# Patient Record
Sex: FEMALE | Race: WHITE | NOT HISPANIC OR LATINO | Employment: FULL TIME | URBAN - METROPOLITAN AREA
[De-identification: names, ages, dates, MRNs, and addresses within clinical notes are randomized per-mention and may not be internally consistent; named-entity substitution may affect disease eponyms.]

---

## 2017-01-04 DIAGNOSIS — E03.9 HYPOTHYROIDISM, UNSPECIFIED TYPE: Primary | ICD-10-CM

## 2017-01-06 DIAGNOSIS — E03.9 HYPOTHYROIDISM, UNSPECIFIED TYPE: Primary | ICD-10-CM

## 2017-01-06 RX ORDER — LEVOTHYROXINE SODIUM 50 UG/1
50 TABLET ORAL DAILY
Qty: 90 TABLET | Refills: 2 | Status: SHIPPED | OUTPATIENT
Start: 2017-01-06 | End: 2017-04-30 | Stop reason: DRUGHIGH

## 2017-02-23 ENCOUNTER — OFFICE VISIT (OUTPATIENT)
Dept: FAMILY MEDICINE | Facility: CLINIC | Age: 52
End: 2017-02-23
Payer: COMMERCIAL

## 2017-02-23 ENCOUNTER — RADIANT APPOINTMENT (OUTPATIENT)
Dept: GENERAL RADIOLOGY | Facility: CLINIC | Age: 52
End: 2017-02-23
Attending: FAMILY MEDICINE
Payer: COMMERCIAL

## 2017-02-23 VITALS
SYSTOLIC BLOOD PRESSURE: 114 MMHG | BODY MASS INDEX: 35.36 KG/M2 | DIASTOLIC BLOOD PRESSURE: 75 MMHG | HEIGHT: 66 IN | WEIGHT: 220 LBS | HEART RATE: 78 BPM | TEMPERATURE: 98.4 F

## 2017-02-23 DIAGNOSIS — M25.512 PAIN IN JOINT OF LEFT SHOULDER: ICD-10-CM

## 2017-02-23 DIAGNOSIS — M25.512 ACUTE PAIN OF LEFT SHOULDER: ICD-10-CM

## 2017-02-23 DIAGNOSIS — M25.512 ACUTE PAIN OF LEFT SHOULDER: Primary | ICD-10-CM

## 2017-02-23 PROCEDURE — 99213 OFFICE O/P EST LOW 20 MIN: CPT | Mod: 25 | Performed by: FAMILY MEDICINE

## 2017-02-23 PROCEDURE — 73030 X-RAY EXAM OF SHOULDER: CPT | Mod: LT

## 2017-02-23 PROCEDURE — 20610 DRAIN/INJ JOINT/BURSA W/O US: CPT | Performed by: FAMILY MEDICINE

## 2017-02-23 RX ORDER — LIDOCAINE HYDROCHLORIDE 10 MG/ML
3 INJECTION, SOLUTION INFILTRATION; PERINEURAL ONCE
Qty: 3 ML | Refills: 0 | OUTPATIENT
Start: 2017-02-23 | End: 2017-02-23

## 2017-02-23 NOTE — PROGRESS NOTES
SUBJECTIVE:                                                    Merlyn Ravi is a 51 year old female who presents to clinic today for the following health issues:      Joint Pain     Onset: 4 days ago     Description: Patient started pain in L arm 4 days ago, on  2-18 woke up with pain   Location: left shoulder  Character: Sharp, Dull ache and Stabbing    Intensity: moderate, severe with lifting     Progression of Symptoms: worse    Accompanying Signs & Symptoms:  Other symptoms: radiation of pain to down L arm  and weakness of L arm    History:   Previous similar pain: no       Precipitating factors:   Trauma or overuse: no     Alleviating factors:  Improved by: nothing       Therapies Tried and outcome: heat ice and aleve       Patient is a 51 yr old female here for left shoulder and arm pain. She woke up with the pain in her left shoulder. Patient reports that she did not have any trauma to the shoulder and she does not recollect any heavy lifting. Pain is severe and has affected her range of motion. She denies any tingling or numbness in her fingers. Pain does radiate into her upper arms.Patient reports that the pain is sharp in nature. She has been icing it and using aleve.     Problem list and histories reviewed & adjusted, as indicated.  Additional history: as documented    Patient Active Problem List   Diagnosis     CARDIOVASCULAR SCREENING; LDL GOAL LESS THAN 160     LASIK OU 5 yrs     Dermatofibroma     Monoclonal paraproteinemia     Hyperlipidemia LDL goal <130     Right elbow pain     Lateral epicondylitis of right elbow     Past Surgical History   Procedure Laterality Date     C nonspecific procedure       bladder surgery     Hysterectomy, nusrat       Lasik bilateral  2005     Dr. Solis      Hysterectomy, pap no longer indicated         Social History   Substance Use Topics     Smoking status: Never Smoker     Smokeless tobacco: Never Used     Alcohol use 0.0 oz/week     0 Standard drinks or  equivalent per week      Comment: 3 drinks per month     Family History   Problem Relation Age of Onset     Thyroid Disease Mother      HEART DISEASE Mother      HEART DISEASE Father      Hyperlipidemia Father      Hypertension Maternal Grandmother      Breast Cancer Maternal Grandmother      Hypertension Maternal Grandfather      Alzheimer Disease Paternal Grandmother      DIABETES Brother      Eczema Brother      Thyroid Disease Brother          Current Outpatient Prescriptions   Medication Sig Dispense Refill     levothyroxine (SYNTHROID/LEVOTHROID) 50 MCG tablet Take 1 tablet (50 mcg) by mouth daily 90 tablet 2     multivitamin, therapeutic with minerals (MULTI-VITAMIN) TABS Take 1 tablet by mouth daily       atorvastatin (LIPITOR) 10 MG tablet Take 1 tablet (10 mg) by mouth daily 90 tablet 3     [DISCONTINUED] levothyroxine (SYNTHROID/LEVOTHROID) 50 MCG tablet Take 1 tablet (50 mcg) by mouth daily 90 tablet 3     order for DME Equipment being ordered: wrist brace 1 Device 0     levothyroxine (SYNTHROID, LEVOTHROID) 88 MCG tablet Take 1 tablet (88 mcg) by mouth daily 90 tablet 3     IBUPROFEN PO Take 800 mg by mouth every 4 hours as needed for moderate pain       Naproxen Sodium (ALEVE PO) Take 220 mg by mouth 2 times daily (with meals)       FLUARIX QUADRIVALENT 0.5 ML injection        metroNIDAZOLE (METROCREAM) 0.75 % cream Apply twice daily to affected area on face. 45 g 11     doxycycline Monohydrate 100 MG CAPS Take 1 capsule (100 mg) by mouth daily with food 90 capsule 0     Allergies   Allergen Reactions     Demerol      Sedation and vomiting     Sulfa Drugs      unknown reaction     BP Readings from Last 3 Encounters:   02/23/17 114/75   12/08/16 118/70   12/07/16 117/68    Wt Readings from Last 3 Encounters:   02/23/17 220 lb (99.8 kg)   12/08/16 227 lb (103 kg)   12/07/16 222 lb (100.7 kg)                  Labs reviewed in EPIC  Problem list, Medication list, Allergies, and Medical/Social/Surgical  "histories reviewed in EPIC and updated as appropriate.    ROS:  C: NEGATIVE for fever, chills, change in weight  E/M: NEGATIVE for ear, mouth and throat problems  R: NEGATIVE for significant cough or SOB  CV: NEGATIVE for chest pain, palpitations or peripheral edema  MUSCULOSKELETAL: NEGATIVE for significant arthralgias or myalgia and POSITIVE  for joint pain left shoulder    OBJECTIVE:                                                    /75 (BP Location: Right arm, Cuff Size: Adult Regular)  Pulse 78  Temp 98.4  F (36.9  C) (Tympanic)  Ht 5' 5.75\" (1.67 m)  Wt 220 lb (99.8 kg)  BMI 35.78 kg/m2  Body mass index is 35.78 kg/(m^2).  GENERAL: healthy, alert and no distress  HENT: ear canals and TM's normal, nose and mouth without ulcers or lesions  NECK: no adenopathy, no asymmetry, masses, or scars and thyroid normal to palpation  CV: regular rates and rhythm, normal S1 S2, no S3 or S4 and no murmur, click or rub  MS: decreased range of motion in left shoulder  SKIN: no suspicious lesions or rashes    Diagnostic Test Results:  X-rays left shoulder  Normal x-rays     ASSESSMENT/PLAN:                                                        (M25.512) Acute pain of left shoulder  (primary encounter diagnosis)  Comment: Patient's x-rays were discussed. No dislocation or fracture.  Plan: XR Shoulder Left 2 Views            (M25.512) Pain in joint of left shoulder  Comment: Injection placed   Plan: Continue with aleve, icing and restrict self from heavy lifting .     FUTURE APPOINTMENTS:       - Follow-up visit as needed  Patient Instructions         Thank you for choosing [facility].  You may be receiving a survey in the mail from [employer] regarding your visit today.  Please take a few minutes to complete and return the survey to let us know how we are doing.      If you have questions or concerns, please contact us via [employer] or you can contact your care team at [phone].    Our Clinic hours are:  Monday " 6:40 am  to 7:00 pm  Tuesday -Friday 6:40 am to 5:00 pm    The [place] outpatient lab hours are:  Monday - Friday 6:10 am to 4:45 pm  Saturdays 7:00 am to 11:00 am  Appointments are required, call [phone]    If you have clinical questions after hours or would like to schedule an appointment,  call the clinic at [phone].      Memo Garcia MD  [facility]

## 2017-02-23 NOTE — NURSING NOTE
"Chief Complaint   Patient presents with     Shoulder Pain       Initial /75 (BP Location: Right arm, Cuff Size: Adult Regular)  Pulse 78  Temp 98.4  F (36.9  C) (Tympanic)  Ht 5' 5.75\" (1.67 m)  Wt 220 lb (99.8 kg)  BMI 35.78 kg/m2 Estimated body mass index is 35.78 kg/(m^2) as calculated from the following:    Height as of this encounter: 5' 5.75\" (1.67 m).    Weight as of this encounter: 220 lb (99.8 kg).  Medication Reconciliation: complete  "

## 2017-02-23 NOTE — MR AVS SNAPSHOT
After Visit Summary   2/23/2017    Merlyn Ravi    MRN: [id number]           Patient Information     Date Of Birth          1965        Visit Information        Provider Department      2/23/2017 8:40 AM Memo Garcia MD [facility]        Today's Diagnoses     Acute pain of left shoulder    -  1    Pain in joint of left shoulder          Care Instructions          Thank you for choosing [facility].  You may be receiving a survey in the mail from [facility] regarding your visit today.  Please take a few minutes to complete and return the survey to let us know how we are doing.      If you have questions or concerns, please contact us via [employer] or you can contact your care team at [phone].    Our Clinic hours are:  Monday 6:40 am  to 7:00 pm  Tuesday -Friday 6:40 am to 5:00 pm    The [place] outpatient lab hours are:  Monday - Friday 6:10 am to 4:45 pm  Saturdays 7:00 am to 11:00 am  Appointments are required, call [phone]    If you have clinical questions after hours or would like to schedule an appointment,  call the clinic at [phone].        Follow-ups after your visit        Your next 10 appointments already scheduled     Mar 14, 2017  2:00 PM CDT   (Arrive by 1:30 PM)   RETURN ENDOCRINE with Gala Pop MD   [facility] ([facility])    [address] [id number] [phone]            Oct 12, 2017 11:00 AM CDT   LAB with [facility] ([facility])    [address] [place] [id number] [phone]           Patient must bring picture ID.  Patient should be prepared to give a urine specimen  Please do not eat 10-12 hours before your appointment if you are coming in fasting for labs on lipids, cholesterol, or glucose (sugar).  Pregnant women should follow their Care Team instructions. Water with medications is okay. Do  "not drink coffee or other fluids.   If you have concerns about taking  your medications, please ask at office or if scheduling via [employer], send a message by clicking on Secure Messaging, Message Your Care Team.            Oct 19, 2017 11:00 AM CDT   Return Visit with Tanvi Blakely MD   [facility] ([facility])    [facility]  [address]  [facility] [address] [phone]              Who to contact     If you have questions or need follow up information about today's clinic visit or your schedule please contact [facility] directly at [phone].  Normal or non-critical lab and imaging results will be communicated to you by MyChart, letter or phone within 4 business days after the clinic has received the results. If you do not hear from us within 7 days, please contact the clinic through [employer] or phone. If you have a critical or abnormal lab result, we will notify you by phone as soon as possible.  Submit refill requests through [employer] or call your pharmacy and they will forward the refill request to us. Please allow 3 business days for your refill to be completed.          Additional Information About Your Visit        [employer] Information     [employer] gives you secure access to your electronic health record. If you see a primary care provider, you can also send messages to your care team and make appointments. If you have questions, please call your primary care clinic.  If you do not have a primary care provider, please call [phone] and they will assist you.        Care EveryWhere ID     This is your Care EveryWhere ID. This could be used by other organizations to access your [facility] medical records  [id number]        Your Vitals Were     Pulse Temperature Height BMI (Body Mass Index)          78 98.4  F (36.9  C) (Tympanic) 5' 5.75\" (1.67 m) 35.78 kg/m2         Blood Pressure from Last 3 Encounters:   02/23/17 114/75 "   12/08/16 118/70   12/07/16 117/68    Weight from Last 3 Encounters:   02/23/17 220 lb (99.8 kg)   12/08/16 227 lb (103 kg)   12/07/16 222 lb (100.7 kg)              We Performed the Following     Kenalog 40 MG  []          Today's Medication Changes          These changes are accurate as of: 2/23/17 11:01 AM.  If you have any questions, ask your nurse or doctor.               Start taking these medicines.        Dose/Directions    lidocaine 1 % injection   Used for:  Acute pain of left shoulder, Pain in joint of left shoulder   Started by:  Memo Garcia MD        Dose:  3 mL   3 mLs by INTRA-ARTICULAR route once for 1 dose   Quantity:  3 mL   Refills:  0         These medicines have changed or have updated prescriptions.        Dose/Directions    * levothyroxine 88 MCG tablet   Commonly known as:  SYNTHROID/LEVOTHROID   This may have changed:  Another medication with the same name was removed. Continue taking this medication, and follow the directions you see here.   Used for:  Hypothyroidism, unspecified type   Changed by:  Gala Pop MD        Dose:  88 mcg   Take 1 tablet (88 mcg) by mouth daily   Quantity:  90 tablet   Refills:  3       * levothyroxine 50 MCG tablet   Commonly known as:  SYNTHROID/LEVOTHROID   This may have changed:  Another medication with the same name was removed. Continue taking this medication, and follow the directions you see here.   Used for:  Hypothyroidism, unspecified type   Changed by:  Gala Pop MD        Dose:  50 mcg   Take 1 tablet (50 mcg) by mouth daily   Quantity:  90 tablet   Refills:  2       * Notice:  This list has 2 medication(s) that are the same as other medications prescribed for you. Read the directions carefully, and ask your doctor or other care provider to review them with you.      Stop taking these medicines if you haven't already. Please contact your care team if you have questions.     predniSONE 20 MG tablet   Commonly known as:   DELTASONE   Stopped by:  Memo Garcia MD                Where to get your medicines      Some of these will need a paper prescription and others can be bought over the counter.  Ask your nurse if you have questions.     You don't need a prescription for these medications     lidocaine 1 % injection                Primary Care Provider Office Phone # Fax #    Memo Garcia [phone]       [facility] [address] [address]        Thank you!     Thank you for choosing [facility]  for your care. Our goal is always to provide you with excellent care. Hearing back from our patients is one way we can continue to improve our services. Please take a few minutes to complete the written survey that you may receive in the mail after your visit with us. Thank you!             Your Updated Medication List - Protect others around you: Learn how to safely use, store and throw away your medicines at www.disposemymeds.org.          This list is accurate as of: 2/23/17 11:01 AM.  Always use your most recent med list.                   Brand Name Dispense Instructions for use    ALEVE PO      Take 220 mg by mouth 2 times daily (with meals)       atorvastatin 10 MG tablet    LIPITOR    90 tablet    Take 1 tablet (10 mg) by mouth daily       doxycycline Monohydrate 100 MG Caps     90 capsule    Take 1 capsule (100 mg) by mouth daily with food       FLUARIX QUADRIVALENT 0.5 ML injection   Generic drug:  influenza quadrivalent (PF) vacc age 3 yrs and older          IBUPROFEN PO      Take 800 mg by mouth every 4 hours as needed for moderate pain       * levothyroxine 88 MCG tablet    SYNTHROID/LEVOTHROID    90 tablet    Take 1 tablet (88 mcg) by mouth daily       * levothyroxine 50 MCG tablet    SYNTHROID/LEVOTHROID    90 tablet    Take 1 tablet (50 mcg) by mouth daily       lidocaine 1 % injection     3 mL    3 mLs by INTRA-ARTICULAR route once for 1  dose       metroNIDAZOLE 0.75 % cream    METROCREAM    45 g    Apply twice daily to affected area on face.       Multi-vitamin Tabs tablet      Take 1 tablet by mouth daily       order for DME     1 Device    Equipment being ordered: wrist brace       * Notice:  This list has 2 medication(s) that are the same as other medications prescribed for you. Read the directions carefully, and ask your doctor or other care provider to review them with you.

## 2017-02-23 NOTE — PROCEDURES
Injection Procedure note  After obtaining consent from the patient the area of the joint( left shoulder )  was prepped in the usual fashion. Lidocaine 1% (2mls)  with Kenalog (40 Mg ) ( ( 2mls ) was injected into the joint space .Area was cleansed .Band aid applied.Patient tolerated the procedure well.

## 2017-02-23 NOTE — PATIENT INSTRUCTIONS
Thank you for choosing [facility].  You may be receiving a survey in the mail from [employer] regarding your visit today.  Please take a few minutes to complete and return the survey to let us know how we are doing.      If you have questions or concerns, please contact us via [employer] or you can contact your care team at [phone].    Our Clinic hours are:  Monday 6:40 am  to 7:00 pm  Tuesday -Friday 6:40 am to 5:00 pm    The [place] outpatient lab hours are:  Monday - Friday 6:10 am to 4:45 pm  Saturdays 7:00 am to 11:00 am  Appointments are required, call [phone]    If you have clinical questions after hours or would like to schedule an appointment,  call the clinic at [phone].

## 2017-04-05 DIAGNOSIS — E78.5 HYPERLIPIDEMIA LDL GOAL <130: ICD-10-CM

## 2017-04-05 NOTE — TELEPHONE ENCOUNTER
atorvastatin (LIPITOR) 10 MG tablet     Last Written Prescription Date: 1/26/16  Last Fill Quantity: 90, # refills: 3  Last Office Visit with G, P or [facility] prescribing provider: 2/23/16       Lab Results   Component Value Date    CHOL 221 05/12/2016     Lab Results   Component Value Date    HDL 55 05/12/2016     Lab Results   Component Value Date     05/12/2016     Lab Results   Component Value Date    TRIG 181 05/12/2016     Lab Results   Component Value Date    CHOLHDLRATIO 2.9 03/03/2015

## 2017-04-11 RX ORDER — ATORVASTATIN CALCIUM 10 MG/1
TABLET, FILM COATED ORAL
Qty: 90 TABLET | Refills: 0 | Status: SHIPPED | OUTPATIENT
Start: 2017-04-11 | End: 2017-07-25

## 2017-04-19 ENCOUNTER — MYC MEDICAL ADVICE (OUTPATIENT)
Dept: ENDOCRINOLOGY | Facility: CLINIC | Age: 52
End: 2017-04-19

## 2017-04-19 DIAGNOSIS — E06.3 HYPOTHYROIDISM DUE TO HASHIMOTO'S THYROIDITIS: Primary | ICD-10-CM

## 2017-04-27 DIAGNOSIS — E06.3 HYPOTHYROIDISM DUE TO HASHIMOTO'S THYROIDITIS: ICD-10-CM

## 2017-04-27 LAB
T4 FREE SERPL-MCNC: 1.01 NG/DL (ref 0.76–1.46)
TSH SERPL DL<=0.05 MIU/L-ACNC: 1.9 MU/L (ref 0.4–4)

## 2017-04-27 PROCEDURE — 36415 COLL VENOUS BLD VENIPUNCTURE: CPT | Performed by: INTERNAL MEDICINE

## 2017-04-27 PROCEDURE — 84439 ASSAY OF FREE THYROXINE: CPT | Performed by: INTERNAL MEDICINE

## 2017-04-27 PROCEDURE — 84443 ASSAY THYROID STIM HORMONE: CPT | Performed by: INTERNAL MEDICINE

## 2017-04-30 ENCOUNTER — DOCUMENTATION ONLY (OUTPATIENT)
Dept: ENDOCRINOLOGY | Facility: CLINIC | Age: 52
End: 2017-04-30

## 2017-04-30 DIAGNOSIS — E06.3 HASHIMOTO'S THYROIDITIS: Primary | ICD-10-CM

## 2017-04-30 RX ORDER — LEVOTHYROXINE SODIUM 75 UG/1
75 TABLET ORAL DAILY
Qty: 90 TABLET | Refills: 3 | Status: SHIPPED | OUTPATIENT
Start: 2017-04-30 | End: 2018-09-05

## 2017-05-01 ENCOUNTER — TELEPHONE (OUTPATIENT)
Dept: ENDOCRINOLOGY | Facility: CLINIC | Age: 52
End: 2017-05-01

## 2017-05-01 NOTE — PROGRESS NOTES
Patient contact us that she has fatigue persists.  Ordered thyroid lab and normal range.   She was 88 mcg before, then decreased 50, considering her symtpoms, will try 75 mcg.   Sent message to MG team to contact to patient.    Gala Pop MD  Staff Physician  Endocrinology and Metabolism  [facility]  License: [place] [id number]  Pager: [phone]   Ref. Range 4/27/2017 11:38   TSH Latest Ref Range: 0.40 - 4.00 mU/L 1.90   T4 Free Latest Ref Range: 0.76 - 1.46 ng/dL 1.01

## 2017-05-01 NOTE — TELEPHONE ENCOUNTER
Left message for patient to return call.    Mouna Helm LPN  Adult Endocrinology  [facility]      Per Dr. Pop Message  Hi,     She contacted us that her fatigue worsened, ordered thryoid lab,   Came back normal range, but considering her symtoms will change from 50 mcg to 75 mcg levothyroxine, ordered to [facility] [place], [address] [address]. Could you tell her and also she needs to come to see me in 4 months, before that she needs lab, which I ordered.     Thank you     Gala

## 2017-05-03 NOTE — TELEPHONE ENCOUNTER
Left voicemail for patient to contact our office.     Angella Haynes RN  Endocrine Care Coordinator  [facility]

## 2017-05-10 NOTE — TELEPHONE ENCOUNTER
Left voicemail for patient to contact our office.     Angella Haynes RN  Endocrine Care Coordinator  [facility]

## 2017-07-19 DIAGNOSIS — E78.5 HYPERLIPIDEMIA LDL GOAL <130: ICD-10-CM

## 2017-07-19 RX ORDER — ATORVASTATIN CALCIUM 10 MG/1
TABLET, FILM COATED ORAL
Qty: 90 TABLET | Refills: 0 | Status: CANCELLED | OUTPATIENT
Start: 2017-07-19

## 2017-07-19 NOTE — TELEPHONE ENCOUNTER
Atorvastatin         Last Written Prescription Date: 04/11/17  Last Fill Quantity: 90, # refills: 0    Last Office Visit with [facility], P or [facility] prescribing provider:  02/23/17   Future Office Visit:      BP Readings from Last 3 Encounters:   02/23/17 114/75   12/08/16 118/70   12/07/16 117/68     Lab Results   Component Value Date    ALT 36 10/06/2016     Lab Results   Component Value Date    CHOL 221 05/12/2016     Lab Results   Component Value Date    HDL 55 05/12/2016     Lab Results   Component Value Date     05/12/2016     Lab Results   Component Value Date    TRIG 181 05/12/2016     Lab Results   Component Value Date    CHOLHDLRATIO 2.9 03/03/2015

## 2017-07-25 ENCOUNTER — MYC MEDICAL ADVICE (OUTPATIENT)
Dept: FAMILY MEDICINE | Facility: CLINIC | Age: 52
End: 2017-07-25

## 2017-07-25 DIAGNOSIS — E78.5 HYPERLIPIDEMIA LDL GOAL <130: ICD-10-CM

## 2017-07-25 RX ORDER — ATORVASTATIN CALCIUM 10 MG/1
10 TABLET, FILM COATED ORAL DAILY
Qty: 90 TABLET | Refills: 3 | Status: SHIPPED | OUTPATIENT
Start: 2017-07-25 | End: 2018-09-05

## 2017-08-24 DIAGNOSIS — D47.2 MONOCLONAL PARAPROTEINEMIA: Primary | ICD-10-CM

## 2017-08-28 ENCOUNTER — OFFICE VISIT (OUTPATIENT)
Dept: FAMILY MEDICINE | Facility: CLINIC | Age: 52
End: 2017-08-28
Payer: COMMERCIAL

## 2017-08-28 VITALS
SYSTOLIC BLOOD PRESSURE: 121 MMHG | TEMPERATURE: 97.5 F | WEIGHT: 213 LBS | HEIGHT: 66 IN | DIASTOLIC BLOOD PRESSURE: 73 MMHG | BODY MASS INDEX: 34.23 KG/M2

## 2017-08-28 DIAGNOSIS — R22.9 LOCALIZED SUPERFICIAL SWELLING, MASS, OR LUMP: ICD-10-CM

## 2017-08-28 DIAGNOSIS — T75.3XXA MOTION SICKNESS, INITIAL ENCOUNTER: Primary | ICD-10-CM

## 2017-08-28 PROCEDURE — 99213 OFFICE O/P EST LOW 20 MIN: CPT | Performed by: FAMILY MEDICINE

## 2017-08-28 RX ORDER — ONDANSETRON 4 MG/1
4 TABLET, FILM COATED ORAL EVERY 8 HOURS PRN
Qty: 18 TABLET | Refills: 0 | Status: SHIPPED | OUTPATIENT
Start: 2017-08-28 | End: 2020-01-31

## 2017-08-28 RX ORDER — MECLIZINE HYDROCHLORIDE 25 MG/1
25 TABLET ORAL EVERY 6 HOURS PRN
Qty: 30 TABLET | Refills: 1 | Status: SHIPPED | OUTPATIENT
Start: 2017-08-28 | End: 2020-01-31

## 2017-08-28 NOTE — NURSING NOTE
"Chief Complaint   Patient presents with     motion sickness     Mass       Initial /73 (BP Location: Left arm, Cuff Size: Adult Regular)  Temp 97.5  F (36.4  C) (Tympanic)  Ht 5' 5.75\" (1.67 m)  Wt 213 lb (96.6 kg)  BMI 34.64 kg/m2 Estimated body mass index is 34.64 kg/(m^2) as calculated from the following:    Height as of this encounter: 5' 5.75\" (1.67 m).    Weight as of this encounter: 213 lb (96.6 kg).  Medication Reconciliation: complete  "

## 2017-08-28 NOTE — PATIENT INSTRUCTIONS
Thank you for choosing [facility].  You may be receiving a survey in the mail from Jakub Bianchi regarding your visit today.  Please take a few minutes to complete and return the survey to let us know how we are doing.      If you have questions or concerns, please contact us via Studio Moderna or you can contact your care team at [phone].    Our Clinic hours are:  Monday 6:40 am  to 7:00 pm  Tuesday -Friday 6:40 am to 5:00 pm    The [place] outpatient lab hours are:  Monday - Friday 6:10 am to 4:45 pm  Saturdays 7:00 am to 11:00 am  Appointments are required, call [phone]    If you have clinical questions after hours or would like to schedule an appointment,  call the clinic at [phone].  Meclizine tablets or capsules  What is this medicine?  MECLIZINE (MEK anita zeen) is an antihistamine. It is used to prevent nausea, vomiting, or dizziness caused by motion sickness. It is also used to prevent and treat vertigo (extreme dizziness or a feeling that you or your surroundings are tilting or spinning around).  How should I use this medicine?  Take this medicine by mouth with a glass of water. Follow the directions on the prescription label. If you are using this medicine to prevent motion sickness, take the dose at least 1 hour before travel. If it upsets your stomach, take it with food or milk. Take your doses at regular intervals. Do not take your medicine more often than directed.  Talk to your pediatrician regarding the use of this medicine in children. Special care may be needed.  What side effects may I notice from receiving this medicine?  Side effects that you should report to your doctor or health care professional as soon as possible:    feeling faint or lightheaded, falls    fast, irregular heartbeat  Side effects that usually do not require medical attention (report these to your doctor or health care professional if they continue or are bothersome):    constipation    headache    trouble  passing urine or change in the amount of urine    trouble sleeping    upset stomach  What may interact with this medicine?  Do not take this medicine with any of the following medications:    MAOIs like Carbex, Eldepryl, Marplan, Nardil, and Parnate  This medicine may also interact with the following medications:    alcohol    antihistamines for allergy, cough and cold    certain medicines for anxiety or sleep    certain medicines for depression, like amitriptyline, fluoxetine, sertraline    certain medicines for seizures like phenobarbital, primidone    general anesthetics like halothane, isoflurane, methoxyflurane, propofol    local anesthetics like lidocaine, pramoxine, tetracaine    medicines that relax muscles for surgery    narcotic medicines for pain    phenothiazines like chlorpromazine, mesoridazine, prochlorperazine, thioridazine  What if I miss a dose?  If you miss a dose, take it as soon as you can. If it is almost time for your next dose, take only that dose. Do not take double or extra doses.  Where should I keep my medicine?  Keep out of the reach of children.  Store at room temperature between 15 and 30 degrees C (59 and 86 degrees F). Keep container tightly closed. Throw away any unused medicine after the expiration date.  What should I tell my health care provider before I take this medicine?  They need to know if you have any of these conditions:    glaucoma    lung or breathing disease, like asthma    problems urinating    prostate disease    stomach or intestine problems    an unusual or allergic reaction to meclizine, other medicines, foods, dyes, or preservatives    pregnant or trying to get pregnant    breast-feeding  What should I watch for while using this medicine?  Tell your doctor or healthcare professional if your symptoms do not start to get better or if they get worse.  You may get drowsy or dizzy. Do not drive, use machinery, or do anything that needs mental alertness until you know  how this medicine affects you. Do not stand or sit up quickly, especially if you are an older patient. This reduces the risk of dizzy or fainting spells. Alcohol may interfere with the effect of this medicine. Avoid alcoholic drinks.  Your mouth may get dry. Chewing sugarless gum or sucking hard candy, and drinking plenty of water may help. Contact your doctor if the problem does not go away or is severe.  This medicine may cause dry eyes and blurred vision. If you wear contact lenses you may feel some discomfort. Lubricating drops may help. See your eye doctor if the problem does not go away or is severe.  NOTE:This sheet is a summary. It may not cover all possible information. If you have questions about this medicine, talk to your doctor, pharmacist, or health care provider. Copyright  2017 [employer] Standard

## 2017-08-28 NOTE — MR AVS SNAPSHOT
After Visit Summary   8/28/2017    Merlyn Ravi    MRN: [id number]           Patient Information     Date Of Birth          1965        Visit Information        Provider Department      8/28/2017 9:00 AM Memo Garcia MD [facility]        Today's Diagnoses     Motion sickness, initial encounter    -  1      Care Instructions          Thank you for choosing [facility].  You may be receiving a survey in the mail from [facility] regarding your visit today.  Please take a few minutes to complete and return the survey to let us know how we are doing.      If you have questions or concerns, please contact us via LP Amina or you can contact your care team at [phone].    Our Clinic hours are:  Monday 6:40 am  to 7:00 pm  Tuesday -Friday 6:40 am to 5:00 pm    The [place] outpatient lab hours are:  Monday - Friday 6:10 am to 4:45 pm  Saturdays 7:00 am to 11:00 am  Appointments are required, call [phone]    If you have clinical questions after hours or would like to schedule an appointment,  call the clinic at [phone].  Meclizine tablets or capsules  What is this medicine?  MECLIZINE (MEK anita zeen) is an antihistamine. It is used to prevent nausea, vomiting, or dizziness caused by motion sickness. It is also used to prevent and treat vertigo (extreme dizziness or a feeling that you or your surroundings are tilting or spinning around).  How should I use this medicine?  Take this medicine by mouth with a glass of water. Follow the directions on the prescription label. If you are using this medicine to prevent motion sickness, take the dose at least 1 hour before travel. If it upsets your stomach, take it with food or milk. Take your doses at regular intervals. Do not take your medicine more often than directed.  Talk to your pediatrician regarding the use of this medicine in children. Special care may be needed.  What side effects may I notice from receiving  this medicine?  Side effects that you should report to your doctor or health care professional as soon as possible:    feeling faint or lightheaded, falls    fast, irregular heartbeat  Side effects that usually do not require medical attention (report these to your doctor or health care professional if they continue or are bothersome):    constipation    headache    trouble passing urine or change in the amount of urine    trouble sleeping    upset stomach  What may interact with this medicine?  Do not take this medicine with any of the following medications:    MAOIs like Carbex, Eldepryl, Marplan, Nardil, and Parnate  This medicine may also interact with the following medications:    alcohol    antihistamines for allergy, cough and cold    certain medicines for anxiety or sleep    certain medicines for depression, like amitriptyline, fluoxetine, sertraline    certain medicines for seizures like phenobarbital, primidone    general anesthetics like halothane, isoflurane, methoxyflurane, propofol    local anesthetics like lidocaine, pramoxine, tetracaine    medicines that relax muscles for surgery    narcotic medicines for pain    phenothiazines like chlorpromazine, mesoridazine, prochlorperazine, thioridazine  What if I miss a dose?  If you miss a dose, take it as soon as you can. If it is almost time for your next dose, take only that dose. Do not take double or extra doses.  Where should I keep my medicine?  Keep out of the reach of children.  Store at room temperature between 15 and 30 degrees C (59 and 86 degrees F). Keep container tightly closed. Throw away any unused medicine after the expiration date.  What should I tell my health care provider before I take this medicine?  They need to know if you have any of these conditions:    glaucoma    lung or breathing disease, like asthma    problems urinating    prostate disease    stomach or intestine problems    an unusual or allergic reaction to meclizine, other  medicines, foods, dyes, or preservatives    pregnant or trying to get pregnant    breast-feeding  What should I watch for while using this medicine?  Tell your doctor or healthcare professional if your symptoms do not start to get better or if they get worse.  You may get drowsy or dizzy. Do not drive, use machinery, or do anything that needs mental alertness until you know how this medicine affects you. Do not stand or sit up quickly, especially if you are an older patient. This reduces the risk of dizzy or fainting spells. Alcohol may interfere with the effect of this medicine. Avoid alcoholic drinks.  Your mouth may get dry. Chewing sugarless gum or sucking hard candy, and drinking plenty of water may help. Contact your doctor if the problem does not go away or is severe.  This medicine may cause dry eyes and blurred vision. If you wear contact lenses you may feel some discomfort. Lubricating drops may help. See your eye doctor if the problem does not go away or is severe.  NOTE:This sheet is a summary. It may not cover all possible information. If you have questions about this medicine, talk to your doctor, pharmacist, or health care provider. Copyright  2017 Gold Standard                Follow-ups after your visit        Your next 10 appointments already scheduled     Sep 15, 2017  8:50 AM CDT   LAB with [facility] ([facility])    [address] [address] [phone]           Patient must bring picture ID. Patient should be prepared to give a urine specimen  Please do not eat 10-12 hours before your appointment if you are coming in fasting for labs on lipids, cholesterol, or glucose (sugar). Pregnant women should follow their Care Team instructions. Water with medications is okay. Do not drink coffee or other fluids. If you have concerns about taking  your medications, please ask at office or if scheduling via IncellDx, send a message by clicking  "on Secure Messaging, Message Your Care Team.            Sep 20, 2017  9:00 AM CDT   Return Visit with Tanvi Blakely MD   [facility] ([facility])    [facility]  [address]  [facility] [id number] [phone]              Who to contact     If you have questions or need follow up information about today's clinic visit or your schedule please contact [facility] directly at [phone].  Normal or non-critical lab and imaging results will be communicated to you by GleeMasterhart, letter or phone within 4 business days after the clinic has received the results. If you do not hear from us within 7 days, please contact the clinic through Actionalityt or phone. If you have a critical or abnormal lab result, we will notify you by phone as soon as possible.  Submit refill requests through Ridemakerz or call your pharmacy and they will forward the refill request to us. Please allow 3 business days for your refill to be completed.          Additional Information About Your Visit        GleeMasterhart Information     Ridemakerz gives you secure access to your electronic health record. If you see a primary care provider, you can also send messages to your care team and make appointments. If you have questions, please call your primary care clinic.  If you do not have a primary care provider, please call [phone] and they will assist you.        Care EveryWhere ID     This is your Care EveryWhere ID. This could be used by other organizations to access your [place] medical records  [id number]        Your Vitals Were     Temperature Height BMI (Body Mass Index)             97.5  F (36.4  C) (Tympanic) 5' 5.75\" (1.67 m) 34.64 kg/m2          Blood Pressure from Last 3 Encounters:   08/28/17 121/73   02/23/17 114/75   12/08/16 118/70    Weight from Last 3 Encounters:   08/28/17 213 lb (96.6 kg)   02/23/17 220 lb (99.8 kg)   12/08/16 227 lb (103 kg)              Today, you " had the following     No orders found for display         Today's Medication Changes          These changes are accurate as of: 8/28/17  9:37 AM.  If you have any questions, ask your nurse or doctor.               Start taking these medicines.        Dose/Directions    meclizine 25 MG tablet   Commonly known as:  ANTIVERT   Used for:  Motion sickness, initial encounter   Started by:  Memo Garcia MD        Dose:  25 mg   Take 1 tablet (25 mg) by mouth every 6 hours as needed for dizziness   Quantity:  30 tablet   Refills:  1       ondansetron 4 MG tablet   Commonly known as:  ZOFRAN   Used for:  Motion sickness, initial encounter   Started by:  Memo Garcia MD        Dose:  4 mg   Take 1 tablet (4 mg) by mouth every 8 hours as needed   Quantity:  18 tablet   Refills:  0            Where to get your medicines      These medications were sent to [facility] [address]  [address] [address]     Phone:  [phone]     meclizine 25 MG tablet    ondansetron 4 MG tablet                Primary Care Provider Office Phone # Fax #    Memo Garcia [phone]       [address] [address]        Equal Access to Services     CONTRERAS HERNANDEZ : Hadii gildardo howard hadasho Sodeeali, waaxda luqadaha, qaybta kaalmada adeegyada, kadi olivera. So [facility] [phone].    ATENCIÓN: Si habla español, tiene a gay disposición servicios gratuitos de asistencia lingüística. Llame al [phone].    We comply with applicable federal civil rights laws and [place] laws. We do not discriminate on the basis of race, color, national origin, age, disability sex, sexual orientation or gender identity.            Thank you!     Thank you for choosing [facility]  for your care. Our goal is always to provide you with excellent care. Hearing back from our patients is one way we can continue to  improve our services. Please take a few minutes to complete the written survey that you may receive in the mail after your visit with us. Thank you!             Your Updated Medication List - Protect others around you: Learn how to safely use, store and throw away your medicines at www.disposemymeds.org.          This list is accurate as of: 8/28/17  9:37 AM.  Always use your most recent med list.                   Brand Name Dispense Instructions for use Diagnosis    ALEVE PO      Take 220 mg by mouth 2 times daily (with meals)        atorvastatin 10 MG tablet    LIPITOR    90 tablet    Take 1 tablet (10 mg) by mouth daily    Hyperlipidemia LDL goal <130       doxycycline Monohydrate 100 MG Caps     90 capsule    Take 1 capsule (100 mg) by mouth daily with food    Acne rosacea       FLUARIX QUADRIVALENT 0.5 ML injection   Generic drug:  influenza quadrivalent (PF) vacc age 3 yrs and older           IBUPROFEN PO      Take 800 mg by mouth every 4 hours as needed for moderate pain        levothyroxine 75 MCG tablet    SYNTHROID/LEVOTHROID    90 tablet    Take 1 tablet (75 mcg) by mouth daily    Hashimoto's thyroiditis       meclizine 25 MG tablet    ANTIVERT    30 tablet    Take 1 tablet (25 mg) by mouth every 6 hours as needed for dizziness    Motion sickness, initial encounter       metroNIDAZOLE 0.75 % cream    METROCREAM    45 g    Apply twice daily to affected area on face.    Acne rosacea       Multi-vitamin Tabs tablet      Take 1 tablet by mouth daily        ondansetron 4 MG tablet    ZOFRAN    18 tablet    Take 1 tablet (4 mg) by mouth every 8 hours as needed    Motion sickness, initial encounter       order for DME     1 Device    Equipment being ordered: wrist brace    Right elbow pain, Overuse injury, Lateral epicondylitis of right elbow

## 2017-09-14 ENCOUNTER — HOSPITAL ENCOUNTER (OUTPATIENT)
Dept: LAB | Facility: CLINIC | Age: 52
Discharge: HOME | End: 2017-09-14
Attending: INTERNAL MEDICINE | Admitting: INTERNAL MEDICINE
Payer: COMMERCIAL

## 2017-09-14 DIAGNOSIS — D47.2 MONOCLONAL PARAPROTEINEMIA: ICD-10-CM

## 2017-09-14 LAB
ALBUMIN SERPL-MCNC: 3.9 G/DL (ref 3.4–5)
ALP SERPL-CCNC: 103 U/L (ref 40–150)
ALT SERPL W P-5'-P-CCNC: 23 U/L (ref 0–50)
ANION GAP SERPL CALCULATED.3IONS-SCNC: 5 MMOL/L (ref 3–14)
AST SERPL W P-5'-P-CCNC: 16 U/L (ref 0–45)
BASOPHILS # BLD AUTO: 0 10E9/L (ref 0–0.2)
BASOPHILS NFR BLD AUTO: 0.6 %
BILIRUB SERPL-MCNC: 0.7 MG/DL (ref 0.2–1.3)
BUN SERPL-MCNC: 16 MG/DL (ref 7–30)
CALCIUM SERPL-MCNC: 9.4 MG/DL (ref 8.5–10.1)
CHLORIDE SERPL-SCNC: 109 MMOL/L (ref 94–109)
CO2 SERPL-SCNC: 27 MMOL/L (ref 20–32)
CREAT SERPL-MCNC: 0.86 MG/DL (ref 0.52–1.04)
DIFFERENTIAL METHOD BLD: NORMAL
EOSINOPHIL # BLD AUTO: 0.2 10E9/L (ref 0–0.7)
EOSINOPHIL NFR BLD AUTO: 3.5 %
ERYTHROCYTE [DISTWIDTH] IN BLOOD BY AUTOMATED COUNT: 12.6 % (ref 10–15)
GFR SERPL CREATININE-BSD FRML MDRD: 69 ML/MIN/1.7M2
GLUCOSE SERPL-MCNC: 96 MG/DL (ref 70–99)
HCT VFR BLD AUTO: 41.1 % (ref 35–47)
HGB BLD-MCNC: 13.7 G/DL (ref 11.7–15.7)
IMM GRANULOCYTES # BLD: 0 10E9/L (ref 0–0.4)
IMM GRANULOCYTES NFR BLD: 0 %
LYMPHOCYTES # BLD AUTO: 1.9 10E9/L (ref 0.8–5.3)
LYMPHOCYTES NFR BLD AUTO: 38.4 %
MCH RBC QN AUTO: 30.6 PG (ref 26.5–33)
MCHC RBC AUTO-ENTMCNC: 33.3 G/DL (ref 31.5–36.5)
MCV RBC AUTO: 92 FL (ref 78–100)
MONOCYTES # BLD AUTO: 0.3 10E9/L (ref 0–1.3)
MONOCYTES NFR BLD AUTO: 5.8 %
NEUTROPHILS # BLD AUTO: 2.5 10E9/L (ref 1.6–8.3)
NEUTROPHILS NFR BLD AUTO: 51.7 %
PLATELET # BLD AUTO: 264 10E9/L (ref 150–450)
POTASSIUM SERPL-SCNC: 4 MMOL/L (ref 3.4–5.3)
PROT SERPL-MCNC: 7.8 G/DL (ref 6.8–8.8)
RBC # BLD AUTO: 4.47 10E12/L (ref 3.8–5.2)
SODIUM SERPL-SCNC: 141 MMOL/L (ref 133–144)
WBC # BLD AUTO: 4.8 10E9/L (ref 4–11)

## 2017-09-14 PROCEDURE — 82784 ASSAY IGA/IGD/IGG/IGM EACH: CPT | Performed by: INTERNAL MEDICINE

## 2017-09-14 PROCEDURE — 84165 PROTEIN E-PHORESIS SERUM: CPT | Performed by: INTERNAL MEDICINE

## 2017-09-14 PROCEDURE — 83883 ASSAY NEPHELOMETRY NOT SPEC: CPT | Performed by: INTERNAL MEDICINE

## 2017-09-14 PROCEDURE — 80053 COMPREHEN METABOLIC PANEL: CPT | Performed by: INTERNAL MEDICINE

## 2017-09-14 PROCEDURE — 85025 COMPLETE CBC W/AUTO DIFF WBC: CPT | Performed by: INTERNAL MEDICINE

## 2017-09-14 PROCEDURE — 00000402 ZZHCL STATISTIC TOTAL PROTEIN: Performed by: INTERNAL MEDICINE

## 2017-09-14 PROCEDURE — 36415 COLL VENOUS BLD VENIPUNCTURE: CPT | Performed by: INTERNAL MEDICINE

## 2017-09-15 LAB
ALBUMIN SERPL ELPH-MCNC: 4.3 G/DL (ref 3.7–5.1)
ALPHA1 GLOB SERPL ELPH-MCNC: 0.3 G/DL (ref 0.2–0.4)
ALPHA2 GLOB SERPL ELPH-MCNC: 0.8 G/DL (ref 0.5–0.9)
B-GLOBULIN SERPL ELPH-MCNC: 0.8 G/DL (ref 0.6–1)
GAMMA GLOB SERPL ELPH-MCNC: 1.5 G/DL (ref 0.7–1.6)
IGA SERPL-MCNC: 92 MG/DL (ref 70–380)
IGG SERPL-MCNC: 1390 MG/DL (ref 695–1620)
IGM SERPL-MCNC: 179 MG/DL (ref 60–265)
KAPPA LC UR-MCNC: 1.54 MG/DL (ref 0.33–1.94)
KAPPA LC/LAMBDA SER: 0.69 {RATIO} (ref 0.26–1.65)
LAMBDA LC SERPL-MCNC: 2.23 MG/DL (ref 0.57–2.63)
M PROTEIN SERPL ELPH-MCNC: 0.7 G/DL
PROT PATTERN SERPL ELPH-IMP: ABNORMAL

## 2017-09-20 ENCOUNTER — ONCOLOGY VISIT (OUTPATIENT)
Dept: ONCOLOGY | Facility: CLINIC | Age: 52
End: 2017-09-20
Attending: INTERNAL MEDICINE
Payer: COMMERCIAL

## 2017-09-20 VITALS
SYSTOLIC BLOOD PRESSURE: 124 MMHG | HEART RATE: 70 BPM | TEMPERATURE: 98.5 F | WEIGHT: 212.6 LBS | OXYGEN SATURATION: 98 % | HEIGHT: 66 IN | RESPIRATION RATE: 18 BRPM | BODY MASS INDEX: 34.17 KG/M2 | DIASTOLIC BLOOD PRESSURE: 89 MMHG

## 2017-09-20 DIAGNOSIS — D47.2 MONOCLONAL PARAPROTEINEMIA: Primary | ICD-10-CM

## 2017-09-20 PROCEDURE — 99213 OFFICE O/P EST LOW 20 MIN: CPT | Performed by: INTERNAL MEDICINE

## 2017-09-20 PROCEDURE — 99211 OFF/OP EST MAY X REQ PHY/QHP: CPT

## 2017-09-20 RX ORDER — LEVOTHYROXINE SODIUM 50 UG/1
TABLET ORAL
Refills: 2 | COMMUNITY
Start: 2017-04-05 | End: 2017-09-20 | Stop reason: DRUGHIGH

## 2017-09-20 ASSESSMENT — PAIN SCALES - GENERAL: PAINLEVEL: NO PAIN (0)

## 2017-09-20 NOTE — PATIENT INSTRUCTIONS
We would like to see you back in clinic with Dr. Blakely in 1 year with labs prior.  Copy of appointments, and after visit summary (AVS) given to patient.  If you have any questions during business hours (M-F 8 AM- 4PM), please call Smiley Gonzalez RN, BSN, OCN Oncology Hematology /Breast Cancer Navigator at [facility] [phone].   For questions after business hours, or on holidays/weekends, please call our after hours Nurse Triage line [phone]. Thank you.

## 2017-09-20 NOTE — NURSING NOTE
"Oncology Rooming Note    September 20, 2017 10:41 AM   Merlyn Ravi is a 52 year old female who presents for:    Chief Complaint   Patient presents with     Hematology     1 year recheck Monoclonal paraproteinemia, review labs     Initial Vitals: /89 (BP Location: Right arm, Patient Position: Sitting, Cuff Size: Adult Large)  Pulse 70  Temp 98.5  F (36.9  C) (Tympanic)  Resp 18  Ht 1.676 m (5' 6\")  Wt 96.4 kg (212 lb 9.6 oz)  SpO2 98%  Breastfeeding? No  BMI 34.31 kg/m2 Estimated body mass index is 34.31 kg/(m^2) as calculated from the following:    Height as of this encounter: 1.676 m (5' 6\").    Weight as of this encounter: 96.4 kg (212 lb 9.6 oz). Body surface area is 2.12 meters squared.  No Pain (0) Comment: Data Unavailable   No LMP recorded. Patient has had a hysterectomy.  Allergies reviewed: Yes  Medications reviewed: Yes    Medications: Medication refills not needed today.  Pharmacy name entered into QingKe: [email or web address] [address] [place] [address] DR ARRIAGA AT [facility]    Clinical concerns: 1 year recheck Monoclonal paraproteinemia, review labs.    1. Noticed in June 2017 feeling a sensation of her surroundings moving. Worse in there car & looking at her phone. Denies earaches or sinus issues. Tried a OTC decongestant.      7 minutes for nursing intake (face to face time)     Brandy Lorenzo CMA              "

## 2017-09-20 NOTE — MR AVS SNAPSHOT
After Visit Summary   9/20/2017    Merlyn Ravi    MRN: [id number]           Patient Information     Date Of Birth          1965        Visit Information        Provider Department      9/20/2017 10:30 AM Tanvi Blakely MD [facility] ONCOLOGY      Today's Diagnoses     Monoclonal paraproteinemia    -  1       Follow-ups after your visit        Your next 10 appointments already scheduled     Sep 13, 2018  9:00 AM CDT   LAB with [facility] ([facility])    [address] [place] [address]   [phone]           Patient must bring picture ID. Patient should be prepared to give a urine specimen  Please do not eat 10-12 hours before your appointment if you are coming in fasting for labs on lipids, cholesterol, or glucose (sugar). Pregnant women should follow their Care Team instructions. Water with medications is okay. Do not drink coffee or other fluids. If you have concerns about taking  your medications, please ask at office or if scheduling via [employer], send a message by clicking on Secure Messaging, Message Your Care Team.            Sep 20, 2018 11:00 AM CDT   Return Visit with Tanvi Blakely MD   [facility]    [facility]  [address]  [facility] [address]   [phone]              Future tests that were ordered for you today     Open Future Orders        Priority Expected Expires Ordered    CBC with platelets differential Routine 9/20/2018 9/20/2018 9/20/2017    Comprehensive metabolic panel Routine 9/20/2018 9/20/2018 9/20/2017    Immunoglobulins A G and M Routine 9/20/2018 9/20/2018 9/20/2017    [place] and lambda light chain Routine 9/20/2018 9/20/2018 9/20/2017    ELP reflex to IELP Routine 9/20/2018 9/20/2018 9/20/2017            Who to contact     If you have questions or need follow up information about today's clinic visit or your  "schedule please contact [facility] directly at [phone].  Normal or non-critical lab and imaging results will be communicated to you by MyChart, letter or phone within 4 business days after the clinic has received the results. If you do not hear from us within 7 days, please contact the clinic through [employer] or phone. If you have a critical or abnormal lab result, we will notify you by phone as soon as possible.  Submit refill requests through [employer] or call your pharmacy and they will forward the refill request to us. Please allow 3 business days for your refill to be completed.          Additional Information About Your Visit        [facility] Information     [employer] gives you secure access to your electronic health record. If you see a primary care provider, you can also send messages to your care team and make appointments. If you have questions, please call your primary care clinic.  If you do not have a primary care provider, please call [phone] and they will assist you.        Care EveryWhere ID     This is your Care EveryWhere ID. This could be used by other organizations to access your [place] medical records  [id number]        Your Vitals Were     Pulse Temperature Respirations Height Pulse Oximetry Breastfeeding?    70 98.5  F (36.9  C) (Tympanic) 18 1.676 m (5' 6\") 98% No    BMI (Body Mass Index)                   34.31 kg/m2            Blood Pressure from Last 3 Encounters:   09/20/17 124/89   08/28/17 121/73   02/23/17 114/75    Weight from Last 3 Encounters:   09/20/17 96.4 kg (212 lb 9.6 oz)   08/28/17 96.6 kg (213 lb)   02/23/17 99.8 kg (220 lb)                 Today's Medication Changes          These changes are accurate as of: 9/20/17 11:12 AM.  If you have any questions, ask your nurse or doctor.               These medicines have changed or have updated prescriptions.        Dose/Directions    levothyroxine 75 MCG tablet   Commonly known as:  SYNTHROID/LEVOTHROID   This may " have changed:  Another medication with the same name was removed. Continue taking this medication, and follow the directions you see here.   Used for:  Hashimoto's thyroiditis   Changed by:  Gala Pop MD        Dose:  75 mcg   Take 1 tablet (75 mcg) by mouth daily   Quantity:  90 tablet   Refills:  3         Stop taking these medicines if you haven't already. Please contact your care team if you have questions.     metroNIDAZOLE 0.75 % cream   Commonly known as:  METROCREAM   Stopped by:  Tanvi Blakely MD                    Primary Care Provider Office Phone # Fax #    Greciadeidre Tao Garcia [phone] [address] [address]        Equal Access to Services     [facility]: Roseline Stephenson, waayo luciano, qaybta kaalmada juventino, kadi crow . So [facility] [phone].    ATENCIÓN: Si habla español, tiene a gay disposición servicios gratuitos de asistencia lingüística. [facility] [phone].    We comply with applicable federal civil rights laws and [place] laws. We do not discriminate on the basis of race, color, national origin, age, disability sex, sexual orientation or gender identity.            Thank you!     Thank you for choosing [facility]  for your care. Our goal is always to provide you with excellent care. Hearing back from our patients is one way we can continue to improve our services. Please take a few minutes to complete the written survey that you may receive in the mail after your visit with us. Thank you!             Your Updated Medication List - Protect others around you: Learn how to safely use, store and throw away your medicines at www.disposemymeds.org.          This list is accurate as of: 9/20/17 11:12 AM.  Always use your most recent med list.                   Brand Name Dispense Instructions for use Diagnosis    ALEVE PO      Take 220 mg by mouth 2 times daily (with meals)         atorvastatin 10 MG tablet    LIPITOR    90 tablet    Take 1 tablet (10 mg) by mouth daily    Hyperlipidemia LDL goal <130       doxycycline Monohydrate 100 MG Caps     90 capsule    Take 1 capsule (100 mg) by mouth daily with food    Acne rosacea       FLUARIX QUADRIVALENT 0.5 ML injection   Generic drug:  influenza quadrivalent (PF) vacc age 3 yrs and older           IBUPROFEN PO      Take 800 mg by mouth every 4 hours as needed for moderate pain        levothyroxine 75 MCG tablet    SYNTHROID/LEVOTHROID    90 tablet    Take 1 tablet (75 mcg) by mouth daily    Hashimoto's thyroiditis       meclizine 25 MG tablet    ANTIVERT    30 tablet    Take 1 tablet (25 mg) by mouth every 6 hours as needed for dizziness    Motion sickness, initial encounter       Multi-vitamin Tabs tablet      Take 1 tablet by mouth daily        ondansetron 4 MG tablet    ZOFRAN    18 tablet    Take 1 tablet (4 mg) by mouth every 8 hours as needed    Motion sickness, initial encounter       order for DME     1 Device    Equipment being ordered: wrist brace    Right elbow pain, Overuse injury, Lateral epicondylitis of right elbow

## 2017-09-21 NOTE — ASSESSMENT & PLAN NOTE
Merlyn Ravi  has a history of IgG kappa MGUS. The MGUS was discovered after she had been found to have an elevated gamma globulin fraction when she underwent blood testing prior to donating blood at [facility]. She was found to have a 0.4 gram/dL IgG kappa monoclonal protein upon further evaluation. She was noted to have an elevated gamma globulin on 09/30/2013. She underwent metastatic bone survey which did not reveal any evidence of lytic destruction. She was neither anemic nor hypercalcemic at the time of the discovery of the MGUS. She in addition has a history of a dermatofibroma in the lower extremity.

## 2017-09-21 NOTE — PROGRESS NOTES
Hematology/ Oncology Follow-up Visit:  Sep 20, 2017    Reason for Visit:   Chief Complaint   Patient presents with     Hematology     1 year recheck Monoclonal paraproteinemia, review labs       Oncologic History:  Monoclonal paraproteinemia  Merlyn Ravi  has a history of IgG kappa MGUS. The MGUS was discovered after she had been found to have an elevated gamma globulin fraction when she underwent blood testing prior to donating blood at [facility]. She was found to have a 0.4 gram/dL IgG kappa monoclonal protein upon further evaluation. She was noted to have an elevated gamma globulin on 09/30/2013. She underwent metastatic bone survey which did not reveal any evidence of lytic destruction. She was neither anemic nor hypercalcemic at the time of the discovery of the MGUS. She in addition has a history of a dermatofibroma in the lower extremity.       Interval History:  She is returning today for follow-up. She has been feeling well without any complaints of bony aches or pains. She denies any nausea vomiting or diarrhea. She denies any fever or chills. She denies any weight loss or fatigue. She denies any shortness of breath or cough or wheezing.    Review Of Systems:  Constitutional: Negative for fever, chills, and night sweats.  Skin: negative.  Eyes: negative.  Ears/Nose/Throat: negative.  Respiratory: No shortness of breath, dyspnea on exertion, cough, or hemoptysis.  Cardiovascular: negative.  Gastrointestinal: negative.  Genitourinary: negative.  Musculoskeletal: negative.  Neurologic: negative.  Psychiatric: negative.  Hematologic/Lymphatic/Immunologic: negative.  Endocrine: negative.    All other ROS negative unless mentioned in interval history.    Past medical, social, surgical, and family histories reviewed.    Allergies:  Allergies as of 09/20/2017 - Ross as Reviewed 09/20/2017   Allergen Reaction Noted     Demerol  11/16/2009     Sulfa drugs  11/16/2009       Current Medications:  Current Outpatient  "Prescriptions   Medication Sig Dispense Refill     meclizine (ANTIVERT) 25 MG tablet Take 1 tablet (25 mg) by mouth every 6 hours as needed for dizziness 30 tablet 1     ondansetron (ZOFRAN) 4 MG tablet Take 1 tablet (4 mg) by mouth every 8 hours as needed 18 tablet 0     atorvastatin (LIPITOR) 10 MG tablet Take 1 tablet (10 mg) by mouth daily 90 tablet 3     levothyroxine (SYNTHROID/LEVOTHROID) 75 MCG tablet Take 1 tablet (75 mcg) by mouth daily 90 tablet 3     IBUPROFEN PO Take 800 mg by mouth every 4 hours as needed for moderate pain       Naproxen Sodium (ALEVE PO) Take 220 mg by mouth 2 times daily (with meals)       order for DME Equipment being ordered: wrist brace (Patient not taking: Reported on 8/28/2017) 1 Device 0     multivitamin, therapeutic with minerals (MULTI-VITAMIN) TABS Take 1 tablet by mouth daily       FLUARIX QUADRIVALENT 0.5 ML injection        doxycycline Monohydrate 100 MG CAPS Take 1 capsule (100 mg) by mouth daily with food (Patient not taking: Reported on 9/20/2017) 90 capsule 0        Physical Exam:  /89 (BP Location: Right arm, Patient Position: Sitting, Cuff Size: Adult Large)  Pulse 70  Temp 98.5  F (36.9  C) (Tympanic)  Resp 18  Ht 1.676 m (5' 6\")  Wt 96.4 kg (212 lb 9.6 oz)  SpO2 98%  Breastfeeding? No  BMI 34.31 kg/m2  Wt Readings from Last 12 Encounters:   09/20/17 96.4 kg (212 lb 9.6 oz)   08/28/17 96.6 kg (213 lb)   02/23/17 99.8 kg (220 lb)   12/08/16 103 kg (227 lb)   12/07/16 100.7 kg (222 lb)   11/01/16 101.7 kg (224 lb 4.8 oz)   10/14/16 99 kg (218 lb 4.8 oz)   05/12/16 97.5 kg (215 lb)   02/18/16 96.6 kg (213 lb)   01/05/16 96.2 kg (212 lb 2 oz)   11/13/15 94.3 kg (208 lb)   10/22/15 96.1 kg (211 lb 14.4 oz)     GENERAL APPEARANCE: Healthy, alert and in no acute distress.  HEENT: Sclerae anicteric. PERRLA. Oropharynx without ulcers, lesions, or thrush.  NECK: Supple. No asymmetry or masses.  LYMPHATICS: No palpable cervical, supraclavicular, axillary, or " inguinal lymphadenopathy.  RESP: Lungs clear to auscultation bilaterally without rales, rhonchi or wheezes.  CARDIOVASCULAR: Regular rate and rhythm. Normal S1, S2; no S3 or S4. No murmur, gallop, or rub.  ABDOMEN: Soft, nontender. Bowel sounds normal. No palpable organomegaly or masses.  MUSCULOSKELETAL: Extremities without gross deformities noted. No edema of bilateral lower extremities.  SKIN: No suspicious lesions or rashes.  NEURO: Alert and oriented x 3. Cranial nerves II-XII grossly intact.  PSYCHIATRIC: Mentation and affect appear normal.    Laboratory/Imaging Studies:  No visits with results within 2 Week(s) from this visit.  Latest known visit with results is:    Orders Only on 04/27/2017   Component Date Value Ref Range Status     TSH 04/27/2017 1.90  0.40 - 4.00 mU/L Final     T4 Free 04/27/2017 1.01  0.76 - 1.46 ng/dL Final        No results found for this or any previous visit (from the past 744 hour(s)).    Assessment and plan:  (D47.2) Monoclonal paraproteinemia  (primary encounter diagnosis)  I reviewed with the patient today most recent laboratory results. M protein has been stable at 0.7. Patient currently asymptomatic without any evidence of renal impairment, hypercalcemia, anemia or bone involvement. We'll continue to monitor the patient annually. I will see the patient again in one year or sooner if there are new developments or concerns.    Plan: CBC with platelets differential, Comprehensive metabolic panel, Immunoglobulins A G and M, Kappa and lambda light chain, ELP reflex to IELP before next appointment.    The patient is ready to learn, no apparent learning barriers were identified.  Diagnosis and treatment plans were explained to the patient. The patient expressed understanding of the content. The patient asked appropriate questions. The patient questions were answered to her satisfaction.    Chart documentation with Dragon Voice recognition Software. Although reviewed after completion,  some words and grammatical errors may remain.

## 2017-11-22 NOTE — TELEPHONE ENCOUNTER
Patient was not at home so uncertain of Levo dose. Patient believes it's 75 mcg. She will not have insurance after December. Patient scheduled for follow up 12/8/17 at 0830 with labs prior at [facility].     Patient will verify dose at office visit.    Mouna Helm LPN  Adult Endocrinology  [facility]

## 2017-12-06 DIAGNOSIS — E06.3 HYPOTHYROIDISM DUE TO HASHIMOTO'S THYROIDITIS: ICD-10-CM

## 2017-12-06 DIAGNOSIS — E06.3 HASHIMOTO'S THYROIDITIS: ICD-10-CM

## 2017-12-06 LAB
T4 FREE SERPL-MCNC: 1.13 NG/DL (ref 0.76–1.46)
TSH SERPL DL<=0.005 MIU/L-ACNC: 2.99 MU/L (ref 0.4–4)

## 2017-12-06 PROCEDURE — 36415 COLL VENOUS BLD VENIPUNCTURE: CPT | Performed by: INTERNAL MEDICINE

## 2017-12-06 PROCEDURE — 84443 ASSAY THYROID STIM HORMONE: CPT | Performed by: INTERNAL MEDICINE

## 2017-12-06 PROCEDURE — 84439 ASSAY OF FREE THYROXINE: CPT | Performed by: INTERNAL MEDICINE

## 2017-12-08 ENCOUNTER — OFFICE VISIT (OUTPATIENT)
Dept: ENDOCRINOLOGY | Facility: CLINIC | Age: 52
End: 2017-12-08
Payer: COMMERCIAL

## 2017-12-08 VITALS
HEIGHT: 66 IN | SYSTOLIC BLOOD PRESSURE: 112 MMHG | DIASTOLIC BLOOD PRESSURE: 84 MMHG | HEART RATE: 83 BPM | WEIGHT: 207.56 LBS | BODY MASS INDEX: 33.36 KG/M2

## 2017-12-08 DIAGNOSIS — E06.3 HASHIMOTO'S THYROIDITIS: Primary | ICD-10-CM

## 2017-12-08 DIAGNOSIS — Z78.0 POST-MENOPAUSAL: ICD-10-CM

## 2017-12-08 PROCEDURE — 99214 OFFICE O/P EST MOD 30 MIN: CPT | Performed by: INTERNAL MEDICINE

## 2017-12-08 NOTE — LETTER
12/8/2017       RE: Merlyn Ravi  [address] [address]     Dear Colleague,    Thank you for referring your patient, Merlyn Ravi, to the [facility]. Please see a copy of my visit note below.                                                            - Endocrinology Follow up -    Reason for visit/consult:  Hypothyrodism, fatiue    Primary care provider: Memo Garcia    HPI: A 51 yo female with monoclonal gammopathy of unknown significance, stable, incidentally found upon plasma transfusion donation, here for the second follow up of her hypothryoidism. She had a history of HRT since 1998, due to total hysterectomy. HRT stopped January 2016.   (upate)  She has been doing well. She lost 20 lb over 1 year, no more fatigue, hair started to grow, no dry skin. Medication compliance is excellent.       1/5/2016 3/10/2016 12:51 10/6/2016 10:10 12/29/2016 12:20 4/27/2017 11:38 12/6/2017 10:11   TSH  0.4-4.0 8.08 4.99 (H) 5.96 (H) 0.01 (L) 1.90 2.99   T4 Free  0.76-1.46 0.85 0.82 0.89 1.58 (H) 1.01 1.13         ENDO VITALS-UMP 12/8/2017 9/20/2017 8/28/2017 2/23/2017   Weight 207 lb 9 oz 212 lb 9.6 oz 213 lb 220 lb     ENDO VITALS-UMP 12/8/2016   Weight 227 lb     Assessment and Plan  51 year old female with hypothyroidism, came with fatigue for a few years and recently getting worse,     - Continue current LT4 75 mcg  - Recheck TFTs in 1 year  - RTC in 1 year    I spent 30 minutes with this patient face to face and explained the conditions and plans (more than 50% of time was counseling/coordination of care) . The patient understood and is satisfied with today's visit. Return to clinic with me in 1 year.         Gala Pop MD  Staff Physician  Endocrinology and Metabolism  License: [id number]        Past Medical/Surgical History:  Past Medical History:   Diagnosis Date     Allergies      Dermatofibroma  5/14/2012     Past Surgical History:   Procedure Laterality Date     C NONSPECIFIC PROCEDURE      bladder surgery     HYSTERECTOMY, PAP NO LONGER INDICATED       HYSTERECTOMY, MAGY       LASIK BILATERAL  2005    Dr. Solis        Allergies:  Allergies   Allergen Reactions     Demerol      Sedation and vomiting     Sulfa Drugs      unknown reaction       Current Medications   Current Outpatient Prescriptions   Medication     atorvastatin (LIPITOR) 10 MG tablet     levothyroxine (SYNTHROID/LEVOTHROID) 75 MCG tablet     meclizine (ANTIVERT) 25 MG tablet     ondansetron (ZOFRAN) 4 MG tablet     order for DME     IBUPROFEN PO     Naproxen Sodium (ALEVE PO)     FLUARIX QUADRIVALENT 0.5 ML injection     doxycycline Monohydrate 100 MG CAPS     No current facility-administered medications for this visit.        Family History:  Family History   Problem Relation Age of Onset     Thyroid Disease Mother      HEART DISEASE Mother      HEART DISEASE Father      Hyperlipidemia Father      Hypertension Maternal Grandmother      Breast Cancer Maternal Grandmother      Hypertension Maternal Grandfather      Alzheimer Disease Paternal Grandmother      DIABETES Brother      Eczema Brother      Thyroid Disease Brother    Mother hyperthyrodism- removed goiter, Borhter hyperthyroidism- with medication, cousin- hyperthyrodism    Social History:  Social History   Substance Use Topics     Smoking status: Never Smoker     Smokeless tobacco: Never Used     Alcohol use 0.0 oz/week     0 Standard drinks or equivalent per week      Comment: 3 drinks per month   Lives  with 2 kids. Work at [employer]    ROS:  Full review of systems taken with the help of the intake sheet. Pertinent positives include fatigue, loss of energy, weight gain, hair loss. Otherwise a complete 14 point review of systems was taken and is negative unless stated in the history above.      Physical Exam:   Blood pressure 112/84, pulse 83, height  "1.672 m (5' 5.83\"), weight 94.1 kg (207 lb 9 oz)  General: well appearing, no acute distress, pleasant and conversant,   Mental Status/neuro: alert and oriented  Face: symmetrical, normal facial color  Eyes: anicteric, PERRL, no proptosis or lid lag  Neck: suppler, no lymphadenopahty  Thyroid: normal size and texture, no nodule palpable, no bruits  Heart: regular rhythm, S1S2, no murmur appreciated  Lung: clear to auscultation bilaterally  Abdomen: soft, NT/ND, no hepatomegaly  Legs: no swelling or edema      Again, thank you for allowing me to participate in the care of your patient.      Sincerely,    Gala Pop MD      "

## 2017-12-08 NOTE — PROGRESS NOTES
- Endocrinology Follow up -    Reason for visit/consult:  Hypothyrodism, tasneem    Primary care provider: Memo Garcia    HPI: A 51 yo female with monoclonal gammopathy of unknown significance, stable, incidentally found upon plasma transfusion donation, here for the second follow up of her hypothryoidism. She had a history of HRT since 1998, due to total hysterectomy. HRT stopped January 2016.   (upate)  She has been doing well. She lost 20 lb over 1 year, no more fatigue, hair started to grow, no dry skin. Medication compliance is excellent.       1/5/2016 3/10/2016 12:51 10/6/2016 10:10 12/29/2016 12:20 4/27/2017 11:38 12/6/2017 10:11   TSH  0.4-4.0 8.08 4.99 (H) 5.96 (H) 0.01 (L) 1.90 2.99   T4 Free  0.76-1.46 0.85 0.82 0.89 1.58 (H) 1.01 1.13         ENDO VITALS-UMP 12/8/2017 9/20/2017 8/28/2017 2/23/2017   Weight 207 lb 9 oz 212 lb 9.6 oz 213 lb 220 lb     ENDO VITALS-UMP 12/8/2016   Weight 227 lb     Assessment and Plan  51 year old female with hypothyroidism, came with fatigue for a few years and recently getting worse,     - Continue current LT4 75 mcg  - Recheck TFTs in 1 year  - RTC in 1 year    I spent 30 minutes with this patient face to face and explained the conditions and plans (more than 50% of time was counseling/coordination of care) . The patient understood and is satisfied with today's visit. Return to clinic with me in 1 year.         Gala Pop MD  Staff Physician  Endocrinology and Metabolism  License: [id number]        Past Medical/Surgical History:  Past Medical History:   Diagnosis Date     Allergies      Dermatofibroma 5/14/2012     Past Surgical History:   Procedure Laterality Date     C NONSPECIFIC PROCEDURE      bladder surgery     HYSTERECTOMY, PAP NO LONGER INDICATED       HYSTERECTOMY, MAGY       LASIK BILATERAL  2005    Dr. Solis        Allergies:  Allergies   Allergen Reactions     Demerol      Sedation and  "vomiting     Sulfa Drugs      unknown reaction       Current Medications   Current Outpatient Prescriptions   Medication     atorvastatin (LIPITOR) 10 MG tablet     levothyroxine (SYNTHROID/LEVOTHROID) 75 MCG tablet     meclizine (ANTIVERT) 25 MG tablet     ondansetron (ZOFRAN) 4 MG tablet     order for DME     IBUPROFEN PO     Naproxen Sodium (ALEVE PO)     FLUARIX QUADRIVALENT 0.5 ML injection     doxycycline Monohydrate 100 MG CAPS     No current facility-administered medications for this visit.        Family History:  Family History   Problem Relation Age of Onset     Thyroid Disease Mother      HEART DISEASE Mother      HEART DISEASE Father      Hyperlipidemia Father      Hypertension Maternal Grandmother      Breast Cancer Maternal Grandmother      Hypertension Maternal Grandfather      Alzheimer Disease Paternal Grandmother      DIABETES Brother      Eczema Brother      Thyroid Disease Brother    Mother hyperthyrodism- removed goiter, Borhter hyperthyroidism- with medication, cousin- hyperthyrodism    Social History:  Social History   Substance Use Topics     Smoking status: Never Smoker     Smokeless tobacco: Never Used     Alcohol use 0.0 oz/week     0 Standard drinks or equivalent per week      Comment: 3 drinks per month   Lives  with 2 kids. Work at [employer]    ROS:  Full review of systems taken with the help of the intake sheet. Pertinent positives include fatigue, loss of energy, weight gain, hair loss. Otherwise a complete 14 point review of systems was taken and is negative unless stated in the history above.      Physical Exam:   Blood pressure 112/84, pulse 83, height 1.672 m (5' 5.83\"), weight 94.1 kg (207 lb 9 oz)  General: well appearing, no acute distress, pleasant and conversant,   Mental Status/neuro: alert and oriented  Face: symmetrical, normal facial color  Eyes: anicteric, PERRL, no proptosis or lid lag  Neck: suppler, no lymphadenopahty  Thyroid: normal " size and texture, no nodule palpable, no bruits  Heart: regular rhythm, S1S2, no murmur appreciated  Lung: clear to auscultation bilaterally  Abdomen: soft, NT/ND, no hepatomegaly  Legs: no swelling or edema

## 2017-12-08 NOTE — NURSING NOTE
"Merlyn Ravi's goals for this visit include:   Chief Complaint   Patient presents with     Thyroid Problem       She requests these members of her care team be copied on today's visit information: Memo Garcia      PCP: Memo Garcia    Referring Provider:  No referring provider defined for this encounter.    Chief Complaint   Patient presents with     Thyroid Problem       Initial /84  Pulse 83  Ht 1.672 m (5' 5.83\")  Wt 94.1 kg (207 lb 9 oz)  BMI 33.68 kg/m2 Estimated body mass index is 33.68 kg/(m^2) as calculated from the following:    Height as of this encounter: 1.672 m (5' 5.83\").    Weight as of this encounter: 94.1 kg (207 lb 9 oz).  Medication Reconciliation: complete    Do you need any medication refills at today's visit? No    Christi Chester LPN      "

## 2017-12-08 NOTE — MR AVS SNAPSHOT
After Visit Summary   12/8/2017    Merlyn Ravi    MRN: [id number]           Patient Information     Date Of Birth          1965        Visit Information        Provider Department      12/8/2017 8:30 AM Gala Pop MD [facility]        Today's Diagnoses     Hashimoto's thyroiditis    -  1    Post-menopausal          Care Instructions    Patient will call to schedule one year follow up appt.          Follow-ups after your visit        Follow-up notes from your care team     Return in about 1 year (around 12/8/2018).      Your next 10 appointments already scheduled     Sep 13, 2018  9:00 AM CDT   LAB with [facility] ([facility])    [address]  [facility] [address]   [phone]           Please do not eat 10-12 hours before your appointment if you are coming in fasting for labs on lipids, cholesterol, or glucose (sugar). This does not apply to pregnant women. Water, hot tea and black coffee (with nothing added) are okay. Do not drink other fluids, diet soda or chew gum.            Sep 20, 2018 11:00 AM CDT   Return Visit with Tanvi Blakely MD   [facility] ([facility])    [facility]  [address]  [facility] [address]   [phone]              Who to contact     If you have questions or need follow up information about today's clinic visit or your schedule please contact [facility] directly at [phone].  Normal or non-critical lab and imaging results will be communicated to you by MyChart, letter or phone within 4 business days after the clinic has received the results. If you do not hear from us within 7 days, please contact the clinic through MyChart or phone. If you have a critical or abnormal lab result, we will notify you by phone as soon as possible.  Submit refill requests through [employer] or call your pharmacy and they will  "forward the refill request to us. Please allow 3 business days for your refill to be completed.          Additional Information About Your Visit        [employer] Information     [employer] gives you secure access to your electronic health record. If you see a primary care provider, you can also send messages to your care team and make appointments. If you have questions, please call your primary care clinic.  If you do not have a primary care provider, please call [phone] and they will assist you.      [employer] is an electronic gateway that provides easy, online access to your medical records. With [employer], you can request a clinic appointment, read your test results, renew a prescription or communicate with your care team.     To access your existing account, please contact your [facility] or call [phone] for assistance.        Care EveryWhere ID     This is your Care EveryWhere ID. This could be used by other organizations to access your [facility] medical records  [id number]        Your Vitals Were     Pulse Height BMI (Body Mass Index)             83 1.672 m (5' 5.83\") 33.68 kg/m2          Blood Pressure from Last 3 Encounters:   12/08/17 112/84   09/20/17 124/89   08/28/17 121/73    Weight from Last 3 Encounters:   12/08/17 94.1 kg (207 lb 9 oz)   09/20/17 96.4 kg (212 lb 9.6 oz)   08/28/17 96.6 kg (213 lb)              Today, you had the following     No orders found for display       Primary Care Provider Office Phone # Fax #    Memo Tao Garcia [phone] [address] [facility] [address]        Equal Access to Services     CONTRERAS HERNANDEZ : Hadii gildardo Stephenson, wamarnieda mustapha, qaybta blossomalkadi calixto. So [facility] [phone].    ATENCIÓN: Si habla español, tiene a gay disposición servicios gratuitos de asistencia lingüística. Llame al [phone].    We comply with applicable federal " civil rights laws and [place] laws. We do not discriminate on the basis of race, color, national origin, age, disability, sex, sexual orientation, or gender identity.            Thank you!     Thank you for choosing [facility]  for your care. Our goal is always to provide you with excellent care. Hearing back from our patients is one way we can continue to improve our services. Please take a few minutes to complete the written survey that you may receive in the mail after your visit with us. Thank you!             Your Updated Medication List - Protect others around you: Learn how to safely use, store and throw away your medicines at www.disposemymeds.org.          This list is accurate as of: 12/8/17  8:13 PM.  Always use your most recent med list.                   Brand Name Dispense Instructions for use Diagnosis    ALEVE PO      Take 220 mg by mouth 2 times daily (with meals)        atorvastatin 10 MG tablet    LIPITOR    90 tablet    Take 1 tablet (10 mg) by mouth daily    Hyperlipidemia LDL goal <130       doxycycline monohydrate 100 MG capsule     90 capsule    Take 1 capsule (100 mg) by mouth daily with food    Acne rosacea       FLUARIX QUADRIVALENT 0.5 ML injection   Generic drug:  influenza quadrivalent (PF) vacc age 3 yrs and older           IBUPROFEN PO      Take 800 mg by mouth every 4 hours as needed for moderate pain        levothyroxine 75 MCG tablet    SYNTHROID/LEVOTHROID    90 tablet    Take 1 tablet (75 mcg) by mouth daily    Hashimoto's thyroiditis       meclizine 25 MG tablet    ANTIVERT    30 tablet    Take 1 tablet (25 mg) by mouth every 6 hours as needed for dizziness    Motion sickness, initial encounter       ondansetron 4 MG tablet    ZOFRAN    18 tablet    Take 1 tablet (4 mg) by mouth every 8 hours as needed    Motion sickness, initial encounter       order for DME     1 Device    Equipment being ordered: wrist brace    Right elbow pain, Overuse injury, Lateral  epicondylitis of right elbow

## 2018-09-12 ENCOUNTER — HOSPITAL ENCOUNTER (OUTPATIENT)
Dept: LAB | Facility: CLINIC | Age: 53
Discharge: HOME | End: 2018-09-12
Attending: INTERNAL MEDICINE | Admitting: INTERNAL MEDICINE
Payer: COMMERCIAL

## 2018-09-12 DIAGNOSIS — D47.2 MONOCLONAL PARAPROTEINEMIA: ICD-10-CM

## 2018-09-12 LAB
ALBUMIN SERPL-MCNC: 4 G/DL (ref 3.4–5)
ALP SERPL-CCNC: 94 U/L (ref 40–150)
ALT SERPL W P-5'-P-CCNC: 17 U/L (ref 0–50)
ANION GAP SERPL CALCULATED.3IONS-SCNC: 2 MMOL/L (ref 3–14)
AST SERPL W P-5'-P-CCNC: 21 U/L (ref 0–45)
BASOPHILS # BLD AUTO: 0.1 10E9/L (ref 0–0.2)
BASOPHILS NFR BLD AUTO: 0.8 %
BILIRUB SERPL-MCNC: 0.4 MG/DL (ref 0.2–1.3)
BUN SERPL-MCNC: 18 MG/DL (ref 7–30)
CALCIUM SERPL-MCNC: 9.3 MG/DL (ref 8.5–10.1)
CHLORIDE SERPL-SCNC: 107 MMOL/L (ref 94–109)
CO2 SERPL-SCNC: 32 MMOL/L (ref 20–32)
CREAT SERPL-MCNC: 0.79 MG/DL (ref 0.52–1.04)
DIFFERENTIAL METHOD BLD: NORMAL
EOSINOPHIL # BLD AUTO: 0.2 10E9/L (ref 0–0.7)
EOSINOPHIL NFR BLD AUTO: 2.7 %
ERYTHROCYTE [DISTWIDTH] IN BLOOD BY AUTOMATED COUNT: 12.3 % (ref 10–15)
GFR SERPL CREATININE-BSD FRML MDRD: 76 ML/MIN/1.7M2
GLUCOSE SERPL-MCNC: 93 MG/DL (ref 70–99)
HCT VFR BLD AUTO: 41.8 % (ref 35–47)
HGB BLD-MCNC: 13.4 G/DL (ref 11.7–15.7)
IMM GRANULOCYTES # BLD: 0 10E9/L (ref 0–0.4)
IMM GRANULOCYTES NFR BLD: 0.2 %
LYMPHOCYTES # BLD AUTO: 2.2 10E9/L (ref 0.8–5.3)
LYMPHOCYTES NFR BLD AUTO: 34.7 %
MCH RBC QN AUTO: 30.7 PG (ref 26.5–33)
MCHC RBC AUTO-ENTMCNC: 32.1 G/DL (ref 31.5–36.5)
MCV RBC AUTO: 96 FL (ref 78–100)
MONOCYTES # BLD AUTO: 0.5 10E9/L (ref 0–1.3)
MONOCYTES NFR BLD AUTO: 7.2 %
NEUTROPHILS # BLD AUTO: 3.4 10E9/L (ref 1.6–8.3)
NEUTROPHILS NFR BLD AUTO: 54.4 %
NRBC # BLD AUTO: 0 10*3/UL
NRBC BLD AUTO-RTO: 0 /100
PLATELET # BLD AUTO: 299 10E9/L (ref 150–450)
POTASSIUM SERPL-SCNC: 4 MMOL/L (ref 3.4–5.3)
PROT SERPL-MCNC: 8.3 G/DL (ref 6.8–8.8)
RBC # BLD AUTO: 4.37 10E12/L (ref 3.8–5.2)
SODIUM SERPL-SCNC: 141 MMOL/L (ref 133–144)
WBC # BLD AUTO: 6.3 10E9/L (ref 4–11)

## 2018-09-12 PROCEDURE — 83883 ASSAY NEPHELOMETRY NOT SPEC: CPT | Performed by: INTERNAL MEDICINE

## 2018-09-12 PROCEDURE — 82784 ASSAY IGA/IGD/IGG/IGM EACH: CPT | Performed by: INTERNAL MEDICINE

## 2018-09-12 PROCEDURE — 84165 PROTEIN E-PHORESIS SERUM: CPT | Performed by: INTERNAL MEDICINE

## 2018-09-12 PROCEDURE — 36415 COLL VENOUS BLD VENIPUNCTURE: CPT | Performed by: INTERNAL MEDICINE

## 2018-09-12 PROCEDURE — 86334 IMMUNOFIX E-PHORESIS SERUM: CPT | Performed by: INTERNAL MEDICINE

## 2018-09-12 PROCEDURE — 85025 COMPLETE CBC W/AUTO DIFF WBC: CPT | Performed by: INTERNAL MEDICINE

## 2018-09-12 PROCEDURE — 80053 COMPREHEN METABOLIC PANEL: CPT | Performed by: INTERNAL MEDICINE

## 2018-09-12 PROCEDURE — 00000402 ZZHCL STATISTIC TOTAL PROTEIN: Performed by: INTERNAL MEDICINE

## 2018-09-13 LAB
ALBUMIN SERPL ELPH-MCNC: 4.5 G/DL (ref 3.7–5.1)
ALPHA1 GLOB SERPL ELPH-MCNC: 0.3 G/DL (ref 0.2–0.4)
ALPHA2 GLOB SERPL ELPH-MCNC: 0.8 G/DL (ref 0.5–0.9)
B-GLOBULIN SERPL ELPH-MCNC: 0.7 G/DL (ref 0.6–1)
GAMMA GLOB SERPL ELPH-MCNC: 1.5 G/DL (ref 0.7–1.6)
IGA SERPL-MCNC: 102 MG/DL (ref 70–380)
IGG SERPL-MCNC: 1430 MG/DL (ref 695–1620)
IGM SERPL-MCNC: 191 MG/DL (ref 60–265)
KAPPA LC UR-MCNC: 1.8 MG/DL (ref 0.33–1.94)
KAPPA LC/LAMBDA SER: 0.89 {RATIO} (ref 0.26–1.65)
LAMBDA LC SERPL-MCNC: 2.02 MG/DL (ref 0.57–2.63)
M PROTEIN SERPL ELPH-MCNC: 0.7 G/DL
PROT PATTERN SERPL ELPH-IMP: ABNORMAL
PROT PATTERN SERPL IFE-IMP: NORMAL

## 2018-09-17 ENCOUNTER — MYC MEDICAL ADVICE (OUTPATIENT)
Dept: ONCOLOGY | Facility: CLINIC | Age: 53
End: 2018-09-17

## 2018-11-13 ENCOUNTER — MYC MEDICAL ADVICE (OUTPATIENT)
Dept: ENDOCRINOLOGY | Facility: CLINIC | Age: 53
End: 2018-11-13

## 2018-11-13 DIAGNOSIS — E03.9 HYPOTHYROIDISM, UNSPECIFIED TYPE: Primary | ICD-10-CM

## 2018-11-13 NOTE — TELEPHONE ENCOUNTER
Per Dr. Pop Message:    I ordered a thyroid function test.     She have not seen with loss more than 1 year, she can go to blood work but does she need to have a appointment.     Thank you   Gala

## 2018-11-16 DIAGNOSIS — E03.9 HYPOTHYROIDISM, UNSPECIFIED TYPE: ICD-10-CM

## 2018-11-16 LAB
ALBUMIN SERPL-MCNC: 3.8 G/DL (ref 3.4–5)
ALP SERPL-CCNC: 122 U/L (ref 40–150)
ALT SERPL W P-5'-P-CCNC: 45 U/L (ref 0–50)
ANION GAP SERPL CALCULATED.3IONS-SCNC: 6 MMOL/L (ref 3–14)
AST SERPL W P-5'-P-CCNC: 40 U/L (ref 0–45)
BILIRUB SERPL-MCNC: 0.2 MG/DL (ref 0.2–1.3)
BUN SERPL-MCNC: 15 MG/DL (ref 7–30)
CALCIUM SERPL-MCNC: 9.2 MG/DL (ref 8.5–10.1)
CHLORIDE SERPL-SCNC: 104 MMOL/L (ref 94–109)
CO2 SERPL-SCNC: 31 MMOL/L (ref 20–32)
CREAT SERPL-MCNC: 0.74 MG/DL (ref 0.52–1.04)
GFR SERPL CREATININE-BSD FRML MDRD: 81 ML/MIN/1.7M2
GLUCOSE SERPL-MCNC: 119 MG/DL (ref 70–99)
POTASSIUM SERPL-SCNC: 4.3 MMOL/L (ref 3.4–5.3)
PROT SERPL-MCNC: 8 G/DL (ref 6.8–8.8)
SODIUM SERPL-SCNC: 141 MMOL/L (ref 133–144)
T4 FREE SERPL-MCNC: 1.02 NG/DL (ref 0.76–1.46)
TSH SERPL DL<=0.005 MIU/L-ACNC: 3.38 MU/L (ref 0.4–4)

## 2018-11-16 PROCEDURE — 84439 ASSAY OF FREE THYROXINE: CPT | Performed by: INTERNAL MEDICINE

## 2018-11-16 PROCEDURE — 84443 ASSAY THYROID STIM HORMONE: CPT | Performed by: INTERNAL MEDICINE

## 2018-11-16 PROCEDURE — 36415 COLL VENOUS BLD VENIPUNCTURE: CPT | Performed by: INTERNAL MEDICINE

## 2018-11-16 PROCEDURE — 80053 COMPREHEN METABOLIC PANEL: CPT | Performed by: INTERNAL MEDICINE

## 2018-11-19 ENCOUNTER — TELEPHONE (OUTPATIENT)
Dept: ENDOCRINOLOGY | Facility: CLINIC | Age: 53
End: 2018-11-19

## 2018-11-19 ENCOUNTER — MYC MEDICAL ADVICE (OUTPATIENT)
Dept: FAMILY MEDICINE | Facility: CLINIC | Age: 53
End: 2018-11-19

## 2018-11-19 DIAGNOSIS — R74.8 ELEVATED LIVER ENZYMES: Primary | ICD-10-CM

## 2018-11-19 NOTE — TELEPHONE ENCOUNTER
[facility] Call Center    Phone Message    May a detailed message be left on voicemail: yes    Reason for Call: Patient had a lab draw and needs a follow up appt to have her results reviewed prior to January.    Action Taken: Message routed to:  Adult Clinics: Endocrinology p [phone]

## 2018-11-19 NOTE — TELEPHONE ENCOUNTER
Please see patient's my chart message  She is updating provider on labs    Routing to provider.    Iesha ROBLES Rn

## 2018-11-19 NOTE — TELEPHONE ENCOUNTER
Patient sent [facility] message also. Message was sent to Dr. Pop via [facility].      Angella Haynes RN  Endocrine Care Coordinator  [facility]

## 2018-11-21 NOTE — TELEPHONE ENCOUNTER
Please inform patient that her liver enzymes are noted. They did go up. I will recommend that she comes in for a repeat of these labs in a month. Alcohol is sometimes responsible for an increase in liver enzymes. If her alcohol intake is heavy it may affect the liver enzymes. Medication is another cause. Will recommend Lab appointment in a month

## 2018-11-22 DIAGNOSIS — E06.3 HYPOTHYROIDISM DUE TO HASHIMOTO'S THYROIDITIS: Primary | ICD-10-CM

## 2018-11-22 RX ORDER — LEVOTHYROXINE SODIUM 88 UG/1
88 TABLET ORAL DAILY
Qty: 90 TABLET | Refills: 3 | Status: SHIPPED | OUTPATIENT
Start: 2018-11-22 | End: 2020-01-02

## 2018-12-27 ENCOUNTER — OFFICE VISIT (OUTPATIENT)
Dept: DERMATOLOGY | Facility: CLINIC | Age: 53
End: 2018-12-27
Payer: COMMERCIAL

## 2018-12-27 VITALS — SYSTOLIC BLOOD PRESSURE: 128 MMHG | DIASTOLIC BLOOD PRESSURE: 73 MMHG | HEART RATE: 64 BPM | OXYGEN SATURATION: 99 %

## 2018-12-27 DIAGNOSIS — L71.9 ACNE ROSACEA: Primary | ICD-10-CM

## 2018-12-27 DIAGNOSIS — L82.1 SEBORRHEIC KERATOSIS: ICD-10-CM

## 2018-12-27 PROCEDURE — 99213 OFFICE O/P EST LOW 20 MIN: CPT | Performed by: PHYSICIAN ASSISTANT

## 2018-12-27 RX ORDER — DOXYCYCLINE HYCLATE 20 MG
TABLET ORAL
Qty: 180 TABLET | Refills: 3 | Status: SHIPPED | OUTPATIENT
Start: 2018-12-27 | End: 2020-01-02

## 2018-12-27 NOTE — LETTER
12/27/2018         RE: Merlyn Ravi  [address] [facility] [address]        Dear Colleague,    Thank you for referring your patient, Merlyn Ravi, to the [facility]. Please see a copy of my visit note below.    HPI:   Merlyn Ravi is a 53 year old female who presents for recheck of rosacea on the face and for evaluation of new growths on the trunk  chief complaint  Location: as above   Condition present for: rosacea for many years. Spots on the trunk appeared within the past few months.   Previous treatments include: doxycycline for the rosacea which works great    Review Of Systems  Eyes: negative  Ears/Nose/Throat: negative  Respiratory: No shortness of breath, dyspnea on exertion, cough, or hemoptysis  Cardiovascular: negative  Gastrointestinal: negative  Genitourinary: negative  Musculoskeletal: negative  Neurologic: negative  Psychiatric: negative        PHYSICAL EXAM:    /73   Pulse 64   SpO2 99%   Skin exam performed as follows: Type 2 skin. Mood appropriate  Alert and Oriented X 3. Well developed, well nourished in no distress.  General appearance: Normal  Head including face: Normal  Eyes: conjunctiva and lids: Normal  Mouth: Lips, teeth, gums: Normal  Neck: Normal  Chest-breast/axillae: Normal  Back: Normal  Spleen and liver: Normal  Cardiovascular: Exam of peripheral vascular system by observation for swelling, varicosities, edema: Normal  Genitalia: groin, buttocks: Normal  Extremities: digits/nails (clubbing): Normal  Eccrine and Apocrine glands: Normal  Right upper extremity: Normal  Left upper extremity: Normal  Right lower extremity: Normal  Left lower extremity: Normal  Skin: Scalp and body hair: See below    1. Facial skin with mild background erythema but otherwise clear  2. Keratotic papules on th trunk    ASSESSMENT/PLAN:     1. Acne Rosacea - advised on diagnosis and treatment options. Discussed chronic condition of unknown etiology.  Discussed anti-inflammatories, sunscreen, PO and topical medications. Using doxycycline 100 mg every day-BID currently.   --Start doxycycline 20 mg BID    2.  Seborrheic keratosis on the trunk. Advised benign, no treatment needed.        Follow-up: yearly if doing well/PRN sooner  CC:   Scribed By: Radha Hill, MS, PALENNIE      Again, thank you for allowing me to participate in the care of your patient.        Sincerely,        Radha Hill PA-C

## 2018-12-27 NOTE — NURSING NOTE
"Initial /73   Pulse 64   SpO2 99%  Estimated body mass index is 35.35 kg/m  as calculated from the following:    Height as of 9/5/18: 1.676 m (5' 6\").    Weight as of 9/5/18: 99.3 kg (219 lb). .    Melissa Peterson LPN    "

## 2018-12-28 NOTE — PROGRESS NOTES
HPI:   Merlyn Ravi is a 53 year old female who presents for recheck of rosacea on the face and for evaluation of new growths on the trunk  chief complaint  Location: as above   Condition present for: rosacea for many years. Spots on the trunk appeared within the past few months.   Previous treatments include: doxycycline for the rosacea which works great    Review Of Systems  Eyes: negative  Ears/Nose/Throat: negative  Respiratory: No shortness of breath, dyspnea on exertion, cough, or hemoptysis  Cardiovascular: negative  Gastrointestinal: negative  Genitourinary: negative  Musculoskeletal: negative  Neurologic: negative  Psychiatric: negative        PHYSICAL EXAM:    /73   Pulse 64   SpO2 99%   Skin exam performed as follows: Type 2 skin. Mood appropriate  Alert and Oriented X 3. Well developed, well nourished in no distress.  General appearance: Normal  Head including face: Normal  Eyes: conjunctiva and lids: Normal  Mouth: Lips, teeth, gums: Normal  Neck: Normal  Chest-breast/axillae: Normal  Back: Normal  Spleen and liver: Normal  Cardiovascular: Exam of peripheral vascular system by observation for swelling, varicosities, edema: Normal  Genitalia: groin, buttocks: Normal  Extremities: digits/nails (clubbing): Normal  Eccrine and Apocrine glands: Normal  Right upper extremity: Normal  Left upper extremity: Normal  Right lower extremity: Normal  Left lower extremity: Normal  Skin: Scalp and body hair: See below    1. Facial skin with mild background erythema but otherwise clear  2. Keratotic papules on th trunk    ASSESSMENT/PLAN:     1. Acne Rosacea - advised on diagnosis and treatment options. Discussed chronic condition of unknown etiology. Discussed anti-inflammatories, sunscreen, PO and topical medications. Using doxycycline 100 mg every day-BID currently.   --Start doxycycline 20 mg BID    2.  Seborrheic keratosis on the trunk. Advised benign, no treatment needed.        Follow-up: yearly if  doing well/PRN sooner  CC:   Scribed By: Radha Hill, MS, PA-C

## 2019-01-21 DIAGNOSIS — E06.3 HYPOTHYROIDISM DUE TO HASHIMOTO'S THYROIDITIS: ICD-10-CM

## 2019-01-21 LAB
T4 FREE SERPL-MCNC: 1.11 NG/DL (ref 0.76–1.46)
TSH SERPL DL<=0.005 MIU/L-ACNC: 0.46 MU/L (ref 0.4–4)

## 2019-01-21 PROCEDURE — 84439 ASSAY OF FREE THYROXINE: CPT | Performed by: INTERNAL MEDICINE

## 2019-01-21 PROCEDURE — 36415 COLL VENOUS BLD VENIPUNCTURE: CPT | Performed by: INTERNAL MEDICINE

## 2019-01-21 PROCEDURE — 84443 ASSAY THYROID STIM HORMONE: CPT | Performed by: INTERNAL MEDICINE

## 2019-01-25 ENCOUNTER — OFFICE VISIT (OUTPATIENT)
Dept: ENDOCRINOLOGY | Facility: CLINIC | Age: 54
End: 2019-01-25
Payer: COMMERCIAL

## 2019-01-25 VITALS
DIASTOLIC BLOOD PRESSURE: 84 MMHG | BODY MASS INDEX: 36.88 KG/M2 | HEART RATE: 72 BPM | HEIGHT: 66 IN | WEIGHT: 229.5 LBS | OXYGEN SATURATION: 99 % | SYSTOLIC BLOOD PRESSURE: 129 MMHG

## 2019-01-25 DIAGNOSIS — E06.3 HASHIMOTO'S THYROIDITIS: Primary | ICD-10-CM

## 2019-01-25 DIAGNOSIS — Z83.49 FAMILY HISTORY OF THYROID DISEASE: ICD-10-CM

## 2019-01-25 DIAGNOSIS — E66.01 MORBID OBESITY (H): ICD-10-CM

## 2019-01-25 DIAGNOSIS — Z13.21 ENCOUNTER FOR VITAMIN DEFICIENCY SCREENING: ICD-10-CM

## 2019-01-25 PROCEDURE — 99214 OFFICE O/P EST MOD 30 MIN: CPT | Performed by: INTERNAL MEDICINE

## 2019-01-25 ASSESSMENT — MIFFLIN-ST. JEOR: SCORE: 1658.76

## 2019-01-25 NOTE — NURSING NOTE
"Merlyn Ravi's goals for this visit include: follow up thyroid  She requests these members of her care team be copied on today's visit information: Yes    PCP: Memo Garcia    Referring Provider:  Memo Garcia MD  [address] [place] [address]    /84 (BP Location: Left arm, Patient Position: Sitting, Cuff Size: Adult Regular)   Pulse 72   Ht 1.67 m (5' 5.75\")   Wt 104.1 kg (229 lb 8 oz)   SpO2 99%   BMI 37.33 kg/m      Do you need any medication refills at today's visit? No    "

## 2019-01-25 NOTE — LETTER
1/25/2019         RE: Merlyn Ravi  [address] [facility] [address]        Dear Colleague,    Thank you for referring your patient, Merlyn Ravi, to the [facility]. Please see a copy of my visit note below.                                                            - Endocrinology Follow up -    Reason for visit/consult:  Hypothyrodism, fatiue    Primary care provider: Memo Garcia    Assessment and Plan  A 53 year old female with hypothyroidism, came with fatigue for a few years and recently getting worse,     # Hashimoto thryoiditis  Recently increased (3 month ago 11/2018) levothyroxien from 75 mcg to 88 mcg, patient energy level up and feeling better.   Blood work today euthryoid.   - continue current dose of levothryoxine 88 mcg    # Biotin take  She has been takign large dose of biotin (10,000 mcg per patient) for her hair growth.   I explained to the patient that biotin kristine affect thyroid level, so we will try to hold biotin for 3 weeks then recheck.   - TSH, free T4 recheck after holding biotin for 3 weeks    # Family history of DM1   Her brother has DM1 since age 14.   Will check A1C     # Bone health  Hysterectomy, followed by hormone replacement which was stopped 5 year ago.   - vitmain D level to check  - Calcium citrate plus D (elemental Ca 630 mg, VitD 500 international unit(s))    RTC with me in 1 year     I spent 30 minutes with this patient face to face and explained the conditions and plans (more than 50% of time was counseling/coordination of care) . The patient understood and is satisfied with today's visit. Return to clinic with me in 1 year.         Gala Pop MD  Staff Physician  Endocrinology and Metabolism  License: [id number]    Interval History: Recently increased (3 month ago 11/2018) levothyroxien from 75 mcg to 88 mcg, patient energy level up and feeling better.   Blood work today euthryoid.     HPI: A 53 yo female with  monoclonal gammopathy of unknown significance, stable, incidentally found upon plasma transfusion donation, here for the second follow up of her hypothryoidism. She had a history of HRT since 1998, due to total hysterectomy. HRT stopped January 2016.   (upate)  She has been doing well. She lost 20 lb over 1 year, no more fatigue, hair started to grow, no dry skin. Medication compliance is excellent.       1/5/2016 3/10/2016 12:51 10/6/2016 10:10 12/29/2016 12:20 4/27/2017 11:38 12/6/2017 10:11   TSH  0.4-4.0 8.08 4.99 (H) 5.96 (H) 0.01 (L) 1.90 2.99   T4 Free  0.76-1.46 0.85 0.82 0.89 1.58 (H) 1.01 1.13        11/16/2018 15:54 1/21/2019 09:16   TSH 3.38 0.46   T4 Free 1.02 1.11       ENDO VITALS-UMP 1/25/2019 12/27/2018 9/5/2018 12/8/2017   Weight 229 lb 8 oz  219 lb 207 lb 9 oz     ENDO VITALS-UMP 9/20/2017 8/28/2017 2/23/2017 12/8/2016   Weight 212 lb 9.6 oz 213 lb 220 lb 227 lb     ENDO VITALS-UMP 12/7/2016 11/1/2016 10/14/2016 5/12/2016   Weight 222 lb 224 lb 4.8 oz 218 lb 4.8 oz 215 lb     ENDO VITALS-UMP 2/18/2016 1/5/2016 11/13/2015 10/22/2015   Weight 213 lb 212 lb 2 oz 208 lb 211 lb 14.4 oz     ENDO VITALS-UMP 10/15/2015 9/10/2015 4/16/2015 4/2/2015   Weight 211 lb 211 lb 12.8 oz 214 lb 9.6 oz      ENDO VITALS-UMP 3/3/2015 11/14/2014 10/15/2014 10/9/2014   Weight 213 lb 9.6 oz 207 lb 213 lb 3.2 oz 216 lb     ENDO VITALS-UMP 9/25/2014 7/4/2014 4/17/2014 10/16/2013   Weight 205 lb 204 lb 209 lb 14.4 oz 210 lb 4.8 oz     ENDO VITALS-UMP 10/9/2013 10/8/2013   Weight 208 lb 11.2 oz 207 lb         Past Medical/Surgical History:  Past Medical History:   Diagnosis Date     Allergies      Dermatofibroma 5/14/2012     Past Surgical History:   Procedure Laterality Date     C NONSPECIFIC PROCEDURE      bladder surgery     HYSTERECTOMY, PAP NO LONGER INDICATED       HYSTERECTOMY, MAGY       LASIK BILATERAL  2005    Dr. Solis        Allergies:  Allergies   Allergen Reactions     Demerol      Sedation and vomiting      "Sulfa Drugs      unknown reaction       Current Medications   Current Outpatient Medications   Medication     atorvastatin (LIPITOR) 10 MG tablet     doxycycline hyclate (PERIOSTAT) 20 MG tablet     doxycycline monohydrate 100 MG capsule     IBUPROFEN PO     levothyroxine (SYNTHROID/LEVOTHROID) 88 MCG tablet     Naproxen Sodium (ALEVE PO)     meclizine (ANTIVERT) 25 MG tablet     ondansetron (ZOFRAN) 4 MG tablet     order for DME     No current facility-administered medications for this visit.        Family History:  Family History   Problem Relation Age of Onset     Thyroid Disease Mother      Heart Disease Mother      Heart Disease Father      Hyperlipidemia Father      Hypertension Maternal Grandmother      Breast Cancer Maternal Grandmother      Hypertension Maternal Grandfather      Alzheimer Disease Paternal Grandmother      Diabetes Brother      Eczema Brother      Thyroid Disease Brother      Melanoma No family hx of    Mother hyperthyrodism- removed goiter, Borhter hyperthyroidism- with medication, cousin- hyperthyrodism    Social History:  Social History     Tobacco Use     Smoking status: Never Smoker     Smokeless tobacco: Never Used   Substance Use Topics     Alcohol use: Yes     Alcohol/week: 0.0 oz     Comment: 3 drinks per month   Lives  with 2 kids. Work at [employer]).    ROS:  Full review of systems taken with the help of the intake sheet. Pertinent positives include fatigue, loss of energy, weight gain, hair loss. Otherwise a complete 14 point review of systems was taken and is negative unless stated in the history above.      Physical Exam:   Blood pressure 112/84, pulse 83, height 1.672 m (5' 5.83\"), weight 94.1 kg (207 lb 9 oz)  General: well appearing, no acute distress, pleasant and conversant,   Mental Status/neuro: alert and oriented  Face: symmetrical, normal facial color  Eyes: anicteric, PERRL, no proptosis or lid lag  Neck: suppler, no " lymphadenopahty  Thyroid: normal size and texture, no nodule palpable, no bruits  Heart: regular rhythm, S1S2, no murmur appreciated  Lung: clear to auscultation bilaterally  Abdomen: soft, NT/ND, no hepatomegaly  Legs: no swelling or edema      Again, thank you for allowing me to participate in the care of your patient.        Sincerely,        Gala Pop MD

## 2019-01-25 NOTE — PROGRESS NOTES
- Endocrinology Follow up -    Reason for visit/consult:  Hypothyrodism, fatiue    Primary care provider: Memo Garcia    Assessment and Plan  A 53 year old female with hypothyroidism, came with fatigue for a few years and recently getting worse,     # Hashimoto thryoiditis  Recently increased (3 month ago 11/2018) levothyroxien from 75 mcg to 88 mcg, patient energy level up and feeling better.   Blood work today euthryoid.   - continue current dose of levothryoxine 88 mcg    # Biotin take  She has been takign large dose of biotin (10,000 mcg per patient) for her hair growth.   I explained to the patient that biotin kristine affect thyroid level, so we will try to hold biotin for 3 weeks then recheck.   - TSH, free T4 recheck after holding biotin for 3 weeks    # Family history of DM1   Her brother has DM1 since age 14.   Will check A1C     # Bone health  Hysterectomy, followed by hormone replacement which was stopped 5 year ago.   - vitmain D level to check  - Calcium citrate plus D (elemental Ca 630 mg, VitD 500 international unit(s))    RTC with me in 1 year     I spent 30 minutes with this patient face to face and explained the conditions and plans (more than 50% of time was counseling/coordination of care) . The patient understood and is satisfied with today's visit. Return to clinic with me in 1 year.         Gala Pop MD  Staff Physician  Endocrinology and Metabolism  License: [id number]    Interval History: Recently increased (3 month ago 11/2018) levothyroxien from 75 mcg to 88 mcg, patient energy level up and feeling better.   Blood work today euthryoid.     HPI: A 53 yo female with monoclonal gammopathy of unknown significance, stable, incidentally found upon plasma transfusion donation, here for the second follow up of her hypothryoidism. She had a history of HRT since 1998, due to total hysterectomy. HRT stopped January 2016.    (upate)  She has been doing well. She lost 20 lb over 1 year, no more fatigue, hair started to grow, no dry skin. Medication compliance is excellent.       1/5/2016 3/10/2016 12:51 10/6/2016 10:10 12/29/2016 12:20 4/27/2017 11:38 12/6/2017 10:11   TSH  0.4-4.0 8.08 4.99 (H) 5.96 (H) 0.01 (L) 1.90 2.99   T4 Free  0.76-1.46 0.85 0.82 0.89 1.58 (H) 1.01 1.13        11/16/2018 15:54 1/21/2019 09:16   TSH 3.38 0.46   T4 Free 1.02 1.11       [facility]ALS[facility] 1/25/2019 12/27/2018 9/5/2018 12/8/2017   Weight 229 lb 8 oz  219 lb 207 lb 9 oz     [facility] 9/20/2017 8/28/2017 2/23/2017 12/8/2016   Weight 212 lb 9.6 oz 213 lb 220 lb 227 lb     [facility] 12/7/2016 11/1/2016 10/14/2016 5/12/2016   Weight 222 lb 224 lb 4.8 oz 218 lb 4.8 oz 215 lb     [facility] 2/18/2016 1/5/2016 11/13/2015 10/22/2015   Weight 213 lb 212 lb 2 oz 208 lb 211 lb 14.4 oz     [facility] 10/15/2015 9/10/2015 4/16/2015 4/2/2015   Weight 211 lb 211 lb 12.8 oz 214 lb 9.6 oz      [facility] 3/3/2015 11/14/2014 10/15/2014 10/9/2014   Weight 213 lb 9.6 oz 207 lb 213 lb 3.2 oz 216 lb     [facility] 9/25/2014 7/4/2014 4/17/2014 10/16/2013   Weight 205 lb 204 lb 209 lb 14.4 oz 210 lb 4.8 oz     [facility]ALS[facility] 10/9/2013 10/8/2013   Weight 208 lb 11.2 oz 207 lb         Past Medical/Surgical History:  Past Medical History:   Diagnosis Date     Allergies      Dermatofibroma 5/14/2012     Past Surgical History:   Procedure Laterality Date     C NONSPECIFIC PROCEDURE      bladder surgery     HYSTERECTOMY, PAP NO LONGER INDICATED       HYSTERECTOMY, MAGY       LASIK BILATERAL  2005    Dr. Solis        Allergies:  Allergies   Allergen Reactions     Demerol      Sedation and vomiting     Sulfa Drugs      unknown reaction       Current Medications   Current Outpatient Medications   Medication     atorvastatin (LIPITOR) 10 MG tablet     doxycycline hyclate (PERIOSTAT) 20 MG tablet     doxycycline monohydrate 100 MG capsule     IBUPROFEN PO  "    levothyroxine (SYNTHROID/LEVOTHROID) 88 MCG tablet     Naproxen Sodium (ALEVE PO)     meclizine (ANTIVERT) 25 MG tablet     ondansetron (ZOFRAN) 4 MG tablet     order for DME     No current facility-administered medications for this visit.        Family History:  Family History   Problem Relation Age of Onset     Thyroid Disease Mother      Heart Disease Mother      Heart Disease Father      Hyperlipidemia Father      Hypertension Maternal Grandmother      Breast Cancer Maternal Grandmother      Hypertension Maternal Grandfather      Alzheimer Disease Paternal Grandmother      Diabetes Brother      Eczema Brother      Thyroid Disease Brother      Melanoma No family hx of    Mother hyperthyrodism- removed goiter, Borhter hyperthyroidism- with medication, cousin- hyperthyrodism    Social History:  Social History     Tobacco Use     Smoking status: Never Smoker     Smokeless tobacco: Never Used   Substance Use Topics     Alcohol use: Yes     Alcohol/week: 0.0 oz     Comment: 3 drinks per month   Lives  with 2 kids. Work at [employer]    ROS:  Full review of systems taken with the help of the intake sheet. Pertinent positives include fatigue, loss of energy, weight gain, hair loss. Otherwise a complete 14 point review of systems was taken and is negative unless stated in the history above.      Physical Exam:   Blood pressure 112/84, pulse 83, height 1.672 m (5' 5.83\"), weight 94.1 kg (207 lb 9 oz)  General: well appearing, no acute distress, pleasant and conversant,   Mental Status/neuro: alert and oriented  Face: symmetrical, normal facial color  Eyes: anicteric, PERRL, no proptosis or lid lag  Neck: suppler, no lymphadenopahty  Thyroid: normal size and texture, no nodule palpable, no bruits  Heart: regular rhythm, S1S2, no murmur appreciated  Lung: clear to auscultation bilaterally  Abdomen: soft, NT/ND, no hepatomegaly  Legs: no swelling or edema    "

## 2019-02-18 DIAGNOSIS — R74.8 ELEVATED LIVER ENZYMES: ICD-10-CM

## 2019-02-18 DIAGNOSIS — E06.3 HASHIMOTO'S THYROIDITIS: ICD-10-CM

## 2019-02-18 DIAGNOSIS — Z83.49 FAMILY HISTORY OF THYROID DISEASE: ICD-10-CM

## 2019-02-18 DIAGNOSIS — Z13.21 ENCOUNTER FOR VITAMIN DEFICIENCY SCREENING: ICD-10-CM

## 2019-02-18 LAB
ALBUMIN SERPL-MCNC: 3.6 G/DL (ref 3.4–5)
ALP SERPL-CCNC: 110 U/L (ref 40–150)
ALT SERPL W P-5'-P-CCNC: 21 U/L (ref 0–50)
ANION GAP SERPL CALCULATED.3IONS-SCNC: 5 MMOL/L (ref 3–14)
AST SERPL W P-5'-P-CCNC: 16 U/L (ref 0–45)
BILIRUB SERPL-MCNC: 0.5 MG/DL (ref 0.2–1.3)
BUN SERPL-MCNC: 16 MG/DL (ref 7–30)
CALCIUM SERPL-MCNC: 9 MG/DL (ref 8.5–10.1)
CHLORIDE SERPL-SCNC: 108 MMOL/L (ref 94–109)
CO2 SERPL-SCNC: 26 MMOL/L (ref 20–32)
CREAT SERPL-MCNC: 0.8 MG/DL (ref 0.52–1.04)
DEPRECATED CALCIDIOL+CALCIFEROL SERPL-MC: 22 UG/L (ref 20–75)
GFR SERPL CREATININE-BSD FRML MDRD: 84 ML/MIN/{1.73_M2}
GLUCOSE SERPL-MCNC: 104 MG/DL (ref 70–99)
HBA1C MFR BLD: 5.1 % (ref 0–5.6)
POTASSIUM SERPL-SCNC: 4.1 MMOL/L (ref 3.4–5.3)
PROT SERPL-MCNC: 7.6 G/DL (ref 6.8–8.8)
SODIUM SERPL-SCNC: 139 MMOL/L (ref 133–144)
T4 FREE SERPL-MCNC: 1.24 NG/DL (ref 0.76–1.46)
TSH SERPL DL<=0.005 MIU/L-ACNC: 0.1 MU/L (ref 0.4–4)

## 2019-02-18 PROCEDURE — 84439 ASSAY OF FREE THYROXINE: CPT | Performed by: INTERNAL MEDICINE

## 2019-02-18 PROCEDURE — 84443 ASSAY THYROID STIM HORMONE: CPT | Performed by: INTERNAL MEDICINE

## 2019-02-18 PROCEDURE — 82306 VITAMIN D 25 HYDROXY: CPT | Performed by: INTERNAL MEDICINE

## 2019-02-18 PROCEDURE — 80053 COMPREHEN METABOLIC PANEL: CPT | Performed by: INTERNAL MEDICINE

## 2019-02-18 PROCEDURE — 36415 COLL VENOUS BLD VENIPUNCTURE: CPT | Performed by: INTERNAL MEDICINE

## 2019-02-18 PROCEDURE — 83036 HEMOGLOBIN GLYCOSYLATED A1C: CPT | Performed by: INTERNAL MEDICINE

## 2019-02-18 NOTE — RESULT ENCOUNTER NOTE
Dear Merlyn     Here is your results.  Please continue current dose of levothryoxine.  Please call nursing line at [phone] if you have any questions.    Take care  Gala Pop MD

## 2019-02-20 NOTE — RESULT ENCOUNTER NOTE
Please inform patient that test result was within normal parameters.  Blood glucose was slightly high.   Thank you.     Memo Garcia M.D.

## 2019-03-20 ENCOUNTER — HOSPITAL ENCOUNTER (OUTPATIENT)
Dept: MAMMOGRAPHY | Facility: CLINIC | Age: 54
Discharge: HOME | End: 2019-03-20
Attending: FAMILY MEDICINE | Admitting: FAMILY MEDICINE
Payer: COMMERCIAL

## 2019-03-20 DIAGNOSIS — Z12.31 VISIT FOR SCREENING MAMMOGRAM: ICD-10-CM

## 2019-03-20 PROCEDURE — 77067 SCR MAMMO BI INCL CAD: CPT

## 2019-04-04 DIAGNOSIS — E78.5 HYPERLIPIDEMIA LDL GOAL <130: ICD-10-CM

## 2019-04-04 NOTE — TELEPHONE ENCOUNTER
"Requested Prescriptions   Pending Prescriptions Disp Refills     atorvastatin (LIPITOR) 10 MG tablet [Pharmacy Med Name: ATORVASTATIN 10MG TABLETS] 90 tablet 0     Sig: TAKE 1 TABLET(10 MG) BY MOUTH DAILY    Statins Protocol Failed - 4/4/2019  8:54 AM       Failed - Recent (12 mo) or future (30 days) visit within the authorizing provider's specialty    Patient had office visit in the last 12 months or has a visit in the next 30 days with authorizing provider or within the authorizing provider's specialty.  See \"Patient Info\" tab in inbasket, or \"Choose Columns\" in Meds & Orders section of the refill encounter.             Passed - LDL on file in past 12 months    Recent Labs   Lab Test 09/05/18  0806   *            Passed - No abnormal creatine kinase in past 12 months    No lab results found.            Passed - Medication is active on med list       Passed - Patient is age 18 or older       Passed - No active pregnancy on record       Passed - No positive pregnancy test in past 12 months        Last Written Prescription Date:  9/5/18  Last Fill Quantity: 90,  # refills: 3   Last office visit: 9/5/2018 with prescribing provider:  Jose   Future Office Visit:      "

## 2019-04-05 RX ORDER — ATORVASTATIN CALCIUM 10 MG/1
TABLET, FILM COATED ORAL
Qty: 90 TABLET | Refills: 0 | OUTPATIENT
Start: 2019-04-05

## 2019-04-05 NOTE — TELEPHONE ENCOUNTER
Pharmacy advised patient still has refills available at another pharmacy and to check with the patient.    Leandra Rey RN

## 2019-08-01 ENCOUNTER — OFFICE VISIT (OUTPATIENT)
Dept: FAMILY MEDICINE | Facility: CLINIC | Age: 54
End: 2019-08-01
Payer: COMMERCIAL

## 2019-08-01 VITALS
HEIGHT: 66 IN | BODY MASS INDEX: 36.64 KG/M2 | SYSTOLIC BLOOD PRESSURE: 118 MMHG | HEART RATE: 72 BPM | TEMPERATURE: 97.9 F | DIASTOLIC BLOOD PRESSURE: 78 MMHG | WEIGHT: 228 LBS | RESPIRATION RATE: 14 BRPM | OXYGEN SATURATION: 94 %

## 2019-08-01 DIAGNOSIS — N63.0 LUMP OR MASS IN BREAST: Primary | ICD-10-CM

## 2019-08-01 PROCEDURE — 99213 OFFICE O/P EST LOW 20 MIN: CPT | Performed by: FAMILY MEDICINE

## 2019-08-01 ASSESSMENT — MIFFLIN-ST. JEOR: SCORE: 1646.98

## 2019-08-01 NOTE — PROGRESS NOTES
Subjective     Merlyn Ravi is a 54 year old female who presents to clinic today for the following health issues:54 yr old female here for left breast lump. Seen about a month ago. Has increased in size, tender. No nipple discharge. No skin changes. Last mammogram was in March and it was normal . Breast cancer runs in her family. Mom and grandmother.     HPI   Concern - breast lump   Onset: 1mo ago     Description:   Patient notice a lump on L breast about 1 mo ago. It is the size of an egg and seem to be getting larger area  9:00     Intensity: mild     Progression of Symptoms:  Worsening getting larger     Accompanying Signs & Symptoms:  None     Previous history of similar problem:   None     Precipitating factors:   Worsened by: with touching     Alleviating factors:  Improved by: none     Therapies Tried and outcome: none       Patient Active Problem List   Diagnosis     CARDIOVASCULAR SCREENING; LDL GOAL LESS THAN 160     LASIK OU 5 yrs     Dermatofibroma     Monoclonal paraproteinemia     Hyperlipidemia LDL goal <130     Obesity (BMI 35.0-39.9) with comorbidity (H)     Past Surgical History:   Procedure Laterality Date     C NONSPECIFIC PROCEDURE      bladder surgery     HYSTERECTOMY, PAP NO LONGER INDICATED       HYSTERECTOMY, MAGY       LASIK BILATERAL  2005    Dr. Solis        Social History     Tobacco Use     Smoking status: Never Smoker     Smokeless tobacco: Never Used   Substance Use Topics     Alcohol use: Yes     Alcohol/week: 0.0 oz     Comment: 3 drinks per month     Family History   Problem Relation Age of Onset     Thyroid Disease Mother      Heart Disease Mother      Heart Disease Father      Hyperlipidemia Father      Hypertension Maternal Grandmother      Breast Cancer Maternal Grandmother      Hypertension Maternal Grandfather      Alzheimer Disease Paternal Grandmother      Diabetes Brother      Eczema Brother      Thyroid Disease Brother      Melanoma No family hx of          Current  Outpatient Medications   Medication Sig Dispense Refill     atorvastatin (LIPITOR) 10 MG tablet Take 1 tablet (10 mg) by mouth daily 90 tablet 3     doxycycline hyclate (PERIOSTAT) 20 MG tablet 1 tab PO  tablet 3     doxycycline monohydrate 100 MG capsule Take 1 capsule (100 mg) by mouth daily with food 90 capsule 0     levothyroxine (SYNTHROID/LEVOTHROID) 88 MCG tablet Take 1 tablet (88 mcg) by mouth daily 90 tablet 3     IBUPROFEN PO Take 800 mg by mouth every 4 hours as needed for moderate pain       meclizine (ANTIVERT) 25 MG tablet Take 1 tablet (25 mg) by mouth every 6 hours as needed for dizziness (Patient not taking: Reported on 12/8/2017) 30 tablet 1     Naproxen Sodium (ALEVE PO) Take 220 mg by mouth 2 times daily (with meals)       ondansetron (ZOFRAN) 4 MG tablet Take 1 tablet (4 mg) by mouth every 8 hours as needed (Patient not taking: Reported on 12/8/2017) 18 tablet 0     order for DME Equipment being ordered: wrist brace (Patient not taking: Reported on 8/28/2017) 1 Device 0     Allergies   Allergen Reactions     Demerol      Sedation and vomiting     Sulfa Drugs      unknown reaction     BP Readings from Last 3 Encounters:   08/01/19 118/78   01/25/19 129/84   12/27/18 128/73    Wt Readings from Last 3 Encounters:   08/01/19 103.4 kg (228 lb)   01/25/19 104.1 kg (229 lb 8 oz)   09/05/18 99.3 kg (219 lb)                      Reviewed and updated as needed this visit by Provider         Review of Systems   ROS COMP: Constitutional, HEENT, cardiovascular, pulmonary, gi and gu systems are negative, except as otherwise noted.      Objective    There were no vitals taken for this visit.  There is no height or weight on file to calculate BMI.  Physical Exam   GENERAL: healthy, alert and no distress  NECK: no adenopathy, no asymmetry, masses, or scars and thyroid normal to palpation  RESP: lungs clear to auscultation - no rales, rhonchi or wheezes  BREAST: right breast normal without masses,  tenderness or nipple discharge and no palpable axillary masses or adenopathy  BREAST: mass left breast around 11'0clock, soft mobile tender   CV: regular rate and rhythm, normal S1 S2, no S3 or S4, no murmur, click or rub, no peripheral edema and peripheral pulses strong      Diagnostic Test Results:  none         Assessment & Plan     1. Lump or mass in breast  Patient here for a breast lump. Ultrasound and mammogram ordered. Patient will be notified of results   - US Breast Left Limited 1-3 Quadrants; Future  - MA Diagnostic Left w/Pancho; Future             FUTURE APPOINTMENTS:       - Follow-up visit in one month or sooner as needed.    No follow-ups on file.    Memo Garcia MD  [facility] [place]

## 2019-08-01 NOTE — PATIENT INSTRUCTIONS
Thank you for choosing [facility].  You may be receiving an email and/or telephone survey request from [facility] Customer Experience regarding your visit today.  Please take a few minutes to respond to the survey to let us know how we are doing.      If you have questions or concerns, please contact us via [employer] or you can contact your care team at [phone].    Our Clinic hours are:  Monday 6:40 am  to 7:00 pm  Tuesday -Friday 6:40 am to 5:00 pm    The [place] outpatient lab hours are:  Monday - Friday 6:10 am to 4:45 pm  Saturdays 7:00 am to 11:00 am  Appointments are required, call [phone]    If you have clinical questions after hours or would like to schedule an appointment,  call the clinic at [phone].

## 2019-08-09 ENCOUNTER — HOSPITAL ENCOUNTER (OUTPATIENT)
Dept: ULTRASOUND IMAGING | Facility: CLINIC | Age: 54
End: 2019-08-09
Attending: FAMILY MEDICINE
Payer: COMMERCIAL

## 2019-08-09 ENCOUNTER — HOSPITAL ENCOUNTER (OUTPATIENT)
Dept: MAMMOGRAPHY | Facility: CLINIC | Age: 54
Discharge: HOME | End: 2019-08-09
Attending: FAMILY MEDICINE | Admitting: FAMILY MEDICINE
Payer: COMMERCIAL

## 2019-08-09 DIAGNOSIS — N63.0 LUMP OR MASS IN BREAST: ICD-10-CM

## 2019-08-09 PROCEDURE — G0279 TOMOSYNTHESIS, MAMMO: HCPCS

## 2019-08-09 PROCEDURE — 76642 ULTRASOUND BREAST LIMITED: CPT | Mod: LT

## 2019-08-14 NOTE — DISCHARGE INSTRUCTIONS
Breast Biopsy Care Instructions      Site Care:    You may have some discomfort in the breast and may see some bruising at the needle site.    You will be given an ice pack which should be kept in place over the dressing until bedtime tonight.Always keep a gauze pad between your skin and the ice pack.    Wearing your bra continuously for 24 hours post biopsy will give optimal support to the biopsy site.    Activity:       You may go back to your normal routine.     No heavy lifting for 24 hours.    Diet and Medications:       You may go back to your regular diet.     Tylenol is the best choice to relieve your discomfort, the first 24 hours. If you have no bleeding on the first day, Ibuprofen (such as Advil, or Motrin), may be taken on the second day after for pain.     Do not take aspirin for 72 hours following your biopsy.    Questions:     If you have any questions about your procedure performed in Ultrasound, you may contact us at [phone].If you have any questions about your procedure performed in Mammography, you may contact us at [phone].    Results:       The pathology report on your biopsy is usually available within 72 hours. The Radiologist or your personal physician will call you with the results.     You will receive information about any further follow up from your physician.    Call your doctor if you have:       More than slight bleeding or significant pain, or experience swelling of the breast.     If your physician is unavailable, please call the Nurse Advice Line at [phone], or [facility] Emergency Department at [phone].      Patient:______________________________  Time:_________  Date:_____________    Instructor:____________________________   Time:_________  Date:_____________

## 2019-08-15 ENCOUNTER — HOSPITAL ENCOUNTER (OUTPATIENT)
Dept: MAMMOGRAPHY | Facility: CLINIC | Age: 54
Discharge: HOME | End: 2019-08-15
Attending: FAMILY MEDICINE | Admitting: FAMILY MEDICINE
Payer: COMMERCIAL

## 2019-08-15 ENCOUNTER — HOSPITAL ENCOUNTER (OUTPATIENT)
Dept: MAMMOGRAPHY | Facility: CLINIC | Age: 54
End: 2019-08-15
Attending: FAMILY MEDICINE
Payer: COMMERCIAL

## 2019-08-15 DIAGNOSIS — N63.0 LUMP OR MASS IN BREAST: ICD-10-CM

## 2019-08-15 PROCEDURE — 88305 TISSUE EXAM BY PATHOLOGIST: CPT | Performed by: RADIOLOGY

## 2019-08-15 PROCEDURE — 40000986 MA POST PROCEDURE LEFT

## 2019-08-15 PROCEDURE — 88342 IMHCHEM/IMCYTCHM 1ST ANTB: CPT | Performed by: RADIOLOGY

## 2019-08-15 PROCEDURE — 88305 TISSUE EXAM BY PATHOLOGIST: CPT | Mod: 26 | Performed by: RADIOLOGY

## 2019-08-15 PROCEDURE — 88341 IMHCHEM/IMCYTCHM EA ADD ANTB: CPT | Mod: 26,59 | Performed by: RADIOLOGY

## 2019-08-15 PROCEDURE — 88377 M/PHMTRC ALYS ISHQUANT/SEMIQ: CPT | Performed by: PATHOLOGY

## 2019-08-15 PROCEDURE — 25000125 ZZHC RX 250: Performed by: RADIOLOGY

## 2019-08-15 PROCEDURE — 88341 IMHCHEM/IMCYTCHM EA ADD ANTB: CPT | Mod: XU | Performed by: RADIOLOGY

## 2019-08-15 PROCEDURE — 19081 BX BREAST 1ST LESION STRTCTC: CPT | Mod: LT

## 2019-08-15 PROCEDURE — 00000158 ZZHCL STATISTIC H-FISH PROCESS B/S: Performed by: RADIOLOGY

## 2019-08-15 PROCEDURE — 88360 TUMOR IMMUNOHISTOCHEM/MANUAL: CPT | Mod: 26,59 | Performed by: RADIOLOGY

## 2019-08-15 PROCEDURE — 88360 TUMOR IMMUNOHISTOCHEM/MANUAL: CPT | Performed by: RADIOLOGY

## 2019-08-15 PROCEDURE — 00000159 ZZHCL STATISTIC H-SEND OUTS PREP: Performed by: RADIOLOGY

## 2019-08-15 PROCEDURE — 88342 IMHCHEM/IMCYTCHM 1ST ANTB: CPT | Mod: 26 | Performed by: RADIOLOGY

## 2019-08-15 RX ORDER — LIDOCAINE HYDROCHLORIDE 10 MG/ML
5 INJECTION, SOLUTION EPIDURAL; INFILTRATION; INTRACAUDAL; PERINEURAL ONCE
Status: COMPLETED | OUTPATIENT
Start: 2019-08-15 | End: 2019-08-15

## 2019-08-15 RX ADMIN — LIDOCAINE HYDROCHLORIDE 20 ML: 10 INJECTION, SOLUTION EPIDURAL; INFILTRATION; INTRACAUDAL; PERINEURAL at 09:32

## 2019-08-15 NOTE — PROGRESS NOTES
RADIOLOGY PROCEDURE NOTE  Patient name: Merlyn Ravi  MRN: [id number]  : 1965    Pre-procedure diagnosis: Left breast lesion.  Post-procedure diagnosis: Same    Procedure Date/Time: August 15, 2019  9:47 AM  Procedure: Stereotactic needle core biopsy.  Estimated blood loss: None  Specimen(s) collected with description: Six vacuum needle cores.  The patient tolerated the procedure well with no immediate complications.    See imaging dictation for procedural details.    Provider name: Jr Mares  Assistant(s):None

## 2019-08-15 NOTE — IP AVS SNAPSHOT
MRN:[id number]                      After Visit Summary   8/15/2019    Merlyn Ravi    MRN: [id number]           Visit Information        Provider Department      8/15/2019  9:00 AM [place] RAD; W[place]MA1 [facility]           Review of your medicines      Notice    Cannot display patient medications because the patient has not yet been checked in.           Protect others around you: Learn how to safely use, store and throw away your medicines at www.disposemymeds.org.       Follow-ups after your visit       Your next 10 appointments already scheduled    Aug 15, 2019  9:00 AM CDT  MA STEREOTACTIC BREAST BIOPSY VACUUM ASST LEFT with [place]1, [place] RAD  [facility] ([facility] [address] [address] [phone]   How do I prepare for my exam? (Food and drink instructions)  No Food and Drink Restrictions.    How do I prepare for my exam? (Other instructions)  Do not use any powder, lotion or deodorant under your arms or on your breast. If you do, we will ask you to remove it before your exam.  Stop taking Coumadin (warfarin) 5 days before treatment. Restart the day after treatment.  If you take Plavix, Ticlid, Pletal or Persantine, ask your doctor if you should stop these medicines. You may need extra tests on the morning of your scan.    What should I wear: Wear comfortable clothing and a well-fitted bra to the clinic. (A sports bra is best.)    How long does the exam take:  * If having Stereotactic-guided biopsy this exam will take about 2 hours.  * If having Ultrasound-guided biopsy this exam will take about one hour.  * If having MRI-guided biopsy this exam will take about two hours.    What should I bring: Bring a list of your medicines, including vitamins, minerals and over-the-counter drugs. Bring any previous mammograms from other facilities or have them mailed to the breast center. It is best to leave valuables at home.    Do I need a  : No  is needed.    What do I need to tell my doctor:  Tell your care team if:  * You have any allergies.  * You are taking aspirin, ibuprofen, naproxen or any drug that could increase bleeding.    What should I do after the exam:  You may have some bleeding. We will put pressure on the biopsy site, followed by a bandage. You may also have an ice pack. Please wear a well-fitted bra for 24 hours for extra breast support. To reduce the risk of bleeding, take it easy for the rest of the day.    Do not do anything strenuous for 24 hours.    Your breast may feel tender, and you may have a bruise. Take Tylenol for pain relief if needed. Do not take aspirin for three days. If you have questions or concerns about your medicines, talk to your doctor. A small scar may form at the biopsy site.    What is this test: During this exam, a doctor uses imaging and a needle to take tissue samples from your breast. Lab experts then look at the samples under a microscope. A biopsy will tell us if the tissue is cancer or benign (not cancer). For 8 out of 10 patients, the biopsy shows no cancer.    Who should I call with questions: If you have any questions, please call the Imaging Department where you will have your exam. Directions, parking instructions, and other information is available on our website, [email or web address].     Aug 15, 2019 10:30 AM CDT  MA POST PROCEDURE LEFT with W[place]AMike  [facility] ([facility]) [address] [address]  [phone]   How do I prepare for my exam? (Food and drink instructions)  No Food and Drink Restrictions.    How do I prepare for my exam? (Other instructions)  Do not use any powder, lotion or deodorant under your arms or on your breast. If you do, we will ask you to remove it before your exam.    What should I wear: Wear comfortable, two-piece clothing.    How long does the exam take: Most scans will take 15 minutes.    What should I  "bring: Bring any previous mammograms from other facilities or have them mailed to the breast center.    Do I need a : No  is needed.    What do I need to tell my doctor: If you have any allergies, tell your care team.    What should I do after the exam: No restrictions, you may resume normal activities.    What is this test: This test is an x-ray of the breast to look for breast disease. The breast is pressed between two plates to flatten and spread the tissue. An X-ray is taken of the breast from different angles.    Who should I call with questions: If you have any questions, please call the Imaging Department where you will have your exam. Directions, parking instructions, and other information are available on our website, [email or web address].    Other information about my exam  Three-dimensional (3D) mammograms are available at [facility] locations in [facility], [facility], [facility], [place], [facility] and [place]. The [facility] locations include [place] and the [facility] in [place].    Benefits of 3D mammograms include:  * Improved rate of cancer detection  * Decreases your chance of having to go back for more tests, which means fewer:  * \"False-positive\" results (This means that there is an abnormal area but it isn't cancer.)  * Invasive testing procedures, such as a biopsy or surgery  * Can provide clearer images of the breast if you have dense breast tissue.    *3D mammography is an optional exam that anyone can have with a 2D mammogram. It doesn't replace or take the place of a 2D mammogram. 2D mammograms remain an effective screening test for all women. Not all insurance companies cover the cost of a 3D mammogram. Check with your insurance.     Sep 12, 2019  3:30 PM CDT  LAB with [facility] ([facility]) [address] [address] [phone]   Please do not eat 10-12 hours before your " appointment if you are coming in fasting for labs on lipids, cholesterol, or glucose (sugar). Does not apply to pregnant women. Water, tea and black coffee (with nothing added) is okay. Do not drink other fluids, diet soda or gum. If you have concerns about taking your medications, please send a message by clicking on Secure Messaging, Message Your Care Team.     Sep 19, 2019  3:40 PM CDT  Return Visit with Tanvi Blakely MD  [facility] ([facility]) [facility]  [address]  [facility] [address]  [phone]         Care Instructions       Further instructions from your care team       Breast Biopsy Care Instructions      Site Care:    You may have some discomfort in the breast and may see some bruising at the needle site.    You will be given an ice pack which should be kept in place over the dressing until bedtime tonight.Always keep a gauze pad between your skin and the ice pack.    Wearing your bra continuously for 24 hours post biopsy will give optimal support to the biopsy site.    Activity:       You may go back to your normal routine.     No heavy lifting for 24 hours.    Diet and Medications:       You may go back to your regular diet.     Tylenol is the best choice to relieve your discomfort, the first 24 hours. If you have no bleeding on the first day, Ibuprofen (such as Advil, or Motrin), may be taken on the second day after for pain.     Do not take aspirin for 72 hours following your biopsy.    Questions:     If you have any questions about your procedure performed in Ultrasound, you may contact us at [phone].If you have any questions about your procedure performed in Mammography, you may contact us at [phone].    Results:       The pathology report on your biopsy is usually available within 72 hours. The Radiologist or your personal physician will call you with the results.     You will receive information about any further  follow up from your physician.    Call your doctor if you have:       More than slight bleeding or significant pain, or experience swelling of the breast.     If your physician is unavailable, please call the Nurse Advice Line at [phone], or [facility] Emergency Department at [phone].      Patient:______________________________  Time:_________  Date:_____________    Instructor:____________________________   Time:_________  Date:_____________     Additional Information About Your Visit       MyChart Information    [employer] gives you secure access to your electronic health record. If you see a primary care provider, you can also send messages to your care team and make appointments. If you have questions, please call your primary care clinic.  If you do not have a primary care provider, please call [phone] and they will assist you.       Care EveryWhere ID    This is your Care EveryWhere ID. This could be used by other organizations to access your [facility] medical records  [id number]        Primary Care Provider Office Phone # Fax #    Memo Garcia [phone]      Equal Access to Services    [facility] : Roseline Stephenson, waayo luciano, qaybta kadi arambula . So [facility] [phone].    ATENCIÓN: Si damonla español, tiene a gay disposición servicios gratuitos de asistencia lingüística. Llame al [phone].    We comply with applicable federal civil rights laws and [place] laws. We do not discriminate on the basis of race, color, national origin, age, disability, sex, sexual orientation, or gender identity.           Thank you!    Thank you for choosing [facility] for your care. Our goal is always to provide you with excellent care. Hearing back from our patients is one way we can continue to improve our services. Please take a few minutes to complete the written survey that you may receive in the mail after  you visit with us. Thank you!         Medication List     Notice    Cannot display patient medications because the patient has not yet been checked in.

## 2019-08-15 NOTE — IP AVS SNAPSHOT
[facility]  [address]  [facility] [address]  Phone:  [phone]  Fax:  [phone]                                    After Visit Summary   8/15/2019    Merlyn Ravi    MRN: [id number]           After Visit Summary Signature Page    I have received my discharge instructions, and my questions have been answered. I have discussed any challenges I see with this plan with the nurse or doctor.    ..........................................................................................................................................  Patient/Patient Representative Signature      ..........................................................................................................................................  Patient Representative Print Name and Relationship to Patient    ..................................................               ................................................  Date                                   Time    ..........................................................................................................................................  Reviewed by Signature/Title    ...................................................              ..............................................  Date                                               Time          22EPIC Rev 08/18

## 2019-08-20 LAB
COPATH REPORT: NORMAL
COPATH REPORT: NORMAL

## 2019-08-21 ENCOUNTER — OFFICE VISIT (OUTPATIENT)
Dept: FAMILY MEDICINE | Facility: CLINIC | Age: 54
End: 2019-08-21
Payer: COMMERCIAL

## 2019-08-21 VITALS
HEIGHT: 66 IN | WEIGHT: 230 LBS | DIASTOLIC BLOOD PRESSURE: 88 MMHG | RESPIRATION RATE: 14 BRPM | BODY MASS INDEX: 36.96 KG/M2 | TEMPERATURE: 97.8 F | OXYGEN SATURATION: 98 % | HEART RATE: 71 BPM | SYSTOLIC BLOOD PRESSURE: 130 MMHG

## 2019-08-21 DIAGNOSIS — C50.912 INVASIVE LOBULAR CARCINOMA OF LEFT BREAST IN FEMALE (H): Primary | ICD-10-CM

## 2019-08-21 PROCEDURE — 99213 OFFICE O/P EST LOW 20 MIN: CPT | Performed by: FAMILY MEDICINE

## 2019-08-21 ASSESSMENT — MIFFLIN-ST. JEOR: SCORE: 1660.02

## 2019-08-21 NOTE — PROGRESS NOTES
Subjective     Merlyn Ravi is a 54 year old female who presents to clinic today for the following health issues:    Patient is a 54 yr old female here to discuss biopsy results of her breast , she has invasive lobular carcinoma of the left breast. She will need to establish with oncology and general surgery    HPI   Result of bx from breast lump      Patient Active Problem List   Diagnosis     CARDIOVASCULAR SCREENING; LDL GOAL LESS THAN 160     LASIK OU 5 yrs     Dermatofibroma     Monoclonal paraproteinemia     Hyperlipidemia LDL goal <130     Obesity (BMI 35.0-39.9) with comorbidity (H)     Past Surgical History:   Procedure Laterality Date     C NONSPECIFIC PROCEDURE      bladder surgery     HYSTERECTOMY, PAP NO LONGER INDICATED       HYSTERECTOMY, MAGY       LASIK BILATERAL  2005    Dr. Solis        Social History     Tobacco Use     Smoking status: Never Smoker     Smokeless tobacco: Never Used   Substance Use Topics     Alcohol use: Yes     Alcohol/week: 0.0 oz     Comment: 3 drinks per month     Family History   Problem Relation Age of Onset     Thyroid Disease Mother      Heart Disease Mother      Heart Disease Father      Hyperlipidemia Father      Hypertension Maternal Grandmother      Breast Cancer Maternal Grandmother      Hypertension Maternal Grandfather      Alzheimer Disease Paternal Grandmother      Diabetes Brother      Eczema Brother      Thyroid Disease Brother      Melanoma No family hx of          Current Outpatient Medications   Medication Sig Dispense Refill     atorvastatin (LIPITOR) 10 MG tablet Take 1 tablet (10 mg) by mouth daily 90 tablet 3     doxycycline hyclate (PERIOSTAT) 20 MG tablet 1 tab PO  tablet 3     doxycycline monohydrate 100 MG capsule Take 1 capsule (100 mg) by mouth daily with food 90 capsule 0     levothyroxine (SYNTHROID/LEVOTHROID) 88 MCG tablet Take 1 tablet (88 mcg) by mouth daily 90 tablet 3     IBUPROFEN PO Take 800 mg by mouth every 4 hours as needed  for moderate pain       meclizine (ANTIVERT) 25 MG tablet Take 1 tablet (25 mg) by mouth every 6 hours as needed for dizziness (Patient not taking: Reported on 12/8/2017) 30 tablet 1     Naproxen Sodium (ALEVE PO) Take 220 mg by mouth 2 times daily (with meals)       ondansetron (ZOFRAN) 4 MG tablet Take 1 tablet (4 mg) by mouth every 8 hours as needed (Patient not taking: Reported on 12/8/2017) 18 tablet 0     order for DME Equipment being ordered: wrist brace (Patient not taking: Reported on 8/28/2017) 1 Device 0     Allergies   Allergen Reactions     Demerol      Sedation and vomiting     Sulfa Drugs      unknown reaction     BP Readings from Last 3 Encounters:   08/21/19 130/88   08/01/19 118/78   01/25/19 129/84    Wt Readings from Last 3 Encounters:   08/21/19 104.3 kg (230 lb)   08/01/19 103.4 kg (228 lb)   01/25/19 104.1 kg (229 lb 8 oz)                      Reviewed and updated as needed this visit by Provider         Review of Systems   ROS COMP: Constitutional, HEENT, cardiovascular, pulmonary, gi and gu systems are negative, except as otherwise noted.      Objective    There were no vitals taken for this visit.  There is no height or weight on file to calculate BMI.  Physical Exam   GENERAL: healthy, alert and no distress  MS: no gross musculoskeletal defects noted, no edema  PSYCH: mentation appears normal and tearful    Diagnostic Test Results:  none         Assessment & Plan     1. Invasive lobular carcinoma of left breast in female (H)  Referrals placed.   - Oncology/Hematology Adult Referral; Future  - GENERAL SURG ADULT REFERRAL          FUTURE APPOINTMENTS:       - Follow-up visit in one month or sooner as needed.  Patient Instructions         Thank you for choosing [facility].  You may be receiving an email and/or telephone survey request from [facility] [employer] Customer [employer] regarding your visit today.  Please take a few minutes to respond to the survey to let us know how we are  doing.      If you have questions or concerns, please contact us via [employer] or you can contact your care team at [phone].    Our Clinic hours are:  Monday 6:40 am  to 7:00 pm  Tuesday -Friday 6:40 am to 5:00 pm    The [place] outpatient lab hours are:  Monday - Friday 6:10 am to 4:45 pm  Saturdays 7:00 am to 11:00 am  Appointments are required, call [phone]    If you have clinical questions after hours or would like to schedule an appointment,  call the clinic at [phone].      No follow-ups on file.    Memo Garcia MD  [facility]

## 2019-08-21 NOTE — PATIENT INSTRUCTIONS
Thank you for choosing [facility].  You may be receiving an email and/or telephone survey request from [facility] Customer Experience regarding your visit today.  Please take a few minutes to respond to the survey to let us know how we are doing.      If you have questions or concerns, please contact us via [employer] or you can contact your care team at [phone].    Our Clinic hours are:  Monday 6:40 am  to 7:00 pm  Tuesday -Friday 6:40 am to 5:00 pm    The [place] outpatient lab hours are:  Monday - Friday 6:10 am to 4:45 pm  Saturdays 7:00 am to 11:00 am  Appointments are required, call [phone]    If you have clinical questions after hours or would like to schedule an appointment,  call the clinic at [phone].

## 2019-08-28 ENCOUNTER — ONCOLOGY VISIT (OUTPATIENT)
Dept: ONCOLOGY | Facility: CLINIC | Age: 54
End: 2019-08-28
Attending: INTERNAL MEDICINE
Payer: COMMERCIAL

## 2019-08-28 VITALS
WEIGHT: 231.5 LBS | RESPIRATION RATE: 18 BRPM | HEART RATE: 80 BPM | BODY MASS INDEX: 37.21 KG/M2 | HEIGHT: 66 IN | SYSTOLIC BLOOD PRESSURE: 133 MMHG | OXYGEN SATURATION: 97 % | DIASTOLIC BLOOD PRESSURE: 79 MMHG | TEMPERATURE: 98.7 F

## 2019-08-28 DIAGNOSIS — E78.5 HYPERLIPIDEMIA LDL GOAL <130: ICD-10-CM

## 2019-08-28 DIAGNOSIS — C50.912 INVASIVE LOBULAR CARCINOMA OF LEFT BREAST IN FEMALE (H): Primary | ICD-10-CM

## 2019-08-28 DIAGNOSIS — E03.8 OTHER SPECIFIED HYPOTHYROIDISM: ICD-10-CM

## 2019-08-28 DIAGNOSIS — D47.2 MONOCLONAL PARAPROTEINEMIA: ICD-10-CM

## 2019-08-28 PROCEDURE — G0463 HOSPITAL OUTPT CLINIC VISIT: HCPCS

## 2019-08-28 PROCEDURE — 99215 OFFICE O/P EST HI 40 MIN: CPT | Performed by: INTERNAL MEDICINE

## 2019-08-28 ASSESSMENT — MIFFLIN-ST. JEOR: SCORE: 1658.89

## 2019-08-28 ASSESSMENT — PAIN SCALES - GENERAL: PAINLEVEL: MILD PAIN (2)

## 2019-08-28 NOTE — LETTER
"    8/28/2019         RE: Merlyn Ravi  [address] [facility] [id number]        Dear Colleague,    Thank you for referring your patient, Merlyn Ravi, to the [facility]. Please see a copy of my visit note below.    Oncology Rooming Note    August 28, 2019 1:04 PM   Merlyn Ravi is a 54 year old female who presents for:    Chief Complaint   Patient presents with     Oncology Clinic Visit     New Diagnosis - Invasive lobular carcinoma of left breast in female      Initial Vitals: /79 (BP Location: Right arm, Patient Position: Sitting, Cuff Size: Adult Large)   Pulse 80   Temp 98.7  F (37.1  C) (Tympanic)   Resp 18   Ht 1.664 m (5' 5.5\")   Wt 105 kg (231 lb 8 oz)   SpO2 97%   BMI 37.94 kg/m    Estimated body mass index is 37.94 kg/m  as calculated from the following:    Height as of this encounter: 1.664 m (5' 5.5\").    Weight as of this encounter: 105 kg (231 lb 8 oz). Body surface area is 2.2 meters squared.  Mild Pain (2) Comment: Data Unavailable   No LMP recorded. Patient has had a hysterectomy.  Allergies reviewed: Yes  Medications reviewed: Yes    Medications: Medication refills not needed today.  Pharmacy name entered into [facility]: [facility] - [place], [place] - [address]    Clinical concerns: New Diagnosis - Invasive lobular carcinoma of left breast in female.       Brandy Lorenzo CMA                DATE OF VISIT: Aug 28, 2019    REASON FOR REFERRAL: Management of breast cancer.    CHIEF COMPLAINT:   Chief Complaint   Patient presents with     Oncology Clinic Visit     New Diagnosis - Invasive lobular carcinoma of left breast in female        HISTORY OF PRESENT ILLNESS:   54-year-old female patient who is here today to discuss management of breast cancer.  Patient felt a lump in the left breast subsequently she went on to have diagnostic mammography which showed 1.1 cm irregular asymmetry about 11.2 cm from the nipple at 9-10 o'clock " position.  Left breast ultrasound at that area revealed the lesion that measures 1.1 cm additional sonography to the left axilla shows no abnormal lymphadenopathy.  Subsequently the patient went on to have stereotactic breast biopsy done on August 15.  The pathology revealed invasive lobular carcinoma grade 2 out of 3.  Angiolymphatic invasion was not seen.  Carcinoma in situ cannot be excluded.  The tumor was estrogen receptor positive progesterone receptor positive and HER-2/walter came back negative.  Patient is here to discuss these results.  The patient is known with monoclonal gammopathy since 2013.  She has been followed annually with laboratory tests without any evidence of plasma cell dyscrasia.    REVIEW OF SYSTEMS:   Constitutional: Negative for fever, chills, and night sweats.  Skin: negative.  Eyes: negative.  Ears/Nose/Throat: negative.  Respiratory: No shortness of breath, dyspnea on exertion, cough, or hemoptysis.  Cardiovascular: negative.  Gastrointestinal: negative.  Genitourinary: negative.  Musculoskeletal: negative.  Neurologic: negative.  Psychiatric: negative.  Hematologic/Lymphatic/Immunologic: negative.  Endocrine: negative.    PAST MEDICAL HISTORY:   Past Medical History:   Diagnosis Date     Allergies      Dermatofibroma 5/14/2012       PAST SURGICAL HISTORY:   Past Surgical History:   Procedure Laterality Date     C NONSPECIFIC PROCEDURE      bladder surgery     HYSTERECTOMY, PAP NO LONGER INDICATED       HYSTERECTOMY, MAGY       LASIK BILATERAL  2005    Dr. Solis        ALLERGIES:   Allergies as of 08/28/2019 - Reviewed 08/28/2019   Allergen Reaction Noted     Demerol  11/16/2009     Sulfa drugs  11/16/2009       MEDICATIONS:   Current Outpatient Medications   Medication Sig Dispense Refill     atorvastatin (LIPITOR) 10 MG tablet Take 1 tablet (10 mg) by mouth daily 90 tablet 3     doxycycline hyclate (PERIOSTAT) 20 MG tablet 1 tab PO  tablet 3     doxycycline monohydrate 100 MG  capsule Take 1 capsule (100 mg) by mouth daily with food 90 capsule 0     IBUPROFEN PO Take 800 mg by mouth every 4 hours as needed for moderate pain       levothyroxine (SYNTHROID/LEVOTHROID) 88 MCG tablet Take 1 tablet (88 mcg) by mouth daily 90 tablet 3     meclizine (ANTIVERT) 25 MG tablet Take 1 tablet (25 mg) by mouth every 6 hours as needed for dizziness 30 tablet 1     Naproxen Sodium (ALEVE PO) Take 220 mg by mouth 2 times daily (with meals)       ondansetron (ZOFRAN) 4 MG tablet Take 1 tablet (4 mg) by mouth every 8 hours as needed 18 tablet 0     order for DME Equipment being ordered: wrist brace 1 Device 0        FAMILY HISTORY:   Family History   Problem Relation Age of Onset     Thyroid Disease Mother      Heart Disease Mother      Heart Disease Father      Hyperlipidemia Father      Hypertension Maternal Grandmother      Breast Cancer Maternal Grandmother      Hypertension Maternal Grandfather      Alzheimer Disease Paternal Grandmother      Diabetes Brother      Eczema Brother      Thyroid Disease Brother      Melanoma No family hx of         SOCIAL HISTORY:   Social History     Socioeconomic History     Marital status:      Spouse name: None     Number of children: None     Years of education: None     Highest education level: None   Occupational History     Employer: [employer]   Social Needs     Financial resource strain: None     Food insecurity:     Worry: None     Inability: None     Transportation needs:     Medical: None     Non-medical: None   Tobacco Use     Smoking status: Never Smoker     Smokeless tobacco: Never Used   Substance and Sexual Activity     Alcohol use: Yes     Alcohol/week: 0.0 oz     Comment: 3 drinks per month     Drug use: No     Sexual activity: Yes     Partners: Male     Birth control/protection: Surgical     Comment: defer to md   Lifestyle     Physical activity:     Days per week: None     Minutes per session: None     Stress: None   Relationships     Social  "connections:     Talks on phone: None     Gets together: None     Attends Mosque service: None     Active member of club or organization: None     Attends meetings of clubs or organizations: None     Relationship status: None     Intimate partner violence:     Fear of current or ex partner: None     Emotionally abused: None     Physically abused: None     Forced sexual activity: None   Other Topics Concern     Parent/sibling w/ CABG, MI or angioplasty before 65F 55M? No   Social History Narrative     None       PHYSICAL EXAMINATION:   /79 (BP Location: Right arm, Patient Position: Sitting, Cuff Size: Adult Large)   Pulse 80   Temp 98.7  F (37.1  C) (Tympanic)   Resp 18   Ht 1.664 m (5' 5.5\")   Wt 105 kg (231 lb 8 oz)   SpO2 97%   BMI 37.94 kg/m     Wt Readings from Last 10 Encounters:   08/28/19 105 kg (231 lb 8 oz)   08/21/19 104.3 kg (230 lb)   08/01/19 103.4 kg (228 lb)   01/25/19 104.1 kg (229 lb 8 oz)   09/05/18 99.3 kg (219 lb)   12/08/17 94.1 kg (207 lb 9 oz)   09/20/17 96.4 kg (212 lb 9.6 oz)   08/28/17 96.6 kg (213 lb)   02/23/17 99.8 kg (220 lb)   12/08/16 103 kg (227 lb)      ECOG performance status: 0  GENERAL APPEARANCE: Healthy, alert and in no acute distress.  HEENT: Sclerae anicteric. PERRLA. Oropharynx without ulcers, lesions, or thrush.  NECK: Supple. No asymmetry or masses.  LYMPHATICS: No palpable cervical, supraclavicular, axillary, or inguinal lymphadenopathy.  RESP: Lungs clear to auscultation bilaterally without rales, rhonchi or wheezes.  CARDIOVASCULAR: Regular rate and rhythm. Normal S1, S2; no S3 or S4. No murmur, gallop, or rub.  ABDOMEN: Soft, nontender. Bowel sounds normal. No palpable organomegaly or masses.  MUSCULOSKELETAL: Extremities without gross deformities noted. No edema of bilateral lower extremities.  SKIN: No suspicious lesions or rashes.  NEURO: Alert and oriented x 3. Cranial nerves II-XII grossly intact.  PSYCHIATRIC: Mentation and affect appear " "normal.    LABORATORY RESULTS:  Hospital Outpatient Visit on 08/15/2019   Component Date Value Ref Range Status     Copath Report 08/15/2019    Final                    Value:Patient Name: JESSICA CHIU  MR#: [id number]  Specimen #: [id number]  Collected: 8/15/2019  Received: 8/16/2019  Reported: 8/20/2019 20:21  Ordering Phy(s): HERLINDA ROE  Additional Phy(s): ERIC SHAIKH    For improved result formatting, select 'View Enhanced Report Format' under   Linked Documents section.    SPECIMEN(S):  Breast needle biopsy, left. 10:00, 12 cm from nipple    FINAL DIAGNOSIS:  Breast needle biopsy, left. 10:00, 12 cm from nipple:  - Invasive lobular carcinoma  - [place] grade: II (of possible III)       - [place] score 6 (of possible 9)       - Scoring criteria: tubules = 3, nuclear pleomorphism = 2, mitosis =   1.  - Angiolymphatic invasion:  None identified  - Carcinoma in-situ: Cannot exclude (see comment and images)  - Estrogen receptor:  POSITIVE (approximately 80%)  - Progesterone receptor:  POSITIVE (approximately 60-70%)  - Her2/walter:  FISH analysis will be performed and reported by the   [facility]  Danny hong in a separate report.    COMMENT:  TO VIEW DIGITAL IMAGES IN THIS REPORT:      To view the digital images associated with this report in Epic, select   \"View Enhanced Report Format\" under the  Linked Documents section of this report to view the PDF version of the   original CoPath generated report. For  the best view of the images, grab the edge of the opened report window and   stretch it to expand to the full  width of your screen then scroll down to view images.    DIAGNOSTIC COMMENT:  The breast reporting protocol is based on AJCC/UICC TNM, 8th edition.   Protocol web posting date: January 2018.    There is a focal nodular tumor cells which is relatively circumscribed but   not within a clear duct, lacking  the infiltrative " "single cell appearance of the remaining invasive tumor.    The possibility that this nodule is  from within a ductular percent sampling of areas of in situ nikita                          gnancy   cannot be ruled out with final  evaluation deferred to the resected specimen findings.    Electronically signed out by:    AIDE Mann M.D.    CLINICAL HISTORY:  54-year-old female with palpable left breast mass, sound report including   : \"Directed sonography at to the  site of patient's palpable complaint and mammographic finding shows a   vague lesion that measures 1.1 cm\".    GROSS:  The specimen is received in formalin, labeled with the patient's name and   date of birth, and designated \"left  breast 10:00, 12 cm from the nipple\". It consists of multiple yellow-tan   fibrofatty breast core fragments  measuring 2.5 x 2.5 x 0.3 cm in aggregate.  The specimen is wrapped and   entirely submitted in one cassette.  (Dictated by: LOUIS King([facility]) 8/16/2019 10:52 AM)    MICROSCOPIC:  Sections show fibrous tissue with a infiltrate of tumor cells with a   linear pattern of infiltration in the  fibrous stroma and a small nodule of tumor cells without a surrounding   duct                           epithelium, the latter without a  clear surrounding duct although the nodular appearance may be related to   portions of tumor from within a duct  leaving open the possibility of carcinoma in situ (final evaluation for   in-situ malignancy deferred to  permanent section evaluation of excised nodule).  No angiolymphatic   invasion is seen.  The tumors showed no  residual tubule formation demonstrate moderate nuclear pleomorphism and no   appreciable mitotic activity.    IMMUNOHISTOCHEMICAL ANALYSIS FOR ESTROGEN AND PROGESTERONE RECEPTORS    RESULTS:    ESTROGEN RECEPTORS:    POSITIVE                    Approximately 80% of tumor cell nuclei stain   positive                           Intensity of staining: Strong    PROGESTERONE " RECEPTORS:    POSITIVE                    Approximately 60-70% of tumor cell nuclei   stain positive.                           Intensity of staining: Moderate to strong    Performed on paraffin block: Needle biopsy    External and internal controls stain approp                          riately.  Standard assay conditions are met.  Assay Conditions:  Fixative and processing: 10% neutral buffered formalin, paraffin embedded.  Cold ischemia time less than one hour. Time of specimen collection 09:39;   Time placed in formalin 09:41.  Total duration of formalin fixation greater than 6 hours and less than 72   hours.  Staining method used: [place] predilute monoclonal antibodies, estrogen   receptor clone SP1 and progesterone  receptor clone 1E2, antigen heat retrieval in EDTA alkaline pH for 60   minutes, [place] ultraview detection  system and automatic immunostainer.    Scoring system: The ASCO-CAP scoring guidelines are used. A positive test   is defined as positive nuclei  staining in greater than or equal to 1% of tumor cells. A negative test is   defined as nuclear staining in less  than 1% of tumor cells. For positive tests, an estimation of intensity of   nuclear staining over the entire  tumor on tissue sections is also provided.    Reference: DEBRA Hurley, LASHAE Craft, ARUNA Griffin, et al. American Society of   Clinical Oncology/College of  American Pathologists guideline recommendations for immunohistochemical   testing of estrogen and  progesterone receptors in breast cancer, Arch Pathol Lab Med.   2010;134:907-922    (Dictated by: ELA Mann MD 08/20/2019)    The technical component of this testing was completed at the [facility], with the professional component performed   at the [facility], [address] [place] [id number] ([phone])    CPT  Codes:  A: 62985-HC2, 96684-VX, 78712-GD, HFISH, SOH, SOH, 03812-ZVW, 33510-FRQ    COLLECTION SITE:  Client: [facility]  Location: YOLIE Rosado (K) Report 08/15/2019    Final                    Value:Patient Name: JESSICA RAVI  MR#: [id number]  Specimen #: [id number]  Collected: 8/15/2019 09:39  Received: 8/19/2019 17:16  Reported: 8/20/2019 18:15  Ordering Phy(s): KEN JOSHUA  Additional Phy(s): HERLINDA SHAIKH    For improved result formatting, select 'View Enhanced Report Format' under   Linked Documents section.  __________________________________________    TEST(S) REQUESTED:  [facility] HER2 FISH    SPECIMEN DESCRIPTION:  Breast Tissue, Paraffin Embedded    CLINICAL COMMENTS:  [id number]    RESULTS:    Ratio of HER2/JOE-17 signals  Jessica Ravi: 1.0 (GERALD Negative)                                    Avg.   number HER2 signals/nucleus: 1.9                                                                            Avg. number JOE-17 signals/nucleus:  1.9    **Interpretive guidelines per the American Society of Clinical   Oncology/College of American Pathologists  Clinical Practice Guideline Focused Update (Tali ALLEN et al, 2018, Arch                             Pathol Lab Med 142:1364;  doi:10.5858/arpa.9068-2064-FQ):    -- Group 1: HER2/JOE-17 ratio 2.0 or more -AND- avg. number HER2   signals/nucleus 4.0 or more (GERALD Positive)  -- Group 2: HER2/JOE-17 ratio 2.0 or more -AND- avg. number HER2   signals/nucleus <4.0 (Additional work  required)  -- Group 3: HER2/JOE-17 ratio <2.0 -AND- avg. number HER2 signals/nucleus   6.0 or more (Additional work  required)  -- Group 4: HER2/JOE-17 ratio <2.0 -AND- avg. number HER2 signals/nucleus   4.0 or more and <6.0 (Additional  work required)  -- Group 5: HER2/JOE-17 ratio <2.0 -AND- avg. number HER2 signals/nucleus   <4.0 (GERALD Negative)    INTERPRETATION:  Group 5 (GERALD Negative)    Per the American  Society of Clinical Oncology/College of American   Pathologists Clinical Practice Guideline  Focused Update (Tali ALLEN et al, 2018, Arch Pathol Lab Med    doi:10.5858/arpa.6300-5849-GM), the HER2/JOE 17  ratio of 1.0 and average number of HER2 signals/cell of 1.9 places this   specimen in Group 5 (GERALD Negativ                          e).    Specimen:  Case [id number], Block A1  Reported formalin fixation time: 6-72 hours  Number of cells scored: 60  Probe:   Dako HER2/JOE-17 IQFISH pharmDx Probe Mix to HER2 (17q12) and to   the centromere region of chromosome  17    ADDITIONAL COMMENTS:  The IQFISH pharmDx test has been approved by the FDA for the evaluation of   HER2 (ERBB2) gene amplification  status in formalin-fixed, paraffin-embedded breast cancer tissue specimens   and gastric or gastroesophageal  junction adenocarcinoma.  It is intended for use as an adjunct to other   existing clinicopathologic information  used to evaluate patients with such tumors. This test was developed and   its performance characteristics  determined by the [facility] [place]   Clinical Laboratories.    Electronically Signed Out By:  Verona Mcrae M.D., [facility]    CPT Codes:  A: 72851-YLB7, [id number]    TESTING LAB LOCATION:  [address], [address] [address] [phone]    COLLECTION SITE:  Client:  [facility]  Location:  [facility])         IMAGING RESULTS:  Recent Results (from the past 744 hour(s))   MA Diagnostic Left w/Pancho    Narrative    MA DIAGNOSTIC LEFT W/ PANCHO, US BREAST LEFT LIMITED 1-3 QUADRANTS  8/9/2019 9:05 AM    HISTORY:  Palpable lump medially in left breast.    COMPARISON:  3/20/2019.    TECHNIQUE:  Left digital mammography with CAD is performed as well as  DBT. Directed left breast ultrasound was also done.    BREAST DENSITY: Scattered fibroglandular  densities.    LEFT MAMMOGRAM: In the region of the marked palpable site at 9-10:00  and 11.2 cm from the nipple is an irregular asymmetry. It measures 1.1  cm.    Left breast ultrasound: Directed sonography at to the site of  patient's palpable complaint and mammographic finding shows a vague  lesion that measures 1.1 cm. It is difficult to define in two imaging  planes. Since it is best seen mammographically, stereotactic needle  core biopsy is recommended at this time. We will help patient get set  up for this. Additional sonography to the left axilla shows no  abnormal lymph nodes.      Impression    IMPRESSION: BI-RADS CATEGORY: 4 - Suspicious Abnormality-Biopsy Should  Be Considered.    RECOMMENDED FOLLOW-UP: Biopsy.    HERLINDA ROE MD   US Breast Left Limited 1-3 Quadrants    Narrative    MA DIAGNOSTIC LEFT W/ LAURA, US BREAST LEFT LIMITED 1-3 QUADRANTS  8/9/2019 9:05 AM    HISTORY:  Palpable lump medially in left breast.    COMPARISON:  3/20/2019.    TECHNIQUE:  Left digital mammography with CAD is performed as well as  DBT. Directed left breast ultrasound was also done.    BREAST DENSITY: Scattered fibroglandular densities.    LEFT MAMMOGRAM: In the region of the marked palpable site at 9-10:00  and 11.2 cm from the nipple is an irregular asymmetry. It measures 1.1  cm.    Left breast ultrasound: Directed sonography at to the site of  patient's palpable complaint and mammographic finding shows a vague  lesion that measures 1.1 cm. It is difficult to define in two imaging  planes. Since it is best seen mammographically, stereotactic needle  core biopsy is recommended at this time. We will help patient get set  up for this. Additional sonography to the left axilla shows no  abnormal lymph nodes.      Impression    IMPRESSION: BI-RADS CATEGORY: 4 - Suspicious Abnormality-Biopsy Should  Be Considered.    RECOMMENDED FOLLOW-UP: Biopsy.    HERLINDA ROE MD MA Stereotactic Breast Biopsy Vacuum Assist Left     Addendum: 8/21/2019    Merlyn Ravi  Accession # [id number], [id number]    The original report on this patient was dictated by Dr. Mares. The  pathology results have returned and show invasive lobular carcinoma  (with LCIS not excluded). These pathology results are concordant with  imaging findings. Dr. Garcia was called with these results today.  She will make sure the patient is contacted and set up for further  workup.    Herlinda Mares MD  ( Date of Addendum: 8/21/2019 )      HERLINDA MARES MD      Narrative    HISTORY: Left breast lesion.    COMPARISON: 8/9/2019.    STEREOTACTIC NEEDLE CORE BIOPSY OF LEFT BREAST LESION AD 9-10:00:  Risks and benefits of the procedure are discussed with the patient.  Stereotactic guidance is utilized via a medial-lateral approach. A  total of 20 mL of 1% lidocaine are utilized as a local anesthetic.   Six 9-gauge vacuum assisted core biopsy samples of the left breast  lesion are obtained.  There was no significant blood loss.      After the biopsy a hydrophilic marker is placed in case any further  necessary. Before this is discussed with the patient.    POSTPROCEDURE MAMMOGRAMS FOR MARKER PLACEMENT: The hydrophilic marker  has appropriately deployed. There is a small 1.8 cm hematoma at the  needle core biopsy marker site.      Impression    IMPRESSION: The patient is status post needle core biopsy of left  breast lesion, as described above. Pathologic results are pending.    HERLINDA MARES MD   MA Post Procedure Left    Addendum: 8/21/2019    Merlyn Ravi  Accession # [id number], [id number]    The original report on this patient was dictated by Dr. Mares. The  pathology results have returned and show invasive lobular carcinoma  (with LCIS not excluded). These pathology results are concordant with  imaging findings. Dr. Garcia was called with these results today.  She will make sure the patient is contacted and set up for further  workup.    Herlinda Mares MD  ( Date of Addendum:  8/21/2019 )      HERLINDA ROE MD      Narrative    HISTORY: Left breast lesion.    COMPARISON: 8/9/2019.    STEREOTACTIC NEEDLE CORE BIOPSY OF LEFT BREAST LESION AD 9-10:00:  Risks and benefits of the procedure are discussed with the patient.  Stereotactic guidance is utilized via a medial-lateral approach. A  total of 20 mL of 1% lidocaine are utilized as a local anesthetic.   Six 9-gauge vacuum assisted core biopsy samples of the left breast  lesion are obtained.  There was no significant blood loss.      After the biopsy a hydrophilic marker is placed in case any further  necessary. Before this is discussed with the patient.    POSTPROCEDURE MAMMOGRAMS FOR MARKER PLACEMENT: The hydrophilic marker  has appropriately deployed. There is a small 1.8 cm hematoma at the  needle core biopsy marker site.      Impression    IMPRESSION: The patient is status post needle core biopsy of left  breast lesion, as described above. Pathologic results are pending.    HERLINDA ROE MD       ASSESSMENT AND PLAN:    (C50.912) Invasive lobular carcinoma of left breast in female (H)  54-year-old female patient with invasive lobular carcinoma of the left breast clinical stage T1c N0 M0.  The tumor is estrogen receptor positive, progesterone receptor positive, and HER-2/walter negative.  I reviewed with the patient today the natural history of breast cancer.  We talked about staging, biology, management, follow-up and prognosis.  I reviewed different modalities and treatment of breast cancer including surgical resection, breast radiation therapy, systemic therapy including both systemic chemotherapy and endocrine therapy.  The patient will meet with surgery next week.  I reviewed with her the rationale of surgery we talked about breast lumpectomy versus mastectomy.  I will see the patient again following her definitive surgery to discuss with her further management plan.  I also discussed with the patient the rationale of Oncotype DX.   Potential benefit and risk of endocrine therapy in details.    (E78.5) Hyperlipidemia LDL goal <130  Patient currently on Lipitor 10 mg orally daily.    (E03.8) Other specified hypothyroidism  Patient currently on Synthroid 88 mcg orally daily.     (D47.2) Monoclonal paraproteinemia    There is no evidence of plasma cell dyscrasia at this time.    The patient is ready to learn, no apparent learning barriers were identified, Diagnosis and treatment plans were explained to the patient. The patient expressed understanding of the content. The patient questions were answered to her satisfaction.    Tanvi Blakely MD    Chart documentation with Dragon Voice recognition Software. Although reviewed after completion, some words and grammatical errors may remain.      Again, thank you for allowing me to participate in the care of your patient.        Sincerely,        Tanvi Blakely MD

## 2019-08-28 NOTE — PROGRESS NOTES
"Oncology Rooming Note    August 28, 2019 1:04 PM   Merlyn Ravi is a 54 year old female who presents for:    Chief Complaint   Patient presents with     Oncology Clinic Visit     New Diagnosis - Invasive lobular carcinoma of left breast in female      Initial Vitals: /79 (BP Location: Right arm, Patient Position: Sitting, Cuff Size: Adult Large)   Pulse 80   Temp 98.7  F (37.1  C) (Tympanic)   Resp 18   Ht 1.664 m (5' 5.5\")   Wt 105 kg (231 lb 8 oz)   SpO2 97%   BMI 37.94 kg/m   Estimated body mass index is 37.94 kg/m  as calculated from the following:    Height as of this encounter: 1.664 m (5' 5.5\").    Weight as of this encounter: 105 kg (231 lb 8 oz). Body surface area is 2.2 meters squared.  Mild Pain (2) Comment: Data Unavailable   No LMP recorded. Patient has had a hysterectomy.  Allergies reviewed: Yes  Medications reviewed: Yes    Medications: Medication refills not needed today.  Pharmacy name entered into [facility]: [facility] [place], [place] - [address]    Clinical concerns: New Diagnosis - Invasive lobular carcinoma of left breast in female.       Brandy Lorenzo CMA              "

## 2019-08-28 NOTE — PATIENT INSTRUCTIONS
Follow-up 2 weeks following surgery.  Send Oncotype DX if the patient has node-negative disease of the time of surgery.

## 2019-08-29 PROBLEM — E03.8 OTHER SPECIFIED HYPOTHYROIDISM: Status: ACTIVE | Noted: 2019-08-29

## 2019-08-30 NOTE — PROGRESS NOTES
DATE OF VISIT: Aug 28, 2019    REASON FOR REFERRAL: Management of breast cancer.    CHIEF COMPLAINT:   Chief Complaint   Patient presents with     Oncology Clinic Visit     New Diagnosis - Invasive lobular carcinoma of left breast in female        HISTORY OF PRESENT ILLNESS:   54-year-old female patient who is here today to discuss management of breast cancer.  Patient felt a lump in the left breast subsequently she went on to have diagnostic mammography which showed 1.1 cm irregular asymmetry about 11.2 cm from the nipple at 9-10 o'clock position.  Left breast ultrasound at that area revealed the lesion that measures 1.1 cm additional sonography to the left axilla shows no abnormal lymphadenopathy.  Subsequently the patient went on to have stereotactic breast biopsy done on August 15.  The pathology revealed invasive lobular carcinoma grade 2 out of 3.  Angiolymphatic invasion was not seen.  Carcinoma in situ cannot be excluded.  The tumor was estrogen receptor positive progesterone receptor positive and HER-2/walter came back negative.  Patient is here to discuss these results.  The patient is known with monoclonal gammopathy since 2013.  She has been followed annually with laboratory tests without any evidence of plasma cell dyscrasia.    REVIEW OF SYSTEMS:   Constitutional: Negative for fever, chills, and night sweats.  Skin: negative.  Eyes: negative.  Ears/Nose/Throat: negative.  Respiratory: No shortness of breath, dyspnea on exertion, cough, or hemoptysis.  Cardiovascular: negative.  Gastrointestinal: negative.  Genitourinary: negative.  Musculoskeletal: negative.  Neurologic: negative.  Psychiatric: negative.  Hematologic/Lymphatic/Immunologic: negative.  Endocrine: negative.    PAST MEDICAL HISTORY:   Past Medical History:   Diagnosis Date     Allergies      Dermatofibroma 5/14/2012       PAST SURGICAL HISTORY:   Past Surgical History:   Procedure Laterality Date     C NONSPECIFIC PROCEDURE      bladder  surgery     HYSTERECTOMY, PAP NO LONGER INDICATED       HYSTERECTOMY, MAGY       LASIK BILATERAL  2005    Dr. Solis        ALLERGIES:   Allergies as of 08/28/2019 - Reviewed 08/28/2019   Allergen Reaction Noted     Demerol  11/16/2009     Sulfa drugs  11/16/2009       MEDICATIONS:   Current Outpatient Medications   Medication Sig Dispense Refill     atorvastatin (LIPITOR) 10 MG tablet Take 1 tablet (10 mg) by mouth daily 90 tablet 3     doxycycline hyclate (PERIOSTAT) 20 MG tablet 1 tab PO  tablet 3     doxycycline monohydrate 100 MG capsule Take 1 capsule (100 mg) by mouth daily with food 90 capsule 0     IBUPROFEN PO Take 800 mg by mouth every 4 hours as needed for moderate pain       levothyroxine (SYNTHROID/LEVOTHROID) 88 MCG tablet Take 1 tablet (88 mcg) by mouth daily 90 tablet 3     meclizine (ANTIVERT) 25 MG tablet Take 1 tablet (25 mg) by mouth every 6 hours as needed for dizziness 30 tablet 1     Naproxen Sodium (ALEVE PO) Take 220 mg by mouth 2 times daily (with meals)       ondansetron (ZOFRAN) 4 MG tablet Take 1 tablet (4 mg) by mouth every 8 hours as needed 18 tablet 0     order for DME Equipment being ordered: wrist brace 1 Device 0        FAMILY HISTORY:   Family History   Problem Relation Age of Onset     Thyroid Disease Mother      Heart Disease Mother      Heart Disease Father      Hyperlipidemia Father      Hypertension Maternal Grandmother      Breast Cancer Maternal Grandmother      Hypertension Maternal Grandfather      Alzheimer Disease Paternal Grandmother      Diabetes Brother      Eczema Brother      Thyroid Disease Brother      Melanoma No family hx of         SOCIAL HISTORY:   Social History     Socioeconomic History     Marital status:      Spouse name: None     Number of children: None     Years of education: None     Highest education level: None   Occupational History     Employer: [employer]   Social Needs     Financial resource strain: None     Food insecurity:      "Worry: None     Inability: None     Transportation needs:     Medical: None     Non-medical: None   Tobacco Use     Smoking status: Never Smoker     Smokeless tobacco: Never Used   Substance and Sexual Activity     Alcohol use: Yes     Alcohol/week: 0.0 oz     Comment: 3 drinks per month     Drug use: No     Sexual activity: Yes     Partners: Male     Birth control/protection: Surgical     Comment: defer to md   Lifestyle     Physical activity:     Days per week: None     Minutes per session: None     Stress: None   Relationships     Social connections:     Talks on phone: None     Gets together: None     Attends Hindu service: None     Active member of club or organization: None     Attends meetings of clubs or organizations: None     Relationship status: None     Intimate partner violence:     Fear of current or ex partner: None     Emotionally abused: None     Physically abused: None     Forced sexual activity: None   Other Topics Concern     Parent/sibling w/ CABG, MI or angioplasty before 65F 55M? No   Social History Narrative     None       PHYSICAL EXAMINATION:   /79 (BP Location: Right arm, Patient Position: Sitting, Cuff Size: Adult Large)   Pulse 80   Temp 98.7  F (37.1  C) (Tympanic)   Resp 18   Ht 1.664 m (5' 5.5\")   Wt 105 kg (231 lb 8 oz)   SpO2 97%   BMI 37.94 kg/m    Wt Readings from Last 10 Encounters:   08/28/19 105 kg (231 lb 8 oz)   08/21/19 104.3 kg (230 lb)   08/01/19 103.4 kg (228 lb)   01/25/19 104.1 kg (229 lb 8 oz)   09/05/18 99.3 kg (219 lb)   12/08/17 94.1 kg (207 lb 9 oz)   09/20/17 96.4 kg (212 lb 9.6 oz)   08/28/17 96.6 kg (213 lb)   02/23/17 99.8 kg (220 lb)   12/08/16 103 kg (227 lb)      ECOG performance status: 0  GENERAL APPEARANCE: Healthy, alert and in no acute distress.  HEENT: Sclerae anicteric. PERRLA. Oropharynx without ulcers, lesions, or thrush.  NECK: Supple. No asymmetry or masses.  LYMPHATICS: No palpable cervical, supraclavicular, axillary, or inguinal " "lymphadenopathy.  RESP: Lungs clear to auscultation bilaterally without rales, rhonchi or wheezes.  CARDIOVASCULAR: Regular rate and rhythm. Normal S1, S2; no S3 or S4. No murmur, gallop, or rub.  ABDOMEN: Soft, nontender. Bowel sounds normal. No palpable organomegaly or masses.  MUSCULOSKELETAL: Extremities without gross deformities noted. No edema of bilateral lower extremities.  SKIN: No suspicious lesions or rashes.  NEURO: Alert and oriented x 3. Cranial nerves II-XII grossly intact.  PSYCHIATRIC: Mentation and affect appear normal.    LABORATORY RESULTS:  Hospital Outpatient Visit on 08/15/2019   Component Date Value Ref Range Status     Copath Report 08/15/2019    Final                    Value:Patient Name: JESSICA CHIU  MR#: [id number]  Specimen #: [id number]  Collected: 8/15/2019  Received: 8/16/2019  Reported: 8/20/2019 20:21  Ordering Phy(s): HERLINDA ROE  Additional Phy(s): ERIC SHAIKH    For improved result formatting, select 'View Enhanced Report Format' under   Linked Documents section.    SPECIMEN(S):  Breast needle biopsy, left. 10:00, 12 cm from nipple    FINAL DIAGNOSIS:  Breast needle biopsy, left. 10:00, 12 cm from nipple:  - Invasive lobular carcinoma  - Tiffany grade: II (of possible III)       - Tiffany score 6 (of possible 9)       - Scoring criteria: tubules = 3, nuclear pleomorphism = 2, mitosis =   1.  - Angiolymphatic invasion:  None identified  - Carcinoma in-situ: Cannot exclude (see comment and images)  - Estrogen receptor:  POSITIVE (approximately 80%)  - Progesterone receptor:  POSITIVE (approximately 60-70%)  - Her2/walter:  FISH analysis will be performed and reported by the   [facility]  Danny hong in a separate report.    COMMENT:  TO VIEW DIGITAL IMAGES IN THIS REPORT:      To view the digital images associated with this report in Epic, select   \"View Enhanced Report Format\" under the  Linked " "Documents section of this report to view the PDF version of the   original [employer] generated report. For  the best view of the images, grab the edge of the opened report window and   stretch it to expand to the full  width of your screen then scroll down to view images.    DIAGNOSTIC COMMENT:  The breast reporting protocol is based on AJCC/UICC TNM, 8th edition.   Protocol web posting date: January 2018.    There is a focal nodular tumor cells which is relatively circumscribed but   not within a clear duct, lacking  the infiltrative single cell appearance of the remaining invasive tumor.    The possibility that this nodule is  from within a ductular percent sampling of areas of in situ nikita                          gnancy   cannot be ruled out with final  evaluation deferred to the resected specimen findings.    Electronically signed out by:    AIDE Mann M.D.    CLINICAL HISTORY:  54-year-old female with palpable left breast mass, sound report including   : \"Directed sonography at to the  site of patient's palpable complaint and mammographic finding shows a   vague lesion that measures 1.1 cm\".    GROSS:  The specimen is received in formalin, labeled with the patient's name and   date of birth, and designated \"left  breast 10:00, 12 cm from the nipple\". It consists of multiple yellow-tan   fibrofatty breast core fragments  measuring 2.5 x 2.5 x 0.3 cm in aggregate.  The specimen is wrapped and   entirely submitted in one cassette.  (Dictated by: LOUIS Kign([facility]) 8/16/2019 10:52 AM)    MICROSCOPIC:  Sections show fibrous tissue with a infiltrate of tumor cells with a   linear pattern of infiltration in the  fibrous stroma and a small nodule of tumor cells without a surrounding   duct                           epithelium, the latter without a  clear surrounding duct although the nodular appearance may be related to   portions of tumor from within a duct  leaving open the possibility of carcinoma in situ " (final evaluation for   in-situ malignancy deferred to  permanent section evaluation of excised nodule).  No angiolymphatic   invasion is seen.  The tumors showed no  residual tubule formation demonstrate moderate nuclear pleomorphism and no   appreciable mitotic activity.    IMMUNOHISTOCHEMICAL ANALYSIS FOR ESTROGEN AND PROGESTERONE RECEPTORS    RESULTS:    ESTROGEN RECEPTORS:    POSITIVE                    Approximately 80% of tumor cell nuclei stain   positive                           Intensity of staining: Strong    PROGESTERONE RECEPTORS:    POSITIVE                    Approximately 60-70% of tumor cell nuclei   stain positive.                           Intensity of staining: Moderate to strong    Performed on paraffin block: Needle biopsy    External and internal controls stain approp                          riately.  Standard assay conditions are met.  Assay Conditions:  Fixative and processing: 10% neutral buffered formalin, paraffin embedded.  Cold ischemia time less than one hour. Time of specimen collection 09:39;   Time placed in formalin 09:41.  Total duration of formalin fixation greater than 6 hours and less than 72   hours.  Staining method used: [place] predilute monoclonal antibodies, estrogen   receptor clone SP1 and progesterone  receptor clone 1E2, antigen heat retrieval in EDTA alkaline pH for 60   minutes, [place] ultraview detection  system and automatic immunostainer.    Scoring system: The ASCO-CAP scoring guidelines are used. A positive test   is defined as positive nuclei  staining in greater than or equal to 1% of tumor cells. A negative test is   defined as nuclear staining in less  than 1% of tumor cells. For positive tests, an estimation of intensity of   nuclear staining over the entire  tumor on tissue sections is also provided.    Reference: DEBRA Hurley, LASHAE Craft, ARUNA Griffin, et al. American Society of   Clinical Oncology/College of  American  Pathologists guideline recommendations for immunohistochemical   testing of estrogen and  progesterone receptors in breast cancer, Arch Pathol Lab Med.   2010;134:907-922    (Dictated by: ELA Joshua MD 08/20/2019)    The technical component of this testing was completed at the [facility], with the professional component performed   at the [facility], [address] [place] [phone] ([phone])    CPT Codes:  A: 78650-SM9, 44515-FW, 34014-AB, HFISH, SOH, SOH, 79535-OKC, 57084-YKI    COLLECTION SITE:  Client[facility]  [facility]    Final                    Value:Patient Name: JESSICA RAVI  MR#: [id number]  Specimen #: [id number]  Collected: 8/15/2019 09:39  Received: 8/19/2019 17:16  Reported: 8/20/2019 18:15  Ordering Phy(s): KEN JOSHUA  Additional Phy(s): HERLINDA SHAIKH    For improved result formatting, select 'View Enhanced Report Format' under   Linked Documents section.  __________________________________________    TEST(S) REQUESTED:  [employer] HER2 FISH    SPECIMEN DESCRIPTION:  Breast Tissue, Paraffin Embedded    CLINICAL COMMENTS:  [id number]    RESULTS:    Ratio of HER2/JOE-17 signals  Jessica Ravi: 1.0 (GERALD Negative)                                    Avg.   number HER2 signals/nucleus: 1.9                                                                            Avg. number JOE-17 signals/nucleus:  1.9    **Interpretive guidelines per the American Society of Clinical   Oncology/College of American Pathologists  Clinical Practice Guideline Focused Update (Tali ALLEN et al, 2018, Arch                             Pathol Lab Med 142:1364;  doi:10.5858/arpa.8076-3176-KV):    -- Group 1: HER2/JOE-17 ratio 2.0 or more -AND- avg. number HER2   signals/nucleus 4.0 or  more (GERALD Positive)  -- Group 2: HER2/JOE-17 ratio 2.0 or more -AND- avg. number HER2   signals/nucleus <4.0 (Additional work  required)  -- Group 3: HER2/JOE-17 ratio <2.0 -AND- avg. number HER2 signals/nucleus   6.0 or more (Additional work  required)  -- Group 4: HER2/JOE-17 ratio <2.0 -AND- avg. number HER2 signals/nucleus   4.0 or more and <6.0 (Additional  work required)  -- Group 5: HER2/JOE-17 ratio <2.0 -AND- avg. number HER2 signals/nucleus   <4.0 (GERALD Negative)    INTERPRETATION:  Group 5 (GERALD Negative)    Per the American Society of Clinical Oncology/College of American   Pathologists Clinical Practice Guideline  Focused Update (Tali ALLEN et al, 2018, Arch Pathol Lab Med    doi:10.5858/arpa.2862-2329-KM), the HER2/JOE 17  ratio of 1.0 and average number of HER2 signals/cell of 1.9 places this   specimen in Group 5 (GERALD Negativ                          e).    Specimen:  Case [id number], Block A1  Reported formalin fixation time: 6-72 hours  Number of cells scored: 60  Probe:   [employer] HER2/JOE-17 IQFISH pharmDx Probe Mix to HER2 (17q12) and to   the centromere region of chromosome  17    ADDITIONAL COMMENTS:  The IQFISH pharmDx test has been approved by the FDA for the evaluation of   HER2 (ERBB2) gene amplification  status in formalin-fixed, paraffin-embedded breast cancer tissue specimens   and gastric or gastroesophageal  junction adenocarcinoma.  It is intended for use as an adjunct to other   existing clinicopathologic information  used to evaluate patients with such tumors. This test was developed and   its performance characteristics  determined by the [facility] [place]   [facility].    Electronically Signed Out By:  Verona Mcrae M.D., [facility]    CPT Codes:  A: 50485-MUQ4, [id number]    TESTING LAB LOCATION:  [facility]   [facility], [address]  [address]  [phone]    COLLECTION SITE:  Client:  [facility]  Location:  [facility] ()         IMAGING RESULTS:  Recent Results (from the past 744 hour(s))   MA Diagnostic Left w/Laura    Narrative    MA DIAGNOSTIC LEFT W/ LAURA, US BREAST LEFT LIMITED 1-3 QUADRANTS  8/9/2019 9:05 AM    HISTORY:  Palpable lump medially in left breast.    COMPARISON:  3/20/2019.    TECHNIQUE:  Left digital mammography with CAD is performed as well as  DBT. Directed left breast ultrasound was also done.    BREAST DENSITY: Scattered fibroglandular densities.    LEFT MAMMOGRAM: In the region of the marked palpable site at 9-10:00  and 11.2 cm from the nipple is an irregular asymmetry. It measures 1.1  cm.    Left breast ultrasound: Directed sonography at to the site of  patient's palpable complaint and mammographic finding shows a vague  lesion that measures 1.1 cm. It is difficult to define in two imaging  planes. Since it is best seen mammographically, stereotactic needle  core biopsy is recommended at this time. We will help patient get set  up for this. Additional sonography to the left axilla shows no  abnormal lymph nodes.      Impression    IMPRESSION: BI-RADS CATEGORY: 4 - Suspicious Abnormality-Biopsy Should  Be Considered.    RECOMMENDED FOLLOW-UP: Biopsy.    HERLINDA ROE MD   US Breast Left Limited 1-3 Quadrants    Narrative    MA DIAGNOSTIC LEFT W/ LAURA, US BREAST LEFT LIMITED 1-3 QUADRANTS  8/9/2019 9:05 AM    HISTORY:  Palpable lump medially in left breast.    COMPARISON:  3/20/2019.    TECHNIQUE:  Left digital mammography with CAD is performed as well as  DBT. Directed left breast ultrasound was also done.    BREAST DENSITY: Scattered fibroglandular densities.    LEFT MAMMOGRAM: In the region of the marked palpable site at 9-10:00  and 11.2 cm from the nipple is an irregular asymmetry. It measures 1.1  cm.    Left breast ultrasound: Directed sonography at to the site of  patient's palpable complaint and  mammographic finding shows a vague  lesion that measures 1.1 cm. It is difficult to define in two imaging  planes. Since it is best seen mammographically, stereotactic needle  core biopsy is recommended at this time. We will help patient get set  up for this. Additional sonography to the left axilla shows no  abnormal lymph nodes.      Impression    IMPRESSION: BI-RADS CATEGORY: 4 - Suspicious Abnormality-Biopsy Should  Be Considered.    RECOMMENDED FOLLOW-UP: Biopsy.    HERLINDA ROE MD   MA Stereotactic Breast Biopsy Vacuum Assist Left    Addendum: 8/21/2019    Merlyn Ravi  Accession # [id number], [id number]    The original report on this patient was dictated by Dr. Roe. The  pathology results have returned and show invasive lobular carcinoma  (with LCIS not excluded). These pathology results are concordant with  imaging findings. Dr. Garcia was called with these results today.  She will make sure the patient is contacted and set up for further  workup.    Herlinda Roe MD  ( Date of Addendum: 8/21/2019 )      HERLINDA ROE MD      Narrative    HISTORY: Left breast lesion.    COMPARISON: 8/9/2019.    STEREOTACTIC NEEDLE CORE BIOPSY OF LEFT BREAST LESION AD 9-10:00:  Risks and benefits of the procedure are discussed with the patient.  Stereotactic guidance is utilized via a medial-lateral approach. A  total of 20 mL of 1% lidocaine are utilized as a local anesthetic.   Six 9-gauge vacuum assisted core biopsy samples of the left breast  lesion are obtained.  There was no significant blood loss.      After the biopsy a hydrophilic marker is placed in case any further  necessary. Before this is discussed with the patient.    POSTPROCEDURE MAMMOGRAMS FOR MARKER PLACEMENT: The hydrophilic marker  has appropriately deployed. There is a small 1.8 cm hematoma at the  needle core biopsy marker site.      Impression    IMPRESSION: The patient is status post needle core biopsy of left  breast lesion, as described above.  Pathologic results are pending.    HERLINDA ROE MD   MA Post Procedure Left    Addendum: 8/21/2019    Merlyn Ravi  Accession # [id number], [id number]    The original report on this patient was dictated by Dr. Roe. The  pathology results have returned and show invasive lobular carcinoma  (with LCIS not excluded). These pathology results are concordant with  imaging findings. Dr. Garcia was called with these results today.  She will make sure the patient is contacted and set up for further  workup.    Herlinda Roe MD  ( Date of Addendum: 8/21/2019 )      HERLINDA ROE MD      Narrative    HISTORY: Left breast lesion.    COMPARISON: 8/9/2019.    STEREOTACTIC NEEDLE CORE BIOPSY OF LEFT BREAST LESION AD 9-10:00:  Risks and benefits of the procedure are discussed with the patient.  Stereotactic guidance is utilized via a medial-lateral approach. A  total of 20 mL of 1% lidocaine are utilized as a local anesthetic.   Six 9-gauge vacuum assisted core biopsy samples of the left breast  lesion are obtained.  There was no significant blood loss.      After the biopsy a hydrophilic marker is placed in case any further  necessary. Before this is discussed with the patient.    POSTPROCEDURE MAMMOGRAMS FOR MARKER PLACEMENT: The hydrophilic marker  has appropriately deployed. There is a small 1.8 cm hematoma at the  needle core biopsy marker site.      Impression    IMPRESSION: The patient is status post needle core biopsy of left  breast lesion, as described above. Pathologic results are pending.    HERLINDA ROE MD       ASSESSMENT AND PLAN:    (C50.912) Invasive lobular carcinoma of left breast in female (H)  54-year-old female patient with invasive lobular carcinoma of the left breast clinical stage T1c N0 M0.  The tumor is estrogen receptor positive, progesterone receptor positive, and HER-2/walter negative.  I reviewed with the patient today the natural history of breast cancer.  We talked about staging, biology, management,  follow-up and prognosis.  I reviewed different modalities and treatment of breast cancer including surgical resection, breast radiation therapy, systemic therapy including both systemic chemotherapy and endocrine therapy.  The patient will meet with surgery next week.  I reviewed with her the rationale of surgery we talked about breast lumpectomy versus mastectomy.  I will see the patient again following her definitive surgery to discuss with her further management plan.  I also discussed with the patient the rationale of Oncotype DX.  Potential benefit and risk of endocrine therapy in details.    (E78.5) Hyperlipidemia LDL goal <130  Patient currently on Lipitor 10 mg orally daily.    (E03.8) Other specified hypothyroidism  Patient currently on Synthroid 88 mcg orally daily.     (D47.2) Monoclonal paraproteinemia    There is no evidence of plasma cell dyscrasia at this time.    The patient is ready to learn, no apparent learning barriers were identified, Diagnosis and treatment plans were explained to the patient. The patient expressed understanding of the content. The patient questions were answered to her satisfaction.    Tanvi Blakely MD    Chart documentation with Dragon Voice recognition Software. Although reviewed after completion, some words and grammatical errors may remain.

## 2019-09-04 ENCOUNTER — TELEPHONE (OUTPATIENT)
Dept: SURGERY | Facility: CLINIC | Age: 54
End: 2019-09-04

## 2019-09-04 NOTE — TELEPHONE ENCOUNTER
Reason for Call:  Form, our goal is to have forms completed with 72 hours, however, some forms may require a visit or additional information.    Type of letter, form or note:  FMLA    Who is the form from?: Patient    Where did the form come from: form was faxed in    What clinic location was the form placed at?: [facility] (ENT, Neurology, Rheumatology, Surgery, Urology, Vascular Surgery)    Where the form was placed: Given to MA/RN    What number is listed as a contact on the form?: [phone]       Additional comments: please complete forms and call pt when ready for      Call taken on 9/4/2019 at 1:35 PM by Barbara Gibbons

## 2019-09-05 ENCOUNTER — OFFICE VISIT (OUTPATIENT)
Dept: SURGERY | Facility: CLINIC | Age: 54
End: 2019-09-05
Payer: COMMERCIAL

## 2019-09-05 VITALS
RESPIRATION RATE: 18 BRPM | SYSTOLIC BLOOD PRESSURE: 122 MMHG | TEMPERATURE: 98.5 F | HEART RATE: 64 BPM | DIASTOLIC BLOOD PRESSURE: 76 MMHG

## 2019-09-05 DIAGNOSIS — C50.919 INVASIVE LOBULAR CARCINOMA OF BREAST IN FEMALE (H): Primary | ICD-10-CM

## 2019-09-05 PROCEDURE — 99205 OFFICE O/P NEW HI 60 MIN: CPT | Performed by: SURGERY

## 2019-09-05 NOTE — NURSING NOTE
"Chief Complaint   Patient presents with     Breast Mass     Left        Initial /76 (BP Location: Right arm, Patient Position: Chair, Cuff Size: Adult Large)   Pulse 64   Temp 98.5  F (36.9  C) (Tympanic)   Resp 18  Estimated body mass index is 37.94 kg/m  as calculated from the following:    Height as of 8/28/19: 1.664 m (5' 5.5\").    Weight as of 8/28/19: 105 kg (231 lb 8 oz).  BP completed using cuff size: large   Medications and allergies reviewed.      Britni VALDOVINOS CMA     "

## 2019-09-05 NOTE — LETTER
9/5/2019         RE: Merlyn Ravi  [address] [facility] [id number]        Dear Colleague,    Thank you for referring your patient, Merlyn Ravi, to the [facility]. Please see a copy of my visit note below.    Assessment:    Merlyn Ravi is seen in consultation for left breast DCIS, at the request of Memo Garcia MD.    Merlyn is a 54 year old female with left breast invasive lobular carcinoma, grade 2, ER +, IA +, measuring 1.1 cm (mammography) at 9:00 and 11 cm from the nipple.    Plan:  Lumpectomy, mastectomy, bilateral mastectomies, sentinel lymph node biopsy with possible axillary node dissection and reconstructive options have been offered and discussed at length.  I feel the cosmetic outcome with lumpectomy would be optimal.      Her mass feels slightly larger than on mammography, however I suspect part of this is hematoma/post biopsy change.  The entire area will be excised on operation.    We have discussed the indication, alternatives, risks and expected recovery.  Specifically, we have discussed local recurrence of cancer, risk of future second primary breast cancer, incisions, scarring, breast deformity, volume loss, postoperative infection, anesthesia, bleeding, blood transfusion, DVT, PE, postoperative restrictions and physical limitations.  We have discussed the recommended interventions and treatments for these outcomes.    All questions have been answered to the best of my ability.    Breast MRI:  no  Oncology consult:  yes-done  Plastic surgery consult:  no  Radiation oncology consult:  yes-following surgery    Tentatively elects for lumpectomy and sentinel lymph node biopsy.    Recommended time off work postop:  1 wks    HPI:  Merlyn Ravi is a 54 year old female who presents to discuss her recently diagnosed invasive lobular carcinoma.  This was diagnosed via left mammography and core needle biopsy.     Breast mass noted:  left    Skin rashes, dimpling or nipple changes:  none  Nipple discharge:  none  Breast pain:   No  Perform self breast exams: Yes  Previous breast biopsies: No  Previous cyst aspiration: No  Previous other breast surgery: No    Hormonal history:  Age 33 menopausal (surgical), + HRT 16 years, Yes--fertility treatment.  No biological children    Family History:  Family history of breast cancer: Yes - Maternal grandmother  Family history of ovarian cancer:  No  Family history of colon cancer: No  Family history of prostate cancer: Yes - Paternal grandfather    Imaging:   All imaging studies reviewed by me.    Recent Results (from the past 744 hour(s))   MA Diagnostic Left w/Laura    Narrative    MA DIAGNOSTIC LEFT W/ LAURA, US BREAST LEFT LIMITED 1-3 QUADRANTS  8/9/2019 9:05 AM    HISTORY:  Palpable lump medially in left breast.    COMPARISON:  3/20/2019.    TECHNIQUE:  Left digital mammography with CAD is performed as well as  DBT. Directed left breast ultrasound was also done.    BREAST DENSITY: Scattered fibroglandular densities.    LEFT MAMMOGRAM: In the region of the marked palpable site at 9-10:00  and 11.2 cm from the nipple is an irregular asymmetry. It measures 1.1  cm.    Left breast ultrasound: Directed sonography at to the site of  patient's palpable complaint and mammographic finding shows a vague  lesion that measures 1.1 cm. It is difficult to define in two imaging  planes. Since it is best seen mammographically, stereotactic needle  core biopsy is recommended at this time. We will help patient get set  up for this. Additional sonography to the left axilla shows no  abnormal lymph nodes.      Impression    IMPRESSION: BI-RADS CATEGORY: 4 - Suspicious Abnormality-Biopsy Should  Be Considered.    RECOMMENDED FOLLOW-UP: Biopsy.    HERLINDA ROE MD   US Breast Left Limited 1-3 Quadrants    Narrative    MA DIAGNOSTIC LEFT W/ LAURA, US BREAST LEFT LIMITED 1-3 QUADRANTS  8/9/2019 9:05 AM    HISTORY:  Palpable lump  medially in left breast.    COMPARISON:  3/20/2019.    TECHNIQUE:  Left digital mammography with CAD is performed as well as  DBT. Directed left breast ultrasound was also done.    BREAST DENSITY: Scattered fibroglandular densities.    LEFT MAMMOGRAM: In the region of the marked palpable site at 9-10:00  and 11.2 cm from the nipple is an irregular asymmetry. It measures 1.1  cm.    Left breast ultrasound: Directed sonography at to the site of  patient's palpable complaint and mammographic finding shows a vague  lesion that measures 1.1 cm. It is difficult to define in two imaging  planes. Since it is best seen mammographically, stereotactic needle  core biopsy is recommended at this time. We will help patient get set  up for this. Additional sonography to the left axilla shows no  abnormal lymph nodes.      Impression    IMPRESSION: BI-RADS CATEGORY: 4 - Suspicious Abnormality-Biopsy Should  Be Considered.    RECOMMENDED FOLLOW-UP: Biopsy.    HERLINDA ROE MD   MA Stereotactic Breast Biopsy Vacuum Assist Left    Addendum: 8/21/2019    Merlyn Ravi  Accession # [id number], [id number]    The original report on this patient was dictated by Dr. Roe. The  pathology results have returned and show invasive lobular carcinoma  (with LCIS not excluded). These pathology results are concordant with  imaging findings. Dr. Garcia was called with these results today.  She will make sure the patient is contacted and set up for further  workup.    Herlinda Roe MD  ( Date of Addendum: 8/21/2019 )      HERLINDA ROE MD      Narrative    HISTORY: Left breast lesion.    COMPARISON: 8/9/2019.    STEREOTACTIC NEEDLE CORE BIOPSY OF LEFT BREAST LESION AD 9-10:00:  Risks and benefits of the procedure are discussed with the patient.  Stereotactic guidance is utilized via a medial-lateral approach. A  total of 20 mL of 1% lidocaine are utilized as a local anesthetic.   Six 9-gauge vacuum assisted core biopsy samples of the left  breast  lesion are obtained.  There was no significant blood loss.      After the biopsy a hydrophilic marker is placed in case any further  necessary. Before this is discussed with the patient.    POSTPROCEDURE MAMMOGRAMS FOR MARKER PLACEMENT: The hydrophilic marker  has appropriately deployed. There is a small 1.8 cm hematoma at the  needle core biopsy marker site.      Impression    IMPRESSION: The patient is status post needle core biopsy of left  breast lesion, as described above. Pathologic results are pending.    HERLINDA ROE MD   MA Post Procedure Left    Addendum: 8/21/2019    Merlyn Ravi  Accession # [id number], [id number]    The original report on this patient was dictated by Dr. Roe. The  pathology results have returned and show invasive lobular carcinoma  (with LCIS not excluded). These pathology results are concordant with  imaging findings. Dr. Garcia was called with these results today.  She will make sure the patient is contacted and set up for further  workup.    Herlinda Roe MD  ( Date of Addendum: 8/21/2019 )      HERLINDA ROE MD      Narrative    HISTORY: Left breast lesion.    COMPARISON: 8/9/2019.    STEREOTACTIC NEEDLE CORE BIOPSY OF LEFT BREAST LESION AD 9-10:00:  Risks and benefits of the procedure are discussed with the patient.  Stereotactic guidance is utilized via a medial-lateral approach. A  total of 20 mL of 1% lidocaine are utilized as a local anesthetic.   Six 9-gauge vacuum assisted core biopsy samples of the left breast  lesion are obtained.  There was no significant blood loss.      After the biopsy a hydrophilic marker is placed in case any further  necessary. Before this is discussed with the patient.    POSTPROCEDURE MAMMOGRAMS FOR MARKER PLACEMENT: The hydrophilic marker  has appropriately deployed. There is a small 1.8 cm hematoma at the  needle core biopsy marker site.      Impression    IMPRESSION: The patient is status post needle core biopsy of left  breast lesion,  as described above. Pathologic results are pending.    HERLINDA ROE MD       Percutaneous core needle biopsy: FINAL DIAGNOSIS:   Breast needle biopsy, left. 10:00, 12 cm from nipple:   - Invasive lobular carcinoma   - [place] grade: II (of possible III)        - Tiffany score 6 (of possible 9)        - Scoring criteria: tubules = 3, nuclear pleomorphism = 2, mitosis =   1.   - Angiolymphatic invasion:  None identified   - Carcinoma in-situ: Cannot exclude (see comment and images)   - Estrogen receptor:  POSITIVE (approximately 80%)   - Progesterone receptor:  POSITIVE (approximately 60-70%)   - Her2/walter:  FISH analysis will be performed and reported by the   [facility] in a separate report.     Past Medical History:   has a past medical history of Allergies and Dermatofibroma (5/14/2012).    Past Surgical History:  Past Surgical History:   Procedure Laterality Date     C NONSPECIFIC PROCEDURE      bladder surgery     HYSTERECTOMY, PAP NO LONGER INDICATED       HYSTERECTOMY, MAGY       LASIK BILATERAL  2005    Dr. Solis       Social History:  Social History     Socioeconomic History     Marital status:      Spouse name: Not on file     Number of children: Not on file     Years of education: Not on file     Highest education level: Not on file   Occupational History     Employer: [employer]   Social Needs     Financial resource strain: Not on file     Food insecurity:     Worry: Not on file     Inability: Not on file     Transportation needs:     Medical: Not on file     Non-medical: Not on file   Tobacco Use     Smoking status: Never Smoker     Smokeless tobacco: Never Used   Substance and Sexual Activity     Alcohol use: Yes     Alcohol/week: 0.0 oz     Comment: 3 drinks per month     Drug use: No     Sexual activity: Yes     Partners: Male     Birth control/protection: Surgical     Comment: defer to md   Lifestyle     Physical activity:     Days per week: Not on  file     Minutes per session: Not on file     Stress: Not on file   Relationships     Social connections:     Talks on phone: Not on file     Gets together: Not on file     Attends Faith service: Not on file     Active member of club or organization: Not on file     Attends meetings of clubs or organizations: Not on file     Relationship status: Not on file     Intimate partner violence:     Fear of current or ex partner: Not on file     Emotionally abused: Not on file     Physically abused: Not on file     Forced sexual activity: Not on file   Other Topics Concern     Parent/sibling w/ CABG, MI or angioplasty before 65F 55M? No   Social History Narrative     Not on file      ROS:  The 10 point review of systems is negative other than noted in the HPI and above.    PE:  Vitals: /76 (BP Location: Right arm, Patient Position: Chair, Cuff Size: Adult Large)   Pulse 64   Temp 98.5  F (36.9  C) (Tympanic)   Resp 18      General appearance: well-nourished, sitting comfortably, no apparent distress  HEENT:  Head normocephalic and atraumatic, pupils equal and round, conjunctivae clear, mucous membranes moist, external ears and nose normal  Neck: Supple without thyromegaly or masses  Lungs: Respirations unlabored  Lymphatic: No cervical, or supraclavicular lymphadenopathy  Extremities: Without edema  Musculoskeletal:  Normal station and gait  Neurologic: alert, speech is clear, moves all extremities with good strength  Psychiatric: Mood and affect are appropriate  Skin: Without lesions, rashes, or jaundice  Breast: (Examined with Britni Santillan MA)   Symmetrical with no skin or nipple changes.     Contour is pendulous and large.   Parenchyma is soft.   Masses- left - 1.5 cm diameter at 9 O'clock, just medial to this is additional 1.5 cm rounded mass consistent with hematoma.  No ecchymosis.  Biopsy scar is medial to mass   Ecchymosis- none   Incisional scar- none    Lymph:       No supraclavicular/infraclavicular  adenopathy.   Axillary adenopathy: none      This note was created using voice recognition software. Undetected word substitutions or other errors may have occurred.     Time spent with the patient with greater that 50% of the time in discussion was 65 minutes.     Kj Salas, DO      Please route or send letter to:  Referring Provider                  Again, thank you for allowing me to participate in the care of your patient.        Sincerely,        Kj Salas, DO

## 2019-09-05 NOTE — PROGRESS NOTES
Assessment:    Merlyn Ravi is seen in consultation for left breast DCIS, at the request of Memo Garcia MD.    Merlyn is a 54 year old female with left breast invasive lobular carcinoma, grade 2, ER +, CO +, measuring 1.1 cm (mammography) at 9:00 and 11 cm from the nipple.    Plan:  Lumpectomy, mastectomy, bilateral mastectomies, sentinel lymph node biopsy with possible axillary node dissection and reconstructive options have been offered and discussed at length.  I feel the cosmetic outcome with lumpectomy would be optimal.      Her mass feels slightly larger than on mammography, however I suspect part of this is hematoma/post biopsy change.  The entire area will be excised on operation.    We have discussed the indication, alternatives, risks and expected recovery.  Specifically, we have discussed local recurrence of cancer, risk of future second primary breast cancer, incisions, scarring, breast deformity, volume loss, postoperative infection, anesthesia, bleeding, blood transfusion, DVT, PE, postoperative restrictions and physical limitations.  We have discussed the recommended interventions and treatments for these outcomes.    All questions have been answered to the best of my ability.    Breast MRI:  no  Oncology consult:  yes-done  Plastic surgery consult:  no  Radiation oncology consult:  yes-following surgery    Tentatively elects for lumpectomy and sentinel lymph node biopsy.    Recommended time off work postop:  1 wks    HPI:  Merlyn Ravi is a 54 year old female who presents to discuss her recently diagnosed invasive lobular carcinoma.  This was diagnosed via left mammography and core needle biopsy.     Breast mass noted:  left   Skin rashes, dimpling or nipple changes:  none  Nipple discharge:  none  Breast pain:   No  Perform self breast exams: Yes  Previous breast biopsies: No  Previous cyst aspiration: No  Previous other breast surgery: No    Hormonal history:  Age 33  menopausal (surgical), + HRT 16 years, Yes--fertility treatment.  No biological children    Family History:  Family history of breast cancer: Yes - Maternal grandmother  Family history of ovarian cancer:  No  Family history of colon cancer: No  Family history of prostate cancer: Yes - Paternal grandfather    Imaging:   All imaging studies reviewed by me.    Recent Results (from the past 744 hour(s))   MA Diagnostic Left w/Laura    Narrative    MA DIAGNOSTIC LEFT W/ LAURA, US BREAST LEFT LIMITED 1-3 QUADRANTS  8/9/2019 9:05 AM    HISTORY:  Palpable lump medially in left breast.    COMPARISON:  3/20/2019.    TECHNIQUE:  Left digital mammography with CAD is performed as well as  DBT. Directed left breast ultrasound was also done.    BREAST DENSITY: Scattered fibroglandular densities.    LEFT MAMMOGRAM: In the region of the marked palpable site at 9-10:00  and 11.2 cm from the nipple is an irregular asymmetry. It measures 1.1  cm.    Left breast ultrasound: Directed sonography at to the site of  patient's palpable complaint and mammographic finding shows a vague  lesion that measures 1.1 cm. It is difficult to define in two imaging  planes. Since it is best seen mammographically, stereotactic needle  core biopsy is recommended at this time. We will help patient get set  up for this. Additional sonography to the left axilla shows no  abnormal lymph nodes.      Impression    IMPRESSION: BI-RADS CATEGORY: 4 - Suspicious Abnormality-Biopsy Should  Be Considered.    RECOMMENDED FOLLOW-UP: Biopsy.    HERLINDA ROE MD   US Breast Left Limited 1-3 Quadrants    Narrative    MA DIAGNOSTIC LEFT W/ LAURA, US BREAST LEFT LIMITED 1-3 QUADRANTS  8/9/2019 9:05 AM    HISTORY:  Palpable lump medially in left breast.    COMPARISON:  3/20/2019.    TECHNIQUE:  Left digital mammography with CAD is performed as well as  DBT. Directed left breast ultrasound was also done.    BREAST DENSITY: Scattered fibroglandular densities.    LEFT MAMMOGRAM: In  the region of the marked palpable site at 9-10:00  and 11.2 cm from the nipple is an irregular asymmetry. It measures 1.1  cm.    Left breast ultrasound: Directed sonography at to the site of  patient's palpable complaint and mammographic finding shows a vague  lesion that measures 1.1 cm. It is difficult to define in two imaging  planes. Since it is best seen mammographically, stereotactic needle  core biopsy is recommended at this time. We will help patient get set  up for this. Additional sonography to the left axilla shows no  abnormal lymph nodes.      Impression    IMPRESSION: BI-RADS CATEGORY: 4 - Suspicious Abnormality-Biopsy Should  Be Considered.    RECOMMENDED FOLLOW-UP: Biopsy.    HERLINDA ROE MD   MA Stereotactic Breast Biopsy Vacuum Assist Left    Addendum: 8/21/2019    Merlyn Ravi  Accession # [id number], [id number]    The original report on this patient was dictated by Dr. Roe. The  pathology results have returned and show invasive lobular carcinoma  (with LCIS not excluded). These pathology results are concordant with  imaging findings. Dr. Garcia was called with these results today.  She will make sure the patient is contacted and set up for further  workup.    Herlinda Roe MD  ( Date of Addendum: 8/21/2019 )      HERLINDA ROE MD      Narrative    HISTORY: Left breast lesion.    COMPARISON: 8/9/2019.    STEREOTACTIC NEEDLE CORE BIOPSY OF LEFT BREAST LESION AD 9-10:00:  Risks and benefits of the procedure are discussed with the patient.  Stereotactic guidance is utilized via a medial-lateral approach. A  total of 20 mL of 1% lidocaine are utilized as a local anesthetic.   Six 9-gauge vacuum assisted core biopsy samples of the left breast  lesion are obtained.  There was no significant blood loss.      After the biopsy a hydrophilic marker is placed in case any further  necessary. Before this is discussed with the patient.    POSTPROCEDURE MAMMOGRAMS FOR MARKER PLACEMENT: The hydrophilic  marker  has appropriately deployed. There is a small 1.8 cm hematoma at the  needle core biopsy marker site.      Impression    IMPRESSION: The patient is status post needle core biopsy of left  breast lesion, as described above. Pathologic results are pending.    HERLINDA ROE MD   MA Post Procedure Left    Addendum: 8/21/2019    Merlyn Ravi  Accession # [id number], [id number]    The original report on this patient was dictated by Dr. Roe. The  pathology results have returned and show invasive lobular carcinoma  (with LCIS not excluded). These pathology results are concordant with  imaging findings. Dr. Garcia was called with these results today.  She will make sure the patient is contacted and set up for further  workup.    Herlinda Roe MD  ( Date of Addendum: 8/21/2019 )      HERLINDA ROE MD      Narrative    HISTORY: Left breast lesion.    COMPARISON: 8/9/2019.    STEREOTACTIC NEEDLE CORE BIOPSY OF LEFT BREAST LESION AD 9-10:00:  Risks and benefits of the procedure are discussed with the patient.  Stereotactic guidance is utilized via a medial-lateral approach. A  total of 20 mL of 1% lidocaine are utilized as a local anesthetic.   Six 9-gauge vacuum assisted core biopsy samples of the left breast  lesion are obtained.  There was no significant blood loss.      After the biopsy a hydrophilic marker is placed in case any further  necessary. Before this is discussed with the patient.    POSTPROCEDURE MAMMOGRAMS FOR MARKER PLACEMENT: The hydrophilic marker  has appropriately deployed. There is a small 1.8 cm hematoma at the  needle core biopsy marker site.      Impression    IMPRESSION: The patient is status post needle core biopsy of left  breast lesion, as described above. Pathologic results are pending.    HERLINDA ROE MD       Percutaneous core needle biopsy: FINAL DIAGNOSIS:   Breast needle biopsy, left. 10:00, 12 cm from nipple:   - Invasive lobular carcinoma   - Tiffany grade: II (of possible III)         - Tiffany score 6 (of possible 9)        - Scoring criteria: tubules = 3, nuclear pleomorphism = 2, mitosis =   1.   - Angiolymphatic invasion:  None identified   - Carcinoma in-situ: Cannot exclude (see comment and images)   - Estrogen receptor:  POSITIVE (approximately 80%)   - Progesterone receptor:  POSITIVE (approximately 60-70%)   - Her2/walter:  FISH analysis will be performed and reported by the   [facility] in a separate report.     Past Medical History:   has a past medical history of Allergies and Dermatofibroma (5/14/2012).    Past Surgical History:  Past Surgical History:   Procedure Laterality Date     C NONSPECIFIC PROCEDURE      bladder surgery     HYSTERECTOMY, PAP NO LONGER INDICATED       HYSTERECTOMY, MAGY       LASIK BILATERAL  2005    Dr. Solis       Social History:  Social History     Socioeconomic History     Marital status:      Spouse name: Not on file     Number of children: Not on file     Years of education: Not on file     Highest education level: Not on file   Occupational History     Employer: [employer]   Social Needs     Financial resource strain: Not on file     Food insecurity:     Worry: Not on file     Inability: Not on file     Transportation needs:     Medical: Not on file     Non-medical: Not on file   Tobacco Use     Smoking status: Never Smoker     Smokeless tobacco: Never Used   Substance and Sexual Activity     Alcohol use: Yes     Alcohol/week: 0.0 oz     Comment: 3 drinks per month     Drug use: No     Sexual activity: Yes     Partners: Male     Birth control/protection: Surgical     Comment: defer to md   Lifestyle     Physical activity:     Days per week: Not on file     Minutes per session: Not on file     Stress: Not on file   Relationships     Social connections:     Talks on phone: Not on file     Gets together: Not on file     Attends Christian service: Not on file     Active member of club or organization: Not on  file     Attends meetings of clubs or organizations: Not on file     Relationship status: Not on file     Intimate partner violence:     Fear of current or ex partner: Not on file     Emotionally abused: Not on file     Physically abused: Not on file     Forced sexual activity: Not on file   Other Topics Concern     Parent/sibling w/ CABG, MI or angioplasty before 65F 55M? No   Social History Narrative     Not on file      ROS:  The 10 point review of systems is negative other than noted in the HPI and above.    PE:  Vitals: /76 (BP Location: Right arm, Patient Position: Chair, Cuff Size: Adult Large)   Pulse 64   Temp 98.5  F (36.9  C) (Tympanic)   Resp 18      General appearance: well-nourished, sitting comfortably, no apparent distress  HEENT:  Head normocephalic and atraumatic, pupils equal and round, conjunctivae clear, mucous membranes moist, external ears and nose normal  Neck: Supple without thyromegaly or masses  Lungs: Respirations unlabored  Lymphatic: No cervical, or supraclavicular lymphadenopathy  Extremities: Without edema  Musculoskeletal:  Normal station and gait  Neurologic: alert, speech is clear, moves all extremities with good strength  Psychiatric: Mood and affect are appropriate  Skin: Without lesions, rashes, or jaundice  Breast: (Examined with Britni Santillan MA)   Symmetrical with no skin or nipple changes.     Contour is pendulous and large.   Parenchyma is soft.   Masses- left - 1.5 cm diameter at 9 O'clock, just medial to this is additional 1.5 cm rounded mass consistent with hematoma.  No ecchymosis.  Biopsy scar is medial to mass   Ecchymosis- none   Incisional scar- none    Lymph:       No supraclavicular/infraclavicular adenopathy.   Axillary adenopathy: none      This note was created using voice recognition software. Undetected word substitutions or other errors may have occurred.     Time spent with the patient with greater that 50% of the time in discussion was 65 minutes.      Kj Salas, DO      Please route or send letter to:  Referring Provider

## 2019-09-05 NOTE — H&P (VIEW-ONLY)
Assessment:    Merlyn Ravi is seen in consultation for left breast DCIS, at the request of Memo Garcia MD.    Merlyn is a 54 year old female with left breast invasive lobular carcinoma, grade 2, ER +, HI +, measuring 1.1 cm (mammography) at 9:00 and 11 cm from the nipple.    Plan:  Lumpectomy, mastectomy, bilateral mastectomies, sentinel lymph node biopsy with possible axillary node dissection and reconstructive options have been offered and discussed at length.  I feel the cosmetic outcome with lumpectomy would be optimal.      Her mass feels slightly larger than on mammography, however I suspect part of this is hematoma/post biopsy change.  The entire area will be excised on operation.    We have discussed the indication, alternatives, risks and expected recovery.  Specifically, we have discussed local recurrence of cancer, risk of future second primary breast cancer, incisions, scarring, breast deformity, volume loss, postoperative infection, anesthesia, bleeding, blood transfusion, DVT, PE, postoperative restrictions and physical limitations.  We have discussed the recommended interventions and treatments for these outcomes.    All questions have been answered to the best of my ability.    Breast MRI:  no  Oncology consult:  yes-done  Plastic surgery consult:  no  Radiation oncology consult:  yes-following surgery    Tentatively elects for lumpectomy and sentinel lymph node biopsy.    Recommended time off work postop:  1 wks    HPI:  Merlyn Ravi is a 54 year old female who presents to discuss her recently diagnosed invasive lobular carcinoma.  This was diagnosed via left mammography and core needle biopsy.     Breast mass noted:  left   Skin rashes, dimpling or nipple changes:  none  Nipple discharge:  none  Breast pain:   No  Perform self breast exams: Yes  Previous breast biopsies: No  Previous cyst aspiration: No  Previous other breast surgery: No    Hormonal history:  Age 33  menopausal (surgical), + HRT 16 years, Yes--fertility treatment.  No biological children    Family History:  Family history of breast cancer: Yes - Maternal grandmother  Family history of ovarian cancer:  No  Family history of colon cancer: No  Family history of prostate cancer: Yes - Paternal grandfather    Imaging:   All imaging studies reviewed by me.    Recent Results (from the past 744 hour(s))   MA Diagnostic Left w/Laura    Narrative    MA DIAGNOSTIC LEFT W/ LAURA, US BREAST LEFT LIMITED 1-3 QUADRANTS  8/9/2019 9:05 AM    HISTORY:  Palpable lump medially in left breast.    COMPARISON:  3/20/2019.    TECHNIQUE:  Left digital mammography with CAD is performed as well as  DBT. Directed left breast ultrasound was also done.    BREAST DENSITY: Scattered fibroglandular densities.    LEFT MAMMOGRAM: In the region of the marked palpable site at 9-10:00  and 11.2 cm from the nipple is an irregular asymmetry. It measures 1.1  cm.    Left breast ultrasound: Directed sonography at to the site of  patient's palpable complaint and mammographic finding shows a vague  lesion that measures 1.1 cm. It is difficult to define in two imaging  planes. Since it is best seen mammographically, stereotactic needle  core biopsy is recommended at this time. We will help patient get set  up for this. Additional sonography to the left axilla shows no  abnormal lymph nodes.      Impression    IMPRESSION: BI-RADS CATEGORY: 4 - Suspicious Abnormality-Biopsy Should  Be Considered.    RECOMMENDED FOLLOW-UP: Biopsy.    HERLINDA ROE MD   US Breast Left Limited 1-3 Quadrants    Narrative    MA DIAGNOSTIC LEFT W/ LAURA, US BREAST LEFT LIMITED 1-3 QUADRANTS  8/9/2019 9:05 AM    HISTORY:  Palpable lump medially in left breast.    COMPARISON:  3/20/2019.    TECHNIQUE:  Left digital mammography with CAD is performed as well as  DBT. Directed left breast ultrasound was also done.    BREAST DENSITY: Scattered fibroglandular densities.    LEFT MAMMOGRAM: In  the region of the marked palpable site at 9-10:00  and 11.2 cm from the nipple is an irregular asymmetry. It measures 1.1  cm.    Left breast ultrasound: Directed sonography at to the site of  patient's palpable complaint and mammographic finding shows a vague  lesion that measures 1.1 cm. It is difficult to define in two imaging  planes. Since it is best seen mammographically, stereotactic needle  core biopsy is recommended at this time. We will help patient get set  up for this. Additional sonography to the left axilla shows no  abnormal lymph nodes.      Impression    IMPRESSION: BI-RADS CATEGORY: 4 - Suspicious Abnormality-Biopsy Should  Be Considered.    RECOMMENDED FOLLOW-UP: Biopsy.    HERLINDA ROE MD   MA Stereotactic Breast Biopsy Vacuum Assist Left    Addendum: 8/21/2019    Merlyn Ravi  Accession # [id number], [id number]    The original report on this patient was dictated by Dr. Roe. The  pathology results have returned and show invasive lobular carcinoma  (with LCIS not excluded). These pathology results are concordant with  imaging findings. Dr. Garcia was called with these results today.  She will make sure the patient is contacted and set up for further  workup.    Herlinda Roe MD  ( Date of Addendum: 8/21/2019 )      HERLINDA ROE MD      Narrative    HISTORY: Left breast lesion.    COMPARISON: 8/9/2019.    STEREOTACTIC NEEDLE CORE BIOPSY OF LEFT BREAST LESION AD 9-10:00:  Risks and benefits of the procedure are discussed with the patient.  Stereotactic guidance is utilized via a medial-lateral approach. A  total of 20 mL of 1% lidocaine are utilized as a local anesthetic.   Six 9-gauge vacuum assisted core biopsy samples of the left breast  lesion are obtained.  There was no significant blood loss.      After the biopsy a hydrophilic marker is placed in case any further  necessary. Before this is discussed with the patient.    POSTPROCEDURE MAMMOGRAMS FOR MARKER PLACEMENT: The hydrophilic  marker  has appropriately deployed. There is a small 1.8 cm hematoma at the  needle core biopsy marker site.      Impression    IMPRESSION: The patient is status post needle core biopsy of left  breast lesion, as described above. Pathologic results are pending.    HERLINDA ROE MD   MA Post Procedure Left    Addendum: 8/21/2019    Merlyn Ravi  Accession # [id number], [id number]    The original report on this patient was dictated by Dr. Roe. The  pathology results have returned and show invasive lobular carcinoma  (with LCIS not excluded). These pathology results are concordant with  imaging findings. Dr. Garcia was called with these results today.  She will make sure the patient is contacted and set up for further  workup.    Herlinda Roe MD  ( Date of Addendum: 8/21/2019 )      HERLINDA ROE MD      Narrative    HISTORY: Left breast lesion.    COMPARISON: 8/9/2019.    STEREOTACTIC NEEDLE CORE BIOPSY OF LEFT BREAST LESION AD 9-10:00:  Risks and benefits of the procedure are discussed with the patient.  Stereotactic guidance is utilized via a medial-lateral approach. A  total of 20 mL of 1% lidocaine are utilized as a local anesthetic.   Six 9-gauge vacuum assisted core biopsy samples of the left breast  lesion are obtained.  There was no significant blood loss.      After the biopsy a hydrophilic marker is placed in case any further  necessary. Before this is discussed with the patient.    POSTPROCEDURE MAMMOGRAMS FOR MARKER PLACEMENT: The hydrophilic marker  has appropriately deployed. There is a small 1.8 cm hematoma at the  needle core biopsy marker site.      Impression    IMPRESSION: The patient is status post needle core biopsy of left  breast lesion, as described above. Pathologic results are pending.    HERLINDA ROE MD       Percutaneous core needle biopsy: FINAL DIAGNOSIS:   Breast needle biopsy, left. 10:00, 12 cm from nipple:   - Invasive lobular carcinoma   - Tiffany grade: II (of possible III)         - Tiffany score 6 (of possible 9)        - Scoring criteria: tubules = 3, nuclear pleomorphism = 2, mitosis =   1.   - Angiolymphatic invasion:  None identified   - Carcinoma in-situ: Cannot exclude (see comment and images)   - Estrogen receptor:  POSITIVE (approximately 80%)   - Progesterone receptor:  POSITIVE (approximately 60-70%)   - Her2/walter:  FISH analysis will be performed and reported by the   [facility]   [facility] in a separate report.     Past Medical History:   has a past medical history of Allergies and Dermatofibroma (5/14/2012).    Past Surgical History:  Past Surgical History:   Procedure Laterality Date     C NONSPECIFIC PROCEDURE      bladder surgery     HYSTERECTOMY, PAP NO LONGER INDICATED       HYSTERECTOMY, MAGY       LASIK BILATERAL  2005    Dr. Solis       Social History:  Social History     Socioeconomic History     Marital status:      Spouse name: Not on file     Number of children: Not on file     Years of education: Not on file     Highest education level: Not on file   Occupational History     Employer: [employer]   Social Needs     Financial resource strain: Not on file     Food insecurity:     Worry: Not on file     Inability: Not on file     Transportation needs:     Medical: Not on file     Non-medical: Not on file   Tobacco Use     Smoking status: Never Smoker     Smokeless tobacco: Never Used   Substance and Sexual Activity     Alcohol use: Yes     Alcohol/week: 0.0 oz     Comment: 3 drinks per month     Drug use: No     Sexual activity: Yes     Partners: Male     Birth control/protection: Surgical     Comment: defer to md   Lifestyle     Physical activity:     Days per week: Not on file     Minutes per session: Not on file     Stress: Not on file   Relationships     Social connections:     Talks on phone: Not on file     Gets together: Not on file     Attends Yarsani service: Not on file     Active member of club or organization: Not on  file     Attends meetings of clubs or organizations: Not on file     Relationship status: Not on file     Intimate partner violence:     Fear of current or ex partner: Not on file     Emotionally abused: Not on file     Physically abused: Not on file     Forced sexual activity: Not on file   Other Topics Concern     Parent/sibling w/ CABG, MI or angioplasty before 65F 55M? No   Social History Narrative     Not on file      ROS:  The 10 point review of systems is negative other than noted in the HPI and above.    PE:  Vitals: /76 (BP Location: Right arm, Patient Position: Chair, Cuff Size: Adult Large)   Pulse 64   Temp 98.5  F (36.9  C) (Tympanic)   Resp 18      General appearance: well-nourished, sitting comfortably, no apparent distress  HEENT:  Head normocephalic and atraumatic, pupils equal and round, conjunctivae clear, mucous membranes moist, external ears and nose normal  Neck: Supple without thyromegaly or masses  Lungs: Respirations unlabored  Lymphatic: No cervical, or supraclavicular lymphadenopathy  Extremities: Without edema  Musculoskeletal:  Normal station and gait  Neurologic: alert, speech is clear, moves all extremities with good strength  Psychiatric: Mood and affect are appropriate  Skin: Without lesions, rashes, or jaundice  Breast: (Examined with Britni Santillan MA)   Symmetrical with no skin or nipple changes.     Contour is pendulous and large.   Parenchyma is soft.   Masses- left - 1.5 cm diameter at 9 O'clock, just medial to this is additional 1.5 cm rounded mass consistent with hematoma.  No ecchymosis.  Biopsy scar is medial to mass   Ecchymosis- none   Incisional scar- none    Lymph:       No supraclavicular/infraclavicular adenopathy.   Axillary adenopathy: none      This note was created using voice recognition software. Undetected word substitutions or other errors may have occurred.     Time spent with the patient with greater that 50% of the time in discussion was 65 minutes.      Kj Salas, DO      Please route or send letter to:  Referring Provider

## 2019-09-05 NOTE — PATIENT INSTRUCTIONS
Per physician instructions.    If you have questions or concerns on any instructions given to you by your provider today or if you need to schedule an appointment, you can reach us at [phone].    Thank you!

## 2019-09-09 NOTE — TELEPHONE ENCOUNTER
Forms partially completed and placed on providers desk.  Need signature and return to work date for lumpectomy scheduled on 09/23.    Britni VALDOVINOS CMA

## 2019-09-12 ENCOUNTER — TUMOR CONFERENCE (OUTPATIENT)
Dept: ONCOLOGY | Facility: CLINIC | Age: 54
End: 2019-09-12

## 2019-09-12 NOTE — TUMOR CONFERENCE
Tumor Conference Information  Tumor Conference:    Specialties Present:  Medical oncology, Radiation oncology, Surgery, Radiology, Pathology, Research  Patient Status:  A new patient  Pathology:  Discussed (see comment) (Comment: Left Invasive Lobular Carcinoma ER/MO+, HER2-)  Treatment to Date:  None  Clinical Trial Eligibility:  None available  Recommended Plan:  Surgery, Hormonal therapy, Post-operative radiation therapy (Comment: Pan for lumpectomy + RT and hormone therapy.)  Did the review exceed 30 minutes?:  did not           Documentation / Disclaimer Cancer Tumor Board Note  Cancer tumor board recommendations do not override what is determined to be reasonable care and treatment, which is dependent on the circumstances of a patient's case; the patient's medical, social, and personal concerns; and the clinical judgment of the oncologist [physician].

## 2019-09-16 ENCOUNTER — ANESTHESIA EVENT (OUTPATIENT)
Dept: SURGERY | Facility: CLINIC | Age: 54
End: 2019-09-16
Payer: COMMERCIAL

## 2019-09-16 NOTE — ANESTHESIA PREPROCEDURE EVALUATION
Anesthesia Pre-Procedure Evaluation    Patient: Merlyn Ravi   MRN: [id number] : 1965          Preoperative Diagnosis: Right breast cancer    Procedure(s):  LUMPECTOMY, BREAST, WITH SENTINEL LYMPH NODE BIOPSY. [place] node placement at 7:00a by Dr. Salas    Past Medical History:   Diagnosis Date     Allergies      Dermatofibroma 2012     Past Surgical History:   Procedure Laterality Date     C NONSPECIFIC PROCEDURE      bladder surgery     HYSTERECTOMY, PAP NO LONGER INDICATED       HYSTERECTOMY, MAGY       LASIK BILATERAL      Dr. Solis        Anesthesia Evaluation     . Pt has had prior anesthetic.     No history of anesthetic complications          ROS/MED HX    ENT/Pulmonary:     (+)PRISCILLA risk factors obese, , . .    Neurologic:       Cardiovascular:     (+) Dyslipidemia, ----. : . . . :. . Previous cardiac testing date:results:date: results:ECG reviewed date:16 results:Sinus Rhythm   -consider old anterior infarct.     ABNORMAL    date: results:          METS/Exercise Tolerance:     Hematologic:         Musculoskeletal:         GI/Hepatic:         Renal/Genitourinary:         Endo:     (+) thyroid problem hypothyroidism, Obesity, .      Psychiatric:         Infectious Disease:         Malignancy:   (+) Malignancy History of Breast          Other: Comment: dermatofibroma                         Physical Exam      Airway   Mallampati: I  TM distance: >3 FB  Neck ROM: full    Dental     Cardiovascular       Pulmonary             Lab Results   Component Value Date    WBC 6.3 2018    HGB 13.4 2018    HCT 41.8 2018     2018     2019    POTASSIUM 4.1 2019    CHLORIDE 108 2019    CO2 26 2019    BUN 16 2019    CR 0.80 2019     (H) 2019    VERONICA 9.0 2019    ALBUMIN 3.6 2019    PROTTOTAL 7.6 2019    ALT 21 2019    AST 16 2019    ALKPHOS 110 2019    BILITOTAL 0.5 2019     "TSH 0.10 (L) 02/18/2019    T4 1.24 02/18/2019       Preop Vitals  BP Readings from Last 3 Encounters:   09/05/19 122/76   08/28/19 133/79   08/21/19 130/88    Pulse Readings from Last 3 Encounters:   09/05/19 64   08/28/19 80   08/21/19 71      Resp Readings from Last 3 Encounters:   09/05/19 18   08/28/19 18   08/21/19 14    SpO2 Readings from Last 3 Encounters:   08/28/19 97%   08/21/19 98%   08/01/19 94%      Temp Readings from Last 1 Encounters:   09/05/19 36.9  C (98.5  F) (Tympanic)    Ht Readings from Last 1 Encounters:   08/28/19 1.664 m (5' 5.5\")      Wt Readings from Last 1 Encounters:   08/28/19 105 kg (231 lb 8 oz)    Estimated body mass index is 37.94 kg/m  as calculated from the following:    Height as of 8/28/19: 1.664 m (5' 5.5\").    Weight as of 8/28/19: 105 kg (231 lb 8 oz).       Anesthesia Plan      History & Physical Review  History and physical reviewed and following examination; no interval change.    ASA Status:  2 .    NPO Status:  > 6 hours    Plan for General and ETT with Intravenous induction. Maintenance will be TIVA.           Postoperative Care      Consents  Anesthetic plan, risks, benefits and alternatives discussed with:  Patient and Spouse..                 MICHELE Delacruz CRNA  "

## 2019-09-19 ENCOUNTER — HOSPITAL ENCOUNTER (OUTPATIENT)
Dept: LAB | Facility: CLINIC | Age: 54
Discharge: HOME | End: 2019-09-19
Attending: INTERNAL MEDICINE | Admitting: INTERNAL MEDICINE
Payer: COMMERCIAL

## 2019-09-19 DIAGNOSIS — D47.2 MONOCLONAL PARAPROTEINEMIA: ICD-10-CM

## 2019-09-19 LAB
ALBUMIN SERPL-MCNC: 3.7 G/DL (ref 3.4–5)
ALP SERPL-CCNC: 111 U/L (ref 40–150)
ALT SERPL W P-5'-P-CCNC: 26 U/L (ref 0–50)
ANION GAP SERPL CALCULATED.3IONS-SCNC: 4 MMOL/L (ref 3–14)
AST SERPL W P-5'-P-CCNC: 21 U/L (ref 0–45)
BASOPHILS # BLD AUTO: 0.1 10E9/L (ref 0–0.2)
BASOPHILS NFR BLD AUTO: 0.8 %
BILIRUB SERPL-MCNC: 0.3 MG/DL (ref 0.2–1.3)
BUN SERPL-MCNC: 16 MG/DL (ref 7–30)
CALCIUM SERPL-MCNC: 9 MG/DL (ref 8.5–10.1)
CHLORIDE SERPL-SCNC: 108 MMOL/L (ref 94–109)
CO2 SERPL-SCNC: 28 MMOL/L (ref 20–32)
CREAT SERPL-MCNC: 0.84 MG/DL (ref 0.52–1.04)
DIFFERENTIAL METHOD BLD: NORMAL
EOSINOPHIL # BLD AUTO: 0.2 10E9/L (ref 0–0.7)
EOSINOPHIL NFR BLD AUTO: 3 %
ERYTHROCYTE [DISTWIDTH] IN BLOOD BY AUTOMATED COUNT: 12.1 % (ref 10–15)
GFR SERPL CREATININE-BSD FRML MDRD: 79 ML/MIN/{1.73_M2}
GLUCOSE SERPL-MCNC: 109 MG/DL (ref 70–99)
HCT VFR BLD AUTO: 41.4 % (ref 35–47)
HGB BLD-MCNC: 13.3 G/DL (ref 11.7–15.7)
IMM GRANULOCYTES # BLD: 0 10E9/L (ref 0–0.4)
IMM GRANULOCYTES NFR BLD: 0.2 %
LYMPHOCYTES # BLD AUTO: 2.3 10E9/L (ref 0.8–5.3)
LYMPHOCYTES NFR BLD AUTO: 37.5 %
MCH RBC QN AUTO: 30.6 PG (ref 26.5–33)
MCHC RBC AUTO-ENTMCNC: 32.1 G/DL (ref 31.5–36.5)
MCV RBC AUTO: 95 FL (ref 78–100)
MONOCYTES # BLD AUTO: 0.5 10E9/L (ref 0–1.3)
MONOCYTES NFR BLD AUTO: 7.8 %
NEUTROPHILS # BLD AUTO: 3.1 10E9/L (ref 1.6–8.3)
NEUTROPHILS NFR BLD AUTO: 50.7 %
NRBC # BLD AUTO: 0 10*3/UL
NRBC BLD AUTO-RTO: 0 /100
PLATELET # BLD AUTO: 280 10E9/L (ref 150–450)
POTASSIUM SERPL-SCNC: 3.6 MMOL/L (ref 3.4–5.3)
PROT SERPL-MCNC: 7.8 G/DL (ref 6.8–8.8)
RBC # BLD AUTO: 4.34 10E12/L (ref 3.8–5.2)
SODIUM SERPL-SCNC: 140 MMOL/L (ref 133–144)
WBC # BLD AUTO: 6.1 10E9/L (ref 4–11)

## 2019-09-19 PROCEDURE — 82784 ASSAY IGA/IGD/IGG/IGM EACH: CPT | Performed by: INTERNAL MEDICINE

## 2019-09-19 PROCEDURE — 84165 PROTEIN E-PHORESIS SERUM: CPT | Performed by: INTERNAL MEDICINE

## 2019-09-19 PROCEDURE — 83883 ASSAY NEPHELOMETRY NOT SPEC: CPT | Performed by: INTERNAL MEDICINE

## 2019-09-19 PROCEDURE — 00000402 ZZHCL STATISTIC TOTAL PROTEIN: Performed by: INTERNAL MEDICINE

## 2019-09-19 PROCEDURE — 36415 COLL VENOUS BLD VENIPUNCTURE: CPT | Performed by: INTERNAL MEDICINE

## 2019-09-19 PROCEDURE — 85025 COMPLETE CBC W/AUTO DIFF WBC: CPT | Performed by: INTERNAL MEDICINE

## 2019-09-19 PROCEDURE — 80053 COMPREHEN METABOLIC PANEL: CPT | Performed by: INTERNAL MEDICINE

## 2019-09-19 PROCEDURE — 86334 IMMUNOFIX E-PHORESIS SERUM: CPT | Performed by: INTERNAL MEDICINE

## 2019-09-20 DIAGNOSIS — E78.5 HYPERLIPIDEMIA LDL GOAL <130: ICD-10-CM

## 2019-09-20 LAB
ALBUMIN SERPL ELPH-MCNC: 4.1 G/DL (ref 3.7–5.1)
ALPHA1 GLOB SERPL ELPH-MCNC: 0.3 G/DL (ref 0.2–0.4)
ALPHA2 GLOB SERPL ELPH-MCNC: 0.7 G/DL (ref 0.5–0.9)
B-GLOBULIN SERPL ELPH-MCNC: 0.7 G/DL (ref 0.6–1)
GAMMA GLOB SERPL ELPH-MCNC: 1.3 G/DL (ref 0.7–1.6)
IGA SERPL-MCNC: 80 MG/DL (ref 70–380)
IGG SERPL-MCNC: 1410 MG/DL (ref 695–1620)
IGM SERPL-MCNC: 151 MG/DL (ref 60–265)
KAPPA LC UR-MCNC: 2.81 MG/DL (ref 0.33–1.94)
KAPPA LC/LAMBDA SER: 1.78 {RATIO} (ref 0.26–1.65)
LAMBDA LC SERPL-MCNC: 1.58 MG/DL (ref 0.57–2.63)
M PROTEIN SERPL ELPH-MCNC: 0.7 G/DL
PROT PATTERN SERPL ELPH-IMP: ABNORMAL
PROT PATTERN SERPL IFE-IMP: NORMAL

## 2019-09-23 ENCOUNTER — ANESTHESIA (OUTPATIENT)
Dept: SURGERY | Facility: CLINIC | Age: 54
End: 2019-09-23
Payer: COMMERCIAL

## 2019-09-23 ENCOUNTER — HOSPITAL ENCOUNTER (OUTPATIENT)
Facility: CLINIC | Age: 54
Discharge: HOME | End: 2019-09-23
Attending: SURGERY | Admitting: SURGERY
Payer: COMMERCIAL

## 2019-09-23 ENCOUNTER — HOSPITAL ENCOUNTER (OUTPATIENT)
Dept: NUCLEAR MEDICINE | Facility: CLINIC | Age: 54
Setting detail: NUCLEAR MEDICINE
Discharge: HOME | End: 2019-09-23
Attending: SURGERY | Admitting: SURGERY
Payer: COMMERCIAL

## 2019-09-23 ENCOUNTER — HOSPITAL ENCOUNTER (OUTPATIENT)
Dept: MAMMOGRAPHY | Facility: CLINIC | Age: 54
End: 2019-09-23
Attending: SURGERY | Admitting: SURGERY
Payer: COMMERCIAL

## 2019-09-23 VITALS
OXYGEN SATURATION: 95 % | HEIGHT: 66 IN | SYSTOLIC BLOOD PRESSURE: 114 MMHG | DIASTOLIC BLOOD PRESSURE: 66 MMHG | BODY MASS INDEX: 37.12 KG/M2 | WEIGHT: 231 LBS | HEART RATE: 46 BPM | TEMPERATURE: 97.4 F | RESPIRATION RATE: 16 BRPM

## 2019-09-23 DIAGNOSIS — C50.919 INVASIVE LOBULAR CARCINOMA OF BREAST IN FEMALE (H): Primary | ICD-10-CM

## 2019-09-23 DIAGNOSIS — C50.919 INVASIVE LOBULAR CARCINOMA OF BREAST IN FEMALE (H): ICD-10-CM

## 2019-09-23 PROCEDURE — 71000013 ZZH RECOVERY PHASE 1 LEVEL 1 EA ADDTL HR: Performed by: SURGERY

## 2019-09-23 PROCEDURE — 25000132 ZZH RX MED GY IP 250 OP 250 PS 637: Performed by: NURSE ANESTHETIST, CERTIFIED REGISTERED

## 2019-09-23 PROCEDURE — 38525 BIOPSY/REMOVAL LYMPH NODES: CPT | Mod: LT | Performed by: SURGERY

## 2019-09-23 PROCEDURE — 25000128 H RX IP 250 OP 636: Performed by: SURGERY

## 2019-09-23 PROCEDURE — 88307 TISSUE EXAM BY PATHOLOGIST: CPT | Mod: 26 | Performed by: SURGERY

## 2019-09-23 PROCEDURE — 27210794 ZZH OR GENERAL SUPPLY STERILE: Performed by: SURGERY

## 2019-09-23 PROCEDURE — 25000128 H RX IP 250 OP 636: Performed by: NURSE ANESTHETIST, CERTIFIED REGISTERED

## 2019-09-23 PROCEDURE — A9520 TC99 TILMANOCEPT DIAG 0.5MCI: HCPCS | Performed by: SURGERY

## 2019-09-23 PROCEDURE — 25000125 ZZHC RX 250: Performed by: NURSE ANESTHETIST, CERTIFIED REGISTERED

## 2019-09-23 PROCEDURE — 71000012 ZZH RECOVERY PHASE 1 LEVEL 1 FIRST HR: Performed by: SURGERY

## 2019-09-23 PROCEDURE — 76098 X-RAY EXAM SURGICAL SPECIMEN: CPT

## 2019-09-23 PROCEDURE — 36000058 ZZH SURGERY LEVEL 3 EA 15 ADDTL MIN: Performed by: SURGERY

## 2019-09-23 PROCEDURE — 34300033 ZZH RX 343: Performed by: SURGERY

## 2019-09-23 PROCEDURE — 88307 TISSUE EXAM BY PATHOLOGIST: CPT | Performed by: SURGERY

## 2019-09-23 PROCEDURE — 36000056 ZZH SURGERY LEVEL 3 1ST 30 MIN: Performed by: SURGERY

## 2019-09-23 PROCEDURE — 78195 LYMPH SYSTEM IMAGING: CPT | Mod: TC

## 2019-09-23 PROCEDURE — 38900 IO MAP OF SENT LYMPH NODE: CPT | Performed by: SURGERY

## 2019-09-23 PROCEDURE — 37000009 ZZH ANESTHESIA TECHNICAL FEE, EACH ADDTL 15 MIN: Performed by: SURGERY

## 2019-09-23 PROCEDURE — 71000027 ZZH RECOVERY PHASE 2 EACH 15 MINS: Performed by: SURGERY

## 2019-09-23 PROCEDURE — 37000008 ZZH ANESTHESIA TECHNICAL FEE, 1ST 30 MIN: Performed by: SURGERY

## 2019-09-23 PROCEDURE — 27110028 ZZH OR GENERAL SUPPLY NON-STERILE: Performed by: SURGERY

## 2019-09-23 PROCEDURE — 19301 PARTIAL MASTECTOMY: CPT | Mod: LT | Performed by: SURGERY

## 2019-09-23 PROCEDURE — 25000125 ZZHC RX 250: Performed by: SURGERY

## 2019-09-23 PROCEDURE — 25000132 ZZH RX MED GY IP 250 OP 250 PS 637: Performed by: SURGERY

## 2019-09-23 PROCEDURE — 40000305 ZZH STATISTIC PRE PROC ASSESS I: Performed by: SURGERY

## 2019-09-23 PROCEDURE — 25800030 ZZH RX IP 258 OP 636: Performed by: NURSE ANESTHETIST, CERTIFIED REGISTERED

## 2019-09-23 PROCEDURE — 00000159 ZZHCL STATISTIC H-SEND OUTS PREP: Performed by: SURGERY

## 2019-09-23 RX ORDER — ONDANSETRON 4 MG/1
4 TABLET, ORALLY DISINTEGRATING ORAL EVERY 30 MIN PRN
Status: DISCONTINUED | OUTPATIENT
Start: 2019-09-23 | End: 2019-09-23 | Stop reason: HOSPADM

## 2019-09-23 RX ORDER — ATORVASTATIN CALCIUM 10 MG/1
TABLET, FILM COATED ORAL
Qty: 30 TABLET | Refills: 0 | Status: SHIPPED | OUTPATIENT
Start: 2019-09-23 | End: 2019-12-11

## 2019-09-23 RX ORDER — HYDROCODONE BITARTRATE AND ACETAMINOPHEN 5; 325 MG/1; MG/1
1 TABLET ORAL
Status: COMPLETED | OUTPATIENT
Start: 2019-09-23 | End: 2019-09-23

## 2019-09-23 RX ORDER — SODIUM CHLORIDE, SODIUM LACTATE, POTASSIUM CHLORIDE, CALCIUM CHLORIDE 600; 310; 30; 20 MG/100ML; MG/100ML; MG/100ML; MG/100ML
INJECTION, SOLUTION INTRAVENOUS CONTINUOUS
Status: DISCONTINUED | OUTPATIENT
Start: 2019-09-23 | End: 2019-09-23 | Stop reason: HOSPADM

## 2019-09-23 RX ORDER — LIDOCAINE 40 MG/G
CREAM TOPICAL
Status: DISCONTINUED | OUTPATIENT
Start: 2019-09-23 | End: 2019-09-23 | Stop reason: HOSPADM

## 2019-09-23 RX ORDER — HYDROCODONE BITARTRATE AND ACETAMINOPHEN 5; 325 MG/1; MG/1
1 TABLET ORAL EVERY 6 HOURS PRN
Qty: 12 TABLET | Refills: 0 | Status: SHIPPED | OUTPATIENT
Start: 2019-09-23 | End: 2019-10-16

## 2019-09-23 RX ORDER — ACETAMINOPHEN 325 MG/1
975 TABLET ORAL ONCE
Status: COMPLETED | OUTPATIENT
Start: 2019-09-23 | End: 2019-09-23

## 2019-09-23 RX ORDER — MEPERIDINE HYDROCHLORIDE 25 MG/ML
12.5 INJECTION INTRAMUSCULAR; INTRAVENOUS; SUBCUTANEOUS
Status: DISCONTINUED | OUTPATIENT
Start: 2019-09-23 | End: 2019-09-23 | Stop reason: HOSPADM

## 2019-09-23 RX ORDER — FENTANYL CITRATE 50 UG/ML
INJECTION, SOLUTION INTRAMUSCULAR; INTRAVENOUS PRN
Status: DISCONTINUED | OUTPATIENT
Start: 2019-09-23 | End: 2019-09-23

## 2019-09-23 RX ORDER — PROPOFOL 10 MG/ML
INJECTION, EMULSION INTRAVENOUS PRN
Status: DISCONTINUED | OUTPATIENT
Start: 2019-09-23 | End: 2019-09-23

## 2019-09-23 RX ORDER — CEFAZOLIN SODIUM 1 G/50ML
1 INJECTION, SOLUTION INTRAVENOUS SEE ADMIN INSTRUCTIONS
Status: DISCONTINUED | OUTPATIENT
Start: 2019-09-23 | End: 2019-09-23 | Stop reason: HOSPADM

## 2019-09-23 RX ORDER — FENTANYL CITRATE 50 UG/ML
25-50 INJECTION, SOLUTION INTRAMUSCULAR; INTRAVENOUS
Status: DISCONTINUED | OUTPATIENT
Start: 2019-09-23 | End: 2019-09-23 | Stop reason: HOSPADM

## 2019-09-23 RX ORDER — DEXAMETHASONE SODIUM PHOSPHATE 4 MG/ML
4 INJECTION, SOLUTION INTRA-ARTICULAR; INTRALESIONAL; INTRAMUSCULAR; INTRAVENOUS; SOFT TISSUE EVERY 10 MIN PRN
Status: DISCONTINUED | OUTPATIENT
Start: 2019-09-23 | End: 2019-09-23 | Stop reason: HOSPADM

## 2019-09-23 RX ORDER — DEXAMETHASONE SODIUM PHOSPHATE 4 MG/ML
INJECTION, SOLUTION INTRA-ARTICULAR; INTRALESIONAL; INTRAMUSCULAR; INTRAVENOUS; SOFT TISSUE PRN
Status: DISCONTINUED | OUTPATIENT
Start: 2019-09-23 | End: 2019-09-23

## 2019-09-23 RX ORDER — BUPIVACAINE HYDROCHLORIDE 5 MG/ML
INJECTION, SOLUTION PERINEURAL PRN
Status: DISCONTINUED | OUTPATIENT
Start: 2019-09-23 | End: 2019-09-23 | Stop reason: HOSPADM

## 2019-09-23 RX ORDER — HYDROMORPHONE HYDROCHLORIDE 1 MG/ML
.3-.5 INJECTION, SOLUTION INTRAMUSCULAR; INTRAVENOUS; SUBCUTANEOUS EVERY 10 MIN PRN
Status: DISCONTINUED | OUTPATIENT
Start: 2019-09-23 | End: 2019-09-23 | Stop reason: HOSPADM

## 2019-09-23 RX ORDER — PROPOFOL 10 MG/ML
INJECTION, EMULSION INTRAVENOUS CONTINUOUS PRN
Status: DISCONTINUED | OUTPATIENT
Start: 2019-09-23 | End: 2019-09-23

## 2019-09-23 RX ORDER — METOCLOPRAMIDE 10 MG/1
10 TABLET ORAL EVERY 6 HOURS PRN
Status: DISCONTINUED | OUTPATIENT
Start: 2019-09-23 | End: 2019-09-23 | Stop reason: HOSPADM

## 2019-09-23 RX ORDER — CEFAZOLIN SODIUM 2 G/100ML
2 INJECTION, SOLUTION INTRAVENOUS
Status: DISCONTINUED | OUTPATIENT
Start: 2019-09-23 | End: 2019-09-23 | Stop reason: HOSPADM

## 2019-09-23 RX ORDER — METOCLOPRAMIDE HYDROCHLORIDE 5 MG/ML
10 INJECTION INTRAMUSCULAR; INTRAVENOUS EVERY 6 HOURS PRN
Status: DISCONTINUED | OUTPATIENT
Start: 2019-09-23 | End: 2019-09-23 | Stop reason: HOSPADM

## 2019-09-23 RX ORDER — NALOXONE HYDROCHLORIDE 0.4 MG/ML
.1-.4 INJECTION, SOLUTION INTRAMUSCULAR; INTRAVENOUS; SUBCUTANEOUS
Status: DISCONTINUED | OUTPATIENT
Start: 2019-09-23 | End: 2019-09-23 | Stop reason: HOSPADM

## 2019-09-23 RX ORDER — ONDANSETRON 2 MG/ML
INJECTION INTRAMUSCULAR; INTRAVENOUS PRN
Status: DISCONTINUED | OUTPATIENT
Start: 2019-09-23 | End: 2019-09-23

## 2019-09-23 RX ORDER — DOCUSATE SODIUM 100 MG/1
100 CAPSULE, LIQUID FILLED ORAL 2 TIMES DAILY
Refills: 0 | COMMUNITY
Start: 2019-09-23 | End: 2019-10-16

## 2019-09-23 RX ORDER — ONDANSETRON 2 MG/ML
4 INJECTION INTRAMUSCULAR; INTRAVENOUS EVERY 30 MIN PRN
Status: DISCONTINUED | OUTPATIENT
Start: 2019-09-23 | End: 2019-09-23 | Stop reason: HOSPADM

## 2019-09-23 RX ORDER — GABAPENTIN 300 MG/1
300 CAPSULE ORAL ONCE
Status: COMPLETED | OUTPATIENT
Start: 2019-09-23 | End: 2019-09-23

## 2019-09-23 RX ORDER — LIDOCAINE HYDROCHLORIDE AND EPINEPHRINE 10; 10 MG/ML; UG/ML
INJECTION, SOLUTION INFILTRATION; PERINEURAL PRN
Status: DISCONTINUED | OUTPATIENT
Start: 2019-09-23 | End: 2019-09-23 | Stop reason: HOSPADM

## 2019-09-23 RX ORDER — IBUPROFEN 200 MG
600 TABLET ORAL
Status: DISCONTINUED | OUTPATIENT
Start: 2019-09-23 | End: 2019-09-23 | Stop reason: HOSPADM

## 2019-09-23 RX ORDER — ALBUTEROL SULFATE 0.83 MG/ML
2.5 SOLUTION RESPIRATORY (INHALATION) EVERY 4 HOURS PRN
Status: DISCONTINUED | OUTPATIENT
Start: 2019-09-23 | End: 2019-09-23 | Stop reason: HOSPADM

## 2019-09-23 RX ADMIN — PROPOFOL 75 MG: 10 INJECTION, EMULSION INTRAVENOUS at 11:14

## 2019-09-23 RX ADMIN — SODIUM CHLORIDE, POTASSIUM CHLORIDE, SODIUM LACTATE AND CALCIUM CHLORIDE: 600; 310; 30; 20 INJECTION, SOLUTION INTRAVENOUS at 09:20

## 2019-09-23 RX ADMIN — SODIUM CHLORIDE, POTASSIUM CHLORIDE, SODIUM LACTATE AND CALCIUM CHLORIDE: 600; 310; 30; 20 INJECTION, SOLUTION INTRAVENOUS at 10:07

## 2019-09-23 RX ADMIN — ONDANSETRON 4 MG: 2 INJECTION INTRAMUSCULAR; INTRAVENOUS at 09:31

## 2019-09-23 RX ADMIN — FENTANYL CITRATE 100 MCG: 50 INJECTION, SOLUTION INTRAMUSCULAR; INTRAVENOUS at 09:27

## 2019-09-23 RX ADMIN — DEXAMETHASONE SODIUM PHOSPHATE 10 MG: 4 INJECTION, SOLUTION INTRA-ARTICULAR; INTRALESIONAL; INTRAMUSCULAR; INTRAVENOUS; SOFT TISSUE at 09:31

## 2019-09-23 RX ADMIN — MIDAZOLAM 2 MG: 1 INJECTION INTRAMUSCULAR; INTRAVENOUS at 09:26

## 2019-09-23 RX ADMIN — PROPOFOL 250 MG: 10 INJECTION, EMULSION INTRAVENOUS at 09:31

## 2019-09-23 RX ADMIN — PROPOFOL 50 MCG/KG/MIN: 10 INJECTION, EMULSION INTRAVENOUS at 09:45

## 2019-09-23 RX ADMIN — HYDROMORPHONE HYDROCHLORIDE 0.5 MG: 1 INJECTION, SOLUTION INTRAMUSCULAR; INTRAVENOUS; SUBCUTANEOUS at 12:06

## 2019-09-23 RX ADMIN — ACETAMINOPHEN 975 MG: 325 TABLET, FILM COATED ORAL at 06:42

## 2019-09-23 RX ADMIN — PROPOFOL 50 MG: 10 INJECTION, EMULSION INTRAVENOUS at 09:55

## 2019-09-23 RX ADMIN — GABAPENTIN 300 MG: 300 CAPSULE ORAL at 06:42

## 2019-09-23 RX ADMIN — TILMANOCEPT 0.5 MILLICURIE: KIT at 07:15

## 2019-09-23 RX ADMIN — HYDROCODONE BITARTRATE AND ACETAMINOPHEN 1 TABLET: 5; 325 TABLET ORAL at 12:53

## 2019-09-23 RX ADMIN — FENTANYL CITRATE 100 MCG: 50 INJECTION, SOLUTION INTRAMUSCULAR; INTRAVENOUS at 10:31

## 2019-09-23 ASSESSMENT — MIFFLIN-ST. JEOR: SCORE: 1656.62

## 2019-09-23 NOTE — ANESTHESIA POSTPROCEDURE EVALUATION
Patient: Merlyn Ravi    Procedure(s):  LUMPECTOMY, BREAST, WITH SENTINEL LYMPH NODE BIOPSY. [place] node placement at 7:00a by Dr. Salas    Diagnosis:left breast cancer  Diagnosis Additional Information: No value filed.    Anesthesia Type:  No value filed.    Note:  Anesthesia Post Evaluation    Patient location during evaluation: Bedside  Patient participation: Able to fully participate in evaluation  multimodal analgesia used between 6 hours prior to anesthesia start to PACU dischargeAirway patency: patent  Cardiovascular status: acceptable  Respiratory status: acceptable  two or more mitigation strategies used for obstructive sleep apneaHydration status: acceptable  PONV: none     Anesthetic complications: None          Last vitals:  Vitals:    09/23/19 0611   BP: 120/72   Pulse: 68   Resp: 16   Temp: 36.6  C (97.8  F)   SpO2: 96%         Electronically Signed By: MICHELE White CRNA  September 23, 2019  11:34 AM

## 2019-09-23 NOTE — OP NOTE
Procedure Date: 19     PREOPERATIVE DIAGNOSIS: left Breast Cancer (Invasive lobular carcinoma, ER+, VA+, HER2-)  POSTOPERATIVE DIAGNOSIS:   1. left Breast Cancer (Invasive lobular carcinoma, ER+, VA+, HER2-)  2. Acquired breast defect     PROCEDURE:   1. Left Partial Mastectomy  2. Left Axillary [place] Lymph Node Biopsy  3. Injection of Methylene blue and Technetium 99  4. Lymph node mapping  5. Oncoplastic closure     ATTENDING SURGEON: Kj Salas DO    Assistant: Ross Hebert PA-C (needed for retraction and suction)    ESTIMATED BLOOD LOSS: 30mL    ANESTHESIA: General Endotracheal plus Local Anesthesia (0.25% Marcaine and 1% Lidocaine in a 50:50 mixture)     INDICATIONS FOR PROCEDURE: : Merlyn Ravi presents with a history of a left breast mass, biopsied via image-guided percutaneous means, with features of invasive lobular carcinoma, 1.1cm ER+, VA+, HER2- noted on histology. After discussion was held with the patient regarding indications, risks, benefits and alternatives, benefits, and risks, her questions were addressed and she understood and wished to proceed with left partial mastectomy and left axillary sentinel lymph node biopsy (possible axillary lymphadenectomy). Specific risks discussed included bleeding, infection, seroma, need for additional treatment, nontherapeutic intervention, wound complication (such as dehiscence), recurrence, cosmetic deformity, potential need for additional surgical resection, potential for drain placement, lymphedema, and rare complications related to surgery and/or anesthesia such as venous thromboembolism and cardiorespiratory complications.     PROCEDURE: In the pre-op holding area, the patient was identified by name, , a pre-injection pause was performed and 500 mCi of Technetium 99 was injected after the left nipple areolar complex was prepped in a sterile fashion.  The area was massaged for 5 minutes and patient was brought to OR 2 hours later.   Ultrasound was used in pre-operative holding to identify the clip which appeared to be superficial and discrete from the mass.  The borders were marked on the skin.After informed consent was obtained, the patient was brought to the operating room and placed in the supine position on the Operating Room table. Anesthesia was then administered. The left breast and upper extremity were then prepped and draped in the typical sterile fashion. A time-out was performed to verify patient and procedure. Methylene blue was injected at the margin of the nipple areolar complex on the tumor side (left upper outer ).     Local anesthetic was delivered, and an incision was carried out over the area of highest uptake in the left axilla, at the inferior margin of the hair bearing area. The subcutaneous tissue was carefully dissected using electrocautery, to expose the axillary basin. A focus of high nuclear activity was noted and the probe was utilized to navigate the dissection toward a single nuclear active but otherwise unremarkable lymph node. This was dissected free between hemoclips and electrocautery where appropriate. A ten second count was performed, and the values recorded (75987). Once excised this was passed off the field and sent routine to pathology. No other activity was noted in the axilla to warrant further dissection.  Furthermore no internal mammary activity was observed. Hemostasis was assured. Sterile water irrigant was used, and the irrigant solution suctioned free.  The wound was closed in layers with 3-0 Vicryl deep and 4-0 Monocryl.     Additional local anesthetic was delivered around the left breast mass and a curvilinear incision carried out below the bra line in close proximity to the mass.  Subcutaneous flaps were raised circumferentially until the borders of the subcutaneous tissue was discrete and free from the underlying breast parenchyma containing the tumor.  Dissection was then carried out  circumferentially until the mass was completely excised.  Orientation was maintained and the margin paint kit was used to markus the specimen.  This was then sent for mammography. The margins of the specimen appeared complete, and without evidence of any diseased tissue in proximity to the cut edges. Hemostasis was again assured, and the cavity was cleansed using sterile irrigant. The primary specimen was sent to radiology, and mammographic confirmation of complete resection was received shortly thereafter. Hemoclips were placed in the cavity of the breast to markus the superior, inferior, medial and lateral margins of dissection.   The defect was assessed and the breast was mobile enough to allow deep re-approximation which was accomplished with layers starting with 2-0 Vicryl followed by 2 layers of 3-0 Vicryl.  The breast was then assessed in a more seated position.  The defect was still visible at the inferior and superior margin of dissection thus additional subcutaneous tissue was mobilized.  The subcutaneous tissue was then re-approximated with 3-0 Vicryl followed by 4-0 Monocryl.  An additional 20 minutes of closure time was required for oncoplastic closure defect and the skin.    An instrument count, including laparotomy pads, sponges and needles was performed and found to be correct.       The skin was cleansed and dried, before administration of Dermabond glue. A compression bra was then administered.     The patient tolerated the procedure well, was allowed to recover and was transferred to the recovery room in stable condition.    Kj Salas DO on 9/23/2019 at 11:27 AM

## 2019-09-23 NOTE — ANESTHESIA CARE TRANSFER NOTE
Patient: Merlyn Ravi    Procedure(s):  LUMPECTOMY, BREAST, WITH SENTINEL LYMPH NODE BIOPSY. [place] node placement at 7:00a by Dr. Salas    Diagnosis: left breast cancer  Diagnosis Additional Information: No value filed.    Anesthesia Type:   No value filed.     Note:  Airway :Face Mask  Patient transferred to:PACU  Handoff Report: Identifed the Patient, Identified the Reponsible Provider, Reviewed the pertinent medical history, Discussed the surgical course, Reviewed Intra-OP anesthesia mangement and issues during anesthesia, Set expectations for post-procedure period and Allowed opportunity for questions and acknowledgement of understanding      Vitals: (Last set prior to Anesthesia Care Transfer)    CRNA VITALS  9/23/2019 1058 - 9/23/2019 1134      9/23/2019             Pulse:  69    SpO2:  98 %    Resp Rate (observed):  8                Electronically Signed By: MICHELE White CRNA  September 23, 2019  11:34 AM

## 2019-09-23 NOTE — TELEPHONE ENCOUNTER
Medication is being filled for 1 time refill only due to:  Patient needs labs lipid. Future labs ordered lipid.

## 2019-09-23 NOTE — DISCHARGE INSTRUCTIONS
Same Day Surgery Discharge Instructions  Special Precautions After Surgery - Adult    1. It is not unusual to feel lightheaded or faint, up to 24 hours after surgery or while taking pain medication.  If you have these symptoms; sit for a few minutes before standing and have someone assist you when getting up.  2. You should rest and relax for the next 24 hours and must have someone stay with you for at least 24 hours after your discharge.  3. DO NOT DRIVE any vehicle or operate mechanical equipment for 24 hours following the end of your surgery.  DO NOT DRIVE while taking narcotic pain medications that have been prescribed by your physician.  If you had a limb operated on, you must be able to use it fully to drive.  4. DO NOT drink alcoholic beverages for 24 hours following surgery or while taking prescription pain medication.  5. Drink clear liquids (apple juice, ginger ale, broth, 7-Up, etc.).  Progress to your regular diet as you feel able.  6. Any questions call your physician and do not make important decisions for 24 hours.  ?     __________________________________________________________________________________________________________________________________  IMPORTANT NUMBERS:    [facility] [facility] [place]:  [phone], [phone]  Pharmacy:  [phone]  Same Day Surgery:  [phone], Monday - Friday until 8:30 p.m.  Urgent Care:  [phone]  Emergency Room:  [phone]      [facility]:  [phone]                                                                             [facility]:  [phone] option 1  [facility]:  [phone]     [facility]:  [phone]   [facility]:  [phone]   Home Medical Equipment: [phone]  [facility]:  [phone]    HOME CARE FOLLOWING LUMPECTOMY      APPOINTMENT WITH YOUR SURGEON:  You will be scheduled to see your surgeon in 1 week to discuss your pathology results and  for a wound check.    SUPPORT:  Wear a bra for support and comfort for 3-7 days, day and night.    DRAIN:  If you have a drain in place, you will need a separate appointment with a nurse in the office to have this removed.  You will have a form to keep track of the output of your drain.  When the output reaches a point where the total for a 24 hour period is less than 30cc s, you are ready to have the drain removed.  At that point you can call the office and talk to the nurse to arrange coming into the office to have this done.  If you have more than one drain in place, they may not all be removed at the same time, as the outputs can be very different from each.  The nurse will help you to manage this and help decide when the remaining drains will be taken out.    INCISIONAL CARE:    If you have a dressing in place, keep clean and dry for 48 hours; you may replace the gauze if it becomes soiled.    After 48 hours you may remove the dressing and shower.  Do not submerse incision in water for 1 week.    If you have a Dermabond dressing (a type of skin glue), you may shower immediately.    Sutures will absorb and do not need to be removed.    If present, leave the steri-strips (white paper tapes) in place for 14 days after surgery.    If present, leave Dermabond glue in place until it wears/flakes off.    You may expect a small amount of drainage from your incision.    A lump/ridge under the incision is normal and will gradually resolve.    BATHING:  You are allowed to bath (shallow water in a tub only) or shower 24-48 hours after your surgery.  Mild soap is OK to use near these sites.  When bathing, do not allow the incision or drain site to become submersed in water as this may increase the risk of infection.    ACTIVITY:  Cautiously resume exercise and strenuous activities such as jogging, tennis, aerobics, etc. Also, be careful of stretching activities with operative side for two weeks.    If you had a  sentinel  node biopsy  at the time of your surgery:  You have no restrictions in addition to those noted above.    If you had an  axillary node dissection  at the time of your surgery:  You are recommended to restrict the activity of the arm on the side the dissection was done on.  This means:  no reaching overhead until cleared to do so by your surgeon, no carrying weight on that side greater than a couple pounds, no injections/vaccinations/ blood samples from that side, and no blood pressure measurements on that side.  It is also recommended to elevate the arm on pillows several times a day to decrease/minimize swelling, and avoid sleeping on that arm.    DIET:  No restrictions.  Increased fluid intake is recommended. While taking pain medications, increase dietary fiber or add a fiber supplementation like Metamucil or Citrucel to help prevent constipation - a possible side effect of pain medications.    DISCOMFORT:  Local anesthetic placed at surgery should provide relief for 4-8 hours.  Begin taking pain pills before discomfort is severe.  Take the pain medication with some food, when possible, to minimize side effects.  Intermittent use of ice packs may help during the first 48 hours.  Expect gradual improvement.    RETURN APPOINTMENT:  Schedule a follow-up visit 2-3 weeks post-op.  Office Phone:  [phone]     CONTACT US IF THE FOLLOWING DEVELOPS:   1. A fever that is above 101     2. If there is a large amount of drainage, bleeding, or swelling.   3. Severe pain that is not relieved by your prescription.   4. Drainage that is thick, cloudy, yellow, green or white.   5. Any other questions not answered by  Frequently Asked Questions  sheet.      FREQUENTLY ASKED QUESTIONS:    Q:  How should my incision look?    A:  Normally your incision will appear slightly swollen with light redness directly along the incision itself as it heals.  It may feel like a bump or ridge as the healing/scarring happens, and over time (3-4  months) this bump or ridge feeling should slowly go away.  In general, clear or pink watery drainage can be normal at first as your incision heals, but should decrease over time.    Q:  How do I know if my incision is infected?  A:  Look at your incision for signs of infection, like redness around the incision spreading to surrounding skin, or drainage of cloudy or foul-smelling drainage.  If you feel warm, check your temperature to see if you are running a fever.    **If any of these things occur, please notify the nurse at our office.  We may need you to come into the office for an incision check.      Q:  How do I take care of my incision?  A:  If you have a dressing in place - Starting the day after surgery, replace the dressing 1-2 times a day until there is no further drainage from the incision.  At that time, a dressing is no longer needed.  Try to minimize tape on the skin if irritation is occurring at the tape sites.  If you have significant irritation from tape on the skin, please call the office to discuss other method of dressing your incision.    Small pieces of tape called  steri-strips  may be present directly overlying your incision; these may be removed 10 days after surgery unless otherwise specified by your surgeon.  If these tapes start to loosen at the ends, you may trim them back until they fall off or are removed.    A:  If you had  Dermabond  tissue glue used as a dressing (this causes your incision to look shiny with a clear covering over it) - This type of dressing wears off with time and does not require more dressings over the top unless it is draining around the glue as it wears off.  Do not apply ointments or lotions over the incisions until the glue has completely worn off.    Q:  There is a piece of tape or a sticky  lead  still on my skin.  Can I remove this?  A:  Sometimes the sticky  leads  used for monitoring during surgery or for evaluation in the emergency department are not all  removed while you are in the hospital.  These sometimes have a tab or metal dot on them.  You can easily remove these on your own, like taking off a band-aid.  If there is a gel substance under the  lead , simply wipe/clean it off with a washcloth or paper towel.      Q:  What can I do to minimize constipation (very hard stools, or lack of stools)?  A:  Stay well hydrated.  Increase your dietary fiber intake or take a fiber supplement -with plenty of water.  Walk around frequently.  You may consider an over-the-counter stool-softener.  Your Pharmacist can assist you with choosing one that is stocked at your pharmacy.  Constipation is also one of the most common side effects of pain medication.  If you are using pain medication, be pro-active and try to PREVENT problems with constipation by taking the steps above BEFORE constipation becomes a problem.    Q:  What do I do if I need more pain medications?  A:  Call the office to receive refills.  Be aware that certain pain meds cannot be called into a pharmacy and actually require a paper prescription.  A change may be made in your pain med as you progress thru your recovery period or if you have side effects to certain meds.    --Pain meds are NOT refilled after 5pm on weekdays, and NOT AT ALL on the weekends, so please look ahead to prevent problems.      Q:  Why am I having a hard time sleeping now that I am at home?  A:  Many medications you receive while you are in the hospital can impact your sleep for a number of days after your surgery/hospitalization.  Decreased level of activity and naps during the day may also make sleeping at night difficult.  Try to minimize day-time naps, and get up frequently during the day to walk around your home during your recovery time.  Sleep aides may be of some help, but are not recommended for long-term use.      Q:  I am having some back discomfort.  What should I do?  A:  This may be related to certain positioning that was  required for your surgery, extended periods of time in bed, or other changes in your overall activity level.  You may try ice, heat, acetaminophen, or ibuprofen to treat this temporarily.  Note that many pain medications have acetaminophen in them and would state this on the prescription bottle.  Be sure not to exceed the maximum of 4000mg per day of acetaminophen.     **If the pain you are having does not resolve, is severe, or is a flare of back pain you have had on other occasions prior to surgery, please contact your primary physician for further recommendations or for an appointment to be examined at their office.    Q:  Why am I having headaches?  A:  Headaches can be caused by many things:  caffeine withdrawal, use of pain meds, dehydration, high blood pressure, lack of sleep, over-activity/exhaustion, flare-up of usual migraine headaches.  If you feel this is related to muscle tension (a band-like feeling around the head, or a pressure at the low-back of the head) you may try ice or heat to this area.  You may need to drink more fluids (try electrolyte drink like Gatorade), rest, or take your usual migraine medications.   **If your headaches do not resolve, worsen, are accompanied by other symptoms, or if your blood pressure is high, please call your primary physician for recommendation and/or examination.    Q:  I am unable to urinate.  What do I do?  A:  A small percentage of people can have difficulty urinating initially after surgery.  This includes being able to urinate only a very small amount at a time and feeling discomfort or pressure in the very low abdomen.  This is called  urinary retention , and is actually an urgent situation.  Proceed to your nearest Emergency department for evaluation (not an Urgent Care Center).  Sometimes the bladder does not work correctly after certain medications you receive during surgery, or related to certain procedures.  You may need to have a catheter placed until  your bladder recovers.  When planning to go to an Emergency department, it may help to call the ER to let them know you are coming in for this problem after a surgery.  This may help you get in quicker to be evaluated.  **If you have symptoms of a urinary tract infection, please contact your primary physician for the proper evaluation and treatment.          If you have other questions, please call the office Monday thru Friday between 8am and 5pm to discuss with the nurse.  # [phone]    There is a surgeon ON CALL on weekday evenings and over the weekend in case of urgent need only, and may be contacted at the same number.    If you are having an emergency, call 911 or proceed to your nearest emergency department.

## 2019-09-23 NOTE — TELEPHONE ENCOUNTER
"Requested Prescriptions   Pending Prescriptions Disp Refills     atorvastatin (LIPITOR) 10 MG tablet [Pharmacy Med Name: ATORVASTATIN 10MG TABLETS] 90 tablet 0     Sig: TAKE 1 TABLET BY MOUTH EVERY DAY   Last Written Prescription Date:  9/5/18  Last Fill Quantity: 90 tab,  # refills: 3   Last office visit: 8/21/2019 with prescribing provider:  Memo Garcia     Future Office Visit:   Next 5 appointments (look out 90 days)    Oct 02, 2019  9:15 AM CDT  Return Visit with Tanvi Blakely MD  [facility] ([facility]  [address]  [facility] [place] [address]  [phone]             Statins Protocol Failed - 9/20/2019  6:16 PM        Failed - LDL on file in past 12 months     Recent Labs   Lab Test 09/05/18  0806   *             Passed - No abnormal creatine kinase in past 12 months     No lab results found.             Passed - Recent (12 mo) or future (30 days) visit within the authorizing provider's specialty     Patient had office visit in the last 12 months or has a visit in the next 30 days with authorizing provider or within the authorizing provider's specialty.  See \"Patient Info\" tab in inbasket, or \"Choose Columns\" in Meds & Orders section of the refill encounter.              Passed - Medication is active on med list        Passed - Patient is age 18 or older        Passed - No active pregnancy on record        Passed - No positive pregnancy test in past 12 months          "

## 2019-09-26 ENCOUNTER — MYC MEDICAL ADVICE (OUTPATIENT)
Dept: SURGERY | Facility: CLINIC | Age: 54
End: 2019-09-26

## 2019-09-26 NOTE — LETTER
[facility]  [address] [address]  Phone: [phone]      REPORT OF WORK ABILITY      Date: 9/27/2019                     Employee Name: Merlyn Ravi         YOB: 1965  Medical Record Number: [id number]   Soc.Sec.No: [id number]    EMPLOYEE IS ABLE TO RETURN TO WORK: unrestricted / Full shift on Monday September 30th 2019.    Patient does have a Follow Up in clinic appointment scheduled with provider on 10/1/2019.           Nichole Santiago RN  [facility] / per written order of Dr Josue BAKER [facility]

## 2019-09-27 ENCOUNTER — ANESTHESIA EVENT (OUTPATIENT)
Dept: SURGERY | Facility: CLINIC | Age: 54
End: 2019-09-27
Payer: COMMERCIAL

## 2019-09-27 DIAGNOSIS — C50.912 INVASIVE DUCTAL CARCINOMA OF LEFT BREAST (H): Primary | ICD-10-CM

## 2019-09-27 LAB — COPATH REPORT: NORMAL

## 2019-09-27 NOTE — TELEPHONE ENCOUNTER
Kj Salas, DO  Fl Wy Sp Rn Pool 9 minutes ago (10:58 AM)      Can you please create work note and fax this in for her.  No restrictions when she returns.     Thank you

## 2019-09-30 ENCOUNTER — HOSPITAL ENCOUNTER (OUTPATIENT)
Facility: CLINIC | Age: 54
Discharge: HOME | End: 2019-09-30
Attending: SURGERY | Admitting: SURGERY
Payer: COMMERCIAL

## 2019-09-30 ENCOUNTER — ANESTHESIA (OUTPATIENT)
Dept: SURGERY | Facility: CLINIC | Age: 54
End: 2019-09-30
Payer: COMMERCIAL

## 2019-09-30 VITALS
DIASTOLIC BLOOD PRESSURE: 64 MMHG | SYSTOLIC BLOOD PRESSURE: 108 MMHG | OXYGEN SATURATION: 95 % | RESPIRATION RATE: 16 BRPM

## 2019-09-30 DIAGNOSIS — C50.912 INVASIVE DUCTAL CARCINOMA OF LEFT BREAST (H): ICD-10-CM

## 2019-09-30 PROCEDURE — 88307 TISSUE EXAM BY PATHOLOGIST: CPT | Mod: 26 | Performed by: SURGERY

## 2019-09-30 PROCEDURE — 36000052 ZZH SURGERY LEVEL 2 EA 15 ADDTL MIN: Performed by: SURGERY

## 2019-09-30 PROCEDURE — 25000125 ZZHC RX 250: Performed by: SURGERY

## 2019-09-30 PROCEDURE — 25000132 ZZH RX MED GY IP 250 OP 250 PS 637: Performed by: NURSE ANESTHETIST, CERTIFIED REGISTERED

## 2019-09-30 PROCEDURE — 25000128 H RX IP 250 OP 636: Performed by: NURSE ANESTHETIST, CERTIFIED REGISTERED

## 2019-09-30 PROCEDURE — 19301 PARTIAL MASTECTOMY: CPT | Mod: 78 | Performed by: PHYSICIAN ASSISTANT

## 2019-09-30 PROCEDURE — 37000009 ZZH ANESTHESIA TECHNICAL FEE, EACH ADDTL 15 MIN: Performed by: SURGERY

## 2019-09-30 PROCEDURE — 25000125 ZZHC RX 250: Performed by: NURSE ANESTHETIST, CERTIFIED REGISTERED

## 2019-09-30 PROCEDURE — 36000050 ZZH SURGERY LEVEL 2 1ST 30 MIN: Performed by: SURGERY

## 2019-09-30 PROCEDURE — 25800030 ZZH RX IP 258 OP 636: Performed by: NURSE ANESTHETIST, CERTIFIED REGISTERED

## 2019-09-30 PROCEDURE — 27210794 ZZH OR GENERAL SUPPLY STERILE: Performed by: SURGERY

## 2019-09-30 PROCEDURE — 37000008 ZZH ANESTHESIA TECHNICAL FEE, 1ST 30 MIN: Performed by: SURGERY

## 2019-09-30 PROCEDURE — 25000566 ZZH SEVOFLURANE, EA 15 MIN: Performed by: SURGERY

## 2019-09-30 PROCEDURE — 25000128 H RX IP 250 OP 636: Performed by: SURGERY

## 2019-09-30 PROCEDURE — 88307 TISSUE EXAM BY PATHOLOGIST: CPT | Performed by: SURGERY

## 2019-09-30 PROCEDURE — 40000305 ZZH STATISTIC PRE PROC ASSESS I: Performed by: SURGERY

## 2019-09-30 PROCEDURE — 71000027 ZZH RECOVERY PHASE 2 EACH 15 MINS: Performed by: SURGERY

## 2019-09-30 RX ORDER — CEFAZOLIN SODIUM 2 G/100ML
2 INJECTION, SOLUTION INTRAVENOUS
Status: COMPLETED | OUTPATIENT
Start: 2019-09-30 | End: 2019-09-30

## 2019-09-30 RX ORDER — MEPERIDINE HYDROCHLORIDE 25 MG/ML
12.5 INJECTION INTRAMUSCULAR; INTRAVENOUS; SUBCUTANEOUS
Status: DISCONTINUED | OUTPATIENT
Start: 2019-09-30 | End: 2019-09-30 | Stop reason: HOSPADM

## 2019-09-30 RX ORDER — HYDROMORPHONE HYDROCHLORIDE 1 MG/ML
.3-.5 INJECTION, SOLUTION INTRAMUSCULAR; INTRAVENOUS; SUBCUTANEOUS EVERY 10 MIN PRN
Status: DISCONTINUED | OUTPATIENT
Start: 2019-09-30 | End: 2019-09-30 | Stop reason: HOSPADM

## 2019-09-30 RX ORDER — HYDROXYZINE HYDROCHLORIDE 25 MG/1
25 TABLET, FILM COATED ORAL EVERY 6 HOURS PRN
Status: DISCONTINUED | OUTPATIENT
Start: 2019-09-30 | End: 2019-09-30 | Stop reason: HOSPADM

## 2019-09-30 RX ORDER — HYDROXYZINE HYDROCHLORIDE 50 MG/1
50 TABLET, FILM COATED ORAL EVERY 6 HOURS PRN
Status: DISCONTINUED | OUTPATIENT
Start: 2019-09-30 | End: 2019-09-30 | Stop reason: HOSPADM

## 2019-09-30 RX ORDER — DEXAMETHASONE SODIUM PHOSPHATE 4 MG/ML
INJECTION, SOLUTION INTRA-ARTICULAR; INTRALESIONAL; INTRAMUSCULAR; INTRAVENOUS; SOFT TISSUE PRN
Status: DISCONTINUED | OUTPATIENT
Start: 2019-09-30 | End: 2019-09-30

## 2019-09-30 RX ORDER — ONDANSETRON 4 MG/1
4 TABLET, ORALLY DISINTEGRATING ORAL EVERY 30 MIN PRN
Status: DISCONTINUED | OUTPATIENT
Start: 2019-09-30 | End: 2019-09-30 | Stop reason: HOSPADM

## 2019-09-30 RX ORDER — NALOXONE HYDROCHLORIDE 0.4 MG/ML
.1-.4 INJECTION, SOLUTION INTRAMUSCULAR; INTRAVENOUS; SUBCUTANEOUS
Status: DISCONTINUED | OUTPATIENT
Start: 2019-09-30 | End: 2019-09-30 | Stop reason: HOSPADM

## 2019-09-30 RX ORDER — KETAMINE HYDROCHLORIDE 10 MG/ML
INJECTION, SOLUTION INTRAMUSCULAR; INTRAVENOUS PRN
Status: DISCONTINUED | OUTPATIENT
Start: 2019-09-30 | End: 2019-09-30

## 2019-09-30 RX ORDER — LIDOCAINE HYDROCHLORIDE 10 MG/ML
INJECTION, SOLUTION INFILTRATION; PERINEURAL PRN
Status: DISCONTINUED | OUTPATIENT
Start: 2019-09-30 | End: 2019-09-30

## 2019-09-30 RX ORDER — CEFAZOLIN SODIUM 1 G/50ML
1 INJECTION, SOLUTION INTRAVENOUS SEE ADMIN INSTRUCTIONS
Status: DISCONTINUED | OUTPATIENT
Start: 2019-09-30 | End: 2019-09-30 | Stop reason: HOSPADM

## 2019-09-30 RX ORDER — LIDOCAINE HYDROCHLORIDE AND EPINEPHRINE 10; 10 MG/ML; UG/ML
INJECTION, SOLUTION INFILTRATION; PERINEURAL PRN
Status: DISCONTINUED | OUTPATIENT
Start: 2019-09-30 | End: 2019-09-30 | Stop reason: HOSPADM

## 2019-09-30 RX ORDER — LIDOCAINE 40 MG/G
CREAM TOPICAL
Status: DISCONTINUED | OUTPATIENT
Start: 2019-09-30 | End: 2019-09-30 | Stop reason: HOSPADM

## 2019-09-30 RX ORDER — ONDANSETRON 2 MG/ML
INJECTION INTRAMUSCULAR; INTRAVENOUS PRN
Status: DISCONTINUED | OUTPATIENT
Start: 2019-09-30 | End: 2019-09-30

## 2019-09-30 RX ORDER — SODIUM CHLORIDE, SODIUM LACTATE, POTASSIUM CHLORIDE, CALCIUM CHLORIDE 600; 310; 30; 20 MG/100ML; MG/100ML; MG/100ML; MG/100ML
INJECTION, SOLUTION INTRAVENOUS CONTINUOUS
Status: DISCONTINUED | OUTPATIENT
Start: 2019-09-30 | End: 2019-09-30 | Stop reason: HOSPADM

## 2019-09-30 RX ORDER — ACETAMINOPHEN 325 MG/1
975 TABLET ORAL ONCE
Status: COMPLETED | OUTPATIENT
Start: 2019-09-30 | End: 2019-09-30

## 2019-09-30 RX ORDER — BUPIVACAINE HYDROCHLORIDE 5 MG/ML
INJECTION, SOLUTION PERINEURAL PRN
Status: DISCONTINUED | OUTPATIENT
Start: 2019-09-30 | End: 2019-09-30 | Stop reason: HOSPADM

## 2019-09-30 RX ORDER — DIPHENHYDRAMINE HYDROCHLORIDE 50 MG/ML
INJECTION INTRAMUSCULAR; INTRAVENOUS PRN
Status: DISCONTINUED | OUTPATIENT
Start: 2019-09-30 | End: 2019-09-30

## 2019-09-30 RX ORDER — ONDANSETRON 2 MG/ML
4 INJECTION INTRAMUSCULAR; INTRAVENOUS EVERY 30 MIN PRN
Status: DISCONTINUED | OUTPATIENT
Start: 2019-09-30 | End: 2019-09-30 | Stop reason: HOSPADM

## 2019-09-30 RX ORDER — PROPOFOL 10 MG/ML
INJECTION, EMULSION INTRAVENOUS CONTINUOUS PRN
Status: DISCONTINUED | OUTPATIENT
Start: 2019-09-30 | End: 2019-09-30

## 2019-09-30 RX ORDER — KETOROLAC TROMETHAMINE 30 MG/ML
INJECTION, SOLUTION INTRAMUSCULAR; INTRAVENOUS PRN
Status: DISCONTINUED | OUTPATIENT
Start: 2019-09-30 | End: 2019-09-30

## 2019-09-30 RX ADMIN — DEXAMETHASONE SODIUM PHOSPHATE 10 MG: 4 INJECTION, SOLUTION INTRA-ARTICULAR; INTRALESIONAL; INTRAMUSCULAR; INTRAVENOUS; SOFT TISSUE at 12:30

## 2019-09-30 RX ADMIN — LIDOCAINE HYDROCHLORIDE 100 MG: 10 INJECTION, SOLUTION INFILTRATION; PERINEURAL at 12:30

## 2019-09-30 RX ADMIN — ACETAMINOPHEN 975 MG: 325 TABLET, FILM COATED ORAL at 15:01

## 2019-09-30 RX ADMIN — LIDOCAINE HYDROCHLORIDE 0.1 ML: 10 INJECTION, SOLUTION EPIDURAL; INFILTRATION; INTRACAUDAL; PERINEURAL at 12:14

## 2019-09-30 RX ADMIN — PROPOFOL 150 MCG/KG/MIN: 10 INJECTION, EMULSION INTRAVENOUS at 12:30

## 2019-09-30 RX ADMIN — CEFAZOLIN SODIUM 2 G: 2 INJECTION, SOLUTION INTRAVENOUS at 12:26

## 2019-09-30 RX ADMIN — SODIUM CHLORIDE, POTASSIUM CHLORIDE, SODIUM LACTATE AND CALCIUM CHLORIDE: 600; 310; 30; 20 INJECTION, SOLUTION INTRAVENOUS at 12:13

## 2019-09-30 RX ADMIN — DIPHENHYDRAMINE HYDROCHLORIDE 25 MG: 50 INJECTION, SOLUTION INTRAMUSCULAR; INTRAVENOUS at 12:33

## 2019-09-30 RX ADMIN — KETOROLAC TROMETHAMINE 30 MG: 30 INJECTION, SOLUTION INTRAMUSCULAR at 12:37

## 2019-09-30 RX ADMIN — MIDAZOLAM 2 MG: 1 INJECTION INTRAMUSCULAR; INTRAVENOUS at 12:24

## 2019-09-30 RX ADMIN — KETAMINE HYDROCHLORIDE 50 MG: 10 INJECTION INTRAMUSCULAR; INTRAVENOUS at 12:30

## 2019-09-30 RX ADMIN — ONDANSETRON 4 MG: 2 INJECTION INTRAMUSCULAR; INTRAVENOUS at 12:26

## 2019-09-30 NOTE — ANESTHESIA CARE TRANSFER NOTE
Patient: Merlyn Ravi    Procedure(s):  Re-excision of left breast lumpectomy site    Diagnosis: Invasive ductal carcinoma of left breast (H) [C50.912]  Diagnosis Additional Information: No value filed.    Anesthesia Type:   General, MAC     Note:  Airway :Room Air  Patient transferred to:Phase II  Handoff Report: Identifed the Patient, Identified the Reponsible Provider, Reviewed the pertinent medical history, Discussed the surgical course, Reviewed Intra-OP anesthesia mangement and issues during anesthesia, Set expectations for post-procedure period and Allowed opportunity for questions and acknowledgement of understanding      Vitals: (Last set prior to Anesthesia Care Transfer)    CRNA VITALS  9/30/2019 1249 - 9/30/2019 1323      9/30/2019             Pulse:  61    SpO2:  100 %                Electronically Signed By: MICHELE Aburto CRNA  September 30, 2019  1:23 PM

## 2019-09-30 NOTE — DISCHARGE INSTRUCTIONS
Same Day Surgery Discharge Instructions  Special Precautions After Surgery - Adult    1. It is not unusual to feel lightheaded or faint, up to 24 hours after surgery or while taking pain medication.  If you have these symptoms; sit for a few minutes before standing and have someone assist you when getting up.  2. You should rest and relax for the next 24 hours and must have someone stay with you for at least 24 hours after your discharge.  3. DO NOT DRIVE any vehicle or operate mechanical equipment for 24 hours following the end of your surgery.  DO NOT DRIVE while taking narcotic pain medications that have been prescribed by your physician.  If you had a limb operated on, you must be able to use it fully to drive.  4. DO NOT drink alcoholic beverages for 24 hours following surgery or while taking prescription pain medication.  5. Drink clear liquids (apple juice, ginger ale, broth, 7-Up, etc.).  Progress to your regular diet as you feel able.  6. Any questions call your physician and do not make important decisions for 24 hours.    __________________________________________________________________________________________________________________________________  IMPORTANT NUMBERS:    [facility] [place]  [phone], [phone]  Pharmacy:  [phone]  Same Day Surgery:  [phone], Monday - Friday until 8:30 p.m.  Urgent Care:  [phone]  Emergency Room:  [phone]                                                                                 [facility]:  [phone] -  Dr. Salas      HOME CARE FOLLOWING LUMPECTOMY      APPOINTMENT WITH YOUR SURGEON:  You will be scheduled to see your surgeon in 1 week to discuss your pathology results and for a wound check.    SUPPORT:  Wear a bra for support and comfort for 3-7 days, day and night.    DRAIN:  If you have a drain in place, you will need a separate appointment with a nurse in the office to have this removed.  You will  have a form to keep track of the output of your drain.  When the output reaches a point where the total for a 24 hour period is less than 30cc s, you are ready to have the drain removed.  At that point you can call the office and talk to the nurse to arrange coming into the office to have this done.  If you have more than one drain in place, they may not all be removed at the same time, as the outputs can be very different from each.  The nurse will help you to manage this and help decide when the remaining drains will be taken out.    INCISIONAL CARE:    If you have a dressing in place, keep clean and dry for 48 hours; you may replace the gauze if it becomes soiled.    After 48 hours you may remove the dressing and shower.  Do not submerse incision in water for 1 week.    If you have a Dermabond dressing (a type of skin glue), you may shower immediately.    Sutures will absorb and do not need to be removed.    If present, leave the steri-strips (white paper tapes) in place for 14 days after surgery.    If present, leave Dermabond glue in place until it wears/flakes off.    You may expect a small amount of drainage from your incision.    A lump/ridge under the incision is normal and will gradually resolve.    BATHING:  You are allowed to bath (shallow water in a tub only) or shower 24-48 hours after your surgery.  Mild soap is OK to use near these sites.  When bathing, do not allow the incision or drain site to become submersed in water as this may increase the risk of infection.    ACTIVITY:  Cautiously resume exercise and strenuous activities such as jogging, tennis, aerobics, etc. Also, be careful of stretching activities with operative side for two weeks.    If you had a  sentinel node biopsy  at the time of your surgery:  You have no restrictions in addition to those noted above.    If you had an  axillary node dissection  at the time of your surgery:  You are recommended to restrict the activity of the arm on  the side the dissection was done on.  This means:  no reaching overhead until cleared to do so by your surgeon, no carrying weight on that side greater than a couple pounds, no injections/vaccinations/ blood samples from that side, and no blood pressure measurements on that side.  It is also recommended to elevate the arm on pillows several times a day to decrease/minimize swelling, and avoid sleeping on that arm.    DIET:  No restrictions.  Increased fluid intake is recommended. While taking pain medications, increase dietary fiber or add a fiber supplementation like Metamucil or Citrucel to help prevent constipation - a possible side effect of pain medications.    DISCOMFORT:  Local anesthetic placed at surgery should provide relief for 4-8 hours.  Begin taking pain pills before discomfort is severe.  Take the pain medication with some food, when possible, to minimize side effects.  Intermittent use of ice packs may help during the first 48 hours.  Expect gradual improvement.    RETURN APPOINTMENT:  Schedule a follow-up visit 2-3 weeks post-op.  Office Phone:  [phone]     CONTACT US IF THE FOLLOWING DEVELOPS:   1. A fever that is above 101     2. If there is a large amount of drainage, bleeding, or swelling.   3. Severe pain that is not relieved by your prescription.   4. Drainage that is thick, cloudy, yellow, green or white.   5. Any other questions not answered by  Frequently Asked Questions  sheet.      FREQUENTLY ASKED QUESTIONS:    Q:  How should my incision look?    A:  Normally your incision will appear slightly swollen with light redness directly along the incision itself as it heals.  It may feel like a bump or ridge as the healing/scarring happens, and over time (3-4 months) this bump or ridge feeling should slowly go away.  In general, clear or pink watery drainage can be normal at first as your incision heals, but should decrease over time.    Q:  How do I know if my incision is infected?  A:   Look at your incision for signs of infection, like redness around the incision spreading to surrounding skin, or drainage of cloudy or foul-smelling drainage.  If you feel warm, check your temperature to see if you are running a fever.    **If any of these things occur, please notify the nurse at our office.  We may need you to come into the office for an incision check.      Q:  How do I take care of my incision?  A:  If you have a dressing in place - Starting the day after surgery, replace the dressing 1-2 times a day until there is no further drainage from the incision.  At that time, a dressing is no longer needed.  Try to minimize tape on the skin if irritation is occurring at the tape sites.  If you have significant irritation from tape on the skin, please call the office to discuss other method of dressing your incision.    Small pieces of tape called  steri-strips  may be present directly overlying your incision; these may be removed 10 days after surgery unless otherwise specified by your surgeon.  If these tapes start to loosen at the ends, you may trim them back until they fall off or are removed.    A:  If you had  Dermabond  tissue glue used as a dressing (this causes your incision to look shiny with a clear covering over it) - This type of dressing wears off with time and does not require more dressings over the top unless it is draining around the glue as it wears off.  Do not apply ointments or lotions over the incisions until the glue has completely worn off.    Q:  There is a piece of tape or a sticky  lead  still on my skin.  Can I remove this?  A:  Sometimes the sticky  leads  used for monitoring during surgery or for evaluation in the emergency department are not all removed while you are in the hospital.  These sometimes have a tab or metal dot on them.  You can easily remove these on your own, like taking off a band-aid.  If there is a gel substance under the  lead , simply wipe/clean it off  with a washcloth or paper towel.      Q:  What can I do to minimize constipation (very hard stools, or lack of stools)?  A:  Stay well hydrated.  Increase your dietary fiber intake or take a fiber supplement -with plenty of water.  Walk around frequently.  You may consider an over-the-counter stool-softener.  Your Pharmacist can assist you with choosing one that is stocked at your pharmacy.  Constipation is also one of the most common side effects of pain medication.  If you are using pain medication, be pro-active and try to PREVENT problems with constipation by taking the steps above BEFORE constipation becomes a problem.    Q:  What do I do if I need more pain medications?  A:  Call the office to receive refills.  Be aware that certain pain meds cannot be called into a pharmacy and actually require a paper prescription.  A change may be made in your pain med as you progress thru your recovery period or if you have side effects to certain meds.    --Pain meds are NOT refilled after 5pm on weekdays, and NOT AT ALL on the weekends, so please look ahead to prevent problems.      Q:  Why am I having a hard time sleeping now that I am at home?  A:  Many medications you receive while you are in the hospital can impact your sleep for a number of days after your surgery/hospitalization.  Decreased level of activity and naps during the day may also make sleeping at night difficult.  Try to minimize day-time naps, and get up frequently during the day to walk around your home during your recovery time.  Sleep aides may be of some help, but are not recommended for long-term use.      Q:  I am having some back discomfort.  What should I do?  A:  This may be related to certain positioning that was required for your surgery, extended periods of time in bed, or other changes in your overall activity level.  You may try ice, heat, acetaminophen, or ibuprofen to treat this temporarily.  Note that many pain medications have  acetaminophen in them and would state this on the prescription bottle.  Be sure not to exceed the maximum of 4000mg per day of acetaminophen.     **If the pain you are having does not resolve, is severe, or is a flare of back pain you have had on other occasions prior to surgery, please contact your primary physician for further recommendations or for an appointment to be examined at their office.    Q:  Why am I having headaches?  A:  Headaches can be caused by many things:  caffeine withdrawal, use of pain meds, dehydration, high blood pressure, lack of sleep, over-activity/exhaustion, flare-up of usual migraine headaches.  If you feel this is related to muscle tension (a band-like feeling around the head, or a pressure at the low-back of the head) you may try ice or heat to this area.  You may need to drink more fluids (try electrolyte drink like Gatorade), rest, or take your usual migraine medications.   **If your headaches do not resolve, worsen, are accompanied by other symptoms, or if your blood pressure is high, please call your primary physician for recommendation and/or examination.    Q:  I am unable to urinate.  What do I do?  A:  A small percentage of people can have difficulty urinating initially after surgery.  This includes being able to urinate only a very small amount at a time and feeling discomfort or pressure in the very low abdomen.  This is called  urinary retention , and is actually an urgent situation.  Proceed to your nearest Emergency department for evaluation (not an Urgent Care Center).  Sometimes the bladder does not work correctly after certain medications you receive during surgery, or related to certain procedures.  You may need to have a catheter placed until your bladder recovers.  When planning to go to an Emergency department, it may help to call the ER to let them know you are coming in for this problem after a surgery.  This may help you get in quicker to be evaluated.  **If  you have symptoms of a urinary tract infection, please contact your primary physician for the proper evaluation and treatment.          If you have other questions, please call the office Monday thru Friday between 8am and 5pm to discuss with the nurse.  # [phone]    There is a surgeon ON CALL on weekday evenings and over the weekend in case of urgent need only, and may be contacted at the same number.    If you are having an emergency, call 911 or proceed to your nearest emergency department.

## 2019-09-30 NOTE — INTERVAL H&P NOTE
Patient returns for re-excision of positive posterior margin.  Indications, risks, benefits discussed, she wishes to proceed.  Family and patient questions answered.  No other changes to history from previous.    Kj Salas, DO on 9/30/2019 at 12:25 PM

## 2019-09-30 NOTE — ANESTHESIA POSTPROCEDURE EVALUATION
Patient: Merlyn Ravi    Procedure(s):  Re-excision of left breast lumpectomy site    Diagnosis:Invasive ductal carcinoma of left breast (H) [C50.912]  Diagnosis Additional Information: No value filed.    Anesthesia Type:  General, MAC    Note:  Anesthesia Post Evaluation    Patient location during evaluation: Phase 2 and Bedside  Patient participation: Able to fully participate in evaluation  Level of consciousness: awake and alert  Pain management: adequate  Airway patency: patent  Cardiovascular status: acceptable and hemodynamically stable  Respiratory status: acceptable and room air  Hydration status: acceptable  PONV: none     Anesthetic complications: None          Last vitals:  Vitals:    09/30/19 1202   BP: 127/87   Resp: 16   SpO2: 98%         Electronically Signed By: MICHELE Aburto CRNA  September 30, 2019  1:24 PM

## 2019-09-30 NOTE — OP NOTE
Procedure Date: 9/30/2019    PREOPERATIVE DIAGNOSIS: Cancer of the left Breast, Positive Margin at deep posterior  POSTOPERATIVE DIAGNOSIS: Same  PROCEDURE:  1. Left Segmental Mastectomy (Reexcision additional margin)  2. Complex tissue rearrangement of soft tissue     ATTENDING SURGEON and Assistants:   Surgeon(s):  Kj Salas, Ross Lawson PA-C (needed for retraction and suction    ESTIMATED BLOOD LOSS: 30 mL    FINDINGS:   Lumpectomy cavity intact, no abnormal tissue at re-excision conclusion    INDICATIONS:Ms. Merlyn Ravi is a 54 year old  female who was seen and evaluated in surgical oncology clinic and found to have evidence of invasive breast cancer. The patient was found to be a candidate for breast-conserving partial mastectomy. She was found to have positive margins on pathology and additional breast tissue resection was warranted.  After a discussion was had with the patient regarding alternatives, benefits, and risks (including, but not limited to, bleeding, infection, MI, death, abscess, lymphocele, lymphedema), the patient consented to the aforementioned procedure.     PROCEDURE:   The patient was taken back to the operating room in the supine position and transferred to the operating room table. Sequential venodyne boots were placed. All bony prominences were protected with sponge foam. General anesthesia was then induced. The breast and upper extremity was then prepped with Chloraprep and draped in the usual sterile, surgical fashion. A time-out was performed to verify patient and procedure.     An incision was made over the overlying the original incision on the left breast. The incision was opened and small seroma evacuated. Additional deep posterior margin tissue was excised, with suture marking specimen side. The wound was copiously irrigated, and hemostasis was achieved satisfactorily. The breast tissue was reapproximated in a complex fasion for breast  conservation. The subcutaneous tissue was then reapproximated with interrupted 3-0 Vicryl. The skin was then reapproximated with 4-0 Monocryl and a single fast absorbing 5-0 followed by dermabond. A 50%/50% mixture of 0.25% bupivicaine and 1% lidocaine was injected into the wounds.     The patient tolerated the procedure without difficulty. All needle, towel, and sponge counts were correct at the end of the procedure. The patient was returned to the recovery room in stable condition.    Kj Salas DO on 9/30/2019 at 1:24 PM

## 2019-10-01 ENCOUNTER — MYC MEDICAL ADVICE (OUTPATIENT)
Dept: SURGERY | Facility: CLINIC | Age: 54
End: 2019-10-01

## 2019-10-02 LAB — COPATH REPORT: NORMAL

## 2019-10-05 ENCOUNTER — HEALTH MAINTENANCE LETTER (OUTPATIENT)
Age: 54
End: 2019-10-05

## 2019-10-08 ENCOUNTER — OFFICE VISIT (OUTPATIENT)
Dept: SURGERY | Facility: CLINIC | Age: 54
End: 2019-10-08
Payer: COMMERCIAL

## 2019-10-08 ENCOUNTER — ANESTHESIA EVENT (OUTPATIENT)
Dept: SURGERY | Facility: CLINIC | Age: 54
End: 2019-10-08
Payer: COMMERCIAL

## 2019-10-08 VITALS
BODY MASS INDEX: 37.12 KG/M2 | TEMPERATURE: 98 F | SYSTOLIC BLOOD PRESSURE: 122 MMHG | WEIGHT: 231 LBS | HEIGHT: 66 IN | HEART RATE: 72 BPM | DIASTOLIC BLOOD PRESSURE: 77 MMHG

## 2019-10-08 DIAGNOSIS — C50.919 INVASIVE LOBULAR CARCINOMA OF BREAST IN FEMALE (H): Primary | ICD-10-CM

## 2019-10-08 DIAGNOSIS — E78.5 HYPERLIPIDEMIA LDL GOAL <130: ICD-10-CM

## 2019-10-08 LAB
CHOLEST SERPL-MCNC: 202 MG/DL
HDLC SERPL-MCNC: 52 MG/DL
LDLC SERPL CALC-MCNC: 114 MG/DL
NONHDLC SERPL-MCNC: 150 MG/DL
TRIGL SERPL-MCNC: 180 MG/DL

## 2019-10-08 PROCEDURE — 99024 POSTOP FOLLOW-UP VISIT: CPT | Performed by: SURGERY

## 2019-10-08 PROCEDURE — 80061 LIPID PANEL: CPT | Performed by: FAMILY MEDICINE

## 2019-10-08 PROCEDURE — 36415 COLL VENOUS BLD VENIPUNCTURE: CPT | Performed by: FAMILY MEDICINE

## 2019-10-08 ASSESSMENT — MIFFLIN-ST. JEOR: SCORE: 1656.62

## 2019-10-08 NOTE — ANESTHESIA PREPROCEDURE EVALUATION
Anesthesia Pre-Procedure Evaluation    Patient: Merlyn Ravi   MRN: [id number] : 1965          Preoperative Diagnosis: Invasive lobular carcinoma of breast in female (H) [C50.919]    Procedure(s):  Left breast re-excision    Past Medical History:   Diagnosis Date     Allergies      Dermatofibroma 2012     Past Surgical History:   Procedure Laterality Date     C NONSPECIFIC PROCEDURE      bladder surgery     HYSTERECTOMY, PAP NO LONGER INDICATED       HYSTERECTOMY, MAGY       LASIK BILATERAL      Dr. Solis      LUMPECTOMY BREAST Left 2019    Procedure: Re-excision of left breast lumpectomy site;  Surgeon: Kj Salas DO;  Location: [facility] OR     LUMPECTOMY BREAST WITH SENTINEL NODE, COMBINED Left 2019    Procedure: LUMPECTOMY, BREAST, WITH SENTINEL LYMPH NODE BIOPSY.;  Surgeon: Kj Salas DO;  Location: [facility] OR       Anesthesia Evaluation     . Pt has had prior anesthetic. Type: General           ROS/MED HX    ENT/Pulmonary:  - neg pulmonary ROS     Neurologic:  - neg neurologic ROS     Cardiovascular:     (+) Dyslipidemia, ----. : . . . :. . Previous cardiac testing date:results:date: results:ECG reviewed date:16 results:Sinus Rhythm   -consider old anterior infarct.     ABNORMAL date: results:          METS/Exercise Tolerance:     Hematologic:  - neg hematologic  ROS       Musculoskeletal:  - neg musculoskeletal ROS       GI/Hepatic:  - neg GI/hepatic ROS       Renal/Genitourinary: Comment: Cystitis  Bladder diverticulum - ROS Renal section negative       Endo:     (+) thyroid problem hypothyroidism, Obesity, .      Psychiatric:  - neg psychiatric ROS       Infectious Disease:  - neg infectious disease ROS       Malignancy:   (+) Malignancy History of Breast  Left breast cancer        Other: Comment: Disorder of sweat glands  Allergies  Dermatofibroma  Monoclonal paraproteinemia                         Physical Exam  Normal systems: cardiovascular, pulmonary  "and dental    Airway   Mallampati: II  TM distance: >3 FB  Neck ROM: full    Dental     Cardiovascular       Pulmonary             Lab Results   Component Value Date    WBC 6.1 09/19/2019    HGB 13.3 09/19/2019    HCT 41.4 09/19/2019     09/19/2019     09/19/2019    POTASSIUM 3.6 09/19/2019    CHLORIDE 108 09/19/2019    CO2 28 09/19/2019    BUN 16 09/19/2019    CR 0.84 09/19/2019     (H) 09/19/2019    VERONICA 9.0 09/19/2019    ALBUMIN 3.7 09/19/2019    PROTTOTAL 7.8 09/19/2019    ALT 26 09/19/2019    AST 21 09/19/2019    ALKPHOS 111 09/19/2019    BILITOTAL 0.3 09/19/2019    TSH 0.10 (L) 02/18/2019    T4 1.24 02/18/2019       Preop Vitals  BP Readings from Last 3 Encounters:   10/08/19 122/77   09/30/19 108/64   09/23/19 114/66    Pulse Readings from Last 3 Encounters:   10/08/19 72   09/23/19 (!) 46   09/05/19 64      Resp Readings from Last 3 Encounters:   09/30/19 16   09/23/19 16   09/05/19 18    SpO2 Readings from Last 3 Encounters:   09/30/19 95%   09/23/19 95%   08/28/19 97%      Temp Readings from Last 1 Encounters:   10/08/19 36.7  C (98  F) (Oral)    Ht Readings from Last 1 Encounters:   10/08/19 1.664 m (5' 5.5\")      Wt Readings from Last 1 Encounters:   10/08/19 104.8 kg (231 lb)    Estimated body mass index is 37.86 kg/m  as calculated from the following:    Height as of 10/8/19: 1.664 m (5' 5.5\").    Weight as of 10/8/19: 104.8 kg (231 lb).       Anesthesia Plan      History & Physical Review      ASA Status:  2 .    NPO Status:  > 8 hours    Plan for MAC and General with Intravenous and Propofol induction. Reason for MAC:  Other - see comments  PONV prophylaxis:  Ondansetron (or other 5HT-3) and Dexamethasone or Solumedrol       Postoperative Care  Postoperative pain management:  IV analgesics and Oral pain medications.      Consents                 Miguelito Aguilar CRNA, APRN CRNA  "

## 2019-10-08 NOTE — NURSING NOTE
"Initial /77 (BP Location: Right arm, Patient Position: Sitting, Cuff Size: Adult Large)   Pulse 72   Temp 98  F (36.7  C) (Oral)   Ht 1.664 m (5' 5.5\")   Wt 104.8 kg (231 lb)   BMI 37.86 kg/m   Estimated body mass index is 37.86 kg/m  as calculated from the following:    Height as of this encounter: 1.664 m (5' 5.5\").    Weight as of this encounter: 104.8 kg (231 lb). .    Danni Brock MA    "

## 2019-10-08 NOTE — LETTER
"    10/8/2019         RE: Merlyn Ravi  [id number] [facility] [id number]        Dear Colleague,    Thank you for referring your patient, Merlyn Ravi, to the [facility]. Please see a copy of my visit note below.    General Surgery Post Op    Pt returns for follow up visit s/p left breast re-excision for positive margin on 9/30/2019.    Patient has been doing well, tolerating diet. Bowels moving well. No issues with wound healing/redness/drainage. No fevers.  She admits pain in her left chest wall starting 2 days prior.  She can touch the area of tenderness.  Denies any trauma, falls, injury to the area.        Physical exam: Vitals: /77 (BP Location: Right arm, Patient Position: Sitting, Cuff Size: Adult Large)   Pulse 72   Temp 98  F (36.7  C) (Oral)   Ht 1.664 m (5' 5.5\")   Wt 104.8 kg (231 lb)   BMI 37.86 kg/m     BMI= Body mass index is 37.86 kg/m .    Exam:  Constitutional: healthy, alert and no distress  Cardiovascular: negative  Respiratory: negative  Incision C/D/I.  Minimal seroma.  Left alteral chest wall tender to palpation along rib.  No crepitus, skin changes.  Most tender along costochondral margin    Path:    Initial Surgical Pathology:  A. [place] lymph node, left:   - Negative for malignancy in single lymph node.     B. Left breast tissue with oriented margins:     - Invasive lobular carcinoma (see description, comment and images):     - Tumor size: Non-contiguous tumor spanning an estimated 4.5 of tissue   with 1.8 x 1.5 cm cross sectional   area of tumor in block B16 (see diagnostic comment)     - Tiffany grade: II (of possible III)             - Tiffany score 6 (of possible 9)             - Scoring criteria:  tubules = 3, nuclear pleomorphism = 2,   mitosis = 1.   -  Angiolymphatic invasion seen: None seen     - In-situ malignancy: None seen     - Margins of resection for invasive carcinoma:         - Deep black inked margin: POSITIVE " (block B21 and B29, mid   specimen)         - Anterior green inked margin: 2.1 mm from margin (block B30,   lateral specimen)         - Inferior blue inked margin: 3.5 mm from margin (block B15, mid   specimen)         - Medial yellow inked margin: 3.7 mm from margin (block B1)         - Superior red inked margin: Widely clear         - Lateral orange inked margin: Widely clear     - Staging: pT2pN0     - Estrogen receptor:  POSITIVE (80% on prior needle biopsy [id number])     - Progesterone receptor:  POSITIVE (60-70% on prior needle biopsy   [id number])     - Her2/walter:  Non-amplified (Group 5 - GERALD Negative)     Repeat Surgical pathology  Left breast additional posterior margin:   - Positive new margin involved by invasive lobular carcinoma extending   over 0.55mm x 0.35 mm area.   - No in situ malignancy identified.   - No angiolymphatic invasion present     Assessment:     ICD-10-CM    1. Invasive lobular carcinoma of breast in female (H) C50.919 Case Request: Left breast re-excision     Case Request: Left breast re-excision     Plan: Mrs. Ravi presents after a re-excision for her lobular carcinoma.  Her initial tumor size was larger and potentially multifocal.  Her margins on the inferior portion are positive along a tiny focus measuring less than 1 mm in size.  We discussed her pathology at length with her and her .  We discussed management options at this point of repeat excision of the area versus mastectomy.  I discussed that it is potential for us to have an additional positive margin given her T stage increased and there is a possibility of multifocal disease.  She wishes to continue on with breast conservation therapy which I feel is reasonable given the limited small portion which was positive.  If she has positive margins at this time, I would recommend a mastectomy with or without reconstruction and we again discussed this at length.  She will be expedited to surgery to take advantage of likely  small seroma present to help define our surgical field.  Risks discussed include but are not limited to: bleeding, infection, need for additional surgery, poor cosmesis, volume loss to her breast, dimpling, skin changes and she wishes to proceed.    Kj Salas DO on 10/8/2019 at 8:51 AM        Again, thank you for allowing me to participate in the care of your patient.        Sincerely,        Kj Salas DO

## 2019-10-08 NOTE — PROGRESS NOTES
"General Surgery Post Op    Pt returns for follow up visit s/p left breast re-excision for positive margin on 9/30/2019.    Patient has been doing well, tolerating diet. Bowels moving well. No issues with wound healing/redness/drainage. No fevers.  She admits pain in her left chest wall starting 2 days prior.  She can touch the area of tenderness.  Denies any trauma, falls, injury to the area.        Physical exam: Vitals: /77 (BP Location: Right arm, Patient Position: Sitting, Cuff Size: Adult Large)   Pulse 72   Temp 98  F (36.7  C) (Oral)   Ht 1.664 m (5' 5.5\")   Wt 104.8 kg (231 lb)   BMI 37.86 kg/m    BMI= Body mass index is 37.86 kg/m .    Exam:  Constitutional: healthy, alert and no distress  Cardiovascular: negative  Respiratory: negative  Incision C/D/I.  Minimal seroma.  Left alteral chest wall tender to palpation along rib.  No crepitus, skin changes.  Most tender along costochondral margin    Path:    Initial Surgical Pathology:  A. [place] lymph node, left:   - Negative for malignancy in single lymph node.     B. Left breast tissue with oriented margins:     - Invasive lobular carcinoma (see description, comment and images):     - Tumor size: Non-contiguous tumor spanning an estimated 4.5 of tissue   with 1.8 x 1.5 cm cross sectional   area of tumor in block B16 (see diagnostic comment)     - Tiffany grade: II (of possible III)             - [place] score 6 (of possible 9)             - Scoring criteria:  tubules = 3, nuclear pleomorphism = 2,   mitosis = 1.   -  Angiolymphatic invasion seen: None seen     - In-situ malignancy: None seen     - Margins of resection for invasive carcinoma:         - Deep black inked margin: POSITIVE (block B21 and B29, mid   specimen)         - Anterior green inked margin: 2.1 mm from margin (block B30,   lateral specimen)         - Inferior blue inked margin: 3.5 mm from margin (block B15, mid   specimen)         - Medial yellow inked margin: 3.7 mm from " margin (block B1)         - Superior red inked margin: Widely clear         - Lateral orange inked margin: Widely clear     - Staging: pT2pN0     - Estrogen receptor:  POSITIVE (80% on prior needle biopsy [id number])     - Progesterone receptor:  POSITIVE (60-70% on prior needle biopsy   [id number])     - Her2/watler:  Non-amplified (Group 5 - GERALD Negative)     Repeat Surgical pathology  Left breast additional posterior margin:   - Positive new margin involved by invasive lobular carcinoma extending   over 0.55mm x 0.35 mm area.   - No in situ malignancy identified.   - No angiolymphatic invasion present     Assessment:     ICD-10-CM    1. Invasive lobular carcinoma of breast in female (H) C50.919 Case Request: Left breast re-excision     Case Request: Left breast re-excision     Plan: Mrs. Ravi presents after a re-excision for her lobular carcinoma.  Her initial tumor size was larger and potentially multifocal.  Her margins on the inferior portion are positive along a tiny focus measuring less than 1 mm in size.  We discussed her pathology at length with her and her .  We discussed management options at this point of repeat excision of the area versus mastectomy.  I discussed that it is potential for us to have an additional positive margin given her T stage increased and there is a possibility of multifocal disease.  She wishes to continue on with breast conservation therapy which I feel is reasonable given the limited small portion which was positive.  If she has positive margins at this time, I would recommend a mastectomy with or without reconstruction and we again discussed this at length.  She will be expedited to surgery to take advantage of likely small seroma present to help define our surgical field.  Risks discussed include but are not limited to: bleeding, infection, need for additional surgery, poor cosmesis, volume loss to her breast, dimpling, skin changes and she wishes to proceed.    Kj HARO  DO Josue on 10/8/2019 at 8:51 AM

## 2019-10-09 ENCOUNTER — HOSPITAL ENCOUNTER (OUTPATIENT)
Facility: CLINIC | Age: 54
Discharge: HOME | End: 2019-10-09
Attending: SURGERY | Admitting: SURGERY
Payer: COMMERCIAL

## 2019-10-09 ENCOUNTER — ANESTHESIA (OUTPATIENT)
Dept: SURGERY | Facility: CLINIC | Age: 54
End: 2019-10-09
Payer: COMMERCIAL

## 2019-10-09 VITALS
HEART RATE: 58 BPM | BODY MASS INDEX: 37.12 KG/M2 | TEMPERATURE: 97.8 F | DIASTOLIC BLOOD PRESSURE: 78 MMHG | WEIGHT: 231 LBS | OXYGEN SATURATION: 96 % | HEIGHT: 66 IN | RESPIRATION RATE: 14 BRPM | SYSTOLIC BLOOD PRESSURE: 132 MMHG

## 2019-10-09 DIAGNOSIS — C50.919 INVASIVE LOBULAR CARCINOMA OF BREAST IN FEMALE (H): ICD-10-CM

## 2019-10-09 PROCEDURE — 27210794 ZZH OR GENERAL SUPPLY STERILE: Performed by: SURGERY

## 2019-10-09 PROCEDURE — 37000009 ZZH ANESTHESIA TECHNICAL FEE, EACH ADDTL 15 MIN: Performed by: SURGERY

## 2019-10-09 PROCEDURE — 88307 TISSUE EXAM BY PATHOLOGIST: CPT | Performed by: SURGERY

## 2019-10-09 PROCEDURE — 37000008 ZZH ANESTHESIA TECHNICAL FEE, 1ST 30 MIN: Performed by: SURGERY

## 2019-10-09 PROCEDURE — 19301 PARTIAL MASTECTOMY: CPT | Mod: 78 | Performed by: SURGERY

## 2019-10-09 PROCEDURE — 25000125 ZZHC RX 250: Performed by: NURSE ANESTHETIST, CERTIFIED REGISTERED

## 2019-10-09 PROCEDURE — 25000132 ZZH RX MED GY IP 250 OP 250 PS 637: Performed by: NURSE ANESTHETIST, CERTIFIED REGISTERED

## 2019-10-09 PROCEDURE — 25000128 H RX IP 250 OP 636: Performed by: SURGERY

## 2019-10-09 PROCEDURE — 25000128 H RX IP 250 OP 636: Performed by: NURSE ANESTHETIST, CERTIFIED REGISTERED

## 2019-10-09 PROCEDURE — 40000305 ZZH STATISTIC PRE PROC ASSESS I: Performed by: SURGERY

## 2019-10-09 PROCEDURE — 36000052 ZZH SURGERY LEVEL 2 EA 15 ADDTL MIN: Performed by: SURGERY

## 2019-10-09 PROCEDURE — 36000050 ZZH SURGERY LEVEL 2 1ST 30 MIN: Performed by: SURGERY

## 2019-10-09 PROCEDURE — 25000125 ZZHC RX 250: Performed by: SURGERY

## 2019-10-09 PROCEDURE — 25800030 ZZH RX IP 258 OP 636: Performed by: NURSE ANESTHETIST, CERTIFIED REGISTERED

## 2019-10-09 PROCEDURE — 71000027 ZZH RECOVERY PHASE 2 EACH 15 MINS: Performed by: SURGERY

## 2019-10-09 PROCEDURE — 88307 TISSUE EXAM BY PATHOLOGIST: CPT | Mod: 26 | Performed by: SURGERY

## 2019-10-09 RX ORDER — HYDRALAZINE HYDROCHLORIDE 20 MG/ML
2.5-5 INJECTION INTRAMUSCULAR; INTRAVENOUS EVERY 10 MIN PRN
Status: DISCONTINUED | OUTPATIENT
Start: 2019-10-09 | End: 2019-10-09 | Stop reason: HOSPADM

## 2019-10-09 RX ORDER — BUPIVACAINE HYDROCHLORIDE 5 MG/ML
INJECTION, SOLUTION PERINEURAL PRN
Status: DISCONTINUED | OUTPATIENT
Start: 2019-10-09 | End: 2019-10-09 | Stop reason: HOSPADM

## 2019-10-09 RX ORDER — LIDOCAINE HYDROCHLORIDE AND EPINEPHRINE 10; 10 MG/ML; UG/ML
INJECTION, SOLUTION INFILTRATION; PERINEURAL PRN
Status: DISCONTINUED | OUTPATIENT
Start: 2019-10-09 | End: 2019-10-09 | Stop reason: HOSPADM

## 2019-10-09 RX ORDER — ONDANSETRON 2 MG/ML
4 INJECTION INTRAMUSCULAR; INTRAVENOUS EVERY 30 MIN PRN
Status: DISCONTINUED | OUTPATIENT
Start: 2019-10-09 | End: 2019-10-09 | Stop reason: HOSPADM

## 2019-10-09 RX ORDER — ONDANSETRON 2 MG/ML
INJECTION INTRAMUSCULAR; INTRAVENOUS PRN
Status: DISCONTINUED | OUTPATIENT
Start: 2019-10-09 | End: 2019-10-09

## 2019-10-09 RX ORDER — DEXAMETHASONE SODIUM PHOSPHATE 4 MG/ML
INJECTION, SOLUTION INTRA-ARTICULAR; INTRALESIONAL; INTRAMUSCULAR; INTRAVENOUS; SOFT TISSUE PRN
Status: DISCONTINUED | OUTPATIENT
Start: 2019-10-09 | End: 2019-10-09

## 2019-10-09 RX ORDER — KETAMINE HYDROCHLORIDE 10 MG/ML
INJECTION, SOLUTION INTRAMUSCULAR; INTRAVENOUS PRN
Status: DISCONTINUED | OUTPATIENT
Start: 2019-10-09 | End: 2019-10-09

## 2019-10-09 RX ORDER — CEFAZOLIN SODIUM 2 G/100ML
2 INJECTION, SOLUTION INTRAVENOUS
Status: COMPLETED | OUTPATIENT
Start: 2019-10-09 | End: 2019-10-09

## 2019-10-09 RX ORDER — SODIUM CHLORIDE, SODIUM LACTATE, POTASSIUM CHLORIDE, CALCIUM CHLORIDE 600; 310; 30; 20 MG/100ML; MG/100ML; MG/100ML; MG/100ML
INJECTION, SOLUTION INTRAVENOUS CONTINUOUS
Status: DISCONTINUED | OUTPATIENT
Start: 2019-10-09 | End: 2019-10-09 | Stop reason: HOSPADM

## 2019-10-09 RX ORDER — DEXAMETHASONE SODIUM PHOSPHATE 4 MG/ML
4 INJECTION, SOLUTION INTRA-ARTICULAR; INTRALESIONAL; INTRAMUSCULAR; INTRAVENOUS; SOFT TISSUE EVERY 10 MIN PRN
Status: DISCONTINUED | OUTPATIENT
Start: 2019-10-09 | End: 2019-10-09 | Stop reason: HOSPADM

## 2019-10-09 RX ORDER — ALBUTEROL SULFATE 0.83 MG/ML
2.5 SOLUTION RESPIRATORY (INHALATION) EVERY 4 HOURS PRN
Status: DISCONTINUED | OUTPATIENT
Start: 2019-10-09 | End: 2019-10-09 | Stop reason: HOSPADM

## 2019-10-09 RX ORDER — GABAPENTIN 300 MG/1
300 CAPSULE ORAL ONCE
Status: COMPLETED | OUTPATIENT
Start: 2019-10-09 | End: 2019-10-09

## 2019-10-09 RX ORDER — CEFAZOLIN SODIUM 1 G/50ML
1 INJECTION, SOLUTION INTRAVENOUS SEE ADMIN INSTRUCTIONS
Status: DISCONTINUED | OUTPATIENT
Start: 2019-10-09 | End: 2019-10-09 | Stop reason: HOSPADM

## 2019-10-09 RX ORDER — LIDOCAINE 40 MG/G
CREAM TOPICAL
Status: DISCONTINUED | OUTPATIENT
Start: 2019-10-09 | End: 2019-10-09 | Stop reason: HOSPADM

## 2019-10-09 RX ORDER — PROPOFOL 10 MG/ML
INJECTION, EMULSION INTRAVENOUS CONTINUOUS PRN
Status: DISCONTINUED | OUTPATIENT
Start: 2019-10-09 | End: 2019-10-09

## 2019-10-09 RX ORDER — FENTANYL CITRATE 50 UG/ML
25-50 INJECTION, SOLUTION INTRAMUSCULAR; INTRAVENOUS
Status: DISCONTINUED | OUTPATIENT
Start: 2019-10-09 | End: 2019-10-09 | Stop reason: HOSPADM

## 2019-10-09 RX ORDER — ONDANSETRON 4 MG/1
4 TABLET, ORALLY DISINTEGRATING ORAL EVERY 30 MIN PRN
Status: DISCONTINUED | OUTPATIENT
Start: 2019-10-09 | End: 2019-10-09 | Stop reason: HOSPADM

## 2019-10-09 RX ORDER — KETOROLAC TROMETHAMINE 30 MG/ML
30 INJECTION, SOLUTION INTRAMUSCULAR; INTRAVENOUS EVERY 6 HOURS PRN
Status: DISCONTINUED | OUTPATIENT
Start: 2019-10-09 | End: 2019-10-09 | Stop reason: HOSPADM

## 2019-10-09 RX ORDER — FENTANYL CITRATE 50 UG/ML
INJECTION, SOLUTION INTRAMUSCULAR; INTRAVENOUS PRN
Status: DISCONTINUED | OUTPATIENT
Start: 2019-10-09 | End: 2019-10-09

## 2019-10-09 RX ORDER — NALOXONE HYDROCHLORIDE 0.4 MG/ML
.1-.4 INJECTION, SOLUTION INTRAMUSCULAR; INTRAVENOUS; SUBCUTANEOUS
Status: DISCONTINUED | OUTPATIENT
Start: 2019-10-09 | End: 2019-10-09 | Stop reason: HOSPADM

## 2019-10-09 RX ORDER — ACETAMINOPHEN 325 MG/1
975 TABLET ORAL ONCE
Status: COMPLETED | OUTPATIENT
Start: 2019-10-09 | End: 2019-10-09

## 2019-10-09 RX ORDER — HYDROMORPHONE HYDROCHLORIDE 1 MG/ML
.3-.5 INJECTION, SOLUTION INTRAMUSCULAR; INTRAVENOUS; SUBCUTANEOUS EVERY 10 MIN PRN
Status: DISCONTINUED | OUTPATIENT
Start: 2019-10-09 | End: 2019-10-09 | Stop reason: HOSPADM

## 2019-10-09 RX ADMIN — GABAPENTIN 300 MG: 300 CAPSULE ORAL at 14:08

## 2019-10-09 RX ADMIN — LIDOCAINE HYDROCHLORIDE 0.1 ML: 10 INJECTION, SOLUTION EPIDURAL; INFILTRATION; INTRACAUDAL; PERINEURAL at 13:41

## 2019-10-09 RX ADMIN — SODIUM CHLORIDE, POTASSIUM CHLORIDE, SODIUM LACTATE AND CALCIUM CHLORIDE: 600; 310; 30; 20 INJECTION, SOLUTION INTRAVENOUS at 13:42

## 2019-10-09 RX ADMIN — FENTANYL CITRATE 100 MCG: 50 INJECTION, SOLUTION INTRAMUSCULAR; INTRAVENOUS at 14:14

## 2019-10-09 RX ADMIN — CEFAZOLIN SODIUM 2 G: 2 INJECTION, SOLUTION INTRAVENOUS at 14:14

## 2019-10-09 RX ADMIN — KETAMINE HYDROCHLORIDE 10 MG: 10 INJECTION INTRAMUSCULAR; INTRAVENOUS at 14:43

## 2019-10-09 RX ADMIN — MIDAZOLAM 2 MG: 1 INJECTION INTRAMUSCULAR; INTRAVENOUS at 14:12

## 2019-10-09 RX ADMIN — FENTANYL CITRATE 50 MCG: 50 INJECTION INTRAMUSCULAR; INTRAVENOUS at 15:29

## 2019-10-09 RX ADMIN — ONDANSETRON 4 MG: 2 INJECTION INTRAMUSCULAR; INTRAVENOUS at 14:14

## 2019-10-09 RX ADMIN — KETAMINE HYDROCHLORIDE 20 MG: 10 INJECTION INTRAMUSCULAR; INTRAVENOUS at 14:16

## 2019-10-09 RX ADMIN — LIDOCAINE HYDROCHLORIDE 50 MG: 10 INJECTION, SOLUTION EPIDURAL; INFILTRATION; INTRACAUDAL; PERINEURAL at 14:14

## 2019-10-09 RX ADMIN — DEXAMETHASONE SODIUM PHOSPHATE 8 MG: 4 INJECTION, SOLUTION INTRA-ARTICULAR; INTRALESIONAL; INTRAMUSCULAR; INTRAVENOUS; SOFT TISSUE at 14:14

## 2019-10-09 RX ADMIN — ACETAMINOPHEN 975 MG: 325 TABLET, FILM COATED ORAL at 14:08

## 2019-10-09 RX ADMIN — PROPOFOL 100 MCG/KG/MIN: 10 INJECTION, EMULSION INTRAVENOUS at 14:14

## 2019-10-09 ASSESSMENT — MIFFLIN-ST. JEOR: SCORE: 1656.62

## 2019-10-09 NOTE — ANESTHESIA CARE TRANSFER NOTE
Patient: Merlyn Ravi    Procedure(s):  Left breast re-excision    Diagnosis: Invasive lobular carcinoma of breast in female (H) [C50.919]  Diagnosis Additional Information: No value filed.    Anesthesia Type:   MAC, General     Note:  Airway :Room Air  Patient transferred to:Phase II  Handoff Report: Identifed the Patient, Identified the Reponsible Provider, Reviewed the pertinent medical history, Discussed the surgical course, Reviewed Intra-OP anesthesia mangement and issues during anesthesia, Set expectations for post-procedure period and Allowed opportunity for questions and acknowledgement of understanding      Vitals: (Last set prior to Anesthesia Care Transfer)    CRNA VITALS  10/9/2019 1440 - 10/9/2019 1511      10/9/2019             Pulse:  74    SpO2:  95 %                Electronically Signed By: MICHELE Trevizo CRNA  October 9, 2019  3:11 PM

## 2019-10-09 NOTE — DISCHARGE INSTRUCTIONS
HOME CARE FOLLOWING LUMPECTOMY      APPOINTMENT WITH YOUR SURGEON:  You will be scheduled to see your surgeon in 1 week to discuss your pathology results and for a wound check.    SUPPORT:  Wear a bra for support and comfort for 3-7 days, day and night.    DRAIN:  If you have a drain in place, you will need a separate appointment with a nurse in the office to have this removed.  You will have a form to keep track of the output of your drain.  When the output reaches a point where the total for a 24 hour period is less than 30cc s, you are ready to have the drain removed.  At that point you can call the office and talk to the nurse to arrange coming into the office to have this done.  If you have more than one drain in place, they may not all be removed at the same time, as the outputs can be very different from each.  The nurse will help you to manage this and help decide when the remaining drains will be taken out.    INCISIONAL CARE:    If you have a dressing in place, keep clean and dry for 48 hours; you may replace the gauze if it becomes soiled.    After 48 hours you may remove the dressing and shower.  Do not submerse incision in water for 1 week.    If you have a Dermabond dressing (a type of skin glue), you may shower immediately.    Sutures will absorb and do not need to be removed.    If present, leave the steri-strips (white paper tapes) in place for 14 days after surgery.    If present, leave Dermabond glue in place until it wears/flakes off.    You may expect a small amount of drainage from your incision.    A lump/ridge under the incision is normal and will gradually resolve.    BATHING:  You are allowed to bath (shallow water in a tub only) or shower 24-48 hours after your surgery.  Mild soap is OK to use near these sites.  When bathing, do not allow the incision or drain site to become submersed in water as this may increase the risk of infection.    ACTIVITY:  Cautiously resume exercise and  strenuous activities such as jogging, tennis, aerobics, etc. Also, be careful of stretching activities with operative side for two weeks.    If you had a  sentinel node biopsy  at the time of your surgery:  You have no restrictions in addition to those noted above.    If you had an  axillary node dissection  at the time of your surgery:  You are recommended to restrict the activity of the arm on the side the dissection was done on.  This means:  no reaching overhead until cleared to do so by your surgeon, no carrying weight on that side greater than a couple pounds, no injections/vaccinations/ blood samples from that side, and no blood pressure measurements on that side.  It is also recommended to elevate the arm on pillows several times a day to decrease/minimize swelling, and avoid sleeping on that arm.    DIET:  No restrictions.  Increased fluid intake is recommended. While taking pain medications, increase dietary fiber or add a fiber supplementation like Metamucil or Citrucel to help prevent constipation - a possible side effect of pain medications.    DISCOMFORT:  Local anesthetic placed at surgery should provide relief for 4-8 hours.  Begin taking pain pills before discomfort is severe.  Take the pain medication with some food, when possible, to minimize side effects.  Intermittent use of ice packs may help during the first 48 hours.  Expect gradual improvement.    RETURN APPOINTMENT:  Schedule a follow-up visit 2-3 weeks post-op.  Office Phone:  [phone]     CONTACT US IF THE FOLLOWING DEVELOPS:   1. A fever that is above 101     2. If there is a large amount of drainage, bleeding, or swelling.   3. Severe pain that is not relieved by your prescription.   4. Drainage that is thick, cloudy, yellow, green or white.   5. Any other questions not answered by  Frequently Asked Questions  sheet.      FREQUENTLY ASKED QUESTIONS:    Q:  How should my incision look?    A:  Normally your incision will appear  slightly swollen with light redness directly along the incision itself as it heals.  It may feel like a bump or ridge as the healing/scarring happens, and over time (3-4 months) this bump or ridge feeling should slowly go away.  In general, clear or pink watery drainage can be normal at first as your incision heals, but should decrease over time.    Q:  How do I know if my incision is infected?  A:  Look at your incision for signs of infection, like redness around the incision spreading to surrounding skin, or drainage of cloudy or foul-smelling drainage.  If you feel warm, check your temperature to see if you are running a fever.    **If any of these things occur, please notify the nurse at our office.  We may need you to come into the office for an incision check.      Q:  How do I take care of my incision?  A:  If you have a dressing in place - Starting the day after surgery, replace the dressing 1-2 times a day until there is no further drainage from the incision.  At that time, a dressing is no longer needed.  Try to minimize tape on the skin if irritation is occurring at the tape sites.  If you have significant irritation from tape on the skin, please call the office to discuss other method of dressing your incision.    Small pieces of tape called  steri-strips  may be present directly overlying your incision; these may be removed 10 days after surgery unless otherwise specified by your surgeon.  If these tapes start to loosen at the ends, you may trim them back until they fall off or are removed.    A:  If you had  Dermabond  tissue glue used as a dressing (this causes your incision to look shiny with a clear covering over it) - This type of dressing wears off with time and does not require more dressings over the top unless it is draining around the glue as it wears off.  Do not apply ointments or lotions over the incisions until the glue has completely worn off.    Q:  There is a piece of tape or a sticky   lead  still on my skin.  Can I remove this?  A:  Sometimes the sticky  leads  used for monitoring during surgery or for evaluation in the emergency department are not all removed while you are in the hospital.  These sometimes have a tab or metal dot on them.  You can easily remove these on your own, like taking off a band-aid.  If there is a gel substance under the  lead , simply wipe/clean it off with a washcloth or paper towel.      Q:  What can I do to minimize constipation (very hard stools, or lack of stools)?  A:  Stay well hydrated.  Increase your dietary fiber intake or take a fiber supplement -with plenty of water.  Walk around frequently.  You may consider an over-the-counter stool-softener.  Your Pharmacist can assist you with choosing one that is stocked at your pharmacy.  Constipation is also one of the most common side effects of pain medication.  If you are using pain medication, be pro-active and try to PREVENT problems with constipation by taking the steps above BEFORE constipation becomes a problem.    Q:  What do I do if I need more pain medications?  A:  Call the office to receive refills.  Be aware that certain pain meds cannot be called into a pharmacy and actually require a paper prescription.  A change may be made in your pain med as you progress thru your recovery period or if you have side effects to certain meds.    --Pain meds are NOT refilled after 5pm on weekdays, and NOT AT ALL on the weekends, so please look ahead to prevent problems.      Q:  Why am I having a hard time sleeping now that I am at home?  A:  Many medications you receive while you are in the hospital can impact your sleep for a number of days after your surgery/hospitalization.  Decreased level of activity and naps during the day may also make sleeping at night difficult.  Try to minimize day-time naps, and get up frequently during the day to walk around your home during your recovery time.  Sleep aides may be of some  help, but are not recommended for long-term use.      Q:  I am having some back discomfort.  What should I do?  A:  This may be related to certain positioning that was required for your surgery, extended periods of time in bed, or other changes in your overall activity level.  You may try ice, heat, acetaminophen, or ibuprofen to treat this temporarily.  Note that many pain medications have acetaminophen in them and would state this on the prescription bottle.  Be sure not to exceed the maximum of 4000mg per day of acetaminophen.     **If the pain you are having does not resolve, is severe, or is a flare of back pain you have had on other occasions prior to surgery, please contact your primary physician for further recommendations or for an appointment to be examined at their office.    Q:  Why am I having headaches?  A:  Headaches can be caused by many things:  caffeine withdrawal, use of pain meds, dehydration, high blood pressure, lack of sleep, over-activity/exhaustion, flare-up of usual migraine headaches.  If you feel this is related to muscle tension (a band-like feeling around the head, or a pressure at the low-back of the head) you may try ice or heat to this area.  You may need to drink more fluids (try electrolyte drink like Gatorade), rest, or take your usual migraine medications.   **If your headaches do not resolve, worsen, are accompanied by other symptoms, or if your blood pressure is high, please call your primary physician for recommendation and/or examination.    Q:  I am unable to urinate.  What do I do?  A:  A small percentage of people can have difficulty urinating initially after surgery.  This includes being able to urinate only a very small amount at a time and feeling discomfort or pressure in the very low abdomen.  This is called  urinary retention , and is actually an urgent situation.  Proceed to your nearest Emergency department for evaluation (not an Urgent Care Center).  Sometimes the  bladder does not work correctly after certain medications you receive during surgery, or related to certain procedures.  You may need to have a catheter placed until your bladder recovers.  When planning to go to an Emergency department, it may help to call the ER to let them know you are coming in for this problem after a surgery.  This may help you get in quicker to be evaluated.  **If you have symptoms of a urinary tract infection, please contact your primary physician for the proper evaluation and treatment.          If you have other questions, please call the office Monday thru Friday between 8am and 5pm to discuss with the nurse.  # [phone]    There is a surgeon ON CALL on weekday evenings and over the weekend in case of urgent need only, and may be contacted at the same number.    If you are having an emergency, call 911 or proceed to your nearest emergency department.                          Same Day Surgery Discharge Instructions  Special Precautions After Surgery - Adult    1. It is not unusual to feel lightheaded or faint, up to 24 hours after surgery or while taking pain medication.  If you have these symptoms; sit for a few minutes before standing and have someone assist you when getting up.  2. You should rest and relax for the next 24 hours and must have someone stay with you for at least 24 hours after your discharge.  3. DO NOT DRIVE any vehicle or operate mechanical equipment for 24 hours following the end of your surgery.  DO NOT DRIVE while taking narcotic pain medications that have been prescribed by your physician.  If you had a limb operated on, you must be able to use it fully to drive.  4. DO NOT drink alcoholic beverages for 24 hours following surgery or while taking prescription pain medication.  5. Drink clear liquids (apple juice, ginger ale, broth, 7-Up, etc.).  Progress to your regular diet as you feel able.  6. Any questions call your physician and do not make important  decisions for 24 hours.    __________________________________________________________________________________________________________________________________  IMPORTANT NUMBERS:    [facility] Main Number:  [phone], [phone]  Pharmacy:  [phone]  Same Day Surgery:  [phone], Monday - Friday until 8:30 p.m.  [facility]:  [phone]

## 2019-10-09 NOTE — ANESTHESIA POSTPROCEDURE EVALUATION
Patient: Merlyn Ravi    Procedure(s):  Left breast re-excision    Diagnosis:Invasive lobular carcinoma of breast in female (H) [C50.919]  Diagnosis Additional Information: No value filed.    Anesthesia Type:  MAC, General    Note:  Anesthesia Post Evaluation    Patient location during evaluation: Bedside  Patient participation: Able to fully participate in evaluation  Level of consciousness: awake  Pain management: adequate  Airway patency: patent  Cardiovascular status: acceptable  Respiratory status: acceptable  PONV: none     Anesthetic complications: None          Last vitals:  Vitals:    10/09/19 1600 10/09/19 1615 10/09/19 1700   BP: 123/72 136/74 132/78   Pulse: 53 58    Resp: 14 16 14   Temp:      SpO2: 97% 94% 96%         Electronically Signed By: MICHELE Trevizo CRNA  October 9, 2019  6:22 PM

## 2019-10-09 NOTE — OP NOTE
Procedure Date: 10/9/2019    PREOPERATIVE DIAGNOSIS: Cancer of the left Breast, Positive Margin at posterior margin  POSTOPERATIVE DIAGNOSIS: Same  PROCEDURE:  1. Left Segmental Mastectomy (Reexcision additional margin)  2. Complex tissue rearrangement of soft tissue     ATTENDING SURGEON and Assistants:   Surgeon(s):  Kj Salas, Ross Lawson PA-C (needed for retraction and suction)    ESTIMATED BLOOD LOSS: 3 mL    INDICATIONS:Ms. Merlyn Ravi is a 54 year old  female who was seen and evaluated in surgical oncology clinic and found to have evidence of invasive breast cancer. The patient was found to be a candidate for breast-conserving partial mastectomy. She was found to have positive margins on pathology and additional breast tissue resection was warranted.  After a discussion was had with the patient regarding alternatives, benefits, and risks (including, but not limited to, bleeding, infection, MI, death, abscess, lymphocele, lymphedema), the patient consented to the aforementioned procedure.  We discussed specifically that we would likely revert to mastectomy if this is positive as it may represent multifocal disease.  The patient expressed understanding and was very interested in continuing attempt at breast conservation therapy.    PROCEDURE:   The patient was taken back to the operating room in the supine position and transferred to the operating room table. Sequential venodyne boots were placed. All bony prominences were protected with sponge foam. General anesthesia was then induced. The breast and upper extremity was then prepped with Chloraprep and draped in the usual sterile, surgical fashion. A time-out was performed to verify patient and procedure.     An incision was made over the overlying the original incision on the left breast. The incision was opened and small seroma evacuated. Additional posterior and inferior margin tissue was excised, with clips marking  true margin for radiation.  The specimens (2 posterior and 1 inferior were marked with 2-0 Silk suture on the cavity side). The wound was copiously irrigated, and hemostasis was achieved satisfactorily. The breast tissue was reapproximated in a complex fasion for breast conservation. The subcutaneous tissue was then reapproximated with interrupted 2-0 Vicryl and 3-0 Vicryl. The skin was then reapproximated with Dermabond. A 50%/50% mixture of 0.25% bupivicaine and 1% lidocaine was injected into the wounds.     The patient tolerated the procedure without difficulty. All needle, towel, and sponge counts were correct at the end of the procedure. The patient was returned to the recovery room in stable condition.    Kj Salas DO on 10/9/2019 at 3:07 PM

## 2019-10-12 LAB — COPATH REPORT: NORMAL

## 2019-10-15 ENCOUNTER — OFFICE VISIT (OUTPATIENT)
Dept: SURGERY | Facility: CLINIC | Age: 54
End: 2019-10-15
Payer: COMMERCIAL

## 2019-10-15 VITALS
HEIGHT: 66 IN | SYSTOLIC BLOOD PRESSURE: 117 MMHG | DIASTOLIC BLOOD PRESSURE: 76 MMHG | BODY MASS INDEX: 37.12 KG/M2 | WEIGHT: 231 LBS | TEMPERATURE: 98.6 F | HEART RATE: 76 BPM

## 2019-10-15 DIAGNOSIS — C50.919 INVASIVE LOBULAR CARCINOMA OF BREAST IN FEMALE (H): Primary | ICD-10-CM

## 2019-10-15 PROCEDURE — 99024 POSTOP FOLLOW-UP VISIT: CPT | Performed by: SURGERY

## 2019-10-15 ASSESSMENT — MIFFLIN-ST. JEOR: SCORE: 1656.62

## 2019-10-15 NOTE — NURSING NOTE
"Initial /76 (BP Location: Right arm, Patient Position: Sitting, Cuff Size: Adult Large)   Pulse 76   Temp 98.6  F (37  C) (Oral)   Ht 1.664 m (5' 5.5\")   Wt 104.8 kg (231 lb)   BMI 37.86 kg/m   Estimated body mass index is 37.86 kg/m  as calculated from the following:    Height as of this encounter: 1.664 m (5' 5.5\").    Weight as of this encounter: 104.8 kg (231 lb). .    Danni Brock MA    "

## 2019-10-15 NOTE — LETTER
"    10/15/2019         RE: Merlyn Ravi  [address] [facility] [address]        Dear Colleague,    Thank you for referring your patient, Merlyn Ravi, to the [facility]. Please see a copy of my visit note below.    General Surgery Post Op    Pt returns for follow up visit s/p breast re-excision on 10/9/2019.    Patient has been doing well, tolerating diet. Bowels moving well.  No issues with wound healing/redness/drainage. No fevers. She has some residual point tenderness on left chest and left medial arm, this was present after initial operation and has gradually improved.  No chest pain, no shortness of breath, no cough.      Physical exam: Vitals: /76 (BP Location: Right arm, Patient Position: Sitting, Cuff Size: Adult Large)   Pulse 76   Temp 98.6  F (37  C) (Oral)   Ht 1.664 m (5' 5.5\")   Wt 104.8 kg (231 lb)   BMI 37.86 kg/m     BMI= Body mass index is 37.86 kg/m .    Exam:  Constitutional: healthy, alert and no distress  Cardiovascular: negative  Respiratory: negative  Breast: examined with Danni Hope.  Well healing incision, minimal ecchymosis.  Minimal seroma  Point tenderness left chest wall over costochondral junction Rib 7/8.    Path:  A. Posterior left breast lumpectomy cavity:   - Negative for residual malignancy, no atypical cellular proliferations   identified.   - Reactive inflammatory changes of prior biopsy with reactive fibrosis,   fat necrosis, granulation tissue, and   granulomatous inflammation.     B. Inferior left breast lump cavity:   - Negative for residual malignancy, no atypical cellular proliferations   identified.   - Reactive inflammatory changes of prior biopsy with reactive fibrosis,   fat necrosis, granulation tissue, and   granulomatous inflammation.     Assessment: No diagnosis found.     Plan: Mrs. Ravi returns after re-excision x 2 of breast lumpectomy cavity which is negative this time.  Her final pathology is [id number] " Invasive lobular carcinoma ER+, DE+, HER2-.  She will have a follow-up clinical breast exam in 6 months.  She is to see oncology tomorrow for discussion of adjuvant endocrine therapy and referral to radiation oncology.  I discussed that her pain should improve in her medial arm over time, it appears to be area of intercostobrachial nerve.  I expect this to improve.  She is otherwise asymptomatic from a cardiopulmonary standpoint.    Kj Salas,  on 10/15/2019 at 7:29 AM        Again, thank you for allowing me to participate in the care of your patient.        Sincerely,        Kj Salas, DO

## 2019-10-15 NOTE — PROGRESS NOTES
"General Surgery Post Op    Pt returns for follow up visit s/p breast re-excision on 10/9/2019.    Patient has been doing well, tolerating diet. Bowels moving well.  No issues with wound healing/redness/drainage. No fevers. She has some residual point tenderness on left chest and left medial arm, this was present after initial operation and has gradually improved.  No chest pain, no shortness of breath, no cough.      Physical exam: Vitals: /76 (BP Location: Right arm, Patient Position: Sitting, Cuff Size: Adult Large)   Pulse 76   Temp 98.6  F (37  C) (Oral)   Ht 1.664 m (5' 5.5\")   Wt 104.8 kg (231 lb)   BMI 37.86 kg/m    BMI= Body mass index is 37.86 kg/m .    Exam:  Constitutional: healthy, alert and no distress  Cardiovascular: negative  Respiratory: negative  Breast: examined with Danni Hope.  Well healing incision, minimal ecchymosis.  Minimal seroma  Point tenderness left chest wall over costochondral junction Rib 7/8.    Path:  A. Posterior left breast lumpectomy cavity:   - Negative for residual malignancy, no atypical cellular proliferations   identified.   - Reactive inflammatory changes of prior biopsy with reactive fibrosis,   fat necrosis, granulation tissue, and   granulomatous inflammation.     B. Inferior left breast lump cavity:   - Negative for residual malignancy, no atypical cellular proliferations   identified.   - Reactive inflammatory changes of prior biopsy with reactive fibrosis,   fat necrosis, granulation tissue, and   granulomatous inflammation.     Assessment: No diagnosis found.     Plan: Mrs. Ravi returns after re-excision x 2 of breast lumpectomy cavity which is negative this time.  Her final pathology is [id number] Invasive lobular carcinoma ER+, DC+, HER2-.  She will have a follow-up clinical breast exam in 6 months.  She is to see oncology tomorrow for discussion of adjuvant endocrine therapy and referral to radiation oncology.  I discussed that her pain should improve " in her medial arm over time, it appears to be area of intercostobrachial nerve.  I expect this to improve.  She is otherwise asymptomatic from a cardiopulmonary standpoint.    Kj Salas,  on 10/15/2019 at 7:29 AM

## 2019-10-16 ENCOUNTER — ONCOLOGY VISIT (OUTPATIENT)
Dept: ONCOLOGY | Facility: CLINIC | Age: 54
End: 2019-10-16
Attending: INTERNAL MEDICINE
Payer: COMMERCIAL

## 2019-10-16 VITALS
HEIGHT: 66 IN | SYSTOLIC BLOOD PRESSURE: 126 MMHG | DIASTOLIC BLOOD PRESSURE: 82 MMHG | RESPIRATION RATE: 18 BRPM | WEIGHT: 231.1 LBS | OXYGEN SATURATION: 98 % | HEART RATE: 79 BPM | TEMPERATURE: 97.8 F | BODY MASS INDEX: 37.14 KG/M2

## 2019-10-16 DIAGNOSIS — C50.912 INVASIVE LOBULAR CARCINOMA OF LEFT BREAST IN FEMALE (H): Primary | ICD-10-CM

## 2019-10-16 PROCEDURE — G0463 HOSPITAL OUTPT CLINIC VISIT: HCPCS

## 2019-10-16 PROCEDURE — 99214 OFFICE O/P EST MOD 30 MIN: CPT | Performed by: INTERNAL MEDICINE

## 2019-10-16 ASSESSMENT — MIFFLIN-ST. JEOR: SCORE: 1661.04

## 2019-10-16 ASSESSMENT — PAIN SCALES - GENERAL: PAINLEVEL: MILD PAIN (2)

## 2019-10-16 NOTE — PROGRESS NOTES
"Oncology Rooming Note    October 16, 2019 8:58 AM   Merlyn Ravi is a 54 year old female who presents for:    Chief Complaint   Patient presents with     Oncology Clinic Visit     New Daignoosis - Invasive ductal carcinoma of left breast & 1 year Monoclonal paraproteinemia, review labs & Path     Initial Vitals: /82 (BP Location: Right arm, Patient Position: Sitting, Cuff Size: Adult Large)   Pulse 79   Temp 97.8  F (36.6  C) (Tympanic)   Resp 18   Ht 1.67 m (5' 5.75\")   Wt 104.8 kg (231 lb 1.6 oz)   SpO2 98%   Breastfeeding? No   BMI 37.58 kg/m   Estimated body mass index is 37.58 kg/m  as calculated from the following:    Height as of this encounter: 1.67 m (5' 5.75\").    Weight as of this encounter: 104.8 kg (231 lb 1.6 oz). Body surface area is 2.2 meters squared.  Mild Pain (2) Comment: Data Unavailable   No LMP recorded. Patient has had a hysterectomy.  Allergies reviewed: Yes  Medications reviewed: Yes    Medications: Medication refills not needed today.  Pharmacy name entered into [employer]:    [facility] [place] [address] [id number] - [place] - [address] AT [facility] [address]    Clinical concerns: New Daignoosis - Invasive lobular carcinoma of left breast & 1 year Monoclonal paraproteinemia, review labs & Path.  Noticing left sided soreness left armpit down into her left rib cage since second surgery 9/30/19.      Angy Nicolas, JORDAN            "

## 2019-10-16 NOTE — LETTER
"    10/16/2019         RE: Merlyn Ravi  [address] [facility] [address]        Dear Colleague,    Thank you for referring your patient, Merlyn Ravi, to the [facility]. Please see a copy of my visit note below.    Oncology Rooming Note    October 16, 2019 8:58 AM   Merlyn Ravi is a 54 year old female who presents for:    Chief Complaint   Patient presents with     Oncology Clinic Visit     New Daignoosis - Invasive ductal carcinoma of left breast & 1 year Monoclonal paraproteinemia, review labs & Path     Initial Vitals: /82 (BP Location: Right arm, Patient Position: Sitting, Cuff Size: Adult Large)   Pulse 79   Temp 97.8  F (36.6  C) (Tympanic)   Resp 18   Ht 1.67 m (5' 5.75\")   Wt 104.8 kg (231 lb 1.6 oz)   SpO2 98%   Breastfeeding? No   BMI 37.58 kg/m    Estimated body mass index is 37.58 kg/m  as calculated from the following:    Height as of this encounter: 1.67 m (5' 5.75\").    Weight as of this encounter: 104.8 kg (231 lb 1.6 oz). Body surface area is 2.2 meters squared.  Mild Pain (2) Comment: Data Unavailable   No LMP recorded. Patient has had a hysterectomy.  Allergies reviewed: Yes  Medications reviewed: Yes    Medications: Medication refills not needed today.  Pharmacy name entered into [employer]:    [facility], [place] - [address] [id number] - [place], [place] - [address] AT [facility] [address]    Clinical concerns: New Daignoosis - Invasive lobular carcinoma of left breast & 1 year Monoclonal paraproteinemia, review labs & Path.  Noticing left sided soreness left armpit down into her left rib cage since second surgery 9/30/19.      Angy Nicolas CMA              OncotypeDx ordered. RT referral placed. Smiley Gonzalez, RN, BSN, OCN on 10/16/2019 at 10:53 AM      DATE OF VISIT: Oct 16, 2019    Merlyn Ravi is a 54 year old female is seen today for   Chief Complaint   Patient " presents with     Oncology Clinic Visit     New Daignoosis - Invasive lobular carcinoma of left breast & 1 year Monoclonal paraproteinemia, review labs & Path.    .       (C50.912) Invasive lobular carcinoma of left breast in female (H)  (primary encounter diagnosis)  Patient is here today for follow-up visit.  Patient had a left lumpectomy and sentinel lymph node biopsy done on September 23, 2019 showing invasive lobular carcinoma with tumor size about 4.5 cm.  Tumor is grade 2 angiolymphatic invasion was not seen.  In situ malignancy was not seen . [place] lymph node was negative for malignancy.  The tumor was positive for estrogen receptor and progesterone receptor and negative for HER-2/walter receptors.  Because of positive deep margin, the patient went on to have reexcision on September 30, 2019 which resulted in positive margin as well.  Eventually she had reexcision again on October 9, 2019 which came back negative for residual malignancy.  Patient is here today to discuss further management.  Base of the above information I would recommend patient to have an Oncotype DX evaluation since the benefit of chemotherapy.  I gave the patient an overview today about endocrine therapy.  Because of the patient history of hysterectomy in the past, I would recommend tamoxifen at 20 mg orally daily.  I gave the patient overview about potential side effects of tamoxifen including increased risk of thromboembolic disease.  I also gave the patient handout about the drug.  We referred the patient to conclude local therapy with breast radiation therapy.  I will meet with the patient again when the results of Oncotype DX is available to discuss chemotherapy if indicated    The patient is ready to learn, no apparent learning barriers were identified.  Diagnosis and treatment plans were explained to the patient. The patient expressed understanding of the content. The patient asked appropriate questions. The patient questions were  answered to her satisfaction.  Time spent 25 minutes more than 50% of the time in counseling and coordination of care including discussion of management of lobular carcinoma of the breast, management, side effects of endocrine therapy and chemotherapy, follow-up and prognosis.  Chart documentation with Dragon Voice recognition Software. Although reviewed after completion, some words and grammatical errors may remain.      Again, thank you for allowing me to participate in the care of your patient.        Sincerely,        Tanvi Blakely MD

## 2019-10-16 NOTE — PATIENT INSTRUCTIONS
Order oncotypeDX  Referral to radiation therapy  Give the patient information about tamoxifen  Follow-up when Oncotype is available

## 2019-10-17 NOTE — PROGRESS NOTES
DATE OF VISIT: Oct 16, 2019    Merlyn Ravi is a 54 year old female is seen today for   Chief Complaint   Patient presents with     Oncology Clinic Visit     New Daignoosis - Invasive lobular carcinoma of left breast & 1 year Monoclonal paraproteinemia, review labs & Path.    .       (C50.912) Invasive lobular carcinoma of left breast in female (H)  (primary encounter diagnosis)  Patient is here today for follow-up visit.  Patient had a left lumpectomy and sentinel lymph node biopsy done on September 23, 2019 showing invasive lobular carcinoma with tumor size about 4.5 cm.  Tumor is grade 2 angiolymphatic invasion was not seen.  In situ malignancy was not seen . [place] lymph node was negative for malignancy.  The tumor was positive for estrogen receptor and progesterone receptor and negative for HER-2/walter receptors.  Because of positive deep margin, the patient went on to have reexcision on September 30, 2019 which resulted in positive margin as well.  Eventually she had reexcision again on October 9, 2019 which came back negative for residual malignancy.  Patient is here today to discuss further management.  Base of the above information I would recommend patient to have an Oncotype DX evaluation since the benefit of chemotherapy.  I gave the patient an overview today about endocrine therapy.  Because of the patient history of hysterectomy in the past, I would recommend tamoxifen at 20 mg orally daily.  I gave the patient overview about potential side effects of tamoxifen including increased risk of thromboembolic disease.  I also gave the patient handout about the drug.  We referred the patient to conclude local therapy with breast radiation therapy.  I will meet with the patient again when the results of Oncotype DX is available to discuss chemotherapy if indicated    The patient is ready to learn, no apparent learning barriers were identified.  Diagnosis and treatment plans were explained to the patient.  The patient expressed understanding of the content. The patient asked appropriate questions. The patient questions were answered to her satisfaction.  Time spent 25 minutes more than 50% of the time in counseling and coordination of care including discussion of management of lobular carcinoma of the breast, management, side effects of endocrine therapy and chemotherapy, follow-up and prognosis.  Chart documentation with Dragon Voice recognition Software. Although reviewed after completion, some words and grammatical errors may remain.

## 2019-10-24 ENCOUNTER — TRANSFERRED RECORDS (OUTPATIENT)
Dept: HEALTH INFORMATION MANAGEMENT | Facility: CLINIC | Age: 54
End: 2019-10-24

## 2019-10-24 LAB — LAB SCANNED RESULT: NORMAL

## 2019-10-25 ENCOUNTER — PATIENT OUTREACH (OUTPATIENT)
Dept: ONCOLOGY | Facility: CLINIC | Age: 54
End: 2019-10-25

## 2019-10-25 NOTE — PROGRESS NOTES
Patient notified of Oncotype 20. She will keep her appointment with radiation and follow up with Dr. Blakely.  Maame Soliz RN

## 2019-10-25 NOTE — PROGRESS NOTES
Per verbal from Dr. Blakely, there is no benefit to chemotherapy. After radiation therapy, make appointment to discuss endocrine therapy. Patient notified of recommendations. Appointment will be kept for RT.  Maame Soliz RN on 10.25.19 at 4:27 PM

## 2019-10-27 NOTE — RESULT ENCOUNTER NOTE
Please inform patient that test result showed mild elevation of cholesterol.  Recommend working on diet and exercise.  Thank you.     Memo Garcia M.D.

## 2019-10-29 ENCOUNTER — OFFICE VISIT (OUTPATIENT)
Dept: RADIATION THERAPY | Facility: OUTPATIENT CENTER | Age: 54
End: 2019-10-29
Payer: COMMERCIAL

## 2019-10-29 VITALS
BODY MASS INDEX: 37.83 KG/M2 | DIASTOLIC BLOOD PRESSURE: 87 MMHG | RESPIRATION RATE: 16 BRPM | OXYGEN SATURATION: 97 % | HEART RATE: 82 BPM | WEIGHT: 232.6 LBS | SYSTOLIC BLOOD PRESSURE: 137 MMHG

## 2019-10-29 DIAGNOSIS — C50.919 INVASIVE LOBULAR CARCINOMA OF BREAST IN FEMALE (H): Primary | ICD-10-CM

## 2019-10-29 ASSESSMENT — PAIN SCALES - GENERAL: PAINLEVEL: NO PAIN (0)

## 2019-10-29 NOTE — PROGRESS NOTES
Department of Radiation Oncology  Radiation Therapy Center  [facility]  [address], [address]  [place] [address]  [phone]       Consultation Note    Name: Merlyn Ravi MRN: [id number]   : 1965   Date of Service: 10/29/2019  Referring: Dr. Salas and Dr. Blakely     Reason for consultation: Invasive lobular carcinoma of the left breast status post lumpectomy and sentinel lymph node evaluation.  Final pathology demonstrated ILC, 4.5 cm in size, grade 2, no LVSI, negative margins status post reexcision, 0 out of 1 sentinel lymph node, ER positive, [place] positive, HER-2 negative, pathologic T2N0, low Oncotype recurrence score.  Evaluate role for radiation therapy.    History of Present Illness   Ms. Ravi is a 54 year old female left breast cancer.     Patient initially presented with a palpable left breast mass, prompting further evaluation.  Diagnostic imaging confirmed the presence of a left breast mass at the 10 o'clock position, 12 cm from the nipple.  On 8/15/2019 the patient underwent left breast biopsy.  Final pathology demonstrated ILC, grade 2, no LVSI, ER positive, KY positive, HER-2 negative.  On 2019 the patient underwent left breast lumpectomy and sentinel lymph node evaluation by Dr. Salas.  Final pathology demonstrated ILC, 4.5 cm in size, grade 2, no LVSI, negative margins status post reexcision, 0 out of 1 sentinel lymph node, ER positive, [place] positive, HER-2 negative, pathologic T2N0.  The patient was subsequent seen by Dr. Blakely who ordered Oncotype, and recurrence score came back low at 20.  No systemic chemotherapy was recommended.  Anti-endocrine therapy was discussed.  The patient subsequent presents today to discuss potential role of adjuvant whole breast radiation therapy.     She is overall doing well.  She denies any breast pain.  No issues with range of motion.  No lymphedema.  No pacemaker.  No prior radiation.  No history of  connective tissue disorder.      Past Medical History:   Past Medical History:   Diagnosis Date     Allergies      Dermatofibroma 5/14/2012       Past Surgical History:   Past Surgical History:   Procedure Laterality Date     C NONSPECIFIC PROCEDURE      bladder surgery     HYSTERECTOMY, PAP NO LONGER INDICATED       HYSTERECTOMY, MAGY       LASIK BILATERAL  2005    Dr. Solis      LUMPECTOMY BREAST Left 9/30/2019    Procedure: Re-excision of left breast lumpectomy site;  Surgeon: Kj Salas DO;  Location: [facility]     LUMPECTOMY BREAST Left 10/9/2019    Procedure: Left breast re-excision;  Surgeon: Kj Salas DO;  Location: [facility]     LUMPECTOMY BREAST WITH SENTINEL NODE, COMBINED Left 9/23/2019    Procedure: LUMPECTOMY, BREAST, WITH SENTINEL LYMPH NODE BIOPSY.;  Surgeon: Kj Salas DO;  Location: [facility] OR       Chemotherapy History:  None    Radiation History:  none    Pregnant: No  Implanted Cardiac Devices: No    Medications:  Current Outpatient Medications   Medication     atorvastatin (LIPITOR) 10 MG tablet     doxycycline hyclate (PERIOSTAT) 20 MG tablet     doxycycline monohydrate 100 MG capsule     IBUPROFEN PO     levothyroxine (SYNTHROID/LEVOTHROID) 88 MCG tablet     meclizine (ANTIVERT) 25 MG tablet     Naproxen Sodium (ALEVE PO)     ondansetron (ZOFRAN) 4 MG tablet     No current facility-administered medications for this visit.          Allergies:     Allergies   Allergen Reactions     Demerol      Sedation and vomiting     Sulfa Drugs      unknown reaction           Family History:  Family History   Problem Relation Age of Onset     Thyroid Disease Mother      Heart Disease Mother      Heart Disease Father      Hyperlipidemia Father      Hypertension Maternal Grandmother      Breast Cancer Maternal Grandmother      Hypertension Maternal Grandfather      Alzheimer Disease Paternal Grandmother      Diabetes Brother      Eczema Brother      Thyroid Disease Brother       Melanoma No family hx of        Review of Systems   A 10-point review of systems was performed. Pertinent findings are noted in the HPI.    Physical Exam   ECOG Status: 1    Vitals:  /87 (BP Location: Right arm, Cuff Size: Adult Large)   Pulse 82   Resp 16   Wt 105.5 kg (232 lb 9.6 oz)   SpO2 97%   BMI 37.83 kg/m      Gen: Alert, in NAD  Head: NC/AT  Eyes: PERRL, EOMI, sclera anicteric  Ears: No external auricular lesions  Nose/sinus: No rhinorrhea or epistaxis  Oral cavity/oropharynx: MMM, no visible oral cavity lesions, FOM and BOT are soft to palpation  Neck: Full ROM, supple, no palpable adenopathy  Pulm: No wheezing, stridor or respiratory distress  CV: Extremities are warm and well-perfused, no cyanosis, no pedal edema  Abdominal: Normal bowel sounds, soft, nontender, no masses  Musculoskeletal: No issues with range of motion.  Lymphedema: Lower extremity lymphedema.  Skin: Normal color and turgor  Neuro: A/Ox3, CN II-XII intact, normal gait    Imaging/Path/Labs   Imaging:   Per HPI    Path:   8/15/19  SPECIMEN(S):   Breast needle biopsy, left. 10:00, 12 cm from nipple     FINAL DIAGNOSIS:   Breast needle biopsy, left. 10:00, 12 cm from nipple:   - Invasive lobular carcinoma   - [place] grade: II (of possible III)        - Tiffany score 6 (of possible 9)        - Scoring criteria: tubules = 3, nuclear pleomorphism = 2, mitosis =   1.   - Angiolymphatic invasion:  None identified   - Carcinoma in-situ: Cannot exclude (see comment and images)   - Estrogen receptor:  POSITIVE (approximately 80%)   - Progesterone receptor:  POSITIVE (approximately 60-70%)   - Her2/walter:  FISH analysis will be performed and reported by the   [facility] in a separate report.     9/23/19  SPECIMEN(S):   A: [place] lymph node, left   B: Breast tissue, left     FINAL DIAGNOSIS:   A. [place] lymph node, left:   - Negative for malignancy in single lymph node.     B. Left breast  tissue with oriented margins:     - Invasive lobular carcinoma (see description, comment and images):     - Tumor size: Non-contiguous tumor spanning an estimated 4.5 of tissue   with 1.8 x 1.5 cm cross sectional   area of tumor in block B16 (see diagnostic comment)     - Tiffany grade: II (of possible III)             - Tiffany score 6 (of possible 9)             - Scoring criteria:  tubules = 3, nuclear pleomorphism = 2,   mitosis = 1.   -  Angiolymphatic invasion seen: None seen     - In-situ malignancy: None seen     - Margins of resection for invasive carcinoma:         - Deep black inked margin: POSITIVE (block B21 and B29, mid   specimen)         - Anterior green inked margin: 2.1 mm from margin (block B30,   lateral specimen)         - Inferior blue inked margin: 3.5 mm from margin (block B15, mid   specimen)         - Medial yellow inked margin: 3.7 mm from margin (block B1)         - Superior red inked margin: Widely clear         - Lateral orange inked margin: Widely clear     - Staging: pT2pN0     - Estrogen receptor:  POSITIVE (80% on prior needle biopsy [id number])     - Progesterone receptor:  POSITIVE (60-70% on prior needle biopsy   [id number])     - Her2/walter:  Non-amplified (Group 5 - GERALD Negative)     COMMENT:   TO VIEW DIGITAL IMAGES IN THIS REPORT:   +++ Digital images of the pathologic findings are included with this   report (15 images total)+++     9/30/19  FINAL DIAGNOSIS:   Left breast additional posterior margin:   - Positive new margin involved by invasive lobular carcinoma extending   over 0.55mm x 0.35 mm area.   - No in situ malignancy identified.   - No angiolymphatic invasion present     10/9/19  SPECIMEN(S):   A: Posterior left breast lumpectomy cavity   B: Inferior left breast lump cavity     FINAL DIAGNOSIS:   A. Posterior left breast lumpectomy cavity:   - Negative for residual malignancy, no atypical cellular proliferations   identified.   - Reactive inflammatory changes of  prior biopsy with reactive fibrosis,   fat necrosis, granulation tissue, and   granulomatous inflammation.     B. Inferior left breast lump cavity:   - Negative for residual malignancy, no atypical cellular proliferations   identified.   - Reactive inflammatory changes of prior biopsy with reactive fibrosis,   fat necrosis, granulation tissue, and   granulomatous inflammation.       Assessment    Ms. Ravi is a 54 year old female with diagnosed invasive lobular carcinoma of the left breast status post lumpectomy and sentinel lymph node evaluation.  Final pathology demonstrated ILC, 4.5 cm in size, grade 2, no LVSI, negative margins status post reexcision, 0 out of 1 sentinel lymph node, ER positive, TN positive, HER-2 negative, pathologic T2N0, low Oncotype recurrence score.  She presents today to discuss potential role of adjuvant radiation therapy.     Plan     We discussed that adjuvant radiation is the standard of care for patients with invasive ductal carcinoma after lumpectomy. The most recent update of the EBCTCG metaanalysis for early stage breast cancer showed that for patients with pathologically node negative disease, radiotherapy reduces the 5 year risk of any recurrence from 31% to 15% (Lancet, 2011). This translated into a 3.3% absolute survival benefit at 15 years for all-comers.      We recommend hypofractionated radiation therapy based on results from the [place] hypofractionation trial (Clayton et al, NEJ, 2010) and the recent update of the [place] START trial (Sri et al, Lancet Oncol, 2013), which both show equivalent local control, survival and cosmesis with these shorter treatment schedules as compared to conventional radiation fractionation. After our discussion, the patient is interested in proceeding with hypofractionated treatment to the left breast.  We recommend a dose of 4005 cGy in 15 fractions followed by 1000 cGy in 5 fraction boost to the lumpectomy bed due to age < 60 years old and  large tumor size.     Today we discussed the logistics of treatment planning and delivery in detail with the patient. We also discussed side effects, including short term risks of fatigue and skin reaction, and long term risks of pneumonitis, lung fibrosis, soft tissue fibrosis, skin changes, breast contour changes, rib fractures, cardiac damage, secondary cancers, lymphedema, and thyroid dysfunction. We described the use of 3D-conformal radiotherapy to minimize dose to the normal tissues, while adequately covering the target tissues, and the ability of this technique to decrease potential for toxicity.     With regards to sparing of the heart, we discussed the use of deep inspiration breath hold for treatment planning and delivery. This method has been shown to significantly reduce dose to the lung and heart as compared to treatment with free breathing (Shawn et al, AJCO, 2012). We discussed that this will involve 3D-conformal treatment planning, with contouring of the heart, and that limiting heart dose will be a high priority for treatment planning, as will dose to the contralateral breast and lung.      The risks and benefits of radiation therapy were discussed in detail with the patient. The patient expressed an understanding of these risks and has agreed to proceed with the proposed treatment. Informed consent was obtained.  CT simulation will be scheduled. We will plan to begin RT 7-10 days after CT simulation. Patient agrees with the plan in place.     Donnie Nuno MD  Department of Radiation Oncology  [facility]

## 2019-10-29 NOTE — LETTER
10/29/2019      RE: Merlyn Ravi  [address] [facility] [address]          Department of Radiation Oncology  Radiation Therapy Center  [facility]  [address], [address]  [place] [address] [phone]       Consultation Note    Name: Merlyn Ravi MRN: [id number]   : 1965   Date of Service: 10/29/2019  Referring: Dr. Salas and Dr. Blakely     Reason for consultation: Invasive lobular carcinoma of the left breast status post lumpectomy and sentinel lymph node evaluation.  Final pathology demonstrated ILC, 4.5 cm in size, grade 2, no LVSI, negative margins status post reexcision, 0 out of 1 sentinel lymph node, ER positive, LA positive, HER-2 negative, pathologic T2N0, low Oncotype recurrence score.  Evaluate role for radiation therapy.    History of Present Illness   Ms. Ravi is a 54 year old female left breast cancer.     Patient initially presented with a palpable left breast mass, prompting further evaluation.  Diagnostic imaging confirmed the presence of a left breast mass at the 10 o'clock position, 12 cm from the nipple.  On 8/15/2019 the patient underwent left breast biopsy.  Final pathology demonstrated ILC, grade 2, no LVSI, ER positive, LA positive, HER-2 negative.  On 2019 the patient underwent left breast lumpectomy and sentinel lymph node evaluation by Dr. Salas.  Final pathology demonstrated ILC, 4.5 cm in size, grade 2, no LVSI, negative margins status post reexcision, 0 out of 1 sentinel lymph node, ER positive, LA positive, HER-2 negative, pathologic T2N0.  The patient was subsequent seen by Dr. Blakely who ordered Oncotype, and recurrence score came back low at 20.  No systemic chemotherapy was recommended.  Anti-endocrine therapy was discussed.  The patient subsequent presents today to discuss potential role of adjuvant whole breast radiation therapy.     She is overall doing well.  She denies any breast pain.  No  [address] [facility] [address]   HISTORY AND PHYSICAL       Name:  Alexei Carter   MR#:  [id number]   :  1988   Account #:  [de-identified]        Date of Adm:  2017       CHIEF COMPLAINT: Abdominal pain. HISTORY OF PRESENT ILLNESS: The patient is a 24-year-old   female with past medical history of ovarian cyst, migrainous   headaches, dysmenorrhea, and recent implantation of IUD, who   presents to the hospital with the above mentioned symptoms. The   patient reports that she was at baseline health until yesterday when   she started experiencing some abdominal pain when awaking this   morning and the pain has been severe. She started getting some   vaginal bleeding this morning, but because she has irregular   periods thought that was because of recent implantation of IUD. Regardless went to work today and the pain got more severe to the   point where she could not get up. She went to see her gynecologist,   Dr. Vickie Donovan, who did a pelvic exam associated with a pelvic ultrasound,   both abdominal as well as vaginal. The patient is a healthcare provider   and reports that she does not have any cervical motion tenderness. The doctor told her that there was nothing that was related to GYN or   the recent implantation of IUD. He was concerned about appendicitis   and the patient was told to come to the nearest ER for further   management and evaluation. The continued to be in significant   distress and pain and had a CT scan done, which was essentially   negative for appendicitis. Was evaluated by Surgery, who   recommended no surgical intervention at this point in time and   observing the patient under the hospitalist service. Currently the   patient reports that she is experiencing significant right lower quadrant   pain radiating to the upper quadrant with some nausea, no vomiting.    The patient had a small bowel movement this morning, but reports that issues with range of motion.  No lymphedema.  No pacemaker.  No prior radiation.  No history of connective tissue disorder.      Past Medical History:   Past Medical History:   Diagnosis Date     Allergies      Dermatofibroma 5/14/2012       Past Surgical History:   Past Surgical History:   Procedure Laterality Date     C NONSPECIFIC PROCEDURE      bladder surgery     HYSTERECTOMY, PAP NO LONGER INDICATED       HYSTERECTOMY, MAGY       LASIK BILATERAL  2005    Dr. Solis      LUMPECTOMY BREAST Left 9/30/2019    Procedure: Re-excision of left breast lumpectomy site;  Surgeon: Kj Salas DO;  Location: [facility] OR     LUMPECTOMY BREAST Left 10/9/2019    Procedure: Left breast re-excision;  Surgeon: Kj Salas DO;  Location: [facility] OR     LUMPECTOMY BREAST WITH SENTINEL NODE, COMBINED Left 9/23/2019    Procedure: LUMPECTOMY, BREAST, WITH SENTINEL LYMPH NODE BIOPSY.;  Surgeon: Kj Salas DO;  Location: [facility] OR       Chemotherapy History:  None    Radiation History:  none    Pregnant: No  Implanted Cardiac Devices: No    Medications:  Current Outpatient Medications   Medication     atorvastatin (LIPITOR) 10 MG tablet     doxycycline hyclate (PERIOSTAT) 20 MG tablet     doxycycline monohydrate 100 MG capsule     IBUPROFEN PO     levothyroxine (SYNTHROID/LEVOTHROID) 88 MCG tablet     meclizine (ANTIVERT) 25 MG tablet     Naproxen Sodium (ALEVE PO)     ondansetron (ZOFRAN) 4 MG tablet     No current facility-administered medications for this visit.          Allergies:     Allergies   Allergen Reactions     Demerol      Sedation and vomiting     Sulfa Drugs      unknown reaction           Family History:  Family History   Problem Relation Age of Onset     Thyroid Disease Mother      Heart Disease Mother      Heart Disease Father      Hyperlipidemia Father      Hypertension Maternal Grandmother      Breast Cancer Maternal Grandmother      Hypertension Maternal Grandfather      Alzheimer Disease  she is usually regular. The patient denies any fevers or chills   associated with her symptoms. Denies any melena or hematemesis. Denies any trauma to the abdomen. Denies consumption of any exotic   or unconventional food. Denies any headache, blurry vision, sore   throat, trouble swallowing, trouble with speech, any chest pain,   shortness of breath, cough, fever, chills, urinary symptoms, focal or   generalized neurological weakness, recent travels or sick contacts. The patient denies any vaginal discharge. PAST MEDICAL HISTORY: See above. HOME MEDICATIONS: Currently the patient is on     1. Prednisone 10 mg daily. 2. IUD. 3. Topamax 50 mg nightly. 4. BuSpar 50 mg daily. 5. Clonazepam 2 mg at bedtime. 6. Lexapro 20 mg daily. SOCIAL HISTORY: Denies alcohol, tobacco, IV drug abuse. Lives at   home. REVIEW OF SYSTEMS   A 10-point review of systems is done, which is essentially negative   except as in HPI mentioned above. ALLERGIES   1. COMPAZINE. 2.    3. PROMETHAZINE. 4. REGLAN. [address] [place] [address]. FAMILY HISTORY: Was discussed, was found to be noncontributory. PHYSICAL EXAMINATION   VITAL SIGNS: Temperature 98.1, pulse 73, respiratory rate 14, blood   pressure 112/76, pulse oximetry 98% on room air. GENERAL: Alert x3, awake, in severe distress. Resting in   bed, pleasant female, appears to be stated age. HEENT: Pupils equal and reactive to light. Dry mucous membranes. Tympanic membranes clear. NECK: Supple. CHEST: Clear to auscultation bilaterally. CORONARY: S1, S2 were heard. ABDOMEN: Soft, tender to palpation in the right lower quadrant. No   rebound or guarding. Bowel sounds are hypoactive. EXTREMITIES: No clubbing, no cyanosis, no edema. NEUROPSYCHIATRIC: Pleasant mood and affect. Sensory grossly   within normal limits. DTR 1+. Strength 5/5. SKIN: Warm. LABORATORY DATA: White count 8, hemoglobin 12, hematocrit 36.8,   platelets 138.  Urine shows no Paternal Grandmother      Diabetes Brother      Eczema Brother      Thyroid Disease Brother      Melanoma No family hx of        Review of Systems   A 10-point review of systems was performed. Pertinent findings are noted in the HPI.    Physical Exam   ECOG Status: 1    Vitals:  /87 (BP Location: Right arm, Cuff Size: Adult Large)   Pulse 82   Resp 16   Wt 105.5 kg (232 lb 9.6 oz)   SpO2 97%   BMI 37.83 kg/m       Gen: Alert, in NAD  Head: NC/AT  Eyes: PERRL, EOMI, sclera anicteric  Ears: No external auricular lesions  Nose/sinus: No rhinorrhea or epistaxis  Oral cavity/oropharynx: MMM, no visible oral cavity lesions, FOM and BOT are soft to palpation  Neck: Full ROM, supple, no palpable adenopathy  Pulm: No wheezing, stridor or respiratory distress  CV: Extremities are warm and well-perfused, no cyanosis, no pedal edema  Abdominal: Normal bowel sounds, soft, nontender, no masses  Musculoskeletal: No issues with range of motion.  Lymphedema: Lower extremity lymphedema.  Skin: Normal color and turgor  Neuro: A/Ox3, CN II-XII intact, normal gait    Imaging/Path/Labs   Imaging:   Per HPI    Path:   8/15/19  SPECIMEN(S):   Breast needle biopsy, left. 10:00, 12 cm from nipple     FINAL DIAGNOSIS:   Breast needle biopsy, left. 10:00, 12 cm from nipple:   - Invasive lobular carcinoma   - [place] grade: II (of possible III)        - [place] score 6 (of possible 9)        - Scoring criteria: tubules = 3, nuclear pleomorphism = 2, mitosis =   1.   - Angiolymphatic invasion:  None identified   - Carcinoma in-situ: Cannot exclude (see comment and images)   - Estrogen receptor:  POSITIVE (approximately 80%)   - Progesterone receptor:  POSITIVE (approximately 60-70%)   - Her2/walter:  FISH analysis will be performed and reported by the   [facility]   [facility] in a separate report.     9/23/19  SPECIMEN(S):   A: [place] lymph node, left   B: Breast tissue, left     FINAL DIAGNOSIS:   A.  signs of infection. Sodium 140,   potassium 3.6, chloride 109, bicarbonate 21, BUN 9, creatinine 0.8    albumin 4.1, ALT  alkaline phosphatase 46, lipase 164, lactic acid   0.7. CT of the abdomen and pelvis shows a 2.6 cm left ovarian cyst    abnormality. Ultrasound of the pelvis shows  left ovary, also   documented in both ovaries  . ASSESSMENT AND PLAN   1. Abdominal pain, no clear etiology. The patient will be observed in   the observation unit. Start the patient on IV hydration, keep n.p.o.   except for ice chips and provide pain control. We monitor. Will repeat   labs in the morning. If any peritoneal signs, will mandate emergent   surgical intervention. Will provide supportive care and may consider   getting an in-house gynecological  consult in the morning if symptoms   persist. Further intervention will be per hospital course. Will reassess   as needed. 2. Migraine headaches, stable. Continue to monitor. 3. Gastrointestinal and deep venous thrombosis prophylaxis. The   patient is on sequential compression devices.         Hillary Brock MD MM / Giancarlo Griffith   D:  04/20/2017   20:22   T:  04/20/2017   21:22   Job #:  [id number] [place] lymph node, left:   - Negative for malignancy in single lymph node.     B. Left breast tissue with oriented margins:     - Invasive lobular carcinoma (see description, comment and images):     - Tumor size: Non-contiguous tumor spanning an estimated 4.5 of tissue   with 1.8 x 1.5 cm cross sectional   area of tumor in block B16 (see diagnostic comment)     - [place] grade: II (of possible III)             - [place] score 6 (of possible 9)             - Scoring criteria:  tubules = 3, nuclear pleomorphism = 2,   mitosis = 1.   -  Angiolymphatic invasion seen: None seen     - In-situ malignancy: None seen     - Margins of resection for invasive carcinoma:         - Deep black inked margin: POSITIVE (block B21 and B29, mid   specimen)         - Anterior green inked margin: 2.1 mm from margin (block B30,   lateral specimen)         - Inferior blue inked margin: 3.5 mm from margin (block B15, mid   specimen)         - Medial yellow inked margin: 3.7 mm from margin (block B1)         - Superior red inked margin: Widely clear         - Lateral orange inked margin: Widely clear     - Staging: pT2pN0     - Estrogen receptor:  POSITIVE (80% on prior needle biopsy [id number])     - Progesterone receptor:  POSITIVE (60-70% on prior needle biopsy   [id number])     - Her2/walter:  Non-amplified (Group 5 - GERALD Negative)     COMMENT:   TO VIEW DIGITAL IMAGES IN THIS REPORT:   +++ Digital images of the pathologic findings are included with this   report (15 images total)+++     9/30/19  FINAL DIAGNOSIS:   Left breast additional posterior margin:   - Positive new margin involved by invasive lobular carcinoma extending   over 0.55mm x 0.35 mm area.   - No in situ malignancy identified.   - No angiolymphatic invasion present     10/9/19  SPECIMEN(S):   A: Posterior left breast lumpectomy cavity   B: Inferior left breast lump cavity     FINAL DIAGNOSIS:   A. Posterior left breast lumpectomy cavity:   - Negative for residual  malignancy, no atypical cellular proliferations   identified.   - Reactive inflammatory changes of prior biopsy with reactive fibrosis,   fat necrosis, granulation tissue, and   granulomatous inflammation.     B. Inferior left breast lump cavity:   - Negative for residual malignancy, no atypical cellular proliferations   identified.   - Reactive inflammatory changes of prior biopsy with reactive fibrosis,   fat necrosis, granulation tissue, and   granulomatous inflammation.       Assessment    Ms. Ravi is a 54 year old female with diagnosed invasive lobular carcinoma of the left breast status post lumpectomy and sentinel lymph node evaluation.  Final pathology demonstrated ILC, 4.5 cm in size, grade 2, no LVSI, negative margins status post reexcision, 0 out of 1 sentinel lymph node, ER positive, SD positive, HER-2 negative, pathologic T2N0, low Oncotype recurrence score.  She presents today to discuss potential role of adjuvant radiation therapy.     Plan     We discussed that adjuvant radiation is the standard of care for patients with invasive ductal carcinoma after lumpectomy. The most recent update of the EBCTCG metaanalysis for early stage breast cancer showed that for patients with pathologically node negative disease, radiotherapy reduces the 5 year risk of any recurrence from 31% to 15% (Lancet, 2011). This translated into a 3.3% absolute survival benefit at 15 years for all-comers.      We recommend hypofractionated radiation therapy based on results from the [place] hypofractionation trial (Clayton et al, NEJ, 2010) and the recent update of the [place] START trial (Sri et al, Lancet Oncol, 2013), which both show equivalent local control, survival and cosmesis with these shorter treatment schedules as compared to conventional radiation fractionation. After our discussion, the patient is interested in proceeding with hypofractionated treatment to the left breast.  We recommend a dose of 4005 cGy in 15  fractions followed by 1000 cGy in 5 fraction boost to the lumpectomy bed due to age < 60 years old and large tumor size.     Today we discussed the logistics of treatment planning and delivery in detail with the patient. We also discussed side effects, including short term risks of fatigue and skin reaction, and long term risks of pneumonitis, lung fibrosis, soft tissue fibrosis, skin changes, breast contour changes, rib fractures, cardiac damage, secondary cancers, lymphedema, and thyroid dysfunction. We described the use of 3D-conformal radiotherapy to minimize dose to the normal tissues, while adequately covering the target tissues, and the ability of this technique to decrease potential for toxicity.     With regards to sparing of the heart, we discussed the use of deep inspiration breath hold for treatment planning and delivery. This method has been shown to significantly reduce dose to the lung and heart as compared to treatment with free breathing (Shawn et al, AJCO, 2012). We discussed that this will involve 3D-conformal treatment planning, with contouring of the heart, and that limiting heart dose will be a high priority for treatment planning, as will dose to the contralateral breast and lung.      The risks and benefits of radiation therapy were discussed in detail with the patient. The patient expressed an understanding of these risks and has agreed to proceed with the proposed treatment. Informed consent was obtained.  CT simulation will be scheduled. We will plan to begin RT 7-10 days after CT simulation. Patient agrees with the plan in place.     Donnie Nuno MD  Department of Radiation Oncology  [facility]

## 2019-10-31 ENCOUNTER — OFFICE VISIT (OUTPATIENT)
Dept: RADIATION THERAPY | Facility: OUTPATIENT CENTER | Age: 54
End: 2019-10-31
Payer: COMMERCIAL

## 2019-10-31 DIAGNOSIS — C50.919 INVASIVE LOBULAR CARCINOMA OF BREAST IN FEMALE (H): Primary | ICD-10-CM

## 2019-10-31 NOTE — PROGRESS NOTES
Patient underwent CT simulation.    Donnie Nuno M.D.  Department of Radiation Oncology  [facility]

## 2019-10-31 NOTE — LETTER
10/31/2019      RE: Merlyn FRANCOIS [place]  [address] [address] [facility] [address]       Patient underwent CT simulation.    Donnie Nuno M.D.  Department of Radiation Oncology  [facility]       Donnie Nuno MD

## 2019-11-08 ENCOUNTER — APPOINTMENT (OUTPATIENT)
Dept: RADIATION THERAPY | Facility: OUTPATIENT CENTER | Age: 54
End: 2019-11-08
Payer: COMMERCIAL

## 2019-11-11 ENCOUNTER — APPOINTMENT (OUTPATIENT)
Dept: RADIATION THERAPY | Facility: OUTPATIENT CENTER | Age: 54
End: 2019-11-11
Payer: COMMERCIAL

## 2019-11-12 ENCOUNTER — APPOINTMENT (OUTPATIENT)
Dept: RADIATION THERAPY | Facility: OUTPATIENT CENTER | Age: 54
End: 2019-11-12
Payer: COMMERCIAL

## 2019-11-13 ENCOUNTER — OFFICE VISIT (OUTPATIENT)
Dept: RADIATION THERAPY | Facility: OUTPATIENT CENTER | Age: 54
End: 2019-11-13
Payer: COMMERCIAL

## 2019-11-13 ENCOUNTER — APPOINTMENT (OUTPATIENT)
Dept: RADIATION THERAPY | Facility: OUTPATIENT CENTER | Age: 54
End: 2019-11-13
Payer: COMMERCIAL

## 2019-11-13 VITALS
WEIGHT: 232.6 LBS | OXYGEN SATURATION: 96 % | HEART RATE: 80 BPM | SYSTOLIC BLOOD PRESSURE: 117 MMHG | RESPIRATION RATE: 18 BRPM | DIASTOLIC BLOOD PRESSURE: 77 MMHG | BODY MASS INDEX: 37.83 KG/M2

## 2019-11-13 DIAGNOSIS — C50.919 INVASIVE LOBULAR CARCINOMA OF BREAST IN FEMALE (H): Primary | ICD-10-CM

## 2019-11-13 NOTE — LETTER
"2019      RE: Merlyn Ravi  [address] [facility] [address]       [facility]  SPECIALIZING IN BREAKTHROUGHS  Radiation Oncology    On Treatment Visit Note      Merlyn Ravi      Date: 2019   MRN: [id number]   : 1965  Diagnosis: Lobular Breast Ca      Reason for Visit:  On Radiation Treatment Visit     Treatment Summary to Date  Treatment Site: L breast Current Dose: 801/5005 cGy Fractions: 3/20      Chemotherapy  Chemo concurrent with radx?: No    Subjective:   Doing well. No acute complaints. Very mild pruritus at times. Some warmth sensation.       Nursing ROS:   Nutrition Alteration  Diet Type: Patient's Preference  Skin  Skin Reaction: 0 - No changes  Skin Note: feels a little \"warmth\" to breast, a little heaviness. no pain.        Cardiovascular  Respiratory effort: 1 - Normal - without distress           Pain Assessment  0-10 Pain Scale: 0      Objective:   /77   Pulse 80   Resp 18   Wt 105.5 kg (232 lb 9.6 oz)   SpO2 96%   BMI 37.83 kg/m      Mild erythema without desquamation.     Labs:  CBC RESULTS:   Recent Labs   Lab Test 19  1601   WBC 6.1   RBC 4.34   HGB 13.3   HCT 41.4   MCV 95   MCH 30.6   MCHC 32.1   RDW 12.1        ELECTROLYTES:  Recent Labs   Lab Test 19  1601      POTASSIUM 3.6   CHLORIDE 108   VERONICA 9.0   CO2 28   BUN 16   CR 0.84   *       Assessment:  Ms. Ravi is a 54 year old female with diagnosed invasive lobular carcinoma of the left breast status post lumpectomy and sentinel lymph node evaluation.  Final pathology demonstrated ILC, 4.5 cm in size, grade 2, no LVSI, negative margins status post reexcision, 0 out of 1 sentinel lymph node, ER positive, KS positive, HER-2 negative, pathologic T2N0, low Oncotype recurrence score.  She  is undergoing adjuvant radiation therapy.    Tolerating radiation therapy well.  All questions and concerns addressed.    Plan:   1. Continue current " therapy.    2. Continue skin care.   3. Ibuprofen PRN breast pain. Neosporin with pain relief PRN pain.   4. Hydrocortisone PRN pruritus.       Mosaiq chart and setup information reviewed  Ports checked    Medication Review  Med list reviewed with patient?: Yes    Educational Topic Discussed  Education Instructions: reviewed SE, skin care, self care.      Donnie Nuno MD

## 2019-11-13 NOTE — PROGRESS NOTES
"[facility]YS[facility]IANS  SPECIALIZING IN BREAKTHROUGHS  Radiation Oncology    On Treatment Visit Note      Merlyn Ravi      Date: 2019   MRN: [id number]   : 1965  Diagnosis: Lobular Breast Ca      Reason for Visit:  On Radiation Treatment Visit     Treatment Summary to Date  Treatment Site: L breast Current Dose: 801/5005 cGy Fractions: 3/20      Chemotherapy  Chemo concurrent with radx?: No    Subjective:   Doing well. No acute complaints. Very mild pruritus at times. Some warmth sensation.       Nursing ROS:   Nutrition Alteration  Diet Type: Patient's Preference  Skin  Skin Reaction: 0 - No changes  Skin Note: feels a little \"warmth\" to breast, a little heaviness. no pain.        Cardiovascular  Respiratory effort: 1 - Normal - without distress           Pain Assessment  0-10 Pain Scale: 0      Objective:   /77   Pulse 80   Resp 18   Wt 105.5 kg (232 lb 9.6 oz)   SpO2 96%   BMI 37.83 kg/m     Mild erythema without desquamation.     Labs:  CBC RESULTS:   Recent Labs   Lab Test 19  1601   WBC 6.1   RBC 4.34   HGB 13.3   HCT 41.4   MCV 95   MCH 30.6   MCHC 32.1   RDW 12.1        ELECTROLYTES:  Recent Labs   Lab Test 19  1601      POTASSIUM 3.6   CHLORIDE 108   VERONICA 9.0   CO2 28   BUN 16   CR 0.84   *       Assessment:  Ms. Ravi is a 54 year old female with diagnosed invasive lobular carcinoma of the left breast status post lumpectomy and sentinel lymph node evaluation.  Final pathology demonstrated ILC, 4.5 cm in size, grade 2, no LVSI, negative margins status post reexcision, 0 out of 1 sentinel lymph node, ER positive, MO positive, HER-2 negative, pathologic T2N0, low Oncotype recurrence score.  She is undergoing adjuvant radiation therapy.    Tolerating radiation therapy well.  All questions and concerns addressed.    Plan:   1. Continue current therapy.    2. Continue skin care.   3. Ibuprofen PRN breast pain. Neosporin with pain relief " PRN pain.   4. Hydrocortisone PRN pruritus.       Mosaiq chart and setup information reviewed  Ports checked    Medication Review  Med list reviewed with patient?: Yes    Educational Topic Discussed  Education Instructions: reviewed SE, skin care, self care.      Donnie Nuno MD

## 2019-11-14 ENCOUNTER — APPOINTMENT (OUTPATIENT)
Dept: RADIATION THERAPY | Facility: OUTPATIENT CENTER | Age: 54
End: 2019-11-14
Payer: COMMERCIAL

## 2019-11-15 ENCOUNTER — APPOINTMENT (OUTPATIENT)
Dept: RADIATION THERAPY | Facility: OUTPATIENT CENTER | Age: 54
End: 2019-11-15
Payer: COMMERCIAL

## 2019-11-18 ENCOUNTER — APPOINTMENT (OUTPATIENT)
Dept: RADIATION THERAPY | Facility: OUTPATIENT CENTER | Age: 54
End: 2019-11-18
Payer: COMMERCIAL

## 2019-11-19 ENCOUNTER — APPOINTMENT (OUTPATIENT)
Dept: RADIATION THERAPY | Facility: OUTPATIENT CENTER | Age: 54
End: 2019-11-19
Payer: COMMERCIAL

## 2019-11-20 ENCOUNTER — APPOINTMENT (OUTPATIENT)
Dept: RADIATION THERAPY | Facility: OUTPATIENT CENTER | Age: 54
End: 2019-11-20
Payer: COMMERCIAL

## 2019-11-20 ENCOUNTER — OFFICE VISIT (OUTPATIENT)
Dept: RADIATION THERAPY | Facility: OUTPATIENT CENTER | Age: 54
End: 2019-11-20
Payer: COMMERCIAL

## 2019-11-20 VITALS
SYSTOLIC BLOOD PRESSURE: 113 MMHG | DIASTOLIC BLOOD PRESSURE: 67 MMHG | WEIGHT: 235.8 LBS | HEART RATE: 67 BPM | BODY MASS INDEX: 38.35 KG/M2

## 2019-11-20 DIAGNOSIS — C50.919 INVASIVE LOBULAR CARCINOMA OF BREAST IN FEMALE (H): Primary | ICD-10-CM

## 2019-11-20 NOTE — PROGRESS NOTES
On Treatment Visit Note     Merlyn Ravi                                                                                 Date: 2019              MRN: [id number]   : 1965    Diagnosis: Lobular Breast Cancer     Reason for Visit:  On Radiation Treatment Visit      Treatment Summary to Date  Treatment Site: L breast Current Dose: 2136/5005 cGy Fractions:        Chemotherapy  Chemo concurrent with radx?: No     Subjective:  Doing well. No acute complaints. Mild left marleni-areolar tenderness, stated as mostly manageable.     Objective: A/Ox3. NAD  Mild erythema without desquamation over irradiated field. Moderate erythema present in left marleni-areolar area.     Labs: No interval labs     Assessment:  Ms. Ravi is a 54 year old female with invasive lobular carcinoma of the left breast. She is status post lumpectomy and sentinel lymph node evaluation.  Final pathology demonstrated ILC, 4.5 cm in size, grade 2, no LVSI, negative margins status post re-excision, 0 out of 1 sentinel lymph nodes, ER+, CT+, HER-2 negative disease. pathologic T2N0, low Oncotype recurrence score.  She is undergoing adjuvant radiation therapy with a lumpectomy cavity boost.     Tolerating radiation therapy well.  All questions and concerns addressed.     Plan:   1. Continue current therapy.    2. Continue skin care.   3. Ibuprofen PRN breast pain and nipple tenderness. Neosporin with pain relief PRN pain.   4. Hydrocortisone PRN pruritus.      Sung Perez MD-PhD PGY-4  Radiation Oncology Resident, [facility]  [phone]    I was present with the resident during the visit. I discussed the case with the resident and agree with the note as documented by the resident.    Donnie Nuno M.D.  Department of Radiation Oncology  [facility]

## 2019-11-20 NOTE — LETTER
2019      RE: Merlyn Ravi  [address] [facility] [address]       On Treatment Visit Note     Merlyn Ravi                                                                                 Date: 2019              MRN: [id number]   : 1965    Diagnosis: Lobular Breast Cancer     Reason for Visit:  On Radiation Treatment Visit      Treatment Summary to Date  Treatment Site: L breast Current Dose: 2136/5005 cGy Fractions:        Chemotherapy  Chemo concurrent with radx?: No     Subjective:  Doing well. No acute complaints. Mild left marleni-areolar tenderness, stated as mostly manageable.     Objective:  A/Ox3. NAD  Mild erythema without desquamation over irradiated field. Moderate erythema present in left marleni-areolar area.     Labs: No interval labs     Assessment:  Ms. Ravi is a 54 year old female with invasive lobular carcinoma of the left breast. She is status post lumpectomy and sentinel lymph node evaluation.  Final pathology demonstrated ILC, 4.5 cm in size, grade 2, no LVSI, negative margins status post re-excision, 0 out of 1 sentinel lymph nodes, ER+, [place]+, HER-2 negative disease. pathologic T2N0, low Oncotype recurrence score.  She is undergoing adjuvant radiation therapy with a lumpectomy cavity boost.     Tolerating radiation therapy well.  All questions and concerns addressed.     Plan:   1. Continue current therapy.    2. Continue skin care.   3. Ibuprofen PRN breast pain and nipple tenderness. Neosporin with pain relief PRN pain.   4. Hydrocortisone PRN pruritus.      Sung Perez MD-PhD PGY-4  Radiation Oncology Resident, [facility]  [phone]    I was present with the resident during the visit. I discussed the case with the resident and agree with the note as documented by the resident.    Donnie Nuno M.D.  Department of Radiation Oncology  [facility]

## 2019-11-21 ENCOUNTER — APPOINTMENT (OUTPATIENT)
Dept: RADIATION THERAPY | Facility: OUTPATIENT CENTER | Age: 54
End: 2019-11-21
Payer: COMMERCIAL

## 2019-11-22 ENCOUNTER — APPOINTMENT (OUTPATIENT)
Dept: RADIATION THERAPY | Facility: OUTPATIENT CENTER | Age: 54
End: 2019-11-22
Payer: COMMERCIAL

## 2019-11-25 ENCOUNTER — APPOINTMENT (OUTPATIENT)
Dept: RADIATION THERAPY | Facility: OUTPATIENT CENTER | Age: 54
End: 2019-11-25
Payer: COMMERCIAL

## 2019-11-26 ENCOUNTER — APPOINTMENT (OUTPATIENT)
Dept: RADIATION THERAPY | Facility: OUTPATIENT CENTER | Age: 54
End: 2019-11-26
Payer: COMMERCIAL

## 2019-11-27 ENCOUNTER — APPOINTMENT (OUTPATIENT)
Dept: RADIATION THERAPY | Facility: OUTPATIENT CENTER | Age: 54
End: 2019-11-27
Payer: COMMERCIAL

## 2019-11-27 ENCOUNTER — OFFICE VISIT (OUTPATIENT)
Dept: RADIATION THERAPY | Facility: OUTPATIENT CENTER | Age: 54
End: 2019-11-27
Payer: COMMERCIAL

## 2019-11-27 VITALS
DIASTOLIC BLOOD PRESSURE: 80 MMHG | SYSTOLIC BLOOD PRESSURE: 121 MMHG | HEART RATE: 69 BPM | BODY MASS INDEX: 37.96 KG/M2 | WEIGHT: 233.4 LBS

## 2019-11-27 DIAGNOSIS — C50.919 INVASIVE LOBULAR CARCINOMA OF BREAST IN FEMALE (H): Primary | ICD-10-CM

## 2019-11-27 NOTE — LETTER
2019      RE: Merlyn Ravi  [address] [facility] [address]       On Treatment Visit Note     Merlyn Ravi                                                                                 Date: 2019              MRN: [id number]   : 1965    Diagnosis: Lobular Breast Cancer     Reason for Visit:  On Radiation Treatment Visit      Treatment Summary to Date  Treatment Site: L breast Current Dose: 3471/5005 cGy Fractions:        Chemotherapy  Chemo concurrent with radx?: No     Subjective:  Doing well. No acute complaints. Mild left marleni-areolar tenderness, stated as mostly manageable.     Objective: A/Ox3. NAD  Mild erythema without desquamation over irradiated field. Moderate erythema present in left marleni-areolar area.     Labs: No interval labs     Assessment:  Ms. Ravi is a 54 year old female with invasive lobular carcinoma of the left breast. She is status post lumpectomy and sentinel lymph node evaluation.  Final pathology demonstrated ILC, 4.5 cm in size, grade 2, no LVSI, negative margins status post re-excision, 0 out of 1 sentinel lymph nodes, ER+, MS+, HER-2 negative disease. pathologic T2N0, low Oncotype recurrence score.  She is undergoing adjuvant radiation therapy with a lumpectomy cavity boost.     Tolerating radiation therapy well.  All questions and concerns addressed.     Plan:   1. Continue current therapy.    2. Continue skin care.   3. Ibuprofen PRN breast pain and nipple tenderness. Neosporin with pain relief PRN pain.   4. Hydrocortisone PRN pruritus.       Donnie Nuno M.D.  Department of Radiation Oncology  [facility]

## 2019-11-27 NOTE — PROGRESS NOTES
On Treatment Visit Note     Merlyn Ravi                                                                                 Date: 2019              MRN: [id number]   : 1965    Diagnosis: Lobular Breast Cancer     Reason for Visit:  On Radiation Treatment Visit      Treatment Summary to Date  Treatment Site: L breast Current Dose: 3471/5005 cGy Fractions:        Chemotherapy  Chemo concurrent with radx?: No     Subjective:  Doing well. No acute complaints. Mild left marleni-areolar tenderness, stated as mostly manageable.     Objective: A/Ox3. NAD  Mild erythema without desquamation over irradiated field. Moderate erythema present in left marleni-areolar area.     Labs: No interval labs     Assessment:  Ms. Ravi is a 54 year old female with invasive lobular carcinoma of the left breast. She is status post lumpectomy and sentinel lymph node evaluation.  Final pathology demonstrated ILC, 4.5 cm in size, grade 2, no LVSI, negative margins status post re-excision, 0 out of 1 sentinel lymph nodes, ER+, [place]+, HER-2 negative disease. pathologic T2N0, low Oncotype recurrence score.  She is undergoing adjuvant radiation therapy with a lumpectomy cavity boost.     Tolerating radiation therapy well.  All questions and concerns addressed.     Plan:   1. Continue current therapy.    2. Continue skin care.   3. Ibuprofen PRN breast pain and nipple tenderness. Neosporin with pain relief PRN pain.   4. Hydrocortisone PRN pruritus.       Donnie Nuno M.D.  Department of Radiation Oncology  [facility]

## 2019-11-29 ENCOUNTER — APPOINTMENT (OUTPATIENT)
Dept: RADIATION THERAPY | Facility: OUTPATIENT CENTER | Age: 54
End: 2019-11-29
Payer: COMMERCIAL

## 2019-12-02 ENCOUNTER — APPOINTMENT (OUTPATIENT)
Dept: RADIATION THERAPY | Facility: OUTPATIENT CENTER | Age: 54
End: 2019-12-02
Payer: COMMERCIAL

## 2019-12-03 ENCOUNTER — APPOINTMENT (OUTPATIENT)
Dept: RADIATION THERAPY | Facility: OUTPATIENT CENTER | Age: 54
End: 2019-12-03
Payer: COMMERCIAL

## 2019-12-04 ENCOUNTER — APPOINTMENT (OUTPATIENT)
Dept: RADIATION THERAPY | Facility: OUTPATIENT CENTER | Age: 54
End: 2019-12-04
Payer: COMMERCIAL

## 2019-12-04 ENCOUNTER — OFFICE VISIT (OUTPATIENT)
Dept: RADIATION THERAPY | Facility: OUTPATIENT CENTER | Age: 54
End: 2019-12-04
Payer: COMMERCIAL

## 2019-12-04 VITALS
DIASTOLIC BLOOD PRESSURE: 80 MMHG | RESPIRATION RATE: 18 BRPM | OXYGEN SATURATION: 97 % | BODY MASS INDEX: 38.35 KG/M2 | WEIGHT: 235.8 LBS | HEART RATE: 71 BPM | SYSTOLIC BLOOD PRESSURE: 125 MMHG

## 2019-12-04 DIAGNOSIS — C50.919 INVASIVE LOBULAR CARCINOMA OF BREAST IN FEMALE (H): Primary | ICD-10-CM

## 2019-12-04 NOTE — LETTER
2019      RE: Merlyn Ravi  [address] [facility] [address]       On Treatment Visit Note     Merlyn Ravi                                                                             Date: 2019              MRN: [id number]   : 1965     Diagnosis: Lobular Breast Cancer     Reason for Visit:  On Radiation Treatment Visit      Treatment Summary to Date  Treatment Site: L breast Current Dose:  4405/5005 cGy Fractions: 1       Subjective:  Doing well. No acute complaints. Mild left marleni-areolar tenderness, stated as mostly manageable.     Objective: A/Ox3. NAD  Patchy mild-moderate erythema without desquamation over irradiated field. Moderate erythema present in left marleni-areolar area. Hyperpigmentation but not desquamation appreciated in left inframammary fold     Labs: No interval labs     Assessment:  Ms. Ravi is a 54 year old female with invasive lobular carcinoma of the left breast. She is status post lumpectomy and sentinel lymph node evaluation.  Final pathology demonstrated ILC, 4.5 cm in size, grade 2, no LVSI, negative margins status post re-excision, 0 out of 1 sentinel lymph nodes, ER+, MT+, HER-2 negative disease. pathologic T2N0, low Oncotype recurrence score.  She is undergoing adjuvant radiation therapy with a lumpectomy cavity boost.     Tolerating radiation therapy well.  All questions and concerns addressed.     Plan:   1. Continue current therapy.    2. Continue skin care. Pt provided with Mepilex  3. Ibuprofen PRN breast pain and nipple tenderness. Neosporin with pain relief PRN pain.   4. Hydrocortisone PRN pruritus.   5. The patient is scheduled to finish adjuvant RT on 2019. We will have her RTC for reassessment in 1m with PERCY Jackson MD-PhD PGY-4  Radiation Oncology Resident, [facility]  [phone]    Staff Note:  Patient was seen personally by be and I was present when she was seen by resident. I  agree with above note.  Mosaic and and treatment image was reviewed.    Amando Garcia MD

## 2019-12-04 NOTE — PROGRESS NOTES
On Treatment Visit Note     Merlyn Ravi                                                                             Date: 2019              MRN: [id number]   : 1965     Diagnosis: Lobular Breast Cancer     Reason for Visit:  On Radiation Treatment Visit      Treatment Summary to Date  Treatment Site: L breast Current Dose: 4405/5005 cGy Fractions:       Subjective:  Doing well. No acute complaints. Mild left marleni-areolar tenderness, stated as mostly manageable.     Objective: A/Ox3. NAD  Patchy mild-moderate erythema without desquamation over irradiated field. Moderate erythema present in left marleni-areolar area. Hyperpigmentation but not desquamation appreciated in left inframammary fold     Labs: No interval labs     Assessment:  Ms. Ravi is a 54 year old female with invasive lobular carcinoma of the left breast. She is status post lumpectomy and sentinel lymph node evaluation.  Final pathology demonstrated ILC, 4.5 cm in size, grade 2, no LVSI, negative margins status post re-excision, 0 out of 1 sentinel lymph nodes, ER+, ID+, HER-2 negative disease. pathologic T2N0, low Oncotype recurrence score.  She is undergoing adjuvant radiation therapy with a lumpectomy cavity boost.     Tolerating radiation therapy well.  All questions and concerns addressed.     Plan:   1. Continue current therapy.    2. Continue skin care. Pt provided with Mepilex  3. Ibuprofen PRN breast pain and nipple tenderness. Neosporin with pain relief PRN pain.   4. Hydrocortisone PRN pruritus.   5. The patient is scheduled to finish adjuvant RT on 2019. We will have her RTC for reassessment in 1m with PERCY Jackson MD-PhD PGY-4  Radiation Oncology Resident, [facility]  [phone]    Staff Note:  Patient was seen personally by be and I was present when she was seen by resident. I agree with above note.  Mosaic and and treatment image was reviewed.    Amando Garcia MD

## 2019-12-05 ENCOUNTER — APPOINTMENT (OUTPATIENT)
Dept: RADIATION THERAPY | Facility: OUTPATIENT CENTER | Age: 54
End: 2019-12-05
Payer: COMMERCIAL

## 2019-12-06 ENCOUNTER — APPOINTMENT (OUTPATIENT)
Dept: RADIATION THERAPY | Facility: OUTPATIENT CENTER | Age: 54
End: 2019-12-06
Payer: COMMERCIAL

## 2019-12-09 ENCOUNTER — APPOINTMENT (OUTPATIENT)
Dept: RADIATION THERAPY | Facility: OUTPATIENT CENTER | Age: 54
End: 2019-12-09
Payer: COMMERCIAL

## 2019-12-10 ENCOUNTER — MYC MEDICAL ADVICE (OUTPATIENT)
Dept: FAMILY MEDICINE | Facility: CLINIC | Age: 54
End: 2019-12-10

## 2019-12-10 DIAGNOSIS — E78.5 HYPERLIPIDEMIA LDL GOAL <130: ICD-10-CM

## 2019-12-10 NOTE — TELEPHONE ENCOUNTER
Dr. Garcia,    Patient has been on Atorvastatin and had a repeat Lipid as she was due for her yearly.  On the results you wrote for her to do diet and exercise. Are you not wanting patient on her medication? She will need a refill otherwise. Mayte BRADY RN

## 2019-12-11 ENCOUNTER — DOCUMENTATION ONLY (OUTPATIENT)
Dept: RADIATION THERAPY | Facility: OUTPATIENT CENTER | Age: 54
End: 2019-12-11

## 2019-12-11 ENCOUNTER — ONCOLOGY VISIT (OUTPATIENT)
Dept: ONCOLOGY | Facility: CLINIC | Age: 54
End: 2019-12-11
Attending: INTERNAL MEDICINE
Payer: COMMERCIAL

## 2019-12-11 VITALS
BODY MASS INDEX: 37.02 KG/M2 | HEIGHT: 67 IN | DIASTOLIC BLOOD PRESSURE: 72 MMHG | OXYGEN SATURATION: 98 % | WEIGHT: 235.9 LBS | RESPIRATION RATE: 18 BRPM | TEMPERATURE: 98.7 F | SYSTOLIC BLOOD PRESSURE: 124 MMHG | HEART RATE: 73 BPM

## 2019-12-11 DIAGNOSIS — C50.919 INVASIVE LOBULAR CARCINOMA OF BREAST IN FEMALE (H): ICD-10-CM

## 2019-12-11 DIAGNOSIS — C50.912 INVASIVE LOBULAR CARCINOMA OF LEFT BREAST IN FEMALE (H): Primary | ICD-10-CM

## 2019-12-11 PROCEDURE — G0463 HOSPITAL OUTPT CLINIC VISIT: HCPCS

## 2019-12-11 PROCEDURE — 99214 OFFICE O/P EST MOD 30 MIN: CPT | Performed by: INTERNAL MEDICINE

## 2019-12-11 RX ORDER — TAMOXIFEN CITRATE 20 MG/1
20 TABLET ORAL DAILY
Qty: 90 TABLET | Refills: 1 | Status: SHIPPED | OUTPATIENT
Start: 2019-12-11 | End: 2020-03-11

## 2019-12-11 RX ORDER — ATORVASTATIN CALCIUM 10 MG/1
10 TABLET, FILM COATED ORAL DAILY
Qty: 90 TABLET | Refills: 3 | Status: SHIPPED | OUTPATIENT
Start: 2019-12-11 | End: 2019-12-12

## 2019-12-11 ASSESSMENT — MIFFLIN-ST. JEOR: SCORE: 1694.73

## 2019-12-11 ASSESSMENT — PAIN SCALES - GENERAL: PAINLEVEL: NO PAIN (0)

## 2019-12-11 NOTE — PATIENT INSTRUCTIONS
Start tamoxifen 20 mg orally daily.  Please give the patient information about tamoxifen.  Follow-up in 3 months with repeat laboratory tests.

## 2019-12-11 NOTE — PROGRESS NOTES
"Oncology Rooming Note    December 11, 2019 3:47 PM   Merlyn Ravi is a 54 year old female who presents for:    Chief Complaint   Patient presents with     Oncology Clinic Visit     Follow up after RT, breast cancer.      Initial Vitals: /72 (BP Location: Right arm, Patient Position: Sitting, Cuff Size: Adult Large)   Pulse 73   Temp 98.7  F (37.1  C) (Tympanic)   Resp 18   Ht 1.689 m (5' 6.5\")   Wt 107 kg (235 lb 14.4 oz)   SpO2 98%   Breastfeeding No   BMI 37.50 kg/m   Estimated body mass index is 37.5 kg/m  as calculated from the following:    Height as of this encounter: 1.689 m (5' 6.5\").    Weight as of this encounter: 107 kg (235 lb 14.4 oz). Body surface area is 2.24 meters squared.  No Pain (0) Comment: Data Unavailable   No LMP recorded. Patient has had a hysterectomy.  Allergies reviewed: Yes  Medications reviewed: Yes    Medications: Medication refills not needed today.  Pharmacy name entered into [employer]: [id number], [place] - [address] AT [facility] [address]    Clinical concerns: Follow up after RT, breast cancer.     Angy Nicolas, JORDAN           C  "

## 2019-12-11 NOTE — PROGRESS NOTES
Department of Radiation Oncology  Radiation Therapy Center  [facility] [place] [address]  [phone]       Radiotherapy Treatment Summary          19-19    PATIENT: Merlyn Ravi  MEDICAL RECORD NO: [id number]   : 1965    DIAGNOSIS: invasive lobular carcinoma of the left breast. She is status post lumpectomy and sentinel lymph node evaluation  PATHOLOGY:Final pathology demonstrated ILC, 4.5 cm in size, grade 2, no LVSI, negative margins status post re-excision, 0 out of 1 sentinel lymph nodes, ER+, RI+, HER-2 negative disease, low Oncotype recurrence score                             STAGE: pathologic T2N0  INTENT OF RADIOTHERAPY: adjuvant radiation therapy with a lumpectomy cavity boost.  CONCURRENT SYSTEMIC THERAPY:  No     ONCOLOGIC HISTORY:    Ms. Ravi is a 54 year old female left breast cancer.      Patient initially presented with a palpable left breast mass, prompting further evaluation.  Diagnostic imaging confirmed the presence of a left breast mass at the 10 o'clock position, 12 cm from the nipple.  On 8/15/2019 the patient underwent left breast biopsy.  Final pathology demonstrated ILC, grade 2, no LVSI, ER positive, RI positive, HER-2 negative.  On 2019 the patient underwent left breast lumpectomy and sentinel lymph node evaluation by Dr. Salas.  Final pathology demonstrated ILC, 4.5 cm in size, grade 2, no LVSI, negative margins status post reexcision, 0 out of 1 sentinel lymph node, ER positive, RI positive, HER-2 negative, pathologic T2N0.  The patient was subsequent seen by Dr. Blakely who ordered Oncotype, and recurrence score came back low at 20.  No systemic chemotherapy was recommended.  Anti-endocrine therapy was discussed.  The patient subsequently presented 10/29/19 in Radiation Oncology to discuss potential role of adjuvant whole breast radiation therapy.      SITE OF TREATMENT: L breast    DATES  OF TREATMENT:  11/11/19-12/09/19    TOTAL DOSE OF TREATMENT: 5005 cGy    DOSE PER FRACTION OF TREATMENT: 267 cGy x 15 fractions + 200 cGy x 5 fractions       COMMENT/TOXICITY:   Mild left marleni-areolar tenderness reported. Patchy mild-moderate erythema without desquamation over irradiated field. Moderate erythema present in left marleni-areolar area. Hyperpigmentation but not desquamation appreciated in left inframammary fold          PAIN MANAGEMENT:    Ibuprofen PRN breast pain and nipple tenderness. Neosporin with pain relief PRN pain                        FOLLOW UP PLAN:  1. Continue skin care. Pt provided with Mepilex  2. Hydrocortisone PRN pruritus.   3. We will have her RTC for reassessment in 1m with Bailey Melendez NP    Radiation Oncologist: Donnie Nuno M.D.  Department of Radiation Oncology  [facility]

## 2019-12-11 NOTE — PROGRESS NOTES
DATE OF VISIT: Dec 11, 2019    Merlyn Ravi is a 54 year old female is seen today for   Chief Complaint   Patient presents with     Oncology Clinic Visit     Follow up after RT, breast cancer.    .       (C50.912) Invasive lobular carcinoma of left breast in female (H)  (primary encounter diagnosis)  I discussed with the patient today adjuvant chemotherapy.  We discussed aromatase inhibitors as well as tamoxifen.  Patient had a history of hysterectomy.  We will proceed with tamoxifen 20 mg orally daily.  I gave the patient an overview about potential side effects of tamoxifen in details including the risk of thromboembolic disease.  Patient will continue on tamoxifen for 5 years.  Patient will continue calcium and vitamin D supplements.  We talked about reducing the risk for breast cancer by diet and exercise.  We also talked about follow-up plan for breast cancer.I reviewed with the patient today also follow-up of breast cancer. I told her that evidence from randomized trials indicates that periodic follow-up with bone scans, liver sonography, chest x-rays, and blood tests of liver function does not improve survival or quality of life when compared with routine physical examinations. Even when these tests permit earlier detection of recurrent disease, patient survival is unaffected. On the basis of these data, acceptable follow-up can be limited to physical examination and annual mammography for asymptomatic patients who complete treatment. According to NCCN guidelines, follow-up of breast cancer should include history and physical examination with emphasis on breast examination every 4-6 months for 5 years then annually thereafter. Emphasis is also on continuing with annual mammography. Evidence also suggests that active lifestyle and achieving and maintaining ideal body weight with a BMI of 20-25 may lead to optimal breast cancer outcomes.  The patient is ready to learn, no apparent learning barriers were  identified.  Diagnosis and treatment plans were explained to the patient. The patient expressed understanding of the content. The patient asked appropriate questions. The patient questions were answered to her satisfaction.  Time spent 25 minutes more than 50% of the time in counseling and coronation of care including discussion of management of lobular carcinoma, side effects of medications, follow-up and prognosis.  Chart documentation with Dragon Voice recognition Software. Although reviewed after completion, some words and grammatical errors may remain.

## 2019-12-11 NOTE — PROGRESS NOTES
Education on Tamoxifen provided to patient and . Denies questions or concerns at this time. Will  prescription and start it tomorrow 12.12.19. Status check next week. Smiley Gonzalez RN, BSN, OCN on 12/11/2019 at 4:25 PM

## 2019-12-11 NOTE — LETTER
"    12/11/2019         RE: Merlyn Ravi  [address] [facility] [address]        Dear Colleague,    Thank you for referring your patient, Merlyn Ravi, to the [facility]. Please see a copy of my visit note below.    Oncology Rooming Note    December 11, 2019 3:47 PM   Merlyn Ravi is a 54 year old female who presents for:    Chief Complaint   Patient presents with     Oncology Clinic Visit     Follow up after RT, breast cancer.      Initial Vitals: /72 (BP Location: Right arm, Patient Position: Sitting, Cuff Size: Adult Large)   Pulse 73   Temp 98.7  F (37.1  C) (Tympanic)   Resp 18   Ht 1.689 m (5' 6.5\")   Wt 107 kg (235 lb 14.4 oz)   SpO2 98%   Breastfeeding No   BMI 37.50 kg/m    Estimated body mass index is 37.5 kg/m  as calculated from the following:    Height as of this encounter: 1.689 m (5' 6.5\").    Weight as of this encounter: 107 kg (235 lb 14.4 oz). Body surface area is 2.24 meters squared.  No Pain (0) Comment: Data Unavailable   No LMP recorded. Patient has had a hysterectomy.  Allergies reviewed: Yes  Medications reviewed: Yes    Medications: Medication refills not needed today.  Pharmacy name entered into [employer]: [facility] #[id number] - [place], [place] - [address] AT [facility] OF [address]    Clinical concerns: Follow up after RT, breast cancer.     JORDAN Fortune    DATE OF VISIT: Dec 11, 2019    Merlyn Ravi is a 54 year old female is seen today for   Chief Complaint   Patient presents with     Oncology Clinic Visit     Follow up after RT, breast cancer.    .       (C50.912) Invasive lobular carcinoma of left breast in female (H)  (primary encounter diagnosis)  I discussed with the patient today adjuvant chemotherapy.  We discussed aromatase inhibitors as well as tamoxifen.  Patient had a history of hysterectomy.  We will proceed with tamoxifen 20 mg orally daily.  I gave the patient an overview about " potential side effects of tamoxifen in details including the risk of thromboembolic disease.  Patient will continue on tamoxifen for 5 years.  Patient will continue calcium and vitamin D supplements.  We talked about reducing the risk for breast cancer by diet and exercise.  We also talked about follow-up plan for breast cancer.I reviewed with the patient today also follow-up of breast cancer. I told her that evidence from randomized trials indicates that periodic follow-up with bone scans, liver sonography, chest x-rays, and blood tests of liver function does not improve survival or quality of life when compared with routine physical examinations. Even when these tests permit earlier detection of recurrent disease, patient survival is unaffected. On the basis of these data, acceptable follow-up can be limited to physical examination and annual mammography for asymptomatic patients who complete treatment. According to NCCN guidelines, follow-up of breast cancer should include history and physical examination with emphasis on breast examination every 4-6 months for 5 years then annually thereafter. Emphasis is also on continuing with annual mammography. Evidence also suggests that active lifestyle and achieving and maintaining ideal body weight with a BMI of 20-25 may lead to optimal breast cancer outcomes.  The patient is ready to learn, no apparent learning barriers were identified.  Diagnosis and treatment plans were explained to the patient. The patient expressed understanding of the content. The patient asked appropriate questions. The patient questions were answered to her satisfaction.  Time spent 25 minutes more than 50% of the time in counseling and coronation of care including discussion of management of lobular carcinoma, side effects of medications, follow-up and prognosis.  Chart documentation with Dragon Voice recognition Software. Although reviewed after completion, some words and grammatical errors  may remain.    Again, thank you for allowing me to participate in the care of your patient.        Sincerely,        Tanvi Blakely MD

## 2019-12-12 ENCOUNTER — MYC REFILL (OUTPATIENT)
Dept: FAMILY MEDICINE | Facility: CLINIC | Age: 54
End: 2019-12-12

## 2019-12-12 DIAGNOSIS — E78.5 HYPERLIPIDEMIA LDL GOAL <130: ICD-10-CM

## 2019-12-13 RX ORDER — ATORVASTATIN CALCIUM 10 MG/1
TABLET, FILM COATED ORAL
Qty: 30 TABLET | Refills: 0 | OUTPATIENT
Start: 2019-12-13

## 2019-12-13 NOTE — TELEPHONE ENCOUNTER
Recently sent to another pharmacy. Advised requesting pharmacy to check with patient.    Leandra Rey RN

## 2019-12-16 RX ORDER — ATORVASTATIN CALCIUM 10 MG/1
10 TABLET, FILM COATED ORAL DAILY
Qty: 90 TABLET | Refills: 2 | Status: SHIPPED | OUTPATIENT
Start: 2019-12-16 | End: 2021-02-02

## 2019-12-16 NOTE — TELEPHONE ENCOUNTER
Prescription approved per [facility].  Binta Gayle RNC

## 2020-01-02 ENCOUNTER — MYC REFILL (OUTPATIENT)
Dept: DERMATOLOGY | Facility: CLINIC | Age: 55
End: 2020-01-02

## 2020-01-02 ENCOUNTER — MYC REFILL (OUTPATIENT)
Dept: ENDOCRINOLOGY | Facility: CLINIC | Age: 55
End: 2020-01-02

## 2020-01-02 DIAGNOSIS — E06.3 HYPOTHYROIDISM DUE TO HASHIMOTO'S THYROIDITIS: ICD-10-CM

## 2020-01-02 DIAGNOSIS — L71.9 ACNE ROSACEA: ICD-10-CM

## 2020-01-02 RX ORDER — DOXYCYCLINE HYCLATE 20 MG
TABLET ORAL
Qty: 180 TABLET | Refills: 0 | Status: SHIPPED | OUTPATIENT
Start: 2020-01-02 | End: 2020-07-23

## 2020-01-02 RX ORDER — LEVOTHYROXINE SODIUM 88 UG/1
88 TABLET ORAL DAILY
Qty: 90 TABLET | Refills: 0 | Status: SHIPPED | OUTPATIENT
Start: 2020-01-02 | End: 2020-04-02

## 2020-01-02 NOTE — TELEPHONE ENCOUNTER
Medication is being filled for 1 time refill only due to:  Patient needs to be seen because it has been more than one year since last visit. Letter sent to pt to make appt.  Lacey MAHMOOD RN BSN PHN  Specialty Clinics

## 2020-01-10 ENCOUNTER — OFFICE VISIT (OUTPATIENT)
Dept: RADIATION THERAPY | Facility: OUTPATIENT CENTER | Age: 55
End: 2020-01-10
Payer: COMMERCIAL

## 2020-01-10 VITALS
RESPIRATION RATE: 16 BRPM | SYSTOLIC BLOOD PRESSURE: 130 MMHG | DIASTOLIC BLOOD PRESSURE: 79 MMHG | HEART RATE: 69 BPM | WEIGHT: 233.6 LBS | BODY MASS INDEX: 37.14 KG/M2

## 2020-01-10 DIAGNOSIS — C50.919 INVASIVE LOBULAR CARCINOMA OF BREAST IN FEMALE (H): Primary | ICD-10-CM

## 2020-01-10 NOTE — NURSING NOTE
FOLLOW-UP VISIT    Patient Name: Merlyn Ravi      : 1965     Age: 54 year old        ______________________________________________________________________________     Chief Complaint   Patient presents with     Radiation Therapy     Return visit, breast cancer     /79 (BP Location: Right arm, Cuff Size: Adult Large)   Pulse 69   Resp 16   Wt 106 kg (233 lb 9.6 oz)   BMI 37.14 kg/m       Date Radiation Completed: 19    Pain  Denies    Meds  Current Med List Reviewed: Yes  Medication Note:     Skin: healed well    Range of Motion: full, no edema    Respiratory: No shortness of breath, dyspnea on exertion, cough, or hemoptysis    Hormone Therapy:  Started tamoxifen    Lymphedema Follow up: No    Energy Level: tired, but just started tamoxifen       Appointments:     DATE  Oncologist: marybeth HIDALGO/radha March   Surgeon: mg Ashton april   Primary:      Other Notes: heading to [place] next week for vacation

## 2020-01-10 NOTE — PROGRESS NOTES
Department of Radiation Oncology  Radiation Therapy Center  [facility] [place] [address]  [phone]         Radiation Oncology Follow-up Visit  January 10, 2020    Merlyn Ravi  MRN: [id number]   : 1965     DIAGNOSIS: invasive lobular carcinoma of the left breast. She is status post lumpectomy and sentinel lymph node evaluation  PATHOLOGY: Final pathology demonstrated ILC, 4.5 cm in size, grade 2, no LVSI, negative margins status post re-excision, 0 out of 1 sentinel lymph nodes, ER+, IN+, HER-2 negative disease, low Oncotype recurrence score                             STAGE: pathologic T2N0  INTENT OF RADIOTHERAPY: adjuvant radiation therapy with a lumpectomy cavity boost.  CONCURRENT SYSTEMIC THERAPY:  No      ONCOLOGIC HISTORY:    Ms. Ravi is a 54 year old female left breast cancer.      Patient initially presented with a palpable left breast mass, prompting further evaluation.  Diagnostic imaging confirmed the presence of a left breast mass at the 10 o'clock position, 12 cm from the nipple.  On 8/15/2019 the patient underwent left breast biopsy.  Final pathology demonstrated ILC, grade 2, no LVSI, ER positive, IN positive, HER-2 negative.  On 2019 the patient underwent left breast lumpectomy and sentinel lymph node evaluation by Dr. Salas.  Final pathology demonstrated ILC, 4.5 cm in size, grade 2, no LVSI, negative margins status post reexcision, 0 out of 1 sentinel lymph node, ER positive, IN positive, HER-2 negative, pathologic T2N0.  The patient was subsequent seen by Dr. Blakely who ordered Oncotype, and recurrence score came back low at 20.  No systemic chemotherapy was recommended.  Anti-endocrine therapy was discussed.  The patient subsequently presented 10/29/19 in Radiation Oncology to discuss potential role of adjuvant whole breast radiation therapy.      Of note, the patient has a history of monoclonal gammopathy since .  She has been  "followed annually by medical oncology with laboratory tests without any evidence of plasma cell dyscrasia     SITE OF TREATMENT: L breast     DATES  OF TREATMENT: 19-19     TOTAL DOSE OF TREATMENT: 5005 cGy     DOSE PER FRACTION OF TREATMENT: 267 cGy x 15 fractions + 200 cGy x 5 fractions       COMMENT/TOXICITY:   Mild left marleni-areolar tenderness reported. Patchy mild-moderate erythema without desquamation over irradiated field. Moderate erythema present in left marleni-areolar area. Hyperpigmentation but not desquamation appreciated in left inframammary fold                                           INTERVAL SINCE COMPLETION OF RADIATION THERAPY:   1 month    SUBJECTIVE:   Merlyn Ravi is a 54 year old female who is here today for routine 1 month follow up after completing radiation therapy.  She has seen healing of her skin reaction. She reports she did have peeling of skin near her nipple and areola complex and near her surgical incision. Now resolved. She has improving hyperpigmentation to skin of IMF and near incision line.  She continues to moisturize.  Denies any cough or shortness of breath related to possible risk of interstitial pneumonitis. Good ROM of left arm and shoulder. She also has had resolution of her fatigue. No lymphedema. No pain other than intermittent short in duration \"zingers\". These are reported as becoming less severe and frequent since surgery.     Tolerating Tamoxifen with some side effects of hot flashes. She did have a headache for 3 days after starting the medication now resolved. .      ROS otherwise negative on a 12-system review.  ECO       Current Outpatient Medications   Medication     atorvastatin (LIPITOR) 10 MG tablet     doxycycline hyclate (PERIOSTAT) 20 MG tablet     doxycycline monohydrate 100 MG capsule     IBUPROFEN PO     levothyroxine (SYNTHROID/LEVOTHROID) 88 MCG tablet     meclizine (ANTIVERT) 25 MG tablet     Naproxen Sodium (ALEVE PO)     " ondansetron (ZOFRAN) 4 MG tablet     tamoxifen (NOLVADEX) 20 MG tablet     No current facility-administered medications for this visit.           Allergies   Allergen Reactions     Demerol      Sedation and vomiting     Sulfa Drugs      unknown reaction       Past Medical History:   Diagnosis Date     Allergies      Dermatofibroma 5/14/2012         PHYSICAL EXAM:  /79 (BP Location: Right arm, Cuff Size: Adult Large)   Pulse 69   Resp 16   Wt 106 kg (233 lb 9.6 oz)   BMI 37.14 kg/m    General: in no acute distress, alert and oriented x3.   HEENT: Normocephalic, atraumatic.  PERRLA, oropharynx clear with no mucositis or thrush.   Lymph Nodes: No palpable pre/post-auricular, cervical, or axillary lymphadenopathy appreciated.   CV: RRR, normal S1 S2.  No murmurs, gallops, or rubs.   Lungs: Clear to auscultation bilaterally.  No dullness to percussion.   Abdomen:  Soft and non tender. No palpable masses.    Extremities: no clubbing, cyanosis, or edema.   Neurologic: Alert and oriented x3. Cranial nerves II-XII grossly intact.   Breast:Well healed incisions to left breast. Hyperpigmentation to left IMF and near incision area (10 o'clock). Skin intact. No concerning lumps or masses bilaterally.     LABS AND IMAGING:  none    IMPRESSION:   Ms. Ravi is a 54 year old female with a invasive lobular carcinoma of the left breast. She is status post lumpectomy and sentinel lymph node evaluation. Final pathology demonstrated ILC, 4.5 cm in size, grade 2, no LVSI, negative margins status post re-excision, 0 out of 1 sentinel lymph nodes, ER+, VT+, HER-2 negative disease, low Oncotype recurrence score. Pathologic T2N0. Completed adjuvant radiation therapy with a lumpectomy cavity boost to L breast 5005 cGy from 11/11/19-12/19/19.        Adjuvant Tamoxifen (had a hysterectomy).                     PLAN:   Acute toxicities from RT improving. Has mild residual hyperpigmenation which will continue to improve with time.  Discussed long term care with continued moisturizing of the treated breast, and the use of sun screen (going to [place] soon). Discussed possibility of scar formation from radiation and treatment with gentle massage. She will follow up here 6 months with Dr. Nuno.     Patient will follow up with medical oncology for continued care and imaging (Dr. Blakely).  According to NCCN guidelines, follow-up of breast cancer should include history and physical examination with emphasis on breast examination every 4-12 months for 5 years as clinically appropriate then annually thereafter. Last screening bilateral mammogram was 3/20/2019. Emphasis is also on continuing with annual mammography. Recommended yearly.    Endocrine therapy. Continues to be adherent to adjuvant endocrine therapy.    Lymphedema.She remains at low risk for lymphedema.  She denies lymphedema at this time, but she will contact the clinic if she needs a referral to the lymphedema specialist.     Cancer screening.  Up to date with colonoscopies.  She should limit her sun exposure and use sunscreens.     Healthy lifestyle.  She should continue to refrain from tobacco. She should have an exercise program of 150 minutes of cardiovascular exercise per week.  She will continue to see her primary care provider for general health maintenance.       Bailey Melendez [facility]  Radiation Therapy Center  [facility]  [address] [place], [address]  [place] [address]

## 2020-01-10 NOTE — LETTER
1/10/2020      RE: Merlyn Ravi  [address] [id number]       .       Department of Radiation Oncology  Radiation Therapy Center  [facility]  [place] [id number] [phone]         Radiation Oncology Follow-up Visit  January 10, 2020    Merlyn Ravi  MRN: [id number]   : 1965     DIAGNOSIS: invasive lobular carcinoma of the left breast. She is status post lumpectomy and sentinel lymph node evaluation  PATHOLOGY: Final pathology demonstrated ILC, 4.5 cm in size, grade 2, no LVSI, negative margins status post re-excision, 0 out of 1 sentinel lymph nodes, ER+, DE+, HER-2 negative disease, low Oncotype recurrence score                             STAGE: pathologic T2N0  INTENT OF RADIOTHERAPY: adjuvant radiation therapy with a lumpectomy cavity boost.  CONCURRENT SYSTEMIC THERAPY:  No      ONCOLOGIC HISTORY:    Ms. Ravi is a 54 year old female left breast cancer.      Patient initially presented with a palpable left breast mass, prompting further evaluation.  Diagnostic imaging confirmed the presence of a left breast mass at the 10 o'clock position, 12 cm from the nipple.  On 8/15/2019 the patient underwent left breast biopsy.  Final pathology demonstrated ILC, grade 2, no LVSI, ER positive, DE positive, HER-2 negative.  On 2019 the patient underwent left breast lumpectomy and sentinel lymph node evaluation by Dr. Salas.  Final pathology demonstrated ILC, 4.5 cm in size, grade 2, no LVSI, negative margins status post reexcision, 0 out of 1 sentinel lymph node, ER positive, DE positive, HER-2 negative, pathologic T2N0.  The patient was subsequent seen by Dr. Blakely who ordered Oncotype, and recurrence score came back low at 20.  No systemic chemotherapy was recommended.  Anti-endocrine therapy was discussed.  The patient subsequently presented 10/29/19 in Radiation Oncology to discuss potential role of adjuvant whole breast radiation  "therapy.      Of note, the patient has a history of monoclonal gammopathy since .  She has been followed annually by medical oncology with laboratory tests without any evidence of plasma cell dyscrasia     SITE OF TREATMENT: L breast     DATES  OF TREATMENT: 19-19     TOTAL DOSE OF TREATMENT: 5005 cGy     DOSE PER FRACTION OF TREATMENT: 267 cGy x 15 fractions + 200 cGy x 5 fractions       COMMENT/TOXICITY:   Mild left marleni-areolar tenderness reported. Patchy mild-moderate erythema without desquamation over irradiated field. Moderate erythema present in left marleni-areolar area. Hyperpigmentation but not desquamation appreciated in left inframammary fold                                           INTERVAL SINCE COMPLETION OF RADIATION THERAPY:   1 month    SUBJECTIVE:   Merlyn Ravi is a 54 year old female who is here today for routine 1 month follow up after completing radiation therapy.  She has seen healing of her skin reaction. She reports she did have peeling of skin near her nipple and areola complex and near her surgical incision. Now resolved. She has improving hyperpigmentation to skin of IMF and near incision line.  She continues to moisturize.  Denies any cough or shortness of breath related to possible risk of interstitial pneumonitis. Good ROM of left arm and shoulder. She also has had resolution of her fatigue. No lymphedema. No pain other than intermittent short in duration \"zingers\". These are reported as becoming less severe and frequent since surgery.     Tolerating Tamoxifen with some side effects of hot flashes. She did have a headache for 3 days after starting the medication now resolved. .      ROS otherwise negative on a 12-system review.  ECO       Current Outpatient Medications   Medication     atorvastatin (LIPITOR) 10 MG tablet     doxycycline hyclate (PERIOSTAT) 20 MG tablet     doxycycline monohydrate 100 MG capsule     IBUPROFEN PO     levothyroxine " (SYNTHROID/LEVOTHROID) 88 MCG tablet     meclizine (ANTIVERT) 25 MG tablet     Naproxen Sodium (ALEVE PO)     ondansetron (ZOFRAN) 4 MG tablet     tamoxifen (NOLVADEX) 20 MG tablet     No current facility-administered medications for this visit.           Allergies   Allergen Reactions     Demerol      Sedation and vomiting     Sulfa Drugs      unknown reaction       Past Medical History:   Diagnosis Date     Allergies      Dermatofibroma 5/14/2012         PHYSICAL EXAM:  /79 (BP Location: Right arm, Cuff Size: Adult Large)   Pulse 69   Resp 16   Wt 106 kg (233 lb 9.6 oz)   BMI 37.14 kg/m     General: in no acute distress, alert and oriented x3.   HEENT: Normocephalic, atraumatic.  PERRLA, oropharynx clear with no mucositis or thrush.   Lymph Nodes: No palpable pre/post-auricular, cervical, or axillary lymphadenopathy appreciated.   CV: RRR, normal S1 S2.  No murmurs, gallops, or rubs.   Lungs: Clear to auscultation bilaterally.  No dullness to percussion.   Abdomen:  Soft and non tender. No palpable masses.    Extremities: no clubbing, cyanosis, or edema.   Neurologic: Alert and oriented x3. Cranial nerves II-XII grossly intact.   Breast:Well healed incisions to left breast. Hyperpigmentation to left IMF and near incision area (10 o'clock). Skin intact. No concerning lumps or masses bilaterally.     LABS AND IMAGING:  none    IMPRESSION:   Ms. Ravi is a 54 year old female with a invasive lobular carcinoma of the left breast. She is status post lumpectomy and sentinel lymph node evaluation. Final pathology demonstrated ILC, 4.5 cm in size, grade 2, no LVSI, negative margins status post re-excision, 0 out of 1 sentinel lymph nodes, ER+, MI+, HER-2 negative disease, low Oncotype recurrence score. Pathologic T2N0. Completed adjuvant radiation therapy with a lumpectomy cavity boost to L breast 5005 cGy from 11/11/19-12/19/19.        Adjuvant Tamoxifen (had a hysterectomy).                     PLAN:   Acute  toxicities from RT improving. Has mild residual hyperpigmenation which will continue to improve with time. Discussed long term care with continued moisturizing of the treated breast, and the use of sun screen (going to [place] soon). Discussed possibility of scar formation from radiation and treatment with gentle massage. She will follow up here 6 months with Dr. Nuno.     Patient will follow up with medical oncology for continued care and imaging (Dr. Blakely).  According to NCCN guidelines, follow-up of breast cancer should include history and physical examination with emphasis on breast examination every 4-12 months for 5 years as clinically appropriate then annually thereafter. Last screening bilateral mammogram was 3/20/2019. Emphasis is also on continuing with annual mammography. Recommended yearly.    Endocrine therapy. Continues to be adherent to adjuvant endocrine therapy.    Lymphedema.She remains at low risk for lymphedema.  She denies lymphedema at this time, but she will contact the clinic if she needs a referral to the lymphedema specialist.     Cancer screening.   Up to date with colonoscopies.  She should limit her sun exposure and use sunscreens.     Healthy lifestyle.  She should continue to refrain from tobacco. She should have an exercise program of 150 minutes of cardiovascular exercise per week.  She will continue to see her primary care provider for general health maintenance.       Bailey Melendez [facility]  Radiation Therapy Center  [facility]  [address], [address]  [place] [address]

## 2020-01-31 ENCOUNTER — PATIENT OUTREACH (OUTPATIENT)
Dept: ONCOLOGY | Facility: CLINIC | Age: 55
End: 2020-01-31

## 2020-01-31 ENCOUNTER — APPOINTMENT (OUTPATIENT)
Dept: ULTRASOUND IMAGING | Facility: CLINIC | Age: 55
End: 2020-01-31
Attending: NURSE PRACTITIONER
Payer: COMMERCIAL

## 2020-01-31 ENCOUNTER — HOSPITAL ENCOUNTER (EMERGENCY)
Facility: CLINIC | Age: 55
Discharge: HOME | End: 2020-01-31
Attending: NURSE PRACTITIONER | Admitting: NURSE PRACTITIONER
Payer: COMMERCIAL

## 2020-01-31 VITALS
DIASTOLIC BLOOD PRESSURE: 75 MMHG | BODY MASS INDEX: 36.48 KG/M2 | WEIGHT: 227 LBS | OXYGEN SATURATION: 97 % | HEIGHT: 66 IN | TEMPERATURE: 98.2 F | SYSTOLIC BLOOD PRESSURE: 135 MMHG | RESPIRATION RATE: 16 BRPM

## 2020-01-31 DIAGNOSIS — R25.2 BILATERAL LEG CRAMPS: ICD-10-CM

## 2020-01-31 LAB
ANION GAP SERPL CALCULATED.3IONS-SCNC: 3 MMOL/L (ref 3–14)
BUN SERPL-MCNC: 15 MG/DL (ref 7–30)
CALCIUM SERPL-MCNC: 9.8 MG/DL (ref 8.5–10.1)
CHLORIDE SERPL-SCNC: 109 MMOL/L (ref 94–109)
CO2 SERPL-SCNC: 30 MMOL/L (ref 20–32)
CREAT SERPL-MCNC: 0.9 MG/DL (ref 0.52–1.04)
GFR SERPL CREATININE-BSD FRML MDRD: 72 ML/MIN/{1.73_M2}
GLUCOSE SERPL-MCNC: 99 MG/DL (ref 70–99)
MAGNESIUM SERPL-MCNC: 2.2 MG/DL (ref 1.6–2.3)
POTASSIUM SERPL-SCNC: 4.1 MMOL/L (ref 3.4–5.3)
SODIUM SERPL-SCNC: 142 MMOL/L (ref 133–144)

## 2020-01-31 PROCEDURE — 93970 EXTREMITY STUDY: CPT

## 2020-01-31 PROCEDURE — 99284 EMERGENCY DEPT VISIT MOD MDM: CPT | Mod: Z6 | Performed by: NURSE PRACTITIONER

## 2020-01-31 PROCEDURE — 80048 BASIC METABOLIC PNL TOTAL CA: CPT | Performed by: NURSE PRACTITIONER

## 2020-01-31 PROCEDURE — 99284 EMERGENCY DEPT VISIT MOD MDM: CPT | Mod: 25 | Performed by: NURSE PRACTITIONER

## 2020-01-31 PROCEDURE — 83735 ASSAY OF MAGNESIUM: CPT | Performed by: NURSE PRACTITIONER

## 2020-01-31 ASSESSMENT — ENCOUNTER SYMPTOMS
FEVER: 0
CONSTIPATION: 0
SPEECH DIFFICULTY: 0
VOMITING: 0
SORE THROAT: 0
CONFUSION: 0
DIFFICULTY URINATING: 0
ABDOMINAL PAIN: 0
MYALGIAS: 0
DYSURIA: 0
HEADACHES: 0
DIARRHEA: 0
COUGH: 0
EYE PAIN: 0
CHILLS: 0
WHEEZING: 0
DIAPHORESIS: 0
SHORTNESS OF BREATH: 0
NAUSEA: 0

## 2020-01-31 ASSESSMENT — MIFFLIN-ST. JEOR: SCORE: 1638.48

## 2020-01-31 NOTE — PROGRESS NOTES
Patient called and is having left leg cramps in her left calf for the last several nights and occasionally during the day also. She denies redness or warmth to the touch. Patient started Tamoxifen about 6 weeks ago. Patient wondering what she should do. Spoke with Dr. Blakely who would like the patient to go to the ER to rule out DVT. Patient verbalized agreement to plan. Will await results.Sury Ramos RN on 1/31/2020 at 12:50 PM

## 2020-01-31 NOTE — ED PROVIDER NOTES
"  History     Chief Complaint   Patient presents with     Leg Pain     cramping left calf pain; \"joan horses\", increasing over the past 1 week.     HPI  Merlyn Ravi is a 54 year old female with past medical history of invasive lobular carcinoma of the breast and subsequent tamoxifen who presents to the emergency department with bilateral lower leg pain with reported onset of symptoms started this past Sunday.    cramping left and right calf pain that started this past Sunday and is worse on the left than the right.   Pt states that her calf felt like there was a cramp and felt there was a hard knot present.  She tried to have her daughter massage it without relief. The pain stayed until Wednesday and then the pain seemed to lessen and then last night it seemed to start all over again and milder in intensity.  Pt on tamoxifen and concerned about hx of DVT as this is a possible SE.  Pt denies hx of DVT, PE.  Pt denies hx   Pt was on a plane last week and it was 3 hours long.    Allergies:  Allergies   Allergen Reactions     Demerol      Sedation and vomiting     Sulfa Drugs      unknown reaction       Problem List:    Patient Active Problem List    Diagnosis Date Noted     Invasive lobular carcinoma of breast in female (H) 10/08/2019     Priority: Medium     Added automatically from request for surgery [id number]       Other specified hypothyroidism 08/29/2019     Priority: Medium     Obesity (BMI 35.0-39.9) with comorbidity (H) 01/25/2019     Priority: Medium     Hyperlipidemia LDL goal <130 10/14/2014     Priority: Medium     Monoclonal paraproteinemia 10/08/2013     Priority: Medium     Dermatofibroma 05/14/2012     Priority: Medium     LASIK OU 5 yrs 11/18/2010     Priority: Medium     CARDIOVASCULAR SCREENING; LDL GOAL LESS THAN 160 10/31/2010     Priority: Medium        Past Medical History:    Past Medical History:   Diagnosis Date     Allergies      Dermatofibroma 5/14/2012       Past Surgical History:  "   Past Surgical History:   Procedure Laterality Date     C NONSPECIFIC PROCEDURE      bladder surgery     HYSTERECTOMY, PAP NO LONGER INDICATED       HYSTERECTOMY, MAGY       LASIK BILATERAL  2005    Dr. Solis      LUMPECTOMY BREAST Left 9/30/2019    Procedure: Re-excision of left breast lumpectomy site;  Surgeon: Kj Salas DO;  Location: [facility]     LUMPECTOMY BREAST Left 10/9/2019    Procedure: Left breast re-excision;  Surgeon: Kj Salas DO;  Location: [facility] OR     LUMPECTOMY BREAST WITH SENTINEL NODE, COMBINED Left 9/23/2019    Procedure: LUMPECTOMY, BREAST, WITH SENTINEL LYMPH NODE BIOPSY.;  Surgeon: Kj Salas DO;  Location: [facility] OR       Family History:    Family History   Problem Relation Age of Onset     Thyroid Disease Mother      Heart Disease Mother      Heart Disease Father      Hyperlipidemia Father      Hypertension Maternal Grandmother      Breast Cancer Maternal Grandmother      Hypertension Maternal Grandfather      Alzheimer Disease Paternal Grandmother      Diabetes Brother      Eczema Brother      Thyroid Disease Brother      Melanoma No family hx of        Social History:  Marital Status:   [2]  Social History     Tobacco Use     Smoking status: Never Smoker     Smokeless tobacco: Never Used   Substance Use Topics     Alcohol use: Yes     Alcohol/week: 0.0 standard drinks     Comment: 3 drinks per month     Drug use: No        Medications:    atorvastatin (LIPITOR) 10 MG tablet  calcium 500-125 MG-UNIT TABS  doxycycline hyclate (PERIOSTAT) 20 MG tablet  IBUPROFEN PO  levothyroxine (SYNTHROID/LEVOTHROID) 88 MCG tablet  Naproxen Sodium (ALEVE PO)  tamoxifen (NOLVADEX) 20 MG tablet  doxycycline monohydrate 100 MG capsule      Review of Systems   Constitutional: Negative for chills, diaphoresis and fever.   HENT: Negative for ear pain and sore throat.    Eyes: Negative for pain.   Respiratory: Negative for cough, shortness of breath and wheezing.   "  Cardiovascular: Negative for chest pain.   Gastrointestinal: Negative for abdominal pain, constipation, diarrhea, nausea and vomiting.   Genitourinary: Negative for difficulty urinating and dysuria.   Musculoskeletal: Negative for myalgias.        Bilateral calf cramps and pain   Skin: Negative for rash.   Neurological: Negative for speech difficulty and headaches.   Psychiatric/Behavioral: Negative for confusion and self-injury.   All other systems reviewed and are negative.      Physical Exam   BP: 135/75  Heart Rate: 75  Temp: 98.2  F (36.8  C)  Resp: 16  Height: 166.4 cm (5' 5.5\")  Weight: 103 kg (227 lb)  SpO2: 97 %      Physical Exam  Vitals signs and nursing note reviewed.   Constitutional:       General: She is not in acute distress.     Appearance: Normal appearance. She is well-developed. She is obese. She is not ill-appearing, toxic-appearing or diaphoretic.   HENT:      Head: Normocephalic and atraumatic.      Right Ear: External ear normal.      Left Ear: External ear normal.   Eyes:      General:         Right eye: No discharge.         Left eye: No discharge.      Conjunctiva/sclera: Conjunctivae normal.   Cardiovascular:      Rate and Rhythm: Normal rate and regular rhythm.      Heart sounds: Normal heart sounds. No murmur. No friction rub.   Pulmonary:      Effort: Pulmonary effort is normal. No respiratory distress.      Breath sounds: Normal breath sounds. No stridor. No wheezing or rales.   Chest:      Chest wall: No tenderness.   Musculoskeletal:      Right lower leg: Normal. She exhibits no tenderness, no bony tenderness, no swelling, no deformity and no laceration. No edema.      Left lower leg: Normal. She exhibits no tenderness, no bony tenderness, no swelling, no deformity and no laceration. No edema.      Comments: Pedal pulses equal and normal bilaterally, deep tendon reflexes equal +2 bilaterally.  Bilateral legs are equal without erythema, masses, nodules, swelling.  No palpable " popliteal masses noted and no erythema noted on either leg.   Skin:     General: Skin is warm and dry.      Coloration: Skin is not pale.      Findings: No erythema or rash.   Neurological:      Mental Status: She is alert.         ED Course        Procedures    Results for orders placed or performed during the hospital encounter of 01/31/20 (from the past 24 hour(s))   US Lower Extremity Venous Duplex Bilateral    Narrative    US LOWER EXTREMITY VENOUS DUPLEX BILATERAL   1/31/2020 4:34 PM     HISTORY: Bilateral calf pain onset of symptoms Sunday evening, worse  on left than right. History of breast cancer. On tamoxifen. 3 hour  plane flight a few weeks ago.    COMPARISON: None.    FINDINGS: Gray-scale, color and Doppler spectral analysis ultrasound  was performed of the bilateral legs. Compression and augmentation  imaging was performed.    There is no evidence for deep venous thrombosis.      Impression    IMPRESSION: No evidence for DVT within either leg.   Basic metabolic panel   Result Value Ref Range    Sodium 142 133 - 144 mmol/L    Potassium 4.1 3.4 - 5.3 mmol/L    Chloride 109 94 - 109 mmol/L    Carbon Dioxide 30 20 - 32 mmol/L    Anion Gap 3 3 - 14 mmol/L    Glucose 99 70 - 99 mg/dL    Urea Nitrogen 15 7 - 30 mg/dL    Creatinine 0.90 0.52 - 1.04 mg/dL    GFR Estimate 72 >60 mL/min/[1.73_m2]    GFR Estimate If Black 83 >60 mL/min/[1.73_m2]    Calcium 9.8 8.5 - 10.1 mg/dL   Magnesium   Result Value Ref Range    Magnesium 2.2 1.6 - 2.3 mg/dL       Medications - No data to display    Assessments & Plan (with Medical Decision Making)  Merlyn Ravi is a 54 year old female with past medical history of invasive lobular carcinoma of the breast and subsequent tamoxifen who presents to the emergency department with bilateral lower leg pain with reported onset of symptoms started this past Sunday.  On exam patient has normal bilateral lower extremities with no erythema, swelling, masses, deep tendon reflexes  equal, pedal pulses equal bilaterally.  Bilateral venous Doppler ultrasound completed and negative for DVT.  Patient concerned about magnesium level and calcium level and basic metabolic panel completed and magnesium level completed and both are within normal limits.  Lab level results were called the patient due to patient standing extended time in the emergency department due to patient volume.  Reassurance is provided and discussed magnesium supplementation PRN.  Recommend follow-up if worsening symptoms.  Did discuss tamoxifen if persistent symptoms to discuss with oncologist about alternative agents.  Did discuss exercise is important to.  Patient verbalized understanding and was discharged in stable condition.     I have reviewed the nursing notes.    I have reviewed the findings, diagnosis, plan and need for follow up with the patient.  Discharge Medication List as of 1/31/2020  4:58 PM          Final diagnoses:   Bilateral leg cramps       1/31/2020   [facility] EMERGENCY DEPARTMENT     Merlyn Vargas, MICHELE CNP  01/31/20 3605

## 2020-01-31 NOTE — ED AVS SNAPSHOT
[facility] Emergency Department  [address] [address]  Phone:  [phone]  Fax:  [phone]                                    Merlyn Ravi   MRN: [id number]    Department:  [facility] Emergency Department   Date of Visit:  1/31/2020           After Visit Summary Signature Page    I have received my discharge instructions, and my questions have been answered. I have discussed any challenges I see with this plan with the nurse or doctor.    ..........................................................................................................................................  Patient/Patient Representative Signature      ..........................................................................................................................................  Patient Representative Print Name and Relationship to Patient    ..................................................               ................................................  Date                                   Time    ..........................................................................................................................................  Reviewed by Signature/Title    ...................................................              ..............................................  Date                                               Time          22EPIC Rev 08/18

## 2020-01-31 NOTE — DISCHARGE INSTRUCTIONS
Consider daily magnesium supplement 400 mg to 1200 mg daily, may cause diarrhea if at the 1200 dose  Follow up with oncology if cramps persist   Exercise regularly

## 2020-02-03 NOTE — PROGRESS NOTES
Called patient to follow up leg cramps. Patients U/S was negative for DVT's in legs, labs were normal. Patient did state that she had a leg cramp again last night but it didn't last very long. Asked patient if she wants to continue Tamoxifen or if she wants to discuss with MD taking a different AI. Patient states she would like to continue taking the tamoxifen for awhile longer and start exercising to see if that helps. Patient will call if symptoms worsen.

## 2020-02-10 PROBLEM — C50.919 INVASIVE LOBULAR CARCINOMA OF BREAST IN FEMALE (H): Status: ACTIVE | Noted: 2019-08-15

## 2020-02-22 NOTE — PROGRESS NOTES
SUBJECTIVE:   Merlyn Ravi is a 52 year old female who presents to clinic today for the following health issues:      Concern - motion sickness while passenger in car   Onset: 2 mos ago     Description:   Patient will get car sickness while riding in the car  she will have to have her  pull over so she can drive.    Intensity: mild, moderate    Progression of Symptoms:  worsening    Accompanying Signs & Symptoms:  None     Previous history of similar problem:   Patient had motion sickness as a child     Precipitating factors:   Worsened by: with riding in the car     Alleviating factors:  Improved by: with driving the car     Therapies Tried and outcome: dramamine     Concern - Lump back of neck   Onset: 2 weeks ago     Description:   Patient has lump in back of her neck has not gotten larger     Intensity: 0/10    Progression of Symptoms:  same    Accompanying Signs & Symptoms:  none    Previous history of similar problem:   None     Precipitating factors:   Worsened by: none     Alleviating factors:  Improved by: none     Therapies Tried and outcome: none     Patient understand may not get to this     Joint Pain    Onset: L knee     Description: Patient has pain in L knee with bending   Location: left knee  Character: Sharp    Intensity: moderate    Progression of Symptoms: same    Accompanying Signs & Symptoms:  Other symptoms: none    History:   Previous similar pain: no       Precipitating factors:   Trauma or overuse: no     Alleviating factors:  Improved by: massage    Therapies Tried and outcome: messaging and moving postion of knee     Patient comes in with chief complaint of motion sickness.Patient states that she had this symptom as a child where she will experience motion sickness only in the car. Patient she kind of out grown the symptom but lately the symptom has crept back. Patient says she sometimes have her  pull over and sometimes she will vomit. When she is driving she does not  experience this feeling.     Other symptoms that she brought up today is that she noticed a small lump in the back of her neck. She reports that she at first thought it was a pimple and tried to pop it but with no relief. Patient reports that it does not hurt. She has had no drainage from the lump.      Problem list and histories reviewed & adjusted, as indicated.  Additional history: as documented    Patient Active Problem List   Diagnosis     CARDIOVASCULAR SCREENING; LDL GOAL LESS THAN 160     LASIK OU 5 yrs     Dermatofibroma     Monoclonal paraproteinemia     Hyperlipidemia LDL goal <130     Past Surgical History:   Procedure Laterality Date     C NONSPECIFIC PROCEDURE      bladder surgery     HYSTERECTOMY, PAP NO LONGER INDICATED       HYSTERECTOMY, MAGY       LASIK BILATERAL  2005    Dr. Solis        Social History   Substance Use Topics     Smoking status: Never Smoker     Smokeless tobacco: Never Used     Alcohol use 0.0 oz/week     0 Standard drinks or equivalent per week      Comment: 3 drinks per month     Family History   Problem Relation Age of Onset     Thyroid Disease Mother      HEART DISEASE Mother      HEART DISEASE Father      Hyperlipidemia Father      Hypertension Maternal Grandmother      Breast Cancer Maternal Grandmother      Hypertension Maternal Grandfather      Alzheimer Disease Paternal Grandmother      DIABETES Brother      Eczema Brother      Thyroid Disease Brother          Current Outpatient Prescriptions   Medication Sig Dispense Refill     meclizine (ANTIVERT) 25 MG tablet Take 1 tablet (25 mg) by mouth every 6 hours as needed for dizziness 30 tablet 1     ondansetron (ZOFRAN) 4 MG tablet Take 1 tablet (4 mg) by mouth every 8 hours as needed 18 tablet 0     atorvastatin (LIPITOR) 10 MG tablet Take 1 tablet (10 mg) by mouth daily 90 tablet 3     levothyroxine (SYNTHROID/LEVOTHROID) 75 MCG tablet Take 1 tablet (75 mcg) by mouth daily 90 tablet 3     order for DME Equipment being  "ordered: wrist brace (Patient not taking: Reported on 8/28/2017) 1 Device 0     [DISCONTINUED] levothyroxine (SYNTHROID, LEVOTHROID) 88 MCG tablet Take 1 tablet (88 mcg) by mouth daily (Patient not taking: Reported on 8/28/2017) 90 tablet 3     multivitamin, therapeutic with minerals (MULTI-VITAMIN) TABS Take 1 tablet by mouth daily       IBUPROFEN PO Take 800 mg by mouth every 4 hours as needed for moderate pain       Naproxen Sodium (ALEVE PO) Take 220 mg by mouth 2 times daily (with meals)       FLUARIX QUADRIVALENT 0.5 ML injection        metroNIDAZOLE (METROCREAM) 0.75 % cream Apply twice daily to affected area on face. (Patient not taking: Reported on 8/28/2017) 45 g 11     doxycycline Monohydrate 100 MG CAPS Take 1 capsule (100 mg) by mouth daily with food 90 capsule 0     Allergies   Allergen Reactions     Demerol      Sedation and vomiting     Sulfa Drugs      unknown reaction         Reviewed and updated as needed this visit by clinical staffTobacco  Allergies  Med Hx  Surg Hx  Fam Hx  Soc Hx      Reviewed and updated as needed this visit by Provider         ROS:  Constitutional, HEENT, cardiovascular, pulmonary, gi and gu systems are negative, except as otherwise noted.      OBJECTIVE:   /73 (BP Location: Left arm, Cuff Size: Adult Regular)  Temp 97.5  F (36.4  C) (Tympanic)  Ht 5' 5.75\" (1.67 m)  Wt 213 lb (96.6 kg)  BMI 34.64 kg/m2  Body mass index is 34.64 kg/(m^2).  GENERAL: healthy, alert and no distress  EYES: Eyes grossly normal to inspection, PERRL and conjunctivae and sclerae normal  HENT: ear canals and TM's normal, nose and mouth without ulcers or lesions  NECK: no adenopathy, no asymmetry, masses, or scars and thyroid normal to palpation  RESP: lungs clear to auscultation - no rales, rhonchi or wheezes  CV: regular rate and rhythm, normal S1 S2, no S3 or S4, no murmur, click or rub, no peripheral edema and peripheral pulses strong  SKIN: no suspicious lesions or rashes and small " cyst like swelling which is movable , non tender located on the base of her neck.     Diagnostic Test Results:  none     ASSESSMENT/PLAN:     (T75.3XXA) Motion sickness, initial encounter  (primary encounter diagnosis)  Comment: Patient reassured, given medication to help with the motion sickness  Plan: meclizine (ANTIVERT) 25 MG tablet, ondansetron         (ZOFRAN) 4 MG tablet            (R22.9) Localized superficial swelling, mass, or lump  Comment: Patient reassured. Cyst likely benign, if cyst becomes bigger and starts to cause pain may need this removed   Plan: As above     FUTURE APPOINTMENTS:       - Follow-up visit as needed    Memo Garcia MD  [facility]   stated

## 2020-03-05 ENCOUNTER — TELEPHONE (OUTPATIENT)
Dept: ENDOCRINOLOGY | Facility: CLINIC | Age: 55
End: 2020-03-05

## 2020-03-05 DIAGNOSIS — E03.9 HYPOTHYROIDISM, UNSPECIFIED TYPE: Primary | ICD-10-CM

## 2020-03-05 NOTE — TELEPHONE ENCOUNTER
[facility] Call Center    Phone Message    May a detailed message be left on voicemail: yes     Reason for Call: Pt called requesting orders for lab- blood work to be ordered before her appointment on Monday with Kiesha. Pt stated she is going to [facility] 3/6/20 to be drawn for different labs and she would like to have them done at the same time. Please follow up with pt for questions and concerns.    Action Taken: Message routed to:  [facility] ([facility] Endo    Travel Screening: Not Applicable

## 2020-03-06 ENCOUNTER — HOSPITAL ENCOUNTER (OUTPATIENT)
Dept: LAB | Facility: CLINIC | Age: 55
Discharge: HOME | End: 2020-03-06
Attending: INTERNAL MEDICINE | Admitting: INTERNAL MEDICINE
Payer: COMMERCIAL

## 2020-03-06 DIAGNOSIS — C50.912 INVASIVE LOBULAR CARCINOMA OF LEFT BREAST IN FEMALE (H): ICD-10-CM

## 2020-03-06 LAB
ALBUMIN SERPL-MCNC: 3.5 G/DL (ref 3.4–5)
ALP SERPL-CCNC: 68 U/L (ref 40–150)
ALT SERPL W P-5'-P-CCNC: 17 U/L (ref 0–50)
ANION GAP SERPL CALCULATED.3IONS-SCNC: <1 MMOL/L (ref 3–14)
AST SERPL W P-5'-P-CCNC: 18 U/L (ref 0–45)
BASOPHILS # BLD AUTO: 0 10E9/L (ref 0–0.2)
BASOPHILS NFR BLD AUTO: 0.8 %
BILIRUB SERPL-MCNC: 0.3 MG/DL (ref 0.2–1.3)
BUN SERPL-MCNC: 16 MG/DL (ref 7–30)
CALCIUM SERPL-MCNC: 9 MG/DL (ref 8.5–10.1)
CANCER AG27-29 SERPL-ACNC: 17 U/ML (ref 0–39)
CHLORIDE SERPL-SCNC: 108 MMOL/L (ref 94–109)
CO2 SERPL-SCNC: 31 MMOL/L (ref 20–32)
CREAT SERPL-MCNC: 0.86 MG/DL (ref 0.52–1.04)
DIFFERENTIAL METHOD BLD: NORMAL
EOSINOPHIL # BLD AUTO: 0.1 10E9/L (ref 0–0.7)
EOSINOPHIL NFR BLD AUTO: 2.3 %
ERYTHROCYTE [DISTWIDTH] IN BLOOD BY AUTOMATED COUNT: 12.8 % (ref 10–15)
GFR SERPL CREATININE-BSD FRML MDRD: 76 ML/MIN/{1.73_M2}
GLUCOSE SERPL-MCNC: 115 MG/DL (ref 70–99)
HCT VFR BLD AUTO: 39.1 % (ref 35–47)
HGB BLD-MCNC: 13 G/DL (ref 11.7–15.7)
IMM GRANULOCYTES # BLD: 0 10E9/L (ref 0–0.4)
IMM GRANULOCYTES NFR BLD: 0.2 %
LYMPHOCYTES # BLD AUTO: 1.7 10E9/L (ref 0.8–5.3)
LYMPHOCYTES NFR BLD AUTO: 31.9 %
MCH RBC QN AUTO: 31 PG (ref 26.5–33)
MCHC RBC AUTO-ENTMCNC: 33.2 G/DL (ref 31.5–36.5)
MCV RBC AUTO: 93 FL (ref 78–100)
MONOCYTES # BLD AUTO: 0.5 10E9/L (ref 0–1.3)
MONOCYTES NFR BLD AUTO: 8.7 %
NEUTROPHILS # BLD AUTO: 3 10E9/L (ref 1.6–8.3)
NEUTROPHILS NFR BLD AUTO: 56.1 %
NRBC # BLD AUTO: 0 10*3/UL
NRBC BLD AUTO-RTO: 0 /100
PLATELET # BLD AUTO: 248 10E9/L (ref 150–450)
POTASSIUM SERPL-SCNC: 4 MMOL/L (ref 3.4–5.3)
PROT SERPL-MCNC: 7.4 G/DL (ref 6.8–8.8)
RBC # BLD AUTO: 4.19 10E12/L (ref 3.8–5.2)
SODIUM SERPL-SCNC: 139 MMOL/L (ref 133–144)
WBC # BLD AUTO: 5.3 10E9/L (ref 4–11)

## 2020-03-06 PROCEDURE — 80053 COMPREHEN METABOLIC PANEL: CPT | Performed by: INTERNAL MEDICINE

## 2020-03-06 PROCEDURE — 85025 COMPLETE CBC W/AUTO DIFF WBC: CPT | Performed by: INTERNAL MEDICINE

## 2020-03-06 PROCEDURE — 36415 COLL VENOUS BLD VENIPUNCTURE: CPT | Performed by: INTERNAL MEDICINE

## 2020-03-06 PROCEDURE — 86300 IMMUNOASSAY TUMOR CA 15-3: CPT | Performed by: INTERNAL MEDICINE

## 2020-03-09 ENCOUNTER — OFFICE VISIT (OUTPATIENT)
Dept: ENDOCRINOLOGY | Facility: CLINIC | Age: 55
End: 2020-03-09
Payer: COMMERCIAL

## 2020-03-09 VITALS
DIASTOLIC BLOOD PRESSURE: 77 MMHG | HEART RATE: 85 BPM | WEIGHT: 233 LBS | HEIGHT: 67 IN | BODY MASS INDEX: 36.57 KG/M2 | SYSTOLIC BLOOD PRESSURE: 114 MMHG

## 2020-03-09 DIAGNOSIS — E03.9 HYPOTHYROIDISM, UNSPECIFIED TYPE: ICD-10-CM

## 2020-03-09 DIAGNOSIS — C50.912 MALIGNANT NEOPLASM OF LEFT FEMALE BREAST, UNSPECIFIED ESTROGEN RECEPTOR STATUS, UNSPECIFIED SITE OF BREAST (H): ICD-10-CM

## 2020-03-09 DIAGNOSIS — E06.3 HYPOTHYROIDISM DUE TO HASHIMOTO'S THYROIDITIS: Primary | ICD-10-CM

## 2020-03-09 LAB
DEPRECATED CALCIDIOL+CALCIFEROL SERPL-MC: 30 UG/L (ref 20–75)
T4 FREE SERPL-MCNC: 1.24 NG/DL (ref 0.76–1.46)
TSH SERPL DL<=0.005 MIU/L-ACNC: 1.75 MU/L (ref 0.4–4)

## 2020-03-09 ASSESSMENT — PAIN SCALES - GENERAL: PAINLEVEL: NO PAIN (0)

## 2020-03-09 ASSESSMENT — MIFFLIN-ST. JEOR: SCORE: 1676.57

## 2020-03-09 NOTE — LETTER
3/9/2020       RE: Merlyn Ravi  [address] [facility] [address]     Dear Colleague,    Thank you for referring your patient, Merlyn Ravi, to the [place] [facility] at [facility]. Please see a copy of my visit note below.                                                            - Endocrinology Follow up -    Reason for visit/consult:  Hypothyrodism, fatiue    Primary care provider: Memo Garcia    Assessment and Plan  A 55 year old female with hypothyroidism, came with fatigue for a few years and recently getting worse,     # Hashimoto thryoiditis  Increased in 11/2018) levothyroxien from 75 mcg to 88 mcg, since then she feels better.     - TSH, free T4 today    - continue current dose of levothryoxine 88 mcg    # Breast CA on the left  Diagnosed 2019, underwent lumpectomy, followed by RT. Currently taking Tamoxifen.       # Family history of DM1   Her brother has DM1 since age 14.   Will check A1C     # Bone health  Hysterectomy, followed by hormone replacement which was stopped 5 year ago.   She was recently diagnosed braest CA, Currently taking Tamoxifen.     - vitmain D level to check today  - Calcium citrate plus D (elemental Ca 630 mg, VitD 500 international unit(s)) to make sure to take.     RTC with me in 1 year     I spent 30 minutes with this patient face to face and explained the conditions and plans (more than 50% of time was counseling/coordination of care) . The patient understood and is satisfied with today's visit. Return to clinic with me in 1 year.         Gala Pop MD  Staff Physician  Endocrinology and Metabolism  License: [id number]    Interval History as of 3/9/2020 : She was recently diagnosed braest CA, Currently taking Tamoxifen. Compliant to levothryoxine 88 mcg.   Interval History as of 3/9/2020 : Patient has been doing well. Last seen . Medication compliance   . New event includes  .feelign good.    Interval History: Recently increased (3 month ago 11/2018) levothyroxien from 75 mcg to 88 mcg, patient energy level up and feeling better.   Blood work today euthryoid.     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 She had a history of HRT since 1998, due to total hysterectomy. HRT stopped January 2016.   (upate)  She has been doing well. She lost 20 lb over 1 year, no more fatigue, hair started to grow, no dry skin. Medication compliance is excellent.       1/5/2016 3/10/2016 12:51 10/6/2016 10:10 12/29/2016 12:20 4/27/2017 11:38 12/6/2017 10:11   TSH  0.4-4.0 8.08 4.99 (H) 5.96 (H) 0.01 (L) 1.90 2.99   T4 Free  0.76-1.46 0.85 0.82 0.89 1.58 (H) 1.01 1.13        11/16/2018 15:54 1/21/2019 09:16   TSH 3.38 0.46   T4 Free 1.02 1.11       Past Medical/Surgical History:  Past Medical History:   Diagnosis Date     Allergies      Dermatofibroma 5/14/2012     Past Surgical History:   Procedure Laterality Date     C NONSPECIFIC PROCEDURE      bladder surgery     HYSTERECTOMY, PAP NO LONGER INDICATED       HYSTERECTOMY, MAGY       LASIK BILATERAL  2005    Dr. Solis      LUMPECTOMY BREAST Left 9/30/2019    Procedure: Re-excision of left breast lumpectomy site;  Surgeon: Kj Salas DO;  Location: [facility]     LUMPECTOMY BREAST Left 10/9/2019    Procedure: Left breast re-excision;  Surgeon: Kj Salas DO;  Location: [facility]     LUMPECTOMY BREAST WITH SENTINEL NODE, COMBINED Left 9/23/2019    Procedure: LUMPECTOMY, BREAST, WITH SENTINEL LYMPH NODE BIOPSY.;  Surgeon: Kj Salas DO;  Location: [facility]       Allergies:  Allergies   Allergen Reactions     Demerol      Sedation and vomiting     Sulfa Drugs      unknown reaction       Current Medications   Current Outpatient Medications   Medication     atorvastatin (LIPITOR) 10 MG tablet     calcium  "500-125 MG-UNIT TABS     doxycycline hyclate (PERIOSTAT) 20 MG tablet     doxycycline monohydrate 100 MG capsule     IBUPROFEN PO     levothyroxine (SYNTHROID/LEVOTHROID) 88 MCG tablet     Naproxen Sodium (ALEVE PO)     tamoxifen (NOLVADEX) 20 MG tablet     No current facility-administered medications for this visit.        Family History:  Family History   Problem Relation Age of Onset     Thyroid Disease Mother      Heart Disease Mother      Heart Disease Father      Hyperlipidemia Father      Hypertension Maternal Grandmother      Breast Cancer Maternal Grandmother      Hypertension Maternal Grandfather      Alzheimer Disease Paternal Grandmother      Diabetes Brother      Eczema Brother      Thyroid Disease Brother      Melanoma No family hx of    Mother hyperthyrodism- removed goiter, Borhter hyperthyroidism- with medication, cousin- hyperthyrodism    Social History:  Social History     Tobacco Use     Smoking status: Never Smoker     Smokeless tobacco: Never Used   Substance Use Topics     Alcohol use: Yes     Alcohol/week: 0.0 standard drinks     Comment: 3 drinks per month   Lives  with 2 kids. Work at [employer]    ROS:  Full review of systems taken with the help of the intake sheet. Pertinent positives include fatigue, loss of energy, weight gain, hair loss. Otherwise a complete 14 point review of systems was taken and is negative unless stated in the history above.      Physical Exam:   Blood pressure 112/84, pulse 83, height 1.672 m (5' 5.83\"), weight 94.1 kg (207 lb 9 oz)  General: well appearing, no acute distress, pleasant and conversant,   Mental Status/neuro: alert and oriented  Face: symmetrical, normal facial color  Eyes: anicteric, PERRL, no proptosis or lid lag  Neck: suppler, no lymphadenopahty  Thyroid: normal size and texture, no nodule palpable, no bruits  Heart: regular rhythm, S1S2, no murmur appreciated  Lung: clear to auscultation bilaterally  Abdomen: soft, " NT/ND, no hepatomegaly  Legs: no swelling or edema      Again, thank you for allowing me to participate in the care of your patient.      Sincerely,    Gala Pop MD

## 2020-03-09 NOTE — PROGRESS NOTES
- Endocrinology Follow up -    Reason for visit/consult:  Hypothyrodism, tasneem    Primary care provider: Memo Garcia    Assessment and Plan  A 55 year old female with hypothyroidism, came with fatigue for a few years and recently getting worse,     # Hashimoto thryoiditis  Increased in 11/2018) levothyroxien from 75 mcg to 88 mcg, since then she feels better.     - TSH, free T4 today    - continue current dose of levothryoxine 88 mcg    # Breast CA on the left  Diagnosed 2019, underwent lumpectomy, followed by RT. Currently taking Tamoxifen.       # Family history of DM1   Her brother has DM1 since age 14.   Will check A1C     # Bone health  Hysterectomy, followed by hormone replacement which was stopped 5 year ago.   She was recently diagnosed braest CA, Currently taking Tamoxifen.     - vitmain D level to check today  - Calcium citrate plus D (elemental Ca 630 mg, VitD 500 international unit(s)) to make sure to take.     RTC with me in 1 year     I spent 30 minutes with this patient face to face and explained the conditions and plans (more than 50% of time was counseling/coordination of care) . The patient understood and is satisfied with today's visit. Return to clinic with me in 1 year.         Gala Pop MD  Staff Physician  Endocrinology and Metabolism  License: [id number]    Interval History as of 3/9/2020 : She was recently diagnosed braest CA, Currently taking Tamoxifen. Compliant to levothryoxine 88 mcg.   Interval History as of 3/9/2020 : Patient has been doing well. Last seen . Medication compliance   . New event includes  .feelign good.   Interval History: Recently increased (3 month ago 11/2018) levothyroxien from 75 mcg to 88 mcg, patient energy level up and feeling better.   Blood work today euthryoid.     HPI: A 53 yo female with monoclonal gammopathy of unknown significance, stable, incidentally found upon plasma transfusion  donation, here for the second follow up of her hypothryoidism. She had a history of HRT since 1998, due to total hysterectomy. HRT stopped January 2016.   (upate)  She has been doing well. She lost 20 lb over 1 year, no more fatigue, hair started to grow, no dry skin. Medication compliance is excellent.       1/5/2016 3/10/2016 12:51 10/6/2016 10:10 12/29/2016 12:20 4/27/2017 11:38 12/6/2017 10:11   TSH  0.4-4.0 8.08 4.99 (H) 5.96 (H) 0.01 (L) 1.90 2.99   T4 Free  0.76-1.46 0.85 0.82 0.89 1.58 (H) 1.01 1.13        11/16/2018 15:54 1/21/2019 09:16   TSH 3.38 0.46   T4 Free 1.02 1.11       Past Medical/Surgical History:  Past Medical History:   Diagnosis Date     Allergies      Dermatofibroma 5/14/2012     Past Surgical History:   Procedure Laterality Date     C NONSPECIFIC PROCEDURE      bladder surgery     HYSTERECTOMY, PAP NO LONGER INDICATED       HYSTERECTOMY, MAGY       LASIK BILATERAL  2005    Dr. Solis      LUMPECTOMY BREAST Left 9/30/2019    Procedure: Re-excision of left breast lumpectomy site;  Surgeon: Kj Salas DO;  Location: [facility]     LUMPECTOMY BREAST Left 10/9/2019    Procedure: Left breast re-excision;  Surgeon: Kj Salas DO;  Location: [facility]     LUMPECTOMY BREAST WITH SENTINEL NODE, COMBINED Left 9/23/2019    Procedure: LUMPECTOMY, BREAST, WITH SENTINEL LYMPH NODE BIOPSY.;  Surgeon: Kj Salas DO;  Location: [facility]       Allergies:  Allergies   Allergen Reactions     Demerol      Sedation and vomiting     Sulfa Drugs      unknown reaction       Current Medications   Current Outpatient Medications   Medication     atorvastatin (LIPITOR) 10 MG tablet     calcium 500-125 MG-UNIT TABS     doxycycline hyclate (PERIOSTAT) 20 MG tablet     doxycycline monohydrate 100 MG capsule     IBUPROFEN PO     levothyroxine (SYNTHROID/LEVOTHROID) 88 MCG tablet     Naproxen Sodium (ALEVE PO)     tamoxifen (NOLVADEX) 20 MG tablet     No current facility-administered medications  "for this visit.        Family History:  Family History   Problem Relation Age of Onset     Thyroid Disease Mother      Heart Disease Mother      Heart Disease Father      Hyperlipidemia Father      Hypertension Maternal Grandmother      Breast Cancer Maternal Grandmother      Hypertension Maternal Grandfather      Alzheimer Disease Paternal Grandmother      Diabetes Brother      Eczema Brother      Thyroid Disease Brother      Melanoma No family hx of    Mother hyperthyrodism- removed goiter, Borhter hyperthyroidism- with medication, cousin- hyperthyrodism    Social History:  Social History     Tobacco Use     Smoking status: Never Smoker     Smokeless tobacco: Never Used   Substance Use Topics     Alcohol use: Yes     Alcohol/week: 0.0 standard drinks     Comment: 3 drinks per month   Lives  with 2 kids. Work at [employer]).    ROS:  Full review of systems taken with the help of the intake sheet. Pertinent positives include fatigue, loss of energy, weight gain, hair loss. Otherwise a complete 14 point review of systems was taken and is negative unless stated in the history above.      Physical Exam:   Blood pressure 112/84, pulse 83, height 1.672 m (5' 5.83\"), weight 94.1 kg (207 lb 9 oz)  General: well appearing, no acute distress, pleasant and conversant,   Mental Status/neuro: alert and oriented  Face: symmetrical, normal facial color  Eyes: anicteric, PERRL, no proptosis or lid lag  Neck: suppler, no lymphadenopahty  Thyroid: normal size and texture, no nodule palpable, no bruits  Heart: regular rhythm, S1S2, no murmur appreciated  Lung: clear to auscultation bilaterally  Abdomen: soft, NT/ND, no hepatomegaly  Legs: no swelling or edema    "

## 2020-03-11 ENCOUNTER — ONCOLOGY VISIT (OUTPATIENT)
Dept: ONCOLOGY | Facility: CLINIC | Age: 55
End: 2020-03-11
Attending: INTERNAL MEDICINE
Payer: COMMERCIAL

## 2020-03-11 VITALS
SYSTOLIC BLOOD PRESSURE: 114 MMHG | RESPIRATION RATE: 18 BRPM | HEIGHT: 67 IN | OXYGEN SATURATION: 99 % | WEIGHT: 228.9 LBS | TEMPERATURE: 98.2 F | BODY MASS INDEX: 35.93 KG/M2 | HEART RATE: 64 BPM | DIASTOLIC BLOOD PRESSURE: 73 MMHG

## 2020-03-11 DIAGNOSIS — C50.912 INVASIVE LOBULAR CARCINOMA OF LEFT BREAST IN FEMALE (H): ICD-10-CM

## 2020-03-11 DIAGNOSIS — C50.919 INVASIVE LOBULAR CARCINOMA OF BREAST IN FEMALE (H): Primary | ICD-10-CM

## 2020-03-11 DIAGNOSIS — D47.2 MONOCLONAL PARAPROTEINEMIA: ICD-10-CM

## 2020-03-11 DIAGNOSIS — E03.8 OTHER SPECIFIED HYPOTHYROIDISM: ICD-10-CM

## 2020-03-11 DIAGNOSIS — E78.5 HYPERLIPIDEMIA LDL GOAL <130: ICD-10-CM

## 2020-03-11 PROCEDURE — G0463 HOSPITAL OUTPT CLINIC VISIT: HCPCS

## 2020-03-11 PROCEDURE — 99214 OFFICE O/P EST MOD 30 MIN: CPT | Performed by: INTERNAL MEDICINE

## 2020-03-11 RX ORDER — TAMOXIFEN CITRATE 20 MG/1
20 TABLET ORAL DAILY
Qty: 90 TABLET | Refills: 1 | Status: SHIPPED | OUTPATIENT
Start: 2020-03-11 | End: 2020-12-23

## 2020-03-11 ASSESSMENT — PAIN SCALES - GENERAL: PAINLEVEL: MILD PAIN (2)

## 2020-03-11 ASSESSMENT — MIFFLIN-ST. JEOR: SCORE: 1657.97

## 2020-03-11 NOTE — LETTER
3/11/2020         RE: Merlyn Ravi  [address] [facility] [id number]        Dear Colleague,    Thank you for referring your patient, Merlyn Ravi, to the [facility]. Please see a copy of my visit note below.    Hematology/ Oncology Follow-up Visit:  Mar 11, 2020    Reason for Visit:   Chief Complaint   Patient presents with     Oncology Clinic Visit     Follow up after radiation therapy, left breast cancer.        Oncologic History:    Invasive lobular carcinoma of breast in female (H)  Kristina Ravi felt a lump in the left breast subsequently she went on to have diagnostic mammography which showed 1.1 cm irregular asymmetry about 11.2 cm from the nipple at 9-10 o'clock position.  Left breast ultrasound at that area revealed the lesion that measures 1.1 cm additional sonography to the left axilla shows no abnormal lymphadenopathy.  Subsequently the patient went on to have stereotactic breast biopsy done on August 15.  The pathology revealed invasive lobular carcinoma grade 2 out of 3.  Angiolymphatic invasion was not seen.  Carcinoma in situ cannot be excluded.  The tumor was estrogen receptor positive progesterone receptor positive and HER-2/walter came back negative.  Patient had a left lumpectomy and sentinel lymph node biopsy done on September 23, 2019 showing invasive lobular carcinoma with tumor size about 4.5 cm.  Tumor is grade 2 angiolymphatic invasion was not seen.  In situ malignancy was not seen . [place] lymph node was negative for malignancy.  The tumor was positive for estrogen receptor and progesterone receptor and negative for HER-2/walter receptors.  Because of positive deep margin, the patient went on to have reexcision on September 30, 2019 which resulted in positive margin as well.  Eventually she had reexcision again on October 9, 2019 which came back negative for residual malignancy.  Oncotype DX came back with recurrence score at 20.  The patient started on  tamoxifen 20 mg orally daily.      Interval History:  Patient returning today for follow-up visit.  Started on tamoxifen about 3 months ago.  She noticed mild increase in hot flashes.  She also noticed some change in hair short-term memory.  She denies aches or pains.  She denies any skin rash.  She denies any nausea or vomiting or diarrhea.    Review Of Systems:  Constitutional: Negative for fever, chills, and night sweats.  Hot flashes as mentioned above.  Skin: negative.  Eyes: negative.  Ears/Nose/Throat: negative.  Respiratory: No shortness of breath, dyspnea on exertion, cough, or hemoptysis.  Cardiovascular: negative.  Gastrointestinal: negative.  Genitourinary: negative.  Musculoskeletal: negative.  Neurologic: negative.  Psychiatric: negative.  Hematologic/Lymphatic/Immunologic: negative.  Endocrine: negative.    All other ROS negative unless mentioned in interval history.    Past medical, social, surgical, and family histories reviewed.    Allergies:  Allergies as of 03/11/2020 - Reviewed 03/11/2020   Allergen Reaction Noted     Demerol  11/16/2009     Sulfa drugs  11/16/2009       Current Medications:  Current Outpatient Medications   Medication Sig Dispense Refill     atorvastatin (LIPITOR) 10 MG tablet Take 1 tablet (10 mg) by mouth daily 90 tablet 2     calcium 500-125 MG-UNIT TABS Take 2 tablets by mouth 2 times daily       doxycycline hyclate (PERIOSTAT) 20 MG tablet 1 tab PO BID APPT NEEDED FOR REFILLS. (Patient taking differently: Take 20 mg by mouth daily APPT NEEDED FOR REFILLS.) 180 tablet 0     doxycycline monohydrate 100 MG capsule Take 1 capsule (100 mg) by mouth daily with food 90 capsule 0     levothyroxine (SYNTHROID/LEVOTHROID) 88 MCG tablet Take 1 tablet (88 mcg) by mouth daily Patient due for office visit 90 tablet 0     IBUPROFEN PO Take 800 mg by mouth every 4 hours as needed for moderate pain       Naproxen Sodium (ALEVE PO) Take 220 mg by mouth 2 times daily (with meals)        "tamoxifen (NOLVADEX) 20 MG tablet Take 1 tablet (20 mg) by mouth daily 90 tablet 1        Physical Exam:  /73 (BP Location: Right arm, Patient Position: Sitting, Cuff Size: Adult Large)   Pulse 64   Temp 98.2  F (36.8  C) (Tympanic)   Resp 18   Ht 1.689 m (5' 6.5\")   Wt 103.8 kg (228 lb 14.4 oz)   SpO2 99%   Breastfeeding No   BMI 36.39 kg/m    Wt Readings from Last 12 Encounters:   03/11/20 103.8 kg (228 lb 14.4 oz)   03/09/20 105.7 kg (233 lb)   01/31/20 103 kg (227 lb)   01/10/20 106 kg (233 lb 9.6 oz)   12/11/19 107 kg (235 lb 14.4 oz)   12/04/19 107 kg (235 lb 12.8 oz)   11/27/19 105.9 kg (233 lb 6.4 oz)   11/20/19 107 kg (235 lb 12.8 oz)   11/13/19 105.5 kg (232 lb 9.6 oz)   10/29/19 105.5 kg (232 lb 9.6 oz)   10/16/19 104.8 kg (231 lb 1.6 oz)   10/15/19 104.8 kg (231 lb)     ECOG performance status: 0  GENERAL APPEARANCE: Healthy, alert and in no acute distress.  HEENT: Sclerae anicteric. PERRLA. Oropharynx without ulcers, lesions, or thrush.  NECK: Supple. No asymmetry or masses.  LYMPHATICS: No palpable cervical, supraclavicular, axillary, or inguinal lymphadenopathy.  RESP: Lungs clear to auscultation bilaterally without rales, rhonchi or wheezes.\",  BREAST: Right- No mass, nipple discharge or axillary adenopathy. Left- No mass, nipple discharge or axillary adenopathy \"CARDIOVASCULAR: Regular rate and rhythm. Normal S1, S2; no S3 or S4. No murmur, gallop, or rub.  ABDOMEN: Soft, nontender. Bowel sounds normal. No palpable organomegaly or masses.  MUSCULOSKELETAL: Extremities without gross deformities noted. No edema of bilateral lower extremities.  SKIN: No suspicious lesions or rashes.  NEURO: Alert and oriented x 3. Cranial nerves II-XII grossly intact.  PSYCHIATRIC: Mentation and affect appear normal.    Laboratory/Imaging Studies:  Orders Only on 03/09/2020   Component Date Value Ref Range Status     Vitamin D Deficiency screening 03/09/2020 30  20 - 75 ug/L Final    Comment: Season, " race, dietary intake, and treatment affect the concentration of   25-hydroxy-Vitamin D. Values may decrease during winter months and increase   during summer months. Values 20-29 ug/L may indicate Vitamin D insufficiency   and values <20 ug/L may indicate Vitamin D deficiency.  Vitamin D determination is routinely performed by an immunoassay specific for   25 hydroxyvitamin D3.  If an individual is on vitamin D2 (ergocalciferol)   supplementation, please specify 25 OH vitamin D2 and D3 level determination by   LCMSMS test [id number].       T4 Free 03/09/2020 1.24  0.76 - 1.46 ng/dL Final     TSH 03/09/2020 1.75  0.40 - 4.00 mU/L Final          Assessment and plan:    (C50.919) Invasive lobular carcinoma of breast in female (H)  I reviewed with the patient today most recent laboratory test.  Patient will continue on tamoxifen 20 mg orally daily.  We will arrange for annual mammography.  I will see the patient again in 3 months time or sooner if there are new developments or concerns.    (D47.2) Monoclonal paraproteinemia  There is no evidence of plasma cell dyscrasia.    (E78.5) Hyperlipidemia LDL goal <130  Patient continues on atorvastatin 10 mg orally daily.    (E03.8) Other specified hypothyroidism    Patient currently on Synthroid 88 mcg orally daily.    The patient is ready to learn, no apparent learning barriers were identified.  Diagnosis and treatment plans were explained to the patient. The patient expressed understanding of the content. The patient asked appropriate questions. The patient questions were answered to her satisfaction.    Chart documentation with Dragon Voice recognition Software. Although reviewed after completion, some words and grammatical errors may remain.    Oncology Rooming Note    March 11, 2020 3:21 PM   Merlyn Ravi is a 55 year old female who presents for:    No chief complaint on file.    Initial Vitals: /73 (BP Location: Right arm, Patient Position: Sitting, Cuff Size:  "Adult Large)   Pulse 64   Temp 98.2  F (36.8  C) (Tympanic)   Resp 18   Ht 1.689 m (5' 6.5\")   Wt 103.8 kg (228 lb 14.4 oz)   SpO2 99%   Breastfeeding No   BMI 36.39 kg/m   Estimated body mass index is 36.39 kg/m  as calculated from the following:    Height as of this encounter: 1.689 m (5' 6.5\").    Weight as of this encounter: 103.8 kg (228 lb 14.4 oz). Body surface area is 2.21 meters squared.  Mild Pain (2) Comment: Data Unavailable   No LMP recorded. Patient has had a hysterectomy.  Allergies reviewed: Yes  Medications reviewed: Yes    Medications: Medication refills not needed today.  Pharmacy name entered into [employer]: [id number] - [place], [place] - [address] [facility] [address]    Clinical concerns:Follow up after radiation therapy, left breast cancer.  Very itchy underneath the left breast, 2/10 discomfort.       Angy Nicolas [facility]                Again, thank you for allowing me to participate in the care of your patient.        Sincerely,        Tanvi Blakely MD    "

## 2020-03-11 NOTE — PROGRESS NOTES
"Oncology Rooming Note    March 11, 2020 3:21 PM   Merlyn Ravi is a 55 year old female who presents for:    No chief complaint on file.    Initial Vitals: /73 (BP Location: Right arm, Patient Position: Sitting, Cuff Size: Adult Large)   Pulse 64   Temp 98.2  F (36.8  C) (Tympanic)   Resp 18   Ht 1.689 m (5' 6.5\")   Wt 103.8 kg (228 lb 14.4 oz)   SpO2 99%   Breastfeeding No   BMI 36.39 kg/m   Estimated body mass index is 36.39 kg/m  as calculated from the following:    Height as of this encounter: 1.689 m (5' 6.5\").    Weight as of this encounter: 103.8 kg (228 lb 14.4 oz). Body surface area is 2.21 meters squared.  Mild Pain (2) Comment: Data Unavailable   No LMP recorded. Patient has had a hysterectomy.  Allergies reviewed: Yes  Medications reviewed: Yes    Medications: Medication refills not needed today.  Pharmacy name entered into [employer]: [facility] #[id number] - [place], [place] - [address] AT [facility] [address]    Clinical concerns:Follow up after radiation therapy, left breast cancer.  Very itchy underneath the left breast, 2/10 discomfort.       Angy Nicolas, [facility]              "

## 2020-03-11 NOTE — ASSESSMENT & PLAN NOTE
Kristina Ravi felt a lump in the left breast subsequently she went on to have diagnostic mammography which showed 1.1 cm irregular asymmetry about 11.2 cm from the nipple at 9-10 o'clock position.  Left breast ultrasound at that area revealed the lesion that measures 1.1 cm additional sonography to the left axilla shows no abnormal lymphadenopathy.  Subsequently the patient went on to have stereotactic breast biopsy done on August 15.  The pathology revealed invasive lobular carcinoma grade 2 out of 3.  Angiolymphatic invasion was not seen.  Carcinoma in situ cannot be excluded.  The tumor was estrogen receptor positive progesterone receptor positive and HER-2/walter came back negative.  Patient had a left lumpectomy and sentinel lymph node biopsy done on September 23, 2019 showing invasive lobular carcinoma with tumor size about 4.5 cm.  Tumor is grade 2 angiolymphatic invasion was not seen.  In situ malignancy was not seen . [place] lymph node was negative for malignancy.  The tumor was positive for estrogen receptor and progesterone receptor and negative for HER-2/walter receptors.  Because of positive deep margin, the patient went on to have reexcision on September 30, 2019 which resulted in positive margin as well.  Eventually she had reexcision again on October 9, 2019 which came back negative for residual malignancy.  Oncotype DX came back with recurrence score at 20.  The patient started on tamoxifen 20 mg orally daily.

## 2020-03-11 NOTE — PROGRESS NOTES
Hematology/ Oncology Follow-up Visit:  Mar 11, 2020    Reason for Visit:   Chief Complaint   Patient presents with     Oncology Clinic Visit     Follow up after radiation therapy, left breast cancer.        Oncologic History:    Invasive lobular carcinoma of breast in female (H)  Kristina Ravi felt a lump in the left breast subsequently she went on to have diagnostic mammography which showed 1.1 cm irregular asymmetry about 11.2 cm from the nipple at 9-10 o'clock position.  Left breast ultrasound at that area revealed the lesion that measures 1.1 cm additional sonography to the left axilla shows no abnormal lymphadenopathy.  Subsequently the patient went on to have stereotactic breast biopsy done on August 15.  The pathology revealed invasive lobular carcinoma grade 2 out of 3.  Angiolymphatic invasion was not seen.  Carcinoma in situ cannot be excluded.  The tumor was estrogen receptor positive progesterone receptor positive and HER-2/walter came back negative.  Patient had a left lumpectomy and sentinel lymph node biopsy done on September 23, 2019 showing invasive lobular carcinoma with tumor size about 4.5 cm.  Tumor is grade 2 angiolymphatic invasion was not seen.  In situ malignancy was not seen . [place] lymph node was negative for malignancy.  The tumor was positive for estrogen receptor and progesterone receptor and negative for HER-2/walter receptors.  Because of positive deep margin, the patient went on to have reexcision on September 30, 2019 which resulted in positive margin as well.  Eventually she had reexcision again on October 9, 2019 which came back negative for residual malignancy.  Oncotype DX came back with recurrence score at 20.  The patient started on tamoxifen 20 mg orally daily.      Interval History:  Patient returning today for follow-up visit.  Started on tamoxifen about 3 months ago.  She noticed mild increase in hot flashes.  She also noticed some change in hair short-term memory.  She  denies aches or pains.  She denies any skin rash.  She denies any nausea or vomiting or diarrhea.    Review Of Systems:  Constitutional: Negative for fever, chills, and night sweats.  Hot flashes as mentioned above.  Skin: negative.  Eyes: negative.  Ears/Nose/Throat: negative.  Respiratory: No shortness of breath, dyspnea on exertion, cough, or hemoptysis.  Cardiovascular: negative.  Gastrointestinal: negative.  Genitourinary: negative.  Musculoskeletal: negative.  Neurologic: negative.  Psychiatric: negative.  Hematologic/Lymphatic/Immunologic: negative.  Endocrine: negative.    All other ROS negative unless mentioned in interval history.    Past medical, social, surgical, and family histories reviewed.    Allergies:  Allergies as of 03/11/2020 - Reviewed 03/11/2020   Allergen Reaction Noted     Demerol  11/16/2009     Sulfa drugs  11/16/2009       Current Medications:  Current Outpatient Medications   Medication Sig Dispense Refill     atorvastatin (LIPITOR) 10 MG tablet Take 1 tablet (10 mg) by mouth daily 90 tablet 2     calcium 500-125 MG-UNIT TABS Take 2 tablets by mouth 2 times daily       doxycycline hyclate (PERIOSTAT) 20 MG tablet 1 tab PO BID APPT NEEDED FOR REFILLS. (Patient taking differently: Take 20 mg by mouth daily APPT NEEDED FOR REFILLS.) 180 tablet 0     doxycycline monohydrate 100 MG capsule Take 1 capsule (100 mg) by mouth daily with food 90 capsule 0     levothyroxine (SYNTHROID/LEVOTHROID) 88 MCG tablet Take 1 tablet (88 mcg) by mouth daily Patient due for office visit 90 tablet 0     IBUPROFEN PO Take 800 mg by mouth every 4 hours as needed for moderate pain       Naproxen Sodium (ALEVE PO) Take 220 mg by mouth 2 times daily (with meals)       tamoxifen (NOLVADEX) 20 MG tablet Take 1 tablet (20 mg) by mouth daily 90 tablet 1        Physical Exam:  /73 (BP Location: Right arm, Patient Position: Sitting, Cuff Size: Adult Large)   Pulse 64   Temp 98.2  F (36.8  C) (Tympanic)   Resp 18  "  Ht 1.689 m (5' 6.5\")   Wt 103.8 kg (228 lb 14.4 oz)   SpO2 99%   Breastfeeding No   BMI 36.39 kg/m    Wt Readings from Last 12 Encounters:   03/11/20 103.8 kg (228 lb 14.4 oz)   03/09/20 105.7 kg (233 lb)   01/31/20 103 kg (227 lb)   01/10/20 106 kg (233 lb 9.6 oz)   12/11/19 107 kg (235 lb 14.4 oz)   12/04/19 107 kg (235 lb 12.8 oz)   11/27/19 105.9 kg (233 lb 6.4 oz)   11/20/19 107 kg (235 lb 12.8 oz)   11/13/19 105.5 kg (232 lb 9.6 oz)   10/29/19 105.5 kg (232 lb 9.6 oz)   10/16/19 104.8 kg (231 lb 1.6 oz)   10/15/19 104.8 kg (231 lb)     ECOG performance status: 0  GENERAL APPEARANCE: Healthy, alert and in no acute distress.  HEENT: Sclerae anicteric. PERRLA. Oropharynx without ulcers, lesions, or thrush.  NECK: Supple. No asymmetry or masses.  LYMPHATICS: No palpable cervical, supraclavicular, axillary, or inguinal lymphadenopathy.  RESP: Lungs clear to auscultation bilaterally without rales, rhonchi or wheezes.\",  BREAST: Right- No mass, nipple discharge or axillary adenopathy. Left- No mass, nipple discharge or axillary adenopathy \"CARDIOVASCULAR: Regular rate and rhythm. Normal S1, S2; no S3 or S4. No murmur, gallop, or rub.  ABDOMEN: Soft, nontender. Bowel sounds normal. No palpable organomegaly or masses.  MUSCULOSKELETAL: Extremities without gross deformities noted. No edema of bilateral lower extremities.  SKIN: No suspicious lesions or rashes.  NEURO: Alert and oriented x 3. Cranial nerves II-XII grossly intact.  PSYCHIATRIC: Mentation and affect appear normal.    Laboratory/Imaging Studies:  Orders Only on 03/09/2020   Component Date Value Ref Range Status     Vitamin D Deficiency screening 03/09/2020 30  20 - 75 ug/L Final    Comment: Season, race, dietary intake, and treatment affect the concentration of   25-hydroxy-Vitamin D. Values may decrease during winter months and increase   during summer months. Values 20-29 ug/L may indicate Vitamin D insufficiency   and values <20 ug/L may indicate " Vitamin D deficiency.  Vitamin D determination is routinely performed by an immunoassay specific for   25 hydroxyvitamin D3.  If an individual is on vitamin D2 (ergocalciferol)   supplementation, please specify 25 OH vitamin D2 and D3 level determination by   LCMSMS test [id number].       T4 Free 03/09/2020 1.24  0.76 - 1.46 ng/dL Final     TSH 03/09/2020 1.75  0.40 - 4.00 mU/L Final          Assessment and plan:    (C50.919) Invasive lobular carcinoma of breast in female (H)  I reviewed with the patient today most recent laboratory test.  Patient will continue on tamoxifen 20 mg orally daily.  We will arrange for annual mammography.  I will see the patient again in 3 months time or sooner if there are new developments or concerns.    (D47.2) Monoclonal paraproteinemia  There is no evidence of plasma cell dyscrasia.    (E78.5) Hyperlipidemia LDL goal <130  Patient continues on atorvastatin 10 mg orally daily.    (E03.8) Other specified hypothyroidism    Patient currently on Synthroid 88 mcg orally daily.    The patient is ready to learn, no apparent learning barriers were identified.  Diagnosis and treatment plans were explained to the patient. The patient expressed understanding of the content. The patient asked appropriate questions. The patient questions were answered to her satisfaction.    Chart documentation with Dragon Voice recognition Software. Although reviewed after completion, some words and grammatical errors may remain.

## 2020-04-02 ENCOUNTER — MYC REFILL (OUTPATIENT)
Dept: ENDOCRINOLOGY | Facility: CLINIC | Age: 55
End: 2020-04-02

## 2020-04-02 DIAGNOSIS — E06.3 HYPOTHYROIDISM DUE TO HASHIMOTO'S THYROIDITIS: ICD-10-CM

## 2020-04-02 RX ORDER — LEVOTHYROXINE SODIUM 88 UG/1
88 TABLET ORAL DAILY
Qty: 90 TABLET | Refills: 3 | Status: SHIPPED | OUTPATIENT
Start: 2020-04-02 | End: 2021-03-29

## 2020-04-20 ENCOUNTER — OFFICE VISIT (OUTPATIENT)
Dept: SURGERY | Facility: CLINIC | Age: 55
End: 2020-04-20
Payer: COMMERCIAL

## 2020-04-20 VITALS
WEIGHT: 219 LBS | HEART RATE: 66 BPM | HEIGHT: 67 IN | DIASTOLIC BLOOD PRESSURE: 76 MMHG | TEMPERATURE: 98.3 F | BODY MASS INDEX: 34.37 KG/M2 | SYSTOLIC BLOOD PRESSURE: 127 MMHG

## 2020-04-20 DIAGNOSIS — C50.919 INVASIVE LOBULAR CARCINOMA OF BREAST IN FEMALE (H): Primary | ICD-10-CM

## 2020-04-20 PROCEDURE — 99213 OFFICE O/P EST LOW 20 MIN: CPT | Performed by: SURGERY

## 2020-04-20 ASSESSMENT — MIFFLIN-ST. JEOR: SCORE: 1613.07

## 2020-04-20 NOTE — PATIENT INSTRUCTIONS
Per physician instructions      If you have questions or concerns on any instructions given to you by your provider today or if you need to schedule an appointment, you can reach us at [phone].

## 2020-04-20 NOTE — PROGRESS NOTES
"BREAST CANCER FOLLOW UP  CHIEF COMPLAINT  Chief Complaint   Patient presents with     RECHECK     follow up issues with      HPI  Merlyn Ravi is a 55 year old female who presents with   Chief Complaint   Patient presents with     RECHECK     follow up issues with      The patient presents for her breast cancer followup appointment. Overall, she is feeling good. She denies any significant fatigue, fevers, chills, or changes in appetite. She has not suffered any significant weight loss.  She has not noted any areas of skin changes or any masses in the breast or chest wall that she is concerned about.  She denies skin dimpling, puckering, erythema, or nipple discharge. She has not noted any palpable axillary lymphadenopathy.    She does complain of pain in her left medial arm which has been presents since surgery.  She admits some weakness with arm elevation and numbness.  She does not a history of \"bulging disc\" many years ago and does see a chiropractor with some improvement.  She feels her symptoms worsen with elevation of her left arm above shoulder level    PAST MEDICAL HISTORY  Past Medical History:   Diagnosis Date     Allergies      Dermatofibroma 5/14/2012     FAMILY HISTORY  Family History   Problem Relation Age of Onset     Thyroid Disease Mother      Heart Disease Mother      Heart Disease Father      Hyperlipidemia Father      Hypertension Maternal Grandmother      Breast Cancer Maternal Grandmother      Hypertension Maternal Grandfather      Alzheimer Disease Paternal Grandmother      Diabetes Brother      Eczema Brother      Thyroid Disease Brother      Melanoma No family hx of      SOCIAL HISTORY  Social History     Socioeconomic History     Marital status:      Spouse name: Not on file     Number of children: Not on file     Years of education: Not on file     Highest education level: Not on file   Occupational History     Employer: [employer]   Social Needs     Financial resource strain: " Not on file     Food insecurity     Worry: Not on file     Inability: Not on file     Transportation needs     Medical: Not on file     Non-medical: Not on file   Tobacco Use     Smoking status: Never Smoker     Smokeless tobacco: Never Used   Substance and Sexual Activity     Alcohol use: Yes     Alcohol/week: 0.0 standard drinks     Comment: 3 drinks per month     Drug use: No     Sexual activity: Yes     Partners: Male     Birth control/protection: Surgical     Comment: defer to md   Lifestyle     Physical activity     Days per week: Not on file     Minutes per session: Not on file     Stress: Not on file   Relationships     Social connections     Talks on phone: Not on file     Gets together: Not on file     Attends Druze service: Not on file     Active member of club or organization: Not on file     Attends meetings of clubs or organizations: Not on file     Relationship status: Not on file     Intimate partner violence     Fear of current or ex partner: Not on file     Emotionally abused: Not on file     Physically abused: Not on file     Forced sexual activity: Not on file   Other Topics Concern     Parent/sibling w/ CABG, MI or angioplasty before 65F 55M? No   Social History Narrative     Not on file     SURGICAL HISTORY  Past Surgical History:   Procedure Laterality Date     C NONSPECIFIC PROCEDURE      bladder surgery     HYSTERECTOMY, PAP NO LONGER INDICATED       HYSTERECTOMY, MAGY       LASIK BILATERAL  2005    Dr. Solis      LUMPECTOMY BREAST Left 9/30/2019    Procedure: Re-excision of left breast lumpectomy site;  Surgeon: Kj Salas DO;  Location: [facility]     LUMPECTOMY BREAST Left 10/9/2019    Procedure: Left breast re-excision;  Surgeon: Kj Salas DO;  Location: [facility]     LUMPECTOMY BREAST WITH SENTINEL NODE, COMBINED Left 9/23/2019    Procedure: LUMPECTOMY, BREAST, WITH SENTINEL LYMPH NODE BIOPSY.;  Surgeon: Kj Salas DO;  Location: [facility]     CURRENT  "MEDICATIONS    Current Outpatient Medications:      atorvastatin (LIPITOR) 10 MG tablet, Take 1 tablet (10 mg) by mouth daily, Disp: 90 tablet, Rfl: 2     calcium 500-125 MG-UNIT TABS, Take 2 tablets by mouth 2 times daily, Disp: , Rfl:      doxycycline hyclate (PERIOSTAT) 20 MG tablet, 1 tab PO BID APPT NEEDED FOR REFILLS. (Patient taking differently: Take 20 mg by mouth daily APPT NEEDED FOR REFILLS.), Disp: 180 tablet, Rfl: 0     doxycycline monohydrate 100 MG capsule, Take 1 capsule (100 mg) by mouth daily with food, Disp: 90 capsule, Rfl: 0     IBUPROFEN PO, Take 800 mg by mouth every 4 hours as needed for moderate pain, Disp: , Rfl:      levothyroxine (SYNTHROID/LEVOTHROID) 88 MCG tablet, Take 1 tablet (88 mcg) by mouth daily, Disp: 90 tablet, Rfl: 3     Naproxen Sodium (ALEVE PO), Take 220 mg by mouth 2 times daily (with meals), Disp: , Rfl:      tamoxifen (NOLVADEX) 20 MG tablet, Take 1 tablet (20 mg) by mouth daily, Disp: 90 tablet, Rfl: 1  ALLERGIES  Allergies   Allergen Reactions     Demerol      Sedation and vomiting     Sulfa Drugs      unknown reaction     PHYSICAL EXAM  VITAL SIGNS:  height is 1.689 m (5' 6.5\") and weight is 99.3 kg (219 lb). Her tympanic temperature is 98.3  F (36.8  C). Her blood pressure is 127/76 and her pulse is 66.   Constitutional: Well developed, Well nourished, No acute distress, Non-toxic appearance.   HENT: Normocephalic, Atraumatic, Bilateral external ears normal, Oropharynx moist, No oral exudates, Nose normal.   Eyes: EOMI, Conjunctiva normal, No discharge.   Neck: Normal range of motion, No tenderness, Supple, No stridor.   Lymphatic: No lymphadenopathy noted.   Cardiovascular: Normal heart rate, Normal rhythm, No murmurs, No rubs, No gallops.   Breast Exam: Seen and examined with Maria Isabel VINES  RIGHT: No areas of skin dimpling or puckering. No erythema. No skin scaling or changes. No expressible nipple discharge. No focal areas of significant tenderness. .  No palpable " axillary lymphadenopathy.  LEFT: Post radiation changes of left breast, mainly medially.  Post-operative changes/scarring.  No focal mass.  Palpable ridge left lateral chest wall with some tenderness.    No palpable axillary lymphadenopathy.  Thorax & Lungs: Normal breath sounds, No respiratory distress, No wheezing, No chest tenderness.   Skin: Warm, Dry, No erythema, No rash.   Neurologic: Alert & oriented x 3, Normal motor function, Normal sensory function, No focal deficits noted.   Psychiatric: Affect normal, Judgment normal, Mood normal.     FINAL IMPRESSION AND PLAN  1. Invasive lobular carcinoma of breast in female (H)        The patient is 7 months out from her breast cancer surgery. She c/o pain in medial arm in addition to radicular type symptoms.  She does have a palpable ridge over left lateral chest wall on exam which is tender to palpation.  This is far remote from her cancer or axillary procedures.  I am uncertain of the significance of this, however recommended imaging to rule out any chance of recurrence including mammogram/diagnostic ultrasound.  I advised her to wear a sports type bra, free of underwire as it appears to be in line with her current bra and may be causing irritation.    Pending imaging, she will have appropriate management with potential referral for cervical imaging to rule out radicular type symptoms/etiology, physical therapy, neurotin, vs other.  She is comfortable with this plan.    She will continue periodic self breast or chest wall exam. She is encouraged to followup with me for any changes on self-exam, or for any concerns or questions. She will followup with her primary care provider for routine care, and continue followup with her oncologist as scheduled.    My recommendations were discussed with the patient. She will see me in 3 months for another exam.      Kj Salas, DO on 4/20/2020 at 3:12 PM

## 2020-04-20 NOTE — NURSING NOTE
"Initial /76 (BP Location: Right arm, Patient Position: Sitting, Cuff Size: Adult Regular)   Pulse 66   Temp 98.3  F (36.8  C) (Tympanic)   Ht 1.689 m (5' 6.5\")   Wt 99.3 kg (219 lb)   BMI 34.82 kg/m   Estimated body mass index is 34.82 kg/m  as calculated from the following:    Height as of this encounter: 1.689 m (5' 6.5\").    Weight as of this encounter: 99.3 kg (219 lb). .    Sury Gaspar CMA    "

## 2020-04-20 NOTE — LETTER
"    4/20/2020         RE: Merlyn Ravi  [id number] [facility] [id number]        Dear Colleague,    Thank you for referring your patient, Merlyn Ravi, to the [facility]. Please see a copy of my visit note below.    BREAST CANCER FOLLOW UP  CHIEF COMPLAINT  Chief Complaint   Patient presents with     RECHECK     follow up issues with      HPI  Merlyn Ravi is a 55 year old female who presents with   Chief Complaint   Patient presents with     RECHECK     follow up issues with      The patient presents for her breast cancer followup appointment. Overall, she is feeling good. She denies any significant fatigue, fevers, chills, or changes in appetite. She has not suffered any significant weight loss.  She has not noted any areas of skin changes or any masses in the breast or chest wall that she is concerned about.  She denies skin dimpling, puckering, erythema, or nipple discharge. She has not noted any palpable axillary lymphadenopathy.    She does complain of pain in her left medial arm which has been presents since surgery.  She admits some weakness with arm elevation and numbness.  She does not a history of \"bulging disc\" many years ago and does see a chiropractor with some improvement.  She feels her symptoms worsen with elevation of her left arm above shoulder level    PAST MEDICAL HISTORY  Past Medical History:   Diagnosis Date     Allergies      Dermatofibroma 5/14/2012     FAMILY HISTORY  Family History   Problem Relation Age of Onset     Thyroid Disease Mother      Heart Disease Mother      Heart Disease Father      Hyperlipidemia Father      Hypertension Maternal Grandmother      Breast Cancer Maternal Grandmother      Hypertension Maternal Grandfather      Alzheimer Disease Paternal Grandmother      Diabetes Brother      Eczema Brother      Thyroid Disease Brother      Melanoma No family hx of      SOCIAL HISTORY  Social History     Socioeconomic History     Marital " status:      Spouse name: Not on file     Number of children: Not on file     Years of education: Not on file     Highest education level: Not on file   Occupational History     Employer: [employer]   Social Needs     Financial resource strain: Not on file     Food insecurity     Worry: Not on file     Inability: Not on file     Transportation needs     Medical: Not on file     Non-medical: Not on file   Tobacco Use     Smoking status: Never Smoker     Smokeless tobacco: Never Used   Substance and Sexual Activity     Alcohol use: Yes     Alcohol/week: 0.0 standard drinks     Comment: 3 drinks per month     Drug use: No     Sexual activity: Yes     Partners: Male     Birth control/protection: Surgical     Comment: defer to md   Lifestyle     Physical activity     Days per week: Not on file     Minutes per session: Not on file     Stress: Not on file   Relationships     Social connections     Talks on phone: Not on file     Gets together: Not on file     Attends Islam service: Not on file     Active member of club or organization: Not on file     Attends meetings of clubs or organizations: Not on file     Relationship status: Not on file     Intimate partner violence     Fear of current or ex partner: Not on file     Emotionally abused: Not on file     Physically abused: Not on file     Forced sexual activity: Not on file   Other Topics Concern     Parent/sibling w/ CABG, MI or angioplasty before 65F 55M? No   Social History Narrative     Not on file     SURGICAL HISTORY  Past Surgical History:   Procedure Laterality Date     C NONSPECIFIC PROCEDURE      bladder surgery     HYSTERECTOMY, PAP NO LONGER INDICATED       HYSTERECTOMY, MAGY       LASIK BILATERAL  2005    Dr. Solis      LUMPECTOMY BREAST Left 9/30/2019    Procedure: Re-excision of left breast lumpectomy site;  Surgeon: Kj Salas DO;  Location: [facility]     LUMPECTOMY BREAST Left 10/9/2019    Procedure: Left breast re-excision;   "Surgeon: Kj Salas DO;  Location: [facility]     LUMPECTOMY BREAST WITH SENTINEL NODE, COMBINED Left 9/23/2019    Procedure: LUMPECTOMY, BREAST, WITH SENTINEL LYMPH NODE BIOPSY.;  Surgeon: Kj Salas DO;  Location: [facility]     CURRENT MEDICATIONS    Current Outpatient Medications:      atorvastatin (LIPITOR) 10 MG tablet, Take 1 tablet (10 mg) by mouth daily, Disp: 90 tablet, Rfl: 2     calcium 500-125 MG-UNIT TABS, Take 2 tablets by mouth 2 times daily, Disp: , Rfl:      doxycycline hyclate (PERIOSTAT) 20 MG tablet, 1 tab PO BID APPT NEEDED FOR REFILLS. (Patient taking differently: Take 20 mg by mouth daily APPT NEEDED FOR REFILLS.), Disp: 180 tablet, Rfl: 0     doxycycline monohydrate 100 MG capsule, Take 1 capsule (100 mg) by mouth daily with food, Disp: 90 capsule, Rfl: 0     IBUPROFEN PO, Take 800 mg by mouth every 4 hours as needed for moderate pain, Disp: , Rfl:      levothyroxine (SYNTHROID/LEVOTHROID) 88 MCG tablet, Take 1 tablet (88 mcg) by mouth daily, Disp: 90 tablet, Rfl: 3     Naproxen Sodium (ALEVE PO), Take 220 mg by mouth 2 times daily (with meals), Disp: , Rfl:      tamoxifen (NOLVADEX) 20 MG tablet, Take 1 tablet (20 mg) by mouth daily, Disp: 90 tablet, Rfl: 1  ALLERGIES  Allergies   Allergen Reactions     Demerol      Sedation and vomiting     Sulfa Drugs      unknown reaction     PHYSICAL EXAM  VITAL SIGNS:  height is 1.689 m (5' 6.5\") and weight is 99.3 kg (219 lb). Her tympanic temperature is 98.3  F (36.8  C). Her blood pressure is 127/76 and her pulse is 66.   Constitutional: Well developed, Well nourished, No acute distress, Non-toxic appearance.   HENT: Normocephalic, Atraumatic, Bilateral external ears normal, Oropharynx moist, No oral exudates, Nose normal.   Eyes: EOMI, Conjunctiva normal, No discharge.   Neck: Normal range of motion, No tenderness, Supple, No stridor.   Lymphatic: No lymphadenopathy noted.   Cardiovascular: Normal heart rate, Normal rhythm, No " murmurs, No rubs, No gallops.   Breast Exam: Seen and examined with Maria Isabel VINES  RIGHT: No areas of skin dimpling or puckering. No erythema. No skin scaling or changes. No expressible nipple discharge. No focal areas of significant tenderness. .  No palpable axillary lymphadenopathy.  LEFT: Post radiation changes of left breast, mainly medially.  Post-operative changes/scarring.  No focal mass.  Palpable ridge left lateral chest wall with some tenderness.    No palpable axillary lymphadenopathy.  Thorax & Lungs: Normal breath sounds, No respiratory distress, No wheezing, No chest tenderness.   Skin: Warm, Dry, No erythema, No rash.   Neurologic: Alert & oriented x 3, Normal motor function, Normal sensory function, No focal deficits noted.   Psychiatric: Affect normal, Judgment normal, Mood normal.     FINAL IMPRESSION AND PLAN  1. Invasive lobular carcinoma of breast in female (H)        The patient is 7 months out from her breast cancer surgery. She c/o pain in medial arm in addition to radicular type symptoms.  She does have a palpable ridge over left lateral chest wall on exam which is tender to palpation.  This is far remote from her cancer or axillary procedures.  I am uncertain of the significance of this, however recommended imaging to rule out any chance of recurrence including mammogram/diagnostic ultrasound.  I advised her to wear a sports type bra, free of underwire as it appears to be in line with her current bra and may be causing irritation.    Pending imaging, she will have appropriate management with potential referral for cervical imaging to rule out radicular type symptoms/etiology, physical therapy, neurotin, vs other.  She is comfortable with this plan.    She will continue periodic self breast or chest wall exam. She is encouraged to followup with me for any changes on self-exam, or for any concerns or questions. She will followup with her primary care provider for routine care, and continue  followup with her oncologist as scheduled.    My recommendations were discussed with the patient. She will see me in 3 months for another exam.      Kj Salas, DO on 4/20/2020 at 3:12 PM          Again, thank you for allowing me to participate in the care of your patient.        Sincerely,        Kj Salas, DO

## 2020-04-23 ENCOUNTER — HOSPITAL ENCOUNTER (OUTPATIENT)
Dept: ULTRASOUND IMAGING | Facility: CLINIC | Age: 55
End: 2020-04-23
Attending: SURGERY
Payer: COMMERCIAL

## 2020-04-23 ENCOUNTER — HOSPITAL ENCOUNTER (OUTPATIENT)
Dept: MAMMOGRAPHY | Facility: CLINIC | Age: 55
End: 2020-04-23
Attending: SURGERY
Payer: COMMERCIAL

## 2020-04-23 DIAGNOSIS — C50.919 INVASIVE LOBULAR CARCINOMA OF BREAST IN FEMALE (H): ICD-10-CM

## 2020-04-23 DIAGNOSIS — N63.32 LUMP OF AXILLARY TAIL OF LEFT BREAST: ICD-10-CM

## 2020-04-23 PROCEDURE — 76642 ULTRASOUND BREAST LIMITED: CPT | Mod: LT

## 2020-04-23 PROCEDURE — 77066 DX MAMMO INCL CAD BI: CPT

## 2020-06-01 ENCOUNTER — HOSPITAL ENCOUNTER (OUTPATIENT)
Dept: LAB | Facility: CLINIC | Age: 55
Discharge: HOME | End: 2020-06-01
Attending: INTERNAL MEDICINE | Admitting: INTERNAL MEDICINE
Payer: COMMERCIAL

## 2020-06-01 DIAGNOSIS — C50.919 INVASIVE LOBULAR CARCINOMA OF BREAST IN FEMALE (H): ICD-10-CM

## 2020-06-01 LAB
ALBUMIN SERPL-MCNC: 3.1 G/DL (ref 3.4–5)
ALP SERPL-CCNC: 66 U/L (ref 40–150)
ALT SERPL W P-5'-P-CCNC: 37 U/L (ref 0–50)
ANION GAP SERPL CALCULATED.3IONS-SCNC: 4 MMOL/L (ref 3–14)
AST SERPL W P-5'-P-CCNC: 28 U/L (ref 0–45)
BASOPHILS # BLD AUTO: 0 10E9/L (ref 0–0.2)
BASOPHILS NFR BLD AUTO: 0.5 %
BILIRUB SERPL-MCNC: 0.4 MG/DL (ref 0.2–1.3)
BUN SERPL-MCNC: 12 MG/DL (ref 7–30)
CALCIUM SERPL-MCNC: 8.9 MG/DL (ref 8.5–10.1)
CANCER AG27-29 SERPL-ACNC: 16 U/ML (ref 0–39)
CHLORIDE SERPL-SCNC: 111 MMOL/L (ref 94–109)
CO2 SERPL-SCNC: 28 MMOL/L (ref 20–32)
CREAT SERPL-MCNC: 0.86 MG/DL (ref 0.52–1.04)
DIFFERENTIAL METHOD BLD: ABNORMAL
EOSINOPHIL # BLD AUTO: 0.1 10E9/L (ref 0–0.7)
EOSINOPHIL NFR BLD AUTO: 2.1 %
ERYTHROCYTE [DISTWIDTH] IN BLOOD BY AUTOMATED COUNT: 12.1 % (ref 10–15)
GFR SERPL CREATININE-BSD FRML MDRD: 76 ML/MIN/{1.73_M2}
GLUCOSE SERPL-MCNC: 104 MG/DL (ref 70–99)
HCT VFR BLD AUTO: 38.7 % (ref 35–47)
HGB BLD-MCNC: 12.6 G/DL (ref 11.7–15.7)
IMM GRANULOCYTES # BLD: 0 10E9/L (ref 0–0.4)
IMM GRANULOCYTES NFR BLD: 0.3 %
LYMPHOCYTES # BLD AUTO: 1.1 10E9/L (ref 0.8–5.3)
LYMPHOCYTES NFR BLD AUTO: 28.7 %
MCH RBC QN AUTO: 30.7 PG (ref 26.5–33)
MCHC RBC AUTO-ENTMCNC: 32.6 G/DL (ref 31.5–36.5)
MCV RBC AUTO: 94 FL (ref 78–100)
MONOCYTES # BLD AUTO: 0.4 10E9/L (ref 0–1.3)
MONOCYTES NFR BLD AUTO: 9.2 %
NEUTROPHILS # BLD AUTO: 2.3 10E9/L (ref 1.6–8.3)
NEUTROPHILS NFR BLD AUTO: 59.2 %
NRBC # BLD AUTO: 0 10*3/UL
NRBC BLD AUTO-RTO: 0 /100
PLATELET # BLD AUTO: 236 10E9/L (ref 150–450)
POTASSIUM SERPL-SCNC: 3.7 MMOL/L (ref 3.4–5.3)
PROT SERPL-MCNC: 7.3 G/DL (ref 6.8–8.8)
RBC # BLD AUTO: 4.11 10E12/L (ref 3.8–5.2)
SODIUM SERPL-SCNC: 143 MMOL/L (ref 133–144)
WBC # BLD AUTO: 3.8 10E9/L (ref 4–11)

## 2020-06-01 PROCEDURE — 86300 IMMUNOASSAY TUMOR CA 15-3: CPT | Performed by: INTERNAL MEDICINE

## 2020-06-01 PROCEDURE — 85025 COMPLETE CBC W/AUTO DIFF WBC: CPT | Performed by: INTERNAL MEDICINE

## 2020-06-01 PROCEDURE — 36415 COLL VENOUS BLD VENIPUNCTURE: CPT | Performed by: INTERNAL MEDICINE

## 2020-06-01 PROCEDURE — 80053 COMPREHEN METABOLIC PANEL: CPT | Performed by: INTERNAL MEDICINE

## 2020-06-05 ENCOUNTER — ONCOLOGY VISIT (OUTPATIENT)
Dept: ONCOLOGY | Facility: CLINIC | Age: 55
End: 2020-06-05
Attending: INTERNAL MEDICINE
Payer: COMMERCIAL

## 2020-06-05 VITALS
TEMPERATURE: 97.5 F | WEIGHT: 224.7 LBS | RESPIRATION RATE: 18 BRPM | OXYGEN SATURATION: 96 % | SYSTOLIC BLOOD PRESSURE: 121 MMHG | BODY MASS INDEX: 36.11 KG/M2 | HEART RATE: 72 BPM | DIASTOLIC BLOOD PRESSURE: 80 MMHG | HEIGHT: 66 IN

## 2020-06-05 DIAGNOSIS — C50.919 INVASIVE LOBULAR CARCINOMA OF BREAST IN FEMALE (H): Primary | ICD-10-CM

## 2020-06-05 DIAGNOSIS — N61.0 MASTITIS, LEFT, ACUTE: ICD-10-CM

## 2020-06-05 PROCEDURE — G0463 HOSPITAL OUTPT CLINIC VISIT: HCPCS

## 2020-06-05 PROCEDURE — 99213 OFFICE O/P EST LOW 20 MIN: CPT | Performed by: NURSE PRACTITIONER

## 2020-06-05 RX ORDER — CLINDAMYCIN HCL 300 MG
300 CAPSULE ORAL
COMMUNITY
Start: 2020-05-30 | End: 2020-07-07

## 2020-06-05 ASSESSMENT — PAIN SCALES - GENERAL: PAINLEVEL: NO PAIN (0)

## 2020-06-05 ASSESSMENT — MIFFLIN-ST. JEOR: SCORE: 1627.01

## 2020-06-05 NOTE — LETTER
6/5/2020         RE: Merlyn Ravi  [address] [facility] [id number]        Dear Colleague,    Thank you for referring your patient, Merlyn Ravi, to the [facility]. Please see a copy of my visit note below.    [facility] Hematology and Oncology Progress Note    Patient: Merlyn Ravi  MRN: [id number]        Reason for Visit    1. Left breast cancer (invasive lobular; ER/NH+; HER-2 neg) - pT2-pN0-cM0  2. Monoclonal paraproteinemia    _____________________________________________________________________________    History of Present Illness/ Interval History    Ms. Merlyn Ravi  is a 55 year old back for 3-month evaluation/surveillance for her history of breast cancer. Resected about 8 months ago. On adjuvant Tamoxifen for approximately 7 months.    At post-op follow-up in April, a left lateral chest wall abnormality was palpated/tender. Diagnostic bilateral mamogram negative for cancer. Left breast US also negative for cancer.      Review of Systems    Mild/rare hot flashes on Tamoxifen; has lessened the longer she's been on it.   No myalgias/arthralgias.  No lower extremity edema.   No GI side effects.    Still has small left lateral breast lump, stable. (Evaluated with mammogram and US in April, negative).    Last Friday awoke with acute chills/rigors, high-grade temp (104 max), left breast tenderness then diffuse redness by Saturday. Video visit with Urgent Care, diagnosed with cellulitis and initiated antibiotics 5/30. Symptoms markedly improved within 24 hrs. Low grade temps 3-4 days, now resolved. Minimal residual tenderness and erythema remains. Completes antibiotics through 6/9.    Ongoing and stable left arm/axillary discomfort/mild weakness since surgery. No lymphedema.     No dyspnea.  No new pain.  No headaches/neuro symptoms.      Oncology History/Treatment  Diagnosis/Stage:   8/2019: Left lobular breast cancer (pT2-pN0)  -self-palpated left  breast lump  -Diagnostic mammogram: 1.1 cm irregularly asymmetry 11.2 cm from nipple at 9:30 o'clock position  -Left breast US: 1.1 cm tumor. No left axillary adenopathy  -stereotactic biopsy: invasive lobular ca, grade 2. Angiolymphatic invasion neg. Carcinoma in situ cannot be excluded. ER+/IL+, HER-2 negative  -Lumpectomy surgical path: 4.5 cm invasive lobular carcinoma, grade 2. Angiolymphatic invasion neg. No LCIS. SN negative.  ER+/IL+; HER-2 neg. Positive deep margin. Re-resection also resulted in another positive margin. 2nd re-excision eventually resulted in negative residual malignancy.    Oncotype DX score 20    Total hysterectomy and bilateral oopherectomy, 1998.      Treatment:  9/23/2019: Left lumpectomy and SN biopsy  9/30/2019: Re-excision for positive margin  10/9/2019: Re-excision for 2nd positive margin (resulted in negative margin/residual malignancy)    11/11 - 12/9/2019: Completed adjuvant left breast RT with lumpectomy cavity boost (5005 cGy)    12/2019: Initiated adjuvant tamoxifen 20 mg daily      Past History  Past Medical History:   Diagnosis Date     Allergies      Dermatofibroma 5/14/2012         Past Surgical History:   Procedure Laterality Date     C NONSPECIFIC PROCEDURE      bladder surgery     HYSTERECTOMY, PAP NO LONGER INDICATED       HYSTERECTOMY, MAGY       LASIK BILATERAL  2005    Dr. Solis      LUMPECTOMY BREAST Left 9/30/2019    Procedure: Re-excision of left breast lumpectomy site;  Surgeon: Kj Salas DO;  Location: [facility]     LUMPECTOMY BREAST Left 10/9/2019    Procedure: Left breast re-excision;  Surgeon: Kj Salas DO;  Location: [facility]     LUMPECTOMY BREAST WITH SENTINEL NODE, COMBINED Left 9/23/2019    Procedure: LUMPECTOMY, BREAST, WITH SENTINEL LYMPH NODE BIOPSY.;  Surgeon: Kj Salas DO;  Location: [facility] OR       Physical Exam    GENERAL: Alert and oriented to time place and person. Seated comfortably. In no distress.  HEAD:  Atraumatic and normocephalic. No alopecia.  EYES: JEN, EOMI. No erythema. No icterus.  ORAL CAVITY: Moist. No mucosal lesion or tonsillar enlargement.  NECK: supple. No thyroid enlargement.  LYMPH NODES: No palpable supraclavicular, cervical, axillary lymphadenopathy.  BREASTS: Right breast without abnormalities. Left breast medial lumpectomy scar with minimal tenderness/numbness, no masses/lumps. Mild peau d'orange- like appearance of left breast stable per patient post-radiation. Faint erythema of medial/lower left breast and minimally warm to touch. Non-tender.  CHEST: clear to auscultation bilaterally. Resonant to percussion throughout bilaterally. Symmetrical breath movements bilaterally.  CVS: S1 and S2 are heard. Regular rate and rhythm. No murmur or gallop or rub heard.  ABDOMEN: Soft. Not tender. Not distended. No palpable hepatomegaly or splenomegaly. No other mass palpable. Bowel sounds present.  EXTREMITIES: Warm. No peripheral edema.  NEURO: No gross deficit noted. Non-antalgic gait.    Lab Results    CBC: WBC 3.8; diff normal. Remainder WNL  CMP: WNL  CA 27.29: 16 (WNL)    Imaging  4/2020: Diagnostic bilateral mammogram and Left breast US negative    Assessment/Plan  1. Invasive left lobular breast cancer (ER/CO+/HER2 neg)  Continues on Tamoxifen 20 mg daily with good tolerance.  Clinically, no evidence or recurrent cancer. Tumor marker normal.  Recent mammogram and US negative.     Continue Tamoxifen 20 mg daily.    Return in 3-4 months with CA 27.29 and clinical exam.  Due for annual screening bilateral mammogram April, 2021.    2.  Left breast cellulitis  Differentials include cellulitis, latent radiation dermatitis flare, lymphedema or inflammatory breast cancer. Given the high-grade fever and prompt response to antibiotics, this is favored as bacterial cellulitis. She will complete the full antibiotic course next week and should expect continued improvement with breast back to baseline within  "1-2 weeks of completion. If she does not have complete return to baseline or recurrent symptoms, she will call us or Dr. Salas (breast surgeon) for re-evaluation.     3.   Monoclonal paraproteinemia  No evidence of plasma cell dyscrasia.    Billing  Total face to face time 20 minutes, with 15 minutes of that dedicated to counseling, education or coordination of care.     Signed by: Zena Gonzalez NP      Oncology Rooming Note    June 5, 2020 9:41 AM   Merlyn Ravi is a 55 year old female who presents for:    Chief Complaint   Patient presents with     Oncology Clinic Visit     Follow up breast cancer.      Initial Vitals: /80 (BP Location: Right arm, Patient Position: Sitting, Cuff Size: Adult Large)   Pulse 72   Temp 97.5  F (36.4  C) (Tympanic)   Resp 18   Ht 1.67 m (5' 5.75\")   Wt 101.9 kg (224 lb 11.2 oz)   SpO2 96%   Breastfeeding No   BMI 36.54 kg/m   Estimated body mass index is 36.54 kg/m  as calculated from the following:    Height as of this encounter: 1.67 m (5' 5.75\").    Weight as of this encounter: 101.9 kg (224 lb 11.2 oz). Body surface area is 2.17 meters squared.  No Pain (0) Comment: Data Unavailable   No LMP recorded. Patient has had a hysterectomy.  Allergies reviewed: Yes  Medications reviewed: Yes    Medications: Medication refills not needed today.  Pharmacy name entered into [employer]: [id number] [place] - [address] AT [facility] [address]    Clinical concerns: Follow up breast cancer. C/o 5/30/20 developed cellulitis left breast.       Angy Nicolas, [facility]              Again, thank you for allowing me to participate in the care of your patient.        Sincerely,        Zena Gonzalez NP    "

## 2020-06-05 NOTE — PROGRESS NOTES
[facility] Hematology and Oncology Progress Note    Patient: Merlyn Ravi  MRN: [id number]        Reason for Visit    1. Left breast cancer (invasive lobular; ER/CT+; HER-2 neg) - pT2-pN0-cM0  2. Monoclonal paraproteinemia    _____________________________________________________________________________    History of Present Illness/ Interval History    Ms. Merlyn Ravi  is a 55 year old back for 3-month evaluation/surveillance for her history of breast cancer. Resected about 8 months ago. On adjuvant Tamoxifen for approximately 7 months.    At post-op follow-up in April, a left lateral chest wall abnormality was palpated/tender. Diagnostic bilateral mamogram negative for cancer. Left breast US also negative for cancer.      Review of Systems    Mild/rare hot flashes on Tamoxifen; has lessened the longer she's been on it.   No myalgias/arthralgias.  No lower extremity edema.   No GI side effects.    Still has small left lateral breast lump, stable. (Evaluated with mammogram and US in April, negative).    Last Friday awoke with acute chills/rigors, high-grade temp (104 max), left breast tenderness then diffuse redness by Saturday. Video visit with Urgent Care, diagnosed with cellulitis and initiated antibiotics 5/30. Symptoms markedly improved within 24 hrs. Low grade temps 3-4 days, now resolved. Minimal residual tenderness and erythema remains. Completes antibiotics through 6/9.    Ongoing and stable left arm/axillary discomfort/mild weakness since surgery. No lymphedema.     No dyspnea.  No new pain.  No headaches/neuro symptoms.      Oncology History/Treatment  Diagnosis/Stage:   8/2019: Left lobular breast cancer (pT2-pN0)  -self-palpated left breast lump  -Diagnostic mammogram: 1.1 cm irregularly asymmetry 11.2 cm from nipple at 9:30 o'clock position  -Left breast US: 1.1 cm tumor. No left axillary adenopathy  -stereotactic biopsy: invasive lobular ca, grade 2. Angiolymphatic invasion  neg. Carcinoma in situ cannot be excluded. ER+/UT+, HER-2 negative  -Lumpectomy surgical path: 4.5 cm invasive lobular carcinoma, grade 2. Angiolymphatic invasion neg. No LCIS. SN negative.  ER+/UT+; HER-2 neg. Positive deep margin. Re-resection also resulted in another positive margin. 2nd re-excision eventually resulted in negative residual malignancy.    Oncotype DX score 20    Total hysterectomy and bilateral oopherectomy, 1998.      Treatment:  9/23/2019: Left lumpectomy and SN biopsy  9/30/2019: Re-excision for positive margin  10/9/2019: Re-excision for 2nd positive margin (resulted in negative margin/residual malignancy)    11/11 - 12/9/2019: Completed adjuvant left breast RT with lumpectomy cavity boost (5005 cGy)    12/2019: Initiated adjuvant tamoxifen 20 mg daily      Past History  Past Medical History:   Diagnosis Date     Allergies      Dermatofibroma 5/14/2012         Past Surgical History:   Procedure Laterality Date     C NONSPECIFIC PROCEDURE      bladder surgery     HYSTERECTOMY, PAP NO LONGER INDICATED       HYSTERECTOMY, MAGY       LASIK BILATERAL  2005    Dr. Solis      LUMPECTOMY BREAST Left 9/30/2019    Procedure: Re-excision of left breast lumpectomy site;  Surgeon: Kj Salas DO;  Location: [facility]     LUMPECTOMY BREAST Left 10/9/2019    Procedure: Left breast re-excision;  Surgeon: Kj Salas DO;  Location: [facility]     LUMPECTOMY BREAST WITH SENTINEL NODE, COMBINED Left 9/23/2019    Procedure: LUMPECTOMY, BREAST, WITH SENTINEL LYMPH NODE BIOPSY.;  Surgeon: Kj Salas DO;  Location: [facility] OR       Physical Exam    GENERAL: Alert and oriented to time place and person. Seated comfortably. In no distress.  HEAD: Atraumatic and normocephalic. No alopecia.  EYES: JEN, EOMI. No erythema. No icterus.  ORAL CAVITY: Moist. No mucosal lesion or tonsillar enlargement.  NECK: supple. No thyroid enlargement.  LYMPH NODES: No palpable supraclavicular, cervical,  axillary lymphadenopathy.  BREASTS: Right breast without abnormalities. Left breast medial lumpectomy scar with minimal tenderness/numbness, no masses/lumps. Mild peau d'orange- like appearance of left breast stable per patient post-radiation. Faint erythema of medial/lower left breast and minimally warm to touch. Non-tender.  CHEST: clear to auscultation bilaterally. Resonant to percussion throughout bilaterally. Symmetrical breath movements bilaterally.  CVS: S1 and S2 are heard. Regular rate and rhythm. No murmur or gallop or rub heard.  ABDOMEN: Soft. Not tender. Not distended. No palpable hepatomegaly or splenomegaly. No other mass palpable. Bowel sounds present.  EXTREMITIES: Warm. No peripheral edema.  NEURO: No gross deficit noted. Non-antalgic gait.    Lab Results    CBC: WBC 3.8; diff normal. Remainder WNL  CMP: WNL  CA 27.29: 16 (WNL)    Imaging  4/2020: Diagnostic bilateral mammogram and Left breast US negative    Assessment/Plan  1. Invasive left lobular breast cancer (ER/[place]+/HER2 neg)  Continues on Tamoxifen 20 mg daily with good tolerance.  Clinically, no evidence or recurrent cancer. Tumor marker normal.  Recent mammogram and US negative.     Continue Tamoxifen 20 mg daily.    Return in 3-4 months with CA 27.29 and clinical exam.  Due for annual screening bilateral mammogram April, 2021.    2.  Left breast cellulitis  Differentials include cellulitis, latent radiation dermatitis flare, lymphedema or inflammatory breast cancer. Given the high-grade fever and prompt response to antibiotics, this is favored as bacterial cellulitis. She will complete the full antibiotic course next week and should expect continued improvement with breast back to baseline within 1-2 weeks of completion. If she does not have complete return to baseline or recurrent symptoms, she will call us or Dr. Salas (breast surgeon) for re-evaluation.     3.   Monoclonal paraproteinemia  No evidence of plasma cell  dyscrasia.    Billing  Total face to face time 20 minutes, with 15 minutes of that dedicated to counseling, education or coordination of care.     Signed by: Zena Gonzalez NP

## 2020-06-05 NOTE — PROGRESS NOTES
"Oncology Rooming Note    June 5, 2020 9:41 AM   Merlyn Ravi is a 55 year old female who presents for:    Chief Complaint   Patient presents with     Oncology Clinic Visit     Follow up breast cancer.      Initial Vitals: /80 (BP Location: Right arm, Patient Position: Sitting, Cuff Size: Adult Large)   Pulse 72   Temp 97.5  F (36.4  C) (Tympanic)   Resp 18   Ht 1.67 m (5' 5.75\")   Wt 101.9 kg (224 lb 11.2 oz)   SpO2 96%   Breastfeeding No   BMI 36.54 kg/m   Estimated body mass index is 36.54 kg/m  as calculated from the following:    Height as of this encounter: 1.67 m (5' 5.75\").    Weight as of this encounter: 101.9 kg (224 lb 11.2 oz). Body surface area is 2.17 meters squared.  No Pain (0) Comment: Data Unavailable   No LMP recorded. Patient has had a hysterectomy.  Allergies reviewed: Yes  Medications reviewed: Yes    Medications: Medication refills not needed today.  Pharmacy name entered into [employer]: [facility][id number] [facility], [place] - [address] AT [facility] [address]    Clinical concerns: Follow up breast cancer. C/o 5/30/20 developed cellulitis left breast.       Angy Nicolas, [facility]            "

## 2020-07-07 ENCOUNTER — OFFICE VISIT (OUTPATIENT)
Dept: RADIATION THERAPY | Facility: OUTPATIENT CENTER | Age: 55
End: 2020-07-07
Payer: COMMERCIAL

## 2020-07-07 VITALS
TEMPERATURE: 96.5 F | OXYGEN SATURATION: 98 % | DIASTOLIC BLOOD PRESSURE: 80 MMHG | SYSTOLIC BLOOD PRESSURE: 122 MMHG | RESPIRATION RATE: 16 BRPM | BODY MASS INDEX: 36.33 KG/M2 | WEIGHT: 223.4 LBS | HEART RATE: 63 BPM

## 2020-07-07 DIAGNOSIS — Z98.890 STATUS POST LEFT BREAST LUMPECTOMY: ICD-10-CM

## 2020-07-07 DIAGNOSIS — C50.919 INVASIVE LOBULAR CARCINOMA OF BREAST IN FEMALE (H): Primary | ICD-10-CM

## 2020-07-07 DIAGNOSIS — Z78.9 TIGHTNESS SENSATION: ICD-10-CM

## 2020-07-07 DIAGNOSIS — I89.0 LYMPHEDEMA: ICD-10-CM

## 2020-07-07 DIAGNOSIS — Z92.3 STATUS POST RADIATION THERAPY: ICD-10-CM

## 2020-07-07 ASSESSMENT — PAIN SCALES - GENERAL: PAINLEVEL: MILD PAIN (3)

## 2020-07-07 NOTE — LETTER
2020         RE: Merlyn Ravi  [id number] [id number]        Dear Colleague,    Thank you for referring your patient, Merlyn Ravi, to the [facility]. Please see a copy of my visit note below.    .       Department of Radiation Oncology  Radiation Therapy Center  [facility]  [place] [address]  [phone]         Radiation Oncology Follow-up Visit  2020      Merlyn Ravi  MRN: [id number]   : 1965     DIAGNOSIS: invasive lobular carcinoma of the left breast. She is status post lumpectomy and sentinel lymph node evaluation  PATHOLOGY: Final pathology demonstrated ILC, 4.5 cm in size, grade 2, no LVSI, negative margins status post re-excision, 0 out of 1 sentinel lymph nodes, ER+, MT+, HER-2 negative disease, low Oncotype recurrence score                             STAGE: pathologic T2N0  INTENT OF RADIOTHERAPY: adjuvant radiation therapy with a lumpectomy cavity boost.  CONCURRENT SYSTEMIC THERAPY:  No      ONCOLOGIC HISTORY:    Ms. Ravi is a 55 year old female left breast cancer.      Patient initially presented with a palpable left breast mass, prompting further evaluation.  Diagnostic imaging confirmed the presence of a left breast mass at the 10 o'clock position, 12 cm from the nipple.  On 8/15/2019 the patient underwent left breast biopsy.  Final pathology demonstrated ILC, grade 2, no LVSI, ER positive, MT positive, HER-2 negative.  On 2019 the patient underwent left breast lumpectomy and sentinel lymph node evaluation by Dr. Salas.  Final pathology demonstrated ILC, 4.5 cm in size, grade 2, no LVSI, negative margins status post reexcision, 0 out of 1 sentinel lymph node, ER positive, MT positive, HER-2 negative, pathologic T2N0.  The patient was subsequent seen by Dr. Blakely who ordered Oncotype, and recurrence score came back low at 20.  No systemic chemotherapy was recommended.  Anti-endocrine  therapy was discussed.  The patient subsequently presented 10/29/19 in Radiation Oncology to discuss potential role of adjuvant whole breast radiation therapy.      Of note, the patient has a history of monoclonal gammopathy since 2013.  She has been followed annually by medical oncology with laboratory tests without any evidence of plasma cell dyscrasia     SITE OF TREATMENT: L breast     DATES  OF TREATMENT: 11/11/19-12/09/19     TOTAL DOSE OF TREATMENT: 5005 cGy     DOSE PER FRACTION OF TREATMENT: 267 cGy x 15 fractions + 200 cGy x 5 fractions       COMMENT/TOXICITY:   Mild left marleni-areolar tenderness reported. Patchy mild-moderate erythema without desquamation over irradiated field. Moderate erythema present in left marleni-areolar area. Hyperpigmentation but not desquamation appreciated in left inframammary fold                                           INTERVAL SINCE COMPLETION OF RADIATION THERAPY:   7 months    SUBJECTIVE:   Merlyn Ravi is a 55 year old female who is here today for routine follow up after completing radiation therapy.       At post-op follow-up in April/20 (Dr. Salas), palpated ridging left lateral breast with tenderness.  Diagnostic bilateral mamogram negative for cancer. Left breast US also negative for cancer.    She states post RT she had mild edema of left breast that improved gradually. Patient then presented at ED 5/30/20 with fever, chills, and left breast pain - she reported redness, warmth, and nipple pain. Prescribed 10 day course of clindamycin 300 mg 4 times daily for suspected cellulitis. Symptoms markedly improved within 24 hrs. Low grade temps 3-4 days, resolved. Redness, warmth and pain all improved. Completed antibiotics through 6/9.     Today denies any further fevers or chills. She states that she does have continued mild edema with associated tenderness, and minimal erythema. This is described as much improved since completion of the antibiotics for suspected  "cellulitis, but still not back to baseline.  She also has residual hyperpigmentation to medial breast as well as IMF. She also reports no pain but tenderness and \"tightness\" to axillary region extending mid inner arm and intermittently extending to palm of left hand. Denies loss of ROM of arm and shoulder.     She continues to moisturize and massage healed incision line.     Patient denies any lumps, masses, or nipple discharge. No enlarged lymph nodes.    No fatigue.     She denies persistent cough or dyspnea, abdominal distension or RUQ pain, persistent or progressing bone aches or pain, headaches, confusion. No weight loss.     Tolerating Tamoxifen.      ROS otherwise negative on a 12-system review.  ECO         Current Outpatient Medications   Medication     atorvastatin (LIPITOR) 10 MG tablet     calcium 500-125 MG-UNIT TABS     doxycycline hyclate (PERIOSTAT) 20 MG tablet     doxycycline monohydrate 100 MG capsule     levothyroxine (SYNTHROID/LEVOTHROID) 88 MCG tablet     Naproxen Sodium (ALEVE PO)     tamoxifen (NOLVADEX) 20 MG tablet     IBUPROFEN PO     No current facility-administered medications for this visit.           Allergies   Allergen Reactions     Demerol      Sedation and vomiting     Meperidine      Sulfa Drugs      unknown reaction       Past Medical History:   Diagnosis Date     Allergies      Dermatofibroma 2012         PHYSICAL EXAM:  /80 (BP Location: Right arm, Patient Position: Sitting, Cuff Size: Adult Regular)   Pulse 63   Temp 96.5  F (35.8  C) (Oral)   Resp 16   Wt 101.3 kg (223 lb 6.4 oz)   SpO2 98%   BMI 36.33 kg/m    General: in no acute distress, alert and oriented x3.   HEENT: Normocephalic, atraumatic.  PERRLA  Lymph Nodes: No palpable pre/post-auricular, cervical, or axillary lymphadenopathy appreciated.   CV: RRR, normal S1 S2.  No murmurs, gallops, or rubs.   Lungs: Clear to auscultation bilaterally.  No dullness to percussion.   Abdomen:  Soft and non " tender. No palpable masses.    Extremities: no clubbing, cyanosis, or edema.   Neurologic: Alert and oriented x3. Cranial nerves II-XII grossly intact.   Breast:Well healed incisions to left breast. Hyperpigmentation to left IMF and near incision area (10 o'clock). Skin intact. No concerning lumps or masses bilaterally. No skin atrophy or telangiestasias. Mild edema and faint erythema left breast. Tenderness to palpation mid axilla region and inner left upper arm.     LABS AND IMAGING:  MAMMOGRAM DIAGNOSTIC BILATERAL W/ LAURA, ULTRASOUND BREAST LEFT LIMITED  1-3 QUADRANTS April 23, 2020 11:36 AM     HISTORY: Personal history of medial left breast cancer, status post  lumpectomy. Patient reports a new palpable lump in the far lateral and  far posterior aspect of the left breast.     COMPARISON: 8/15/2019 and 8/9/2019.     BREAST DENSITY: Scattered fibroglandular densities.     FINDINGS:       Bilateral diagnostic mammogram:     Right breast: No mass, asymmetry, architectural distortion, or  suspicious appearing microcalcifications.     Left breast: Postsurgical changes are seen in the medial left breast  and left axilla. No mass, asymmetry, architectural distortion, or  suspicious appearing microcalcifications are seen in the area of  palpable concern. Targeted ultrasound to the area of palpable concern  will be performed for complete evaluation.     Left breast ultrasound:     Targeted ultrasound to the area of palpable concern was performed. No  cystic or solid mass, architectural distortion, or abnormal shadowing.        HISTORY: Personal history of medial left breast cancer, status post  lumpectomy. Patient reports a new palpable lump in the far lateral and  far posterior aspect of the left breast.                                                                 IMPRESSION:   1. No evidence of malignancy.  2. No mammographic or sonographic abnormality is seen in the area of  palpable concern on the left.  3.  Postsurgical changes of the left breast.     BI-RADS CATEGORY: 2 - Benign.     RECOMMENDED FOLLOW-UP: Annual Mammography.     JAYMIE BUSH MD    IMPRESSION:   Ms. Ravi is a 55 year old female with a invasive lobular carcinoma of the left breast. She is status post lumpectomy and sentinel lymph node evaluation. Final pathology demonstrated ILC, 4.5 cm in size, grade 2, no LVSI, negative margins status post re-excision, 0 out of 1 sentinel lymph nodes, ER+, SC+, HER-2 negative disease, low Oncotype recurrence score. Pathologic T2N0. Completed adjuvant radiation therapy with a lumpectomy cavity boost to L breast 5005 cGy from 11/11/19-12/19/19. Adjuvant Tamoxifen (had a hysterectomy).        Bilateral Diagnostic Mammogram completed on 4/23/20 showing no evidence of malignancy. Postsurgical changes seen left breast.       PLAN:     Had improvement of symptoms of suspected cellulitis left breast within 24 hours of start of antibiotics. Completed full course. Continues to have mild edema left breast and minimal erythema possibly due to lymphedema. Patient also has tightness and tenderness running from axilla down inner arm, and intermittently to palm of hand. 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    Has mild residual hyperpigmenation which will continue to improve with time. Discussed long term care with continued moisturizing of the treated breast, and the use of sun screen. Discussed possibility of scar formation from radiation and treatment with gentle massage. She will follow up here 6 months with Dr. Nuno.     Patient will follow up with medical oncology for continued care and imaging (Dr. Blakely).  According to NCCN guidelines, follow-up of breast cancer should include history and physical examination with emphasis on breast examination every 4-12 months for 5 years as clinically appropriate then annually thereafter.  Emphasis is  also on continuing with annual mammography. Recommended yearly.    Patient to followup with breast surgeon ~8/2020.     Endocrine therapy. Continues to be adherent to adjuvant endocrine therapy.    Cancer screening.  Up to date with colonoscopies (last 9/2014).  Recommended repeat in 10 years.     Healthy lifestyle.  She should continue to refrain from tobacco.  She will continue to see her primary care provider for general health maintenance.     Continue followup with Medical oncology for Monoclonal paraproteinemia.    Bailey Melendez [facility]  Radiation Therapy Center  [facility]  [address], [address]  [place] [address]

## 2020-07-07 NOTE — NURSING NOTE
FOLLOW-UP VISIT    Patient Name: Merlyn Ravi      : 1965     Age: 55 year old        ______________________________________________________________________________     Chief Complaint   Patient presents with     Radiation Therapy     Return appointment with Bailey Melendez CNP     /80 (BP Location: Right arm, Patient Position: Sitting, Cuff Size: Adult Regular)   Pulse 63   Temp 96.5  F (35.8  C) (Oral)   Resp 16   Wt 101.3 kg (223 lb 6.4 oz)   SpO2 98%   BMI 36.33 kg/m       Date Radiation Completed: 2019    Pain  Current history of pain associated with this visit:   Intensity: 3/10  Current: aching  Location: L shoulder, under L arm and down L arm  Treatment: Follow up with Dr. Josue Diaz  Current Med List Reviewed: Yes  Medication Note: Tamoxifen    Skin: Warm  Dry  Intact    Range of Motion: Full    Respiratory: No shortness of breath, dyspnea on exertion, cough, or hemoptysis    Hormone Therapy: Yes    Lymphedema Follow up: Yes- PT referral for possible cording    Energy Level: normal      Appointments:     DATE  Oncologist: Dr. Blakely   2020   Surgeon: Dr. Salas  in Aug. 2020 patient to call to schedule   Primary:      Other Notes: Follow up with Bailey Melendez CNP in 1 year.

## 2020-07-07 NOTE — PROGRESS NOTES
Department of Radiation Oncology  Radiation Therapy Center  [facility] [place] [address]  [phone]         Radiation Oncology Follow-up Visit  2020      Merlyn Ravi  MRN: [id number]   : 1965     DIAGNOSIS: invasive lobular carcinoma of the left breast. She is status post lumpectomy and sentinel lymph node evaluation  PATHOLOGY: Final pathology demonstrated ILC, 4.5 cm in size, grade 2, no LVSI, negative margins status post re-excision, 0 out of 1 sentinel lymph nodes, ER+, DE+, HER-2 negative disease, low Oncotype recurrence score                             STAGE: pathologic T2N0  INTENT OF RADIOTHERAPY: adjuvant radiation therapy with a lumpectomy cavity boost.  CONCURRENT SYSTEMIC THERAPY:  No      ONCOLOGIC HISTORY:    Ms. Ravi is a 55 year old female left breast cancer.      Patient initially presented with a palpable left breast mass, prompting further evaluation.  Diagnostic imaging confirmed the presence of a left breast mass at the 10 o'clock position, 12 cm from the nipple.  On 8/15/2019 the patient underwent left breast biopsy.  Final pathology demonstrated ILC, grade 2, no LVSI, ER positive, DE positive, HER-2 negative.  On 2019 the patient underwent left breast lumpectomy and sentinel lymph node evaluation by Dr. Salas.  Final pathology demonstrated ILC, 4.5 cm in size, grade 2, no LVSI, negative margins status post reexcision, 0 out of 1 sentinel lymph node, ER positive, DE positive, HER-2 negative, pathologic T2N0.  The patient was subsequent seen by Dr. Blakely who ordered Oncotype, and recurrence score came back low at 20.  No systemic chemotherapy was recommended.  Anti-endocrine therapy was discussed.  The patient subsequently presented 10/29/19 in Radiation Oncology to discuss potential role of adjuvant whole breast radiation therapy.      Of note, the patient has a history of monoclonal gammopathy since .  She has been followed  "annually by medical oncology with laboratory tests without any evidence of plasma cell dyscrasia     SITE OF TREATMENT: L breast     DATES  OF TREATMENT: 11/11/19-12/09/19     TOTAL DOSE OF TREATMENT: 5005 cGy     DOSE PER FRACTION OF TREATMENT: 267 cGy x 15 fractions + 200 cGy x 5 fractions       COMMENT/TOXICITY:   Mild left marleni-areolar tenderness reported. Patchy mild-moderate erythema without desquamation over irradiated field. Moderate erythema present in left marleni-areolar area. Hyperpigmentation but not desquamation appreciated in left inframammary fold                                           INTERVAL SINCE COMPLETION OF RADIATION THERAPY:   7 months    SUBJECTIVE:   Merlyn Ravi is a 55 year old female who is here today for routine follow up after completing radiation therapy.       At post-op follow-up in April/20 (Dr. Salas), palpated ridging left lateral breast with tenderness.  Diagnostic bilateral mamogram negative for cancer. Left breast US also negative for cancer.    She states post RT she had mild edema of left breast that improved gradually. Patient then presented at ED 5/30/20 with fever, chills, and left breast pain - she reported redness, warmth, and nipple pain. Prescribed 10 day course of clindamycin 300 mg 4 times daily for suspected cellulitis. Symptoms markedly improved within 24 hrs. Low grade temps 3-4 days, resolved. Redness, warmth and pain all improved. Completed antibiotics through 6/9.     Today denies any further fevers or chills. She states that she does have continued mild edema with associated tenderness, and minimal erythema. This is described as much improved since completion of the antibiotics for suspected cellulitis, but still not back to baseline.  She also has residual hyperpigmentation to medial breast as well as IMF. She also reports no pain but tenderness and \"tightness\" to axillary region extending mid inner arm and intermittently extending to palm of left " hand. Denies loss of ROM of arm and shoulder.     She continues to moisturize and massage healed incision line.     Patient denies any lumps, masses, or nipple discharge. No enlarged lymph nodes.    No fatigue.     She denies persistent cough or dyspnea, abdominal distension or RUQ pain, persistent or progressing bone aches or pain, headaches, confusion. No weight loss.     Tolerating Tamoxifen.      ROS otherwise negative on a 12-system review.  ECO         Current Outpatient Medications   Medication     atorvastatin (LIPITOR) 10 MG tablet     calcium 500-125 MG-UNIT TABS     doxycycline hyclate (PERIOSTAT) 20 MG tablet     doxycycline monohydrate 100 MG capsule     levothyroxine (SYNTHROID/LEVOTHROID) 88 MCG tablet     Naproxen Sodium (ALEVE PO)     tamoxifen (NOLVADEX) 20 MG tablet     IBUPROFEN PO     No current facility-administered medications for this visit.           Allergies   Allergen Reactions     Demerol      Sedation and vomiting     Meperidine      Sulfa Drugs      unknown reaction       Past Medical History:   Diagnosis Date     Allergies      Dermatofibroma 2012         PHYSICAL EXAM:  /80 (BP Location: Right arm, Patient Position: Sitting, Cuff Size: Adult Regular)   Pulse 63   Temp 96.5  F (35.8  C) (Oral)   Resp 16   Wt 101.3 kg (223 lb 6.4 oz)   SpO2 98%   BMI 36.33 kg/m    General: in no acute distress, alert and oriented x3.   HEENT: Normocephalic, atraumatic.  PERRLA  Lymph Nodes: No palpable pre/post-auricular, cervical, or axillary lymphadenopathy appreciated.   CV: RRR, normal S1 S2.  No murmurs, gallops, or rubs.   Lungs: Clear to auscultation bilaterally.  No dullness to percussion.   Abdomen:  Soft and non tender. No palpable masses.    Extremities: no clubbing, cyanosis, or edema.   Neurologic: Alert and oriented x3. Cranial nerves II-XII grossly intact.   Breast:Well healed incisions to left breast. Hyperpigmentation to left IMF and near incision area (10 o'clock).  Skin intact. No concerning lumps or masses bilaterally. No skin atrophy or telangiestasias. Mild edema and faint erythema left breast. Tenderness to palpation mid axilla region and inner left upper arm.     LABS AND IMAGING:  MAMMOGRAM DIAGNOSTIC BILATERAL W/ LAURA, ULTRASOUND BREAST LEFT LIMITED  1-3 QUADRANTS April 23, 2020 11:36 AM     HISTORY: Personal history of medial left breast cancer, status post  lumpectomy. Patient reports a new palpable lump in the far lateral and  far posterior aspect of the left breast.     COMPARISON: 8/15/2019 and 8/9/2019.     BREAST DENSITY: Scattered fibroglandular densities.     FINDINGS:       Bilateral diagnostic mammogram:     Right breast: No mass, asymmetry, architectural distortion, or  suspicious appearing microcalcifications.     Left breast: Postsurgical changes are seen in the medial left breast  and left axilla. No mass, asymmetry, architectural distortion, or  suspicious appearing microcalcifications are seen in the area of  palpable concern. Targeted ultrasound to the area of palpable concern  will be performed for complete evaluation.     Left breast ultrasound:     Targeted ultrasound to the area of palpable concern was performed. No  cystic or solid mass, architectural distortion, or abnormal shadowing.        HISTORY: Personal history of medial left breast cancer, status post  lumpectomy. Patient reports a new palpable lump in the far lateral and  far posterior aspect of the left breast.                                                                 IMPRESSION:   1. No evidence of malignancy.  2. No mammographic or sonographic abnormality is seen in the area of  palpable concern on the left.  3. Postsurgical changes of the left breast.     BI-RADS CATEGORY: 2 - Benign.     RECOMMENDED FOLLOW-UP: Annual Mammography.     JAYMIE BUSH MD    IMPRESSION:   Ms. Ravi is a 55 year old female with a invasive lobular carcinoma of the left breast. She is status post  lumpectomy and sentinel lymph node evaluation. Final pathology demonstrated ILC, 4.5 cm in size, grade 2, no LVSI, negative margins status post re-excision, 0 out of 1 sentinel lymph nodes, ER+, RI+, HER-2 negative disease, low Oncotype recurrence score. Pathologic T2N0. Completed adjuvant radiation therapy with a lumpectomy cavity boost to L breast 5005 cGy from 11/11/19-12/19/19. Adjuvant Tamoxifen (had a hysterectomy).        Bilateral Diagnostic Mammogram completed on 4/23/20 showing no evidence of malignancy. Postsurgical changes seen left breast.       PLAN:     Had improvement of symptoms of suspected cellulitis left breast within 24 hours of start of antibiotics. Completed full course. Continues to have mild edema left breast and minimal erythema possibly due to lymphedema. Patient also has tightness and tenderness running from axilla down inner arm, and intermittently to palm of hand. 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    Has mild residual hyperpigmenation which will continue to improve with time. Discussed long term care with continued moisturizing of the treated breast, and the use of sun screen. Discussed possibility of scar formation from radiation and treatment with gentle massage. She will follow up here 6 months with Dr. Nuno.     Patient will follow up with medical oncology for continued care and imaging (Dr. Blakely).  According to NCCN guidelines, follow-up of breast cancer should include history and physical examination with emphasis on breast examination every 4-12 months for 5 years as clinically appropriate then annually thereafter.  Emphasis is also on continuing with annual mammography. Recommended yearly.    Patient to followup with breast surgeon ~8/2020.     Endocrine therapy. Continues to be adherent to adjuvant endocrine therapy.    Cancer screening.  Up to date with colonoscopies (last 9/2014).  Recommended  repeat in 10 years.     Healthy lifestyle.  She should continue to refrain from tobacco.  She will continue to see her primary care provider for general health maintenance.     Continue followup with Medical oncology for Monoclonal paraproteinemia.    Bailey Melendez [facility]  Radiation Therapy Center  [facility]  [address], [address]  [place] [address]

## 2020-07-21 ENCOUNTER — HOSPITAL ENCOUNTER (OUTPATIENT)
Dept: PHYSICAL THERAPY | Facility: CLINIC | Age: 55
Setting detail: THERAPIES SERIES
End: 2020-07-21
Attending: NURSE PRACTITIONER
Payer: COMMERCIAL

## 2020-07-21 DIAGNOSIS — Z98.890 STATUS POST LEFT BREAST LUMPECTOMY: ICD-10-CM

## 2020-07-21 DIAGNOSIS — Z78.9 TIGHTNESS SENSATION: ICD-10-CM

## 2020-07-21 DIAGNOSIS — I89.0 LYMPHEDEMA: ICD-10-CM

## 2020-07-21 DIAGNOSIS — Z92.3 STATUS POST RADIATION THERAPY: ICD-10-CM

## 2020-07-21 PROCEDURE — 97161 PT EVAL LOW COMPLEX 20 MIN: CPT | Mod: GP | Performed by: PHYSICAL THERAPIST

## 2020-07-21 PROCEDURE — 97140 MANUAL THERAPY 1/> REGIONS: CPT | Mod: GP | Performed by: PHYSICAL THERAPIST

## 2020-07-21 NOTE — PROGRESS NOTES
"Outpatient Lymphedema Therapy Evaluation       07/21/20 1300   Rehab Discipline   Discipline PT   Type of Visit   Type of visit Initial Edema Evaluation       present No   General Information   Start of care 07/21/20   Referring physician Bailey Melendez CNP   Orders Evaluate and treat as indicated   Order date 07/07/20   Medical diagnosis L-breast cancer   Edema onset   (7/7/2020 - date of referral)   Affected body parts LUE;Other  (L-breast)   Edema etiology Surgery;Cancer with lymph node dissection;Radiation   Location - Cancer with lymph node dissection L-breast cancer s/p lumpectomy with SLN biopsy (0/1+);    Location - Radiation LUQ/chest/breast   Radiation comments compelted 11/11/19 - 12/9/19   Edema etiology comments L-sided lumpectomy September 2019 (3 total surgeries): 9/23/19, 9/30/19 and 10/9/2019;  patient had a follow-up appt in April 2020 and mammogram and US both negative for cancer;  pt was in the ER on 5/30/2020 for breast cellulitis, placed on oral antibiotics x10 days and pt felt edema improved after antibiotics but not back to normal; pt also with residual hyperpigmentation to meial breast as well as IMF; reported tightness in L axilla that extends down into arm and \"sometimes into the wrist and palm\"   Pertinent history of current problem (PT: include personal factors and/or comorbidities that impact the POC; OT: include additional occupational profile info) pt reports her grandma had lymphedema; pt reports swelling \"might be in my arm, I'm not sure\" and forsure in L-breast; notices arm gets achy if using too much and tightness/pain in axilla   Surgical / medical history reviewed Yes   Prior level of functional mobility independent with all functional moblity and ADL but anything about the shoulder is tight and painful (overhead reaching, washing hair); difficulty and pain when swimming in her pool, so just modifies or \"pushes\" through the pain   Prior treatment   (no past " lymphedema tx)   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 No   Have you fallen and had an injury in the past year? No   Is patient a fall risk? No   Abuse Screen (yes response referral indicated)   Feels Unsafe at Home or Work/School no   Feels Threatened by Someone no   Does Anyone Try to Keep You From Having Contact with Others or Doing Things Outside Your Home? no   Physical Signs of Abuse Present no   System Outcome Measures   Outcome Measures Lymphedema   Lymphedema Life Impact Scale (score range 0-72). A higher score indicates greater impairment. 20   Subjective Report   Patient report of symptoms LUQ/LUE is painful, heavy and tight; limits sleeping - unable to sleep in bed, has been sleeping in recliner chair   Precautions   Precautions comments no known precautions   Patient / Family Goals   Patient / family goals statement to get stretch back in LUE, control the swelling and pain; goal to get back to working out again   Pain   Patient currently in pain Yes   Pain location LUE   Pain rating 2/10   Pain comments pain is constant to some degree that goes all the way down to hand   Cognitive Status   Orientation Orientation to person, place and time   Level of consciousness Alert   Follows commands and answers questions 100% of the time   Personal safety and judgement Intact   Memory Intact   Edema Exam / Assessment   Skin condition Intact   Skin condition comments LUE skin all intact with slightly more fullness (non-pitting) palpated in upper arm mostly compared to R-side; L-breast itself is very full, firm with fibrosis present at medial and inferior portion of L-breast that is very tender to touch   Scar Yes   Location L-lumpectomy   Mobility fairly  good   Capillary refill Symmetrical   Radial pulse Symmetrical   Stemmer sign Negative   Ulceration No   Girth Measurements   Girth Measurements Refer to separate girth measurement flowsheet   Volume UE   Right UE (mL) 2015.12   Left UE (mL) 2034.76   UE volume comparison LUE volume greater than RUE volume   % difference 0.9%   Range of Motion   ROM comments L-GH flexion slightly less than R-side, grossly 170 but with pain/tightness in L-axilla; L-GH abduction near full available range but starts getting pain at  degrees abduction   Strength   Strength comments L- strength noted to be less on L-side vs R-side; L-flexion and abduction 4/5 vs R-side 4+/5   Palpation   Palpation painful with palpation in L-axilla and down medial, inner arm   Sensory   Sensory perception Light touch   Light touch Intact   Coordination   Coordination Gross motor coordination appropriate   Muscle Tone   Muscle tone No deficits were identified   Planned Edema Interventions   Planned edema interventions Manual lymph drainage;Gradient compression bandaging;Fit for compression garment;Exercises;Precautions to prevent infection / exacerbation;Education;Manual therapy;Skin care / precautions;Scar mobilization;Myofascial release;Home management program development   Clinical Impression   Criteria for skilled therapeutic intervention met Yes   Therapy diagnosis LUE and L-breast lymphedema with fibrosis   Influenced by the following impairments / conditions Stage 2   Functional limitations due to impairments / conditions tightness and pain with any overhead activity or ADL when using LUE   Clinical Presentation Evolving/Changing   Clinical Presentation Rationale clinical judgement; fibrosis present in L-breast   Clinical Decision Making (Complexity) Low complexity   Treatment Frequency 2x/week   Treatment duration 8 weeks   Patient / family and/or staff in agreement with plan of care Yes   Risks and benefits of therapy have been  explained Yes   Education Assessment   Preferred learning style Listening   Barriers to learning No barriers   Goals   Edema Eval Goals 1;2;3;4   Goal 1   Goal identifier stg   Goal description pt to have daytime toleance to wearing compression bra with compression pad/Komprex2 to decrease edema, fibrosis and related symptoms in L-breast lymphedema   Target date 08/04/20   Goal 2   Goal identifier stg   Goal description pt to be independent with self-MLD of L-breast to decrease lymphedema and fibrosis for improved comfort with overhead activity/ADL   Target date 08/04/20   Goal 3   Goal identifier ltg   Goal description pt to be independent with longterm LUQ lymphedema management via HEP, skin cares, compression garment wear/use and self-MLD   Target date 09/19/20   Goal 4   Goal identifier ltg   Goal description pt to have at least 3-5 point improvement on LLIS due to decreased lympehdema, fibrosis and related symptoms in LUQ   Target date 09/19/20   Total Evaluation Time   PT Eval, Low Complexity Minutes (21043) 10

## 2020-07-22 ENCOUNTER — DOCUMENTATION ONLY (OUTPATIENT)
Dept: RADIATION THERAPY | Facility: OUTPATIENT CENTER | Age: 55
End: 2020-07-22

## 2020-07-22 ENCOUNTER — HOSPITAL ENCOUNTER (OUTPATIENT)
Dept: PHYSICAL THERAPY | Facility: CLINIC | Age: 55
Setting detail: THERAPIES SERIES
End: 2020-07-22
Attending: NURSE PRACTITIONER
Payer: COMMERCIAL

## 2020-07-22 PROCEDURE — 97140 MANUAL THERAPY 1/> REGIONS: CPT | Mod: GP | Performed by: PHYSICAL THERAPIST

## 2020-07-22 NOTE — PROGRESS NOTES
Merlyn Ravi  MRN: [id number]   : 1965      Ms. Ravi is a 55 year old female with a invasive lobular carcinoma of the left breast. She is status post lumpectomy and sentinel lymph node evaluation. Final pathology demonstrated ILC, 4.5 cm in size, grade 2, no LVSI, negative margins status post re-excision, 0 out of 1 sentinel lymph nodes, ER+, WA+, HER-2 negative disease, low Oncotype recurrence score. Pathologic T2N0. Completed adjuvant radiation therapy with a lumpectomy cavity boost to L breast 5005 cGy from 19-19. Adjuvant Tamoxifen (had a hysterectomy).         Referred to PT for lymphedema management on 20. Stopped by clinic today requesting excuse letter for work. She reports improvement in edema and tenderness in left breast since start of PT this week. Aware to call if no continued improvement or worsening of symptoms.     Will continue to monitor.    MICHELE Garcia CNP

## 2020-07-23 DIAGNOSIS — L71.9 ACNE ROSACEA: ICD-10-CM

## 2020-07-23 RX ORDER — DOXYCYCLINE HYCLATE 20 MG
TABLET ORAL
Qty: 60 TABLET | Refills: 0 | Status: SHIPPED | OUTPATIENT
Start: 2020-07-23 | End: 2020-09-24

## 2020-07-23 NOTE — TELEPHONE ENCOUNTER
Pt calling - states she is out of medication    Requested Prescriptions   Pending Prescriptions Disp Refills     doxycycline hyclate (PERIOSTAT) 20 MG tablet 180 tablet 0     Si tab PO BID APPT NEEDED FOR REFILLS.       There is no refill protocol information for this order        Last office visit: 2018 with prescribing provider:  CAITLYN Hill   Future Office Visit:   Next 5 appointments (look out 90 days)    Aug 27, 2020  2:30 PM CDT  [facility] Visit with Kj Salas DO  [facility] [address] [place] [address]  [phone]   Sep 04, 2020  9:00 AM CDT  Return Visit with Tanvi Blakely MD  [facility] ([facility]) [address]  [facility] [place] [address]  [phone]               Denise Behrendt  Specialty CSS

## 2020-07-23 NOTE — TELEPHONE ENCOUNTER
Medication is being filled for 1 time refill only due to:  Patient needs to be seen because it has been more than one year since last visit. Pt made follow up appt.  Lacey MAHMOOD RN BSN PHN  Specialty Clinics

## 2020-07-28 ENCOUNTER — HOSPITAL ENCOUNTER (OUTPATIENT)
Dept: PHYSICAL THERAPY | Facility: CLINIC | Age: 55
Setting detail: THERAPIES SERIES
End: 2020-07-28
Attending: NURSE PRACTITIONER
Payer: COMMERCIAL

## 2020-07-28 PROCEDURE — 97140 MANUAL THERAPY 1/> REGIONS: CPT | Mod: GP | Performed by: REHABILITATION PRACTITIONER

## 2020-07-29 ENCOUNTER — HOSPITAL ENCOUNTER (OUTPATIENT)
Dept: PHYSICAL THERAPY | Facility: CLINIC | Age: 55
Setting detail: THERAPIES SERIES
End: 2020-07-29
Attending: NURSE PRACTITIONER
Payer: COMMERCIAL

## 2020-07-29 PROCEDURE — 97140 MANUAL THERAPY 1/> REGIONS: CPT | Mod: GP | Performed by: REHABILITATION PRACTITIONER

## 2020-08-03 ENCOUNTER — HOSPITAL ENCOUNTER (OUTPATIENT)
Dept: PHYSICAL THERAPY | Facility: CLINIC | Age: 55
Setting detail: THERAPIES SERIES
End: 2020-08-03
Attending: NURSE PRACTITIONER
Payer: COMMERCIAL

## 2020-08-03 PROCEDURE — 97140 MANUAL THERAPY 1/> REGIONS: CPT | Mod: GP | Performed by: PHYSICAL THERAPIST

## 2020-08-04 ENCOUNTER — HOSPITAL ENCOUNTER (OUTPATIENT)
Dept: PHYSICAL THERAPY | Facility: CLINIC | Age: 55
Setting detail: THERAPIES SERIES
End: 2020-08-04
Attending: NURSE PRACTITIONER
Payer: COMMERCIAL

## 2020-08-04 ENCOUNTER — TELEPHONE (OUTPATIENT)
Dept: PHYSICAL THERAPY | Facility: CLINIC | Age: 55
End: 2020-08-04

## 2020-08-04 DIAGNOSIS — I89.0 LYMPHEDEMA: Primary | ICD-10-CM

## 2020-08-04 PROCEDURE — 97140 MANUAL THERAPY 1/> REGIONS: CPT | Mod: GP | Performed by: REHABILITATION PRACTITIONER

## 2020-08-04 NOTE — TELEPHONE ENCOUNTER
I am placing a DME order into epic for your signature, please sign at your earliest convenience. Thank you Connie Toussaint PTA/CLT

## 2020-08-10 ENCOUNTER — HOSPITAL ENCOUNTER (OUTPATIENT)
Dept: PHYSICAL THERAPY | Facility: CLINIC | Age: 55
Setting detail: THERAPIES SERIES
End: 2020-08-10
Attending: NURSE PRACTITIONER
Payer: COMMERCIAL

## 2020-08-10 PROCEDURE — 97140 MANUAL THERAPY 1/> REGIONS: CPT | Mod: GP | Performed by: REHABILITATION PRACTITIONER

## 2020-08-13 ENCOUNTER — OFFICE VISIT (OUTPATIENT)
Dept: DERMATOLOGY | Facility: CLINIC | Age: 55
End: 2020-08-13
Payer: COMMERCIAL

## 2020-08-13 VITALS — SYSTOLIC BLOOD PRESSURE: 135 MMHG | DIASTOLIC BLOOD PRESSURE: 83 MMHG | RESPIRATION RATE: 20 BRPM | HEART RATE: 96 BPM

## 2020-08-13 DIAGNOSIS — L71.9 ACNE ROSACEA: Primary | ICD-10-CM

## 2020-08-13 PROCEDURE — 99213 OFFICE O/P EST LOW 20 MIN: CPT | Performed by: PHYSICIAN ASSISTANT

## 2020-08-13 RX ORDER — DOXYCYCLINE 50 MG/1
CAPSULE ORAL
Qty: 90 CAPSULE | Refills: 3 | Status: SHIPPED | OUTPATIENT
Start: 2020-08-13 | End: 2020-08-13

## 2020-08-13 RX ORDER — DOXYCYCLINE 100 MG/1
CAPSULE ORAL
Qty: 30 CAPSULE | Refills: 0 | Status: SHIPPED | OUTPATIENT
Start: 2020-08-13 | End: 2020-09-24

## 2020-08-13 RX ORDER — DOXYCYCLINE 50 MG/1
CAPSULE ORAL
Qty: 90 CAPSULE | Refills: 3 | Status: SHIPPED | OUTPATIENT
Start: 2020-08-13 | End: 2021-11-04

## 2020-08-13 RX ORDER — DOXYCYCLINE 100 MG/1
CAPSULE ORAL
Qty: 30 CAPSULE | Refills: 0 | Status: SHIPPED | OUTPATIENT
Start: 2020-08-13 | End: 2020-08-13

## 2020-08-13 NOTE — PROGRESS NOTES
HPI:   Merlyn Ravi is a 53 year old female who presents for recheck of rosacea. She has been taking 20 mg daily which has largely been helping, but she has been flaring over the past several months. She has had an incredible amount of stress lately and she attributes the recent flaring to this.   Location:face  Condition present for: many years   Previous treatments include: doxycycline for the rosacea which works great    Review Of Systems  Eyes: negative  Ears/Nose/Throat: negative  Respiratory: No shortness of breath, dyspnea on exertion, cough, or hemoptysis  Cardiovascular: negative  Gastrointestinal: negative  Genitourinary: negative  Musculoskeletal: negative  Neurologic: negative  Psychiatric: negative  Skin: positive for rosacea        PHYSICAL EXAM:    /83 (BP Location: Right arm, Patient Position: Sitting, Cuff Size: Adult Large)   Pulse 96   Resp 20   Skin exam performed as follows: Type 2 skin. Mood appropriate  Alert and Oriented X 3. Well developed, well nourished in no distress.  General appearance: Normal  Head including face: Normal  Eyes: conjunctiva and lids: Normal  Mouth: Lips, teeth, gums: Normal  Neck: Normal  Chest-breast/axillae: Normal  Back: Normal  Spleen and liver: Normal  Cardiovascular: Exam of peripheral vascular system by observation for swelling, varicosities, edema: Normal  Genitalia: groin, buttocks: Normal  Extremities: digits/nails (clubbing): Normal  Eccrine and Apocrine glands: Normal  Right upper extremity: Normal  Left upper extremity: Normal  Right lower extremity: Normal  Left lower extremity: Normal  Skin: Scalp and body hair: See below    1. 1+ pustules and background erythema on the face      ASSESSMENT/PLAN:     Acne Rosacea - she prefers once daily dosing for any medications she takes. Would prefer to take PO vs topicals.  advised on diagnosis and treatment options. Discussed chronic condition of unknown etiology. Discussed anti-inflammatories,  sunscreen, PO and topical medications.  --Start doxycycline 100 mg daily x 1 month then switch to doxycycline 50 mg daily.           Follow-up: yearly if doing well/PRN sooner  CC:   Scribed By: Radha Hill MS, PA-C

## 2020-08-13 NOTE — PROGRESS NOTES
"Initial /83 (BP Location: Right arm, Patient Position: Sitting, Cuff Size: Adult Large)   Pulse 96   Resp 20  Estimated body mass index is 36.33 kg/m  as calculated from the following:    Height as of 6/5/20: 1.67 m (5' 5.75\").    Weight as of 7/7/20: 101.3 kg (223 lb 6.4 oz).     Maria Isabel HOUSTON RN   Specialty Clinics   "

## 2020-08-13 NOTE — LETTER
8/13/2020         RE: Merlyn Ravi  [address] [address]        Dear Colleague,    Thank you for referring your patient, Merlyn Ravi, to the [facility]. Please see a copy of my visit note below.    HPI:   Merlyn Ravi is a 53 year old female who presents for recheck of rosacea. She has been taking 20 mg daily which has largely been helping, but she has been flaring over the past several months. She has had an incredible amount of stress lately and she attributes the recent flaring to this.   Location:face  Condition present for: many years   Previous treatments include: doxycycline for the rosacea which works great    Review Of Systems  Eyes: negative  Ears/Nose/Throat: negative  Respiratory: No shortness of breath, dyspnea on exertion, cough, or hemoptysis  Cardiovascular: negative  Gastrointestinal: negative  Genitourinary: negative  Musculoskeletal: negative  Neurologic: negative  Psychiatric: negative  Skin: positive for rosacea        PHYSICAL EXAM:    /83 (BP Location: Right arm, Patient Position: Sitting, Cuff Size: Adult Large)   Pulse 96   Resp 20   Skin exam performed as follows: Type 2 skin. Mood appropriate  Alert and Oriented X 3. Well developed, well nourished in no distress.  General appearance: Normal  Head including face: Normal  Eyes: conjunctiva and lids: Normal  Mouth: Lips, teeth, gums: Normal  Neck: Normal  Chest-breast/axillae: Normal  Back: Normal  Spleen and liver: Normal  Cardiovascular: Exam of peripheral vascular system by observation for swelling, varicosities, edema: Normal  Genitalia: groin, buttocks: Normal  Extremities: digits/nails (clubbing): Normal  Eccrine and Apocrine glands: Normal  Right upper extremity: Normal  Left upper extremity: Normal  Right lower extremity: Normal  Left lower extremity: Normal  Skin: Scalp and body hair: See below    1. 1+ pustules and background erythema on the  "face      ASSESSMENT/PLAN:     Acne Rosacea - she prefers once daily dosing for any medications she takes. Would prefer to take PO vs topicals.  advised on diagnosis and treatment options. Discussed chronic condition of unknown etiology. Discussed anti-inflammatories, sunscreen, PO and topical medications.  --Start doxycycline 100 mg daily x 1 month then switch to doxycycline 50 mg daily.           Follow-up: yearly if doing well/PRN sooner  CC:   Scribed By: Radha Hill MS, PA-C      Initial /83 (BP Location: Right arm, Patient Position: Sitting, Cuff Size: Adult Large)   Pulse 96   Resp 20  Estimated body mass index is 36.33 kg/m  as calculated from the following:    Height as of 6/5/20: 1.67 m (5' 5.75\").    Weight as of 7/7/20: 101.3 kg (223 lb 6.4 oz).     Maria Isabel HOUSTON RN   Specialty Clinics     Again, thank you for allowing me to participate in the care of your patient.        Sincerely,        Radha Hill PA-C    "

## 2020-08-14 ENCOUNTER — MEDICAL CORRESPONDENCE (OUTPATIENT)
Dept: HEALTH INFORMATION MANAGEMENT | Facility: CLINIC | Age: 55
End: 2020-08-14

## 2020-08-19 ENCOUNTER — HOSPITAL ENCOUNTER (OUTPATIENT)
Dept: PHYSICAL THERAPY | Facility: CLINIC | Age: 55
Setting detail: THERAPIES SERIES
End: 2020-08-19
Attending: NURSE PRACTITIONER
Payer: COMMERCIAL

## 2020-08-19 PROCEDURE — 97140 MANUAL THERAPY 1/> REGIONS: CPT | Mod: GP | Performed by: PHYSICAL THERAPIST

## 2020-08-19 NOTE — PROGRESS NOTES
Outpatient Physical Therapy Progress Note     Patient: Merlyn Ravi  : 1965    Beginning/End Dates of Reporting Period:  2020 to 2020    Referring Provider: Bailey Melendez CNP    Therapy Diagnosis: LUE and L-breast lymphedema with fiibrosis     Client Self Report: Running late to appointment today.  Has appointmenet for garment and bra fitting tomorrow.    Objective Measurements:  Objective Measure: L breast measurment  Details: 48.8 cm taken on 8/10/2020  Objective Measure:  Basic Pump  Details: PRE: W 16.4cm; F 26.8cm; B 36.9cm; C 118.5cm; POST: W: 16.4cm; F: 27.1cm; B: 36.5cm; C: 120.8cm  Objective Measure: Flexitouch  Details: PRE: W 16.4 cm; F 27.1 cm; B 36.5 cm; C 120.8cm; POST: W: 16cm; F: 26.9cm; B: 35.8cm; C: 118.2cm    Outcome Measures (most recent score):  LLIS to be completed at discharge     Goals:  Goal Identifier stg   Goal Description pt to have daytime toleance to wearing compression bra with compression pad/Komprex2 to decrease edema, fibrosis and related symptoms in L-breast lymphedema   Target Date 20   Date Met  20   Progress:     Goal Identifier stg   Goal Description pt to be independent with self-MLD of L-breast to decrease lymphedema and fibrosis for improved comfort with overhead activity/ADL   Target Date 20   Date Met      Progress:     Goal Identifier ltg   Goal Description pt to be independent with longterm LUQ lymphedema management via HEP, skin cares, compression garment wear/use and self-MLD   Target Date 20   Date Met      Progress:     Goal Identifier ltg   Goal Description pt to have at least 3-5 point improvement on LLIS due to decreased lympehdema, fibrosis and related symptoms in LUQ   Target Date 20   Date Met      Progress:     Progress Toward Goals:   Progress this reporting period: Pt demonstrates decreased fibrosis over past appointments, however continues to have L breast edema present.  Pt has had a flexitouch pump trial  and demonstrated reductions in edema during session.  Pt also has an appointment to be fit for breast compression pad and compression bras this week.  Pt would continue to benefit from continued lymphedema therapy to reduce L breast edema.    Plan:  Continue therapy per current plan of care.    Discharge:  No

## 2020-08-25 ENCOUNTER — HOSPITAL ENCOUNTER (OUTPATIENT)
Dept: PHYSICAL THERAPY | Facility: CLINIC | Age: 55
Setting detail: THERAPIES SERIES
End: 2020-08-25
Attending: NURSE PRACTITIONER
Payer: COMMERCIAL

## 2020-08-25 PROCEDURE — 97140 MANUAL THERAPY 1/> REGIONS: CPT | Mod: GP | Performed by: REHABILITATION PRACTITIONER

## 2020-08-27 ENCOUNTER — OFFICE VISIT (OUTPATIENT)
Dept: SURGERY | Facility: CLINIC | Age: 55
End: 2020-08-27
Payer: COMMERCIAL

## 2020-08-27 ENCOUNTER — HOSPITAL ENCOUNTER (OUTPATIENT)
Dept: PHYSICAL THERAPY | Facility: CLINIC | Age: 55
Setting detail: THERAPIES SERIES
End: 2020-08-27
Attending: NURSE PRACTITIONER
Payer: COMMERCIAL

## 2020-08-27 VITALS
TEMPERATURE: 98.5 F | SYSTOLIC BLOOD PRESSURE: 130 MMHG | HEIGHT: 66 IN | WEIGHT: 223 LBS | DIASTOLIC BLOOD PRESSURE: 78 MMHG | HEART RATE: 68 BPM | BODY MASS INDEX: 35.84 KG/M2

## 2020-08-27 DIAGNOSIS — C50.919 INVASIVE LOBULAR CARCINOMA OF BREAST IN FEMALE (H): ICD-10-CM

## 2020-08-27 DIAGNOSIS — R52 PAIN: Primary | ICD-10-CM

## 2020-08-27 PROCEDURE — 97140 MANUAL THERAPY 1/> REGIONS: CPT | Mod: GP | Performed by: REHABILITATION PRACTITIONER

## 2020-08-27 PROCEDURE — 99213 OFFICE O/P EST LOW 20 MIN: CPT | Performed by: SURGERY

## 2020-08-27 RX ORDER — GABAPENTIN 100 MG/1
100 CAPSULE ORAL 2 TIMES DAILY
Qty: 60 CAPSULE | Refills: 0 | Status: SHIPPED | OUTPATIENT
Start: 2020-08-27 | End: 2020-09-24

## 2020-08-27 ASSESSMENT — MIFFLIN-ST. JEOR: SCORE: 1619.3

## 2020-08-27 NOTE — NURSING NOTE
"Initial /78 (BP Location: Right arm, Patient Position: Sitting, Cuff Size: Adult Regular)   Pulse 68   Temp 98.5  F (36.9  C) (Tympanic)   Ht 1.67 m (5' 5.75\")   Wt 101.2 kg (223 lb)   BMI 36.27 kg/m   Estimated body mass index is 36.27 kg/m  as calculated from the following:    Height as of this encounter: 1.67 m (5' 5.75\").    Weight as of this encounter: 101.2 kg (223 lb). .    Danni Brock MA    "

## 2020-08-27 NOTE — LETTER
8/27/2020         RE: Merlyn Ravi  [address] [facility] [address]        Dear Colleague,    Thank you for referring your patient, Merlyn Ravi, to the [facility]. Please see a copy of my visit note below.    BREAST CANCER FOLLOW UP  CHIEF COMPLAINT  Chief Complaint   Patient presents with     RECHECK     HPI  Merlyn Ravi is a 55 year old female who presents with   Chief Complaint   Patient presents with     RECHECK     The patient presents for her breast cancer followup appointment. Overall, she is feeling good. She denies any significant fatigue, fevers, chills, or changes in appetite. She has not suffered any significant weight loss.  She notes onset of cellulitis after mammogram/ultrasound 4/20.  Patient subsequently developed lymphedema of the breast.  She is currently undergoing treatment with PT with some noted improvement.  No changes in her breast, she feels scar is less prominent than it was initially.  She has continued left upper extremity heaviness, particularly after lying on her left arm.    Review of Systems  Constitutional: Denies fever or chills   Eyes: Denies change in visual acuity   HENT: Denies nasal congestion or sore throat   Respiratory: Denies cough or shortness of breath   Cardiovascular: Denies chest pain or edema   GI: Denies abdominal pain, nausea, vomiting, bloody stools or diarrhea   : Denies dysuria   Musculoskeletal: Denies back pain or joint pain   Integument: Denies rash   Neurologic: Denies headache, focal weakness or sensory changes   Endocrine: Denies polyuria or polydipsia   Lymphatic: Denies swollen glands   Psychiatric: Denies depression or anxiety     PAST MEDICAL HISTORY  Past Medical History:   Diagnosis Date     Allergies      Dermatofibroma 5/14/2012     FAMILY HISTORY  Family History   Problem Relation Age of Onset     Thyroid Disease Mother      Heart Disease Mother      Heart Disease Father      Hyperlipidemia  Father      Hypertension Maternal Grandmother      Breast Cancer Maternal Grandmother      Hypertension Maternal Grandfather      Alzheimer Disease Paternal Grandmother      Diabetes Brother      Eczema Brother      Thyroid Disease Brother      Melanoma No family hx of      SOCIAL HISTORY  Social History     Socioeconomic History     Marital status:      Spouse name: None     Number of children: None     Years of education: None     Highest education level: None   Occupational History     Employer: [employer]   Social Needs     Financial resource strain: None     Food insecurity     Worry: None     Inability: None     Transportation needs     Medical: None     Non-medical: None   Tobacco Use     Smoking status: Never Smoker     Smokeless tobacco: Never Used   Substance and Sexual Activity     Alcohol use: Yes     Alcohol/week: 0.0 standard drinks     Comment: 3 drinks per month     Drug use: No     Sexual activity: Yes     Partners: Male     Birth control/protection: Surgical     Comment: defer to md   Lifestyle     Physical activity     Days per week: None     Minutes per session: None     Stress: None   Relationships     Social connections     Talks on phone: None     Gets together: None     Attends Taoism service: None     Active member of club or organization: None     Attends meetings of clubs or organizations: None     Relationship status: None     Intimate partner violence     Fear of current or ex partner: None     Emotionally abused: None     Physically abused: None     Forced sexual activity: None   Other Topics Concern     Parent/sibling w/ CABG, MI or angioplasty before 65F 55M? No   Social History Narrative     None     SURGICAL HISTORY  Past Surgical History:   Procedure Laterality Date     C NONSPECIFIC PROCEDURE      bladder surgery     HYSTERECTOMY, PAP NO LONGER INDICATED       HYSTERECTOMY, MAGY       LASIK BILATERAL  2005    Dr. Solis      LUMPECTOMY BREAST Left 9/30/2019    Procedure:  "Re-excision of left breast lumpectomy site;  Surgeon: Kj Salas DO;  Location: [facility]     LUMPECTOMY BREAST Left 10/9/2019    Procedure: Left breast re-excision;  Surgeon: Kj Salas DO;  Location: [facility]     LUMPECTOMY BREAST WITH SENTINEL NODE, COMBINED Left 9/23/2019    Procedure: LUMPECTOMY, BREAST, WITH SENTINEL LYMPH NODE BIOPSY.;  Surgeon: Kj Salas DO;  Location: [facility]     CURRENT MEDICATIONS    Current Outpatient Medications:      atorvastatin (LIPITOR) 10 MG tablet, Take 1 tablet (10 mg) by mouth daily, Disp: 90 tablet, Rfl: 2     calcium 500-125 MG-UNIT TABS, Take 2 tablets by mouth 2 times daily, Disp: , Rfl:      doxycycline hyclate (PERIOSTAT) 20 MG tablet, 1 tab PO BID, Disp: 180 tablet, Rfl: 3     doxycycline hyclate (PERIOSTAT) 20 MG tablet, 1 tab PO BID APPT NEEDED FOR REFILLS., Disp: 60 tablet, Rfl: 0     doxycycline monohydrate (MONODOX) 100 MG capsule, 1 cap PO daily with food and full glass of water, Disp: 30 capsule, Rfl: 0     doxycycline monohydrate (MONODOX) 50 MG capsule, 1 tab po daily, Disp: 90 capsule, Rfl: 3     doxycycline monohydrate 100 MG capsule, Take 1 capsule (100 mg) by mouth daily with food, Disp: 90 capsule, Rfl: 0     IBUPROFEN PO, Take 800 mg by mouth every 4 hours as needed for moderate pain, Disp: , Rfl:      levothyroxine (SYNTHROID/LEVOTHROID) 88 MCG tablet, Take 1 tablet (88 mcg) by mouth daily, Disp: 90 tablet, Rfl: 3     Naproxen Sodium (ALEVE PO), Take 220 mg by mouth 2 times daily (with meals), Disp: , Rfl:      tamoxifen (NOLVADEX) 20 MG tablet, Take 1 tablet (20 mg) by mouth daily, Disp: 90 tablet, Rfl: 1  ALLERGIES  Allergies   Allergen Reactions     Demerol      Sedation and vomiting     Meperidine      Sulfa Drugs      unknown reaction     PHYSICAL EXAM  VITAL SIGNS:  height is 1.67 m (5' 5.75\") and weight is 101.2 kg (223 lb). Her tympanic temperature is 98.5  F (36.9  C). Her blood pressure is 130/78 and her pulse is " 68.   Constitutional: Well developed, Well nourished, No acute distress, Non-toxic appearance.   HENT: Normocephalic, Atraumatic, Bilateral external ears normal, Oropharynx moist, No oral exudates, Nose normal.   Eyes: EOMI, Conjunctiva normal, No discharge.   Neck: Normal range of motion, No tenderness, Supple, No stridor.   Lymphatic: No lymphadenopathy noted.   Cardiovascular: Normal heart rate, Normal rhythm, No murmurs, No rubs, No gallops.   Breast Exam: Examined with Danni Brock MA  RIGHT: No areas of skin dimpling or puckering. No erythema. No skin scaling or changes. No expressible nipple discharge. No focal areas of significant tenderness. .  No palpable axillary lymphadenopathy.  LEFT: Lymphedema of left breast. No incisional tenderness or mass No skin scaling or changes. No expressible nipple discharge. No focal areas of significant tenderness. .  No palpable axillary lymphadenopathy.  Thorax & Lungs: Normal breath sounds, No respiratory distress, No wheezing, No chest tenderness.   Abdomen: Bowel sounds normal, Soft, No masses, No pulsatile masses.   Skin: Warm, Dry, No erythema, No rash.   Back: No tenderness, No CVA tenderness.   Extremities: Intact distal pulses, No edema, No tenderness, No cyanosis, No clubbing.   Musculoskeletal: Good range of motion in all major joints. No tenderness to palpation or major deformities noted.   Neurologic: Alert & oriented x 3, Normal motor function, Normal sensory function, No focal deficits noted.   Psychiatric: Affect normal, Judgment normal, Mood normal.         Imaging Studies:   No results found for this or any previous visit (from the past 744 hour(s)).      FINAL IMPRESSION AND PLAN  1. Pain    2. Invasive lobular carcinoma of breast in female (H)      The patient is 11 months out from her breast cancer surgery; T1N0M0, invasive lobular carcinoma. She had repeat imaging in April which was negative She is continuing to recover well from her breast cancer  treatment other than lymphedema of the left breast.    She has just started lymphedema treatment.  I advised continued physical therapy.  I will plan to see her back in 1 month as I have started her on Gabapentin in hopes of improving her pain in left upper extremity and to assess improvement     Kj Salas, DO on 8/27/2020 at 2:46 PM        Again, thank you for allowing me to participate in the care of your patient.        Sincerely,        Kj Salas, DO

## 2020-08-27 NOTE — PROGRESS NOTES
BREAST CANCER FOLLOW UP  CHIEF COMPLAINT  Chief Complaint   Patient presents with     RECHECK     HPI  Merlyn Ravi is a 55 year old female who presents with   Chief Complaint   Patient presents with     RECHECK     The patient presents for her breast cancer followup appointment. Overall, she is feeling good. She denies any significant fatigue, fevers, chills, or changes in appetite. She has not suffered any significant weight loss.  She notes onset of cellulitis after mammogram/ultrasound 4/20.  Patient subsequently developed lymphedema of the breast.  She is currently undergoing treatment with PT with some noted improvement.  No changes in her breast, she feels scar is less prominent than it was initially.  She has continued left upper extremity heaviness, particularly after lying on her left arm.    Review of Systems  Constitutional: Denies fever or chills   Eyes: Denies change in visual acuity   HENT: Denies nasal congestion or sore throat   Respiratory: Denies cough or shortness of breath   Cardiovascular: Denies chest pain or edema   GI: Denies abdominal pain, nausea, vomiting, bloody stools or diarrhea   : Denies dysuria   Musculoskeletal: Denies back pain or joint pain   Integument: Denies rash   Neurologic: Denies headache, focal weakness or sensory changes   Endocrine: Denies polyuria or polydipsia   Lymphatic: Denies swollen glands   Psychiatric: Denies depression or anxiety     PAST MEDICAL HISTORY  Past Medical History:   Diagnosis Date     Allergies      Dermatofibroma 5/14/2012     FAMILY HISTORY  Family History   Problem Relation Age of Onset     Thyroid Disease Mother      Heart Disease Mother      Heart Disease Father      Hyperlipidemia Father      Hypertension Maternal Grandmother      Breast Cancer Maternal Grandmother      Hypertension Maternal Grandfather      Alzheimer Disease Paternal Grandmother      Diabetes Brother      Eczema Brother      Thyroid Disease Brother      Melanoma No  family hx of      SOCIAL HISTORY  Social History     Socioeconomic History     Marital status:      Spouse name: None     Number of children: None     Years of education: None     Highest education level: None   Occupational History     Employer: [employer]   Social Needs     Financial resource strain: None     Food insecurity     Worry: None     Inability: None     Transportation needs     Medical: None     Non-medical: None   Tobacco Use     Smoking status: Never Smoker     Smokeless tobacco: Never Used   Substance and Sexual Activity     Alcohol use: Yes     Alcohol/week: 0.0 standard drinks     Comment: 3 drinks per month     Drug use: No     Sexual activity: Yes     Partners: Male     Birth control/protection: Surgical     Comment: defer to md   Lifestyle     Physical activity     Days per week: None     Minutes per session: None     Stress: None   Relationships     Social connections     Talks on phone: None     Gets together: None     Attends Nondenominational service: None     Active member of club or organization: None     Attends meetings of clubs or organizations: None     Relationship status: None     Intimate partner violence     Fear of current or ex partner: None     Emotionally abused: None     Physically abused: None     Forced sexual activity: None   Other Topics Concern     Parent/sibling w/ CABG, MI or angioplasty before 65F 55M? No   Social History Narrative     None     SURGICAL HISTORY  Past Surgical History:   Procedure Laterality Date     C NONSPECIFIC PROCEDURE      bladder surgery     HYSTERECTOMY, PAP NO LONGER INDICATED       HYSTERECTOMY, MAGY       LASIK BILATERAL  2005    Dr. Solis      LUMPECTOMY BREAST Left 9/30/2019    Procedure: Re-excision of left breast lumpectomy site;  Surgeon: Kj Salas DO;  Location: [facility]     LUMPECTOMY BREAST Left 10/9/2019    Procedure: Left breast re-excision;  Surgeon: Kj Salas DO;  Location: [facility]     LUMPECTOMY BREAST  "WITH SENTINEL NODE, COMBINED Left 9/23/2019    Procedure: LUMPECTOMY, BREAST, WITH SENTINEL LYMPH NODE BIOPSY.;  Surgeon: Kj Salas DO;  Location: [facility]     CURRENT MEDICATIONS    Current Outpatient Medications:      atorvastatin (LIPITOR) 10 MG tablet, Take 1 tablet (10 mg) by mouth daily, Disp: 90 tablet, Rfl: 2     calcium 500-125 MG-UNIT TABS, Take 2 tablets by mouth 2 times daily, Disp: , Rfl:      doxycycline hyclate (PERIOSTAT) 20 MG tablet, 1 tab PO BID, Disp: 180 tablet, Rfl: 3     doxycycline hyclate (PERIOSTAT) 20 MG tablet, 1 tab PO BID APPT NEEDED FOR REFILLS., Disp: 60 tablet, Rfl: 0     doxycycline monohydrate (MONODOX) 100 MG capsule, 1 cap PO daily with food and full glass of water, Disp: 30 capsule, Rfl: 0     doxycycline monohydrate (MONODOX) 50 MG capsule, 1 tab po daily, Disp: 90 capsule, Rfl: 3     doxycycline monohydrate 100 MG capsule, Take 1 capsule (100 mg) by mouth daily with food, Disp: 90 capsule, Rfl: 0     IBUPROFEN PO, Take 800 mg by mouth every 4 hours as needed for moderate pain, Disp: , Rfl:      levothyroxine (SYNTHROID/LEVOTHROID) 88 MCG tablet, Take 1 tablet (88 mcg) by mouth daily, Disp: 90 tablet, Rfl: 3     Naproxen Sodium (ALEVE PO), Take 220 mg by mouth 2 times daily (with meals), Disp: , Rfl:      tamoxifen (NOLVADEX) 20 MG tablet, Take 1 tablet (20 mg) by mouth daily, Disp: 90 tablet, Rfl: 1  ALLERGIES  Allergies   Allergen Reactions     Demerol      Sedation and vomiting     Meperidine      Sulfa Drugs      unknown reaction     PHYSICAL EXAM  VITAL SIGNS:  height is 1.67 m (5' 5.75\") and weight is 101.2 kg (223 lb). Her tympanic temperature is 98.5  F (36.9  C). Her blood pressure is 130/78 and her pulse is 68.   Constitutional: Well developed, Well nourished, No acute distress, Non-toxic appearance.   HENT: Normocephalic, Atraumatic, Bilateral external ears normal, Oropharynx moist, No oral exudates, Nose normal.   Eyes: EOMI, Conjunctiva normal, No " discharge.   Neck: Normal range of motion, No tenderness, Supple, No stridor.   Lymphatic: No lymphadenopathy noted.   Cardiovascular: Normal heart rate, Normal rhythm, No murmurs, No rubs, No gallops.   Breast Exam: Examined with Danni Brock MA  RIGHT: No areas of skin dimpling or puckering. No erythema. No skin scaling or changes. No expressible nipple discharge. No focal areas of significant tenderness. .  No palpable axillary lymphadenopathy.  LEFT: Lymphedema of left breast. No incisional tenderness or mass No skin scaling or changes. No expressible nipple discharge. No focal areas of significant tenderness. .  No palpable axillary lymphadenopathy.  Thorax & Lungs: Normal breath sounds, No respiratory distress, No wheezing, No chest tenderness.   Abdomen: Bowel sounds normal, Soft, No masses, No pulsatile masses.   Skin: Warm, Dry, No erythema, No rash.   Back: No tenderness, No CVA tenderness.   Extremities: Intact distal pulses, No edema, No tenderness, No cyanosis, No clubbing.   Musculoskeletal: Good range of motion in all major joints. No tenderness to palpation or major deformities noted.   Neurologic: Alert & oriented x 3, Normal motor function, Normal sensory function, No focal deficits noted.   Psychiatric: Affect normal, Judgment normal, Mood normal.         Imaging Studies:   No results found for this or any previous visit (from the past 744 hour(s)).      FINAL IMPRESSION AND PLAN  1. Pain    2. Invasive lobular carcinoma of breast in female (H)      The patient is 11 months out from her breast cancer surgery; T1N0M0, invasive lobular carcinoma. She had repeat imaging in April which was negative She is continuing to recover well from her breast cancer treatment other than lymphedema of the left breast.    She has just started lymphedema treatment.  I advised continued physical therapy.  I will plan to see her back in 1 month as I have started her on Gabapentin in hopes of improving her pain in left  upper extremity and to assess improvement     Kj Salas,  on 8/27/2020 at 2:46 PM

## 2020-09-01 ENCOUNTER — HOSPITAL ENCOUNTER (OUTPATIENT)
Dept: LAB | Facility: CLINIC | Age: 55
Discharge: HOME | End: 2020-09-01
Attending: INTERNAL MEDICINE | Admitting: NURSE PRACTITIONER
Payer: COMMERCIAL

## 2020-09-01 ENCOUNTER — HOSPITAL ENCOUNTER (OUTPATIENT)
Dept: PHYSICAL THERAPY | Facility: CLINIC | Age: 55
Setting detail: THERAPIES SERIES
End: 2020-09-01
Attending: NURSE PRACTITIONER
Payer: COMMERCIAL

## 2020-09-01 DIAGNOSIS — C50.919 INVASIVE LOBULAR CARCINOMA OF BREAST IN FEMALE (H): ICD-10-CM

## 2020-09-01 LAB — CANCER AG27-29 SERPL-ACNC: 15 U/ML (ref 0–39)

## 2020-09-01 PROCEDURE — 86300 IMMUNOASSAY TUMOR CA 15-3: CPT | Performed by: NURSE PRACTITIONER

## 2020-09-01 PROCEDURE — 97140 MANUAL THERAPY 1/> REGIONS: CPT | Mod: GP | Performed by: REHABILITATION PRACTITIONER

## 2020-09-01 PROCEDURE — 36415 COLL VENOUS BLD VENIPUNCTURE: CPT | Performed by: NURSE PRACTITIONER

## 2020-09-03 ENCOUNTER — HOSPITAL ENCOUNTER (OUTPATIENT)
Dept: PHYSICAL THERAPY | Facility: CLINIC | Age: 55
Setting detail: THERAPIES SERIES
End: 2020-09-03
Attending: NURSE PRACTITIONER
Payer: COMMERCIAL

## 2020-09-03 PROCEDURE — 97140 MANUAL THERAPY 1/> REGIONS: CPT | Mod: GP | Performed by: PHYSICAL THERAPIST

## 2020-09-04 ENCOUNTER — ONCOLOGY VISIT (OUTPATIENT)
Dept: ONCOLOGY | Facility: CLINIC | Age: 55
End: 2020-09-04
Attending: INTERNAL MEDICINE
Payer: COMMERCIAL

## 2020-09-04 VITALS
HEIGHT: 66 IN | TEMPERATURE: 97 F | SYSTOLIC BLOOD PRESSURE: 133 MMHG | DIASTOLIC BLOOD PRESSURE: 80 MMHG | RESPIRATION RATE: 18 BRPM | OXYGEN SATURATION: 98 % | HEART RATE: 84 BPM | BODY MASS INDEX: 35.68 KG/M2 | WEIGHT: 222 LBS

## 2020-09-04 DIAGNOSIS — D47.2 MONOCLONAL PARAPROTEINEMIA: ICD-10-CM

## 2020-09-04 DIAGNOSIS — E03.8 OTHER SPECIFIED HYPOTHYROIDISM: ICD-10-CM

## 2020-09-04 DIAGNOSIS — C50.919 INVASIVE LOBULAR CARCINOMA OF BREAST IN FEMALE (H): Primary | ICD-10-CM

## 2020-09-04 DIAGNOSIS — E78.5 HYPERLIPIDEMIA LDL GOAL <130: ICD-10-CM

## 2020-09-04 PROCEDURE — 99214 OFFICE O/P EST MOD 30 MIN: CPT | Performed by: INTERNAL MEDICINE

## 2020-09-04 PROCEDURE — G0463 HOSPITAL OUTPT CLINIC VISIT: HCPCS

## 2020-09-04 ASSESSMENT — MIFFLIN-ST. JEOR: SCORE: 1614.77

## 2020-09-04 ASSESSMENT — PAIN SCALES - GENERAL: PAINLEVEL: NO PAIN (0)

## 2020-09-04 NOTE — PROGRESS NOTES
"Oncology Rooming Note    September 4, 2020 9:14 AM   Merlyn Ravi is a 55 year old female who presents for:    Chief Complaint   Patient presents with     Oncology Clinic Visit     3 month follow up breast cancer.     Initial Vitals: /80 (BP Location: Right arm, Patient Position: Sitting, Cuff Size: Adult Large)   Pulse 84   Temp 97  F (36.1  C) (Oral)   Resp 18   Ht 1.67 m (5' 5.75\")   Wt 100.7 kg (222 lb)   SpO2 98%   Breastfeeding No   BMI 36.10 kg/m   Estimated body mass index is 36.1 kg/m  as calculated from the following:    Height as of this encounter: 1.67 m (5' 5.75\").    Weight as of this encounter: 100.7 kg (222 lb). Body surface area is 2.16 meters squared.  No Pain (0) Comment: Data Unavailable   No LMP recorded. Patient has had a hysterectomy.  Allergies reviewed: Yes  Medications reviewed: Yes    Medications: Medication refills not needed today.  Pharmacy name entered into [employer]: [id number] - [place], [place] - [address] AT [facility] [address]    Clinical concerns: 3 month follow up breast cancer.      Angy Nicolas, [facility]            "

## 2020-09-04 NOTE — LETTER
"    9/4/2020         RE: Merlyn Ravi  [address] [facility] [address]        Dear Colleague,    Thank you for referring your patient, Merlyn Ravi, to the [facility]. Please see a copy of my visit note below.    Oncology Rooming Note    September 4, 2020 9:14 AM   Merlyn Ravi is a 55 year old female who presents for:    Chief Complaint   Patient presents with     Oncology Clinic Visit     3 month follow up breast cancer.     Initial Vitals: /80 (BP Location: Right arm, Patient Position: Sitting, Cuff Size: Adult Large)   Pulse 84   Temp 97  F (36.1  C) (Oral)   Resp 18   Ht 1.67 m (5' 5.75\")   Wt 100.7 kg (222 lb)   SpO2 98%   Breastfeeding No   BMI 36.10 kg/m   Estimated body mass index is 36.1 kg/m  as calculated from the following:    Height as of this encounter: 1.67 m (5' 5.75\").    Weight as of this encounter: 100.7 kg (222 lb). Body surface area is 2.16 meters squared.  No Pain (0) Comment: Data Unavailable   No LMP recorded. Patient has had a hysterectomy.  Allergies reviewed: Yes  Medications reviewed: Yes    Medications: Medication refills not needed today.  Pharmacy name entered into [employer]: [id number] - [place], [place] - [address] AT [facility] [address]    Clinical concerns: 3 month follow up breast cancer.      Angy Nicolas, [facility]              Hematology/ Oncology Follow-up Visit:  Sep 4, 2020    Reason for Visit:   Chief Complaint   Patient presents with     Oncology Clinic Visit     3 month follow up breast cancer.       Oncologic History:    Invasive lobular carcinoma of breast in female (H)  Kristina Ravi felt a lump in the left breast subsequently she went on to have diagnostic mammography which showed 1.1 cm irregular asymmetry about 11.2 cm from the nipple at 9-10 o'clock position.  Left breast ultrasound at that area revealed the lesion that measures 1.1 cm additional sonography to the left axilla shows " no abnormal lymphadenopathy.  Subsequently the patient went on to have stereotactic breast biopsy done on August 15.  The pathology revealed invasive lobular carcinoma grade 2 out of 3.  Angiolymphatic invasion was not seen.  Carcinoma in situ cannot be excluded.  The tumor was estrogen receptor positive progesterone receptor positive and HER-2/walter came back negative.  Patient had a left lumpectomy and sentinel lymph node biopsy done on September 23, 2019 showing invasive lobular carcinoma with tumor size about 4.5 cm.  Tumor is grade 2 angiolymphatic invasion was not seen.  In situ malignancy was not seen . [place] lymph node was negative for malignancy.  The tumor was positive for estrogen receptor and progesterone receptor and negative for HER-2/walter receptors.  Because of positive deep margin, the patient went on to have reexcision on September 30, 2019 which resulted in positive margin as well.  Eventually she had reexcision again on October 9, 2019 which came back negative for residual malignancy.  Oncotype DX came back with recurrence score at 20.  The patient started on tamoxifen 20 mg orally daily.      Interval History:  .Patient is here today for follow-up visit.  She has been feeling well without any recent complaints weight loss night sweats fever or chills patient diarrhea.She is currently on tamoxifen.  She has been tolerating tamoxifen without significant side effects    Review Of Systems:  Constitutional: Negative for fever, chills, and night sweats.  Skin: negative.  Eyes: negative.  Ears/Nose/Throat: negative.  Respiratory: No shortness of breath, dyspnea on exertion, cough, or hemoptysis.  Cardiovascular: negative.  Gastrointestinal: negative.  Genitourinary: negative.  Musculoskeletal: negative.  Neurologic: negative.  Psychiatric: negative.  Hematologic/Lymphatic/Immunologic: negative.  Endocrine: negative.    All other ROS negative unless mentioned in interval history.    Past medical, social,  "surgical, and family histories reviewed.    Allergies:  Allergies as of 09/04/2020 - Reviewed 09/04/2020   Allergen Reaction Noted     Demerol  11/16/2009     Meperidine  05/30/2020     Sulfa drugs  11/16/2009       Current Medications:  Current Outpatient Medications   Medication Sig Dispense Refill     atorvastatin (LIPITOR) 10 MG tablet Take 1 tablet (10 mg) by mouth daily 90 tablet 2     calcium 500-125 MG-UNIT TABS Take 2 tablets by mouth 2 times daily       doxycycline monohydrate (MONODOX) 100 MG capsule 1 cap PO daily with food and full glass of water 30 capsule 0     gabapentin (NEURONTIN) 100 MG capsule Take 1 capsule (100 mg) by mouth 2 times daily 60 capsule 0     IBUPROFEN PO Take 800 mg by mouth every 4 hours as needed for moderate pain       levothyroxine (SYNTHROID/LEVOTHROID) 88 MCG tablet Take 1 tablet (88 mcg) by mouth daily 90 tablet 3     Naproxen Sodium (ALEVE PO) Take 220 mg by mouth 2 times daily (with meals)       tamoxifen (NOLVADEX) 20 MG tablet Take 1 tablet (20 mg) by mouth daily 90 tablet 1     doxycycline hyclate (PERIOSTAT) 20 MG tablet 1 tab PO  tablet 3     doxycycline hyclate (PERIOSTAT) 20 MG tablet 1 tab PO BID APPT NEEDED FOR REFILLS. 60 tablet 0     doxycycline monohydrate (MONODOX) 50 MG capsule 1 tab po daily (Patient not taking: Reported on 9/4/2020) 90 capsule 3     doxycycline monohydrate 100 MG capsule Take 1 capsule (100 mg) by mouth daily with food 90 capsule 0        Physical Exam:  /80 (BP Location: Right arm, Patient Position: Sitting, Cuff Size: Adult Large)   Pulse 84   Temp 97  F (36.1  C) (Oral)   Resp 18   Ht 1.67 m (5' 5.75\")   Wt 100.7 kg (222 lb)   SpO2 98%   Breastfeeding No   BMI 36.10 kg/m    Wt Readings from Last 12 Encounters:   09/04/20 100.7 kg (222 lb)   08/27/20 101.2 kg (223 lb)   07/07/20 101.3 kg (223 lb 6.4 oz)   06/05/20 101.9 kg (224 lb 11.2 oz)   04/20/20 99.3 kg (219 lb)   03/11/20 103.8 kg (228 lb 14.4 oz)   03/09/20 " "105.7 kg (233 lb)   01/31/20 103 kg (227 lb)   01/10/20 106 kg (233 lb 9.6 oz)   12/11/19 107 kg (235 lb 14.4 oz)   12/04/19 107 kg (235 lb 12.8 oz)   11/27/19 105.9 kg (233 lb 6.4 oz)     ECOG performance status: 0  GENERAL APPEARANCE: Healthy, alert and in no acute distress.  HEENT: Sclerae anicteric. PERRLA. Oropharynx without ulcers, lesions, or thrush.  NECK: Supple. No asymmetry or masses.  LYMPHATICS: No palpable cervical, supraclavicular, axillary, or inguinal lymphadenopathy.  RESP: Lungs clear to auscultation bilaterally without rales, rhonchi or wheezes.\",  BREAST: Right- No mass, nipple discharge or axillary adenopathy. Left- No mass, nipple discharge or axillary adenopathy \"CARDIOVASCULAR: Regular rate and rhythm. Normal S1, S2; no S3 or S4. No murmur, gallop, or rub.  ABDOMEN: Soft, nontender. Bowel sounds normal. No palpable organomegaly or masses.  MUSCULOSKELETAL: Extremities without gross deformities noted. No edema of bilateral lower extremities.  SKIN: No suspicious lesions or rashes.  NEURO: Alert and oriented x 3. Cranial nerves II-XII grossly intact.  PSYCHIATRIC: Mentation and affect appear normal.    Laboratory/Imaging Studies:  No visits with results within 2 Week(s) from this visit.   Latest known visit with results is:   Orders Only on 03/09/2020   Component Date Value Ref Range Status     Vitamin D Deficiency screening 03/09/2020 30  20 - 75 ug/L Final    Comment: Season, race, dietary intake, and treatment affect the concentration of   25-hydroxy-Vitamin D. Values may decrease during winter months and increase   during summer months. Values 20-29 ug/L may indicate Vitamin D insufficiency   and values <20 ug/L may indicate Vitamin D deficiency.  Vitamin D determination is routinely performed by an immunoassay specific for   25 hydroxyvitamin D3.  If an individual is on vitamin D2 (ergocalciferol)   supplementation, please specify 25 OH vitamin D2 and D3 level determination by   LCMSMS " test VITD23.       T4 Free 03/09/2020 1.24  0.76 - 1.46 ng/dL Final     TSH 03/09/2020 1.75  0.40 - 4.00 mU/L Final        No results found for this or any previous visit (from the past 744 hour(s)).    Assessment and plan:    (C50.919) Invasive lobular carcinoma of breast in female (H)  (primary encounter diagnosis)  I reviewed with the patient today most recent laboratory tests.  There is no evidence of breast cancer recurrence.  The patient will continue tamoxifen 20 mg orally daily.  We will see the patient again in 6 months or sooner if there are new problems or concerns.    (D47.2) Monoclonal paraproteinemia  We will continue to monitor M protein levels   Annually.  Plan: Immunoglobulins A G and M, ELP reflex to IELP,    Kappa and lambda light chain    (E78.5) Hyperlipidemia LDL goal <130  Patient currently on atorvastatin 10 mg orally daily.    (E03.8) Other specified hypothyroidism  Patient currently on levothyroxine 88 mcg orally daily.    The patient is ready to learn, no apparent learning barriers were identified.  Diagnosis and treatment plans were explained to the patient. The patient expressed understanding of the content. The patient asked appropriate questions. The patient questions were answered to her satisfaction.    Chart documentation with Dragon Voice recognition Software. Although reviewed after completion, some words and grammatical errors may remain.      Again, thank you for allowing me to participate in the care of your patient.        Sincerely,        Tanvi Blakely MD

## 2020-09-04 NOTE — ASSESSMENT & PLAN NOTE
Kristina Raiv felt a lump in the left breast subsequently she went on to have diagnostic mammography which showed 1.1 cm irregular asymmetry about 11.2 cm from the nipple at 9-10 o'clock position.  Left breast ultrasound at that area revealed the lesion that measures 1.1 cm additional sonography to the left axilla shows no abnormal lymphadenopathy.  Subsequently the patient went on to have stereotactic breast biopsy done on August 15.  The pathology revealed invasive lobular carcinoma grade 2 out of 3.  Angiolymphatic invasion was not seen.  Carcinoma in situ cannot be excluded.  The tumor was estrogen receptor positive progesterone receptor positive and HER-2/walter came back negative.  Patient had a left lumpectomy and sentinel lymph node biopsy done on September 23, 2019 showing invasive lobular carcinoma with tumor size about 4.5 cm.  Tumor is grade 2 angiolymphatic invasion was not seen.  In situ malignancy was not seen . [place] lymph node was negative for malignancy.  The tumor was positive for estrogen receptor and progesterone receptor and negative for HER-2/walter receptors.  Because of positive deep margin, the patient went on to have reexcision on September 30, 2019 which resulted in positive margin as well.  Eventually she had reexcision again on October 9, 2019 which came back negative for residual malignancy.  Oncotype DX came back with recurrence score at 20.  The patient started on tamoxifen 20 mg orally daily.

## 2020-09-09 ENCOUNTER — HOSPITAL ENCOUNTER (OUTPATIENT)
Dept: PHYSICAL THERAPY | Facility: CLINIC | Age: 55
Setting detail: THERAPIES SERIES
End: 2020-09-09
Attending: NURSE PRACTITIONER
Payer: COMMERCIAL

## 2020-09-09 PROCEDURE — 97110 THERAPEUTIC EXERCISES: CPT | Mod: GP | Performed by: PHYSICAL THERAPIST

## 2020-09-09 PROCEDURE — 97140 MANUAL THERAPY 1/> REGIONS: CPT | Mod: GP | Performed by: PHYSICAL THERAPIST

## 2020-09-09 NOTE — PROGRESS NOTES
Hematology/ Oncology Follow-up Visit:  Sep 4, 2020    Reason for Visit:   Chief Complaint   Patient presents with     Oncology Clinic Visit     3 month follow up breast cancer.       Oncologic History:    Invasive lobular carcinoma of breast in female (H)  Kristina Ravi felt a lump in the left breast subsequently she went on to have diagnostic mammography which showed 1.1 cm irregular asymmetry about 11.2 cm from the nipple at 9-10 o'clock position.  Left breast ultrasound at that area revealed the lesion that measures 1.1 cm additional sonography to the left axilla shows no abnormal lymphadenopathy.  Subsequently the patient went on to have stereotactic breast biopsy done on August 15.  The pathology revealed invasive lobular carcinoma grade 2 out of 3.  Angiolymphatic invasion was not seen.  Carcinoma in situ cannot be excluded.  The tumor was estrogen receptor positive progesterone receptor positive and HER-2/walter came back negative.  Patient had a left lumpectomy and sentinel lymph node biopsy done on September 23, 2019 showing invasive lobular carcinoma with tumor size about 4.5 cm.  Tumor is grade 2 angiolymphatic invasion was not seen.  In situ malignancy was not seen . [place] lymph node was negative for malignancy.  The tumor was positive for estrogen receptor and progesterone receptor and negative for HER-2/walter receptors.  Because of positive deep margin, the patient went on to have reexcision on September 30, 2019 which resulted in positive margin as well.  Eventually she had reexcision again on October 9, 2019 which came back negative for residual malignancy.  Oncotype DX came back with recurrence score at 20.  The patient started on tamoxifen 20 mg orally daily.      Interval History:  .Patient is here today for follow-up visit.  She has been feeling well without any recent complaints weight loss night sweats fever or chills patient diarrhea.She is currently on tamoxifen.  She has been tolerating  tamoxifen without significant side effects    Review Of Systems:  Constitutional: Negative for fever, chills, and night sweats.  Skin: negative.  Eyes: negative.  Ears/Nose/Throat: negative.  Respiratory: No shortness of breath, dyspnea on exertion, cough, or hemoptysis.  Cardiovascular: negative.  Gastrointestinal: negative.  Genitourinary: negative.  Musculoskeletal: negative.  Neurologic: negative.  Psychiatric: negative.  Hematologic/Lymphatic/Immunologic: negative.  Endocrine: negative.    All other ROS negative unless mentioned in interval history.    Past medical, social, surgical, and family histories reviewed.    Allergies:  Allergies as of 09/04/2020 - Reviewed 09/04/2020   Allergen Reaction Noted     Demerol  11/16/2009     Meperidine  05/30/2020     Sulfa drugs  11/16/2009       Current Medications:  Current Outpatient Medications   Medication Sig Dispense Refill     atorvastatin (LIPITOR) 10 MG tablet Take 1 tablet (10 mg) by mouth daily 90 tablet 2     calcium 500-125 MG-UNIT TABS Take 2 tablets by mouth 2 times daily       doxycycline monohydrate (MONODOX) 100 MG capsule 1 cap PO daily with food and full glass of water 30 capsule 0     gabapentin (NEURONTIN) 100 MG capsule Take 1 capsule (100 mg) by mouth 2 times daily 60 capsule 0     IBUPROFEN PO Take 800 mg by mouth every 4 hours as needed for moderate pain       levothyroxine (SYNTHROID/LEVOTHROID) 88 MCG tablet Take 1 tablet (88 mcg) by mouth daily 90 tablet 3     Naproxen Sodium (ALEVE PO) Take 220 mg by mouth 2 times daily (with meals)       tamoxifen (NOLVADEX) 20 MG tablet Take 1 tablet (20 mg) by mouth daily 90 tablet 1     doxycycline hyclate (PERIOSTAT) 20 MG tablet 1 tab PO  tablet 3     doxycycline hyclate (PERIOSTAT) 20 MG tablet 1 tab PO BID APPT NEEDED FOR REFILLS. 60 tablet 0     doxycycline monohydrate (MONODOX) 50 MG capsule 1 tab po daily (Patient not taking: Reported on 9/4/2020) 90 capsule 3     doxycycline monohydrate  "100 MG capsule Take 1 capsule (100 mg) by mouth daily with food 90 capsule 0        Physical Exam:  /80 (BP Location: Right arm, Patient Position: Sitting, Cuff Size: Adult Large)   Pulse 84   Temp 97  F (36.1  C) (Oral)   Resp 18   Ht 1.67 m (5' 5.75\")   Wt 100.7 kg (222 lb)   SpO2 98%   Breastfeeding No   BMI 36.10 kg/m    Wt Readings from Last 12 Encounters:   09/04/20 100.7 kg (222 lb)   08/27/20 101.2 kg (223 lb)   07/07/20 101.3 kg (223 lb 6.4 oz)   06/05/20 101.9 kg (224 lb 11.2 oz)   04/20/20 99.3 kg (219 lb)   03/11/20 103.8 kg (228 lb 14.4 oz)   03/09/20 105.7 kg (233 lb)   01/31/20 103 kg (227 lb)   01/10/20 106 kg (233 lb 9.6 oz)   12/11/19 107 kg (235 lb 14.4 oz)   12/04/19 107 kg (235 lb 12.8 oz)   11/27/19 105.9 kg (233 lb 6.4 oz)     ECOG performance status: 0  GENERAL APPEARANCE: Healthy, alert and in no acute distress.  HEENT: Sclerae anicteric. PERRLA. Oropharynx without ulcers, lesions, or thrush.  NECK: Supple. No asymmetry or masses.  LYMPHATICS: No palpable cervical, supraclavicular, axillary, or inguinal lymphadenopathy.  RESP: Lungs clear to auscultation bilaterally without rales, rhonchi or wheezes.\",  BREAST: Right- No mass, nipple discharge or axillary adenopathy. Left- No mass, nipple discharge or axillary adenopathy \"CARDIOVASCULAR: Regular rate and rhythm. Normal S1, S2; no S3 or S4. No murmur, gallop, or rub.  ABDOMEN: Soft, nontender. Bowel sounds normal. No palpable organomegaly or masses.  MUSCULOSKELETAL: Extremities without gross deformities noted. No edema of bilateral lower extremities.  SKIN: No suspicious lesions or rashes.  NEURO: Alert and oriented x 3. Cranial nerves II-XII grossly intact.  PSYCHIATRIC: Mentation and affect appear normal.    Laboratory/Imaging Studies:  No visits with results within 2 Week(s) from this visit.   Latest known visit with results is:   Orders Only on 03/09/2020   Component Date Value Ref Range Status     Vitamin D Deficiency " screening 03/09/2020 30  20 - 75 ug/L Final    Comment: Season, race, dietary intake, and treatment affect the concentration of   25-hydroxy-Vitamin D. Values may decrease during winter months and increase   during summer months. Values 20-29 ug/L may indicate Vitamin D insufficiency   and values <20 ug/L may indicate Vitamin D deficiency.  Vitamin D determination is routinely performed by an immunoassay specific for   25 hydroxyvitamin D3.  If an individual is on vitamin D2 (ergocalciferol)   supplementation, please specify 25 OH vitamin D2 and D3 level determination by   LCMSMS test VITD23.       T4 Free 03/09/2020 1.24  0.76 - 1.46 ng/dL Final     TSH 03/09/2020 1.75  0.40 - 4.00 mU/L Final        No results found for this or any previous visit (from the past 744 hour(s)).    Assessment and plan:    (C50.919) Invasive lobular carcinoma of breast in female (H)  (primary encounter diagnosis)  I reviewed with the patient today most recent laboratory tests.  There is no evidence of breast cancer recurrence.  The patient will continue tamoxifen 20 mg orally daily.  We will see the patient again in 6 months or sooner if there are new problems or concerns.    (D47.2) Monoclonal paraproteinemia  We will continue to monitor M protein levels   Annually.  Plan: Immunoglobulins A G and M, ELP reflex to IELP,    Kappa and lambda light chain    (E78.5) Hyperlipidemia LDL goal <130  Patient currently on atorvastatin 10 mg orally daily.    (E03.8) Other specified hypothyroidism  Patient currently on levothyroxine 88 mcg orally daily.    The patient is ready to learn, no apparent learning barriers were identified.  Diagnosis and treatment plans were explained to the patient. The patient expressed understanding of the content. The patient asked appropriate questions. The patient questions were answered to her satisfaction.    Chart documentation with Dragon Voice recognition Software. Although reviewed after completion, some words  and grammatical errors may remain.

## 2020-09-10 ENCOUNTER — MYC MEDICAL ADVICE (OUTPATIENT)
Dept: RADIATION THERAPY | Facility: OUTPATIENT CENTER | Age: 55
End: 2020-09-10

## 2020-09-10 ENCOUNTER — HOSPITAL ENCOUNTER (OUTPATIENT)
Dept: PHYSICAL THERAPY | Facility: CLINIC | Age: 55
Setting detail: THERAPIES SERIES
End: 2020-09-10
Attending: NURSE PRACTITIONER
Payer: COMMERCIAL

## 2020-09-10 PROCEDURE — 97140 MANUAL THERAPY 1/> REGIONS: CPT | Mod: GP | Performed by: REHABILITATION PRACTITIONER

## 2020-09-15 ENCOUNTER — HOSPITAL ENCOUNTER (OUTPATIENT)
Dept: PHYSICAL THERAPY | Facility: CLINIC | Age: 55
Setting detail: THERAPIES SERIES
End: 2020-09-15
Attending: NURSE PRACTITIONER
Payer: COMMERCIAL

## 2020-09-15 PROCEDURE — 97140 MANUAL THERAPY 1/> REGIONS: CPT | Mod: GP | Performed by: PHYSICAL THERAPIST

## 2020-09-17 ENCOUNTER — HOSPITAL ENCOUNTER (OUTPATIENT)
Dept: PHYSICAL THERAPY | Facility: CLINIC | Age: 55
Setting detail: THERAPIES SERIES
End: 2020-09-17
Attending: NURSE PRACTITIONER
Payer: COMMERCIAL

## 2020-09-17 PROCEDURE — 97140 MANUAL THERAPY 1/> REGIONS: CPT | Mod: GP | Performed by: REHABILITATION PRACTITIONER

## 2020-09-21 ENCOUNTER — HOSPITAL ENCOUNTER (OUTPATIENT)
Dept: PHYSICAL THERAPY | Facility: CLINIC | Age: 55
Setting detail: THERAPIES SERIES
End: 2020-09-21
Attending: NURSE PRACTITIONER
Payer: COMMERCIAL

## 2020-09-21 PROCEDURE — 97140 MANUAL THERAPY 1/> REGIONS: CPT | Mod: GP | Performed by: PHYSICAL THERAPIST

## 2020-09-21 NOTE — PROGRESS NOTES
Outpatient Physical Therapy Progress Note     Patient: Merlyn Ravi  : 1965    Beginning/End Dates of Reporting Period:  2020 to 2020    Referring Provider: Bailey Melendez CNP    Therapy Diagnosis: LUE and L-breast lymphedema with fibrosis      Client Self Report: Hard spot on middle breast    Objective Measurements:  Objective Measure: L breast measurment  Details: 50.0 cm    Objective Measure: Palpation  Details: fibrosis to medial breast extending to inferior breast; increased fibrosis to medial scar     Outcome Measures (most recent score):  LLIS = 20 on date of initial evaluation; pt will again complete at discharge      Goals:  Goal Identifier stg   Goal Description pt to have daytime toleance to wearing compression bra with compression pad/Komprex2 to decrease edema, fibrosis and related symptoms in L-breast lymphedema   Target Date 20   Date Met  20   Progress:     Goal Identifier stg   Goal Description pt to be independent with self-MLD of L-breast to decrease lymphedema and fibrosis for improved comfort with overhead activity/ADL   Target Date 20   Date Met  20   Progress:     Goal Identifier ltg   Goal Description pt to be independent with longterm LUQ lymphedema management via HEP, skin cares, compression garment wear/use and self-MLD   Target Date 10/19/20   Date Met      Progress:     Goal Identifier ltg   Goal Description pt to have at least 3-5 point improvement on LLIS due to decreased lympehdema, fibrosis and related symptoms in LUQ   Target Date 10/19/20   Date Met      Progress:       Progress Toward Goals:   Overall patient has made progress with L-breast reductions and softening with MLD and MFR treatment in clinic and recently notified that she has been approved for a compression pump which will assist with longterm lymphedema management for maintenance. Will reduce frequency to 1x/week      Plan:  Other: reduce to 1x/week    Discharge:  No

## 2020-09-24 ENCOUNTER — OFFICE VISIT (OUTPATIENT)
Dept: SURGERY | Facility: CLINIC | Age: 55
End: 2020-09-24
Payer: COMMERCIAL

## 2020-09-24 ENCOUNTER — MYC MEDICAL ADVICE (OUTPATIENT)
Dept: SURGERY | Facility: CLINIC | Age: 55
End: 2020-09-24

## 2020-09-24 VITALS
SYSTOLIC BLOOD PRESSURE: 125 MMHG | HEART RATE: 73 BPM | DIASTOLIC BLOOD PRESSURE: 83 MMHG | TEMPERATURE: 98.7 F | RESPIRATION RATE: 18 BRPM

## 2020-09-24 DIAGNOSIS — C50.919 INVASIVE LOBULAR CARCINOMA OF BREAST IN FEMALE (H): Primary | ICD-10-CM

## 2020-09-24 DIAGNOSIS — I89.0 LYMPHEDEMA OF BREAST: ICD-10-CM

## 2020-09-24 DIAGNOSIS — R52 PAIN: ICD-10-CM

## 2020-09-24 PROCEDURE — 99212 OFFICE O/P EST SF 10 MIN: CPT | Performed by: SURGERY

## 2020-09-24 RX ORDER — GABAPENTIN 100 MG/1
100 CAPSULE ORAL 2 TIMES DAILY
Qty: 60 CAPSULE | Refills: 0 | Status: SHIPPED | OUTPATIENT
Start: 2020-09-24 | End: 2020-11-06

## 2020-09-24 NOTE — PATIENT INSTRUCTIONS
Per physician instructions.    If you have questions or concerns on any instructions given to you by your provider today or if you need to schedule an appointment, you can reach us at [phone].    Thank you!

## 2020-09-24 NOTE — LETTER
9/24/2020         RE: Merlyn Ravi  [address] [facility] [address]        Dear Colleague,    Thank you for referring your patient, Merlyn Ravi, to the [facility]. Please see a copy of my visit note below.    Mrs. Ravi returns to discuss lymphedema of left breast.  She feels it is improving significantly.  Her left sided chest wall/axillary pain is also improving with treatment in addition to the Gabapentin.  She does note increased stress and feels she is somewhat depressed.  She relates that her father has end stage dementia and currently is in hospice.  She feels more emotional since starting Gabapentin.  She denies suicidal ideation or thoughts of harming others.  She relates the improvement she has had with Gabapentin justifies the use in her mind and does not want to stop.      I discussed she should see her PCP regarding her depression, she agrees.    I will plan to see her back in 3 months for clinical breast exam.  She should continue self exams in the interim.      Kj Salas, DO on 9/24/2020 at 3:38 PM      Again, thank you for allowing me to participate in the care of your patient.        Sincerely,        Kj Salas, DO

## 2020-09-24 NOTE — NURSING NOTE
"Chief Complaint   Patient presents with     Follow Up     Breast Cancer, Pt reports possible side effect from gabapentin, thinks she gets depressed/emotional        Initial /83 (BP Location: Right arm, Patient Position: Chair, Cuff Size: Adult Regular)   Pulse 73   Temp 98.7  F (37.1  C) (Tympanic)   Resp 18  Estimated body mass index is 36.1 kg/m  as calculated from the following:    Height as of 9/4/20: 1.67 m (5' 5.75\").    Weight as of 9/4/20: 100.7 kg (222 lb).  BP completed using cuff size: regular  Medications and allergies reviewed.      Britni VALDOVINOS CMA     "

## 2020-09-24 NOTE — PROGRESS NOTES
Mrs. Ravi returns to discuss lymphedema of left breast.  She feels it is improving significantly.  Her left sided chest wall/axillary pain is also improving with treatment in addition to the Gabapentin.  She does note increased stress and feels she is somewhat depressed.  She relates that her father has end stage dementia and currently is in hospice.  She feels more emotional since starting Gabapentin.  She denies suicidal ideation or thoughts of harming others.  She relates the improvement she has had with Gabapentin justifies the use in her mind and does not want to stop.      I discussed she should see her PCP regarding her depression, she agrees.    I will plan to see her back in 3 months for clinical breast exam.  She should continue self exams in the interim.      Kj Salas, DO on 9/24/2020 at 3:38 PM

## 2020-09-30 ENCOUNTER — HOSPITAL ENCOUNTER (OUTPATIENT)
Dept: PHYSICAL THERAPY | Facility: CLINIC | Age: 55
Setting detail: THERAPIES SERIES
End: 2020-09-30
Attending: NURSE PRACTITIONER
Payer: COMMERCIAL

## 2020-09-30 PROCEDURE — 97140 MANUAL THERAPY 1/> REGIONS: CPT | Mod: GP | Performed by: PHYSICAL THERAPIST

## 2020-10-07 ENCOUNTER — HOSPITAL ENCOUNTER (OUTPATIENT)
Dept: PHYSICAL THERAPY | Facility: CLINIC | Age: 55
Setting detail: THERAPIES SERIES
End: 2020-10-07
Attending: NURSE PRACTITIONER
Payer: COMMERCIAL

## 2020-10-07 PROCEDURE — 97140 MANUAL THERAPY 1/> REGIONS: CPT | Mod: GP | Performed by: PHYSICAL THERAPIST

## 2020-10-14 ENCOUNTER — HOSPITAL ENCOUNTER (OUTPATIENT)
Dept: PHYSICAL THERAPY | Facility: CLINIC | Age: 55
Setting detail: THERAPIES SERIES
End: 2020-10-14
Attending: NURSE PRACTITIONER
Payer: COMMERCIAL

## 2020-10-14 PROCEDURE — 97140 MANUAL THERAPY 1/> REGIONS: CPT | Mod: GP | Performed by: PHYSICAL THERAPIST

## 2020-10-20 ENCOUNTER — HOSPITAL ENCOUNTER (OUTPATIENT)
Dept: PHYSICAL THERAPY | Facility: CLINIC | Age: 55
Setting detail: THERAPIES SERIES
End: 2020-10-20
Attending: NURSE PRACTITIONER
Payer: COMMERCIAL

## 2020-10-20 PROCEDURE — 97140 MANUAL THERAPY 1/> REGIONS: CPT | Mod: GP | Performed by: PHYSICAL THERAPIST

## 2020-10-21 NOTE — PROGRESS NOTES
Outpatient Physical Therapy Progress Note     Patient: Merlyn Ravi  : 1965    Beginning/End Dates of Reporting Period:  2020 to 10/14/2020    Referring Provider: Bailey Melendez CNP    Therapy Diagnosis: LUE and L-breast lymphedema with fibrosis     Client Self Report: everything is going well, [place] and not able to use my pump so that scar tissue feels thicker I think    Objective Measurements:  Observation:  Increased fibrosis to L breast scar and medial breast    Outcome Measures (most recent score):  LLIS to be completed upon discharge; LLIS at Evaluation: 20     Goals:  Goal Identifier stg   Goal Description pt to have daytime toleance to wearing compression bra with compression pad/Komprex2 to decrease edema, fibrosis and related symptoms in L-breast lymphedema   Target Date 20   Date Met  20   Progress:     Goal Identifier stg   Goal Description pt to be independent with self-MLD of L-breast to decrease lymphedema and fibrosis for improved comfort with overhead activity/ADL   Target Date 20   Date Met  20   Progress:     Goal Identifier ltg   Goal Description pt to be independent with longterm LUQ lymphedema management via HEP, skin cares, compression garment wear/use and self-MLD   Target Date 20   Date Met      Progress:     Goal Identifier ltg   Goal Description pt to have at least 3-5 point improvement on LLIS due to decreased lympehdema, fibrosis and related symptoms in LUQ   Target Date 20   Date Met      Progress:     Progress Toward Goals:   Progress this reporting period: Pt has demonstrated reductions in lymphedema, however continues to have fibrosis and scar adhesions to L breast.  Pt demonstrates softening of scar and breast immediately following session.  Pt would continue to benefit from lymphedema services to work on MLD, MFR, lymphatouch and scar mobilization.    Plan:  Continue therapy per current plan of  care.    Discharge:  No

## 2020-10-27 ENCOUNTER — HOSPITAL ENCOUNTER (OUTPATIENT)
Dept: PHYSICAL THERAPY | Facility: CLINIC | Age: 55
Setting detail: THERAPIES SERIES
End: 2020-10-27
Attending: NURSE PRACTITIONER
Payer: COMMERCIAL

## 2020-10-27 PROCEDURE — 97140 MANUAL THERAPY 1/> REGIONS: CPT | Mod: GP | Performed by: PHYSICAL THERAPIST

## 2020-11-03 ENCOUNTER — HOSPITAL ENCOUNTER (OUTPATIENT)
Dept: PHYSICAL THERAPY | Facility: CLINIC | Age: 55
Setting detail: THERAPIES SERIES
End: 2020-11-03
Attending: NURSE PRACTITIONER
Payer: COMMERCIAL

## 2020-11-03 PROCEDURE — 97140 MANUAL THERAPY 1/> REGIONS: CPT | Mod: GP | Performed by: PHYSICAL THERAPIST

## 2020-11-04 NOTE — ANESTHESIA PREPROCEDURE EVALUATION
Anesthesia Pre-Procedure Evaluation    Patient: Merlyn Ravi   MRN: [id number] : 1965          Preoperative Diagnosis: Invasive ductal carcinoma of left breast (H) [C50.912]    Procedure(s):  Re-excision of left breast lumpectomy site    Past Medical History:   Diagnosis Date     Allergies      Dermatofibroma 2012     Past Surgical History:   Procedure Laterality Date     C NONSPECIFIC PROCEDURE      bladder surgery     HYSTERECTOMY, PAP NO LONGER INDICATED       HYSTERECTOMY, MAGY       LASIK BILATERAL      Dr. Solis      LUMPECTOMY BREAST WITH SENTINEL NODE, COMBINED Left 2019    Procedure: LUMPECTOMY, BREAST, WITH SENTINEL LYMPH NODE BIOPSY.;  Surgeon: Kj Salas DO;  Location: [facility]       Anesthesia Evaluation     . Pt has had prior anesthetic. Type: General and MAC           ROS/MED HX    ENT/Pulmonary:     (+)PRISCILLA risk factors obese, , . .    Neurologic:  - neg neurologic ROS     Cardiovascular:     (+) Dyslipidemia, ----. : . . . :. .       METS/Exercise Tolerance:     Hematologic:  - neg hematologic  ROS       Musculoskeletal:  - neg musculoskeletal ROS       GI/Hepatic:  - neg GI/hepatic ROS       Renal/Genitourinary:  - ROS Renal section negative       Endo:     (+) thyroid problem hypothyroidism, Obesity, .      Psychiatric:  - neg psychiatric ROS       Infectious Disease:  - neg infectious disease ROS       Malignancy:   (+) Malignancy History of Breast  Breast CA Active status post Surgery.         Other:    - neg other ROS                      Physical Exam  Normal systems: cardiovascular, pulmonary and dental    Airway   Mallampati: II  TM distance: >3 FB  Neck ROM: full    Dental     Cardiovascular       Pulmonary             Lab Results   Component Value Date    WBC 6.1 2019    HGB 13.3 2019    HCT 41.4 2019     2019     2019    POTASSIUM 3.6 2019    CHLORIDE 108 2019    CO2 28 2019    BUN 16  Referred by: Christiano Cruz MD; Medical Diagnosis (from order):    Diagnosis Information      Diagnosis    V43.64 (ICD-9-CM) - Z96.642 (ICD-10-CM) - Status post total replacement of left hip                Daily Treatment Note -  Physical Therapy    Visit:  5     SUBJECTIVE                                                                                                             Patient cancelled earlier appointment this week due stomach upset in which she states has completed subsided since Monday and she is feeling better. Patient rates pain 4/10 in hip at start of session. Reports taking pain medication prior to session. States that she had been running errands prior to appointment and slight fatigue present in left LE. Reports that nerve pain has improved in last couple of days and stretches performed last session helped a lot. Has been able to sleep on left side last couple nights without discomfort.     Pain / Symptoms:  Pain rating (out of 10): Current: 4 (nerve pain to lateral hip of surgical)     OBJECTIVE                                                                                                                        TREATMENT                                                                                                                  Therapeutic Exercise:  Nustep Workload 3 x 5 minutes LE only  UE support on table:  -hip flexion x 10 bilateral  -hip abduction x 10 bilateral   -knee flexion x 10 bilateral   -double heel raise x 12  -mini squats x 8    On foam pad with light UE support on mat table:  Static stance with c-spine flexion/extension x 30 sec  Static stance with c-spine rotation x 30 sec    Seated sit to stand without UE support x10    Seated piriformis stretch on R only x30 seconds  Seated hamstring stretch x 30 seconds bilateral  Single leg stance on left only with light UE support x 30 seconds  Single leg stance on left only with light UE support and added c-spine flexion, extension,  "09/19/2019    CR 0.84 09/19/2019     (H) 09/19/2019    VERONICA 9.0 09/19/2019    ALBUMIN 3.7 09/19/2019    PROTTOTAL 7.8 09/19/2019    ALT 26 09/19/2019    AST 21 09/19/2019    ALKPHOS 111 09/19/2019    BILITOTAL 0.3 09/19/2019    TSH 0.10 (L) 02/18/2019    T4 1.24 02/18/2019       Preop Vitals  BP Readings from Last 3 Encounters:   09/23/19 114/66   09/05/19 122/76   08/28/19 133/79    Pulse Readings from Last 3 Encounters:   09/23/19 (!) 46   09/05/19 64   08/28/19 80      Resp Readings from Last 3 Encounters:   09/23/19 16   09/05/19 18   08/28/19 18    SpO2 Readings from Last 3 Encounters:   09/23/19 95%   08/28/19 97%   08/21/19 98%      Temp Readings from Last 1 Encounters:   09/23/19 36.3  C (97.4  F) (Oral)    Ht Readings from Last 1 Encounters:   09/23/19 1.664 m (5' 5.5\")      Wt Readings from Last 1 Encounters:   09/23/19 104.8 kg (231 lb)    Estimated body mass index is 37.86 kg/m  as calculated from the following:    Height as of 9/23/19: 1.664 m (5' 5.5\").    Weight as of 9/23/19: 104.8 kg (231 lb).       Anesthesia Plan      History & Physical Review  History and physical reviewed and following examination; no interval change.    ASA Status:  2 .    NPO Status:  > 6 hours    Plan for General and MAC with Propofol and Intravenous induction. Maintenance will be TIVA.  Reason for MAC:  Deep or markedly invasive procedure (G8)  PONV prophylaxis:  Ondansetron (or other 5HT-3) and Dexamethasone or Solumedrol       Postoperative Care  Postoperative pain management:  IV analgesics, Oral pain medications and Multi-modal analgesia.      Consents  Anesthetic plan, risks, benefits and alternatives discussed with:  Patient..                 MICHELE Aburto CRNA  " and rotation x 30 sec each  Side stepping x 40 feet bilateral    Seated long arch quad 3 second hold x 10 bilateral   Retro walking without  SPC x 40 feet  Tandem walking without SPC x 40 feet   Walking with cane x 200 feet        Skilled input: verbal instruction/cues, as detailed above, tactile instruction/cues and posture correction  education/instruction on: correct exercise technique while performing standing hip abduction to reduce hip hike, core and glute activation and for improving quality of gait to reduce lateral trunk sway.    Writer verbally educated and received verbal consent for hand placement, positioning of patient, and techniques to be performed today from patient for clothing adjustments for techniques and therapist position for techniques as described above and how they are pertinent to the patient's plan of care.    Home Exercise Program: Bilateral ankle pumps - hourly  Heel slides - 3 session of 2 minutes throughout the day  Quad sets - 2 session of 10 reps throughout the day  Glut sets - 2 session of 10 reps throughout the day.  10/30  UE support on table:  -hip flexion x 10 bilateral  -hip abduction x 10 bilateral   -knee flexion x 10 bilateral   -double heel raise x 10  -mini squats x 8  **advised 1 set of standing exercises and 1 set laying exercises a day     ASSESSMENT                                                                                                             Patient arrives to session ambulating with cane and lateral trunk sway is noted. Session focused on LE strengthening and standing balance in order to reduce need for use of cane for ambulation and reduce gait deviations present. Requires light upper extremity pressure while performing standing balance tasks and stepping strategy at times with tandem walking. Patient reports decreased pain at end of session at 2-3/10 in left hip and denies sciatic or nerve pain. Patient does have slight fatigue at end of session  though is expected due to activities performed prior to session. Advised to continue to complete standing HEP and sitting HEP once per day.  Pain/symptoms after session: 2 and 3        PLAN                                                                                                                           Suggestions for next session as indicated: Progress per plan of care    Continue with standing exercises, static and dynamic standing balance.         Procedures and total treatment time documented Time Entry flowsheet.

## 2020-11-06 ENCOUNTER — OFFICE VISIT (OUTPATIENT)
Dept: FAMILY MEDICINE | Facility: CLINIC | Age: 55
End: 2020-11-06
Payer: COMMERCIAL

## 2020-11-06 VITALS
RESPIRATION RATE: 13 BRPM | TEMPERATURE: 97.9 F | DIASTOLIC BLOOD PRESSURE: 70 MMHG | OXYGEN SATURATION: 96 % | WEIGHT: 219 LBS | SYSTOLIC BLOOD PRESSURE: 128 MMHG | HEART RATE: 77 BPM | BODY MASS INDEX: 35.62 KG/M2

## 2020-11-06 DIAGNOSIS — G62.9 NEUROPATHY: ICD-10-CM

## 2020-11-06 DIAGNOSIS — F33.0 MAJOR DEPRESSIVE DISORDER, RECURRENT EPISODE, MILD (H): ICD-10-CM

## 2020-11-06 DIAGNOSIS — R52 PAIN: Primary | ICD-10-CM

## 2020-11-06 DIAGNOSIS — Z23 NEED FOR PROPHYLACTIC VACCINATION AND INOCULATION AGAINST INFLUENZA: ICD-10-CM

## 2020-11-06 DIAGNOSIS — E66.01 MORBID OBESITY (H): ICD-10-CM

## 2020-11-06 PROCEDURE — 99214 OFFICE O/P EST MOD 30 MIN: CPT | Mod: 25 | Performed by: FAMILY MEDICINE

## 2020-11-06 PROCEDURE — 90471 IMMUNIZATION ADMIN: CPT | Performed by: FAMILY MEDICINE

## 2020-11-06 PROCEDURE — 90682 RIV4 VACC RECOMBINANT DNA IM: CPT | Performed by: FAMILY MEDICINE

## 2020-11-06 RX ORDER — FLUOXETINE 10 MG/1
10 CAPSULE ORAL DAILY
Qty: 30 CAPSULE | Refills: 3 | Status: SHIPPED | OUTPATIENT
Start: 2020-11-06 | End: 2021-03-04

## 2020-11-06 RX ORDER — GABAPENTIN 100 MG/1
100 CAPSULE ORAL 3 TIMES DAILY
Qty: 90 CAPSULE | Refills: 0 | Status: SHIPPED | OUTPATIENT
Start: 2020-11-06 | End: 2020-12-08

## 2020-11-06 NOTE — PROGRESS NOTES
SUBJECTIVE:                                                    Merlyn Ravi is 55 year old female   Chief Complaint   Patient presents with     Musculoskeletal Problem     Imm/Inj     Flu Shot     Patient is here today to follow up on lymphedema, she has been treated by Dr Salas-general sugery and wants her to follow up with PCP to discuss Gabapentin.      Patient is a 55 yr old female here for recheck on neuropathy . She was diagnosed with left breast cancer last year and a lumpectomy with lymph node resection - she developed lymphedema on the left arm with some neuropathy-since then she has been getting lymphedema treatment. She has also been experiencing neuropathy in her left arm. She has been on gabapentin 100 mg twice a day but she reports that this has not been effective. She was wondering if it will be okay to increase the dose.     She is also struggling quite a bit with depression and anxiety. She reports that her dad is in hospice and she says that she has been taking care of him which has taken a toll on her. She is also having some marital issues. She was quite teary through out the encounter. I recommended that she try some antidepressants and consider some counseling.    Problem list and histories reviewed & adjusted, as indicated.  Additional history: as documented    Patient Active Problem List   Diagnosis     CARDIOVASCULAR SCREENING; LDL GOAL LESS THAN 160     LASIK OU 5 yrs     Dermatofibroma     Monoclonal paraproteinemia     Hyperlipidemia LDL goal <130     Obesity (BMI 35.0-39.9) with comorbidity (H)     Other specified hypothyroidism     Invasive lobular carcinoma of breast in female (H)     Past Surgical History:   Procedure Laterality Date     HYSTERECTOMY, PAP NO LONGER INDICATED       HYSTERECTOMY, MAGY       LASIK BILATERAL  2005    Dr. Solis      LUMPECTOMY BREAST Left 9/30/2019    Procedure: Re-excision of left breast lumpectomy site;  Surgeon: Kj Salas, DO;   Location: [facility]     LUMPECTOMY BREAST Left 10/9/2019    Procedure: Left breast re-excision;  Surgeon: Kj Salas DO;  Location: [facility]     LUMPECTOMY BREAST WITH SENTINEL NODE, COMBINED Left 9/23/2019    Procedure: LUMPECTOMY, BREAST, WITH SENTINEL LYMPH NODE BIOPSY.;  Surgeon: Kj Salas DO;  Location: [facility] OR     ZZC NONSPECIFIC PROCEDURE      bladder surgery       Social History     Tobacco Use     Smoking status: Never Smoker     Smokeless tobacco: Never Used   Substance Use Topics     Alcohol use: Yes     Alcohol/week: 0.0 standard drinks     Comment: 3 drinks per month     Family History   Problem Relation Age of Onset     Thyroid Disease Mother      Heart Disease Mother      Heart Disease Father      Hyperlipidemia Father      Hypertension Maternal Grandmother      Breast Cancer Maternal Grandmother      Hypertension Maternal Grandfather      Alzheimer Disease Paternal Grandmother      Diabetes Brother      Eczema Brother      Thyroid Disease Brother      Melanoma No family hx of          Current Outpatient Medications   Medication Sig Dispense Refill     atorvastatin (LIPITOR) 10 MG tablet Take 1 tablet (10 mg) by mouth daily 90 tablet 2     calcium 500-125 MG-UNIT TABS Take 2 tablets by mouth 2 times daily       FLUoxetine (PROZAC) 10 MG capsule Take 1 capsule (10 mg) by mouth daily 30 capsule 3     gabapentin (NEURONTIN) 100 MG capsule Take 1 capsule (100 mg) by mouth 3 times daily Take one tab in the morning and two at night. 90 capsule 0     levothyroxine (SYNTHROID/LEVOTHROID) 88 MCG tablet Take 1 tablet (88 mcg) by mouth daily 90 tablet 3     tamoxifen (NOLVADEX) 20 MG tablet Take 1 tablet (20 mg) by mouth daily 90 tablet 1     doxycycline monohydrate (MONODOX) 50 MG capsule 1 tab po daily 90 capsule 3     IBUPROFEN PO Take 800 mg by mouth every 4 hours as needed for moderate pain       Naproxen Sodium (ALEVE PO) Take 220 mg by mouth 2 times daily (with meals)       Allergies    Allergen Reactions     Demerol      Sedation and vomiting     Meperidine      Sulfa Drugs      unknown reaction     Recent Labs   Lab Test 06/01/20  0828 03/09/20  0925 03/06/20  1536 10/08/19  0855 10/08/19  0855 09/19/19  1601 02/18/19  1039 09/05/18  0806 09/05/18  0806 05/12/16  0844 05/12/16  0844   A1C  --   --   --   --   --   --  5.1  --   --   --   --    LDL  --   --   --   --  114*  --   --   --  139*  --  130*   HDL  --   --   --   --  52  --   --   --  49*  --  55   TRIG  --   --   --   --  180*  --   --   --  139  --  181*   ALT 37  --  17  --   --  26 21   < >  --    < >  --    CR 0.86  --  0.86   < >  --  0.84 0.80   < >  --    < >  --    GFRESTIMATED 76  --  76   < >  --  79 84   < >  --    < >  --    GFRESTBLACK 88  --  88   < >  --  >90 >90   < >  --    < >  --    POTASSIUM 3.7  --  4.0   < >  --  3.6 4.1   < >  --    < >  --    TSH  --  1.75  --   --   --   --  0.10*   < >  --    < >  --     < > = values in this interval not displayed.      BP Readings from Last 3 Encounters:   11/06/20 128/70   09/24/20 125/83   09/04/20 133/80    Wt Readings from Last 3 Encounters:   11/06/20 99.3 kg (219 lb)   09/04/20 100.7 kg (222 lb)   08/27/20 101.2 kg (223 lb)         ROS:  Constitutional, HEENT, cardiovascular, pulmonary, gi and gu systems are negative, except as otherwise noted.    OBJECTIVE:                                                    /70   Pulse 77   Temp 97.9  F (36.6  C) (Tympanic)   Resp 13   Wt 99.3 kg (219 lb)   SpO2 96%   BMI 35.62 kg/m    GENERAL APPEARANCE ADULT: Alert, no acute distress  HENT: Ears and TMs normal, oral mucosa and posterior oropharynx normal  NECK: No adenopathy,masses or thyromegaly  RESP: lungs clear to auscultation   CV: normal rate, regular rhythm, no murmur or gallop  MS: extremities normal, no peripheral edema  Left  forearm no swelling observed  SKIN: no suspicious lesions or rashes  NEURO: Alert, oriented, speech and mentation normal  Diagnostic  Test Results:  none      ASSESSMENT/PLAN:                                                    Merlyn was seen today for musculoskeletal problem and imm/inj.    Diagnoses and all orders for this visit:    Pain  -     gabapentin (NEURONTIN) 100 MG capsule; Take 1 capsule (100 mg) by mouth 3 times daily Take one tab in the morning and two at night.    Neuropathy  -     gabapentin (NEURONTIN) 100 MG capsule; Take 1 capsule (100 mg) by mouth 3 times daily Take one tab in the morning and two at night.        - discussed at length with patient recommend that she increase the dose - she was worried that this may worsen her anxiety. I reassured her . Will consult with psychiatry about the dose increase.       Major depressive disorder, recurrent episode, mild (H)  -     FLUoxetine (PROZAC) 10 MG capsule; Take 1 capsule (10 mg) by mouth daily  -      Patient opened to trying medication.    Morbid obesity (H)      Need for prophylactic vaccination and inoculation against influenza  -     INFLUENZA QUAD, RECOMBINANT, P-FREE (RIV4) (FLUBLOCK) [86146]        Memo Garcia MD  [facility] [place]

## 2020-11-12 ENCOUNTER — HOSPITAL ENCOUNTER (OUTPATIENT)
Dept: PHYSICAL THERAPY | Facility: CLINIC | Age: 55
Setting detail: THERAPIES SERIES
End: 2020-11-12
Attending: NURSE PRACTITIONER
Payer: COMMERCIAL

## 2020-11-12 PROCEDURE — 97140 MANUAL THERAPY 1/> REGIONS: CPT | Mod: GP | Performed by: PHYSICAL THERAPIST

## 2020-11-14 ENCOUNTER — HEALTH MAINTENANCE LETTER (OUTPATIENT)
Age: 55
End: 2020-11-14

## 2020-11-18 NOTE — PROGRESS NOTES
Outpatient Physical Therapy Progress Note     Patient: Merlyn Ravi  : 1965    Beginning/End Dates of Reporting Period:  10/14/2020 to 2020    Referring Provider: Bailey Melendez CNP    Therapy Diagnosis: LUE and L-breast lymphedema with fibrosis     Client Self Report: the area is starting to stay softer after trearment    Objective Measurements:  L-Breast Girth  On 11/3/2020 -0.4 cm reduction since 10/27/2020    Outcome Measures (most recent score):  LLIS to be completed upon discharge; LLIS at Evaluation: 20      Goals:  Goal Identifier stg   Goal Description pt to have daytime toleance to wearing compression bra with compression pad/Komprex2 to decrease edema, fibrosis and related symptoms in L-breast lymphedema   Target Date 20   Date Met  20   Progress:     Goal Identifier stg   Goal Description pt to be independent with self-MLD of L-breast to decrease lymphedema and fibrosis for improved comfort with overhead activity/ADL   Target Date 20   Date Met  20   Progress:     Goal Identifier ltg   Goal Description pt to be independent with longterm LUQ lymphedema management via HEP, skin cares, compression garment wear/use and self-MLD   Target Date 20   Date Met      Progress:     Goal Identifier ltg   Goal Description pt to have at least 3-5 point improvement on LLIS due to decreased lympehdema, fibrosis and related symptoms in LUQ   Target Date 20   Date Met      Progress:     Progress Toward Goals:   Progress this reporting period: Pt has been maintaining breast edema reductions, however continues to demonstrate breast fibrosis and scar adhesions.  Pt is currently continuing to see lymphedema therapy due to above deficits and will continue to use the lymphatouch and MFR to reduce fibrosis and scare adhesions.    Plan:  Continue therapy per current plan of care.    Discharge:  No

## 2020-11-19 ENCOUNTER — HOSPITAL ENCOUNTER (OUTPATIENT)
Dept: PHYSICAL THERAPY | Facility: CLINIC | Age: 55
Setting detail: THERAPIES SERIES
End: 2020-11-19
Attending: NURSE PRACTITIONER
Payer: COMMERCIAL

## 2020-11-19 PROCEDURE — 97140 MANUAL THERAPY 1/> REGIONS: CPT | Mod: GP | Performed by: PHYSICAL THERAPIST

## 2020-12-08 DIAGNOSIS — R52 PAIN: ICD-10-CM

## 2020-12-08 DIAGNOSIS — G62.9 NEUROPATHY: ICD-10-CM

## 2020-12-08 RX ORDER — GABAPENTIN 100 MG/1
100 CAPSULE ORAL 3 TIMES DAILY
Qty: 90 CAPSULE | Refills: 0 | Status: SHIPPED | OUTPATIENT
Start: 2020-12-08 | End: 2021-01-07

## 2020-12-08 NOTE — TELEPHONE ENCOUNTER
Requested Prescriptions   Pending Prescriptions Disp Refills     gabapentin (NEURONTIN) 100 MG capsule 90 capsule 0     Sig: Take 1 capsule (100 mg) by mouth 3 times daily Take one tab in the morning and two at night.       There is no refill protocol information for this order        Last Written Prescription Date:    Last Fill Quantity: ,  # refills:    Last office visit: 11/6/2020 with prescribing provider:  Jose Pérez Office Visit:   Next 5 appointments (look out 90 days)    Mar 05, 2021  3:30 PM  Return Visit with Tanvi Blakely MD  [facility] [place] ([facility] [address]  [facility] [address]  [phone]

## 2020-12-09 ENCOUNTER — APPOINTMENT (OUTPATIENT)
Dept: GENERAL RADIOLOGY | Facility: CLINIC | Age: 55
End: 2020-12-09
Attending: EMERGENCY MEDICINE
Payer: COMMERCIAL

## 2020-12-09 ENCOUNTER — HOSPITAL ENCOUNTER (EMERGENCY)
Facility: CLINIC | Age: 55
Discharge: HOME | End: 2020-12-09
Attending: EMERGENCY MEDICINE | Admitting: EMERGENCY MEDICINE
Payer: COMMERCIAL

## 2020-12-09 VITALS
HEIGHT: 66 IN | OXYGEN SATURATION: 100 % | HEART RATE: 58 BPM | TEMPERATURE: 98 F | RESPIRATION RATE: 18 BRPM | DIASTOLIC BLOOD PRESSURE: 103 MMHG | SYSTOLIC BLOOD PRESSURE: 156 MMHG | BODY MASS INDEX: 34.55 KG/M2 | WEIGHT: 215 LBS

## 2020-12-09 DIAGNOSIS — S63.501A SPRAIN OF RIGHT WRIST, INITIAL ENCOUNTER: ICD-10-CM
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
## 2020-12-09 PROCEDURE — 99284 EMERGENCY DEPT VISIT MOD MDM: CPT | Performed by: EMERGENCY MEDICINE

## 2020-12-09 PROCEDURE — 29125 APPL SHORT ARM SPLINT STATIC: CPT | Mod: RT | Performed by: EMERGENCY MEDICINE

## 2020-12-09 PROCEDURE — 99284 EMERGENCY DEPT VISIT MOD MDM: CPT | Mod: 25 | Performed by: EMERGENCY MEDICINE

## 2020-12-09 PROCEDURE — 73090 X-RAY EXAM OF FOREARM: CPT | Mod: RT

## 2020-12-09 PROCEDURE — 73130 X-RAY EXAM OF HAND: CPT | Mod: RT

## 2020-12-09 ASSESSMENT — MIFFLIN-ST. JEOR: SCORE: 1586.98
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
## 2020-12-09 NOTE — DISCHARGE INSTRUCTIONS
Return if symptoms worsen or new symptoms develop.  Follow-up with primary care physician later this week for recheck.  Take ibuprofen or Tylenol for pain.  If any dizziness headache neck pain chest pain shortness of breath altered mental status abdominal pain focal numbness weakness in extremity bowel or bladder dysfunction please return for further evaluation and care.

## 2020-12-09 NOTE — ED AVS SNAPSHOT
[facility] Emergency Dept  [address] [address]  Phone: [phone]  Fax: [phone]                                    Merlyn Ravi   MRN: [id number]    Department: [facility] Emergency Dept   Date of Visit: 12/9/2020           After Visit Summary Signature Page    I have received my discharge instructions, and my questions have been answered. I have discussed any challenges I see with this plan with the nurse or doctor.    ..........................................................................................................................................  Patient/Patient Representative Signature      ..........................................................................................................................................  Patient Representative Print Name and Relationship to Patient    ..................................................               ................................................  Date                                   Time    ..........................................................................................................................................  Reviewed by Signature/Title    ...................................................              ..............................................  Date                                               Time          22EPIC Rev 08/18

## 2020-12-15 ENCOUNTER — HOSPITAL ENCOUNTER (OUTPATIENT)
Dept: PHYSICAL THERAPY | Facility: CLINIC | Age: 55
Setting detail: THERAPIES SERIES
End: 2020-12-15
Attending: NURSE PRACTITIONER
Payer: COMMERCIAL

## 2020-12-15 PROCEDURE — 97140 MANUAL THERAPY 1/> REGIONS: CPT | Mod: GP | Performed by: PHYSICAL THERAPIST

## 2020-12-15 NOTE — PROGRESS NOTES
Outpatient Physical Therapy Discharge Note     Patient: Merlyn Ravi  : 1965    Beginning/End Dates of Reporting Period:  2020 to 12/15/2020    Referring Provider: Bailey Melendez CNP    Therapy Diagnosis: L-breast lymphedema with scarring and fibrosis     Client Self Report: everything feels so much better, I'm really good about using my pump and wearing my bra and compresison pad    Objective Measurements:  Objective Measure: breast girth   Details: L breast 46.5cm and R-breast 46.2cm;  since initial evaluation L-breast reduced by 6.6cm    Objective Measure: LUE girth  Details: since initial evaluation -12.1% and since last measured on 2020 -1.6%      Outcome Measures (most recent score):  Lymphedema Life Impact Scale (score range 0-72). A higher score indicates greater impairment.: 3    Goals:  Goal Identifier stg   Goal Description pt to have daytime toleance to wearing compression bra with compression pad/Komprex2 to decrease edema, fibrosis and related symptoms in L-breast lymphedema   Target Date 20   Date Met  20   Progress:     Goal Identifier stg   Goal Description pt to be independent with self-MLD of L-breast to decrease lymphedema and fibrosis for improved comfort with overhead activity/ADL   Target Date 20   Date Met  20   Progress:     Goal Identifier ltg   Goal Description pt to be independent with longterm LUQ lymphedema management via HEP, skin cares, compression garment wear/use and self-MLD   Target Date 20   Date Met  12/15/20   Progress:     Goal Identifier ltg   Goal Description pt to have at least 3-5 point improvement on LLIS due to decreased lympehdema, fibrosis and related symptoms in LUQ   Target Date 20   Date Met  12/15/20   Progress:     Progress Toward Goals:   OP lymphedema goals met      Plan:  Discharge from therapy.    Discharge:    Reason for Discharge: Patient has met all goals.    Equipment Issued: compression bra and  Oscar compression lumpectomy pad from  [employer] and Flexitouch compression pump    Discharge Plan: Patient to continue home program.

## 2020-12-23 DIAGNOSIS — C50.912 INVASIVE LOBULAR CARCINOMA OF LEFT BREAST IN FEMALE (H): ICD-10-CM

## 2020-12-23 RX ORDER — TAMOXIFEN CITRATE 20 MG/1
20 TABLET ORAL DAILY
Qty: 90 TABLET | Refills: 1 | Status: SHIPPED | OUTPATIENT
Start: 2020-12-23 | End: 2021-03-29

## 2020-12-23 NOTE — PROGRESS NOTES
request received from patient requesting a refill of tamoxifen on behalf of Dr. Blakely.  Last refill: 3/11/20  # 90 with 1 refills at [facility].  Last office visit:  9/4/20  Next office visit:  3/5/21    Sury Ramos RN

## 2021-01-07 ENCOUNTER — OFFICE VISIT (OUTPATIENT)
Dept: RADIATION THERAPY | Facility: OUTPATIENT CENTER | Age: 56
End: 2021-01-07
Payer: COMMERCIAL

## 2021-01-07 VITALS
HEART RATE: 69 BPM | BODY MASS INDEX: 35.15 KG/M2 | WEIGHT: 217.8 LBS | DIASTOLIC BLOOD PRESSURE: 74 MMHG | RESPIRATION RATE: 16 BRPM | SYSTOLIC BLOOD PRESSURE: 106 MMHG

## 2021-01-07 DIAGNOSIS — G62.9 NEUROPATHY: ICD-10-CM

## 2021-01-07 DIAGNOSIS — R52 PAIN: ICD-10-CM

## 2021-01-07 DIAGNOSIS — C50.919 INVASIVE LOBULAR CARCINOMA OF BREAST IN FEMALE (H): Primary | ICD-10-CM

## 2021-01-07 RX ORDER — GABAPENTIN 100 MG/1
CAPSULE ORAL
Qty: 90 CAPSULE | Refills: 0 | Status: SHIPPED | OUTPATIENT
Start: 2021-01-07 | End: 2021-02-03

## 2021-01-07 NOTE — TELEPHONE ENCOUNTER
Requested Prescriptions   Pending Prescriptions Disp Refills     gabapentin (NEURONTIN) 100 MG capsule [Pharmacy Med Name: GABAPENTIN 100MG CAPSULES] 90 capsule 0     Sig: TAKE 1 CAPSULE BY MOUTH EVERY MORNING AND 2 CAPSULES AT NIGHT       There is no refill protocol information for this order

## 2021-01-07 NOTE — NURSING NOTE
FOLLOW-UP VISIT    Patient Name: Merlyn Ravi      : 1965     Age: 55 year old        ______________________________________________________________________________     Chief Complaint   Patient presents with     Radiation Therapy     Breast cancer follow up     /74 (BP Location: Right arm, Cuff Size: Adult Large)   Pulse 69   Resp 16   Wt 98.8 kg (217 lb 12.8 oz)   BMI 35.15 kg/m       Date Radiation Completed: 19    Pain  Left arm neuropathy, on gabapentin    Meds  Current Med List Reviewed: Yes  Tolerating arimidex    Skin: no issues    Range of Motion: full    Respiratory: No shortness of breath, dyspnea on exertion, cough, or hemoptysis    Hormone Therapy: Yes    Lymphedema Follow up: has compression garments, flexitouch, lymphedema managed    Energy Level: normal      Appointments:     DATE  Oncologist: Reddy Follow up march   Surgeon: Josue As needed   Primary:      Other Notes:

## 2021-01-07 NOTE — LETTER
2021         RE: Merlyn Ravi  [address] [address]        Dear Colleague,    Thank you for referring your patient, Merlyn Ravi, to the RAD[facility]ATION [facility]Y [facility]. Please see a copy of my visit note below.       Department of Radiation Oncology  Radiation Therapy Center  [facility]  [place] [address]  [phone]         Radiation Oncology Follow-up Visit  21    Merlyn Ravi  MRN: [id number]   : 1965     DIAGNOSIS: invasive lobular carcinoma of the left breast. She is status post lumpectomy and sentinel lymph node evaluation  PATHOLOGY: Final pathology demonstrated ILC, 4.5 cm in size, grade 2, no LVSI, negative margins status post re-excision, 0 out of 1 sentinel lymph nodes, ER+, NV+, HER-2 negative disease, low Oncotype recurrence score                             STAGE: pathologic T2N0  INTENT OF RADIOTHERAPY: adjuvant radiation therapy with a lumpectomy cavity boost.  CONCURRENT SYSTEMIC THERAPY:  No      ONCOLOGIC HISTORY:    Ms. Ravi is a 55 year old female left breast cancer.      Patient initially presented with a palpable left breast mass, prompting further evaluation.  Diagnostic imaging confirmed the presence of a left breast mass at the 10 o'clock position, 12 cm from the nipple.  On 8/15/2019 the patient underwent left breast biopsy.  Final pathology demonstrated ILC, grade 2, no LVSI, ER positive, NV positive, HER-2 negative.  On 2019 the patient underwent left breast lumpectomy and sentinel lymph node evaluation by Dr. Salas.  Final pathology demonstrated ILC, 4.5 cm in size, grade 2, no LVSI, negative margins status post reexcision, 0 out of 1 sentinel lymph node, ER positive, NV positive, HER-2 negative, pathologic T2N0.  The patient was subsequent seen by Dr. Blakely who ordered Oncotype, and recurrence score came back low at 20.  No systemic chemotherapy was recommended.  Anti-endocrine therapy was  discussed.  The patient subsequently presented 10/29/19 in Radiation Oncology to discuss potential role of adjuvant whole breast radiation therapy.      Of note, the patient has a history of monoclonal gammopathy since .  She has been followed annually by medical oncology with laboratory tests without any evidence of plasma cell dyscrasia     SITE OF TREATMENT: L breast     DATES  OF TREATMENT: 19-19     TOTAL DOSE OF TREATMENT: 5005 cGy     DOSE PER FRACTION OF TREATMENT: 267 cGy x 15 fractions + 200 cGy x 5 fractions       COMMENT/TOXICITY:   Mild left marleni-areolar tenderness reported. Patchy mild-moderate erythema without desquamation over irradiated field. Moderate erythema present in left marleni-areolar area. Hyperpigmentation but not desquamation appreciated in left inframammary fold                                           INTERVAL SINCE COMPLETION OF RADIATION THERAPY:   13 months     SUBJECTIVE:   Merlyn Ravi is a 55 year old female who is here today for routine follow up after completing radiation therapy.       20 Mammogram and U/S were ELIZABETH.    Post RT has had moderate breast edema. Working with PT with marked improvement.     On Tamoxifen. Mild fatigue/ memory change at times. Overall tolerating.     Patient denies any new lumps, masses, or nipple discharge. No enlarged lymph nodes.    Tolerating Tamoxifen.      ROS otherwise negative on a 12-system review.  ECO               PHYSICAL EXAM:  /74 (BP Location: Right arm, Cuff Size: Adult Large)   Pulse 69   Resp 16   Wt 98.8 kg (217 lb 12.8 oz)   BMI 35.15 kg/m    General: in no acute distress, alert and oriented x3.   HEENT: Normocephalic, atraumatic.  PERRLA  Lymph Nodes: No palpable pre/post-auricular, cervical, or axillary lymphadenopathy appreciated.   CV: RRR, normal S1 S2.  No murmurs, gallops, or rubs.   Lungs: Clear to auscultation bilaterally.  No dullness to percussion.   Abdomen:  Soft and non tender. No  palpable masses.    Extremities: no clubbing, cyanosis, or edema.   Neurologic: Alert and oriented x3. Cranial nerves II-XII grossly intact.   Breast:Well healed incisions to left breast. Hyperpigmentation to left IMF and near incision area (10 o'clock). Skin intact. No concerning lumps or masses bilaterally. No skin atrophy or telangiestasias. Mild edema and faint erythema left breast.     LABS AND IMAGIN20  Mammogram/US    IMPRESSION:   1. No evidence of malignancy.  2. No mammographic or sonographic abnormality is seen in the area of  palpable concern on the left.  3. Postsurgical changes of the left breast      IMPRESSION:   Ms. Ravi is a 55 year old female with a invasive lobular carcinoma of the left breast. She is status post lumpectomy and sentinel lymph node evaluation. Final pathology demonstrated ILC, 4.5 cm in size, grade 2, no LVSI, negative margins status post re-excision, 0 out of 1 sentinel lymph nodes, ER+, KY+, HER-2 negative disease, low Oncotype recurrence score. Pathologic T2N0. Completed adjuvant radiation therapy with a lumpectomy cavity boost to L breast 5005 cGy from 19-19. Adjuvant Tamoxifen (had a hysterectomy).        PLAN:   1. Clinically and radiographically ELIZABETH.   2. Breast edema. Marked improvement with PT.  3. Continue follow up with medical oncology and surgery team.   4. RTC in 6 months.       Donnie Nuno M.D.  Department of Radiation Oncology  [facility]

## 2021-01-08 NOTE — PROGRESS NOTES
Department of Radiation Oncology  Radiation Therapy Center  [facility] [place] [address]  [phone]         Radiation Oncology Follow-up Visit  21    Merlyn Ravi  MRN: [id number]   : 1965     DIAGNOSIS: invasive lobular carcinoma of the left breast. She is status post lumpectomy and sentinel lymph node evaluation  PATHOLOGY: Final pathology demonstrated ILC, 4.5 cm in size, grade 2, no LVSI, negative margins status post re-excision, 0 out of 1 sentinel lymph nodes, ER+, [place]+, HER-2 negative disease, low Oncotype recurrence score                             STAGE: pathologic T2N0  INTENT OF RADIOTHERAPY: adjuvant radiation therapy with a lumpectomy cavity boost.  CONCURRENT SYSTEMIC THERAPY:  No      ONCOLOGIC HISTORY:    Ms. Ravi is a 55 year old female left breast cancer.      Patient initially presented with a palpable left breast mass, prompting further evaluation.  Diagnostic imaging confirmed the presence of a left breast mass at the 10 o'clock position, 12 cm from the nipple.  On 8/15/2019 the patient underwent left breast biopsy.  Final pathology demonstrated ILC, grade 2, no LVSI, ER positive, NJ positive, HER-2 negative.  On 2019 the patient underwent left breast lumpectomy and sentinel lymph node evaluation by Dr. Salas.  Final pathology demonstrated ILC, 4.5 cm in size, grade 2, no LVSI, negative margins status post reexcision, 0 out of 1 sentinel lymph node, ER positive, NJ positive, HER-2 negative, pathologic T2N0.  The patient was subsequent seen by Dr. Blakely who ordered Oncotype, and recurrence score came back low at 20.  No systemic chemotherapy was recommended.  Anti-endocrine therapy was discussed.  The patient subsequently presented 10/29/19 in Radiation Oncology to discuss potential role of adjuvant whole breast radiation therapy.      Of note, the patient has a history of monoclonal gammopathy since .  She has been followed  annually by medical oncology with laboratory tests without any evidence of plasma cell dyscrasia     SITE OF TREATMENT: L breast     DATES  OF TREATMENT: 19-19     TOTAL DOSE OF TREATMENT: 5005 cGy     DOSE PER FRACTION OF TREATMENT: 267 cGy x 15 fractions + 200 cGy x 5 fractions       COMMENT/TOXICITY:   Mild left marleni-areolar tenderness reported. Patchy mild-moderate erythema without desquamation over irradiated field. Moderate erythema present in left marleni-areolar area. Hyperpigmentation but not desquamation appreciated in left inframammary fold                                           INTERVAL SINCE COMPLETION OF RADIATION THERAPY:   13 months     SUBJECTIVE:   Merlyn Ravi is a 55 year old female who is here today for routine follow up after completing radiation therapy.       20 Mammogram and U/S were ELIZABETH.    Post RT has had moderate breast edema. Working with PT with marked improvement.     On Tamoxifen. Mild fatigue/ memory change at times. Overall tolerating.     Patient denies any new lumps, masses, or nipple discharge. No enlarged lymph nodes.    Tolerating Tamoxifen.      ROS otherwise negative on a 12-system review.  ECO               PHYSICAL EXAM:  /74 (BP Location: Right arm, Cuff Size: Adult Large)   Pulse 69   Resp 16   Wt 98.8 kg (217 lb 12.8 oz)   BMI 35.15 kg/m    General: in no acute distress, alert and oriented x3.   HEENT: Normocephalic, atraumatic.  PERRLA  Lymph Nodes: No palpable pre/post-auricular, cervical, or axillary lymphadenopathy appreciated.   CV: RRR, normal S1 S2.  No murmurs, gallops, or rubs.   Lungs: Clear to auscultation bilaterally.  No dullness to percussion.   Abdomen:  Soft and non tender. No palpable masses.    Extremities: no clubbing, cyanosis, or edema.   Neurologic: Alert and oriented x3. Cranial nerves II-XII grossly intact.   Breast:Well healed incisions to left breast. Hyperpigmentation to left IMF and near incision area (10  o'clock). Skin intact. No concerning lumps or masses bilaterally. No skin atrophy or telangiestasias. Mild edema and faint erythema left breast.     LABS AND IMAGIN20  Mammogram/US    IMPRESSION:   1. No evidence of malignancy.  2. No mammographic or sonographic abnormality is seen in the area of  palpable concern on the left.  3. Postsurgical changes of the left breast      IMPRESSION:   Ms. Ravi is a 55 year old female with a invasive lobular carcinoma of the left breast. She is status post lumpectomy and sentinel lymph node evaluation. Final pathology demonstrated ILC, 4.5 cm in size, grade 2, no LVSI, negative margins status post re-excision, 0 out of 1 sentinel lymph nodes, ER+, [place]+, HER-2 negative disease, low Oncotype recurrence score. Pathologic T2N0. Completed adjuvant radiation therapy with a lumpectomy cavity boost to L breast 5005 cGy from 19-19. Adjuvant Tamoxifen (had a hysterectomy).        PLAN:   1. Clinically and radiographically ELIZABETH.   2. Breast edema. Marked improvement with PT.  3. Continue follow up with medical oncology and surgery team.   4. RTC in 6 months.       Donnie Nuno M.D.  Department of Radiation Oncology  [facility]

## 2021-01-28 DIAGNOSIS — E78.5 HYPERLIPIDEMIA LDL GOAL <130: ICD-10-CM

## 2021-01-29 NOTE — TELEPHONE ENCOUNTER
"Requested Prescriptions   Pending Prescriptions Disp Refills     atorvastatin (LIPITOR) 10 MG tablet [Pharmacy Med Name: ATORVASTATIN 10MG TABLETS] 90 tablet 2     Sig: TAKE 1 TABLET(10 MG) BY MOUTH DAILY       Statins Protocol Failed - 1/28/2021  2:08 PM        Failed - LDL on file in past 12 months     Recent Labs   Lab Test 10/08/19  0855   *             Passed - No abnormal creatine kinase in past 12 months     No lab results found.             Passed - Recent (12 mo) or future (30 days) visit within the authorizing provider's specialty     Patient has had an office visit with the authorizing provider or a provider within the authorizing providers department within the previous 12 mos or has a future within next 30 days. See \"Patient Info\" tab in inbasket, or \"Choose Columns\" in Meds & Orders section of the refill encounter.              Passed - Medication is active on med list        Passed - Patient is age 18 or older        Passed - No active pregnancy on record        Passed - No positive pregnancy test in past 12 months             "

## 2021-02-01 DIAGNOSIS — G62.9 NEUROPATHY: ICD-10-CM

## 2021-02-01 DIAGNOSIS — R52 PAIN: ICD-10-CM

## 2021-02-01 NOTE — TELEPHONE ENCOUNTER
Requested Prescriptions   Pending Prescriptions Disp Refills     gabapentin (NEURONTIN) 100 MG capsule [Pharmacy Med Name: GABAPENTIN 100MG CAPSULES] 90 capsule 0     Sig: TAKE ONE CAPSULE BY MOUTH EVERY MORNING AND 2 CAPSULES AT NIGHT       There is no refill protocol information for this order

## 2021-02-02 NOTE — TELEPHONE ENCOUNTER
Routing refill request to provider for review/approval because:  Drug not on the [facility] refill protocol     Maame Soliz RN

## 2021-02-03 RX ORDER — ATORVASTATIN CALCIUM 10 MG/1
TABLET, FILM COATED ORAL
Qty: 30 TABLET | Refills: 0 | Status: SHIPPED | OUTPATIENT
Start: 2021-02-03 | End: 2021-03-04

## 2021-02-03 RX ORDER — GABAPENTIN 100 MG/1
CAPSULE ORAL
Qty: 90 CAPSULE | Refills: 0 | Status: SHIPPED | OUTPATIENT
Start: 2021-02-03 | End: 2021-03-04

## 2021-02-08 ENCOUNTER — HOSPITAL ENCOUNTER (EMERGENCY)
Facility: CLINIC | Age: 56
Discharge: HOME | End: 2021-02-08
Attending: EMERGENCY MEDICINE | Admitting: EMERGENCY MEDICINE
Payer: COMMERCIAL

## 2021-02-08 ENCOUNTER — APPOINTMENT (OUTPATIENT)
Dept: GENERAL RADIOLOGY | Facility: CLINIC | Age: 56
End: 2021-02-08
Attending: EMERGENCY MEDICINE
Payer: COMMERCIAL

## 2021-02-08 VITALS
HEART RATE: 71 BPM | SYSTOLIC BLOOD PRESSURE: 104 MMHG | DIASTOLIC BLOOD PRESSURE: 71 MMHG | TEMPERATURE: 97.6 F | BODY MASS INDEX: 34.72 KG/M2 | HEIGHT: 66 IN | RESPIRATION RATE: 16 BRPM | WEIGHT: 216 LBS | OXYGEN SATURATION: 97 %

## 2021-02-08 DIAGNOSIS — M54.2 NECK PAIN: ICD-10-CM

## 2021-02-08 DIAGNOSIS — M54.50 ACUTE MIDLINE LOW BACK PAIN WITHOUT SCIATICA: ICD-10-CM
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
## 2021-02-08 PROCEDURE — 72040 X-RAY EXAM NECK SPINE 2-3 VW: CPT

## 2021-02-08 PROCEDURE — 72100 X-RAY EXAM L-S SPINE 2/3 VWS: CPT

## 2021-02-08 PROCEDURE — 99284 EMERGENCY DEPT VISIT MOD MDM: CPT | Performed by: EMERGENCY MEDICINE

## 2021-02-08 PROCEDURE — 250N000013 HC RX MED GY IP 250 OP 250 PS 637: Performed by: EMERGENCY MEDICINE

## 2021-02-08 RX ORDER — ACETAMINOPHEN 500 MG
1000 TABLET ORAL ONCE
Status: COMPLETED | OUTPATIENT
Start: 2021-02-08 | End: 2021-02-08

## 2021-02-08 RX ADMIN — ACETAMINOPHEN 1000 MG: 500 TABLET ORAL at 11:51

## 2021-02-08 ASSESSMENT — ENCOUNTER SYMPTOMS
ENDOCRINE NEGATIVE: 1
BACK PAIN: 1
CARDIOVASCULAR NEGATIVE: 1
CONSTITUTIONAL NEGATIVE: 1
RESPIRATORY NEGATIVE: 1
NECK PAIN: 1
ALLERGIC/IMMUNOLOGIC NEGATIVE: 1
EYES NEGATIVE: 1
HEADACHES: 1
PSYCHIATRIC NEGATIVE: 1
GASTROINTESTINAL NEGATIVE: 1
HEMATOLOGIC/LYMPHATIC NEGATIVE: 1

## 2021-02-08 ASSESSMENT — MIFFLIN-ST. JEOR: SCORE: 1591.52
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
## 2021-03-01 ENCOUNTER — HOSPITAL ENCOUNTER (OUTPATIENT)
Dept: LAB | Facility: CLINIC | Age: 56
Discharge: HOME | End: 2021-03-01
Attending: INTERNAL MEDICINE | Admitting: INTERNAL MEDICINE
Payer: COMMERCIAL

## 2021-03-01 DIAGNOSIS — C50.919 INVASIVE LOBULAR CARCINOMA OF BREAST IN FEMALE (H): ICD-10-CM

## 2021-03-01 DIAGNOSIS — D47.2 MONOCLONAL PARAPROTEINEMIA: ICD-10-CM

## 2021-03-01 LAB
ALBUMIN SERPL-MCNC: 3.5 G/DL (ref 3.4–5)
ALP SERPL-CCNC: 90 U/L (ref 40–150)
ALT SERPL W P-5'-P-CCNC: 27 U/L (ref 0–50)
ANION GAP SERPL CALCULATED.3IONS-SCNC: 3 MMOL/L (ref 3–14)
AST SERPL W P-5'-P-CCNC: 25 U/L (ref 0–45)
BASOPHILS # BLD AUTO: 0 10E9/L (ref 0–0.2)
BASOPHILS NFR BLD AUTO: 0.9 %
BILIRUB SERPL-MCNC: 0.4 MG/DL (ref 0.2–1.3)
BUN SERPL-MCNC: 14 MG/DL (ref 7–30)
CALCIUM SERPL-MCNC: 8.8 MG/DL (ref 8.5–10.1)
CANCER AG27-29 SERPL-ACNC: 16 U/ML (ref 0–39)
CHLORIDE SERPL-SCNC: 111 MMOL/L (ref 94–109)
CO2 SERPL-SCNC: 28 MMOL/L (ref 20–32)
CREAT SERPL-MCNC: 0.83 MG/DL (ref 0.52–1.04)
DIFFERENTIAL METHOD BLD: NORMAL
EOSINOPHIL # BLD AUTO: 0.2 10E9/L (ref 0–0.7)
EOSINOPHIL NFR BLD AUTO: 4.1 %
ERYTHROCYTE [DISTWIDTH] IN BLOOD BY AUTOMATED COUNT: 12.5 % (ref 10–15)
GFR SERPL CREATININE-BSD FRML MDRD: 79 ML/MIN/{1.73_M2}
GLUCOSE SERPL-MCNC: 92 MG/DL (ref 70–99)
HCT VFR BLD AUTO: 41.5 % (ref 35–47)
HGB BLD-MCNC: 13.5 G/DL (ref 11.7–15.7)
IMM GRANULOCYTES # BLD: 0 10E9/L (ref 0–0.4)
IMM GRANULOCYTES NFR BLD: 0.2 %
LYMPHOCYTES # BLD AUTO: 1.6 10E9/L (ref 0.8–5.3)
LYMPHOCYTES NFR BLD AUTO: 35.6 %
MCH RBC QN AUTO: 30.7 PG (ref 26.5–33)
MCHC RBC AUTO-ENTMCNC: 32.5 G/DL (ref 31.5–36.5)
MCV RBC AUTO: 94 FL (ref 78–100)
MONOCYTES # BLD AUTO: 0.3 10E9/L (ref 0–1.3)
MONOCYTES NFR BLD AUTO: 7.6 %
NEUTROPHILS # BLD AUTO: 2.3 10E9/L (ref 1.6–8.3)
NEUTROPHILS NFR BLD AUTO: 51.6 %
NRBC # BLD AUTO: 0 10*3/UL
NRBC BLD AUTO-RTO: 0 /100
PLATELET # BLD AUTO: 270 10E9/L (ref 150–450)
POTASSIUM SERPL-SCNC: 4.2 MMOL/L (ref 3.4–5.3)
PROT SERPL-MCNC: 7.6 G/DL (ref 6.8–8.8)
RBC # BLD AUTO: 4.4 10E12/L (ref 3.8–5.2)
SODIUM SERPL-SCNC: 142 MMOL/L (ref 133–144)
WBC # BLD AUTO: 4.4 10E9/L (ref 4–11)

## 2021-03-01 PROCEDURE — 83883 ASSAY NEPHELOMETRY NOT SPEC: CPT | Performed by: INTERNAL MEDICINE

## 2021-03-01 PROCEDURE — 36415 COLL VENOUS BLD VENIPUNCTURE: CPT | Performed by: INTERNAL MEDICINE

## 2021-03-01 PROCEDURE — 85025 COMPLETE CBC W/AUTO DIFF WBC: CPT | Performed by: INTERNAL MEDICINE

## 2021-03-01 PROCEDURE — 80053 COMPREHEN METABOLIC PANEL: CPT | Performed by: INTERNAL MEDICINE

## 2021-03-01 PROCEDURE — 86334 IMMUNOFIX E-PHORESIS SERUM: CPT | Mod: 26 | Performed by: PATHOLOGY

## 2021-03-01 PROCEDURE — 86300 IMMUNOASSAY TUMOR CA 15-3: CPT | Performed by: INTERNAL MEDICINE

## 2021-03-01 PROCEDURE — 84165 PROTEIN E-PHORESIS SERUM: CPT | Mod: TC | Performed by: INTERNAL MEDICINE

## 2021-03-01 PROCEDURE — 86334 IMMUNOFIX E-PHORESIS SERUM: CPT | Mod: TC | Performed by: INTERNAL MEDICINE

## 2021-03-01 PROCEDURE — 82784 ASSAY IGA/IGD/IGG/IGM EACH: CPT | Performed by: INTERNAL MEDICINE

## 2021-03-01 PROCEDURE — 999N001036 HC STATISTIC TOTAL PROTEIN: Performed by: INTERNAL MEDICINE

## 2021-03-01 PROCEDURE — 84165 PROTEIN E-PHORESIS SERUM: CPT | Mod: 26 | Performed by: PATHOLOGY

## 2021-03-02 ENCOUNTER — OFFICE VISIT (OUTPATIENT)
Dept: SURGERY | Facility: CLINIC | Age: 56
End: 2021-03-02
Payer: COMMERCIAL

## 2021-03-02 VITALS
TEMPERATURE: 97 F | SYSTOLIC BLOOD PRESSURE: 118 MMHG | BODY MASS INDEX: 34.72 KG/M2 | DIASTOLIC BLOOD PRESSURE: 72 MMHG | HEART RATE: 65 BPM | WEIGHT: 216.05 LBS | HEIGHT: 66 IN

## 2021-03-02 DIAGNOSIS — C50.919 INVASIVE LOBULAR CARCINOMA OF BREAST IN FEMALE (H): Primary | ICD-10-CM

## 2021-03-02 LAB
ALBUMIN SERPL ELPH-MCNC: 4 G/DL (ref 3.7–5.1)
ALPHA1 GLOB SERPL ELPH-MCNC: 0.4 G/DL (ref 0.2–0.4)
ALPHA2 GLOB SERPL ELPH-MCNC: 0.7 G/DL (ref 0.5–0.9)
B-GLOBULIN SERPL ELPH-MCNC: 0.7 G/DL (ref 0.6–1)
GAMMA GLOB SERPL ELPH-MCNC: 1.4 G/DL (ref 0.7–1.6)
KAPPA LC UR-MCNC: 3.23 MG/DL (ref 0.33–1.94)
KAPPA LC/LAMBDA SER: 2.63 {RATIO} (ref 0.26–1.65)
LAMBDA LC SERPL-MCNC: 1.23 MG/DL (ref 0.57–2.63)
M PROTEIN SERPL ELPH-MCNC: 0.5 G/DL
PROT PATTERN SERPL ELPH-IMP: ABNORMAL
PROT PATTERN SERPL IFE-IMP: NORMAL

## 2021-03-02 PROCEDURE — 99213 OFFICE O/P EST LOW 20 MIN: CPT | Performed by: SURGERY

## 2021-03-02 ASSESSMENT — MIFFLIN-ST. JEOR: SCORE: 1586.75

## 2021-03-02 NOTE — NURSING NOTE
"Initial /72 (BP Location: Right arm, Patient Position: Sitting, Cuff Size: Adult Large)   Pulse 65   Temp 97  F (36.1  C) (Tympanic)   Ht 1.676 m (5' 6\")   Wt 98 kg (216 lb 0.8 oz)   BMI 34.87 kg/m   Estimated body mass index is 34.87 kg/m  as calculated from the following:    Height as of this encounter: 1.676 m (5' 6\").    Weight as of this encounter: 98 kg (216 lb 0.8 oz). .    Danni Brock MA    "
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
## 2021-03-04 DIAGNOSIS — E78.5 HYPERLIPIDEMIA LDL GOAL <130: ICD-10-CM

## 2021-03-04 RX ORDER — ATORVASTATIN CALCIUM 10 MG/1
TABLET, FILM COATED ORAL
Qty: 90 TABLET | OUTPATIENT
Start: 2021-03-04

## 2021-03-04 NOTE — TELEPHONE ENCOUNTER
"Requested Prescriptions   Pending Prescriptions Disp Refills     atorvastatin (LIPITOR) 10 MG tablet [Pharmacy Med Name: ATORVASTATIN 10MG TABLETS] 90 tablet      Sig: TAKE 1 TABLET(10 MG) BY MOUTH DAILY       Statins Protocol Failed - 3/4/2021  3:20 PM        Failed - LDL on file in past 12 months     Recent Labs   Lab Test 10/08/19  0855   *             Passed - No abnormal creatine kinase in past 12 months     No lab results found.             Passed - Recent (12 mo) or future (30 days) visit within the authorizing provider's specialty     Patient has had an office visit with the authorizing provider or a provider within the authorizing providers department within the previous 12 mos or has a future within next 30 days. See \"Patient Info\" tab in inbasket, or \"Choose Columns\" in Meds & Orders section of the refill encounter.              Passed - Medication is active on med list        Passed - Patient is age 18 or older        Passed - No active pregnancy on record        Passed - No positive pregnancy test in past 12 months             "

## 2021-03-05 ENCOUNTER — MYC MEDICAL ADVICE (OUTPATIENT)
Dept: FAMILY MEDICINE | Facility: CLINIC | Age: 56
End: 2021-03-05

## 2021-03-05 ENCOUNTER — VIRTUAL VISIT (OUTPATIENT)
Dept: ONCOLOGY | Facility: CLINIC | Age: 56
End: 2021-03-05
Attending: INTERNAL MEDICINE
Payer: COMMERCIAL

## 2021-03-05 VITALS — HEIGHT: 66 IN | WEIGHT: 215 LBS | BODY MASS INDEX: 34.55 KG/M2

## 2021-03-05 DIAGNOSIS — D47.2 MONOCLONAL PARAPROTEINEMIA: ICD-10-CM

## 2021-03-05 DIAGNOSIS — E78.5 HYPERLIPIDEMIA LDL GOAL <130: ICD-10-CM

## 2021-03-05 DIAGNOSIS — C50.912 INVASIVE LOBULAR CARCINOMA OF LEFT BREAST IN FEMALE (H): Primary | ICD-10-CM

## 2021-03-05 DIAGNOSIS — C50.919 INVASIVE LOBULAR CARCINOMA OF BREAST IN FEMALE (H): ICD-10-CM

## 2021-03-05 DIAGNOSIS — E03.8 OTHER SPECIFIED HYPOTHYROIDISM: ICD-10-CM

## 2021-03-05 LAB
IGA SERPL-MCNC: 76 MG/DL (ref 84–499)
IGG SERPL-MCNC: 1482 MG/DL (ref 610–1616)
IGM SERPL-MCNC: 138 MG/DL (ref 35–242)

## 2021-03-05 PROCEDURE — 99214 OFFICE O/P EST MOD 30 MIN: CPT | Mod: GT | Performed by: INTERNAL MEDICINE

## 2021-03-05 RX ORDER — TAMOXIFEN CITRATE 20 MG/1
20 TABLET ORAL DAILY
Qty: 90 TABLET | Refills: 1 | Status: CANCELLED | OUTPATIENT
Start: 2021-03-05

## 2021-03-05 ASSESSMENT — PAIN SCALES - GENERAL: PAINLEVEL: NO PAIN (0)

## 2021-03-05 ASSESSMENT — MIFFLIN-ST. JEOR: SCORE: 1581.98
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
## 2021-03-05 NOTE — TELEPHONE ENCOUNTER
Dr. Garcia,   Please review my chart message and advise.     Thanks,   Shannan Mariano  [facility] RN

## 2021-03-05 NOTE — PROGRESS NOTES
"Oncology Rooming Note- Via text # [phone].     March 5, 2021 12:55 PM   Merlyn Ravi is a 56 year old female who presents for:    Chief Complaint   Patient presents with     Oncology Clinic Visit     6 month follow up breast cancer and monoclonal paraprotinemia. Review lab results.      Initial Vitals: Ht 1.676 m (5' 6\")   Wt 97.5 kg (215 lb)   Breastfeeding No   BMI 34.70 kg/m   Estimated body mass index is 34.7 kg/m  as calculated from the following:    Height as of this encounter: 1.676 m (5' 6\").    Weight as of this encounter: 97.5 kg (215 lb). Body surface area is 2.13 meters squared.  No Pain (0) Comment: Data Unavailable   No LMP recorded. Patient has had a hysterectomy.  Allergies reviewed: Yes  Medications reviewed: Yes    Medications: Medication refills not needed today.  Pharmacy name entered into [employer] Store: [id number] - [place], [place] - [address] AT [facility] [address]    Clinical concerns: 6 month follow up breast cancer and monoclonal paraprotinemia. Review lab results.       Angy Nicolas CMA            "

## 2021-03-05 NOTE — ASSESSMENT & PLAN NOTE
Kristina Ravi felt a lump in the left breast subsequently she went on to have diagnostic mammography which showed 1.1 cm irregular asymmetry about 11.2 cm from the nipple at 9-10 o'clock position.  Left breast ultrasound at that area revealed the lesion that measures 1.1 cm additional sonography to the left axilla shows no abnormal lymphadenopathy.  Subsequently the patient went on to have stereotactic breast biopsy done on August 15.  The pathology revealed invasive lobular carcinoma grade 2 out of 3.  Angiolymphatic invasion was not seen.  Carcinoma in situ cannot be excluded.  The tumor was estrogen receptor positive progesterone receptor positive and HER-2/walter came back negative.  Patient had a left lumpectomy and sentinel lymph node biopsy done on September 23, 2019 showing invasive lobular carcinoma with tumor size about 4.5 cm.  Tumor is grade 2 angiolymphatic invasion was not seen.  In situ malignancy was not seen . [place] lymph node was negative for malignancy.  The tumor was positive for estrogen receptor and progesterone receptor and negative for HER-2/walter receptors.  Because of positive deep margin, the patient went on to have reexcision on September 30, 2019 which resulted in positive margin as well.  Eventually she had reexcision again on October 9, 2019 which came back negative for residual malignancy.  Oncotype DX came back with recurrence score at 20.  The patient started on tamoxifen 20 mg orally daily.

## 2021-03-08 NOTE — TELEPHONE ENCOUNTER
Can this patient have a telephone visit with me? I do not like changing medication through this medium I will prefer to talk to the patient. Thanks.

## 2021-03-15 ENCOUNTER — OFFICE VISIT (OUTPATIENT)
Dept: FAMILY MEDICINE | Facility: CLINIC | Age: 56
End: 2021-03-15
Payer: COMMERCIAL

## 2021-03-15 VITALS
TEMPERATURE: 98.7 F | SYSTOLIC BLOOD PRESSURE: 128 MMHG | HEART RATE: 74 BPM | WEIGHT: 224.8 LBS | BODY MASS INDEX: 36.28 KG/M2 | OXYGEN SATURATION: 98 % | DIASTOLIC BLOOD PRESSURE: 82 MMHG

## 2021-03-15 DIAGNOSIS — F43.23 ADJUSTMENT REACTION WITH ANXIETY AND DEPRESSION: Primary | ICD-10-CM

## 2021-03-15 DIAGNOSIS — Z13.220 LIPID SCREENING: ICD-10-CM

## 2021-03-15 LAB
CHOLEST SERPL-MCNC: 176 MG/DL
HDLC SERPL-MCNC: 59 MG/DL
LDLC SERPL CALC-MCNC: 84 MG/DL
NONHDLC SERPL-MCNC: 117 MG/DL
TRIGL SERPL-MCNC: 166 MG/DL

## 2021-03-15 PROCEDURE — 36415 COLL VENOUS BLD VENIPUNCTURE: CPT | Performed by: FAMILY MEDICINE

## 2021-03-15 PROCEDURE — 80061 LIPID PANEL: CPT | Performed by: FAMILY MEDICINE

## 2021-03-15 PROCEDURE — 99213 OFFICE O/P EST LOW 20 MIN: CPT | Performed by: FAMILY MEDICINE

## 2021-03-15 RX ORDER — VENLAFAXINE HYDROCHLORIDE 37.5 MG/1
37.5 CAPSULE, EXTENDED RELEASE ORAL DAILY
Qty: 30 CAPSULE | Refills: 3 | Status: SHIPPED | OUTPATIENT
Start: 2021-03-15 | End: 2021-07-23

## 2021-03-15 ASSESSMENT — ANXIETY QUESTIONNAIRES
GAD7 TOTAL SCORE: 6
1. FEELING NERVOUS, ANXIOUS, OR ON EDGE: SEVERAL DAYS
6. BECOMING EASILY ANNOYED OR IRRITABLE: SEVERAL DAYS
5. BEING SO RESTLESS THAT IT IS HARD TO SIT STILL: SEVERAL DAYS
3. WORRYING TOO MUCH ABOUT DIFFERENT THINGS: SEVERAL DAYS
7. FEELING AFRAID AS IF SOMETHING AWFUL MIGHT HAPPEN: NOT AT ALL
2. NOT BEING ABLE TO STOP OR CONTROL WORRYING: SEVERAL DAYS

## 2021-03-15 ASSESSMENT — PATIENT HEALTH QUESTIONNAIRE - PHQ9
SUM OF ALL RESPONSES TO PHQ QUESTIONS 1-9: 13
5. POOR APPETITE OR OVEREATING: SEVERAL DAYS

## 2021-03-15 NOTE — PROGRESS NOTES
Assessment & Plan     Adjustment reaction with anxiety and depression  Medication faxed, recommend tapering off the fluoxetine slowly.  - venlafaxine (EFFEXOR-XR) 37.5 MG 24 hr capsule; Take 1 capsule (37.5 mg) by mouth daily    Lipid screening  - Lipid panel reflex to direct LDL Fasting  956}         FUTURE APPOINTMENTS:       - Follow-up visit in one month or sooner as needed.    Return in about 3 months (around 6/15/2021) for Follow up.    Memo Garcia MD  [facility]o is a 56 year old who presents for the following health issues     HPI   56 yr old female here for concerns of antidepressants. She has a history of invasive lobular cancer of left breast. She reports that her oncologist had suggested that she change her antidepressants as this has a potential of decreasing the effectiveness of tamoxifen which she takes for breast cancer prevention and maintenance.  She reports the medication has been working and she has had no side effects so far.  I recommended that we switch her to Venlafaxine which has no drug drug interaction with tamoxifen.  It  also will helps with hot flashes which she gets from medication.      Depression Followup    How are you doing with your depression since your last visit? Improved but   she is on Prozac and her Oncologist who suggested that her med be changed other than Prozac because of Tamoxifen not being as effective    Are you having other symptoms that might be associated with depression? No    Have you had a significant life event?  Grief or Loss     Are you feeling anxious or having panic attacks?   No    Do you have any concerns with your use of alcohol or other drugs? No    Social History     Tobacco Use     Smoking status: Never Smoker     Smokeless tobacco: Never Used   Substance Use Topics     Alcohol use: Yes     Alcohol/week: 0.0 standard drinks     Comment: 3 drinks per month     Drug use: No     No flowsheet  data found.  No flowsheet data found.  No flowsheet data found.  No flowsheet data found.    Suicide Assessment Five-step Evaluation and Treatment (SAFE-T)      How many servings of fruits and vegetables do you eat daily?  2-3    On average, how many sweetened beverages do you drink each day (Examples: soda, juice, sweet tea, etc.  Do NOT count diet or artificially sweetened beverages)?   0    How many days per week do you exercise enough to make your heart beat faster? 3 or less    How many minutes a day do you exercise enough to make your heart beat faster? 9 or less    How many days per week do you miss taking your medication? 0        Review of Systems   Constitutional, HEENT, cardiovascular, pulmonary, gi and gu systems are negative, except as otherwise noted.      Objective    /82   Pulse 74   Temp 98.7  F (37.1  C) (Tympanic)   Wt 102 kg (224 lb 12.8 oz)   SpO2 98%   BMI 36.28 kg/m    Body mass index is 36.28 kg/m .  Physical Exam   GENERAL: healthy, alert and no distress  EYES: Eyes grossly normal to inspection, PERRL and conjunctivae and sclerae normal  HENT: ear canals and TM's normal, nose and mouth without ulcers or lesions  NECK: no adenopathy, no asymmetry, masses, or scars and thyroid normal to palpation  RESP: lungs clear to auscultation - no rales, rhonchi or wheezes  CV: regular rate and rhythm, normal S1 S2, no S3 or S4, no murmur, click or rub, no peripheral edema and peripheral pulses strong  ABDOMEN: soft, nontender, no hepatosplenomegaly, no masses and bowel sounds normal  MS: no gross musculoskeletal defects noted, no edema    No results found for this or any previous visit (from the past 24 hour(s)).  Pending labs

## 2021-03-16 ASSESSMENT — ANXIETY QUESTIONNAIRES: GAD7 TOTAL SCORE: 6

## 2021-03-16 NOTE — RESULT ENCOUNTER NOTE
Please inform patient that test result showed mild cholesterol elevation much improved from last check.    Thank you.     Memo Garcia M.D.

## 2021-03-27 DIAGNOSIS — E78.5 HYPERLIPIDEMIA LDL GOAL <130: ICD-10-CM

## 2021-03-27 DIAGNOSIS — E06.3 HYPOTHYROIDISM DUE TO HASHIMOTO'S THYROIDITIS: ICD-10-CM

## 2021-03-29 DIAGNOSIS — C50.912 INVASIVE LOBULAR CARCINOMA OF LEFT BREAST IN FEMALE (H): ICD-10-CM

## 2021-03-29 RX ORDER — LEVOTHYROXINE SODIUM 88 UG/1
88 TABLET ORAL DAILY
Qty: 90 TABLET | Refills: 3 | Status: SHIPPED | OUTPATIENT
Start: 2021-03-29 | End: 2022-03-22

## 2021-03-29 RX ORDER — TAMOXIFEN CITRATE 20 MG/1
20 TABLET ORAL DAILY
Qty: 90 TABLET | Refills: 1 | Status: SHIPPED | OUTPATIENT
Start: 2021-03-29 | End: 2021-09-27

## 2021-03-29 NOTE — TELEPHONE ENCOUNTER
levothyroxine (SYNTHROID/LEVOTHROID) 88 MCG tablet        Last Written Prescription Date:  04/02/2020  Last Fill Quantity: 90,   # refills: 3  Last Office Visit : 03/09/2020  Future Office visit:  none    Routing refill request to provider for review/approval because: labs

## 2021-03-29 NOTE — TELEPHONE ENCOUNTER
"Requested Prescriptions   Pending Prescriptions Disp Refills     atorvastatin (LIPITOR) 10 MG tablet [Pharmacy Med Name: ATORVASTATIN 10MG TABLETS] 90 tablet      Sig: TAKE 1 TABLET(10 MG) BY MOUTH DAILY       Statins Protocol Passed - 3/27/2021 10:54 AM        Passed - LDL on file in past 12 months     Recent Labs   Lab Test 03/15/21  0745   LDL 84             Passed - No abnormal creatine kinase in past 12 months     No lab results found.             Passed - Recent (12 mo) or future (30 days) visit within the authorizing provider's specialty     Patient has had an office visit with the authorizing provider or a provider within the authorizing providers department within the previous 12 mos or has a future within next 30 days. See \"Patient Info\" tab in inbasket, or \"Choose Columns\" in Meds & Orders section of the refill encounter.              Passed - Medication is active on med list        Passed - Patient is age 18 or older        Passed - No active pregnancy on record        Passed - No positive pregnancy test in past 12 months             "

## 2021-03-29 NOTE — PROGRESS NOTES
Fax received from pharmacy requesting a refill of Tamoxifen on behalf of patient.  Last refill: 12/23/20  # 90 with 1 refills at [facility].  Last office visit:  3/5/21  Next office visit:  9/10/21    Odalis Mcfarland RN

## 2021-03-30 RX ORDER — ATORVASTATIN CALCIUM 10 MG/1
TABLET, FILM COATED ORAL
Qty: 90 TABLET | Refills: 1 | Status: SHIPPED | OUTPATIENT
Start: 2021-03-30 | End: 2021-09-22

## 2021-05-05 ENCOUNTER — HOSPITAL ENCOUNTER (OUTPATIENT)
Dept: MAMMOGRAPHY | Facility: CLINIC | Age: 56
Discharge: HOME | End: 2021-05-05
Attending: INTERNAL MEDICINE | Admitting: INTERNAL MEDICINE
Payer: COMMERCIAL

## 2021-05-05 DIAGNOSIS — Z12.31 SCREENING MAMMOGRAM FOR HIGH-RISK PATIENT: ICD-10-CM

## 2021-05-05 PROCEDURE — 77063 BREAST TOMOSYNTHESIS BI: CPT

## 2021-05-06 NOTE — PROGRESS NOTES
Reviewed mammogram results and recommendations with patient. Denies questions or concerns. Will keep appointment as scheduled with Dr. Blakely in September 2021. Advised to call with questions. Direct line provided. Smiley Gonzalez RN on 5/6/2021 at 1:38 PM

## 2021-05-12 ENCOUNTER — OFFICE VISIT (OUTPATIENT)
Dept: FAMILY MEDICINE | Facility: CLINIC | Age: 56
End: 2021-05-12
Payer: COMMERCIAL

## 2021-05-12 VITALS
BODY MASS INDEX: 35.52 KG/M2 | RESPIRATION RATE: 18 BRPM | DIASTOLIC BLOOD PRESSURE: 78 MMHG | WEIGHT: 221 LBS | HEART RATE: 63 BPM | SYSTOLIC BLOOD PRESSURE: 114 MMHG | HEIGHT: 66 IN | OXYGEN SATURATION: 98 % | TEMPERATURE: 97.7 F

## 2021-05-12 DIAGNOSIS — N39.46 MIXED INCONTINENCE URGE AND STRESS (MALE)(FEMALE): Primary | ICD-10-CM

## 2021-05-12 DIAGNOSIS — N39.46 MIXED INCONTINENCE URGE AND STRESS (MALE)(FEMALE): ICD-10-CM

## 2021-05-12 PROCEDURE — 99213 OFFICE O/P EST LOW 20 MIN: CPT | Performed by: FAMILY MEDICINE

## 2021-05-12 RX ORDER — TOLTERODINE 2 MG/1
2 CAPSULE, EXTENDED RELEASE ORAL DAILY
Qty: 30 CAPSULE | Refills: 1 | Status: SHIPPED | OUTPATIENT
Start: 2021-05-12 | End: 2021-05-17

## 2021-05-12 ASSESSMENT — ENCOUNTER SYMPTOMS
GASTROINTESTINAL NEGATIVE: 1
PSYCHIATRIC NEGATIVE: 1
CARDIOVASCULAR NEGATIVE: 1
CONSTITUTIONAL NEGATIVE: 1
ALLERGIC/IMMUNOLOGIC NEGATIVE: 1
EYES NEGATIVE: 1
RESPIRATORY NEGATIVE: 1
ENDOCRINE NEGATIVE: 1
MUSCULOSKELETAL NEGATIVE: 1
HEMATOLOGIC/LYMPHATIC NEGATIVE: 1

## 2021-05-12 ASSESSMENT — MIFFLIN-ST. JEOR: SCORE: 1609.2

## 2021-05-12 NOTE — PROGRESS NOTES
Assessment & Plan     Mixed incontinence urge and stress (male)(female)  Faxed medication to pharmacy. Recommend recheck in one month.Also suggested Kegel exercises.   - tolterodine ER (DETROL LA) 2 MG 24 hr capsule; Take 1 capsule (2 mg) by mouth daily        FUTURE APPOINTMENTS:       - Follow-up visit in one month or sooner as needed.  Patient Instructions         Thank you for choosing [facility].  You may be receiving an email and/or telephone survey request from [facility] Customer Experience regarding your visit today.  Please take a few minutes to respond to the survey to let us know how we are doing.      If you have questions or concerns, please contact us via [facility] or you can contact your care team at [phone].    Our Clinic hours are:  Monday 6:40 am  to 7:00 pm  Tuesday -Friday 6:40 am to 5:00 pm    The [place] outpatient lab hours are:  Monday - Friday 6:10 am to 4:45 pm  Saturdays 7:00 am to 11:00 am  Appointments are required, call [phone]    If you have clinical questions after hours or would like to schedule an appointment,  call the clinic at [phone].    Patient Education     Urinary Incontinence, Female (Adult)   Urinary incontinence means loss of bladder control. This problem affects many women, especially as they get older. If you have incontinence, you may be embarrassed to ask for help. But know that this problem can be treated.   Types of Incontinence  There are different types of incontinence. Two of the main types are described here. You can have more than one type.     Stress incontinence. With this type, urine leaks when pressure (stress) is put on the bladder. This may happen when you cough, sneeze, or laugh. Stress incontinence most often occurs because the pelvic floor muscles that support the bladder and urethra are weak. This can happen after pregnancy and vaginal childbirth or a hysterectomy. It can also be due to excess body weight or hormone  changes.    Urge incontinence (also called overactive bladder). With this type, a sudden urge to urinate is felt often. This may happen even though there may not be much urine in the bladder. The need to urinate often during the night is common. Urge incontinence most often occurs because of bladder spasms. This may be due to bladder irritation or infection. Damage to bladder nerves or pelvic muscles, constipation, and certain medicines can also lead to urge incontinence.  Treatment depends on the cause. Further evaluation is needed to find the type you have. This will likely include an exam and certain tests. Based on the results, you and your healthcare provider can then plan treatment. Until a diagnosis is made, the home care tips below can help ease symptoms.   Home care    Do pelvic floor muscle exercises, if they are prescribed. The pelvic floor muscles help support the bladder and urethra. Many women find that their symptoms improve when doing special exercises that strengthen these muscles. To do the exercises, contract the muscles you would use to stop your stream of urine. But do this when you re not urinating. Hold for 10 seconds, then relax. Repeat 10 to 20 times in a row, at least 3 times a day. Your healthcare provider may give you other instructions for how to do the exercises and how often.    Keep a bladder diary. This helps track how often and how much you urinate over a set period of time. Bring this diary with you to your next visit with the provider. The information can help your provider learn more about your bladder problem.    Lose weight, if advised to by your provider. Extra weight puts pressure on the bladder. Your provider can help you create a weight-loss plan that s right for you. This may include exercising more and making certain diet changes.    Don't have foods and drinks that may irritate the bladder. These can include alcohol and caffeinated drinks.    Quit smoking. Smoking and  other tobacco use can lead to a long-term (chronic) cough that strains the pelvic floor muscles. Smoking may also damage the bladder and urethra. Talk with your provider about treatments or methods you can use to quit smoking.    If drinking large amounts of fluid makes you have symptoms, you may be advised to limit your fluid intake. You may also be advised to drink most of your fluids during the day and to limit fluids at night.    If you re worried about urine leakage or accidents, you may wear absorbent pads to catch urine. Change the pads often. This helps reduce discomfort. It may also reduce the risk of skin or bladder infections.    Follow-up care  Follow up with your healthcare provider, or as directed. It may take some to find the right treatment for your problem. But healthy lifestyle changes can be made right away. These include such things as exercising on a regular basis, eating a healthy diet, losing weight (if needed), and quitting smoking. Your treatment plan may include special therapies or medicines. Certain procedures or surgery may also be options. Talk about any questions you have with your provider.   When to seek medical advice  Call the healthcare provider right away if any of these occur:    Fever of 100.4 F (38 C) or higher, or as directed by your provider    Bladder pain or fullness    Belly swelling    Nausea or vomiting    Back pain    Weakness, dizziness, or fainting  [employer] last reviewed this educational content on 1/1/2020 2000-2021 [employer]. All rights reserved. This information is not intended as a substitute for professional medical care. Always follow your healthcare professional's instructions.               Return in about 4 weeks (around 6/9/2021) for Follow up.    Memo Garcia MD  [facility]    Norman Acevedo is a 56 year old who presents for the following health issues  accompanied by herself:    HPI 56 yr old  "female here for incontinence symptoms. She states that this has been ongoing for about a year and appears to be getting worse. She reports urgency and frequency. She also reports that with laughing and sneezing she has a few leakage. She has to wear a pad all the time. She denies any burning or flank pain. She has no systemic symptoms. She has a history of breast cancer. She is in remission. She was wondering about estrogen as an option for the incontinence. I explained to her that because of her history of breast cancer using estrogen will be tricky, I recommended trying medication and also possibly considering surgery at some point. She is open to trying medication.         Concern - Bladder leakage  Onset: One year but has been getting worse the past two months.  Description: Bladder leakage, multiple times per day.  She can urinate then go back to her activities and feel like she has to urinate again.  Intensity: mild  Progression of Symptoms:  worsening  Accompanying Signs & Symptoms: No pain.  Just frequency.  She is not sure if this is related to a decrease with the Estrogen.  She is taking Tamoxifen.  Previous history of similar problem: This is newer in the last year.  History of urethral dilation at the age of 17, twice.  Precipitating factors:        Worsened by: None  Alleviating factors:        Improved by: None  Therapies tried and outcome: None        Review of Systems   Constitutional: Negative.    HENT: Negative.    Eyes: Negative.    Respiratory: Negative.    Cardiovascular: Negative.    Gastrointestinal: Negative.    Endocrine: Negative.    Genitourinary: Positive for urgency.   Musculoskeletal: Negative.    Skin: Negative.    Allergic/Immunologic: Negative.    Hematological: Negative.    Psychiatric/Behavioral: Negative.             Objective    /78   Pulse 63   Temp 97.7  F (36.5  C) (Tympanic)   Resp 18   Ht 1.676 m (5' 6\")   Wt 100.2 kg (221 lb)   SpO2 98%   BMI 35.67 kg/m    Body " mass index is 35.67 kg/m .  Physical Exam  Constitutional:       Appearance: Normal appearance. She is obese.   HENT:      Head: Normocephalic and atraumatic.      Right Ear: Tympanic membrane normal.      Left Ear: Tympanic membrane normal.   Cardiovascular:      Rate and Rhythm: Normal rate and regular rhythm.      Pulses: Normal pulses.      Heart sounds: Normal heart sounds.   Pulmonary:      Effort: Pulmonary effort is normal.      Breath sounds: Normal breath sounds.   Abdominal:      General: Abdomen is flat.      Palpations: Abdomen is soft.   Musculoskeletal: Normal range of motion.   Neurological:      Mental Status: She is alert and oriented to person, place, and time.   Psychiatric:         Mood and Affect: Mood normal.         Behavior: Behavior normal.

## 2021-05-12 NOTE — PATIENT INSTRUCTIONS
Thank you for choosing [facility].  You may be receiving an email and/or telephone survey request from [facility] Customer Experience regarding your visit today.  Please take a few minutes to respond to the survey to let us know how we are doing.      If you have questions or concerns, please contact us via [employer] or you can contact your care team at [phone].    Our Clinic hours are:  Monday 6:40 am  to 7:00 pm  Tuesday -Friday 6:40 am to 5:00 pm    The [place] outpatient lab hours are:  Monday - Friday 6:10 am to 4:45 pm  Saturdays 7:00 am to 11:00 am  Appointments are required, call [phone]    If you have clinical questions after hours or would like to schedule an appointment,  call the clinic at [phone].    Patient Education     Urinary Incontinence, Female (Adult)   Urinary incontinence means loss of bladder control. This problem affects many women, especially as they get older. If you have incontinence, you may be embarrassed to ask for help. But know that this problem can be treated.   Types of Incontinence  There are different types of incontinence. Two of the main types are described here. You can have more than one type.     Stress incontinence. With this type, urine leaks when pressure (stress) is put on the bladder. This may happen when you cough, sneeze, or laugh. Stress incontinence most often occurs because the pelvic floor muscles that support the bladder and urethra are weak. This can happen after pregnancy and vaginal childbirth or a hysterectomy. It can also be due to excess body weight or hormone changes.    Urge incontinence (also called overactive bladder). With this type, a sudden urge to urinate is felt often. This may happen even though there may not be much urine in the bladder. The need to urinate often during the night is common. Urge incontinence most often occurs because of bladder spasms. This may be due to bladder irritation or infection. Damage to bladder  nerves or pelvic muscles, constipation, and certain medicines can also lead to urge incontinence.  Treatment depends on the cause. Further evaluation is needed to find the type you have. This will likely include an exam and certain tests. Based on the results, you and your healthcare provider can then plan treatment. Until a diagnosis is made, the home care tips below can help ease symptoms.   Home care    Do pelvic floor muscle exercises, if they are prescribed. The pelvic floor muscles help support the bladder and urethra. Many women find that their symptoms improve when doing special exercises that strengthen these muscles. To do the exercises, contract the muscles you would use to stop your stream of urine. But do this when you re not urinating. Hold for 10 seconds, then relax. Repeat 10 to 20 times in a row, at least 3 times a day. Your healthcare provider may give you other instructions for how to do the exercises and how often.    Keep a bladder diary. This helps track how often and how much you urinate over a set period of time. Bring this diary with you to your next visit with the provider. The information can help your provider learn more about your bladder problem.    Lose weight, if advised to by your provider. Extra weight puts pressure on the bladder. Your provider can help you create a weight-loss plan that s right for you. This may include exercising more and making certain diet changes.    Don't have foods and drinks that may irritate the bladder. These can include alcohol and caffeinated drinks.    Quit smoking. Smoking and other tobacco use can lead to a long-term (chronic) cough that strains the pelvic floor muscles. Smoking may also damage the bladder and urethra. Talk with your provider about treatments or methods you can use to quit smoking.    If drinking large amounts of fluid makes you have symptoms, you may be advised to limit your fluid intake. You may also be advised to drink most of  your fluids during the day and to limit fluids at night.    If you re worried about urine leakage or accidents, you may wear absorbent pads to catch urine. Change the pads often. This helps reduce discomfort. It may also reduce the risk of skin or bladder infections.    Follow-up care  Follow up with your healthcare provider, or as directed. It may take some to find the right treatment for your problem. But healthy lifestyle changes can be made right away. These include such things as exercising on a regular basis, eating a healthy diet, losing weight (if needed), and quitting smoking. Your treatment plan may include special therapies or medicines. Certain procedures or surgery may also be options. Talk about any questions you have with your provider.   When to seek medical advice  Call the healthcare provider right away if any of these occur:    Fever of 100.4 F (38 C) or higher, or as directed by your provider    Bladder pain or fullness    Belly swelling    Nausea or vomiting    Back pain    Weakness, dizziness, or fainting  [employer] last reviewed this educational content on 1/1/2020 2000-2021 [employer]. All rights reserved. This information is not intended as a substitute for professional medical care. Always follow your healthcare professional's instructions.            19-Mar-2018 00:00

## 2021-05-13 ENCOUNTER — OFFICE VISIT (OUTPATIENT)
Dept: SURGERY | Facility: CLINIC | Age: 56
End: 2021-05-13
Payer: COMMERCIAL

## 2021-05-13 VITALS — RESPIRATION RATE: 18 BRPM | DIASTOLIC BLOOD PRESSURE: 78 MMHG | HEART RATE: 69 BPM | SYSTOLIC BLOOD PRESSURE: 122 MMHG

## 2021-05-13 DIAGNOSIS — C50.919 INVASIVE LOBULAR CARCINOMA OF BREAST IN FEMALE (H): Primary | ICD-10-CM

## 2021-05-13 PROCEDURE — 99213 OFFICE O/P EST LOW 20 MIN: CPT | Performed by: SURGERY

## 2021-05-13 NOTE — PROGRESS NOTES
BREAST CANCER FOLLOW UP  CHIEF COMPLAINT  Chief Complaint   Patient presents with     RECHECK     Breast Cancer      HPI  Merlyn Ravi is a 56 year old female who presents with   Chief Complaint   Patient presents with     RECHECK     Breast Cancer      The patient presents for her breast cancer followup appointment. Overall, she is feeling good. She denies any significant fatigue, fevers, chills, or changes in appetite. She has not suffered any significant weight loss.  She has not noted any areas of skin changes or any masses in the breast or chest wall that she is concerned about.  She denies puckering, erythema, or nipple discharge.   Lymphedema improved with treatment which she is performing daily.  She has not noted any palpable axillary lymphadenopathy.  She does complain of urinary incontinence, seeing her PCP.    Review of Systems  10 point ROS otherwise negative    PAST MEDICAL HISTORY  Past Medical History:   Diagnosis Date     Allergies      Dermatofibroma 5/14/2012     FAMILY HISTORY  Family History   Problem Relation Age of Onset     Thyroid Disease Mother      Heart Disease Mother      Heart Disease Father      Hyperlipidemia Father      Hypertension Maternal Grandmother      Breast Cancer Maternal Grandmother      Hypertension Maternal Grandfather      Alzheimer Disease Paternal Grandmother      Diabetes Brother      Eczema Brother      Thyroid Disease Brother      Melanoma No family hx of      SOCIAL HISTORY  Social History     Socioeconomic History     Marital status:      Spouse name: None     Number of children: None     Years of education: None     Highest education level: None   Occupational History     Employer: [employer]   Social Needs     Financial resource strain: None     Food insecurity     Worry: None     Inability: None     Transportation needs     Medical: None     Non-medical: None   Tobacco Use     Smoking status: Never Smoker     Smokeless tobacco: Never Used   Substance  and Sexual Activity     Alcohol use: Yes     Alcohol/week: 0.0 standard drinks     Comment: 3 drinks per month     Drug use: No     Sexual activity: Yes     Partners: Male     Birth control/protection: Surgical     Comment: defer to md   Lifestyle     Physical activity     Days per week: None     Minutes per session: None     Stress: None   Relationships     Social connections     Talks on phone: None     Gets together: None     Attends [employer] service: None     Active member of club or organization: None     Attends meetings of clubs or organizations: None     Relationship status: None     Intimate partner violence     Fear of current or ex partner: None     Emotionally abused: None     Physically abused: None     Forced sexual activity: None   Other Topics Concern     Parent/sibling w/ CABG, MI or angioplasty before 65F 55M? No   Social History Narrative     None     SURGICAL HISTORY  Past Surgical History:   Procedure Laterality Date     HYSTERECTOMY, PAP NO LONGER INDICATED       HYSTERECTOMY, MAGY       LASIK BILATERAL  2005    Dr. Solis      LUMPECTOMY BREAST Left 9/30/2019    Procedure: Re-excision of left breast lumpectomy site;  Surgeon: Kj Salas DO;  Location: [facility] OR     LUMPECTOMY BREAST Left 10/9/2019    Procedure: Left breast re-excision;  Surgeon: Kj Salas DO;  Location: [facility] OR     LUMPECTOMY BREAST WITH SENTINEL NODE, COMBINED Left 9/23/2019    Procedure: LUMPECTOMY, BREAST, WITH SENTINEL LYMPH NODE BIOPSY.;  Surgeon: Kj Salas DO;  Location: [facility] OR     [facility] NONSPECIFIC PROCEDURE      bladder surgery     CURRENT MEDICATIONS    Current Outpatient Medications:      atorvastatin (LIPITOR) 10 MG tablet, TAKE 1 TABLET(10 MG) BY MOUTH DAILY, Disp: 90 tablet, Rfl: 1     calcium 500-125 MG-UNIT TABS, Take 2 tablets by mouth 2 times daily, Disp: , Rfl:      doxycycline monohydrate (MONODOX) 50 MG capsule, 1 tab po daily, Disp: 90 capsule, Rfl: 3     FLUoxetine  (PROZAC) 10 MG capsule, TAKE 1 CAPSULE BY MOUTH DAILY, Disp: 30 capsule, Rfl: 3     gabapentin (NEURONTIN) 100 MG capsule, TAKE 1 CAPSULE BY MOUTH EVERY MORNING AND 2 CAPSULES EVERY EVENING, Disp: 90 capsule, Rfl: 3     IBUPROFEN PO, Take 800 mg by mouth every 4 hours as needed for moderate pain, Disp: , Rfl:      levothyroxine (SYNTHROID/LEVOTHROID) 88 MCG tablet, Take 1 tablet (88 mcg) by mouth daily, Disp: 90 tablet, Rfl: 3     Naproxen Sodium (ALEVE PO), Take 220 mg by mouth 2 times daily (with meals), Disp: , Rfl:      tamoxifen (NOLVADEX) 20 MG tablet, Take 1 tablet (20 mg) by mouth daily, Disp: 90 tablet, Rfl: 1     tolterodine ER (DETROL LA) 2 MG 24 hr capsule, Take 1 capsule (2 mg) by mouth daily, Disp: 30 capsule, Rfl: 1     venlafaxine (EFFEXOR-XR) 37.5 MG 24 hr capsule, Take 1 capsule (37.5 mg) by mouth daily, Disp: 30 capsule, Rfl: 3  ALLERGIES  Allergies   Allergen Reactions     Demerol      Sedation and vomiting     Meperidine      Sulfa Drugs      unknown reaction     PHYSICAL EXAM  VITAL SIGNS:  blood pressure is 122/78 and her pulse is 69. Her respiration is 18.   Constitutional: Well developed, Well nourished, No acute distress, Non-toxic appearance.   HENT: Normocephalic, Atraumatic, Bilateral external ears normal, Oropharynx moist, No oral exudates, Nose normal.   Eyes: EOMI, Conjunctiva normal, No discharge.   Neck: Normal range of motion, No tenderness, Supple, No stridor.   Lymphatic: No lymphadenopathy noted.   Cardiovascular: Normal heart rate, Normal rhythm, No murmurs, No rubs, No gallops.   Breast Exam: Examined with Britni Santillan MA  RIGHT: No areas of skin dimpling or puckering. No erythema. No skin scaling or changes. No expressible nipple discharge. No focal areas of significant tenderness. .  No palpable axillary lymphadenopathy.  LEFT: No areas of skin dimpling or puckering. No erythema. Lymphedema of left medial breast improved from last visit., incision C/D/I.  Post surgical  changes, no palpable mass.  No expressible nipple discharge. No focal areas of significant tenderness. .  No palpable axillary lymphadenopathy.  Thorax & Lungs: Normal breath sounds, No respiratory distress, No wheezing, No chest tenderness.   Abdomen: Bowel sounds normal, Soft, No masses, No pulsatile masses.   Skin: Warm, Dry, No erythema, No rash.   Back: No tenderness, No CVA tenderness.   Extremities: Intact distal pulses, No edema, No tenderness, No cyanosis, No clubbing.   Musculoskeletal: Good range of motion in all major joints. No tenderness to palpation or major deformities noted.   Neurologic: Alert & oriented x 3, Normal motor function, Normal sensory function, No focal deficits noted.   Psychiatric: Affect normal, Judgment normal, Mood normal.         Imaging Studies:   Recent Results (from the past 744 hour(s))   MA Screen Bilateral w/Pancho    Narrative    SCREENING MAMMOGRAM, BILATERAL, DIGITAL w/CAD AND TOMOSYNTHESIS -  5/5/2021 8:43 AM.    BREAST SYMPTOMS: No current breast complaints.     COMPARISON:  4/23/2020, 3/20/2019, 5/20/2016.    BREAST DENSITY: Scattered fibroglandular densities.    COMMENTS: No findings of suspicion for malignancy.   Post surgical  changes are seen in the left breast.      Impression    IMPRESSION: BI-RADS CATEGORY: 2 - Benign.    RECOMMENDED FOLLOW-UP: Annual Mammography.  Recommend routine annual screening mammography.    Exam results letter mailed to patient.    SIOBHAN VICTOR MD         FINAL IMPRESSION AND PLAN  1. Invasive lobular carcinoma of breast in female (H)        The patient is 19 months out from her breast cancer surgery. There is no evidence of recurrent disease on my exam today. She is continuing to recover well from her breast cancer treatment. She is on Tamoxifen.  Her lymphedema is improving with continued treatment.    She will continue periodic self breast or chest wall exam. She is encouraged to followup with me for any changes on self-exam, or  for any concerns or questions. She will followup with her primary care provider for routine care, and continue followup with her oncologist as scheduled.    My recommendations were discussed with the patient. She will see me in 4 months for another exam (seeing radiation oncology and medical oncology in interim). Patient will be scheduled for any indicated mammography or additional imaging.       Kj Salas, DO on 5/13/2021 at 7:55 AM

## 2021-05-13 NOTE — LETTER
5/13/2021         RE: Merlyn Ravi  [address] [facility] [id number]        Dear Colleague,    Thank you for referring your patient, Merlyn Ravi, to the [facility]. Please see a copy of my visit note below.    BREAST CANCER FOLLOW UP  CHIEF COMPLAINT  Chief Complaint   Patient presents with     RECHECK     Breast Cancer      HPI  Merlyn Ravi is a 56 year old female who presents with   Chief Complaint   Patient presents with     RECHECK     Breast Cancer      The patient presents for her breast cancer followup appointment. Overall, she is feeling good. She denies any significant fatigue, fevers, chills, or changes in appetite. She has not suffered any significant weight loss.  She has not noted any areas of skin changes or any masses in the breast or chest wall that she is concerned about.  She denies puckering, erythema, or nipple discharge.   Lymphedema improved with treatment which she is performing daily.  She has not noted any palpable axillary lymphadenopathy.  She does complain of urinary incontinence, seeing her PCP.    Review of Systems  10 point ROS otherwise negative    PAST MEDICAL HISTORY  Past Medical History:   Diagnosis Date     Allergies      Dermatofibroma 5/14/2012     FAMILY HISTORY  Family History   Problem Relation Age of Onset     Thyroid Disease Mother      Heart Disease Mother      Heart Disease Father      Hyperlipidemia Father      Hypertension Maternal Grandmother      Breast Cancer Maternal Grandmother      Hypertension Maternal Grandfather      Alzheimer Disease Paternal Grandmother      Diabetes Brother      Eczema Brother      Thyroid Disease Brother      Melanoma No family hx of      SOCIAL HISTORY  Social History     Socioeconomic History     Marital status:      Spouse name: None     Number of children: None     Years of education: None     Highest education level: None   Occupational History     Employer: [employer]    Social Needs     Financial resource strain: None     Food insecurity     Worry: None     Inability: None     Transportation needs     Medical: None     Non-medical: None   Tobacco Use     Smoking status: Never Smoker     Smokeless tobacco: Never Used   Substance and Sexual Activity     Alcohol use: Yes     Alcohol/week: 0.0 standard drinks     Comment: 3 drinks per month     Drug use: No     Sexual activity: Yes     Partners: Male     Birth control/protection: Surgical     Comment: defer to md   Lifestyle     Physical activity     Days per week: None     Minutes per session: None     Stress: None   Relationships     Social connections     Talks on phone: None     Gets together: None     Attends [employer] service: None     Active member of club or organization: None     Attends meetings of clubs or organizations: None     Relationship status: None     Intimate partner violence     Fear of current or ex partner: None     Emotionally abused: None     Physically abused: None     Forced sexual activity: None   Other Topics Concern     Parent/sibling w/ CABG, MI or angioplasty before 65F 55M? No   Social History Narrative     None     SURGICAL HISTORY  Past Surgical History:   Procedure Laterality Date     HYSTERECTOMY, PAP NO LONGER INDICATED       HYSTERECTOMY, MAGY       LASIK BILATERAL  2005    Dr. Solis      LUMPECTOMY BREAST Left 9/30/2019    Procedure: Re-excision of left breast lumpectomy site;  Surgeon: Kj Salas DO;  Location: [facility] OR     LUMPECTOMY BREAST Left 10/9/2019    Procedure: Left breast re-excision;  Surgeon: Kj Salas DO;  Location: [facility] OR     LUMPECTOMY BREAST WITH SENTINEL NODE, COMBINED Left 9/23/2019    Procedure: LUMPECTOMY, BREAST, WITH SENTINEL LYMPH NODE BIOPSY.;  Surgeon: Kj Salas DO;  Location: [facility] OR     [facility] NONSPECIFIC PROCEDURE      bladder surgery     CURRENT MEDICATIONS    Current Outpatient Medications:      atorvastatin (LIPITOR) 10 MG  tablet, TAKE 1 TABLET(10 MG) BY MOUTH DAILY, Disp: 90 tablet, Rfl: 1     calcium 500-125 MG-UNIT TABS, Take 2 tablets by mouth 2 times daily, Disp: , Rfl:      doxycycline monohydrate (MONODOX) 50 MG capsule, 1 tab po daily, Disp: 90 capsule, Rfl: 3     FLUoxetine (PROZAC) 10 MG capsule, TAKE 1 CAPSULE BY MOUTH DAILY, Disp: 30 capsule, Rfl: 3     gabapentin (NEURONTIN) 100 MG capsule, TAKE 1 CAPSULE BY MOUTH EVERY MORNING AND 2 CAPSULES EVERY EVENING, Disp: 90 capsule, Rfl: 3     IBUPROFEN PO, Take 800 mg by mouth every 4 hours as needed for moderate pain, Disp: , Rfl:      levothyroxine (SYNTHROID/LEVOTHROID) 88 MCG tablet, Take 1 tablet (88 mcg) by mouth daily, Disp: 90 tablet, Rfl: 3     Naproxen Sodium (ALEVE PO), Take 220 mg by mouth 2 times daily (with meals), Disp: , Rfl:      tamoxifen (NOLVADEX) 20 MG tablet, Take 1 tablet (20 mg) by mouth daily, Disp: 90 tablet, Rfl: 1     tolterodine ER (DETROL LA) 2 MG 24 hr capsule, Take 1 capsule (2 mg) by mouth daily, Disp: 30 capsule, Rfl: 1     venlafaxine (EFFEXOR-XR) 37.5 MG 24 hr capsule, Take 1 capsule (37.5 mg) by mouth daily, Disp: 30 capsule, Rfl: 3  ALLERGIES  Allergies   Allergen Reactions     Demerol      Sedation and vomiting     Meperidine      Sulfa Drugs      unknown reaction     PHYSICAL EXAM  VITAL SIGNS:  blood pressure is 122/78 and her pulse is 69. Her respiration is 18.   Constitutional: Well developed, Well nourished, No acute distress, Non-toxic appearance.   HENT: Normocephalic, Atraumatic, Bilateral external ears normal, Oropharynx moist, No oral exudates, Nose normal.   Eyes: EOMI, Conjunctiva normal, No discharge.   Neck: Normal range of motion, No tenderness, Supple, No stridor.   Lymphatic: No lymphadenopathy noted.   Cardiovascular: Normal heart rate, Normal rhythm, No murmurs, No rubs, No gallops.   Breast Exam: Examined with Britni Santillan MA  RIGHT: No areas of skin dimpling or puckering. No erythema. No skin scaling or changes. No  expressible nipple discharge. No focal areas of significant tenderness. .  No palpable axillary lymphadenopathy.  LEFT: No areas of skin dimpling or puckering. No erythema. Lymphedema of left medial breast improved from last visit., incision C/D/I.  Post surgical changes, no palpable mass.  No expressible nipple discharge. No focal areas of significant tenderness. .  No palpable axillary lymphadenopathy.  Thorax & Lungs: Normal breath sounds, No respiratory distress, No wheezing, No chest tenderness.   Abdomen: Bowel sounds normal, Soft, No masses, No pulsatile masses.   Skin: Warm, Dry, No erythema, No rash.   Back: No tenderness, No CVA tenderness.   Extremities: Intact distal pulses, No edema, No tenderness, No cyanosis, No clubbing.   Musculoskeletal: Good range of motion in all major joints. No tenderness to palpation or major deformities noted.   Neurologic: Alert & oriented x 3, Normal motor function, Normal sensory function, No focal deficits noted.   Psychiatric: Affect normal, Judgment normal, Mood normal.         Imaging Studies:   Recent Results (from the past 744 hour(s))   MA Screen Bilateral w/Pancho    Narrative    SCREENING MAMMOGRAM, BILATERAL, DIGITAL w/CAD AND TOMOSYNTHESIS -  5/5/2021 8:43 AM.    BREAST SYMPTOMS: No current breast complaints.     COMPARISON:  4/23/2020, 3/20/2019, 5/20/2016.    BREAST DENSITY: Scattered fibroglandular densities.    COMMENTS: No findings of suspicion for malignancy.   Post surgical  changes are seen in the left breast.      Impression    IMPRESSION: BI-RADS CATEGORY: 2 - Benign.    RECOMMENDED FOLLOW-UP: Annual Mammography.  Recommend routine annual screening mammography.    Exam results letter mailed to patient.    SIOBHAN VICTOR MD         FINAL IMPRESSION AND PLAN  1. Invasive lobular carcinoma of breast in female (H)        The patient is 19 months out from her breast cancer surgery. There is no evidence of recurrent disease on my exam today. She is  continuing to recover well from her breast cancer treatment. She is on Tamoxifen.  Her lymphedema is improving with continued treatment.    She will continue periodic self breast or chest wall exam. She is encouraged to followup with me for any changes on self-exam, or for any concerns or questions. She will followup with her primary care provider for routine care, and continue followup with her oncologist as scheduled.    My recommendations were discussed with the patient. She will see me in 4 months for another exam (seeing radiation oncology and medical oncology in interim). Patient will be scheduled for any indicated mammography or additional imaging.       Kj Salas DO on 5/13/2021 at 7:55 AM        Again, thank you for allowing me to participate in the care of your patient.        Sincerely,        Kj Salas DO

## 2021-05-13 NOTE — NURSING NOTE
"Chief Complaint   Patient presents with     RECHECK     Breast Cancer        Initial /78 (BP Location: Right arm, Patient Position: Chair, Cuff Size: Adult Large)   Pulse 69   Resp 18  Estimated body mass index is 35.67 kg/m  as calculated from the following:    Height as of 5/12/21: 1.676 m (5' 6\").    Weight as of 5/12/21: 100.2 kg (221 lb).  BP completed using cuff size: large   Medications and allergies reviewed.      Britni VALDOVINOS CMA     "

## 2021-05-13 NOTE — PATIENT INSTRUCTIONS
Per physician instructions.    If you have questions or concerns on any instructions given to you by your provider today or if you need to schedule an appointment, you can reach us at [phone].  Listen to the menu for the Specialty Clinic option.      Thank you!

## 2021-05-17 RX ORDER — TOLTERODINE 2 MG/1
CAPSULE, EXTENDED RELEASE ORAL
Qty: 90 CAPSULE | Refills: 0 | Status: SHIPPED | OUTPATIENT
Start: 2021-05-17 | End: 2021-12-09

## 2021-07-12 ENCOUNTER — OFFICE VISIT (OUTPATIENT)
Dept: RADIATION THERAPY | Facility: OUTPATIENT CENTER | Age: 56
End: 2021-07-12
Payer: COMMERCIAL

## 2021-07-12 VITALS
BODY MASS INDEX: 36.09 KG/M2 | HEART RATE: 68 BPM | DIASTOLIC BLOOD PRESSURE: 77 MMHG | RESPIRATION RATE: 16 BRPM | SYSTOLIC BLOOD PRESSURE: 116 MMHG | WEIGHT: 223.6 LBS

## 2021-07-12 DIAGNOSIS — I89.0 LYMPHEDEMA: ICD-10-CM

## 2021-07-12 DIAGNOSIS — C50.919 BREAST CANCER (H): Primary | ICD-10-CM

## 2021-07-12 NOTE — PROGRESS NOTES
Department of Radiation Oncology  Radiation Therapy Center  [facility] [address]  [phone]         Radiation Oncology Follow-up Visit  21    Merlyn Ravi  MRN: [id number]   : 1965     DIAGNOSIS: invasive lobular carcinoma of the left breast. She is status post lumpectomy and sentinel lymph node evaluation  PATHOLOGY: Final pathology demonstrated ILC, 4.5 cm in size, grade 2, no LVSI, negative margins status post re-excision, 0 out of 1 sentinel lymph nodes, ER+, TX+, HER-2 negative disease, low Oncotype recurrence score                             STAGE: pathologic T2N0  INTENT OF RADIOTHERAPY: adjuvant radiation therapy with a lumpectomy cavity boost.  CONCURRENT SYSTEMIC THERAPY:  No      ONCOLOGIC HISTORY:    Ms. Ravi is a 55 year old female left breast cancer.      Patient initially presented with a palpable left breast mass, prompting further evaluation.  Diagnostic imaging confirmed the presence of a left breast mass at the 10 o'clock position, 12 cm from the nipple.  On 8/15/2019 the patient underwent left breast biopsy.  Final pathology demonstrated ILC, grade 2, no LVSI, ER positive, TX positive, HER-2 negative.  On 2019 the patient underwent left breast lumpectomy and sentinel lymph node evaluation by Dr. Salas.  Final pathology demonstrated ILC, 4.5 cm in size, grade 2, no LVSI, negative margins status post reexcision, 0 out of 1 sentinel lymph node, ER positive, TX positive, HER-2 negative, pathologic T2N0.  The patient was subsequent seen by Dr. Blakely who ordered Oncotype, and recurrence score came back low at 20.  No systemic chemotherapy was recommended.  Anti-endocrine therapy was discussed.  The patient subsequently presented 10/29/19 in Radiation Oncology to discuss potential role of adjuvant whole breast radiation therapy.      Of note, the patient has a history of monoclonal gammopathy since .  She has been followed  annually by medical oncology with laboratory tests without any evidence of plasma cell dyscrasia     SITE OF TREATMENT: L breast     DATES  OF TREATMENT: 19-19     TOTAL DOSE OF TREATMENT: 5005 cGy     DOSE PER FRACTION OF TREATMENT: 267 cGy x 15 fractions + 200 cGy x 5 fractions       COMMENT/TOXICITY:   Mild left marleni-areolar tenderness reported. Patchy mild-moderate erythema without desquamation over irradiated field. Moderate erythema present in left marleni-areolar area. Hyperpigmentation but not desquamation appreciated in left inframammary fold                                           INTERVAL SINCE COMPLETION OF RADIATION THERAPY:   19 months     SUBJECTIVE:   Merlyn Ravi is a 56 year old female who is here today for routine follow up after completing radiation therapy.       21 Mammogram  was ELIZABETH.    Post RT has had moderate breast edema. Working with PT with improvement initially. Slightly worsened despite flexitouch compression device and massage techniques.     On Tamoxifen. Mild fatigue/ memory change at times. Overall tolerating.     Patient denies any new lumps, masses, or nipple discharge. No enlarged lymph nodes.    Tolerating Tamoxifen.       ROS otherwise negative on a 12-system review.  ECO         PHYSICAL EXAM:  /77 (BP Location: Right arm, Cuff Size: Adult Large)   Pulse 68   Resp 16   Wt 101.4 kg (223 lb 9.6 oz)   BMI 36.09 kg/m    General: in no acute distress, alert and oriented x3.   HEENT: Normocephalic, atraumatic.  PERRLA  Lymph Nodes: No palpable pre/post-auricular, cervical, or axillary lymphadenopathy appreciated.   CV: RRR, normal S1 S2.  No murmurs, gallops, or rubs.   Lungs: Clear to auscultation bilaterally.  No dullness to percussion.   Abdomen:  Soft and non tender. No palpable masses.    Extremities: no clubbing, cyanosis, or edema.   Neurologic: Alert and oriented x3. Cranial nerves II-XII grossly intact.   Breast:Well healed incisions to left  breast. No concerning lumps or masses bilaterally. No skin atrophy or telangiestasias. Moderate edema.    LABS AND IMAGIN21  Mammogram    IMPRESSION: BI-RADS CATEGORY: 2 - Benign.     RECOMMENDED FOLLOW-UP: Annual Mammography.  Recommend routine annual screening mammography.        IMPRESSION:   Ms. Ravi is a 56 year old female with a invasive lobular carcinoma of the left breast. She is status post lumpectomy and sentinel lymph node evaluation. Final pathology demonstrated ILC, 4.5 cm in size, grade 2, no LVSI, negative margins status post re-excision, 0 out of 1 sentinel lymph nodes, ER+, MT+, HER-2 negative disease, low Oncotype recurrence score. Pathologic T2N0. Completed adjuvant radiation therapy with a lumpectomy cavity boost to L breast 5005 cGy from 19-19. Adjuvant Tamoxifen (had a hysterectomy).        PLAN:   1. Clinically and radiographically ELIZABETH.   2. Breast edema. Sightly worsened despite flexitouch compression device and massage techniques. Will make referral again to PT to help.  3. Continue follow up with medical oncology and surgery team.   4. RTC in 6 months. Will see RILEY Nuno M.D.  Department of Radiation Oncology  [facility]

## 2021-07-12 NOTE — NURSING NOTE
FOLLOW-UP VISIT    Patient Name: Merlyn Ravi      : 1965     Age: 56 year old        ______________________________________________________________________________     Chief Complaint   Patient presents with     Radiation Therapy     Return visit, breast cancer     /77 (BP Location: Right arm, Cuff Size: Adult Large)   Pulse 68   Resp 16   Wt 101.4 kg (223 lb 9.6 oz)   BMI 36.09 kg/m       Date Radiation Completed: 19    Pain  Denies    Meds  Current Med List Reviewed: Yes  Medication Note:     Skin: no issues    Range of Motion: full    Respiratory: No shortness of breath, dyspnea on exertion, cough, or hemoptysis    Hormone Therapy: Yes, tolerating tamoxifen well    Lymphedema Follow up: no longer sees, but uses flexi-touch machine daily to manage lymphedema    Energy Level: normal      Appointments:     DATE  Oncologist: Reddy Upcoming apt 9/10/21   Surgeon: Josue Seen in May, /   Primary:      Other Notes:

## 2021-07-12 NOTE — LETTER
2021         RE: Merlyn Ravi  [address] [facility] [address]        Dear Colleague,    Thank you for referring your patient, Merlyn Ravi, to the RADIATION THERAPY CENTER. Please see a copy of my visit note below.       Department of Radiation Oncology  Radiation Therapy Center  [facility]  [place] [address]  [phone]         Radiation Oncology Follow-up Visit  21    Merlyn Ravi  MRN: [id number]   : 1965     DIAGNOSIS: invasive lobular carcinoma of the left breast. She is status post lumpectomy and sentinel lymph node evaluation  PATHOLOGY: Final pathology demonstrated ILC, 4.5 cm in size, grade 2, no LVSI, negative margins status post re-excision, 0 out of 1 sentinel lymph nodes, ER+, WA+, HER-2 negative disease, low Oncotype recurrence score                             STAGE: pathologic T2N0  INTENT OF RADIOTHERAPY: adjuvant radiation therapy with a lumpectomy cavity boost.  CONCURRENT SYSTEMIC THERAPY:  No      ONCOLOGIC HISTORY:    Ms. Ravi is a 55 year old female left breast cancer.      Patient initially presented with a palpable left breast mass, prompting further evaluation.  Diagnostic imaging confirmed the presence of a left breast mass at the 10 o'clock position, 12 cm from the nipple.  On 8/15/2019 the patient underwent left breast biopsy.  Final pathology demonstrated ILC, grade 2, no LVSI, ER positive, WA positive, HER-2 negative.  On 2019 the patient underwent left breast lumpectomy and sentinel lymph node evaluation by Dr. Salas.  Final pathology demonstrated ILC, 4.5 cm in size, grade 2, no LVSI, negative margins status post reexcision, 0 out of 1 sentinel lymph node, ER positive, WA positive, HER-2 negative, pathologic T2N0.  The patient was subsequent seen by Dr. Blakely who ordered Oncotype, and recurrence score came back low at 20.  No systemic chemotherapy was recommended.  Anti-endocrine therapy was  discussed.  The patient subsequently presented 10/29/19 in Radiation Oncology to discuss potential role of adjuvant whole breast radiation therapy.      Of note, the patient has a history of monoclonal gammopathy since .  She has been followed annually by medical oncology with laboratory tests without any evidence of plasma cell dyscrasia     SITE OF TREATMENT: L breast     DATES  OF TREATMENT: 19-19     TOTAL DOSE OF TREATMENT: 5005 cGy     DOSE PER FRACTION OF TREATMENT: 267 cGy x 15 fractions + 200 cGy x 5 fractions       COMMENT/TOXICITY:   Mild left marleni-areolar tenderness reported. Patchy mild-moderate erythema without desquamation over irradiated field. Moderate erythema present in left marleni-areolar area. Hyperpigmentation but not desquamation appreciated in left inframammary fold                                           INTERVAL SINCE COMPLETION OF RADIATION THERAPY:   19 months     SUBJECTIVE:   Merlyn Ravi is a 56 year old female who is here today for routine follow up after completing radiation therapy.       21 Mammogram  was ELIZABETH.    Post RT has had moderate breast edema. Working with PT with improvement initially. Slightly worsened despite flexitouch compression device and massage techniques.     On Tamoxifen. Mild fatigue/ memory change at times. Overall tolerating.     Patient denies any new lumps, masses, or nipple discharge. No enlarged lymph nodes.    Tolerating Tamoxifen.       ROS otherwise negative on a 12-system review.  ECO         PHYSICAL EXAM:  /77 (BP Location: Right arm, Cuff Size: Adult Large)   Pulse 68   Resp 16   Wt 101.4 kg (223 lb 9.6 oz)   BMI 36.09 kg/m    General: in no acute distress, alert and oriented x3.   HEENT: Normocephalic, atraumatic.  PERRLA  Lymph Nodes: No palpable pre/post-auricular, cervical, or axillary lymphadenopathy appreciated.   CV: RRR, normal S1 S2.  No murmurs, gallops, or rubs.   Lungs: Clear to auscultation  bilaterally.  No dullness to percussion.   Abdomen:  Soft and non tender. No palpable masses.    Extremities: no clubbing, cyanosis, or edema.   Neurologic: Alert and oriented x3. Cranial nerves II-XII grossly intact.   Breast:Well healed incisions to left breast. No concerning lumps or masses bilaterally. No skin atrophy or telangiestasias. Moderate edema.    LABS AND IMAGIN21  Mammogram    IMPRESSION: BI-RADS CATEGORY: 2 - Benign.     RECOMMENDED FOLLOW-UP: Annual Mammography.  Recommend routine annual screening mammography.        IMPRESSION:   Ms. Ravi is a 56 year old female with a invasive lobular carcinoma of the left breast. She is status post lumpectomy and sentinel lymph node evaluation. Final pathology demonstrated ILC, 4.5 cm in size, grade 2, no LVSI, negative margins status post re-excision, 0 out of 1 sentinel lymph nodes, ER+, WA+, HER-2 negative disease, low Oncotype recurrence score. Pathologic T2N0. Completed adjuvant radiation therapy with a lumpectomy cavity boost to L breast 5005 cGy from 19-19. Adjuvant Tamoxifen (had a hysterectomy).        PLAN:   1. Clinically and radiographically ELIZABETH.   2. Breast edema. Sightly worsened despite flexitouch compression device and massage techniques. Will make referral again to PT to help.  3. Continue follow up with medical oncology and surgery team.   4. RTC in 6 months. Will see RILEY Nuno M.D.  Department of Radiation Oncology  [facility]

## 2021-07-23 DIAGNOSIS — F43.23 ADJUSTMENT REACTION WITH ANXIETY AND DEPRESSION: ICD-10-CM

## 2021-07-23 DIAGNOSIS — G62.9 NEUROPATHY: ICD-10-CM

## 2021-07-23 DIAGNOSIS — R52 PAIN: ICD-10-CM

## 2021-07-23 RX ORDER — VENLAFAXINE HYDROCHLORIDE 37.5 MG/1
CAPSULE, EXTENDED RELEASE ORAL
Qty: 30 CAPSULE | Refills: 3 | Status: SHIPPED | OUTPATIENT
Start: 2021-07-23 | End: 2021-12-06 | Stop reason: DRUGHIGH

## 2021-07-23 RX ORDER — GABAPENTIN 100 MG/1
CAPSULE ORAL
Qty: 90 CAPSULE | Refills: 3 | Status: SHIPPED | OUTPATIENT
Start: 2021-07-23 | End: 2021-12-14

## 2021-07-23 NOTE — TELEPHONE ENCOUNTER
Routing refill request for gabapentin to provider for review/approval because: Drug not on the [facility] refill protocol     Routing refill request for venlafaxine to provider for review/approval because: PHQ-9 score > 5      PHQ 3/15/2021   PHQ-9 Total Score 13   Q9: Thoughts of better off dead/self-harm past 2 weeks Not at all

## 2021-08-10 ENCOUNTER — HOSPITAL ENCOUNTER (OUTPATIENT)
Dept: PHYSICAL THERAPY | Facility: CLINIC | Age: 56
Setting detail: THERAPIES SERIES
End: 2021-08-10
Attending: RADIOLOGY
Payer: COMMERCIAL

## 2021-08-10 DIAGNOSIS — C50.919 BREAST CANCER (H): ICD-10-CM

## 2021-08-10 DIAGNOSIS — I89.0 LYMPHEDEMA: ICD-10-CM

## 2021-08-10 PROCEDURE — 97161 PT EVAL LOW COMPLEX 20 MIN: CPT | Mod: GP | Performed by: PHYSICAL THERAPIST

## 2021-08-10 PROCEDURE — 97140 MANUAL THERAPY 1/> REGIONS: CPT | Mod: GP | Performed by: PHYSICAL THERAPIST

## 2021-08-10 NOTE — PROGRESS NOTES
08/10/21 0800   Rehab Discipline   Discipline PT   Type of Visit   Type of visit Initial Edema Evaluation   General Information   Start of care 08/10/21   Referring physician Dr. Marcia Nuno   Orders Evaluate and treat as indicated   Order date 07/12/21   Medical diagnosis L UQ secondary lymphedema   Onset of illness / date of surgery   (2019 cancer and surgery)   Edema onset 07/12/21  (referral date)   Affected body parts   (L chest and breast, L lat chest wall)   Edema etiology Cancer with lymph node dissection;Radiation;Surgery   Pertinent history of current problem (PT: include personal factors and/or comorbidities that impact the POC; OT: include additional occupational profile info) L breast lumpectomy 2019, radiation, saw PT for lymphedema and cording. Had sentinal node eval as well. Now with worsening breast edema.   Surgical / medical history reviewed Yes   Prior level of functional mobility indep   Prior treatment Compression garments;Exercise;Compression pump;MLD  (flexitouch)   Community support Family / friend caregiver   Assistive device comments   (none)   Fall Risk Screen   Fall screen completed by PT   Have you fallen 2 or more times in the past year? No   Have you fallen and had an injury in the past year? No   Abuse Screen (yes response referral indicated)   Feels Unsafe at Home or Work/School no   Feels Threatened by Someone no   System Outcome Measures   Outcome Measures Lymphedema   Lymphedema Life Impact Scale (score range 0-72). A higher score indicates greater impairment. 10   Subjective Report   Patient report of symptoms fullness to L breast (whole thing). a litte too on chest wall and back, nothing on arm, not affecting ROM   Patient / Family Goals   Patient / family goals statement decrease swelling   Pain   Patient currently in pain No  (at rest)   Pain comments areas tender with palpation to L breast   Cognitive Status   Orientation Orientation to person, place and time   Edema Exam  / Assessment   Skin condition Non-pitting   Skin condition comments distal medial and mid to distal lateral breast firm and tender with palpation   Scar Yes   Mobility L superior lateral scal good skin mobility, areas of firmness   Girth Measurements   Girth Measurements Refer to separate girth measurement flowsheet  (breast: L-48.5 cm, R- 44.3 cm 8.66% difference)   Volume UE   Right UE (mL) 1974.89   Left UE (mL) 1992.41   UE volume comparison LUE volume greater than RUE volume   % difference 0.88%   Range of Motion   ROM No deficits were identified   Strength   Strength No deficits were identified   Palpation   Palpation L breast and lateral chest wall firm fluid, areas of fibrosis noted on breast medial/inf and mid/distal on lateral side   Activities of Daily Living   Activities of Daily Living indep   Gait / Locomotion   Gait / Locomotion indep   Planned Edema Interventions   Planned edema interventions Manual lymph drainage;Fit for compression garment;Exercises;Precautions to prevent infection / exacerbation;Education;Manual therapy;Skin care / precautions;Myofascial release;Home management program development   Clinical Impression   Criteria for skilled therapeutic intervention met Yes   Therapy diagnosis L UQ/breast secondary lymphedema   Influenced by the following impairments / conditions Stage 2   Functional limitations due to impairments / conditions tight fitting clothes, fullness/tight skin, itching when has increased edema   Clinical Presentation Stable/Uncomplicated   Clinical Presentation Rationale clinical judgement based on assessment today   Clinical Decision Making (Complexity) Low complexity   Treatment Frequency 2x/week   Treatment duration 12 wks   Patient / family and/or staff in agreement with plan of care Yes   Risks and benefits of therapy have been explained Yes   Education Assessment   Preferred learning style Listening   Barriers to learning No barriers   Goals   Edema Eval Goals  1;2;3;4   Goal 1   Goal identifier stg 1   Goal description pt to be independent with self-MLD of L-breast to decrease lymphedema and fibrosis for improved comfort with overhead activity/ADL   Target date 09/09/21   Goal 2   Goal identifier ltg 1   Goal description pt to be independent with longterm LUQ lymphedema management via HEP, skin cares, compression garment wear/use and self-MLD   Target date 11/08/21   Goal 3   Goal identifier ltg 2   Goal description pt to state signs/Dx of infection and report what to do if notices those, to decrease risk of cellulitis or infection longterm   Target date 11/08/21   Goal 4   Goal identifier ltg 3   Goal description pt to have at least 3 point improvement on LLIS due to decreased lympehdema, fibrosis and related symptoms in LUQ   Target date 11/08/21   Total Evaluation Time   PT Eval, Low Complexity Minutes (73307) 10

## 2021-08-12 ENCOUNTER — HOSPITAL ENCOUNTER (OUTPATIENT)
Dept: PHYSICAL THERAPY | Facility: CLINIC | Age: 56
Setting detail: THERAPIES SERIES
End: 2021-08-12
Attending: RADIOLOGY
Payer: COMMERCIAL

## 2021-08-12 PROCEDURE — 97140 MANUAL THERAPY 1/> REGIONS: CPT | Mod: GP | Performed by: REHABILITATION PRACTITIONER

## 2021-08-17 ENCOUNTER — HOSPITAL ENCOUNTER (OUTPATIENT)
Dept: PHYSICAL THERAPY | Facility: CLINIC | Age: 56
Setting detail: THERAPIES SERIES
End: 2021-08-17
Attending: RADIOLOGY
Payer: COMMERCIAL

## 2021-08-17 PROCEDURE — 97140 MANUAL THERAPY 1/> REGIONS: CPT | Mod: GP | Performed by: REHABILITATION PRACTITIONER

## 2021-08-19 ENCOUNTER — HOSPITAL ENCOUNTER (OUTPATIENT)
Dept: PHYSICAL THERAPY | Facility: CLINIC | Age: 56
Setting detail: THERAPIES SERIES
End: 2021-08-19
Attending: RADIOLOGY
Payer: COMMERCIAL

## 2021-08-19 PROCEDURE — 97140 MANUAL THERAPY 1/> REGIONS: CPT | Mod: GP | Performed by: REHABILITATION PRACTITIONER

## 2021-08-19 NOTE — TELEPHONE ENCOUNTER
"Requested Prescriptions   Pending Prescriptions Disp Refills     atorvastatin (LIPITOR) 10 MG tablet 90 tablet 3     Sig: Take 1 tablet (10 mg) by mouth daily       Statins Protocol Passed - 12/12/2019 10:49 PM        Passed - LDL on file in past 12 months     Recent Labs   Lab Test 10/08/19  0855   *             Passed - No abnormal creatine kinase in past 12 months     No lab results found.             Passed - Recent (12 mo) or future (30 days) visit within the authorizing provider's specialty     Patient has had an office visit with the authorizing provider or a provider within the authorizing providers department within the previous 12 mos or has a future within next 30 days. See \"Patient Info\" tab in inbasket, or \"Choose Columns\" in Meds & Orders section of the refill encounter.              Passed - Medication is active on med list        Passed - Patient is age 18 or older        Passed - No active pregnancy on record        Passed - No positive pregnancy test in past 12 months        Last Written Prescription Date:  12/11/19  Last Fill Quantity: 90,  # refills: 3   Last office visit: 8/21/2019 with prescribing provider:  Jose   Future Office Visit:      " no

## 2021-08-25 ENCOUNTER — HOSPITAL ENCOUNTER (OUTPATIENT)
Dept: PHYSICAL THERAPY | Facility: CLINIC | Age: 56
Setting detail: THERAPIES SERIES
End: 2021-08-25
Attending: RADIOLOGY
Payer: COMMERCIAL

## 2021-08-25 PROCEDURE — 97140 MANUAL THERAPY 1/> REGIONS: CPT | Mod: GP | Performed by: PHYSICAL THERAPIST

## 2021-09-02 ENCOUNTER — HOSPITAL ENCOUNTER (OUTPATIENT)
Dept: PHYSICAL THERAPY | Facility: CLINIC | Age: 56
Setting detail: THERAPIES SERIES
End: 2021-09-02
Attending: RADIOLOGY
Payer: COMMERCIAL

## 2021-09-02 PROCEDURE — 97140 MANUAL THERAPY 1/> REGIONS: CPT | Mod: GP | Performed by: REHABILITATION PRACTITIONER

## 2021-09-07 ENCOUNTER — HOSPITAL ENCOUNTER (OUTPATIENT)
Dept: PHYSICAL THERAPY | Facility: CLINIC | Age: 56
Setting detail: THERAPIES SERIES
End: 2021-09-07
Attending: RADIOLOGY
Payer: COMMERCIAL

## 2021-09-07 ENCOUNTER — LAB (OUTPATIENT)
Dept: LAB | Facility: CLINIC | Age: 56
End: 2021-09-07
Attending: INTERNAL MEDICINE
Payer: COMMERCIAL

## 2021-09-07 DIAGNOSIS — C50.912 INVASIVE LOBULAR CARCINOMA OF LEFT BREAST IN FEMALE (H): ICD-10-CM

## 2021-09-07 LAB
ALBUMIN SERPL-MCNC: 3.3 G/DL (ref 3.4–5)
ALP SERPL-CCNC: 78 U/L (ref 40–150)
ALT SERPL W P-5'-P-CCNC: 22 U/L (ref 0–50)
ANION GAP SERPL CALCULATED.3IONS-SCNC: 3 MMOL/L (ref 3–14)
AST SERPL W P-5'-P-CCNC: 17 U/L (ref 0–45)
BASOPHILS # BLD AUTO: 0 10E3/UL (ref 0–0.2)
BASOPHILS NFR BLD AUTO: 1 %
BILIRUB SERPL-MCNC: 0.3 MG/DL (ref 0.2–1.3)
BUN SERPL-MCNC: 14 MG/DL (ref 7–30)
CALCIUM SERPL-MCNC: 8.4 MG/DL (ref 8.5–10.1)
CHLORIDE BLD-SCNC: 110 MMOL/L (ref 94–109)
CO2 SERPL-SCNC: 29 MMOL/L (ref 20–32)
CREAT SERPL-MCNC: 0.85 MG/DL (ref 0.52–1.04)
EOSINOPHIL # BLD AUTO: 0.2 10E3/UL (ref 0–0.7)
EOSINOPHIL NFR BLD AUTO: 4 %
ERYTHROCYTE [DISTWIDTH] IN BLOOD BY AUTOMATED COUNT: 12.2 % (ref 10–15)
GFR SERPL CREATININE-BSD FRML MDRD: 77 ML/MIN/1.73M2
GLUCOSE BLD-MCNC: 91 MG/DL (ref 70–99)
HCT VFR BLD AUTO: 39.4 % (ref 35–47)
HGB BLD-MCNC: 13 G/DL (ref 11.7–15.7)
IMM GRANULOCYTES # BLD: 0 10E3/UL
IMM GRANULOCYTES NFR BLD: 0 %
LYMPHOCYTES # BLD AUTO: 1.9 10E3/UL (ref 0.8–5.3)
LYMPHOCYTES NFR BLD AUTO: 40 %
MCH RBC QN AUTO: 31.2 PG (ref 26.5–33)
MCHC RBC AUTO-ENTMCNC: 33 G/DL (ref 31.5–36.5)
MCV RBC AUTO: 95 FL (ref 78–100)
MONOCYTES # BLD AUTO: 0.4 10E3/UL (ref 0–1.3)
MONOCYTES NFR BLD AUTO: 9 %
NEUTROPHILS # BLD AUTO: 2.1 10E3/UL (ref 1.6–8.3)
NEUTROPHILS NFR BLD AUTO: 46 %
NRBC # BLD AUTO: 0 10E3/UL
NRBC BLD AUTO-RTO: 0 /100
PLATELET # BLD AUTO: 228 10E3/UL (ref 150–450)
POTASSIUM BLD-SCNC: 3.9 MMOL/L (ref 3.4–5.3)
PROT SERPL-MCNC: 7.3 G/DL (ref 6.8–8.8)
RBC # BLD AUTO: 4.17 10E6/UL (ref 3.8–5.2)
SODIUM SERPL-SCNC: 142 MMOL/L (ref 133–144)
WBC # BLD AUTO: 4.6 10E3/UL (ref 4–11)

## 2021-09-07 PROCEDURE — 97140 MANUAL THERAPY 1/> REGIONS: CPT | Mod: GP | Performed by: REHABILITATION PRACTITIONER

## 2021-09-07 PROCEDURE — 82040 ASSAY OF SERUM ALBUMIN: CPT

## 2021-09-07 PROCEDURE — 85025 COMPLETE CBC W/AUTO DIFF WBC: CPT

## 2021-09-07 PROCEDURE — 86300 IMMUNOASSAY TUMOR CA 15-3: CPT

## 2021-09-07 PROCEDURE — 36415 COLL VENOUS BLD VENIPUNCTURE: CPT

## 2021-09-08 LAB — CANCER AG27-29 SERPL-ACNC: <5 U/ML (ref 0–39)

## 2021-09-09 ENCOUNTER — HOSPITAL ENCOUNTER (OUTPATIENT)
Dept: PHYSICAL THERAPY | Facility: CLINIC | Age: 56
Setting detail: THERAPIES SERIES
End: 2021-09-09
Attending: RADIOLOGY
Payer: COMMERCIAL

## 2021-09-09 PROCEDURE — 97140 MANUAL THERAPY 1/> REGIONS: CPT | Mod: GP | Performed by: PHYSICAL THERAPIST

## 2021-09-09 NOTE — PROGRESS NOTES
Outpatient Physical Therapy Progress Note     Patient: Merlyn Ravi  : 1965    Beginning/End Dates of Reporting Period:  8/10/21 to 2021     Referring Provider: Dr. Marcia Nuno    Therapy Diagnosis: L UQ/breast secondary lymphedema     Client Self Report: middle near sternum sore to touch, some nerve pain down L arm into elbow a little during the day but sleeping at night has been tough last few nights, if moves arm can relieve it a bit, using pump daily    Objective Measurements:   L breast girth 48.3 cm    Goals:  Goal Identifier stg 1   Goal Description pt to be independent with self-MLD of L-breast to decrease lymphedema and fibrosis for improved comfort with overhead activity/ADL   Target Date 21   Date Met  21   Progress (detail required for progress note):       Goal Identifier ltg 1   Goal Description pt to be independent with longterm LUQ lymphedema management via HEP, skin cares, compression garment wear/use and self-MLD   Target Date 21   Date Met      Progress (detail required for progress note): compression pad for in bra is not contacting the medial part of L breast, went over options for different bras to get better fit     Goal Identifier ltg 2   Goal Description pt to state signs/Dx of infection and report what to do if notices those, to decrease risk of cellulitis or infection longterm   Target Date 21   Date Met  21   Progress (detail required for progress note):       Goal Identifier ltg 3   Goal Description pt to have at least 3 point improvement on LLIS due to decreased lympehdema, fibrosis and related symptoms in LUQ   Target Date 21   Date Met      Progress (detail required for progress note):       Plan:  Continue therapy per current plan of care. Will try Lymphatouch for the fibrosis, likely see 2x a wk x 2-3 weeks    Discharge:  No

## 2021-09-12 ENCOUNTER — HEALTH MAINTENANCE LETTER (OUTPATIENT)
Age: 56
End: 2021-09-12

## 2021-09-14 ENCOUNTER — MYC MEDICAL ADVICE (OUTPATIENT)
Dept: SURGERY | Facility: CLINIC | Age: 56
End: 2021-09-14

## 2021-09-14 ENCOUNTER — HOSPITAL ENCOUNTER (OUTPATIENT)
Dept: PHYSICAL THERAPY | Facility: CLINIC | Age: 56
Setting detail: THERAPIES SERIES
End: 2021-09-14
Attending: RADIOLOGY
Payer: COMMERCIAL

## 2021-09-14 PROCEDURE — 97140 MANUAL THERAPY 1/> REGIONS: CPT | Mod: GP | Performed by: REHABILITATION PRACTITIONER

## 2021-09-14 NOTE — TELEPHONE ENCOUNTER
FYI for upcoming appointment on 9/16    Please let me know if you would like the patient directed elsewhere for lab review.     Thanks,   Maria Isabel HOUSTON RN   Specialty Clinics

## 2021-09-16 ENCOUNTER — OFFICE VISIT (OUTPATIENT)
Dept: SURGERY | Facility: CLINIC | Age: 56
End: 2021-09-16
Payer: COMMERCIAL

## 2021-09-16 VITALS
RESPIRATION RATE: 18 BRPM | DIASTOLIC BLOOD PRESSURE: 79 MMHG | HEART RATE: 74 BPM | SYSTOLIC BLOOD PRESSURE: 129 MMHG | OXYGEN SATURATION: 97 %

## 2021-09-16 DIAGNOSIS — C50.919 INVASIVE LOBULAR CARCINOMA OF BREAST IN FEMALE (H): Primary | ICD-10-CM

## 2021-09-16 PROCEDURE — 99213 OFFICE O/P EST LOW 20 MIN: CPT | Performed by: SURGERY

## 2021-09-16 NOTE — LETTER
9/16/2021         RE: Merlyn Ravi  [address] [facility] [address] [facility] [id number]        Dear Colleague,    Thank you for referring your patient, Merlyn Ravi, to the [facility]. Please see a copy of my visit note below.    BREAST CANCER FOLLOW UP  CHIEF COMPLAINT  Chief Complaint   Patient presents with     RECHECK     Invasive lobular carcinoma of breast in female      HPI  Merlyn Ravi is a 56 year old female who presents with   Chief Complaint   Patient presents with     RECHECK     Invasive lobular carcinoma of breast in female      The patient presents for her breast cancer followup appointment. Overall, she is feeling good. She denies any significant fatigue, fevers, chills, or changes in appetite. She has not suffered any significant weight loss.  She has not noted any areas of skin changes or any masses in the breast or chest wall that she is concerned about.  She denies skin dimpling, puckering, erythema, or nipple discharge. She has not noted any palpable axillary lymphadenopathy.  She does complain of continued left breast swelling, improved after physical therapy.    Review of Systems  10 point ROS otherwise negative    PAST MEDICAL HISTORY  Past Medical History:   Diagnosis Date     Allergies      Dermatofibroma 5/14/2012     FAMILY HISTORY  Family History   Problem Relation Age of Onset     Thyroid Disease Mother      Heart Disease Mother      Heart Disease Father      Hyperlipidemia Father      Hypertension Maternal Grandmother      Breast Cancer Maternal Grandmother      Hypertension Maternal Grandfather      Alzheimer Disease Paternal Grandmother      Diabetes Brother      Eczema Brother      Thyroid Disease Brother      Melanoma No family hx of      SOCIAL HISTORY  Social History     Socioeconomic History     Marital status:      Spouse name: None     Number of children: None     Years of education: None     Highest education level: None    Occupational History     Employer: [employer]   Tobacco Use     Smoking status: Never Smoker     Smokeless tobacco: Never Used   Substance and Sexual Activity     Alcohol use: Yes     Alcohol/week: 0.0 standard drinks     Comment: 3 drinks per month     Drug use: No     Sexual activity: Yes     Partners: Male     Birth control/protection: Surgical     Comment: defer to md   Other Topics Concern     Parent/sibling w/ CABG, MI or angioplasty before 65F 55M? No   Social History Narrative     None     Social Determinants of Health     Financial Resource Strain:      Difficulty of Paying Living Expenses:    Food Insecurity:      Worried About Running Out of Food in the Last Year:      Ran Out of Food in the Last Year:    Transportation Needs:      Lack of Transportation (Medical):      Lack of Transportation (Non-Medical):    Physical Activity:      Days of Exercise per Week:      Minutes of Exercise per Session:    Stress:      Feeling of Stress :    Social Connections:      Frequency of Communication with Friends and Family:      Frequency of Social Gatherings with Friends and Family:      Attends [employer] Services:      Active Member of Clubs or Organizations:      Attends Club or Organization Meetings:      Marital Status:    Intimate Partner Violence:      Fear of Current or Ex-Partner:      Emotionally Abused:      Physically Abused:      Sexually Abused:      SURGICAL HISTORY  Past Surgical History:   Procedure Laterality Date     HYSTERECTOMY, PAP NO LONGER INDICATED       HYSTERECTOMY, MAGY       LASIK BILATERAL  2005    Dr. Slois      LUMPECTOMY BREAST Left 9/30/2019    Procedure: Re-excision of left breast lumpectomy site;  Surgeon: Kj Salas DO;  Location: [facility]     LUMPECTOMY BREAST Left 10/9/2019    Procedure: Left breast re-excision;  Surgeon: Kj Salas DO;  Location: [facility] OR     LUMPECTOMY BREAST WITH SENTINEL NODE, COMBINED Left 9/23/2019    Procedure: LUMPECTOMY, BREAST,  WITH SENTINEL LYMPH NODE BIOPSY.;  Surgeon: Kj Salas DO;  Location: [facility] OR     [facility] NONSPECIFIC PROCEDURE      bladder surgery     CURRENT MEDICATIONS    Current Outpatient Medications:      atorvastatin (LIPITOR) 10 MG tablet, TAKE 1 TABLET(10 MG) BY MOUTH DAILY, Disp: 90 tablet, Rfl: 1     calcium 500-125 MG-UNIT TABS, Take 2 tablets by mouth 2 times daily, Disp: , Rfl:      doxycycline monohydrate (MONODOX) 50 MG capsule, 1 tab po daily, Disp: 90 capsule, Rfl: 3     FLUoxetine (PROZAC) 10 MG capsule, TAKE 1 CAPSULE BY MOUTH DAILY, Disp: 30 capsule, Rfl: 3     gabapentin (NEURONTIN) 100 MG capsule, TAKE 1 CAPSULE BY MOUTH EVERY MORNING AND 2 CAPSULES EVERY EVENING, Disp: 90 capsule, Rfl: 3     IBUPROFEN PO, Take 800 mg by mouth every 4 hours as needed for moderate pain, Disp: , Rfl:      levothyroxine (SYNTHROID/LEVOTHROID) 88 MCG tablet, Take 1 tablet (88 mcg) by mouth daily, Disp: 90 tablet, Rfl: 3     Naproxen Sodium (ALEVE PO), Take 220 mg by mouth 2 times daily (with meals), Disp: , Rfl:      tamoxifen (NOLVADEX) 20 MG tablet, Take 1 tablet (20 mg) by mouth daily, Disp: 90 tablet, Rfl: 1     tolterodine ER (DETROL LA) 2 MG 24 hr capsule, TAKE 1 CAPSULE(2 MG) BY MOUTH DAILY, Disp: 90 capsule, Rfl: 0     venlafaxine (EFFEXOR-XR) 37.5 MG 24 hr capsule, TAKE 1 CAPSULE(37.5 MG) BY MOUTH DAILY, Disp: 30 capsule, Rfl: 3  ALLERGIES  Allergies   Allergen Reactions     Demerol      Sedation and vomiting     Meperidine      Sulfa Drugs      unknown reaction     PHYSICAL EXAM  VITAL SIGNS:  blood pressure is 129/79 and her pulse is 74. Her respiration is 18 and oxygen saturation is 97%.   Constitutional: Well developed, Well nourished, No acute distress, Non-toxic appearance.   HENT: Normocephalic, Atraumatic, Bilateral external ears normal, Oropharynx moist, No oral exudates, Nose normal.   Eyes: EOMI, Conjunctiva normal, No discharge.   Neck: Normal range of motion, No tenderness, Supple, No stridor.    Lymphatic: No lymphadenopathy noted.   Cardiovascular: Normal heart rate, Normal rhythm, No murmurs, No rubs, No gallops.   Breast Exam: Examined with Britni Santillan MA  RIGHT: No areas of skin dimpling or puckering. No erythema. No skin scaling or changes. No expressible nipple discharge. No focal areas of significant tenderness. .  No palpable axillary lymphadenopathy.  LEFT: Edema of breast. No erythema. No skin scaling or changes. No expressible nipple discharge. No focal areas of significant tenderness. Scar without mass.  No focal mass.  No palpable axillary lymphadenopathy.  Thorax & Lungs: Normal breath sounds, No respiratory distress, No wheezing, No chest tenderness.   Abdomen: Bowel sounds normal, Soft, No masses, No pulsatile masses.   Skin: Warm, Dry, No erythema, No rash.   Back: No tenderness, No CVA tenderness.   Extremities: Intact distal pulses, No edema, No tenderness, No cyanosis, No clubbing.   Musculoskeletal: Good range of motion in all major joints. No tenderness to palpation or major deformities noted.   Neurologic: Alert & oriented x 3, Normal motor function, Normal sensory function, No focal deficits noted.   Psychiatric: Affect normal, Judgment normal, Mood normal.     Imaging Studies:   Mammogram 5/21 was negative, BIRADs 2      FINAL IMPRESSION AND PLAN  1. Invasive lobular carcinoma of breast in female (H)      The patient is 23 months out from her breast cancer surgery. There is no evidence of recurrent disease on my exam today. She is continuing to recover well from her breast cancer treatment.   She will continue periodic self breast or chest wall exam. She is encouraged to followup with me for any changes on self-exam, or for any concerns or questions. She will followup with her primary care provider for routine care, and continue followup with her oncologist as scheduled.  My recommendations were discussed with the patient. She will see me in 6 months for another exam. Patient  will be scheduled for any indicated mammography or additional imaging.     Kj Salas,  on 9/16/2021 at 10:17 AM            Again, thank you for allowing me to participate in the care of your patient.        Sincerely,        Kj Salas, DO

## 2021-09-16 NOTE — PATIENT INSTRUCTIONS
Per physician instructions.    If you have questions or concerns on any instructions given to you by your provider today or if you need to schedule an appointment, you can reach us at [phone].  Listen to the menu for the Specialty Clinic option.      Thank you!

## 2021-09-16 NOTE — NURSING NOTE
"Chief Complaint   Patient presents with     RECHECK     Invasive lobular carcinoma of breast in female        Initial /79 (BP Location: Right arm, Patient Position: Chair, Cuff Size: Adult Large)   Pulse 74   Resp 18   SpO2 97%  Estimated body mass index is 36.09 kg/m  as calculated from the following:    Height as of 5/12/21: 1.676 m (5' 6\").    Weight as of 7/12/21: 101.4 kg (223 lb 9.6 oz).  BP completed using cuff size: large   Medications and allergies reviewed.      Britni VALDOVINOS CMA     "

## 2021-09-16 NOTE — PROGRESS NOTES
BREAST CANCER FOLLOW UP  CHIEF COMPLAINT  Chief Complaint   Patient presents with     RECHECK     Invasive lobular carcinoma of breast in female      HPI  Merlyn Ravi is a 56 year old female who presents with   Chief Complaint   Patient presents with     RECHECK     Invasive lobular carcinoma of breast in female      The patient presents for her breast cancer followup appointment. Overall, she is feeling good. She denies any significant fatigue, fevers, chills, or changes in appetite. She has not suffered any significant weight loss.  She has not noted any areas of skin changes or any masses in the breast or chest wall that she is concerned about.  She denies skin dimpling, puckering, erythema, or nipple discharge. She has not noted any palpable axillary lymphadenopathy.  She does complain of continued left breast swelling, improved after physical therapy.    Review of Systems  10 point ROS otherwise negative    PAST MEDICAL HISTORY  Past Medical History:   Diagnosis Date     Allergies      Dermatofibroma 5/14/2012     FAMILY HISTORY  Family History   Problem Relation Age of Onset     Thyroid Disease Mother      Heart Disease Mother      Heart Disease Father      Hyperlipidemia Father      Hypertension Maternal Grandmother      Breast Cancer Maternal Grandmother      Hypertension Maternal Grandfather      Alzheimer Disease Paternal Grandmother      Diabetes Brother      Eczema Brother      Thyroid Disease Brother      Melanoma No family hx of      SOCIAL HISTORY  Social History     Socioeconomic History     Marital status:      Spouse name: None     Number of children: None     Years of education: None     Highest education level: None   Occupational History     Employer: [employer]   Tobacco Use     Smoking status: Never Smoker     Smokeless tobacco: Never Used   Substance and Sexual Activity     Alcohol use: Yes     Alcohol/week: 0.0 standard drinks     Comment: 3 drinks per month     Drug use: No      Sexual activity: Yes     Partners: Male     Birth control/protection: Surgical     Comment: defer to md   Other Topics Concern     Parent/sibling w/ CABG, MI or angioplasty before 65F 55M? No   Social History Narrative     None     Social Determinants of Health     Financial Resource Strain:      Difficulty of Paying Living Expenses:    Food Insecurity:      Worried About Running Out of Food in the Last Year:      Ran Out of Food in the Last Year:    Transportation Needs:      Lack of Transportation (Medical):      Lack of Transportation (Non-Medical):    Physical Activity:      Days of Exercise per Week:      Minutes of Exercise per Session:    Stress:      Feeling of Stress :    Social Connections:      Frequency of Communication with Friends and Family:      Frequency of Social Gatherings with Friends and Family:      Attends Evangelical Services:      Active Member of Clubs or Organizations:      Attends Club or Organization Meetings:      Marital Status:    Intimate Partner Violence:      Fear of Current or Ex-Partner:      Emotionally Abused:      Physically Abused:      Sexually Abused:      SURGICAL HISTORY  Past Surgical History:   Procedure Laterality Date     HYSTERECTOMY, PAP NO LONGER INDICATED       HYSTERECTOMY, MAGY       LASIK BILATERAL  2005    Dr. Solis      LUMPECTOMY BREAST Left 9/30/2019    Procedure: Re-excision of left breast lumpectomy site;  Surgeon: Kj Salas DO;  Location: [facility]     LUMPECTOMY BREAST Left 10/9/2019    Procedure: Left breast re-excision;  Surgeon: Kj Salas DO;  Location: [facility]     LUMPECTOMY BREAST WITH SENTINEL NODE, COMBINED Left 9/23/2019    Procedure: LUMPECTOMY, BREAST, WITH SENTINEL LYMPH NODE BIOPSY.;  Surgeon: Kj Salas DO;  Location: [facility] OR     [facility] NONSPECIFIC PROCEDURE      bladder surgery     CURRENT MEDICATIONS    Current Outpatient Medications:      atorvastatin (LIPITOR) 10 MG tablet, TAKE 1 TABLET(10 MG) BY MOUTH  DAILY, Disp: 90 tablet, Rfl: 1     calcium 500-125 MG-UNIT TABS, Take 2 tablets by mouth 2 times daily, Disp: , Rfl:      doxycycline monohydrate (MONODOX) 50 MG capsule, 1 tab po daily, Disp: 90 capsule, Rfl: 3     FLUoxetine (PROZAC) 10 MG capsule, TAKE 1 CAPSULE BY MOUTH DAILY, Disp: 30 capsule, Rfl: 3     gabapentin (NEURONTIN) 100 MG capsule, TAKE 1 CAPSULE BY MOUTH EVERY MORNING AND 2 CAPSULES EVERY EVENING, Disp: 90 capsule, Rfl: 3     IBUPROFEN PO, Take 800 mg by mouth every 4 hours as needed for moderate pain, Disp: , Rfl:      levothyroxine (SYNTHROID/LEVOTHROID) 88 MCG tablet, Take 1 tablet (88 mcg) by mouth daily, Disp: 90 tablet, Rfl: 3     Naproxen Sodium (ALEVE PO), Take 220 mg by mouth 2 times daily (with meals), Disp: , Rfl:      tamoxifen (NOLVADEX) 20 MG tablet, Take 1 tablet (20 mg) by mouth daily, Disp: 90 tablet, Rfl: 1     tolterodine ER (DETROL LA) 2 MG 24 hr capsule, TAKE 1 CAPSULE(2 MG) BY MOUTH DAILY, Disp: 90 capsule, Rfl: 0     venlafaxine (EFFEXOR-XR) 37.5 MG 24 hr capsule, TAKE 1 CAPSULE(37.5 MG) BY MOUTH DAILY, Disp: 30 capsule, Rfl: 3  ALLERGIES  Allergies   Allergen Reactions     Demerol      Sedation and vomiting     Meperidine      Sulfa Drugs      unknown reaction     PHYSICAL EXAM  VITAL SIGNS:  blood pressure is 129/79 and her pulse is 74. Her respiration is 18 and oxygen saturation is 97%.   Constitutional: Well developed, Well nourished, No acute distress, Non-toxic appearance.   HENT: Normocephalic, Atraumatic, Bilateral external ears normal, Oropharynx moist, No oral exudates, Nose normal.   Eyes: EOMI, Conjunctiva normal, No discharge.   Neck: Normal range of motion, No tenderness, Supple, No stridor.   Lymphatic: No lymphadenopathy noted.   Cardiovascular: Normal heart rate, Normal rhythm, No murmurs, No rubs, No gallops.   Breast Exam: Examined with Britni Santillan MA  RIGHT: No areas of skin dimpling or puckering. No erythema. No skin scaling or changes. No expressible  nipple discharge. No focal areas of significant tenderness. .  No palpable axillary lymphadenopathy.  LEFT: Edema of breast. No erythema. No skin scaling or changes. No expressible nipple discharge. No focal areas of significant tenderness. Scar without mass.  No focal mass.  No palpable axillary lymphadenopathy.  Thorax & Lungs: Normal breath sounds, No respiratory distress, No wheezing, No chest tenderness.   Abdomen: Bowel sounds normal, Soft, No masses, No pulsatile masses.   Skin: Warm, Dry, No erythema, No rash.   Back: No tenderness, No CVA tenderness.   Extremities: Intact distal pulses, No edema, No tenderness, No cyanosis, No clubbing.   Musculoskeletal: Good range of motion in all major joints. No tenderness to palpation or major deformities noted.   Neurologic: Alert & oriented x 3, Normal motor function, Normal sensory function, No focal deficits noted.   Psychiatric: Affect normal, Judgment normal, Mood normal.     Imaging Studies:   Mammogram 5/21 was negative, BIRADs 2      FINAL IMPRESSION AND PLAN  1. Invasive lobular carcinoma of breast in female (H)      The patient is 23 months out from her breast cancer surgery. There is no evidence of recurrent disease on my exam today. She is continuing to recover well from her breast cancer treatment.   She will continue periodic self breast or chest wall exam. She is encouraged to followup with me for any changes on self-exam, or for any concerns or questions. She will followup with her primary care provider for routine care, and continue followup with her oncologist as scheduled.  My recommendations were discussed with the patient. She will see me in 6 months for another exam. Patient will be scheduled for any indicated mammography or additional imaging.     Kj Salas, DO on 9/16/2021 at 10:17 AM

## 2021-09-21 ENCOUNTER — HOSPITAL ENCOUNTER (OUTPATIENT)
Dept: PHYSICAL THERAPY | Facility: CLINIC | Age: 56
Setting detail: THERAPIES SERIES
End: 2021-09-21
Attending: RADIOLOGY
Payer: COMMERCIAL

## 2021-09-21 PROCEDURE — 97140 MANUAL THERAPY 1/> REGIONS: CPT | Mod: GP | Performed by: REHABILITATION PRACTITIONER

## 2021-09-23 ENCOUNTER — HOSPITAL ENCOUNTER (OUTPATIENT)
Dept: PHYSICAL THERAPY | Facility: CLINIC | Age: 56
Setting detail: THERAPIES SERIES
End: 2021-09-23
Attending: RADIOLOGY
Payer: COMMERCIAL

## 2021-09-23 PROCEDURE — 97140 MANUAL THERAPY 1/> REGIONS: CPT | Mod: GP | Performed by: PHYSICAL THERAPIST

## 2021-09-27 ENCOUNTER — ONCOLOGY VISIT (OUTPATIENT)
Dept: ONCOLOGY | Facility: CLINIC | Age: 56
End: 2021-09-27
Attending: NURSE PRACTITIONER
Payer: COMMERCIAL

## 2021-09-27 VITALS
HEART RATE: 70 BPM | OXYGEN SATURATION: 95 % | TEMPERATURE: 97.5 F | RESPIRATION RATE: 18 BRPM | BODY MASS INDEX: 35.81 KG/M2 | SYSTOLIC BLOOD PRESSURE: 131 MMHG | HEIGHT: 66 IN | DIASTOLIC BLOOD PRESSURE: 81 MMHG | WEIGHT: 222.8 LBS

## 2021-09-27 DIAGNOSIS — D47.2 MONOCLONAL PARAPROTEINEMIA: ICD-10-CM

## 2021-09-27 DIAGNOSIS — C50.912 INVASIVE LOBULAR CARCINOMA OF LEFT BREAST IN FEMALE (H): Primary | ICD-10-CM

## 2021-09-27 PROCEDURE — 99214 OFFICE O/P EST MOD 30 MIN: CPT | Performed by: NURSE PRACTITIONER

## 2021-09-27 RX ORDER — TAMOXIFEN CITRATE 20 MG/1
20 TABLET ORAL DAILY
Qty: 90 TABLET | Refills: 3 | Status: SHIPPED | OUTPATIENT
Start: 2021-09-27 | End: 2022-03-23

## 2021-09-27 ASSESSMENT — PAIN SCALES - GENERAL: PAINLEVEL: NO PAIN (0)

## 2021-09-27 ASSESSMENT — MIFFLIN-ST. JEOR: SCORE: 1617.36

## 2021-09-27 NOTE — LETTER
"    9/27/2021         RE: Merlyn Ravi  [address] [facility] [id number]        Dear Colleague,    Thank you for referring your patient, Merlyn Ravi, to the [facility]. Please see a copy of my visit note below.    [facility] Hematology and Oncology Progress Note    Patient: Merlyn Ravi  MRN: [id number]        Reason for Visit    1. Left breast lobular cancer, ER/MD+  2. MGUS    _____________________________________________________________________________    History of Present Illness/ Interval History    Ms. Merlyn Ravi  is a 56 year old treated for left breast ILC (ER/MD+) 2 years ago with lumpectomy and adjuvant RT. Has been on Tamoxifen almost 2 years. Tolerating this well with mild manageable side effects of \"brain fog\", hot flashes and muscle cramps.     She works with therapy for left breast lymphedema.   No new breast concerns, sites of pain, headaches nor unexplained weight loss.    ECOG PS: 0      Oncology History/Treatment  Diagnosis/Stage:     8/2019: Left breast invasive lobular cancer (T2-N0)  -self-palpated L breast lump  -diagnostic mammo: 1.1 cm asymmetry 9-10:00 position  -US: 1.1 cm lesion. No axillary adenopathy  -stereotactic biopsy: invasive lobular carcinoma, grade II; ALI neg; LCIS cannot be excluded. ER+, MD+, HER2 neg  -lumpectomy path: 4.5 cm invasive lobular ca, grade III; ALI neg. No LCIS. ER/MD+, HER2 neg. Positive deep margin  -re-excision 1: positive margin. Re-excision 2: negative for residual malignancy  -Oncotype DX score: 20    MGUS IgG, kappa (observed)    Treatment:  9/23/2019: Left lumpectomy  9/30/2019: re-excision for positive margin, persistent positive margin  10/9/2019: re-excision resulted in negative margin    11/2019: adjuvant RT (5005 cGy/20)    11/2019 - present: Tamoxifen 20 mg daily      Physical Exam    GENERAL: Alert and oriented to time place and person. Seated comfortably. In no " distress.  HEAD: Atraumatic and normocephalic. No alopecia.  EYES: JEN, EOMI. No erythema. No icterus.  LYMPH NODES: No palpable supraclavicular, cervical, axillary  Lymphadenopathy.  BREASTS: Left breast general densities related to prior RT/lymphedema. No discrete palpable masses in either breast.   CHEST: clear to auscultation bilaterally. Resonant to percussion throughout bilaterally. Symmetrical breath movements bilaterally.  CVS: S1 and S2 are heard. Regular rate and rhythm. No murmur or gallop or rub heard.  ABDOMEN: Soft. Not tender. Not distended. No palpable hepatomegaly or splenomegaly. No other mass palpable. Bowel sounds present.  EXTREMITIES: Warm. No peripheral edema.  SKIN: no rash, or bruising or purpura.   NEURO: No gross deficit noted. Non-antalgic gait.      Lab Results    CBC, CMP WNL  CA 27.29 <5    Imaging    5/2021 bilateral screening mammogram: benign    Assessment/Plan  1. Left breast cancer  Kristina is now 2 yrs post-diagnosis and has been on adjuvant Tamoxifen nearly 2 yrs. Tolerating this generally well with mild/manageable side effects.    Mammogram in May was benign. Clinical exam negative for recurrence.    Plan:  -6 month follow-up in clinic with exam. No benefit to routine labwork  -Annual mammogram, due 5/2022  -Continue tamoxifen x 5 yrs (11/2024)  -She has had a hysterectomy, so does not require routine pelvic exams on Tamoxifen    2.   Left breast lymphedema  Works with therapy.    3.   MGUS, IgG/kappa  Stable labs in March, 2021.    Plan:  -Annual SPEP, quantitative immunoglobulins and light chains. Do at 6-month return.    Billing  Total time 30 minutes, to include face to face visit, review of EMR, ordering, documentation and coordination of care    Signed by: Zena Gonzalez NP        Again, thank you for allowing me to participate in the care of your patient.        Sincerely,        Zena Gonzalez NP

## 2021-09-27 NOTE — PROGRESS NOTES
"Oncology Rooming Note    September 27, 2021 3:51 PM   Merlyn Ravi is a 56 year old female who presents for:    Chief Complaint   Patient presents with     Oncology Clinic Visit     6 month follow up breast cancer.      Initial Vitals: /81 (BP Location: Right arm, Patient Position: Sitting, Cuff Size: Adult Large)   Pulse 70   Temp 97.5  F (36.4  C) (Oral)   Resp 18   Ht 1.676 m (5' 6\")   Wt 101.1 kg (222 lb 12.8 oz)   SpO2 95%   Breastfeeding No   BMI 35.96 kg/m   Estimated body mass index is 35.96 kg/m  as calculated from the following:    Height as of this encounter: 1.676 m (5' 6\").    Weight as of this encounter: 101.1 kg (222 lb 12.8 oz). Body surface area is 2.17 meters squared.  No Pain (0) Comment: Data Unavailable   No LMP recorded. Patient has had a hysterectomy.  Allergies reviewed: Yes  Medications reviewed: Yes    Medications: Medication refills not needed today.  Pharmacy name entered into [employer]: [id number], [place] - [address] AT [facility] [address]    Clinical concerns: 6 month follow up breast cancer.       Angy Nicolas [facility]            "

## 2021-09-27 NOTE — PROGRESS NOTES
"[facility] Hematology and Oncology Progress Note    Patient: Merlyn Ravi  MRN: [id number]        Reason for Visit    1. Left breast lobular cancer, ER/AR+  2. MGUS    _____________________________________________________________________________    History of Present Illness/ Interval History    Ms. Merlyn Ravi  is a 56 year old treated for left breast ILC (ER/AR+) 2 years ago with lumpectomy and adjuvant RT. Has been on Tamoxifen almost 2 years. Tolerating this well with mild manageable side effects of \"brain fog\", hot flashes and muscle cramps.     She works with therapy for left breast lymphedema.   No new breast concerns, sites of pain, headaches nor unexplained weight loss.    ECOG PS: 0      Oncology History/Treatment  Diagnosis/Stage:     8/2019: Left breast invasive lobular cancer (T2-N0)  -self-palpated L breast lump  -diagnostic mammo: 1.1 cm asymmetry 9-10:00 position  -US: 1.1 cm lesion. No axillary adenopathy  -stereotactic biopsy: invasive lobular carcinoma, grade II; ALI neg; LCIS cannot be excluded. ER+, AR+, HER2 neg  -lumpectomy path: 4.5 cm invasive lobular ca, grade III; ALI neg. No LCIS. ER/AR+, HER2 neg. Positive deep margin  -re-excision 1: positive margin. Re-excision 2: negative for residual malignancy  -Oncotype DX score: 20    MGUS IgG, kappa (observed)    Treatment:  9/23/2019: Left lumpectomy  9/30/2019: re-excision for positive margin, persistent positive margin  10/9/2019: re-excision resulted in negative margin    11/2019: adjuvant RT (5005 cGy/20)    11/2019 - present: Tamoxifen 20 mg daily      Physical Exam    GENERAL: Alert and oriented to time place and person. Seated comfortably. In no distress.  HEAD: Atraumatic and normocephalic. No alopecia.  EYES: JEN, EOMI. No erythema. No icterus.  LYMPH NODES: No palpable supraclavicular, cervical, axillary  Lymphadenopathy.  BREASTS: Left breast general densities related to prior RT/lymphedema. No discrete " palpable masses in either breast.   CHEST: clear to auscultation bilaterally. Resonant to percussion throughout bilaterally. Symmetrical breath movements bilaterally.  CVS: S1 and S2 are heard. Regular rate and rhythm. No murmur or gallop or rub heard.  ABDOMEN: Soft. Not tender. Not distended. No palpable hepatomegaly or splenomegaly. No other mass palpable. Bowel sounds present.  EXTREMITIES: Warm. No peripheral edema.  SKIN: no rash, or bruising or purpura.   NEURO: No gross deficit noted. Non-antalgic gait.      Lab Results    CBC, CMP WNL  CA 27.29 <5    Imaging    5/2021 bilateral screening mammogram: benign    Assessment/Plan  1. Left breast cancer  Kristina is now 2 yrs post-diagnosis and has been on adjuvant Tamoxifen nearly 2 yrs. Tolerating this generally well with mild/manageable side effects.    Mammogram in May was benign. Clinical exam negative for recurrence.    Plan:  -6 month follow-up in clinic with exam. No benefit to routine labwork  -Annual mammogram, due 5/2022  -Continue tamoxifen x 5 yrs (11/2024)  -She has had a hysterectomy, so does not require routine pelvic exams on Tamoxifen    2.   Left breast lymphedema  Works with therapy.    3.   MGUS, IgG/kappa  Stable labs in March, 2021.    Plan:  -Annual SPEP, quantitative immunoglobulins and light chains. Do at 6-month return.    Billing  Total time 30 minutes, to include face to face visit, review of EMR, ordering, documentation and coordination of care    Signed by: Zena Gonzalez NP

## 2021-09-28 ENCOUNTER — HOSPITAL ENCOUNTER (OUTPATIENT)
Dept: PHYSICAL THERAPY | Facility: CLINIC | Age: 56
Setting detail: THERAPIES SERIES
End: 2021-09-28
Attending: RADIOLOGY
Payer: COMMERCIAL

## 2021-09-28 PROCEDURE — 97140 MANUAL THERAPY 1/> REGIONS: CPT | Mod: GP | Performed by: REHABILITATION PRACTITIONER

## 2021-09-30 ENCOUNTER — HOSPITAL ENCOUNTER (OUTPATIENT)
Dept: PHYSICAL THERAPY | Facility: CLINIC | Age: 56
Setting detail: THERAPIES SERIES
End: 2021-09-30
Attending: RADIOLOGY
Payer: COMMERCIAL

## 2021-09-30 PROCEDURE — 97140 MANUAL THERAPY 1/> REGIONS: CPT | Mod: GP | Performed by: PHYSICAL THERAPIST

## 2021-10-05 ENCOUNTER — HOSPITAL ENCOUNTER (OUTPATIENT)
Dept: PHYSICAL THERAPY | Facility: CLINIC | Age: 56
Setting detail: THERAPIES SERIES
End: 2021-10-05
Attending: RADIOLOGY
Payer: COMMERCIAL

## 2021-10-05 PROCEDURE — 97140 MANUAL THERAPY 1/> REGIONS: CPT | Mod: GP | Performed by: REHABILITATION PRACTITIONER

## 2021-10-12 NOTE — PROGRESS NOTES
Outpatient Physical Therapy Progress Note     Patient: Merlyn Ravi  : 1965    Beginning/End Dates of Reporting Period:  9/10/21 to 10/12/2021     Referring Provider: Dr. Marcia Nuno     Therapy Diagnosis: L UQ/breast secondary lymphedema     Client Self Report: it is feeling much better but still is sensitive at the side of the breast    Objective Measurements:  Objective Measure: breast girth  48 cm 10/5/21     Outcome Measures (most recent score):   LLIS 10 on 8/10/21    Goals:  Goal Identifier stg 1   Goal Description pt to be independent with self-MLD of L-breast to decrease lymphedema and fibrosis for improved comfort with overhead activity/ADL   Target Date 21   Date Met  21   Progress (detail required for progress note):       Goal Identifier ltg 1   Goal Description pt to be independent with longterm LUQ lymphedema management via HEP, skin cares, compression garment wear/use and self-MLD   Target Date 21   Date Met  21   Progress (detail required for progress note): has lines on breast from pad in bra, has lines medial to lateral, good fit     Goal Identifier ltg 2   Goal Description pt to state signs/Dx of infection and report what to do if notices those, to decrease risk of cellulitis or infection longterm   Target Date 21   Date Met  21   Progress (detail required for progress note):       Goal Identifier ltg 3   Goal Description pt to have at least 3 point improvement on LLIS due to decreased lympehdema, fibrosis and related symptoms in LUQ   Target Date 21   Date Met      Progress (detail required for progress note):       Plan:  Continue therapy per current plan of care. Decreasing to 1x a wk as pt is doing her pump at home, lymphatouch here for softening the fibrosis has been helping and pt is improving, if conts will dec to every other week and then dicharge    Discharge:  No

## 2021-10-13 ENCOUNTER — HOSPITAL ENCOUNTER (OUTPATIENT)
Dept: PHYSICAL THERAPY | Facility: CLINIC | Age: 56
Setting detail: THERAPIES SERIES
End: 2021-10-13
Attending: RADIOLOGY
Payer: COMMERCIAL

## 2021-10-13 PROCEDURE — 97140 MANUAL THERAPY 1/> REGIONS: CPT | Mod: GP | Performed by: REHABILITATION PRACTITIONER

## 2021-10-28 ENCOUNTER — HOSPITAL ENCOUNTER (OUTPATIENT)
Dept: PHYSICAL THERAPY | Facility: CLINIC | Age: 56
Setting detail: THERAPIES SERIES
End: 2021-10-28
Attending: RADIOLOGY
Payer: COMMERCIAL

## 2021-10-28 PROCEDURE — 97140 MANUAL THERAPY 1/> REGIONS: CPT | Mod: GP | Performed by: PHYSICAL THERAPIST

## 2021-10-28 NOTE — PROGRESS NOTES
Outpatient Physical Therapy Discharge Note     Patient: Merlyn Ravi  : 1965    Beginning/End Dates of Reporting Period:  10/13/212 to 10/28/2021     Referring Provider: Dr. Marcia Nuno     Therapy Diagnosis: L UQ/breast secondary lymphedema     Client Self Report: L side of breast hasn't been tender but the tightness is still there, better when stretches, has the pump and is using it but not everyday    Objective Measurements:   last measured chest girth 45.8 cm on 10/13/21  Outcome Measures (most recent score):  Lymphedema Life Impact Scale (score range 0-72). A higher score indicates greater impairment.: 12, eval was 10    Goals:  Goal Identifier stg 1   Goal Description pt to be independent with self-MLD of L-breast to decrease lymphedema and fibrosis for improved comfort with overhead activity/ADL   Target Date 21   Date Met  21   Progress (detail required for progress note):       Goal Identifier ltg 1   Goal Description pt to be independent with longterm LUQ lymphedema management via HEP, skin cares, compression garment wear/use and self-MLD   Target Date 21   Date Met  21   Progress (detail required for progress note): has lines on breast from pad in bra, has lines medial to lateral, good fit     Goal Identifier ltg 2   Goal Description pt to state signs/Dx of infection and report what to do if notices those, to decrease risk of cellulitis or infection longterm   Target Date 21   Date Met  21   Progress (detail required for progress note):       Goal Identifier ltg 3   Goal Description pt to have at least 3 point improvement on LLIS due to decreased lympehdema, fibrosis and related symptoms in LUQ   Target Date 21   Date Met      Progress (detail required for progress note): score today 12, did not decrease 3 points but overall feels better         Plan:  Discharge from therapy.    Discharge:    Reason for Discharge: Patient has met all goals except LLIS  goal but overall has improved    Equipment Issued: has pump and compression inserts for bra    Discharge Plan: Patient to continue home program.

## 2021-11-17 ENCOUNTER — TELEPHONE (OUTPATIENT)
Dept: OTOLARYNGOLOGY | Facility: CLINIC | Age: 56
End: 2021-11-17
Payer: COMMERCIAL

## 2021-11-17 NOTE — TELEPHONE ENCOUNTER
Reason for Call:  Other appointment    Detailed comments: Pt states she talked with scheduling and is not able to get an appt until Dec. 15th. Pt states she is having inner ear pain.  Started about two weeks ago.  Pt states she gets lightheaded  when she drives, [place]. in the evening. Pt states pain will come and go. Ear feels plugged. Please advise.    Phone Number Patient can be reached at: Cell number on file:    Telephone Information:   Mobile [phone]       Best Time: any     Can we leave a detailed message on this number? YES    Call taken on 11/17/2021 at 3:18 PM by Aleksandar Christensen

## 2021-11-17 NOTE — TELEPHONE ENCOUNTER
New Patient and we are booked out that far because Audio required.    Called and verified pt has not seen PCP yet and has never seen ENT before.  Pt has PCP appt scheduled and will see them first.    Nichole TEJEDA   [facility]

## 2021-12-06 DIAGNOSIS — F43.23 ADJUSTMENT REACTION WITH ANXIETY AND DEPRESSION: ICD-10-CM

## 2021-12-06 RX ORDER — VENLAFAXINE HYDROCHLORIDE 75 MG/1
75 CAPSULE, EXTENDED RELEASE ORAL DAILY
Qty: 90 CAPSULE | Refills: 3 | Status: SHIPPED | OUTPATIENT
Start: 2021-12-06 | End: 2022-10-25

## 2021-12-09 ENCOUNTER — OFFICE VISIT (OUTPATIENT)
Dept: FAMILY MEDICINE | Facility: CLINIC | Age: 56
End: 2021-12-09
Payer: COMMERCIAL

## 2021-12-09 VITALS
DIASTOLIC BLOOD PRESSURE: 72 MMHG | SYSTOLIC BLOOD PRESSURE: 114 MMHG | BODY MASS INDEX: 35.93 KG/M2 | HEART RATE: 68 BPM | OXYGEN SATURATION: 96 % | RESPIRATION RATE: 16 BRPM | TEMPERATURE: 97.5 F | WEIGHT: 222.6 LBS

## 2021-12-09 DIAGNOSIS — R09.81 SINUS CONGESTION: ICD-10-CM

## 2021-12-09 DIAGNOSIS — Z23 HIGH PRIORITY FOR 2019-NCOV VACCINE: ICD-10-CM

## 2021-12-09 DIAGNOSIS — H60.541 DERMATITIS OF EAR CANAL, RIGHT: Primary | ICD-10-CM

## 2021-12-09 PROCEDURE — 99213 OFFICE O/P EST LOW 20 MIN: CPT | Mod: 25 | Performed by: INTERNAL MEDICINE

## 2021-12-09 PROCEDURE — 0064A COVID-19,PF,MODERNA (18+ YRS BOOSTER .25ML): CPT | Performed by: INTERNAL MEDICINE

## 2021-12-09 PROCEDURE — 91306 COVID-19,PF,MODERNA (18+ YRS BOOSTER .25ML): CPT | Performed by: INTERNAL MEDICINE

## 2021-12-09 RX ORDER — PREDNISOLONE ACETATE 10 MG/ML
SUSPENSION/ DROPS OPHTHALMIC
Qty: 10 ML | Refills: 3 | Status: SHIPPED | OUTPATIENT
Start: 2021-12-09 | End: 2022-03-23

## 2021-12-09 RX ORDER — FLUTICASONE PROPIONATE 50 MCG
1 SPRAY, SUSPENSION (ML) NASAL DAILY
Qty: 16 G | Refills: 3 | Status: SHIPPED | OUTPATIENT
Start: 2021-12-09 | End: 2022-03-23

## 2021-12-09 ASSESSMENT — ANXIETY QUESTIONNAIRES
7. FEELING AFRAID AS IF SOMETHING AWFUL MIGHT HAPPEN: NOT AT ALL
5. BEING SO RESTLESS THAT IT IS HARD TO SIT STILL: NOT AT ALL
GAD7 TOTAL SCORE: 0
3. WORRYING TOO MUCH ABOUT DIFFERENT THINGS: NOT AT ALL
GAD7 TOTAL SCORE: 0
6. BECOMING EASILY ANNOYED OR IRRITABLE: NOT AT ALL
1. FEELING NERVOUS, ANXIOUS, OR ON EDGE: NOT AT ALL
2. NOT BEING ABLE TO STOP OR CONTROL WORRYING: NOT AT ALL
7. FEELING AFRAID AS IF SOMETHING AWFUL MIGHT HAPPEN: NOT AT ALL
4. TROUBLE RELAXING: NOT AT ALL
GAD7 TOTAL SCORE: 0

## 2021-12-09 ASSESSMENT — PATIENT HEALTH QUESTIONNAIRE - PHQ9
10. IF YOU CHECKED OFF ANY PROBLEMS, HOW DIFFICULT HAVE THESE PROBLEMS MADE IT FOR YOU TO DO YOUR WORK, TAKE CARE OF THINGS AT HOME, OR GET ALONG WITH OTHER PEOPLE: NOT DIFFICULT AT ALL
SUM OF ALL RESPONSES TO PHQ QUESTIONS 1-9: 0
SUM OF ALL RESPONSES TO PHQ QUESTIONS 1-9: 0

## 2021-12-09 NOTE — LETTER
My Depression Action Plan  Name: Merlyn Ravi   Date of Birth 1965  Date: 12/9/2021    My doctor: Memo Garcia   My clinic: [facility]  [address] [address]  [phone]          GREEN    ZONE   Good Control    What it looks like:     Things are going generally well. You have normal ups and downs. You may even feel depressed from time to time, but bad moods usually last less than a day.   What you need to do:  1. Continue to care for yourself (see self care plan)  2. Check your depression survival kit and update it as needed  3. Follow your physician s recommendations including any medication.  4. Do not stop taking medication unless you consult with your physician first.           YELLOW         ZONE Getting Worse    What it looks like:     Depression is starting to interfere with your life.     It may be hard to get out of bed; you may be starting to isolate yourself from others.    Symptoms of depression are starting to last most all day and this has happened for several days.     You may have suicidal thoughts but they are not constant.   What you need to do:     1. Call your care team. Your response to treatment will improve if you keep your care team informed of your progress. Yellow periods are signs an adjustment may need to be made.     2. Continue your self-care.  Just get dressed and ready for the day.  Don't give yourself time to talk yourself out of it.    3. Talk to someone in your support network.    4. Open up your Depression Self-Care Plan/Wellness Kit.           RED    ZONE Medical Alert - Get Help    What it looks like:     Depression is seriously interfering with your life.     You may experience these or other symptoms: You can t get out of bed most days, can t work or engage in other necessary activities, you have trouble taking care of basic hygiene, or basic responsibilities, thoughts of suicide or death that  will not go away, self-injurious behavior.     What you need to do:  1. Call your care team and request a same-day appointment. If they are not available (weekends or after hours) call your local crisis line, emergency room or 911.          Depression Self-Care Plan / Wellness Kit    Many people find that medication and therapy are helpful treatments for managing depression. In addition, making small changes to your everyday life can help to boost your mood and improve your wellbeing. Below are some tips for you to consider. Be sure to talk with your medical provider and/or behavioral health consultant if your symptoms are worsening or not improving.     Sleep   Sleep hygiene  means all of the habits that support good, restful sleep. It includes maintaining a consistent bedtime and wake time, using your bedroom only for sleeping or sex, and keeping the bedroom dark and free of distractions like a computer, smartphone, or television.     Develop a Healthy Routine  Maintain good hygiene. Get out of bed in the morning, make your bed, brush your teeth, take a shower, and get dressed. Don t spend too much time viewing media that makes you feel stressed. Find time to relax each day.    Exercise  Get some form of exercise every day. This will help reduce pain and release endorphins, the  feel good  chemicals in your brain. It can be as simple as just going for a walk or doing some gardening, anything that will get you moving.      Diet  Strive to eat healthy foods, including fruits and vegetables. Drink plenty of water. Avoid excessive sugar, caffeine, alcohol, and other mood-altering substances.     Stay Connected with Others  Stay in touch with friends and family members.    Manage Your Mood  Try deep breathing, massage therapy, biofeedback, or meditation. Take part in fun activities when you can. Try to find something to smile about each day.     Psychotherapy  Be open to working with a therapist if your provider  recommends it.     Medication  Be sure to take your medication as prescribed. Most anti-depressants need to be taken every day. It usually takes several weeks for medications to work. Not all medicines work for all people. It is important to follow-up with your provider to make sure you have a treatment plan that is working for you. Do not stop your medication abruptly without first discussing it with your provider.    Crisis Resources   These hotlines are for both adults and children. They and are open 24 hours a day, 7 days a week unless noted otherwise.      National Suicide Prevention Lifeline   2-380-265-YWPB (9672)      Crisis Text Line    www.crisistextline.org  Text HOME to 822973 from anywhere in the [place], anytime, about any type of crisis. A live, trained crisis counselor will receive the text and respond quickly.      Lg Lifeline for LGBTQ Youth  A national crisis intervention and suicide lifeline for LGBTQ youth under 25. Provides a safe place to talk without judgement. Call 1-809.818.2716; text START to 986226 or visit www.thetrevorproject.org to talk to a trained counselor.      For [facility] crisis numbers, visit the [facility] website at:  https://[place].gov/dhs/people-we-serve/adults/health-care/mental-health/resources/crisis-contacts.jsp

## 2021-12-09 NOTE — PATIENT INSTRUCTIONS
Try the fluticasone nasal spray and the ear drops for a couple of weeks.  Let us know if symptoms are not improving.      Patient Education     Inner Ear Problems: Causes of Dizziness (Vertigo)        Benign positional vertigo (BPV)   This is the most common cause of vertigo. BPV is also called benign positional paroxysmal vertigo (BPPV). It happens when crystals in the ear canals shift into the wrong place. Vertigo usually occurs when you move your head in a certain way. This can happen when turning in bed, bending, or looking up. Because BPV comes on quickly, you should think about if you are safe to drive or do other tasks that need your full attention.   BPV:    Causes vertigo that lasts for seconds. Vertigo can occur several times a day, depending on body position.    Doesn t cause hearing loss.    Often goes away on its own. But it may go away sooner with treatment.  Infection or inflammation  Sometimes the semicircular canals swell and send incorrect balance signals. This problem may be caused by a viral infection. Depending on the cause, your hearing can be affected (labyrinthitis). Or your hearing can remain normal (neuronitis).   Infection or inflammation:    Causes vertigo that lasts for hours or days. The first episode is usually the worst.    Can cause hearing loss.    Often goes away on its own. But it may go away sooner with treatment.  You may need vestibular rehabilitation if you have balance problems that don't go away.   Meniere s disease  This condition is uncommon. It happens when there is too much fluid in the ear canals. This causes increased pressure and swelling. It affects balance and hearing signals.   Meniere s disease may:    Cause vertigo that last for hours    Cause hearing problems that come and go. The problems are usually in one ear and get worse over time.    Cause buzzing or ringing in the ears (tinnitus)    Cause a feeling of fullness or pressure in the ear    Cause any of these  lasting symptoms: vertigo, hearing loss, tinnitus, or ear fullness  Other causes of vertigo  Vertigo can also be caused by:     A head injury    Certain medicines    Migraines    Brain problems, such as a stroke or bleeding in the brain    Attune last reviewed this educational content on 2/1/2020 2000-2021 [employer]. All rights reserved. This information is not intended as a substitute for professional medical care. Always follow your healthcare professional's instructions.

## 2021-12-09 NOTE — PROGRESS NOTES
Assessment & Plan     Dermatitis of ear canal, right  and  Sinus congestion    Kristina presents with 1 month of right ear itchiness, plugging, pulsing, and brief dizziness episodes.  I think there are possibly a combination of things going on.  Pruritis and dry canal could be due to dermatitis- we'll try some steroid drops.  Pressure could be from Eustashian tube dysfunction/sinus congestion- we'll try Flonase.  She was symptomatic (but no nystagmus) on Abran-Hallpike on the R, so may have some mild BPPV- advised to try home modified Epley maneuvers.  The pulsatile throbbing could be related to the congestion, but advised her to follow-up if this does not resolve with the other measures in case we need to do further work-up    - prednisoLONE acetate (PRED FORTE) 1 % ophthalmic suspension; Place 1-2 drops in the right ear twice daily as needed for itchiness  - fluticasone (FLONASE) 50 MCG/ACT nasal spray; Spray 1 spray into both nostrils daily    High priority for 2019-nCoV vaccine    - COVID-19,PF,MODERNA (18+ Yrs BOOSTER .25mL)         Jose Roy MD  [facility]    Norman Acevedo is a 56 year old who presents for the following health issues    History of Present Illness       She eats 0-1 servings of fruits and vegetables daily.She consumes 1 sweetened beverage(s) daily.She exercises with enough effort to increase her heart rate 9 or less minutes per day.  She exercises with enough effort to increase her heart rate 3 or less days per week.   She is taking medications regularly.       Concern - Ear problem  Onset: x 1 month   Description: Right ear. Itchy, plugged but won't pop, irritated/ not really pain. Feels a pulsing beat in the ear.  Has had some dizziness/nausea while driving (swaying sensation, no room spinning, lasts up to 20 minutes, no clear triggering factor).  Progression of Symptoms:  intermittent  Accompanying Signs & Symptoms: congestion/ allergies, sometimes slightly  dizziness.  No drainage or fevers.    Previous history of similar problem: none  Therapies tried and outcome: ear drop for ear pain (maybe a swimmer's ear drop)- did not help.  She is on daily doxycycline for acne through dermatology  Has not been taking anything for allergies.  Has taken Claritin in the past with relief.  Did allergy shots many years ago.          Review of Systems   Constitutional, HEENT systems are negative, except as otherwise noted.      Objective    /72   Pulse 68   Temp 97.5  F (36.4  C) (Tympanic)   Resp 16   Wt 101 kg (222 lb 9.6 oz)   SpO2 96%   BMI 35.93 kg/m    Body mass index is 35.93 kg/m .  Physical Exam   GENERAL: healthy, alert and no distress  HENT: both ears: canals a bit dry, normal TMs  NEURO: symptoms but no nystagmus with R sided Abran-Hallpike          Answers for HPI/ROS submitted by the patient on 12/9/2021  If you checked off any problems, how difficult have these problems made it for you to do your work, take care of things at home, or get along with other people?: Not difficult at all  PHQ9 TOTAL SCORE: 0  LAUREL 7 TOTAL SCORE: 0

## 2021-12-10 ASSESSMENT — ANXIETY QUESTIONNAIRES: GAD7 TOTAL SCORE: 0

## 2021-12-10 ASSESSMENT — PATIENT HEALTH QUESTIONNAIRE - PHQ9: SUM OF ALL RESPONSES TO PHQ QUESTIONS 1-9: 0

## 2021-12-13 DIAGNOSIS — R52 PAIN: ICD-10-CM

## 2021-12-13 DIAGNOSIS — G62.9 NEUROPATHY: ICD-10-CM

## 2021-12-14 RX ORDER — GABAPENTIN 100 MG/1
CAPSULE ORAL
Qty: 90 CAPSULE | Refills: 3 | Status: SHIPPED | OUTPATIENT
Start: 2021-12-14 | End: 2022-08-31

## 2022-01-02 ENCOUNTER — HEALTH MAINTENANCE LETTER (OUTPATIENT)
Age: 57
End: 2022-01-02

## 2022-01-11 ENCOUNTER — VIRTUAL VISIT (OUTPATIENT)
Dept: RADIATION THERAPY | Facility: OUTPATIENT CENTER | Age: 57
End: 2022-01-11
Payer: COMMERCIAL

## 2022-01-11 DIAGNOSIS — C50.919 INVASIVE LOBULAR CARCINOMA OF BREAST IN FEMALE (H): Primary | ICD-10-CM

## 2022-01-11 DIAGNOSIS — Z12.31 SCREENING MAMMOGRAM FOR HIGH-RISK PATIENT: Primary | ICD-10-CM

## 2022-01-11 NOTE — PROGRESS NOTES
"   Department of Radiation Oncology  Radiation Therapy Center  [facility] [place] [address]  [phone]       Radiation Oncology Follow-up Visit  2022    Telephone Visit    Merlyn Ravi  MRN: [id number]   : 1965     ID: 56 year old post menopausal F with left breast ILC s/p breast conservation therapy. On Tamoxifen  (hysterectomy)     DIAGNOSIS: invasive lobular carcinoma of the left breast. PATHOLOGY: Final pathology demonstrated ILC, 4.5 cm in size, grade 2, no LVSI, negative margins status post re-excision, 0 out of 1 sentinel lymph nodes, ER+, DE+, HER-2 negative disease, low Oncotype recurrence score                             STAGE: pathologic T2N0  INTENT OF RADIOTHERAPY: adjuvant radiation therapy with a lumpectomy cavity boost.  CONCURRENT SYSTEMIC THERAPY:  No   SITE OF TREATMENT: L breast     DATES  OF TREATMENT: 19-19     TOTAL DOSE OF TREATMENT: 5005 cGy     DOSE PER FRACTION OF TREATMENT: 267 cGy x 15 fractions + 200 cGy x 5 fractions    INTERVAL SINCE COMPLETION OF RADIATION THERAPY  2 years    Subjective: she reports she is doing well. Visit was changed from in person visit to phone visit as she is going out of town tomorrow to visit her daughter in [place]. She did two courses of lymphedema therapy for the left breast for tightness and swelling. She reports it is resolved now. She reports \"brain fog\" since starting Tamoxifen. Otherwise is tolerating.     ROS: 14 point review of systems is negative other than what is stated in the HPI    PHYSICAL EXAM:  There were no vitals taken for this visit.  Gen: Alert, in NAD    LABS AND IMAGING:  Mammogram 2021 benign    IMPRESSION:   Ms. Ravi is a 56 year old female with a invasive lobular carcinoma of the left breast. She is status post lumpectomy and sentinel lymph node biopsy. Final pathology demonstrated ILC, 4.5 cm in size, grade 2, no LVSI, negative margins status post re-excision, 0/ " 1 SLN positive ER+, NE+, HER-2 negative, low Oncotype recurrence score, pT2N0. Completed adjuvant radiation therapy with a lumpectomy cavity boost to L breast 5005 cGy from 11/11/19-12/19/19. Adjuvant Tamoxifen (had a hysterectomy). She has no breast complaints.     PLAN:   RTO one year in person    Mechelle Szymanski PA    A total of 10 minutes spent on the phone with the patient.

## 2022-01-11 NOTE — LETTER
"    2022         RE: Merlyn Ravi  [address] [facility] [address]        Dear Colleague,    Thank you for referring your patient, Merlyn Ravi, to the RADIATION THERAPY CENTER. Please see a copy of my visit note below.       Department of Radiation Oncology  Radiation Therapy Center  [facility]  [place] [address]  [phone]       Radiation Oncology Follow-up Visit  2022    Telephone Visit    Merlyn Ravi  MRN: [id number]   : 1965     ID: 56 year old post menopausal F with left breast ILC s/p breast conservation therapy. On Tamoxifen  (hysterectomy)     DIAGNOSIS: invasive lobular carcinoma of the left breast. PATHOLOGY: Final pathology demonstrated ILC, 4.5 cm in size, grade 2, no LVSI, negative margins status post re-excision, 0 out of 1 sentinel lymph nodes, ER+, MS+, HER-2 negative disease, low Oncotype recurrence score                             STAGE: pathologic T2N0  INTENT OF RADIOTHERAPY: adjuvant radiation therapy with a lumpectomy cavity boost.  CONCURRENT SYSTEMIC THERAPY:  No   SITE OF TREATMENT: L breast     DATES  OF TREATMENT: 19-19     TOTAL DOSE OF TREATMENT: 5005 cGy     DOSE PER FRACTION OF TREATMENT: 267 cGy x 15 fractions + 200 cGy x 5 fractions    INTERVAL SINCE COMPLETION OF RADIATION THERAPY  2 years    Subjective: she reports she is doing well. Visit was changed from in person visit to phone visit as she is going out of town tomorrow to visit her daughter in [place]. She did two courses of lymphedema therapy for the left breast for tightness and swelling. She reports it is resolved now. She reports \"brain fog\" since starting Tamoxifen. Otherwise is tolerating.     ROS: 14 point review of systems is negative other than what is stated in the HPI    PHYSICAL EXAM:  There were no vitals taken for this visit.  Gen: Alert, in NAD    LABS AND IMAGING:  Mammogram 2021 benign    IMPRESSION:   Ms. " Breonna is a 56 year old female with a invasive lobular carcinoma of the left breast. She is status post lumpectomy and sentinel lymph node biopsy. Final pathology demonstrated ILC, 4.5 cm in size, grade 2, no LVSI, negative margins status post re-excision, 0/ 1 SLN positive ER+, MA+, HER-2 negative, low Oncotype recurrence score, pT2N0. Completed adjuvant radiation therapy with a lumpectomy cavity boost to L breast 5005 cGy from 11/11/19-12/19/19. Adjuvant Tamoxifen (had a hysterectomy). She has no breast complaints.     PLAN:   RTO one year in person    Mechelle Szymanski PA    A total of 10 minutes spent on the phone with the patient.

## 2022-01-17 ENCOUNTER — TELEPHONE (OUTPATIENT)
Dept: RADIATION THERAPY | Facility: OUTPATIENT CENTER | Age: 57
End: 2022-01-17
Payer: COMMERCIAL

## 2022-02-11 ENCOUNTER — NURSE TRIAGE (OUTPATIENT)
Dept: FAMILY MEDICINE | Facility: CLINIC | Age: 57
End: 2022-02-11
Payer: COMMERCIAL

## 2022-02-11 NOTE — TELEPHONE ENCOUNTER
"S-(situation): Patient calling regarding lower left calf pain.     B-(background): No known injury/exercise as cause. No hx of blood clots.     A-(assessment): Patient experiencing lower left calf pain, rated as 8/10, pain when sitting and with movement. Pain has been off and on for past two weeks. No redness, no swelling, no fever, no chest pain, no back pain, no difficulty breathing, no rash, no numbness, no weakness.      R-(recommendations): Per protocol, advised ED/UC today for evaluation to rule out possibility of blood clot. Patient verbalized understanding and agreed with plan. No further questions or concerns at this time.       Reason for Disposition    Thigh or calf pain in only one leg and present > 1 hour    Additional Information    Negative: Looks like a broken bone or dislocated joint (e.g., crooked or deformed)    Negative: Sounds like a life-threatening emergency to the triager    Negative: Followed a hip injury    Negative: Followed a knee injury    Negative: Followed an ankle or foot injury    Negative: Back pain radiating (shooting) into leg(s)    Negative: Foot pain is the main symptom    Negative: Ankle pain is the main symptom    Negative: Knee pain is the main symptom    Negative: Leg swelling is the main symptom    Negative: Chest pain    Negative: Difficulty breathing    Negative: Entire foot is cool or blue in comparison to other side    Negative: Unable to walk    Negative: Fever and red area (or area very tender to touch)    Negative: Fever and swollen joint    Answer Assessment - Initial Assessment Questions  1. ONSET: \"When did the pain start?\"       Off and on for past two weeks   2. LOCATION: \"Where is the pain located?\"       Lower left calf   3. PAIN: \"How bad is the pain?\"    (Scale 1-10; or mild, moderate, severe)    -  MILD (1-3): doesn't interfere with normal activities     -  MODERATE (4-7): interferes with normal activities (e.g., work or school) or awakens from sleep, " "limping     -  SEVERE (8-10): excruciating pain, unable to do any normal activities, unable to walk      8/10   4. WORK OR EXERCISE: \"Has there been any recent work or exercise that involved this part of the body?\"       No   5. CAUSE: \"What do you think is causing the leg pain?\"      Unsure, \"maybe blood clot?\"  6. OTHER SYMPTOMS: \"Do you have any other symptoms?\" (e.g., chest pain, back pain, breathing difficulty, swelling, rash, fever, numbness, weakness)     No other symptoms   7. PREGNANCY: \"Is there any chance you are pregnant?\" \"When was your last menstrual period?\"      NA    Protocols used: LEG PAIN-A-OH    Arturo BRADY RN    "

## 2022-03-16 ENCOUNTER — LAB (OUTPATIENT)
Dept: LAB | Facility: CLINIC | Age: 57
End: 2022-03-16
Payer: COMMERCIAL

## 2022-03-16 DIAGNOSIS — E78.5 HYPERLIPIDEMIA LDL GOAL <130: ICD-10-CM

## 2022-03-16 LAB — HOLD SPECIMEN: NORMAL

## 2022-03-16 PROCEDURE — 84155 ASSAY OF PROTEIN SERUM: CPT | Performed by: NURSE PRACTITIONER

## 2022-03-16 PROCEDURE — 82784 ASSAY IGA/IGD/IGG/IGM EACH: CPT | Performed by: NURSE PRACTITIONER

## 2022-03-16 PROCEDURE — 83521 IG LIGHT CHAINS FREE EACH: CPT | Mod: 59 | Performed by: NURSE PRACTITIONER

## 2022-03-16 PROCEDURE — 84165 PROTEIN E-PHORESIS SERUM: CPT | Mod: 26 | Performed by: PATHOLOGY

## 2022-03-16 PROCEDURE — 84165 PROTEIN E-PHORESIS SERUM: CPT | Mod: TC | Performed by: PATHOLOGY

## 2022-03-17 DIAGNOSIS — E06.3 HYPOTHYROIDISM DUE TO HASHIMOTO'S THYROIDITIS: ICD-10-CM

## 2022-03-17 NOTE — TELEPHONE ENCOUNTER
Pending Prescriptions:                       Disp   Refills    atorvastatin (LIPITOR) 10 MG tablet [Pharm*90 tab*1        Sig: TAKE 1 TABLET(10 MG) BY MOUTH DAILY    Routing refill request to provider for review/approval because:  Labs not current:      LDL Cholesterol Calculated   Date Value Ref Range Status   03/15/2021 84 <100 mg/dL Final     Comment:     Desirable:       <100 mg/dl     Saida Mendosa RN  [facility]

## 2022-03-18 RX ORDER — ATORVASTATIN CALCIUM 10 MG/1
TABLET, FILM COATED ORAL
Qty: 90 TABLET | Refills: 1 | Status: SHIPPED | OUTPATIENT
Start: 2022-03-18 | End: 2022-10-02

## 2022-03-21 ENCOUNTER — VIRTUAL VISIT (OUTPATIENT)
Dept: DERMATOLOGY | Facility: CLINIC | Age: 57
End: 2022-03-21
Payer: COMMERCIAL

## 2022-03-21 DIAGNOSIS — L71.9 ACNE ROSACEA: Primary | ICD-10-CM

## 2022-03-21 PROCEDURE — 99213 OFFICE O/P EST LOW 20 MIN: CPT | Mod: TEL | Performed by: DERMATOLOGY

## 2022-03-21 RX ORDER — DOXYCYCLINE HYCLATE 20 MG
40 TABLET ORAL DAILY
Qty: 180 TABLET | Refills: 3 | Status: SHIPPED | OUTPATIENT
Start: 2022-03-21 | End: 2023-06-19

## 2022-03-21 NOTE — CONFIDENTIAL NOTE
Chief Complaint   Patient presents with     Rosacea     f/u, patient states that as long as she takes medications things go well     Medications and allergies reviewed with patient.    Lady Wallace, VVF

## 2022-03-21 NOTE — LETTER
3/21/2022       RE: Merlyn Ravi  [address] [address]     Dear Colleague,    Thank you for referring your patient, Merlyn Ravi, to the [facility] DERMATOLOGY [facility] [place] at [facility]. Please see a copy of my visit note below.    [facility] Dermatology Note  Encounter Date: Mar 21, 2022  Telephone ([phone]). Location of teledermatologist: [facility]MATOLOGY [facility] [place]. Start time: 10:38. End time: 10:44.    Dermatology Problem List:  1. Papulopustular rosacea: doxycycline 40 mg daily  - prior: doxycycline  mg daily-BID, metronidazole cream     ____________________________________________    Assessment & Plan:     1. Papulopustular rosacea: overall doing very well on doxycycline 50 mg daily. We discussed dose downtitration +/- use of topicals. Will reduce to 40 mg daily dose with flexibility to reduce further to 20 mg daily if tolerating well and no increase in activity. Can restart topicals in the future if needed, but defer at this time.  - doxycycline 40 mg daily    Procedures Performed:    None    Follow-up: 6-12 months    Staff:     Tez Bui MD, FAAD   of Dermatology  Department of Dermatology  [facility] [employer] [facility]    ____________________________________________    CC: Rosacea (f/u, patient states that as long as she takes medications things go well)    HPI:  Ms. Merlyn Ravi is a(n) 57 year old female who presents today as a return patient for rosacea    Rosacea - on doxycycline 50 mg daily - keeps things under control without topicals  - when come off medications - acne and rosacea comes back  - had been on lower dose in the past, then flared, then increase to 100 mg daily temporarily before reducing back to 50 mg since 2020    Patient is otherwise feeling well, without additional skin concerns.    Labs  Reviewed:  N/A    Physical Exam:  Vitals: There were no vitals taken for this visit.  SKIN: no photos  - No other lesions of concern on areas examined.     Medications:  Current Outpatient Medications   Medication     atorvastatin (LIPITOR) 10 MG tablet     calcium 500-125 MG-UNIT TABS     doxycycline monohydrate (MONODOX) 50 MG capsule     gabapentin (NEURONTIN) 100 MG capsule     IBUPROFEN PO     levothyroxine (SYNTHROID/LEVOTHROID) 88 MCG tablet     Naproxen Sodium (ALEVE PO)     tamoxifen (NOLVADEX) 20 MG tablet     venlafaxine (EFFEXOR-ER) 37.5 MG 24 hr tablet     venlafaxine (EFFEXOR-XR) 75 MG 24 hr capsule     fluticasone (FLONASE) 50 MCG/ACT nasal spray     prednisoLONE acetate (PRED FORTE) 1 % ophthalmic suspension     No current facility-administered medications for this visit.      Past Medical/Surgical History:   Patient Active Problem List   Diagnosis     CARDIOVASCULAR SCREENING; LDL GOAL LESS THAN 160     LASIK OU 5 yrs     Dermatofibroma     Monoclonal paraproteinemia     Hyperlipidemia LDL goal <130     Obesity (BMI 35.0-39.9) with comorbidity (H)     Other specified hypothyroidism     Invasive lobular carcinoma of breast in female (H)     Past Medical History:   Diagnosis Date     Allergies      Dermatofibroma 5/14/2012       CC No referring provider defined for this encounter. on close of this encounter.

## 2022-03-21 NOTE — PROGRESS NOTES
[facility] Dermatology Note  Encounter Date: Mar 21, 2022  Telephone ([phone]). Location of teledermatologist: [facility] [place]. Start time: 10:38. End time: 10:44.    Dermatology Problem List:  1. Papulopustular rosacea: doxycycline 40 mg daily  - prior: doxycycline  mg daily-BID, metronidazole cream     ____________________________________________    Assessment & Plan:     1. Papulopustular rosacea: overall doing very well on doxycycline 50 mg daily. We discussed dose downtitration +/- use of topicals. Will reduce to 40 mg daily dose with flexibility to reduce further to 20 mg daily if tolerating well and no increase in activity. Can restart topicals in the future if needed, but defer at this time.  - doxycycline 40 mg daily    Procedures Performed:    None    Follow-up: 6-12 months    Staff:     Tez Bui MD, FAAD   of Dermatology  Department of Dermatology  [facility]    ____________________________________________    CC: Rosacea (f/u, patient states that as long as she takes medications things go well)    HPI:  Ms. Merlyn Ravi is a(n) 57 year old female who presents today as a return patient for rosacea    Rosacea - on doxycycline 50 mg daily - keeps things under control without topicals  - when come off medications - acne and rosacea comes back  - had been on lower dose in the past, then flared, then increase to 100 mg daily temporarily before reducing back to 50 mg since 2020    Patient is otherwise feeling well, without additional skin concerns.    Labs Reviewed:  N/A    Physical Exam:  Vitals: There were no vitals taken for this visit.  SKIN: no photos  - No other lesions of concern on areas examined.     Medications:  Current Outpatient Medications   Medication     atorvastatin (LIPITOR) 10 MG tablet     calcium 500-125 MG-UNIT TABS     doxycycline monohydrate (MONODOX) 50 MG  capsule     gabapentin (NEURONTIN) 100 MG capsule     IBUPROFEN PO     levothyroxine (SYNTHROID/LEVOTHROID) 88 MCG tablet     Naproxen Sodium (ALEVE PO)     tamoxifen (NOLVADEX) 20 MG tablet     venlafaxine (EFFEXOR-ER) 37.5 MG 24 hr tablet     venlafaxine (EFFEXOR-XR) 75 MG 24 hr capsule     fluticasone (FLONASE) 50 MCG/ACT nasal spray     prednisoLONE acetate (PRED FORTE) 1 % ophthalmic suspension     No current facility-administered medications for this visit.      Past Medical/Surgical History:   Patient Active Problem List   Diagnosis     CARDIOVASCULAR SCREENING; LDL GOAL LESS THAN 160     LASIK OU 5 yrs     Dermatofibroma     Monoclonal paraproteinemia     Hyperlipidemia LDL goal <130     Obesity (BMI 35.0-39.9) with comorbidity (H)     Other specified hypothyroidism     Invasive lobular carcinoma of breast in female (H)     Past Medical History:   Diagnosis Date     Allergies      Dermatofibroma 5/14/2012       CC No referring provider defined for this encounter. on close of this encounter.

## 2022-03-22 ENCOUNTER — TELEPHONE (OUTPATIENT)
Dept: ENDOCRINOLOGY | Facility: CLINIC | Age: 57
End: 2022-03-22

## 2022-03-22 ENCOUNTER — OFFICE VISIT (OUTPATIENT)
Dept: SURGERY | Facility: CLINIC | Age: 57
End: 2022-03-22
Payer: COMMERCIAL

## 2022-03-22 VITALS
HEIGHT: 66 IN | TEMPERATURE: 97.7 F | SYSTOLIC BLOOD PRESSURE: 129 MMHG | DIASTOLIC BLOOD PRESSURE: 78 MMHG | HEART RATE: 68 BPM | BODY MASS INDEX: 35.2 KG/M2 | WEIGHT: 219 LBS

## 2022-03-22 DIAGNOSIS — Z12.31 ENCOUNTER FOR SCREENING MAMMOGRAM FOR HIGH-RISK PATIENT: Primary | ICD-10-CM

## 2022-03-22 DIAGNOSIS — C50.919 INVASIVE LOBULAR CARCINOMA OF BREAST IN FEMALE (H): ICD-10-CM

## 2022-03-22 PROCEDURE — 99213 OFFICE O/P EST LOW 20 MIN: CPT | Performed by: SURGERY

## 2022-03-22 NOTE — TELEPHONE ENCOUNTER
levothyroxine (SYNTHROID/LEVOTHROID) 88 MCG tablet      Last Written Prescription Date:  3-  Last Fill Quantity: 90,   # refills: 3  Last Office Visit : 3-9-2020  Future Office visit:  None    TSH   Date Value Ref Range Status   03/09/2020 1.75 0.40 - 4.00 mU/L Final         Routing refill request to provider for review/approval because:  Over 2 years since last seen - no appointment scheduled  Labs over 2 years old - none scheduled.    Scheduling has been notified to contact the pt for appointment.        Kathleen M Doege RN

## 2022-03-22 NOTE — PROGRESS NOTES
BREAST CANCER FOLLOW UP  CHIEF COMPLAINT  Chief Complaint   Patient presents with     RECHECK     6 months breast re-check     HPI  Merlyn Ravi is a 57 year old female who presents with   Chief Complaint   Patient presents with     RECHECK     6 months breast re-check     The patient presents for her breast cancer followup appointment. Overall, she is feeling good. She denies any significant fatigue, fevers, chills, or changes in appetite. She has not suffered any significant weight loss.  She has not noted any areas of skin changes or any masses in the breast or chest wall that she is concerned about.  She denies skin dimpling, puckering, erythema, or nipple discharge. She has not noted any palpable axillary lymphadenopathy.  She does complain of left lower extremity pain; work-up was negative at outside hospital.      PAST MEDICAL HISTORY  Past Medical History:   Diagnosis Date     Allergies      Dermatofibroma 5/14/2012     FAMILY HISTORY  Family History   Problem Relation Age of Onset     Thyroid Disease Mother      Heart Disease Mother      Heart Disease Father      Hyperlipidemia Father      Hypertension Maternal Grandmother      Breast Cancer Maternal Grandmother      Hypertension Maternal Grandfather      Alzheimer Disease Paternal Grandmother      Diabetes Brother      Eczema Brother      Thyroid Disease Brother      Melanoma No family hx of      SOCIAL HISTORY  Social History     Socioeconomic History     Marital status:      Spouse name: None     Number of children: None     Years of education: None     Highest education level: None   Occupational History     Employer: [employer]   Tobacco Use     Smoking status: Never Smoker     Smokeless tobacco: Never Used   Vaping Use     Vaping Use: Never used   Substance and Sexual Activity     Alcohol use: Yes     Alcohol/week: 0.0 standard drinks     Comment: 3 drinks per month     Drug use: No     Sexual activity: Yes     Partners: Male     Birth  control/protection: Surgical     Comment: defer to md   Other Topics Concern     Parent/sibling w/ CABG, MI or angioplasty before 65F 55M? No   Social History Narrative     None     Social Determinants of Health     Financial Resource Strain: Not on file   Food Insecurity: Not on file   Transportation Needs: Not on file   Physical Activity: Not on file   Stress: Not on file   Social Connections: Not on file   Intimate Partner Violence: Not on file   Housing Stability: Not on file     SURGICAL HISTORY  Past Surgical History:   Procedure Laterality Date     HYSTERECTOMY, PAP NO LONGER INDICATED       HYSTERECTOMY, MAGY       LASIK BILATERAL  2005    Dr. Solis      LUMPECTOMY BREAST Left 9/30/2019    Procedure: Re-excision of left breast lumpectomy site;  Surgeon: Kj Salas DO;  Location: [facility]     LUMPECTOMY BREAST Left 10/9/2019    Procedure: Left breast re-excision;  Surgeon: Kj Salas DO;  Location: [facility] OR     LUMPECTOMY BREAST WITH SENTINEL NODE, COMBINED Left 9/23/2019    Procedure: LUMPECTOMY, BREAST, WITH SENTINEL LYMPH NODE BIOPSY.;  Surgeon: Kj Salas DO;  Location: [facility] OR     [facility] NONSPECIFIC PROCEDURE      bladder surgery     CURRENT MEDICATIONS    Current Outpatient Medications:      atorvastatin (LIPITOR) 10 MG tablet, TAKE 1 TABLET(10 MG) BY MOUTH DAILY, Disp: 90 tablet, Rfl: 1     calcium 500-125 MG-UNIT TABS, Take 2 tablets by mouth 2 times daily, Disp: , Rfl:      doxycycline hyclate (PERIOSTAT) 20 MG tablet, Take 2 tablets (40 mg) by mouth daily, Disp: 180 tablet, Rfl: 3     doxycycline monohydrate (MONODOX) 50 MG capsule, 1 tab po daily, Disp: 30 capsule, Rfl: 0     fluticasone (FLONASE) 50 MCG/ACT nasal spray, Spray 1 spray into both nostrils daily, Disp: 16 g, Rfl: 3     gabapentin (NEURONTIN) 100 MG capsule, TAKE ONE CAPSULE BY MOUTH EVERY MORNING AND 2 CAPSULES EVERY EVENING, Disp: 90 capsule, Rfl: 3     IBUPROFEN PO, Take 800 mg by mouth every 4 hours  "as needed for moderate pain , Disp: , Rfl:      levothyroxine (SYNTHROID/LEVOTHROID) 88 MCG tablet, Take 1 tablet (88 mcg) by mouth daily, Disp: 90 tablet, Rfl: 3     Naproxen Sodium (ALEVE PO), Take 220 mg by mouth 2 times daily (with meals) , Disp: , Rfl:      prednisoLONE acetate (PRED FORTE) 1 % ophthalmic suspension, Place 1-2 drops in the right ear twice daily as needed for itchiness, Disp: 10 mL, Rfl: 3     tamoxifen (NOLVADEX) 20 MG tablet, Take 1 tablet (20 mg) by mouth daily, Disp: 90 tablet, Rfl: 3     venlafaxine (EFFEXOR-ER) 37.5 MG 24 hr tablet, Take 1 tablet (37.5 mg) by mouth daily for 14 days, Disp: 14 tablet, Rfl: 0     venlafaxine (EFFEXOR-XR) 75 MG 24 hr capsule, Take 1 capsule (75 mg) by mouth daily, Disp: 90 capsule, Rfl: 3  ALLERGIES  Allergies   Allergen Reactions     Demerol      Sedation and vomiting     Meperidine      Sulfa Drugs      unknown reaction     PHYSICAL EXAM  VITAL SIGNS:  height is 1.676 m (5' 6\") and weight is 99.3 kg (219 lb). Her tympanic temperature is 97.7  F (36.5  C). Her blood pressure is 129/78 and her pulse is 68.   Constitutional: Well developed, Well nourished, No acute distress, Non-toxic appearance.   HENT: Normocephalic, Atraumatic, Bilateral external ears normal, Oropharynx moist, No oral exudates, Nose normal.   Eyes: EOMI, Conjunctiva normal, No discharge.   Neck: Normal range of motion, No tenderness, Supple, No stridor.   Lymphatic: No lymphadenopathy noted.   Cardiovascular: Normal heart rate, Normal rhythm, No murmurs, No rubs, No gallops.   Breast Exam: Examined with Danni Brock LPN  RIGHT: No areas of skin dimpling or puckering. No erythema. No skin scaling or changes. No expressible nipple discharge. No focal areas of significant tenderness. .  No palpable axillary lymphadenopathy.  LEFT: Lymphedema, mild of breast.  Incision C/D/I.  No masses.  Post surgical changes without specific mass  No palpable axillary lymphadenopathy.  Thorax & Lungs: Normal " breath sounds, No respiratory distress, No wheezing, No chest tenderness.   Abdomen: Bowel sounds normal, Soft, No masses, No pulsatile masses.   Skin: Warm, Dry, No erythema, No rash.   Back: No tenderness, No CVA tenderness.   Extremities: Intact distal pulses, No edema, No tenderness, No cyanosis, No clubbing.   Musculoskeletal: Good range of motion in all major joints. No tenderness to palpation or major deformities noted.   Neurologic: Alert & oriented x 3, Normal motor function, Normal sensory function, No focal deficits noted.   Psychiatric: Affect normal, Judgment normal, Mood normal.         FINAL IMPRESSION AND PLAN  1. Encounter for screening mammogram for high-risk patient    2. Invasive lobular carcinoma of breast in female (H)        The patient is 29 months out from her breast cancer surgery. There is no evidence of recurrent disease on my exam today. She is continuing to recover well from her breast cancer treatment.     She is compliant with adjuvant endocrine therapy.  She is due for mammogram 5/2022, this has been ordered.    Discussed lymphedema treatment, she has machine at home but has not been using it.  She states this is much improved at baseline from prior.    She will continue periodic self breast or chest wall exam. She is encouraged to followup with me for any changes on self-exam, or for any concerns or questions. She will followup with her primary care provider for routine care, and continue followup with her oncologist as scheduled.  My recommendations were discussed with the patient. She will see me in 3 months for another exam. Patient will be scheduled for any indicated mammography or additional imaging.     Kj Salas, DO on 3/22/2022 at 8:27 AM

## 2022-03-22 NOTE — TELEPHONE ENCOUNTER
----- Message from Kathleen M Doege, RN sent at 3/22/2022 12:01 PM CDT -----  Regarding: pt not seen in 2 years  Please call to schedule.    Thanks    I do not need any follow up on the scheduling of this appointment unless the patient will no longer be receiving care in our clinic.

## 2022-03-22 NOTE — NURSING NOTE
"Initial /78 (BP Location: Right arm, Patient Position: Sitting, Cuff Size: Adult Large)   Pulse 68   Temp 97.7  F (36.5  C) (Tympanic)   Ht 1.676 m (5' 6\")   Wt 99.3 kg (219 lb)   BMI 35.35 kg/m   Estimated body mass index is 35.35 kg/m  as calculated from the following:    Height as of this encounter: 1.676 m (5' 6\").    Weight as of this encounter: 99.3 kg (219 lb). .    Danni Brock MA    "

## 2022-03-22 NOTE — LETTER
3/22/2022         RE: Merlyn Ravi  [id number] [facility] [address] [id number]        Dear Colleague,    Thank you for referring your patient, Merlyn Ravi, to the [facility]. Please see a copy of my visit note below.    BREAST CANCER FOLLOW UP  CHIEF COMPLAINT  Chief Complaint   Patient presents with     RECHECK     6 months breast re-check     HPI  Merlyn Ravi is a 57 year old female who presents with   Chief Complaint   Patient presents with     RECHECK     6 months breast re-check     The patient presents for her breast cancer followup appointment. Overall, she is feeling good. She denies any significant fatigue, fevers, chills, or changes in appetite. She has not suffered any significant weight loss.  She has not noted any areas of skin changes or any masses in the breast or chest wall that she is concerned about.  She denies skin dimpling, puckering, erythema, or nipple discharge. She has not noted any palpable axillary lymphadenopathy.  She does complain of left lower extremity pain; work-up was negative at outside hospital.      PAST MEDICAL HISTORY  Past Medical History:   Diagnosis Date     Allergies      Dermatofibroma 5/14/2012     FAMILY HISTORY  Family History   Problem Relation Age of Onset     Thyroid Disease Mother      Heart Disease Mother      Heart Disease Father      Hyperlipidemia Father      Hypertension Maternal Grandmother      Breast Cancer Maternal Grandmother      Hypertension Maternal Grandfather      Alzheimer Disease Paternal Grandmother      Diabetes Brother      Eczema Brother      Thyroid Disease Brother      Melanoma No family hx of      SOCIAL HISTORY  Social History     Socioeconomic History     Marital status:      Spouse name: None     Number of children: None     Years of education: None     Highest education level: None   Occupational History     Employer: [employer]   Tobacco Use     Smoking status: Never Smoker      Smokeless tobacco: Never Used   Vaping Use     Vaping Use: Never used   Substance and Sexual Activity     Alcohol use: Yes     Alcohol/week: 0.0 standard drinks     Comment: 3 drinks per month     Drug use: No     Sexual activity: Yes     Partners: Male     Birth control/protection: Surgical     Comment: defer to md   Other Topics Concern     Parent/sibling w/ CABG, MI or angioplasty before 65F 55M? No   Social History Narrative     None     Social Determinants of Health     Financial Resource Strain: Not on file   Food Insecurity: Not on file   Transportation Needs: Not on file   Physical Activity: Not on file   Stress: Not on file   Social Connections: Not on file   Intimate Partner Violence: Not on file   Housing Stability: Not on file     SURGICAL HISTORY  Past Surgical History:   Procedure Laterality Date     HYSTERECTOMY, PAP NO LONGER INDICATED       HYSTERECTOMY, MAGY       LASIK BILATERAL  2005    Dr. Solis      LUMPECTOMY BREAST Left 9/30/2019    Procedure: Re-excision of left breast lumpectomy site;  Surgeon: Kj Salas DO;  Location: [facility] OR     LUMPECTOMY BREAST Left 10/9/2019    Procedure: Left breast re-excision;  Surgeon: Kj Salas DO;  Location: [facility] OR     LUMPECTOMY BREAST WITH SENTINEL NODE, COMBINED Left 9/23/2019    Procedure: LUMPECTOMY, BREAST, WITH SENTINEL LYMPH NODE BIOPSY.;  Surgeon: Kj Salas DO;  Location: [facility] OR     [facility] NONSPECIFIC PROCEDURE      bladder surgery     CURRENT MEDICATIONS    Current Outpatient Medications:      atorvastatin (LIPITOR) 10 MG tablet, TAKE 1 TABLET(10 MG) BY MOUTH DAILY, Disp: 90 tablet, Rfl: 1     calcium 500-125 MG-UNIT TABS, Take 2 tablets by mouth 2 times daily, Disp: , Rfl:      doxycycline hyclate (PERIOSTAT) 20 MG tablet, Take 2 tablets (40 mg) by mouth daily, Disp: 180 tablet, Rfl: 3     doxycycline monohydrate (MONODOX) 50 MG capsule, 1 tab po daily, Disp: 30 capsule, Rfl: 0     fluticasone (FLONASE) 50 MCG/ACT  "nasal spray, Spray 1 spray into both nostrils daily, Disp: 16 g, Rfl: 3     gabapentin (NEURONTIN) 100 MG capsule, TAKE ONE CAPSULE BY MOUTH EVERY MORNING AND 2 CAPSULES EVERY EVENING, Disp: 90 capsule, Rfl: 3     IBUPROFEN PO, Take 800 mg by mouth every 4 hours as needed for moderate pain , Disp: , Rfl:      levothyroxine (SYNTHROID/LEVOTHROID) 88 MCG tablet, Take 1 tablet (88 mcg) by mouth daily, Disp: 90 tablet, Rfl: 3     Naproxen Sodium (ALEVE PO), Take 220 mg by mouth 2 times daily (with meals) , Disp: , Rfl:      prednisoLONE acetate (PRED FORTE) 1 % ophthalmic suspension, Place 1-2 drops in the right ear twice daily as needed for itchiness, Disp: 10 mL, Rfl: 3     tamoxifen (NOLVADEX) 20 MG tablet, Take 1 tablet (20 mg) by mouth daily, Disp: 90 tablet, Rfl: 3     venlafaxine (EFFEXOR-ER) 37.5 MG 24 hr tablet, Take 1 tablet (37.5 mg) by mouth daily for 14 days, Disp: 14 tablet, Rfl: 0     venlafaxine (EFFEXOR-XR) 75 MG 24 hr capsule, Take 1 capsule (75 mg) by mouth daily, Disp: 90 capsule, Rfl: 3  ALLERGIES  Allergies   Allergen Reactions     Demerol      Sedation and vomiting     Meperidine      Sulfa Drugs      unknown reaction     PHYSICAL EXAM  VITAL SIGNS:  height is 1.676 m (5' 6\") and weight is 99.3 kg (219 lb). Her tympanic temperature is 97.7  F (36.5  C). Her blood pressure is 129/78 and her pulse is 68.   Constitutional: Well developed, Well nourished, No acute distress, Non-toxic appearance.   HENT: Normocephalic, Atraumatic, Bilateral external ears normal, Oropharynx moist, No oral exudates, Nose normal.   Eyes: EOMI, Conjunctiva normal, No discharge.   Neck: Normal range of motion, No tenderness, Supple, No stridor.   Lymphatic: No lymphadenopathy noted.   Cardiovascular: Normal heart rate, Normal rhythm, No murmurs, No rubs, No gallops.   Breast Exam: Examined with Danni Brock LPN  RIGHT: No areas of skin dimpling or puckering. No erythema. No skin scaling or changes. No expressible nipple " discharge. No focal areas of significant tenderness. .  No palpable axillary lymphadenopathy.  LEFT: Lymphedema, mild of breast.  Incision C/D/I.  No masses.  Post surgical changes without specific mass  No palpable axillary lymphadenopathy.  Thorax & Lungs: Normal breath sounds, No respiratory distress, No wheezing, No chest tenderness.   Abdomen: Bowel sounds normal, Soft, No masses, No pulsatile masses.   Skin: Warm, Dry, No erythema, No rash.   Back: No tenderness, No CVA tenderness.   Extremities: Intact distal pulses, No edema, No tenderness, No cyanosis, No clubbing.   Musculoskeletal: Good range of motion in all major joints. No tenderness to palpation or major deformities noted.   Neurologic: Alert & oriented x 3, Normal motor function, Normal sensory function, No focal deficits noted.   Psychiatric: Affect normal, Judgment normal, Mood normal.         FINAL IMPRESSION AND PLAN  1. Encounter for screening mammogram for high-risk patient    2. Invasive lobular carcinoma of breast in female (H)        The patient is 29 months out from her breast cancer surgery. There is no evidence of recurrent disease on my exam today. She is continuing to recover well from her breast cancer treatment.     She is compliant with adjuvant endocrine therapy.  She is due for mammogram 5/2022, this has been ordered.    Discussed lymphedema treatment, she has machine at home but has not been using it.  She states this is much improved at baseline from prior.    She will continue periodic self breast or chest wall exam. She is encouraged to followup with me for any changes on self-exam, or for any concerns or questions. She will followup with her primary care provider for routine care, and continue followup with her oncologist as scheduled.  My recommendations were discussed with the patient. She will see me in 3 months for another exam. Patient will be scheduled for any indicated mammography or additional imaging.     Kj HARO  DO Josue on 3/22/2022 at 8:27 AM            Again, thank you for allowing me to participate in the care of your patient.        Sincerely,        Kj Salas DO

## 2022-03-23 ENCOUNTER — ONCOLOGY VISIT (OUTPATIENT)
Dept: ONCOLOGY | Facility: CLINIC | Age: 57
End: 2022-03-23
Attending: INTERNAL MEDICINE
Payer: COMMERCIAL

## 2022-03-23 VITALS
HEART RATE: 75 BPM | DIASTOLIC BLOOD PRESSURE: 87 MMHG | TEMPERATURE: 98.4 F | WEIGHT: 225 LBS | SYSTOLIC BLOOD PRESSURE: 124 MMHG | BODY MASS INDEX: 36.32 KG/M2 | OXYGEN SATURATION: 97 % | RESPIRATION RATE: 12 BRPM

## 2022-03-23 DIAGNOSIS — D47.2 MONOCLONAL PARAPROTEINEMIA: Primary | ICD-10-CM

## 2022-03-23 DIAGNOSIS — C50.912 INVASIVE LOBULAR CARCINOMA OF LEFT BREAST IN FEMALE (H): ICD-10-CM

## 2022-03-23 DIAGNOSIS — E06.3 HYPOTHYROIDISM DUE TO HASHIMOTO'S THYROIDITIS: ICD-10-CM

## 2022-03-23 DIAGNOSIS — C50.919 INVASIVE LOBULAR CARCINOMA OF BREAST IN FEMALE (H): ICD-10-CM

## 2022-03-23 PROCEDURE — 99214 OFFICE O/P EST MOD 30 MIN: CPT | Performed by: INTERNAL MEDICINE

## 2022-03-23 PROCEDURE — G0463 HOSPITAL OUTPT CLINIC VISIT: HCPCS

## 2022-03-23 RX ORDER — TAMOXIFEN CITRATE 20 MG/1
20 TABLET ORAL DAILY
Qty: 90 TABLET | Refills: 3 | Status: SHIPPED | OUTPATIENT
Start: 2022-03-23 | End: 2023-03-21

## 2022-03-23 RX ORDER — LEVOTHYROXINE SODIUM 88 UG/1
88 TABLET ORAL DAILY
Qty: 30 TABLET | Refills: 0 | Status: SHIPPED | OUTPATIENT
Start: 2022-03-23 | End: 2022-06-15

## 2022-03-23 ASSESSMENT — PAIN SCALES - GENERAL: PAINLEVEL: NO PAIN (0)

## 2022-03-23 NOTE — PATIENT INSTRUCTIONS
Please continue tamoxifen and return for follow-up with me in 1 year.    Please have your blood work checked 1 week before your next office visit with me.

## 2022-03-23 NOTE — LETTER
"    3/23/2022         RE: Merlyn Ravi  [address] [facility] [id number]        Dear Colleague,    Thank you for referring your patient, Merlyn Ravi, to the [facility]. Please see a copy of my visit note below.    Oncology Rooming Note    March 23, 2022 8:09 AM   Merlyn Ravi is a 57 year old female who presents for:    Chief Complaint   Patient presents with     Oncology Clinic Visit     Invasive lobular carcinoma of breast in female - Provider visit only     Initial Vitals: /87 (BP Location: Right arm, Patient Position: Sitting, Cuff Size: Adult Regular)   Pulse 75   Temp 98.4  F (36.9  C) (Oral)   Resp 12   Wt 102.1 kg (225 lb)   SpO2 97%   BMI 36.32 kg/m   Estimated body mass index is 36.32 kg/m  as calculated from the following:    Height as of 3/22/22: 1.676 m (5' 6\").    Weight as of this encounter: 102.1 kg (225 lb). Body surface area is 2.18 meters squared.  No Pain (0) Comment: Data Unavailable   No LMP recorded. Patient has had a hysterectomy.  Allergies reviewed: Yes  Medications reviewed: Yes    Medications: Medication refills not needed today.  Pharmacy name entered into [employer]: [facility][id number] - [place], [place] - [address] AT [facility] [address]    Clinical concerns:  Patient has been having lower left leg pain and cramps for about 3 months.       Keya Farris CMA              The patient is being seen for the following issue/s:  1. Monoclonal paraproteinemia    2. Invasive lobular carcinoma of breast in female (H)      Per last oncology note by Zena Davidson NP from September 2021:  \"Ms. Merlyn Ravi  is a 56 year old treated for left breast ILC (ER/WI+) 2 years ago with lumpectomy and adjuvant RT. Has been on Tamoxifen almost 2 years. Tolerating this well with mild manageable side effects of \"brain fog\", hot flashes and muscle cramps.\"     She returns to the clinic and reports that she continues " "to take tamoxifen without any significant side effects.  She is on venlafaxine which has been prescribed by her cardiologist. She has started to develop some joint stiffness in her hands which she attributes to \" arthritis\".  She takes calcium/vitamin D.  She continues to follow-up with Dr. Salas in the surgery clinic and saw him yesterday and had a breast exam.  She has no breast issues/concerns at this time.    Apparently she also has a history of MGUS.  MGUS labs were just rechecked on March 16, 2022.     She works with therapy for left breast lymphedema.     PHYSICAL EXAMINATION:    General: Todays' vital signs reviewed in the EMR.    ECOG PS is 0  General: The patient is in no acute distress.  HEENT: No scleral icterus    ASSESSMENT/PLAN:    1. Monoclonal paraproteinemia    2. Invasive lobular carcinoma of breast in female (H)        Labs from March 16, 2022 reviewed:    SPEP revealed an M protein measured 0.5 g/DL in the gamma fraction which is stable in quantity compared to 1 year ago.    Previous immunofixation dated March 1, 2021 characterizes as an IgG immunoglobulin of kappa light chain type.    Quantitative immunoglobulin levels were within normal range except for mildly reduced IgA level of 69.    Kappa free light chains were minimally elevated at 3.9 with a kappa/lambda ratio of 2.71.    We discussed the option of switching her to an aromatase inhibitor now versus continue tamoxifen for 5 years and then contemplating a switch to an aromatase inhibitor at that time to complete a total of 10 years of adjuvant hormonal treatment.    She is concerned about side effects of aromatase inhibitors such as worsening joint stiffness/pain given that she is starting to feel some arthritis symptoms in her hands and would like to stay on tamoxifen. I think that is reasonable.  We also discussed her follow-up schedule and since she will continue to follow-up with Dr. Salas in the surgery clinic and have a breast " exam every 6 months and would like to cut back on some of her doctor visits we agreed that she could extend her follow-up in the medical oncology clinic once a year as long as she has no new issues/concerns while she continues to see Dr. Salas in the surgery clinic at least once every 6 months for routine breast cancer surveillance visits.    I sent a prescription to her pharmacy for tamoxifen for 90 days with 3 refills.              Again, thank you for allowing me to participate in the care of your patient.        Sincerely,        Wali Castro MD

## 2022-03-23 NOTE — PROGRESS NOTES
"Oncology Rooming Note    March 23, 2022 8:09 AM   Merlyn Ravi is a 57 year old female who presents for:    Chief Complaint   Patient presents with     Oncology Clinic Visit     Invasive lobular carcinoma of breast in female - Provider visit only     Initial Vitals: /87 (BP Location: Right arm, Patient Position: Sitting, Cuff Size: Adult Regular)   Pulse 75   Temp 98.4  F (36.9  C) (Oral)   Resp 12   Wt 102.1 kg (225 lb)   SpO2 97%   BMI 36.32 kg/m   Estimated body mass index is 36.32 kg/m  as calculated from the following:    Height as of 3/22/22: 1.676 m (5' 6\").    Weight as of this encounter: 102.1 kg (225 lb). Body surface area is 2.18 meters squared.  No Pain (0) Comment: Data Unavailable   No LMP recorded. Patient has had a hysterectomy.  Allergies reviewed: Yes  Medications reviewed: Yes    Medications: Medication refills not needed today.  Pharmacy name entered into [employer]: [id number] [place], [place] - [address] AT [facility] [address]    Clinical concerns:  Patient has been having lower left leg pain and cramps for about 3 months.       Keya Farris, [facility]            "

## 2022-03-23 NOTE — PROGRESS NOTES
"The patient is being seen for the following issue/s:  1. Monoclonal paraproteinemia    2. Invasive lobular carcinoma of breast in female (H)      Per last oncology note by Zena Davidson NP from September 2021:  \"Ms. Merlyn Ravi  is a 56 year old treated for left breast ILC (ER/DC+) 2 years ago with lumpectomy and adjuvant RT. Has been on Tamoxifen almost 2 years. Tolerating this well with mild manageable side effects of \"brain fog\", hot flashes and muscle cramps.\"     She returns to the clinic and reports that she continues to take tamoxifen without any significant side effects.  She is on venlafaxine which has been prescribed by her cardiologist. She has started to develop some joint stiffness in her hands which she attributes to \" arthritis\".  She takes calcium/vitamin D.  She continues to follow-up with Dr. Salas in the surgery clinic and saw him yesterday and had a breast exam.  She has no breast issues/concerns at this time.    Apparently she also has a history of MGUS.  MGUS labs were just rechecked on March 16, 2022.     She works with therapy for left breast lymphedema.     PHYSICAL EXAMINATION:    General: Todays' vital signs reviewed in the EMR.    ECOG PS is 0  General: The patient is in no acute distress.  HEENT: No scleral icterus    ASSESSMENT/PLAN:    1. Monoclonal paraproteinemia    2. Invasive lobular carcinoma of breast in female (H)        Labs from March 16, 2022 reviewed:    SPEP revealed an M protein measured 0.5 g/DL in the gamma fraction which is stable in quantity compared to 1 year ago.    Previous immunofixation dated March 1, 2021 characterizes as an IgG immunoglobulin of kappa light chain type.    Quantitative immunoglobulin levels were within normal range except for mildly reduced IgA level of 69.    Kappa free light chains were minimally elevated at 3.9 with a kappa/lambda ratio of 2.71.    We discussed the option of switching her to an aromatase inhibitor now versus continue " tamoxifen for 5 years and then contemplating a switch to an aromatase inhibitor at that time to complete a total of 10 years of adjuvant hormonal treatment.    She is concerned about side effects of aromatase inhibitors such as worsening joint stiffness/pain given that she is starting to feel some arthritis symptoms in her hands and would like to stay on tamoxifen. I think that is reasonable.  We also discussed her follow-up schedule and since she will continue to follow-up with Dr. Salas in the surgery clinic and have a breast exam every 6 months and would like to cut back on some of her doctor visits we agreed that she could extend her follow-up in the medical oncology clinic once a year as long as she has no new issues/concerns while she continues to see Dr. Salas in the surgery clinic at least once every 6 months for routine breast cancer surveillance visits.    I sent a prescription to her pharmacy for tamoxifen for 90 days with 3 refills.

## 2022-03-24 RX ORDER — LEVOTHYROXINE SODIUM 88 UG/1
TABLET ORAL
Qty: 90 TABLET | OUTPATIENT
Start: 2022-03-24

## 2022-04-19 ENCOUNTER — MYC MEDICAL ADVICE (OUTPATIENT)
Dept: DERMATOLOGY | Facility: CLINIC | Age: 57
End: 2022-04-19
Payer: COMMERCIAL

## 2022-04-19 DIAGNOSIS — L71.9 ROSACEA: Primary | ICD-10-CM

## 2022-05-17 ENCOUNTER — OFFICE VISIT (OUTPATIENT)
Dept: FAMILY MEDICINE | Facility: CLINIC | Age: 57
End: 2022-05-17
Payer: COMMERCIAL

## 2022-05-17 VITALS
HEART RATE: 76 BPM | OXYGEN SATURATION: 98 % | TEMPERATURE: 97.9 F | BODY MASS INDEX: 35.68 KG/M2 | WEIGHT: 222 LBS | HEIGHT: 66 IN | DIASTOLIC BLOOD PRESSURE: 84 MMHG | SYSTOLIC BLOOD PRESSURE: 110 MMHG | RESPIRATION RATE: 16 BRPM

## 2022-05-17 DIAGNOSIS — M25.562 CHRONIC PAIN OF BOTH KNEES: ICD-10-CM

## 2022-05-17 DIAGNOSIS — E66.01 MORBID OBESITY (H): ICD-10-CM

## 2022-05-17 DIAGNOSIS — R32 URINARY INCONTINENCE, UNSPECIFIED TYPE: ICD-10-CM

## 2022-05-17 DIAGNOSIS — M25.561 CHRONIC PAIN OF BOTH KNEES: ICD-10-CM

## 2022-05-17 DIAGNOSIS — G89.29 CHRONIC PAIN OF BOTH KNEES: ICD-10-CM

## 2022-05-17 DIAGNOSIS — F33.0 MAJOR DEPRESSIVE DISORDER, RECURRENT EPISODE, MILD (H): Primary | ICD-10-CM

## 2022-05-17 DIAGNOSIS — Z23 ENCOUNTER FOR IMMUNIZATION: ICD-10-CM

## 2022-05-17 PROCEDURE — 90471 IMMUNIZATION ADMIN: CPT | Performed by: FAMILY MEDICINE

## 2022-05-17 PROCEDURE — 90714 TD VACC NO PRESV 7 YRS+ IM: CPT | Performed by: FAMILY MEDICINE

## 2022-05-17 PROCEDURE — 99214 OFFICE O/P EST MOD 30 MIN: CPT | Mod: 25 | Performed by: FAMILY MEDICINE

## 2022-05-17 RX ORDER — TOLTERODINE 2 MG/1
2 CAPSULE, EXTENDED RELEASE ORAL DAILY
Qty: 90 CAPSULE | Refills: 3 | Status: SHIPPED | OUTPATIENT
Start: 2022-05-17 | End: 2022-12-07 | Stop reason: DRUGHIGH

## 2022-05-17 ASSESSMENT — PAIN SCALES - GENERAL: PAINLEVEL: MILD PAIN (2)

## 2022-05-17 ASSESSMENT — PATIENT HEALTH QUESTIONNAIRE - PHQ9: SUM OF ALL RESPONSES TO PHQ QUESTIONS 1-9: 2

## 2022-05-17 ASSESSMENT — ENCOUNTER SYMPTOMS
EYES NEGATIVE: 1
GASTROINTESTINAL NEGATIVE: 1
CONSTITUTIONAL NEGATIVE: 1
NEUROLOGICAL NEGATIVE: 1
RESPIRATORY NEGATIVE: 1
CARDIOVASCULAR NEGATIVE: 1
ENDOCRINE NEGATIVE: 1
ALLERGIC/IMMUNOLOGIC NEGATIVE: 1
PSYCHIATRIC NEGATIVE: 1
HEMATOLOGIC/LYMPHATIC NEGATIVE: 1
ARTHRALGIAS: 1

## 2022-05-17 NOTE — PROGRESS NOTES
Assessment & Plan     Major depressive disorder, recurrent episode, mild (H)  Stable,no recent events    Encounter for immunization  Patient due for tetanus immnuization  - TD PRESERV FREE, IM (7+ YRS) (DECAVAC/TENIVAC)    Chronic pain of both knees  Pain has been ongoing for months  It hurts to squat and also to kneel. Getting up from a kneeling position is hard.   X-rays done reviewed.Sirisha soft tissue ,recommend sports medicine referral.  - XR Knee Bilateral 1/2 Views; Future  - Orthopedic  Referral; Future    Morbid obesity (H)  Walking and trying to lose weight    Urinary incontinence, unspecified type  Medication refilled  - tolterodine ER (DETROL LA) 2 MG 24 hr capsule; Take 1 capsule (2 mg) by mouth daily        FUTURE APPOINTMENTS:       - Follow-up visit in one month or sooner as needed.    Return in about 4 weeks (around 6/14/2022) for Follow up.    Memo Garcia MD  [facility]    Norman Acevedo is a 57 year old who presents for the following health issues  accompanied by her self.    Chief Complaint   Patient presents with     Knee Pain     Knee pain bilateral, left side is worse. Kneeling will make this worse.  She was seen in the ER on 2-11-22 in [place] at [place].       Toe Pain     Right 2nd and 3rd toe pain.  When bending the toes in it is painful.     Imm/Inj     Due to update her Tetanus injection today.       Knee Pain  Associated symptoms include arthralgias.   History of Present Illness       Reason for visit:  Knee pain and right toe pain  Symptom onset:  More than a month  Symptoms include:  Pain and have a hard getting up and down  Symptom intensity:  Severe  Symptom progression:  Worsening  Had these symptoms before:  Yes  Has tried/received treatment for these symptoms:  No  What makes it worse:  Moving  What makes it better:  Sometimes advil    She eats 0-1 servings of fruits and vegetables daily.She consumes 1 sweetened beverage(s)  "daily.She exercises with enough effort to increase her heart rate 20 to 29 minutes per day.  She exercises with enough effort to increase her heart rate 4 days per week.   She is taking medications regularly.           Review of Systems   Constitutional: Negative.    HENT: Negative.    Eyes: Negative.    Respiratory: Negative.    Cardiovascular: Negative.    Gastrointestinal: Negative.    Endocrine: Negative.    Breasts:  negative.    Genitourinary: Negative.    Musculoskeletal: Positive for arthralgias.   Allergic/Immunologic: Negative.    Neurological: Negative.    Hematological: Negative.    Psychiatric/Behavioral: Negative.          Objective    /84   Pulse 76   Temp 97.9  F (36.6  C) (Tympanic)   Resp 16   Ht 1.676 m (5' 6\")   Wt 100.7 kg (222 lb)   SpO2 98%   BMI 35.83 kg/m    Body mass index is 35.83 kg/m .  Physical Exam  Constitutional:       Appearance: Normal appearance.   HENT:      Head: Normocephalic and atraumatic.      Mouth/Throat:      Mouth: Mucous membranes are moist.   Eyes:      Extraocular Movements: Extraocular movements intact.      Pupils: Pupils are equal, round, and reactive to light.   Cardiovascular:      Rate and Rhythm: Normal rate and regular rhythm.      Pulses: Normal pulses.      Heart sounds: Normal heart sounds.   Pulmonary:      Effort: Pulmonary effort is normal.      Breath sounds: Normal breath sounds.   Abdominal:      General: Abdomen is flat.      Palpations: Abdomen is soft.   Musculoskeletal:         General: Swelling and tenderness present.      Cervical back: Normal range of motion.   Neurological:      Mental Status: She is alert and oriented to person, place, and time.                "

## 2022-05-17 NOTE — NURSING NOTE
Prior to immunization administration, verified patients identity using patient s name and date of birth. Please see Immunization Activity for additional information.     Screening Questionnaire for Adult Immunization    Are you sick today?   No   Do you have allergies to medications, food, a vaccine component or latex?   No   Have you ever had a serious reaction after receiving a vaccination?   No   Do you have a long-term health problem with heart, lung, kidney, or metabolic disease (e.g., diabetes), asthma, a blood disorder, no spleen, complement component deficiency, a cochlear implant, or a spinal fluid leak?  Are you on long-term aspirin therapy?   No   Do you have cancer, leukemia, HIV/AIDS, or any other immune system problem?   Yes   Do you have a parent, brother, or sister with an immune system problem?  Yes   In the past 3 months, have you taken medications that affect  your immune system, such as prednisone, other steroids, or anticancer drugs; drugs for the treatment of rheumatoid arthritis, Crohn s disease, or psoriasis; or have you had radiation treatments?   No   Have you had a seizure, or a brain or other nervous system problem?   No   During the past year, have you received a transfusion of blood or blood    products, or been given immune (gamma) globulin or antiviral drug?   No   For women: Are you pregnant or is there a chance you could become       pregnant during the next month?   No   Have you received any vaccinations in the past 4 weeks?   No     Immunization questionnaire answers were all negative.        Per orders of Dr. Garcia, injection of TD given by Angella Alex CMA. Patient instructed to remain in clinic for 15 minutes afterwards, and to report any adverse reaction to me immediately.       Screening performed by Angella Alex CMA on 5/17/2022 at 9:24 AM.

## 2022-05-17 NOTE — PATIENT INSTRUCTIONS
Thank you for choosing [facility].  You may be receiving an email and/or telephone survey request from [facility] Customer Experience regarding your visit today.  Please take a few minutes to respond to the survey to let us know how we are doing.      If you have questions or concerns, please contact us via [employer]YourFlight or you can contact your care team at [phone] option 2.    Our Clinic hours are:  Monday - Thursday 7am-6pm  Friday 7am-5pm    The [place] outpatient lab hours are:  Monday - Friday 7am-4:30pm    Appointments are required, call [phone]    If you have clinical questions after hours or would like to schedule an appointment,  call the clinic at [phone].

## 2022-05-18 NOTE — PROGRESS NOTES
ASSESSMENT & PLAN    Merlyn was seen today for pain and pain.    Diagnoses and all orders for this visit:    Effusion of left knee  -     Orthopedic  Referral; Future  -     Physical Therapy Referral; Future    Chronic pain of both knees  -     Orthopedic  Referral  -     XR Knee Bilateral 1/2 vw; Future  -     Orthopedic  Referral; Future  -     Physical Therapy Referral; Future      This issue is acute on chronic and Unchanged.    We discussed these other possible diagnosis: More likely aggravation of underlying arthritis.  We discussed the following treatment options: symptom treatment, activity modification/rest, imaging, rehab and referral. Following discussion, plan: will presumptively treat for likely aggravation of underlying arthritis with close follow up if effusion returns.    Plan:  - Today's Plan of Care:  Referral for US guided aspiration and injection  Continue with relative rest and activity modification, Ice, Compression, and Elevation.  Can apply ice 10-15 minutes 3-4 times per day as needed. OTC medications as needed.  Home Exercise Program - range of motion, quad sets, straight leg raises  Rehab: Physical Therapy: [facility] - [phone]    -We also discussed other future treatment options:  MRI Left Knee - if symptoms return or worsen    Follow Up: 6 - 8 weeks and as needed    Concerning signs and symptoms were reviewed.  The patient expressed understanding of this management plan and all questions were answered at this time.    Yasmin Eldridge MD [facility]  Sports Medicine Physician  [facility]      -----  Chief Complaint   Patient presents with     Left Knee - Pain     Right Knee - Pain       SUBJECTIVE  Merlyn Ravi is a/an 57 year old female who is seen in consultation at the request of  Memo Garcia M.D. for evaluation of bilateral knee pain.    The patient is seen by themselves.    Onset: Chronic knee pain for years but  for the past 2.5 months, pain has been more severe. Reports insidious onset without acute precipitating event.  Location of Pain: Bilateral anterior knee pain with pain into the anterior upper left leg and inferior to patella  Worsened by: stairs, squatting, kneeling, sit to stand transition  Better with: IcyHot and cbd lotion (minimally)  Treatments tried: ice, advil, Tylenol, cbd lotion, icyhot  Associated symptoms: Swelling over left medial knee, popping on left knee (left knee more than right knee)    Orthopedic/Surgical history: Chronic knee pain left < right.   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    REVIEW OF SYSTEMS:  Review of Systems  Skin: no bruising, yes swelling  Musculoskeletal: as above  Neurologic: no numbness, paresthesias  Remainder of review of systems is negative including constitutional, CV, pulmonary, GI, except as noted in HPI or medical history.    OBJECTIVE:  /87   Wt 100.7 kg (222 lb)   BMI 35.83 kg/m     General: healthy, alert and in no distress  HEENT: no scleral icterus or conjunctival erythema  Skin: no suspicious lesions or rash. No jaundice.  CV: distal perfusion intact  Resp: normal respiratory effort without conversational dyspnea   Psych: normal mood and affect  Gait: normal steady gait with appropriate coordination and balance  Neuro: Normal light sensory exam of lower extremity    Bilateral Knee exam  Inspection:      moderate effusion left    Patella:      Crepitus noted in the patellofemoral joint bilateral    Tender:      medial patellar border left       lateral patellar border left       medial joint line left       lateral joint line left    Non Tender:      remainder of knee area left    Knee ROM:      Range of motion limited due to swelling on the left    Strength:      5-/5 with knee extension bilateral    Special Tests:     positive (+) Michele left       neg (-)  anterior drawer left       neg (-) posterior drawer left       neg (-) varus at 0 deg and 30 deg left       neg (-) valgus at 0 deg and 30 deg left    Gait:      normal    Neurovascular:      2+ peripheral pulses bilaterally and brisk capillary refill       sensation grossly intact    RADIOLOGY:  I independently ordered, visualized and reviewed these images with the patient  3 XR views of bilateral knees reviewed: no acute bony abnormality, no significant degenerative change  - will follow official read    Review of the result(s) of each unique test - XR

## 2022-05-23 ENCOUNTER — ANCILLARY PROCEDURE (OUTPATIENT)
Dept: GENERAL RADIOLOGY | Facility: CLINIC | Age: 57
End: 2022-05-23
Attending: PEDIATRICS
Payer: COMMERCIAL

## 2022-05-23 ENCOUNTER — OFFICE VISIT (OUTPATIENT)
Dept: ORTHOPEDICS | Facility: CLINIC | Age: 57
End: 2022-05-23
Attending: FAMILY MEDICINE

## 2022-05-23 VITALS — DIASTOLIC BLOOD PRESSURE: 87 MMHG | WEIGHT: 222 LBS | BODY MASS INDEX: 35.83 KG/M2 | SYSTOLIC BLOOD PRESSURE: 117 MMHG

## 2022-05-23 DIAGNOSIS — M25.562 CHRONIC PAIN OF BOTH KNEES: ICD-10-CM

## 2022-05-23 DIAGNOSIS — M25.561 CHRONIC PAIN OF BOTH KNEES: ICD-10-CM

## 2022-05-23 DIAGNOSIS — G89.29 CHRONIC PAIN OF BOTH KNEES: ICD-10-CM

## 2022-05-23 DIAGNOSIS — M25.462 EFFUSION OF LEFT KNEE: Primary | ICD-10-CM

## 2022-05-23 PROCEDURE — 73560 X-RAY EXAM OF KNEE 1 OR 2: CPT | Mod: TC | Performed by: RADIOLOGY

## 2022-05-23 PROCEDURE — 99203 OFFICE O/P NEW LOW 30 MIN: CPT | Performed by: PEDIATRICS

## 2022-05-23 ASSESSMENT — PAIN SCALES - GENERAL: PAINLEVEL: EXTREME PAIN (9)

## 2022-05-23 NOTE — LETTER
5/23/2022         RE: Merlyn Ravi  [address] [facility] [address]        Dear Colleague,    Thank you for referring your patient, Merlyn Ravi, to the [facility] [place]. Please see a copy of my visit note below.    ASSESSMENT & PLAN    Merlyn was seen today for pain and pain.    Diagnoses and all orders for this visit:    Effusion of left knee  -     Orthopedic  Referral; Future  -     Physical Therapy Referral; Future    Chronic pain of both knees  -     Orthopedic  Referral  -     XR Knee Bilateral 1/2 vw; Future  -     Orthopedic  Referral; Future  -     Physical Therapy Referral; Future      This issue is acute on chronic and Unchanged.    We discussed these other possible diagnosis: More likely aggravation of underlying arthritis.  We discussed the following treatment options: symptom treatment, activity modification/rest, imaging, rehab and referral. Following discussion, plan: will presumptively treat for likely aggravation of underlying arthritis with close follow up if effusion returns.    Plan:  - Today's Plan of Care:  Referral for US guided aspiration and injection  Continue with relative rest and activity modification, Ice, Compression, and Elevation.  Can apply ice 10-15 minutes 3-4 times per day as needed. OTC medications as needed.  Home Exercise Program - range of motion, quad sets, straight leg raises  Rehab: Physical Therapy: [facility] - [phone]    -We also discussed other future treatment options:  MRI Left Knee - if symptoms return or worsen    Follow Up: 6 - 8 weeks and as needed    Concerning signs and symptoms were reviewed.  The patient expressed understanding of this management plan and all questions were answered at this time.    Yasmin Eldridge MD [facility]  Sports Medicine Physician  [facility]      -----  Chief Complaint   Patient presents with     Left Knee - Pain      Right Knee - Pain       SUBJECTIVE  Merlyn Ravi is a/an 57 year old female who is seen in consultation at the request of  Memo Garcia M.D. for evaluation of bilateral knee pain.    The patient is seen by themselves.    Onset: Chronic knee pain for years but for the past 2.5 months, pain has been more severe. Reports insidious onset without acute precipitating event.  Location of Pain: Bilateral anterior knee pain with pain into the anterior upper left leg and inferior to patella  Worsened by: stairs, squatting, kneeling, sit to stand transition  Better with: IcyHot and cbd lotion (minimally)  Treatments tried: ice, advil, Tylenol, cbd lotion, icyhot  Associated symptoms: Swelling over left medial knee, popping on left knee (left knee more than right knee)    Orthopedic/Surgical history: Chronic knee pain left < right.   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    REVIEW OF SYSTEMS:  Review of Systems  Skin: no bruising, yes swelling  Musculoskeletal: as above  Neurologic: no numbness, paresthesias  Remainder of review of systems is negative including constitutional, CV, pulmonary, GI, except as noted in HPI or medical history.    OBJECTIVE:  /87   Wt 100.7 kg (222 lb)   BMI 35.83 kg/m     General: healthy, alert and in no distress  HEENT: no scleral icterus or conjunctival erythema  Skin: no suspicious lesions or rash. No jaundice.  CV: distal perfusion intact  Resp: normal respiratory effort without conversational dyspnea   Psych: normal mood and affect  Gait: normal steady gait with appropriate coordination and balance  Neuro: Normal light sensory exam of lower extremity    Bilateral Knee exam  Inspection:      moderate effusion left    Patella:      Crepitus noted in the patellofemoral joint bilateral    Tender:      medial patellar border left       lateral patellar border left       medial joint  line left       lateral joint line left    Non Tender:      remainder of knee area left    Knee ROM:      Range of motion limited due to swelling on the left    Strength:      5-/5 with knee extension bilateral    Special Tests:     positive (+) Michele left       neg (-) anterior drawer left       neg (-) posterior drawer left       neg (-) varus at 0 deg and 30 deg left       neg (-) valgus at 0 deg and 30 deg left    Gait:      normal    Neurovascular:      2+ peripheral pulses bilaterally and brisk capillary refill       sensation grossly intact    RADIOLOGY:  I independently ordered, visualized and reviewed these images with the patient  3 XR views of bilateral knees reviewed: no acute bony abnormality, no significant degenerative change  - will follow official read    Review of the result(s) of each unique test - XR               Again, thank you for allowing me to participate in the care of your patient.        Sincerely,        Yasmin Eldridge MD

## 2022-05-23 NOTE — PATIENT INSTRUCTIONS
We discussed these other possible diagnosis: More likely aggravation of underlying arthritis.  We discussed the following treatment options: symptom treatment, activity modification/rest, imaging, rehab and referral. Following discussion, plan: will presumptively treat with close follow up if effusion returns.    Plan:  - Today's Plan of Care:  Referral for US guided aspiration and injection  Continue with relative rest and activity modification, Ice, Compression, and Elevation.  Can apply ice 10-15 minutes 3-4 times per day as needed. OTC medications as needed.  Home Exercise Program - range of motion, quad sets, straight leg raises  Rehab: [facility] - [phone]    -We also discussed other future treatment options:  MRI Left Knee - if symptoms return or worsen    Follow Up: 6 - 8 weeks and as needed    If you have any further questions for your physician or physician s care team you can call [phone] and use option 3 to leave a voice message. Calls received during business hours will be returned same day.

## 2022-05-24 ENCOUNTER — OFFICE VISIT (OUTPATIENT)
Dept: ORTHOPEDICS | Facility: CLINIC | Age: 57
End: 2022-05-24
Attending: PEDIATRICS
Payer: COMMERCIAL

## 2022-05-24 VITALS — HEIGHT: 66 IN | WEIGHT: 222 LBS | BODY MASS INDEX: 35.68 KG/M2

## 2022-05-24 DIAGNOSIS — M25.562 CHRONIC PAIN OF BOTH KNEES: ICD-10-CM

## 2022-05-24 DIAGNOSIS — G89.29 CHRONIC PAIN OF BOTH KNEES: ICD-10-CM

## 2022-05-24 DIAGNOSIS — M25.462 EFFUSION OF LEFT KNEE: ICD-10-CM

## 2022-05-24 DIAGNOSIS — M25.561 CHRONIC PAIN OF BOTH KNEES: ICD-10-CM

## 2022-05-24 PROCEDURE — 20611 DRAIN/INJ JOINT/BURSA W/US: CPT | Mod: LT | Performed by: FAMILY MEDICINE

## 2022-05-24 RX ORDER — TRIAMCINOLONE ACETONIDE 40 MG/ML
40 INJECTION, SUSPENSION INTRA-ARTICULAR; INTRAMUSCULAR
Status: DISCONTINUED | OUTPATIENT
Start: 2022-05-24 | End: 2022-06-24

## 2022-05-24 RX ORDER — ROPIVACAINE HYDROCHLORIDE 5 MG/ML
3 INJECTION, SOLUTION EPIDURAL; INFILTRATION; PERINEURAL
Status: DISCONTINUED | OUTPATIENT
Start: 2022-05-24 | End: 2022-06-24

## 2022-05-24 RX ADMIN — TRIAMCINOLONE ACETONIDE 40 MG: 40 INJECTION, SUSPENSION INTRA-ARTICULAR; INTRAMUSCULAR at 08:40

## 2022-05-24 RX ADMIN — ROPIVACAINE HYDROCHLORIDE 3 ML: 5 INJECTION, SOLUTION EPIDURAL; INFILTRATION; PERINEURAL at 08:40

## 2022-05-24 NOTE — LETTER
2022         RE: Merlyn Ravi  [address] [facility] [address]        Dear Colleague,    Thank you for referring your patient, Merlyn Ravi, to the [facility] [place]. Please see a copy of my visit note below.    Merlyn Ravi  :  1965  DOS: 2022  MRN: [id number]    Sports Medicine Clinic Procedure    Ultrasound Guided Left Intra-Articular Knee Injection, +/- Aspiration    Clinical History: Gradual onset of chronic bilateral knee pain over past several years, that is significantly worse on left over past 2 - 3 months.    Diagnosis:   1. Chronic pain of both knees    2. Effusion of left knee      Referring Physician: Yasmin Eldridge MD  Large Joint Injection/Arthocentesis    Date/Time: 2022 8:40 AM  Performed by: Pelon Singh DO  Authorized by: Pelon Singh DO     Indications:  Pain, joint swelling and osteoarthritis  Needle Size:  21 G  Guidance: ultrasound    Approach:  Superolateral  Location:  Knee      Medications:  3 mL ropivacaine 5 MG/ML; 40 mg triamcinolone 40 MG/ML  Aspirate amount (mL):  40  Aspirate:  Serous and yellow  Outcome:  Tolerated well, no immediate complications  Procedure discussed: discussed risks, benefits, and alternatives    Consent Given by:  Patient  Timeout: timeout called immediately prior to procedure    Prep: patient was prepped and draped in usual sterile fashion     Ultrasound images of procedure were permanently stored.         Impression:  Successful Left intra-articular knee injection and aspiration.    Plan:  Follow up as directed by Dr Eldridge  If compensatory right knee pain not improved reviewed contacting Dr Eldridge for procedure order for right knee CSI  Expectations and goals of CSI reviewed  Often 2-3 days for steroid effect, and can take up to two weeks for maximum effect  We discussed modified progressive pain-free activity as tolerated  Do not overuse in  first two weeks if feeling better due to concern for vulnerability while steroid is working  Supportive care reviewed  All questions were answered today  Contact us with additional questions or concerns  Signs and sx of concern reviewed      Pelon Singh DO, CAQ  Primary Care Sports Medicine  [facility]         Again, thank you for allowing me to participate in the care of your patient.        Sincerely,        Pelon Singh DO

## 2022-05-24 NOTE — PROGRESS NOTES
Merlyn Ravi  :  1965  DOS: 2022  MRN: [id number]    Sports Medicine Clinic Procedure    Ultrasound Guided Left Intra-Articular Knee Injection, +/- Aspiration    Clinical History: Gradual onset of chronic bilateral knee pain over past several years, that is significantly worse on left over past 2 - 3 months.    Diagnosis:   1. Chronic pain of both knees    2. Effusion of left knee      Referring Physician: Yasmin Eldridge MD  Large Joint Injection/Arthocentesis    Date/Time: 2022 8:40 AM  Performed by: Pelon Singh DO  Authorized by: Pelon Singh DO     Indications:  Pain, joint swelling and osteoarthritis  Needle Size:  21 G  Guidance: ultrasound    Approach:  Superolateral  Location:  Knee      Medications:  3 mL ropivacaine 5 MG/ML; 40 mg triamcinolone 40 MG/ML  Aspirate amount (mL):  40  Aspirate:  Serous and yellow  Outcome:  Tolerated well, no immediate complications  Procedure discussed: discussed risks, benefits, and alternatives    Consent Given by:  Patient  Timeout: timeout called immediately prior to procedure    Prep: patient was prepped and draped in usual sterile fashion     Ultrasound images of procedure were permanently stored.         Impression:  Successful Left intra-articular knee injection and aspiration.    Plan:  Follow up as directed by Dr Eldridge  If compensatory right knee pain not improved reviewed contacting Dr Eldridge for procedure order for right knee CSI  Expectations and goals of CSI reviewed  Often 2-3 days for steroid effect, and can take up to two weeks for maximum effect  We discussed modified progressive pain-free activity as tolerated  Do not overuse in first two weeks if feeling better due to concern for vulnerability while steroid is working  Supportive care reviewed  All questions were answered today  Contact us with additional questions or concerns  Signs and sx of concern reviewed      Pelon Singh DO, CAQ  Primary Care Sports  Medicine  [facility]

## 2022-05-26 ENCOUNTER — HOSPITAL ENCOUNTER (OUTPATIENT)
Dept: MAMMOGRAPHY | Facility: CLINIC | Age: 57
Discharge: HOME | End: 2022-05-26
Attending: NURSE PRACTITIONER | Admitting: NURSE PRACTITIONER
Payer: COMMERCIAL

## 2022-05-26 DIAGNOSIS — Z12.31 SCREENING MAMMOGRAM FOR HIGH-RISK PATIENT: ICD-10-CM

## 2022-05-26 PROCEDURE — 77067 SCR MAMMO BI INCL CAD: CPT

## 2022-06-15 ENCOUNTER — HOSPITAL ENCOUNTER (OUTPATIENT)
Dept: MRI IMAGING | Facility: CLINIC | Age: 57
Discharge: HOME | End: 2022-06-15
Attending: PEDIATRICS | Admitting: PEDIATRICS
Payer: COMMERCIAL

## 2022-06-15 DIAGNOSIS — E06.3 HYPOTHYROIDISM DUE TO HASHIMOTO'S THYROIDITIS: ICD-10-CM

## 2022-06-15 DIAGNOSIS — M25.462 EFFUSION OF LEFT KNEE: ICD-10-CM

## 2022-06-15 PROCEDURE — 73721 MRI JNT OF LWR EXTRE W/O DYE: CPT | Mod: LT

## 2022-06-15 PROCEDURE — 73721 MRI JNT OF LWR EXTRE W/O DYE: CPT | Mod: 26 | Performed by: RADIOLOGY

## 2022-06-15 RX ORDER — LEVOTHYROXINE SODIUM 88 UG/1
88 TABLET ORAL DAILY
Qty: 90 TABLET | Refills: 1 | Status: SHIPPED | OUTPATIENT
Start: 2022-06-15 | End: 2023-02-10

## 2022-06-16 NOTE — RESULT ENCOUNTER NOTE
Reviewed and notified of results via [employer].  Ryan Acevedo,  Please see the results of your MRI.  Given the call of an insufficiency fracture, I would limit your weight bearing as you are able with crutches or a cane.  We will review this in more detail at your follow-up appointment.  Let us know if you have questions.    Yasmin Eldridge MD, CAQ  Primary Care Sports Medicine  [facility]

## 2022-06-17 NOTE — PROGRESS NOTES
ASSESSMENT & PLAN    Merlyn was seen today for pain.    Diagnoses and all orders for this visit:    Effusion of left knee  -     Orthopedic  Referral; Future  -     Crutches Order for DME - ONLY FOR DME    Closed fracture of left tibial plateau, initial encounter  -     Orthopedic  Referral; Future  -     Crutches Order for DME - ONLY FOR DME    Arthritis of left knee  -     Orthopedic  Referral; Future  -     Crutches Order for DME - ONLY FOR DME    Bucket handle tear of lateral meniscus of left knee, unspecified whether old or current tear, subsequent encounter  -     Orthopedic  Referral; Future  -     Crutches Order for DME - ONLY FOR DME      This issue is acute on chronic and Unchanged.    We discussed these other possible diagnosis:  - Discussed treatment for insufficiency fracture includes resting and limited weight bearing. Continue ice, compression.    - Lateral meniscal tear with a flap in the setting of arthritis. Patient would like to discuss options with orthopedic surgery, discussed possible arthroscopy, more likely knee replacement would be recommended.    Plan:  - Today's Plan of Care:  Referral to an Orthopedic Surgeon  Limited weight bearing on crutches  Discussed activity considerations and other supportive care including Ice/Heat, OTC and other topical medications as needed.    -We also discussed other future treatment options:  Referral to Physical Therapy    Follow Up: as needed    Concerning signs and symptoms were reviewed.  The patient expressed understanding of this management plan and all questions were answered at this time.    Yasmin Eldridge MD [facility]  Sports Medicine Physician  [facility]      SUBJECTIVE- Interim History June 17, 2022    Chief Complaint   Patient presents with     Left Knee - Pain       Merlyn Ravi is a 57 year old female who is seen in f/u up for left knee pain. Since last visit on 5/24/2022, patient has noted  persistent knee pain, at times extends throughout leg.  - Had a knee aspiration and injection with Dr. Singh 5/24/2022 with only minimal relief. Knee is swollen again.    Worsened by: driving, lying on her left side, walking on occasion and kneeling. Pivoting.  10/10 pain at worst.   Better with: high dose ibuprofen  Treatments tried: ice, ibuprofen and rest  Associated symptoms:  Swelling on occasion    The patient is seen by themselves.    No family history pertinent to patient's problem today.    REVIEW OF SYSTEMS:  Review of Systems  Skin: no bruising, yes swelling  Musculoskeletal: as above  Neurologic: no numbness, paresthesias  Remainder of review of systems is negative including constitutional, CV, pulmonary, GI, except as noted in HPI or medical history.    OBJECTIVE:  BP (!) 141/98   Pulse 67   Wt 100.7 kg (222 lb)   BMI 35.83 kg/m       General: healthy, alert and in no distress  HEENT: no scleral icterus or conjunctival erythema  Skin: no suspicious lesions or rash. No jaundice.  CV: distal perfusion intact  Resp: normal respiratory effort without conversational dyspnea   Psych: normal mood and affect  Gait: normal steady gait with appropriate coordination and balance  Neuro: Normal light sensory exam of lower extremity     Bilateral Knee exam  Inspection:      moderate effusion left     Patella:      Crepitus noted in the patellofemoral joint bilateral     Tender:             lateral > medial joint line left     Non Tender:      remainder of knee area left     Knee ROM:      Range of motion limited due to swelling on the left     Strength:      5-/5 with knee extension bilateral     Special Tests:     neg (-) Michele left       neg (-) anterior drawer left       neg (-) posterior drawer left       neg (-) varus at 0 deg and 30 deg left       neg (-) valgus at 0 deg and 30 deg left     Gait:      normal     Neurovascular:      2+ peripheral pulses bilaterally and brisk capillary refill       sensation  grossly intact    RADIOLOGY:  Final results and radiologist's interpretation, available in the [facility] health record.  Images were reviewed with the patient in the office today.  My personal interpretation of the performed imaging:   Left Knee MRI 6/15/2022 - lateral meniscal tearing with flipped fragment, lateral tibial plateau reactive edema and possible insufficiency fracture, cartilage defects and arthritis    Review of the result(s) of each unique test - MRI

## 2022-06-20 ENCOUNTER — OFFICE VISIT (OUTPATIENT)
Dept: ORTHOPEDICS | Facility: CLINIC | Age: 57
End: 2022-06-20
Payer: COMMERCIAL

## 2022-06-20 VITALS
SYSTOLIC BLOOD PRESSURE: 141 MMHG | DIASTOLIC BLOOD PRESSURE: 98 MMHG | HEART RATE: 67 BPM | WEIGHT: 222 LBS | BODY MASS INDEX: 35.83 KG/M2

## 2022-06-20 DIAGNOSIS — S82.142A CLOSED FRACTURE OF LEFT TIBIAL PLATEAU, INITIAL ENCOUNTER: ICD-10-CM

## 2022-06-20 DIAGNOSIS — M25.462 EFFUSION OF LEFT KNEE: Primary | ICD-10-CM

## 2022-06-20 DIAGNOSIS — S83.252D BUCKET HANDLE TEAR OF LATERAL MENISCUS OF LEFT KNEE, UNSPECIFIED WHETHER OLD OR CURRENT TEAR, SUBSEQUENT ENCOUNTER: ICD-10-CM

## 2022-06-20 DIAGNOSIS — M17.12 ARTHRITIS OF LEFT KNEE: ICD-10-CM

## 2022-06-20 PROCEDURE — 99213 OFFICE O/P EST LOW 20 MIN: CPT | Performed by: PEDIATRICS

## 2022-06-20 ASSESSMENT — PAIN SCALES - GENERAL: PAINLEVEL: SEVERE PAIN (6)

## 2022-06-20 NOTE — LETTER
6/20/2022         RE: Merlyn Ravi  [address] [facility] [address]        Dear Colleague,    Thank you for referring your patient, Merlyn Ravi, to the [facility] [place]. Please see a copy of my visit note below.    ASSESSMENT & PLAN    Merlyn was seen today for pain.    Diagnoses and all orders for this visit:    Effusion of left knee  -     Orthopedic  Referral; Future  -     Crutches Order for DME - ONLY FOR DME    Closed fracture of left tibial plateau, initial encounter  -     Orthopedic  Referral; Future  -     Crutches Order for DME - ONLY FOR DME    Arthritis of left knee  -     Orthopedic  Referral; Future  -     Crutches Order for DME - ONLY FOR DME    Bucket handle tear of lateral meniscus of left knee, unspecified whether old or current tear, subsequent encounter  -     Orthopedic  Referral; Future  -     Crutches Order for DME - ONLY FOR DME      This issue is acute on chronic and Unchanged.    We discussed these other possible diagnosis:  - Discussed treatment for insufficiency fracture includes resting and limited weight bearing. Continue ice, compression.    - Lateral meniscal tear with a flap in the setting of arthritis. Patient would like to discuss options with orthopedic surgery, discussed possible arthroscopy, more likely knee replacement would be recommended.    Plan:  - Today's Plan of Care:  Referral to an Orthopedic Surgeon  Limited weight bearing on crutches  Discussed activity considerations and other supportive care including Ice/Heat, OTC and other topical medications as needed.    -We also discussed other future treatment options:  Referral to Physical Therapy    Follow Up: as needed    Concerning signs and symptoms were reviewed.  The patient expressed understanding of this management plan and all questions were answered at this time.    Yasmin Eldridge MD [facility]  Sports Medicine  Physician  [facility]      SUBJECTIVE- Interim History June 17, 2022    Chief Complaint   Patient presents with     Left Knee - Pain       Merlyn Ravi is a 57 year old female who is seen in f/u up for left knee pain. Since last visit on 5/24/2022, patient has noted persistent knee pain, at times extends throughout leg.  - Had a knee aspiration and injection with Dr. Singh 5/24/2022 with only minimal relief. Knee is swollen again.    Worsened by: driving, lying on her left side, walking on occasion and kneeling. Pivoting.  10/10 pain at worst.   Better with: high dose ibuprofen  Treatments tried: ice, ibuprofen and rest  Associated symptoms:  Swelling on occasion    The patient is seen by themselves.    No family history pertinent to patient's problem today.    REVIEW OF SYSTEMS:  Review of Systems  Skin: no bruising, yes swelling  Musculoskeletal: as above  Neurologic: no numbness, paresthesias  Remainder of review of systems is negative including constitutional, CV, pulmonary, GI, except as noted in HPI or medical history.    OBJECTIVE:  BP (!) 141/98   Pulse 67   Wt 100.7 kg (222 lb)   BMI 35.83 kg/m       General: healthy, alert and in no distress  HEENT: no scleral icterus or conjunctival erythema  Skin: no suspicious lesions or rash. No jaundice.  CV: distal perfusion intact  Resp: normal respiratory effort without conversational dyspnea   Psych: normal mood and affect  Gait: normal steady gait with appropriate coordination and balance  Neuro: Normal light sensory exam of lower extremity     Bilateral Knee exam  Inspection:      moderate effusion left     Patella:      Crepitus noted in the patellofemoral joint bilateral     Tender:             lateral > medial joint line left     Non Tender:      remainder of knee area left     Knee ROM:      Range of motion limited due to swelling on the left     Strength:      5-/5 with knee extension bilateral     Special Tests:     neg (-) Michele  left       neg (-) anterior drawer left       neg (-) posterior drawer left       neg (-) varus at 0 deg and 30 deg left       neg (-) valgus at 0 deg and 30 deg left     Gait:      normal     Neurovascular:      2+ peripheral pulses bilaterally and brisk capillary refill       sensation grossly intact    RADIOLOGY:  Final results and radiologist's interpretation, available in the [facility] health record.  Images were reviewed with the patient in the office today.  My personal interpretation of the performed imaging:   Left Knee MRI 6/15/2022 - lateral meniscal tearing with flipped fragment, lateral tibial plateau reactive edema and possible insufficiency fracture, cartilage defects and arthritis    Review of the result(s) of each unique test - MRI               Again, thank you for allowing me to participate in the care of your patient.        Sincerely,        Yasmin Eldridge MD

## 2022-06-20 NOTE — PATIENT INSTRUCTIONS
We discussed these other possible diagnosis:  - Discussed treatment for insufficiency fracture includes resting and limited weight bearing. Continue ice, compression.    - Lateral meniscal tear with a flap in the setting of arthritis. Patient would like to discuss options with orthopedic surgery, discussed possible arthroscopy, more likely knee replacement would be recommended.    Plan:  - Today's Plan of Care:  Referral to an Orthopedic Surgeon  Limited weight bearing on crutches  Discussed activity considerations and other supportive care including Ice/Heat, OTC and other topical medications as needed.    -We also discussed other future treatment options:  Referral to Physical Therapy    Follow Up: as needed    If you have any further questions for your physician or physician s care team you can call [phone] and use option 3 to leave a voice message. Calls received during business hours will be returned same day.

## 2022-06-23 ENCOUNTER — OFFICE VISIT (OUTPATIENT)
Dept: ORTHOPEDICS | Facility: CLINIC | Age: 57
End: 2022-06-23
Attending: PEDIATRICS
Payer: COMMERCIAL

## 2022-06-23 VITALS
BODY MASS INDEX: 33.3 KG/M2 | DIASTOLIC BLOOD PRESSURE: 84 MMHG | HEIGHT: 66 IN | SYSTOLIC BLOOD PRESSURE: 128 MMHG | HEART RATE: 80 BPM | WEIGHT: 207.2 LBS

## 2022-06-23 DIAGNOSIS — G89.29 CHRONIC PAIN OF LEFT KNEE: ICD-10-CM

## 2022-06-23 DIAGNOSIS — S83.272A COMPLEX TEAR OF LATERAL MENISCUS OF LEFT KNEE AS CURRENT INJURY, INITIAL ENCOUNTER: Primary | ICD-10-CM

## 2022-06-23 DIAGNOSIS — M25.462 EFFUSION OF LEFT KNEE: ICD-10-CM

## 2022-06-23 DIAGNOSIS — M25.562 CHRONIC PAIN OF LEFT KNEE: ICD-10-CM

## 2022-06-23 DIAGNOSIS — M17.12 PRIMARY OSTEOARTHRITIS OF LEFT KNEE: ICD-10-CM

## 2022-06-23 PROCEDURE — 99204 OFFICE O/P NEW MOD 45 MIN: CPT | Performed by: ORTHOPAEDIC SURGERY

## 2022-06-23 ASSESSMENT — PAIN SCALES - GENERAL: PAINLEVEL: MILD PAIN (2)

## 2022-06-23 NOTE — PROGRESS NOTES
CHIEF COMPLAINT:   Chief Complaint   Patient presents with     Left Knee - Pain     Onset: over a year but has gotten worse since 2/2022. NKI Pain just came on. Pain is over the entire knee, especially laterally. She does have some posterior knee pain as well. Pain increases with walking, exercises, driving, and sitting too long. She had an aspiration and injection on 5/24/22, lasting only 4 days. She is wearing a short hinged knee brace today.    .    Merlyn Ravi is seen today in the [facility] for evaluation of left knee pain at the request of Dr. Yasmin Eldridge         HISTORY OF PRESENT ILLNESS    Merlyn Ravi is a 57 year old female seen for evaluation of ongoing left knee pain with no known injury.   Pain has been present for over 1 years, worse since 2/2022. Pain just came on and getting worse with time. Locates pain throughout the knee, mostly along the outer aspect, but does have pain behind the knee as well. Shoots down the side of the leg to the foot. Can feel pain over the top of the foot at times. No numbness and tingling.  Pain aggravated with walking, exercising, driving, prolonged sitting. She had an Aspiration/injection 5/24/2022, helped somewhat  for 4 days or so but the pain came back.     She's been wearing a short hinged knee brace. Takes ibuprofen as needed.    Right knee was getting sore due to compensation but feeling better now.    She likes to dance and golf, but due to the knee pain, is unable.      Present symptoms: pain laterally, posteriorly and diffuse, pain sharp, shooting, dull/achy , mild pain, mild swelling.    Pain severity: 2/10  Frequency of symptoms: are constant  Exacerbating Factors: weight bearing, stairs, prolonged sitting, prolonged standing  Relieving Factors: rest, medications: ibuprofen  Patient has tried:     NSAIDS: Yes      Physical Therapy: No      Activity modification: Yes      Bracing: Yes      Injections: Yes  5/24/2022.     Ice: Yes      Assistive device:  No    Other PMH:  has a past medical history of Allergies, Breast cancer (H), Dermatofibroma (05/14/2012), and Need for prophylactic hormone replacement therapy (postmenopausal).    She has no past medical history of Acne vulgaris, Actinic keratosis, Basal cell carcinoma, Complication of anesthesia, COPD (chronic obstructive pulmonary disease) (H), Diabetes (H), Eczema, Heart disease, Heart valve disorder, Hypertension, Malignant melanoma (H), Malignant melanoma nos, Malignant neoplasm of ovary (H), Pacemaker, Photosensitive contact dermatitis, PONV (postoperative nausea and vomiting), Psoriasis, Sleep apnea, Squamous cell carcinoma, Type II or unspecified type diabetes mellitus without mention of complication, not stated as uncontrolled, Uncomplicated asthma, Unspecified asthma(493.90), or Urticaria.  Patient Active Problem List   Diagnosis     CARDIOVASCULAR SCREENING; LDL GOAL LESS THAN 160     LASIK OU 5 yrs     Dermatofibroma     Monoclonal paraproteinemia     Hyperlipidemia LDL goal <130     Obesity (BMI 35.0-39.9) with comorbidity (H)     Other specified hypothyroidism     Invasive lobular carcinoma of breast in female (H)     Major depressive disorder, recurrent episode, mild (H)       Surgical Hx:  has a past surgical history that includes NONSPECIFIC PROCEDURE; hysterectomy, nusrat; Lasik bilateral (01/01/2005); hysterectomy, pap no longer indicated; Lumpectomy breast with sentinel node, combined (Left, 09/23/2019); Lumpectomy breast (Left, 09/30/2019); Lumpectomy breast (Left, 10/09/2019); and Biopsy breast.    Medications:   Current Outpatient Medications:      atorvastatin (LIPITOR) 10 MG tablet, TAKE 1 TABLET(10 MG) BY MOUTH DAILY, Disp: 90 tablet, Rfl: 1     calcium 500-125 MG-UNIT TABS, Take 2 tablets by mouth 2 times daily, Disp: , Rfl:      doxycycline hyclate (PERIOSTAT) 20 MG tablet, Take 2 tablets (40 mg) by mouth daily, Disp: 180 tablet, Rfl: 3      gabapentin (NEURONTIN) 100 MG capsule, TAKE ONE CAPSULE BY MOUTH EVERY MORNING AND 2 CAPSULES EVERY EVENING, Disp: 90 capsule, Rfl: 3     IBUPROFEN PO, Take 800 mg by mouth every 4 hours as needed for moderate pain , Disp: , Rfl:      levothyroxine (SYNTHROID/LEVOTHROID) 88 MCG tablet, Take 1 tablet (88 mcg) by mouth daily, Disp: 90 tablet, Rfl: 1     metroNIDAZOLE (METROCREAM) 0.75 % external cream, Apply topically 2 times daily, Disp: 45 g, Rfl: 3     Naproxen Sodium (ALEVE PO), Take 220 mg by mouth 2 times daily (with meals) , Disp: , Rfl:      tamoxifen (NOLVADEX) 20 MG tablet, Take 1 tablet (20 mg) by mouth daily, Disp: 90 tablet, Rfl: 3     tolterodine ER (DETROL LA) 2 MG 24 hr capsule, Take 1 capsule (2 mg) by mouth daily, Disp: 90 capsule, Rfl: 3     venlafaxine (EFFEXOR-XR) 75 MG 24 hr capsule, Take 1 capsule (75 mg) by mouth daily, Disp: 90 capsule, Rfl: 3     doxycycline monohydrate (MONODOX) 50 MG capsule, 1 tab po daily (Patient not taking: No sig reported), Disp: 30 capsule, Rfl: 0    Allergies:   Allergies   Allergen Reactions     Demerol      Sedation and vomiting     Meperidine      Sulfa Drugs      unknown reaction       Social Hx: .   reports that she has never smoked. She has never used smokeless tobacco. She reports current alcohol use. She reports that she does not use drugs.    Family Hx: family history includes Alzheimer Disease in her paternal grandmother; Breast Cancer in her maternal grandmother; Diabetes in her brother; Eczema in her brother; Heart Disease in her father and mother; Hyperlipidemia in her father; Hypertension in her maternal grandfather and maternal grandmother; Thyroid Disease in her brother and mother.    REVIEW OF SYSTEMS: 10 point ROS neg other than the symptoms noted above in the HPI and PMH. Notables include  CONSTITUTIONAL:NEGATIVE for fever, chills, change in weight  INTEGUMENTARY/SKIN: NEGATIVE for worrisome rashes, moles or  "lesions  MUSCULOSKELETAL:See HPI above  NEURO: NEGATIVE for weakness, dizziness or paresthesias    PHYSICAL EXAM:  /84   Pulse 80   Ht 1.676 m (5' 6\")   Wt 94 kg (207 lb 3.2 oz)   BMI 33.44 kg/m     GENERAL APPEARANCE: healthy, alert, no distress  SKIN: no suspicious lesions or rashes  NEURO: Normal strength and tone, mentation intact and speech normal  PSYCH:  mentation appears normal and affect normal, not anxious  RESPIRATORY: No increased work of breathing.  HANDS: no clubbing, nail pitting  LYMPH: no palpable popliteal lymphadenopathy.    BILATERAL LOWER EXTREMITIES:  Gait: slight quadriceps avoidance left.  Alignment: valgus left more than right.  No gross deformities or masses.  No Quad atrophy, strength normal.  Intact sensation deep peroneal nerve, superficial peroneal nerve, med/lat tibial nerve, sural nerve, saphenous nerve  Intact EHL, EDL, TA, FHL, GS, quadriceps hamstrings and hip flexors  Toes warm and well perfused, brisk capillary refill. Palpable 2+ dp pulses.  Bilateral calf soft and nttp or squeeze.  DTRs: achilles 2+, patella 2+.  Edema: trace    LEFT KNEE EXAM:    Skin: intact, no ecchymosis or erythema  Squat: 25 %, limited by anterolateral pain. Causes radiating pain down the leg to the ankle.  ROM: full extension to 130 flexion with lateral discomfort in flexion and extension.  Tight hamstrings on straight leg raise.  Effusion: trace  Tender: lateral joint line, medial joint line, pes, posterolateral knee  McMurrays: positive lateral for pain.    MCL: stable, and non-painful at both 0 and 30 degrees knee flexion  Varus stress: stable, and non-painful at both 0 and 30 degrees knee flexion  Lachmans: neg, firm endpoint  Posterior Drawer stable  Patellofemoral joint:                Apprehension: negative              Crepitations: positive   Grind: positive.    RIGHT KNEE EXAM:    Skin: intact, no ecchymosis or erythema  ROM: full extension to 130+ flexion  Tight hamstrings on " straight leg raise.  Effusion: none  Tender: lateral joint line   NTTP med joint line, anterior or posterior knee  McMurrays: negative    MCL: stable, and non-painful at both 0 and 30 degrees knee flexion  Varus stress: stable, and non-painful at both 0 and 30 degrees knee flexion  Lachmans: neg, firm endpoint  Posterior Drawer stable  Patellofemoral joint:                Apprehension: negative              Crepitations: positive   Grind: positive.    X-RAY: bilateral barboza and lateral views 5/17/2022, bilateral sunrise views 5/23/2022, sunrise view left knee from 6/23/2022 were reviewed in clinic today. On my review, no obvious fractures or dislocations. Fairly preserved joint spaces.    MRI:  MRI left knee from 6/15/2022 was reviewed in clinic today.    1. Complex degenerative tearing of the lateral meniscus involving the  anterior horn and body, extending into the posterior horn this  associated flipped fragment into the meniscal tibial recess, markedly  extruded body.   2. Associated full-thickness cartilage defects involving the articular  surfaces of the weightbearing femoral condyle in keeping plateau as  measured above.   3. High-grade reactive bone marrow edema in the lateral tibial  plateau.  4. Mucoid degeneration of the ACL likely sequela of prior injury.  5. Horizontal oblique tear of the medial meniscus, nondisplaced and  subtle. Mild cartilage fraying of the medial compartment.  6. Focal full-thickness articular cartilage fissure of the lateral  patella facet, otherwise mild articular cartilage fraying in the  trochlea       ASSESSMENT/PLAN: Merlyn Ravi is a 57 year old female with chronic left knee pain, complex lateral meniscus tear, possible medial meniscus tear, primary osteoarthritis.     * discussed injury with patient, what appears to be a complex lateral meniscal tear with displaced flap on MRI, as well as some underlying arthritic changes, which is consistent with symptoms and  physical examination findings.     * Discussed treatment options including nonoperative treatment with continued rest, ice, elevation, activity modification, NSAIDS and Physical Therapy, bracing and potential injections versus surgical treatment with arthroscopy and meniscal repair versus debridement, possible chondral debridement. Risks and benefits of each discussed in detail.  * in the setting of underlying chondrosis, predictability of arthroscopy is uncertain, unless mechanical symptoms present due to the meniscus tear.    * surgical risks discussed: bleeding, infection, pain, scar, damage to adjacent structures (nerve, vessels, cartilage), stiffness, post-traumatic arthritis, failure to relieve symptoms, recurrence of symptoms, blood clots (DVT), pulmonary emolism, risks of anesthesia and death. This surgery is not intended nor expected to alleviate arthritic pain symptoms, nor will it treat or correct underlying arthritic changes. Arthritis and symptoms related to arthritis could worsen with arthroscopy and meniscal and/or chondral debridement. Patient understands.    * understanding the risks of surgery, patient elected to proceed with arthroscopy  * plan: LEFT knee arthroscopy with partial meniscal debridement, possible chondral debridement, outpatient surgery  * will arrange at a time in future mutual convenience  * will need H+P from PCP prior to surgery  * patient will be on ASA x2 weeks postoperative, as well as use of TEDs, crutches  * plan Physical Therapy to start 1-2 weeks postoperative, to be determined at postoperative visit.  * return to clinic 2 week postop for wound check, sooner as needed.  * all questions addressed and answered prior to discharge from clinic today.  * patient to call if any questions or concerns in the meantime.          Timo Berman M.D., M.S.  Dept. of Orthopaedic Surgery  [facility]

## 2022-06-23 NOTE — LETTER
6/23/2022         RE: Merlyn Ravi  [id number] [facility] [id number]        Dear Colleague,    Thank you for referring your patient, Merlyn Ravi, to the [facility] [place]. Please see a copy of my visit note below.    CHIEF COMPLAINT:   Chief Complaint   Patient presents with     Left Knee - Pain     Onset: over a year but has gotten worse since 2/2022. NKI Pain just came on. Pain is over the entire knee, especially laterally. She does have some posterior knee pain as well. Pain increases with walking, exercises, driving, and sitting too long. She had an aspiration and injection on 5/24/22, lasting only 4 days. She is wearing a short hinged knee brace today.    .    Merlyn Ravi is seen today in the [facility] for evaluation of left knee pain at the request of Dr. Yasmin Eldridge         HISTORY OF PRESENT ILLNESS    Merlyn Ravi is a 57 year old female seen for evaluation of ongoing left knee pain with no known injury.   Pain has been present for over 1 years, worse since 2/2022. Pain just came on and getting worse with time. Locates pain throughout the knee, mostly along the outer aspect, but does have pain behind the knee as well. Shoots down the side of the leg to the foot. Can feel pain over the top of the foot at times. No numbness and tingling.  Pain aggravated with walking, exercising, driving, prolonged sitting. She had an Aspiration/injection 5/24/2022, helped somewhat  for 4 days or so but the pain came back.     She's been wearing a short hinged knee brace. Takes ibuprofen as needed.    Right knee was getting sore due to compensation but feeling better now.    She likes to dance and golf, but due to the knee pain, is unable.      Present symptoms: pain laterally, posteriorly and diffuse, pain sharp, shooting, dull/achy , mild pain, mild swelling.    Pain severity: 2/10  Frequency of symptoms: are  constant  Exacerbating Factors: weight bearing, stairs, prolonged sitting, prolonged standing  Relieving Factors: rest, medications: ibuprofen  Patient has tried:     NSAIDS: Yes      Physical Therapy: No      Activity modification: Yes      Bracing: Yes      Injections: Yes 5/24/2022.     Ice: Yes      Assistive device:  No    Other PMH:  has a past medical history of Allergies, Breast cancer (H), Dermatofibroma (05/14/2012), and Need for prophylactic hormone replacement therapy (postmenopausal).    She has no past medical history of Acne vulgaris, Actinic keratosis, Basal cell carcinoma, Complication of anesthesia, COPD (chronic obstructive pulmonary disease) (H), Diabetes (H), Eczema, Heart disease, Heart valve disorder, Hypertension, Malignant melanoma (H), Malignant melanoma nos, Malignant neoplasm of ovary (H), Pacemaker, Photosensitive contact dermatitis, PONV (postoperative nausea and vomiting), Psoriasis, Sleep apnea, Squamous cell carcinoma, Type II or unspecified type diabetes mellitus without mention of complication, not stated as uncontrolled, Uncomplicated asthma, Unspecified asthma(493.90), or Urticaria.  Patient Active Problem List   Diagnosis     CARDIOVASCULAR SCREENING; LDL GOAL LESS THAN 160     LASIK OU 5 yrs     Dermatofibroma     Monoclonal paraproteinemia     Hyperlipidemia LDL goal <130     Obesity (BMI 35.0-39.9) with comorbidity (H)     Other specified hypothyroidism     Invasive lobular carcinoma of breast in female (H)     Major depressive disorder, recurrent episode, mild (H)       Surgical Hx:  has a past surgical history that includes NONSPECIFIC PROCEDURE; hysterectomy, nusrat; Lasik bilateral (01/01/2005); hysterectomy, pap no longer indicated; Lumpectomy breast with sentinel node, combined (Left, 09/23/2019); Lumpectomy breast (Left, 09/30/2019); Lumpectomy breast (Left, 10/09/2019); and Biopsy breast.    Medications:   Current Outpatient Medications:      atorvastatin (LIPITOR) 10 MG  tablet, TAKE 1 TABLET(10 MG) BY MOUTH DAILY, Disp: 90 tablet, Rfl: 1     calcium 500-125 MG-UNIT TABS, Take 2 tablets by mouth 2 times daily, Disp: , Rfl:      doxycycline hyclate (PERIOSTAT) 20 MG tablet, Take 2 tablets (40 mg) by mouth daily, Disp: 180 tablet, Rfl: 3     gabapentin (NEURONTIN) 100 MG capsule, TAKE ONE CAPSULE BY MOUTH EVERY MORNING AND 2 CAPSULES EVERY EVENING, Disp: 90 capsule, Rfl: 3     IBUPROFEN PO, Take 800 mg by mouth every 4 hours as needed for moderate pain , Disp: , Rfl:      levothyroxine (SYNTHROID/LEVOTHROID) 88 MCG tablet, Take 1 tablet (88 mcg) by mouth daily, Disp: 90 tablet, Rfl: 1     metroNIDAZOLE (METROCREAM) 0.75 % external cream, Apply topically 2 times daily, Disp: 45 g, Rfl: 3     Naproxen Sodium (ALEVE PO), Take 220 mg by mouth 2 times daily (with meals) , Disp: , Rfl:      tamoxifen (NOLVADEX) 20 MG tablet, Take 1 tablet (20 mg) by mouth daily, Disp: 90 tablet, Rfl: 3     tolterodine ER (DETROL LA) 2 MG 24 hr capsule, Take 1 capsule (2 mg) by mouth daily, Disp: 90 capsule, Rfl: 3     venlafaxine (EFFEXOR-XR) 75 MG 24 hr capsule, Take 1 capsule (75 mg) by mouth daily, Disp: 90 capsule, Rfl: 3     doxycycline monohydrate (MONODOX) 50 MG capsule, 1 tab po daily (Patient not taking: No sig reported), Disp: 30 capsule, Rfl: 0    Allergies:   Allergies   Allergen Reactions     Demerol      Sedation and vomiting     Meperidine      Sulfa Drugs      unknown reaction       Social Hx: .   reports that she has never smoked. She has never used smokeless tobacco. She reports current alcohol use. She reports that she does not use drugs.    Family Hx: family history includes Alzheimer Disease in her paternal grandmother; Breast Cancer in her maternal grandmother; Diabetes in her brother; Eczema in her brother; Heart Disease in her father and mother; Hyperlipidemia in her father; Hypertension in her maternal grandfather and maternal grandmother; Thyroid Disease in her  "brother and mother.    REVIEW OF SYSTEMS: 10 point ROS neg other than the symptoms noted above in the HPI and PMH. Notables include  CONSTITUTIONAL:NEGATIVE for fever, chills, change in weight  INTEGUMENTARY/SKIN: NEGATIVE for worrisome rashes, moles or lesions  MUSCULOSKELETAL:See HPI above  NEURO: NEGATIVE for weakness, dizziness or paresthesias    PHYSICAL EXAM:  /84   Pulse 80   Ht 1.676 m (5' 6\")   Wt 94 kg (207 lb 3.2 oz)   BMI 33.44 kg/m     GENERAL APPEARANCE: healthy, alert, no distress  SKIN: no suspicious lesions or rashes  NEURO: Normal strength and tone, mentation intact and speech normal  PSYCH:  mentation appears normal and affect normal, not anxious  RESPIRATORY: No increased work of breathing.  HANDS: no clubbing, nail pitting  LYMPH: no palpable popliteal lymphadenopathy.    BILATERAL LOWER EXTREMITIES:  Gait: slight quadriceps avoidance left.  Alignment: valgus left more than right.  No gross deformities or masses.  No Quad atrophy, strength normal.  Intact sensation deep peroneal nerve, superficial peroneal nerve, med/lat tibial nerve, sural nerve, saphenous nerve  Intact EHL, EDL, TA, FHL, GS, quadriceps hamstrings and hip flexors  Toes warm and well perfused, brisk capillary refill. Palpable 2+ dp pulses.  Bilateral calf soft and nttp or squeeze.  DTRs: achilles 2+, patella 2+.  Edema: trace    LEFT KNEE EXAM:    Skin: intact, no ecchymosis or erythema  Squat: 25 %, limited by anterolateral pain. Causes radiating pain down the leg to the ankle.  ROM: full extension to 130 flexion with lateral discomfort in flexion and extension.  Tight hamstrings on straight leg raise.  Effusion: trace  Tender: lateral joint line, medial joint line, pes, posterolateral knee  McMurrays: positive lateral for pain.    MCL: stable, and non-painful at both 0 and 30 degrees knee flexion  Varus stress: stable, and non-painful at both 0 and 30 degrees knee flexion  Lachmans: neg, firm endpoint  Posterior " Drawer stable  Patellofemoral joint:                Apprehension: negative              Crepitations: positive   Grind: positive.    RIGHT KNEE EXAM:    Skin: intact, no ecchymosis or erythema  ROM: full extension to 130+ flexion  Tight hamstrings on straight leg raise.  Effusion: none  Tender: lateral joint line   NTTP med joint line, anterior or posterior knee  McMurrays: negative    MCL: stable, and non-painful at both 0 and 30 degrees knee flexion  Varus stress: stable, and non-painful at both 0 and 30 degrees knee flexion  Lachmans: neg, firm endpoint  Posterior Drawer stable  Patellofemoral joint:                Apprehension: negative              Crepitations: positive   Grind: positive.    X-RAY: bilateral barboza and lateral views 5/17/2022, bilateral sunrise views 5/23/2022, sunrise view left knee from 6/23/2022 were reviewed in clinic today. On my review, no obvious fractures or dislocations. Fairly preserved joint spaces.    MRI:  MRI left knee from 6/15/2022 was reviewed in clinic today.    1. Complex degenerative tearing of the lateral meniscus involving the  anterior horn and body, extending into the posterior horn this  associated flipped fragment into the meniscal tibial recess, markedly  extruded body.   2. Associated full-thickness cartilage defects involving the articular  surfaces of the weightbearing femoral condyle in keeping plateau as  measured above.   3. High-grade reactive bone marrow edema in the lateral tibial  plateau.  4. Mucoid degeneration of the ACL likely sequela of prior injury.  5. Horizontal oblique tear of the medial meniscus, nondisplaced and  subtle. Mild cartilage fraying of the medial compartment.  6. Focal full-thickness articular cartilage fissure of the lateral  patella facet, otherwise mild articular cartilage fraying in the  trochlea       ASSESSMENT/PLAN: Merlyn Ravi is a 57 year old female with chronic left knee pain, complex lateral meniscus tear, possible  medial meniscus tear, primary osteoarthritis.     * discussed injury with patient, what appears to be a complex lateral meniscal tear with displaced flap on MRI, as well as some underlying arthritic changes, which is consistent with symptoms and physical examination findings.     * Discussed treatment options including nonoperative treatment with continued rest, ice, elevation, activity modification, NSAIDS and Physical Therapy, bracing and potential injections versus surgical treatment with arthroscopy and meniscal repair versus debridement, possible chondral debridement. Risks and benefits of each discussed in detail.  * in the setting of underlying chondrosis, predictability of arthroscopy is uncertain, unless mechanical symptoms present due to the meniscus tear.    * surgical risks discussed: bleeding, infection, pain, scar, damage to adjacent structures (nerve, vessels, cartilage), stiffness, post-traumatic arthritis, failure to relieve symptoms, recurrence of symptoms, blood clots (DVT), pulmonary emolism, risks of anesthesia and death. This surgery is not intended nor expected to alleviate arthritic pain symptoms, nor will it treat or correct underlying arthritic changes. Arthritis and symptoms related to arthritis could worsen with arthroscopy and meniscal and/or chondral debridement. Patient understands.    * understanding the risks of surgery, patient elected to proceed with arthroscopy  * plan: LEFT knee arthroscopy with partial meniscal debridement, possible chondral debridement, outpatient surgery  * will arrange at a time in future mutual convenience  * will need H+P from PCP prior to surgery  * patient will be on ASA x2 weeks postoperative, as well as use of TEDs, crutches  * plan Physical Therapy to start 1-2 weeks postoperative, to be determined at postoperative visit.  * return to clinic 2 week postop for wound check, sooner as needed.  * all questions addressed and answered prior to discharge from  clinic today.  * patient to call if any questions or concerns in the meantime.          Timo Berman M.D., M.S.  Dept. of Orthopaedic Surgery  [facility]          Again, thank you for allowing me to participate in the care of your patient.        Sincerely,        Timo Berman MD

## 2022-06-24 ENCOUNTER — TELEPHONE (OUTPATIENT)
Dept: SURGERY | Facility: CLINIC | Age: 57
End: 2022-06-24

## 2022-06-24 PROBLEM — M25.462 EFFUSION OF LEFT KNEE: Status: ACTIVE | Noted: 2022-06-24

## 2022-06-24 PROBLEM — S83.252D: Status: ACTIVE | Noted: 2022-06-24

## 2022-06-24 NOTE — TELEPHONE ENCOUNTER
Type of surgery: Arthroscopy knee with meniscal and chonral debridement left   CPT 17629, 98077   Chronic pain of left knee M25.562     Effusion of left knee M25.462     Closed fracture of left tibial plateau, initial encounter S82.142A     Arthritis of left knee M17.12     Bucket handle tear of lateral meniscus of left knee, unspecified whether old or current tear, subsequent encounter S83.252D    [facility] of [facility]:  [facility]  Date and time of surgery: 07/15/2022  Surgeon: Antonella   Pre-Op Appt Date: 07/15/2022  Post-Op Appt Date: 07/28/2022   Packet sent out: Yes  Pre-cert/Authorization completed:  No prior auth required per [employer] tool.    Date: 6-24-22    Rizwana Barakat  Financial Securing/Prior Auth Dept  [phone]

## 2022-07-07 ENCOUNTER — OFFICE VISIT (OUTPATIENT)
Dept: FAMILY MEDICINE | Facility: CLINIC | Age: 57
End: 2022-07-07
Payer: COMMERCIAL

## 2022-07-07 VITALS
DIASTOLIC BLOOD PRESSURE: 84 MMHG | WEIGHT: 226 LBS | RESPIRATION RATE: 14 BRPM | OXYGEN SATURATION: 97 % | HEIGHT: 66 IN | BODY MASS INDEX: 36.32 KG/M2 | TEMPERATURE: 97.1 F | HEART RATE: 86 BPM | SYSTOLIC BLOOD PRESSURE: 132 MMHG

## 2022-07-07 DIAGNOSIS — S83.252D BUCKET HANDLE TEAR OF LATERAL MENISCUS OF LEFT KNEE, UNSPECIFIED WHETHER OLD OR CURRENT TEAR, SUBSEQUENT ENCOUNTER: ICD-10-CM

## 2022-07-07 DIAGNOSIS — F33.0 MAJOR DEPRESSIVE DISORDER, RECURRENT EPISODE, MILD (H): ICD-10-CM

## 2022-07-07 DIAGNOSIS — E03.8 OTHER SPECIFIED HYPOTHYROIDISM: ICD-10-CM

## 2022-07-07 DIAGNOSIS — Z01.818 PREOP GENERAL PHYSICAL EXAM: Primary | ICD-10-CM

## 2022-07-07 DIAGNOSIS — E78.5 HYPERLIPIDEMIA LDL GOAL <130: ICD-10-CM

## 2022-07-07 DIAGNOSIS — D47.2 MONOCLONAL PARAPROTEINEMIA: ICD-10-CM

## 2022-07-07 DIAGNOSIS — Z98.890 PONV (POSTOPERATIVE NAUSEA AND VOMITING): ICD-10-CM

## 2022-07-07 DIAGNOSIS — R11.2 PONV (POSTOPERATIVE NAUSEA AND VOMITING): ICD-10-CM

## 2022-07-07 DIAGNOSIS — E66.01 MORBID OBESITY (H): ICD-10-CM

## 2022-07-07 DIAGNOSIS — C50.919 INVASIVE LOBULAR CARCINOMA OF BREAST IN FEMALE (H): ICD-10-CM

## 2022-07-07 PROCEDURE — 99214 OFFICE O/P EST MOD 30 MIN: CPT | Performed by: INTERNAL MEDICINE

## 2022-07-07 NOTE — H&P (VIEW-ONLY)
[facility]  [address] [address]  Phone: [phone]  Primary Provider: Memo Garcia  Pre-op Performing Provider: JESSICA LIN      PREOPERATIVE EVALUATION:  Today's date: 7/7/2022    Jessica Ravi is a 57 year old female who presents for a preoperative evaluation.    Surgical Information:  Surgery/Procedure: ARTHROSCOPY, KNEE with meniscal and chonral debridement left  Surgery Location: [facility]  Surgeon: Timo Berman MD   Surgery Date: 07/14/2022  Time of Surgery: TBD  Where patient plans to recover: At home with family  Fax number for surgical facility: Note does not need to be faxed, will be available electronically in Epic.    Type of Anesthesia Anticipated: General    Assessment & Plan     The proposed surgical procedure is considered INTERMEDIATE risk.    Preop general physical exam    - rivaroxaban ANTICOAGULANT (XARELTO) 10 MG TABS tablet; Take 1 tablet (10 mg) by mouth daily (with dinner) for 10 days    Bucket handle tear of lateral meniscus of left knee, unspecified whether old or current tear, subsequent encounter -she is on tamoxifen for history of breast cancer and this should not be stopped in the perioperative period.  Arthroscopic knee surgery is moderate risk, her Caprini score is 4 due to age, BMI, type of surgery.  We discussed the risks and benefits of anticoagulation.  In the end I recommended Xarelto daily x10 days.  Main downside is contraindication to NSAID use.  We will message her surgeon to have them be aware of this, may consider opiates instead of NSAIDs as the primary perioperative pain regimen.  - rivaroxaban ANTICOAGULANT (XARELTO) 10 MG TABS tablet; Take 1 tablet (10 mg) by mouth daily (with dinner) for 10 days    Hyperlipidemia LDL goal <130    Invasive lobular carcinoma of breast in female (H)    Major depressive disorder, recurrent episode, mild (H)    Obesity  (BMI 35.0-39.9) with comorbidity (H)    Other specified hypothyroidism    Monoclonal paraproteinemia    PONV (postoperative nausea and vomiting)    DVT prophylaxis  - rivaroxaban ANTICOAGULANT (XARELTO) 10 MG TABS tablet; Take 1 tablet (10 mg) by mouth daily (with dinner) for 10 days         Risks and Recommendations:  The patient has the following additional risks and recommendations for perioperative complications:  Anemia/Bleeding/Clotting:    - See plan as above    Medication Instructions:  Patient is to take all scheduled medications on the day of surgery    RECOMMENDATION:  APPROVAL GIVEN to proceed with proposed procedure, without further diagnostic evaluation.        1. Start Rivaroxaban (Xarelto) 10 mg daily x 10 days.  Take at dinner/with food - about 12 hours after surgery.  2. Caution with NSAID (Ibuprofen/Advil, Naproxen/Aleve) - can increase risk of bleeding  3. Opiates such as Oxycodone or Acetaminophen/Tylenol ARE safe with the Rivaroxaban   4. Continue all medications as normal up through surgery  5. No blood work or EKG needed                          Subjective     HPI related to upcoming procedure: knee pain requiring surgical management      Preop Questions 7/7/2022   1. Have you ever had a heart attack or stroke? No   2. Have you ever had surgery on your heart or blood vessels, such as a stent placement, a coronary artery bypass, or surgery on an artery in your head, neck, heart, or legs? No   3. Do you have chest pain with activity? No   4. Do you have a history of  heart failure? No   5. Do you currently have a cold, bronchitis or symptoms of other infection? No   6. Do you have a cough, shortness of breath, or wheezing? No   7. Do you or anyone in your family have previous history of blood clots? No   8. Do you or does anyone in your family have a serious bleeding problem such as prolonged bleeding following surgeries or cuts? No   9. Have you ever had problems with anemia or been told to  take iron pills? No   10. Have you had any abnormal blood loss such as black, tarry or bloody stools, or abnormal vaginal bleeding? No   11. Have you ever had a blood transfusion? No   12. Are you willing to have a blood transfusion if it is medically needed before, during, or after your surgery? Yes   13. Have you or any of your relatives ever had problems with anesthesia? No   14. Do you have sleep apnea, excessive snoring or daytime drowsiness? No   15. Do you have any artifical heart valves or other implanted medical devices like a pacemaker, defibrillator, or continuous glucose monitor? No   16. Do you have artificial joints? No   17. Are you allergic to latex? No   18. Is there any chance that you may be pregnant? No     Health Care Directive:  Patient does not have a Health Care Directive or Living Will: Patient states has Advance Directive and will bring in a copy to clinic.    Preoperative Review of :   reviewed - controlled substances reflected in medication list.      Status of Chronic Conditions:  See problem list for active medical problems.  Problems all longstanding and stable, except as noted/documented.  See ROS for pertinent symptoms related to these conditions.    DEPRESSION - Patient has a long history of Depression of moderate severity requiring medication for control with recent symptoms being stable..Current symptoms of depression include none.     HYPERLIPIDEMIA - Patient has a long history of significant Hyperlipidemia requiring medication for treatment with recent good control. Patient reports no problems or side effects with the medication.     HYPOTHYROIDISM - Patient has a longstanding history of chronic Hypothyroidism. Patient has been doing well, noting no tremor, insomnia, hair loss or changes in skin texture. Continues to take medications as directed, without adverse reactions or side effects. Last TSH   Lab Results   Component Value Date    TSH 1.75 03/09/2020   .         Review of Systems  Constitutional, neuro, ENT, endocrine, pulmonary, cardiac, gastrointestinal, genitourinary, musculoskeletal, integument and psychiatric systems are negative, except as otherwise noted.    Patient Active Problem List    Diagnosis Date Noted     Effusion of left knee 06/24/2022     Priority: Medium     Added automatically from request for surgery [id number]       Bucket handle tear of lateral meniscus of left knee, unspecified whether old or current tear, subsequent encounter 06/24/2022     Priority: Medium     Added automatically from request for surgery [id number]       Major depressive disorder, recurrent episode, mild (H) 05/17/2022     Priority: Medium     Other specified hypothyroidism 08/29/2019     Priority: Medium     Invasive lobular carcinoma of breast in female (H) 08/15/2019     Priority: Medium     6.5.2020- Review and distribute treatment summary-[place]  Added automatically from request for surgery [id number]         Obesity (BMI 35.0-39.9) with comorbidity (H) 01/25/2019     Priority: Medium     Hyperlipidemia LDL goal <130 10/14/2014     Priority: Medium     Monoclonal paraproteinemia 10/08/2013     Priority: Medium     Dermatofibroma 05/14/2012     Priority: Medium     LASIK OU 5 yrs 11/18/2010     Priority: Medium     CARDIOVASCULAR SCREENING; LDL GOAL LESS THAN 160 10/31/2010     Priority: Medium      Past Medical History:   Diagnosis Date     Allergies      Breast cancer (H)      Dermatofibroma 05/14/2012     Need for prophylactic hormone replacement therapy (postmenopausal)      Past Surgical History:   Procedure Laterality Date     BIOPSY BREAST       HYSTERECTOMY, PAP NO LONGER INDICATED       HYSTERECTOMY, MAGY       LASIK BILATERAL  01/01/2005    Dr. Solsi      LUMPECTOMY BREAST Left 09/30/2019    Procedure: Re-excision of left breast lumpectomy site;  Surgeon: Kj Salas DO;  Location: [facility]     LUMPECTOMY BREAST Left 10/09/2019    Procedure: Left breast  re-excision;  Surgeon: Kj Salas DO;  Location: [facility]     LUMPECTOMY BREAST WITH SENTINEL NODE, COMBINED Left 09/23/2019    Procedure: LUMPECTOMY, BREAST, WITH SENTINEL LYMPH NODE BIOPSY.;  Surgeon: Kj Salas DO;  Location: [facility] OR     [facility] NONSPECIFIC PROCEDURE      bladder surgery     Current Outpatient Medications   Medication Sig Dispense Refill     atorvastatin (LIPITOR) 10 MG tablet TAKE 1 TABLET(10 MG) BY MOUTH DAILY 90 tablet 1     calcium 500-125 MG-UNIT TABS Take 2 tablets by mouth 2 times daily       doxycycline hyclate (PERIOSTAT) 20 MG tablet Take 2 tablets (40 mg) by mouth daily 180 tablet 3     gabapentin (NEURONTIN) 100 MG capsule TAKE ONE CAPSULE BY MOUTH EVERY MORNING AND 2 CAPSULES EVERY EVENING 90 capsule 3     IBUPROFEN PO Take 800 mg by mouth every 4 hours as needed for moderate pain        levothyroxine (SYNTHROID/LEVOTHROID) 88 MCG tablet Take 1 tablet (88 mcg) by mouth daily 90 tablet 1     metroNIDAZOLE (METROCREAM) 0.75 % external cream Apply topically 2 times daily 45 g 3     Naproxen Sodium (ALEVE PO) Take 220 mg by mouth 2 times daily (with meals)        tamoxifen (NOLVADEX) 20 MG tablet Take 1 tablet (20 mg) by mouth daily 90 tablet 3     tolterodine ER (DETROL LA) 2 MG 24 hr capsule Take 1 capsule (2 mg) by mouth daily 90 capsule 3     venlafaxine (EFFEXOR-XR) 75 MG 24 hr capsule Take 1 capsule (75 mg) by mouth daily 90 capsule 3       Allergies   Allergen Reactions     Demerol      Sedation and vomiting     Meperidine      Sulfa Drugs      unknown reaction        Social History     Tobacco Use     Smoking status: Never Smoker     Smokeless tobacco: Never Used   Substance Use Topics     Alcohol use: Yes     Alcohol/week: 0.0 standard drinks     Comment: 3 drinks per month     Family History   Problem Relation Age of Onset     Thyroid Disease Mother      Heart Disease Mother      Heart Disease Father      Hyperlipidemia Father      Hypertension Maternal  "Grandmother      Breast Cancer Maternal Grandmother      Hypertension Maternal Grandfather      Alzheimer Disease Paternal Grandmother      Diabetes Brother      Eczema Brother      Thyroid Disease Brother      Melanoma No family hx of      History   Drug Use No         Objective     /84   Pulse 86   Temp 97.1  F (36.2  C) (Tympanic)   Resp 14   Ht 1.676 m (5' 6\")   Wt 102.5 kg (226 lb)   SpO2 97%   BMI 36.48 kg/m      Physical Exam    GENERAL APPEARANCE: healthy, alert and no distress     EYES: EOMI, PERRL     HENT: ear canals and TM's normal and nose and mouth without ulcers or lesions     NECK: no adenopathy, no asymmetry, masses, or scars and thyroid normal to palpation     RESP: lungs clear to auscultation - no rales, rhonchi or wheezes     CV: regular rates and rhythm, normal S1 S2, no S3 or S4 and no murmur, click or rub     ABDOMEN:  soft, nontender, no HSM or masses and bowel sounds normal     MS: extremities normal- no gross deformities noted, no evidence of inflammation in joints, FROM in all extremities.     SKIN: no suspicious lesions or rashes     NEURO: Normal strength and tone, sensory exam grossly normal, mentation intact and speech normal     PSYCH: mentation appears normal. and affect normal/bright     LYMPHATICS: No cervical adenopathy    Recent Labs   Lab Test 09/07/21  0811 03/01/21  0828   HGB 13.0 13.5    270    142   POTASSIUM 3.9 4.2   CR 0.85 0.83        Diagnostics:  No labs were ordered during this visit.   No EKG required, no history of coronary heart disease, significant arrhythmia, peripheral arterial disease or other structural heart disease.    Revised Cardiac Risk Index (RCRI):  The patient has the following serious cardiovascular risks for perioperative complications:   - No serious cardiac risks = 0 points     RCRI Interpretation: 0 points: Class I (very low risk - 0.4% complication rate)           Signed Electronically by: Merlyn Garzon, DO  Copy " of this evaluation report is provided to requesting physician.

## 2022-07-07 NOTE — Clinical Note
RIA: I have prescribed Xarelto for perioperative DVT prophylaxis given that she is on tamoxifen for breast cancer treatment.  Because of the Xarelto, NSAIDs are relatively contraindicated due to bleeding risk.  I am not sure if you are planning on prescribing opiates postoperatively, but might be reasonable in her case

## 2022-07-07 NOTE — PROGRESS NOTES
[facility]  [address] [facility] [address]  Phone: [phone]  Primary Provider: Memo Garcia  Pre-op Performing Provider: JESSICA LIN      PREOPERATIVE EVALUATION:  Today's date: 7/7/2022    Jessica Ravi is a 57 year old female who presents for a preoperative evaluation.    Surgical Information:  Surgery/Procedure: ARTHROSCOPY, KNEE with meniscal and chonral debridement left  Surgery Location: [facility]  Surgeon: Timo Berman MD   Surgery Date: 07/14/2022  Time of Surgery: TBD  Where patient plans to recover: At home with family  Fax number for surgical facility: Note does not need to be faxed, will be available electronically in Epic.    Type of Anesthesia Anticipated: General    Assessment & Plan     The proposed surgical procedure is considered INTERMEDIATE risk.    Preop general physical exam    - rivaroxaban ANTICOAGULANT (XARELTO) 10 MG TABS tablet; Take 1 tablet (10 mg) by mouth daily (with dinner) for 10 days    Bucket handle tear of lateral meniscus of left knee, unspecified whether old or current tear, subsequent encounter -she is on tamoxifen for history of breast cancer and this should not be stopped in the perioperative period.  Arthroscopic knee surgery is moderate risk, her Caprini score is 4 due to age, BMI, type of surgery.  We discussed the risks and benefits of anticoagulation.  In the end I recommended Xarelto daily x10 days.  Main downside is contraindication to NSAID use.  We will message her surgeon to have them be aware of this, may consider opiates instead of NSAIDs as the primary perioperative pain regimen.  - rivaroxaban ANTICOAGULANT (XARELTO) 10 MG TABS tablet; Take 1 tablet (10 mg) by mouth daily (with dinner) for 10 days    Hyperlipidemia LDL goal <130    Invasive lobular carcinoma of breast in female (H)    Major depressive disorder, recurrent episode, mild (H)    Obesity  (BMI 35.0-39.9) with comorbidity (H)    Other specified hypothyroidism    Monoclonal paraproteinemia    PONV (postoperative nausea and vomiting)    DVT prophylaxis  - rivaroxaban ANTICOAGULANT (XARELTO) 10 MG TABS tablet; Take 1 tablet (10 mg) by mouth daily (with dinner) for 10 days         Risks and Recommendations:  The patient has the following additional risks and recommendations for perioperative complications:  Anemia/Bleeding/Clotting:    - See plan as above    Medication Instructions:  Patient is to take all scheduled medications on the day of surgery    RECOMMENDATION:  APPROVAL GIVEN to proceed with proposed procedure, without further diagnostic evaluation.        1. Start Rivaroxaban (Xarelto) 10 mg daily x 10 days.  Take at dinner/with food - about 12 hours after surgery.  2. Caution with NSAID (Ibuprofen/Advil, Naproxen/Aleve) - can increase risk of bleeding  3. Opiates such as Oxycodone or Acetaminophen/Tylenol ARE safe with the Rivaroxaban   4. Continue all medications as normal up through surgery  5. No blood work or EKG needed                          Subjective     HPI related to upcoming procedure: knee pain requiring surgical management      Preop Questions 7/7/2022   1. Have you ever had a heart attack or stroke? No   2. Have you ever had surgery on your heart or blood vessels, such as a stent placement, a coronary artery bypass, or surgery on an artery in your head, neck, heart, or legs? No   3. Do you have chest pain with activity? No   4. Do you have a history of  heart failure? No   5. Do you currently have a cold, bronchitis or symptoms of other infection? No   6. Do you have a cough, shortness of breath, or wheezing? No   7. Do you or anyone in your family have previous history of blood clots? No   8. Do you or does anyone in your family have a serious bleeding problem such as prolonged bleeding following surgeries or cuts? No   9. Have you ever had problems with anemia or been told to  take iron pills? No   10. Have you had any abnormal blood loss such as black, tarry or bloody stools, or abnormal vaginal bleeding? No   11. Have you ever had a blood transfusion? No   12. Are you willing to have a blood transfusion if it is medically needed before, during, or after your surgery? Yes   13. Have you or any of your relatives ever had problems with anesthesia? No   14. Do you have sleep apnea, excessive snoring or daytime drowsiness? No   15. Do you have any artifical heart valves or other implanted medical devices like a pacemaker, defibrillator, or continuous glucose monitor? No   16. Do you have artificial joints? No   17. Are you allergic to latex? No   18. Is there any chance that you may be pregnant? No     Health Care Directive:  Patient does not have a Health Care Directive or Living Will: Patient states has Advance Directive and will bring in a copy to clinic.    Preoperative Review of :   reviewed - controlled substances reflected in medication list.      Status of Chronic Conditions:  See problem list for active medical problems.  Problems all longstanding and stable, except as noted/documented.  See ROS for pertinent symptoms related to these conditions.    DEPRESSION - Patient has a long history of Depression of moderate severity requiring medication for control with recent symptoms being stable..Current symptoms of depression include none.     HYPERLIPIDEMIA - Patient has a long history of significant Hyperlipidemia requiring medication for treatment with recent good control. Patient reports no problems or side effects with the medication.     HYPOTHYROIDISM - Patient has a longstanding history of chronic Hypothyroidism. Patient has been doing well, noting no tremor, insomnia, hair loss or changes in skin texture. Continues to take medications as directed, without adverse reactions or side effects. Last TSH   Lab Results   Component Value Date    TSH 1.75 03/09/2020   .         Review of Systems  Constitutional, neuro, ENT, endocrine, pulmonary, cardiac, gastrointestinal, genitourinary, musculoskeletal, integument and psychiatric systems are negative, except as otherwise noted.    Patient Active Problem List    Diagnosis Date Noted     Effusion of left knee 06/24/2022     Priority: Medium     Added automatically from request for surgery [id number]       Bucket handle tear of lateral meniscus of left knee, unspecified whether old or current tear, subsequent encounter 06/24/2022     Priority: Medium     Added automatically from request for surgery [id number]       Major depressive disorder, recurrent episode, mild (H) 05/17/2022     Priority: Medium     Other specified hypothyroidism 08/29/2019     Priority: Medium     Invasive lobular carcinoma of breast in female (H) 08/15/2019     Priority: Medium     6.5.2020- Review and distribute treatment summary-[place]  Added automatically from request for surgery [id number]         Obesity (BMI 35.0-39.9) with comorbidity (H) 01/25/2019     Priority: Medium     Hyperlipidemia LDL goal <130 10/14/2014     Priority: Medium     Monoclonal paraproteinemia 10/08/2013     Priority: Medium     Dermatofibroma 05/14/2012     Priority: Medium     LASIK OU 5 yrs 11/18/2010     Priority: Medium     CARDIOVASCULAR SCREENING; LDL GOAL LESS THAN 160 10/31/2010     Priority: Medium      Past Medical History:   Diagnosis Date     Allergies      Breast cancer (H)      Dermatofibroma 05/14/2012     Need for prophylactic hormone replacement therapy (postmenopausal)      Past Surgical History:   Procedure Laterality Date     BIOPSY BREAST       HYSTERECTOMY, PAP NO LONGER INDICATED       HYSTERECTOMY, MAGY       LASIK BILATERAL  01/01/2005    Dr. Solis      LUMPECTOMY BREAST Left 09/30/2019    Procedure: Re-excision of left breast lumpectomy site;  Surgeon: Kj Salas DO;  Location: [facility]     LUMPECTOMY BREAST Left 10/09/2019    Procedure: Left breast  re-excision;  Surgeon: Kj Salas DO;  Location: [facility]     LUMPECTOMY BREAST WITH SENTINEL NODE, COMBINED Left 09/23/2019    Procedure: LUMPECTOMY, BREAST, WITH SENTINEL LYMPH NODE BIOPSY.;  Surgeon: Kj Salas DO;  Location: [facility] OR     [facility] NONSPECIFIC PROCEDURE      bladder surgery     Current Outpatient Medications   Medication Sig Dispense Refill     atorvastatin (LIPITOR) 10 MG tablet TAKE 1 TABLET(10 MG) BY MOUTH DAILY 90 tablet 1     calcium 500-125 MG-UNIT TABS Take 2 tablets by mouth 2 times daily       doxycycline hyclate (PERIOSTAT) 20 MG tablet Take 2 tablets (40 mg) by mouth daily 180 tablet 3     gabapentin (NEURONTIN) 100 MG capsule TAKE ONE CAPSULE BY MOUTH EVERY MORNING AND 2 CAPSULES EVERY EVENING 90 capsule 3     IBUPROFEN PO Take 800 mg by mouth every 4 hours as needed for moderate pain        levothyroxine (SYNTHROID/LEVOTHROID) 88 MCG tablet Take 1 tablet (88 mcg) by mouth daily 90 tablet 1     metroNIDAZOLE (METROCREAM) 0.75 % external cream Apply topically 2 times daily 45 g 3     Naproxen Sodium (ALEVE PO) Take 220 mg by mouth 2 times daily (with meals)        tamoxifen (NOLVADEX) 20 MG tablet Take 1 tablet (20 mg) by mouth daily 90 tablet 3     tolterodine ER (DETROL LA) 2 MG 24 hr capsule Take 1 capsule (2 mg) by mouth daily 90 capsule 3     venlafaxine (EFFEXOR-XR) 75 MG 24 hr capsule Take 1 capsule (75 mg) by mouth daily 90 capsule 3       Allergies   Allergen Reactions     Demerol      Sedation and vomiting     Meperidine      Sulfa Drugs      unknown reaction        Social History     Tobacco Use     Smoking status: Never Smoker     Smokeless tobacco: Never Used   Substance Use Topics     Alcohol use: Yes     Alcohol/week: 0.0 standard drinks     Comment: 3 drinks per month     Family History   Problem Relation Age of Onset     Thyroid Disease Mother      Heart Disease Mother      Heart Disease Father      Hyperlipidemia Father      Hypertension Maternal  "Grandmother      Breast Cancer Maternal Grandmother      Hypertension Maternal Grandfather      Alzheimer Disease Paternal Grandmother      Diabetes Brother      Eczema Brother      Thyroid Disease Brother      Melanoma No family hx of      History   Drug Use No         Objective     /84   Pulse 86   Temp 97.1  F (36.2  C) (Tympanic)   Resp 14   Ht 1.676 m (5' 6\")   Wt 102.5 kg (226 lb)   SpO2 97%   BMI 36.48 kg/m      Physical Exam    GENERAL APPEARANCE: healthy, alert and no distress     EYES: EOMI, PERRL     HENT: ear canals and TM's normal and nose and mouth without ulcers or lesions     NECK: no adenopathy, no asymmetry, masses, or scars and thyroid normal to palpation     RESP: lungs clear to auscultation - no rales, rhonchi or wheezes     CV: regular rates and rhythm, normal S1 S2, no S3 or S4 and no murmur, click or rub     ABDOMEN:  soft, nontender, no HSM or masses and bowel sounds normal     MS: extremities normal- no gross deformities noted, no evidence of inflammation in joints, FROM in all extremities.     SKIN: no suspicious lesions or rashes     NEURO: Normal strength and tone, sensory exam grossly normal, mentation intact and speech normal     PSYCH: mentation appears normal. and affect normal/bright     LYMPHATICS: No cervical adenopathy    Recent Labs   Lab Test 09/07/21  0811 03/01/21  0828   HGB 13.0 13.5    270    142   POTASSIUM 3.9 4.2   CR 0.85 0.83        Diagnostics:  No labs were ordered during this visit.   No EKG required, no history of coronary heart disease, significant arrhythmia, peripheral arterial disease or other structural heart disease.    Revised Cardiac Risk Index (RCRI):  The patient has the following serious cardiovascular risks for perioperative complications:   - No serious cardiac risks = 0 points     RCRI Interpretation: 0 points: Class I (very low risk - 0.4% complication rate)           Signed Electronically by: Merlyn Garzon, DO  Copy " of this evaluation report is provided to requesting physician.

## 2022-07-07 NOTE — PATIENT INSTRUCTIONS
Start Rivaroxaban (Xarelto) 10 mg daily x 10 days.  Take at dinner/with food - about 12 hours after surgery.  Caution with NSAID (Ibuprofen/Advil, Naproxen/Aleve) - can increase risk of bleeding  Opiates such as Oxycodone or Acetaminophen/Tylenol ARE safe with the Rivaroxaban   Continue all medications as normal up through surgery  No blood work or EKG needed      Preparing for Your Surgery  Getting started  A nurse will call you to review your health history and instructions. They will give you an arrival time based on your scheduled surgery time. Please be ready to share:  Your doctor's clinic name and phone number  Your medical, surgical and anesthesia history  A list of allergies and sensitivities  A list of medicines, including herbal treatments and over-the-counter drugs  Whether the patient has a legal guardian (ask how to send us the papers in advance)  Please tell us if you're pregnant--or if there's any chance you might be pregnant. Some surgeries may injure a fetus (unborn baby), so they require a pregnancy test. Surgeries that are safe for a fetus don't always need a test, and you can choose whether to have one.   If you have a child who's having surgery, please ask for a copy of Preparing for Your Child's Surgery.    Preparing for surgery  Within 30 days of surgery: Have a pre-op exam (sometimes called an H&P, or History and Physical). This can be done at a clinic or pre-operative center.  If you're having a , you may not need this exam. Talk to your care team.  At your pre-op exam, talk to your care team about all medicines you take. If you need to stop any medicines before surgery, ask when to start taking them again.  We do this for your safety. Many medicines can make you bleed too much during surgery. Some change how well surgery (anesthesia) drugs work.  Call your insurance company to let them know you're having surgery. (If you don't have insurance, call [phone].)  Call your clinic  if there's any change in your health. This includes signs of a cold or flu (sore throat, runny nose, cough, rash, fever). It also includes a scrape or scratch near the surgery site.  If you have questions on the day of surgery, call your hospital or surgery center.  COVID testing  You may need to be tested for COVID-19 before having surgery. If so, we will give you instructions.  Eating and drinking guidelines  For your safety: Unless your surgeon tells you otherwise, follow the guidelines below.  Eat and drink as usual until 8 hours before surgery. After that, no food or milk.  Drink clear liquids until 2 hours before surgery. These are liquids you can see through, like water, Gatorade and Propel Water. You may also have black coffee and tea (no cream or milk).  Nothing by mouth within 2 hours of surgery. This includes gum, candy and breath mints.  If you drink alcohol: Stop drinking it the night before surgery.  If your care team tells you to take medicine on the morning of surgery, it's okay to take it with a sip of water.  Preventing infection  Shower or bathe the night before and morning of your surgery. Follow the instructions your clinic gave you. (If no instructions, use regular soap.)  Don't shave or clip hair near your surgery site. We'll remove the hair if needed.  Don't smoke or vape the morning of surgery. You may chew nicotine gum up to 2 hours before surgery. A nicotine patch is okay.  Note: Some surgeries require you to completely quit smoking and nicotine. Check with your surgeon.  Your care team will make every effort to keep you safe from infection. We will:  Clean our hands often with soap and water (or an alcohol-based hand rub).  Clean the skin at your surgery site with a special soap that kills germs.  Give you a special gown to keep you warm. (Cold raises the risk of infection.)  Wear special hair covers, masks, gowns and gloves during surgery.  Give antibiotic medicine, if prescribed. Not  all surgeries need antibiotics.  What to bring on the day of surgery  Photo ID and insurance card  Copy of your health care directive, if you have one  Glasses and hearing aides (bring cases)  You can't wear contacts during surgery  Inhaler and eye drops, if you use them (tell us about these when you arrive)  CPAP machine or breathing device, if you use them  A few personal items, if spending the night  If you have . . .  A pacemaker, ICD (cardiac defibrillator) or other implant: Bring the ID card.  An implanted stimulator: Bring the remote control.  A legal guardian: Bring a copy of the certified (court-stamped) guardianship papers.  Please remove any jewelry, including body piercings. Leave jewelry and other valuables at home.  If you're going home the day of surgery  You must have a responsible adult drive you home. They should stay with you overnight as well.  If you don't have someone to stay with you, and you aren't safe to go home alone, we may keep you overnight. Insurance often won't pay for this.  After surgery  If it's hard to control your pain or you need more pain medicine, please call your surgeon's office.  Questions?   If you have any questions for your care team, list them here: _________________________________________________________________________________________________________________________________________________________________________ ____________________________________ ____________________________________ ____________________________________  For informational purposes only. Not to replace the advice of your health care provider. Copyright   2003, 2019 [place] [employer] [facility]. All rights reserved. Clinically reviewed by Winnie Hanson MD. KIDOZ [id number] - REV 07/21.

## 2022-07-13 ENCOUNTER — ANESTHESIA EVENT (OUTPATIENT)
Dept: SURGERY | Facility: AMBULATORY SURGERY CENTER | Age: 57
End: 2022-07-13
Payer: COMMERCIAL

## 2022-07-13 NOTE — ANESTHESIA PREPROCEDURE EVALUATION
Anesthesia Pre-Procedure Evaluation    Patient: Merlyn Ravi   MRN: [id number] : 1965        Procedure : Procedure(s):  ARTHROSCOPY, KNEE with meniscal and chonral debridement          Past Medical History:   Diagnosis Date     Allergies      Breast cancer (H)      Dermatofibroma 2012     Need for prophylactic hormone replacement therapy (postmenopausal)      PONV (postoperative nausea and vomiting) 2022      Past Surgical History:   Procedure Laterality Date     BIOPSY BREAST       HYSTERECTOMY, PAP NO LONGER INDICATED       HYSTERECTOMY, MAGY       LASIK BILATERAL  2005    Dr. Solis      LUMPECTOMY BREAST Left 2019    Procedure: Re-excision of left breast lumpectomy site;  Surgeon: Kj Salas DO;  Location: [facility]     LUMPECTOMY BREAST Left 10/09/2019    Procedure: Left breast re-excision;  Surgeon: Kj Salas DO;  Location: [facility]     LUMPECTOMY BREAST WITH SENTINEL NODE, COMBINED Left 2019    Procedure: LUMPECTOMY, BREAST, WITH SENTINEL LYMPH NODE BIOPSY.;  Surgeon: Kj Salas DO;  Location: [facility] OR     [facility] NONSPECIFIC PROCEDURE      bladder surgery      Allergies   Allergen Reactions     Demerol      Sedation and vomiting     Meperidine      Sulfa Drugs      unknown reaction      Social History     Tobacco Use     Smoking status: Never Smoker     Smokeless tobacco: Never Used   Substance Use Topics     Alcohol use: Yes     Alcohol/week: 0.0 standard drinks     Comment: 3 drinks per month      Wt Readings from Last 1 Encounters:   22 102.5 kg (226 lb)        Anesthesia Evaluation   Pt has had prior anesthetic. Type: General.    History of anesthetic complications (nausea with breast cancer surgery)  - PONV.      ROS/MED HX  ENT/Pulmonary:     (+) allergic rhinitis,     Neurologic:       Cardiovascular:    (-) murmur   METS/Exercise Tolerance: >4 METS    Hematologic:       Musculoskeletal:       GI/Hepatic:        Renal/Genitourinary:       Endo:     (+) thyroid problem, hypothyroidism, Obesity,     Psychiatric/Substance Use:       Infectious Disease:       Malignancy:   (+) Malignancy, History of Breast.    Other:            Physical Exam    Airway        Mallampati: I   TM distance: > 3 FB   Neck ROM: full   Mouth opening: > 3 cm    Respiratory Devices and Support         Dental  no notable dental history         Cardiovascular          Rhythm and rate: regular and normal (-) no murmur    Pulmonary   pulmonary exam normal        breath sounds clear to auscultation           OUTSIDE LABS:  CBC:   Lab Results   Component Value Date    WBC 4.6 09/07/2021    WBC 4.4 03/01/2021    HGB 13.0 09/07/2021    HGB 13.5 03/01/2021    HCT 39.4 09/07/2021    HCT 41.5 03/01/2021     09/07/2021     03/01/2021     BMP:   Lab Results   Component Value Date     09/07/2021     03/01/2021    POTASSIUM 3.9 09/07/2021    POTASSIUM 4.2 03/01/2021    CHLORIDE 110 (H) 09/07/2021    CHLORIDE 111 (H) 03/01/2021    CO2 29 09/07/2021    CO2 28 03/01/2021    BUN 14 09/07/2021    BUN 14 03/01/2021    CR 0.85 09/07/2021    CR 0.83 03/01/2021    GLC 91 09/07/2021    GLC 92 03/01/2021     COAGS: No results found for: PTT, INR, FIBR  POC: No results found for: BGM, HCG, HCGS  HEPATIC:   Lab Results   Component Value Date    ALBUMIN 3.3 (L) 09/07/2021    PROTTOTAL 7.3 09/07/2021    ALT 22 09/07/2021    AST 17 09/07/2021    ALKPHOS 78 09/07/2021    BILITOTAL 0.3 09/07/2021     OTHER:   Lab Results   Component Value Date    A1C 5.1 02/18/2019    VERONICA 8.4 (L) 09/07/2021    MAG 2.2 01/31/2020    TSH 1.75 03/09/2020    T4 1.24 03/09/2020       Anesthesia Plan    ASA Status:  2   NPO Status:  NPO Appropriate    Anesthesia Type: General.     - Airway: LMA   Induction: Intravenous, Propofol.   Maintenance: Balanced.        Consents    Anesthesia Plan(s) and associated risks, benefits, and realistic alternatives discussed. Questions answered  and patient/representative(s) expressed understanding.    - Discussed:     - Discussed with:  Patient      - Specific Concerns: sore throat, post op pain/nausea, dental damage, rare major complications.     - Extended Intubation/Ventilatory Support Discussed: No.      - Patient is DNR/DNI Status: No    Use of blood products discussed: No .     Postoperative Care    Pain management: IV analgesics, Oral pain medications, Multi-modal analgesia.   PONV prophylaxis: Ondansetron (or other 5HT-3), Dexamethasone or Solumedrol, Background Propofol Infusion     Comments:           H&P reviewed: Unable to attach H&P to encounter due to EHR limitations. H&P Update: appropriate H&P reviewed, patient examined. No interval changes since H&P (within 30 days).         Randall Ball MD

## 2022-07-14 ENCOUNTER — HOSPITAL ENCOUNTER (OUTPATIENT)
Facility: AMBULATORY SURGERY CENTER | Age: 57
Discharge: HOME | End: 2022-07-14
Attending: ORTHOPAEDIC SURGERY | Admitting: ORTHOPAEDIC SURGERY
Payer: COMMERCIAL

## 2022-07-14 ENCOUNTER — ANESTHESIA (OUTPATIENT)
Dept: SURGERY | Facility: AMBULATORY SURGERY CENTER | Age: 57
End: 2022-07-14
Payer: COMMERCIAL

## 2022-07-14 VITALS
SYSTOLIC BLOOD PRESSURE: 132 MMHG | RESPIRATION RATE: 12 BRPM | OXYGEN SATURATION: 97 % | HEART RATE: 65 BPM | TEMPERATURE: 97.5 F | DIASTOLIC BLOOD PRESSURE: 81 MMHG

## 2022-07-14 DIAGNOSIS — S83.272A COMPLEX TEAR OF LATERAL MENISCUS OF LEFT KNEE, UNSPECIFIED WHETHER OLD OR CURRENT TEAR, INITIAL ENCOUNTER: Primary | ICD-10-CM

## 2022-07-14 DIAGNOSIS — S83.252D BUCKET HANDLE TEAR OF LATERAL MENISCUS OF LEFT KNEE, UNSPECIFIED WHETHER OLD OR CURRENT TEAR, SUBSEQUENT ENCOUNTER: ICD-10-CM

## 2022-07-14 DIAGNOSIS — M25.462 EFFUSION OF LEFT KNEE: ICD-10-CM

## 2022-07-14 PROCEDURE — 29881 ARTHRS KNE SRG MNISECTMY M/L: CPT | Mod: LT | Performed by: ORTHOPAEDIC SURGERY

## 2022-07-14 PROCEDURE — G8916 PT W IV AB GIVEN ON TIME: HCPCS

## 2022-07-14 PROCEDURE — 29881 ARTHRS KNE SRG MNISECTMY M/L: CPT | Mod: LT

## 2022-07-14 PROCEDURE — G8907 PT DOC NO EVENTS ON DISCHARG: HCPCS

## 2022-07-14 RX ORDER — OXYCODONE HYDROCHLORIDE 5 MG/1
5 TABLET ORAL EVERY 4 HOURS PRN
Status: DISCONTINUED | OUTPATIENT
Start: 2022-07-14 | End: 2022-07-15 | Stop reason: HOSPADM

## 2022-07-14 RX ORDER — FENTANYL CITRATE 50 UG/ML
25 INJECTION, SOLUTION INTRAMUSCULAR; INTRAVENOUS
Status: DISCONTINUED | OUTPATIENT
Start: 2022-07-14 | End: 2022-07-15 | Stop reason: HOSPADM

## 2022-07-14 RX ORDER — ONDANSETRON 2 MG/ML
INJECTION INTRAMUSCULAR; INTRAVENOUS PRN
Status: DISCONTINUED | OUTPATIENT
Start: 2022-07-14 | End: 2022-07-14

## 2022-07-14 RX ORDER — KETOROLAC TROMETHAMINE 30 MG/ML
INJECTION, SOLUTION INTRAMUSCULAR; INTRAVENOUS PRN
Status: DISCONTINUED | OUTPATIENT
Start: 2022-07-14 | End: 2022-07-14

## 2022-07-14 RX ORDER — SODIUM CHLORIDE, SODIUM LACTATE, POTASSIUM CHLORIDE, CALCIUM CHLORIDE 600; 310; 30; 20 MG/100ML; MG/100ML; MG/100ML; MG/100ML
INJECTION, SOLUTION INTRAVENOUS CONTINUOUS
Status: DISCONTINUED | OUTPATIENT
Start: 2022-07-14 | End: 2022-07-15 | Stop reason: HOSPADM

## 2022-07-14 RX ORDER — OXYCODONE HYDROCHLORIDE 5 MG/1
5 TABLET ORAL
Status: DISCONTINUED | OUTPATIENT
Start: 2022-07-14 | End: 2022-07-15 | Stop reason: HOSPADM

## 2022-07-14 RX ORDER — FENTANYL CITRATE 50 UG/ML
INJECTION, SOLUTION INTRAMUSCULAR; INTRAVENOUS PRN
Status: DISCONTINUED | OUTPATIENT
Start: 2022-07-14 | End: 2022-07-14

## 2022-07-14 RX ORDER — ONDANSETRON 4 MG/1
4 TABLET, ORALLY DISINTEGRATING ORAL EVERY 30 MIN PRN
Status: DISCONTINUED | OUTPATIENT
Start: 2022-07-14 | End: 2022-07-15 | Stop reason: HOSPADM

## 2022-07-14 RX ORDER — LIDOCAINE HYDROCHLORIDE 20 MG/ML
INJECTION, SOLUTION INFILTRATION; PERINEURAL PRN
Status: DISCONTINUED | OUTPATIENT
Start: 2022-07-14 | End: 2022-07-14

## 2022-07-14 RX ORDER — PROPOFOL 10 MG/ML
INJECTION, EMULSION INTRAVENOUS PRN
Status: DISCONTINUED | OUTPATIENT
Start: 2022-07-14 | End: 2022-07-14

## 2022-07-14 RX ORDER — ONDANSETRON 2 MG/ML
4 INJECTION INTRAMUSCULAR; INTRAVENOUS EVERY 30 MIN PRN
Status: DISCONTINUED | OUTPATIENT
Start: 2022-07-14 | End: 2022-07-15 | Stop reason: HOSPADM

## 2022-07-14 RX ORDER — HYDRALAZINE HYDROCHLORIDE 20 MG/ML
2.5-5 INJECTION INTRAMUSCULAR; INTRAVENOUS EVERY 10 MIN PRN
Status: DISCONTINUED | OUTPATIENT
Start: 2022-07-14 | End: 2022-07-15 | Stop reason: HOSPADM

## 2022-07-14 RX ORDER — LIDOCAINE 40 MG/G
CREAM TOPICAL
Status: DISCONTINUED | OUTPATIENT
Start: 2022-07-14 | End: 2022-07-15 | Stop reason: HOSPADM

## 2022-07-14 RX ORDER — ACETAMINOPHEN 325 MG/1
975 TABLET ORAL ONCE
Status: COMPLETED | OUTPATIENT
Start: 2022-07-14 | End: 2022-07-14

## 2022-07-14 RX ORDER — AMOXICILLIN 250 MG
1-2 CAPSULE ORAL 2 TIMES DAILY
Qty: 30 TABLET | Refills: 0 | Status: SHIPPED | OUTPATIENT
Start: 2022-07-14 | End: 2022-07-28

## 2022-07-14 RX ORDER — ONDANSETRON 4 MG/1
4 TABLET, ORALLY DISINTEGRATING ORAL
Status: DISCONTINUED | OUTPATIENT
Start: 2022-07-14 | End: 2022-07-15 | Stop reason: HOSPADM

## 2022-07-14 RX ORDER — HYDROXYZINE HYDROCHLORIDE 25 MG/1
25 TABLET, FILM COATED ORAL
Status: COMPLETED | OUTPATIENT
Start: 2022-07-14 | End: 2022-07-14

## 2022-07-14 RX ORDER — FENTANYL CITRATE 50 UG/ML
25 INJECTION, SOLUTION INTRAMUSCULAR; INTRAVENOUS EVERY 5 MIN PRN
Status: DISCONTINUED | OUTPATIENT
Start: 2022-07-14 | End: 2022-07-15 | Stop reason: HOSPADM

## 2022-07-14 RX ORDER — CEFAZOLIN SODIUM 2 G/100ML
2 INJECTION, SOLUTION INTRAVENOUS
Status: COMPLETED | OUTPATIENT
Start: 2022-07-14 | End: 2022-07-14

## 2022-07-14 RX ORDER — HYDROCODONE BITARTRATE AND ACETAMINOPHEN 5; 325 MG/1; MG/1
1-2 TABLET ORAL EVERY 6 HOURS PRN
Qty: 12 TABLET | Refills: 0 | Status: SHIPPED | OUTPATIENT
Start: 2022-07-14 | End: 2022-07-28

## 2022-07-14 RX ORDER — METOPROLOL TARTRATE 1 MG/ML
1-2 INJECTION, SOLUTION INTRAVENOUS EVERY 5 MIN PRN
Status: DISCONTINUED | OUTPATIENT
Start: 2022-07-14 | End: 2022-07-15 | Stop reason: HOSPADM

## 2022-07-14 RX ORDER — PROPOFOL 10 MG/ML
INJECTION, EMULSION INTRAVENOUS CONTINUOUS PRN
Status: DISCONTINUED | OUTPATIENT
Start: 2022-07-14 | End: 2022-07-14

## 2022-07-14 RX ORDER — BUPIVACAINE HYDROCHLORIDE 2.5 MG/ML
INJECTION, SOLUTION INFILTRATION; PERINEURAL PRN
Status: DISCONTINUED | OUTPATIENT
Start: 2022-07-14 | End: 2022-07-14 | Stop reason: HOSPADM

## 2022-07-14 RX ADMIN — LIDOCAINE HYDROCHLORIDE 60 MG: 20 INJECTION, SOLUTION INFILTRATION; PERINEURAL at 10:03

## 2022-07-14 RX ADMIN — ONDANSETRON 4 MG: 2 INJECTION INTRAMUSCULAR; INTRAVENOUS at 10:45

## 2022-07-14 RX ADMIN — ACETAMINOPHEN 975 MG: 325 TABLET ORAL at 09:30

## 2022-07-14 RX ADMIN — FENTANYL CITRATE 50 MCG: 50 INJECTION, SOLUTION INTRAMUSCULAR; INTRAVENOUS at 10:03

## 2022-07-14 RX ADMIN — OXYCODONE HYDROCHLORIDE 5 MG: 5 TABLET ORAL at 11:10

## 2022-07-14 RX ADMIN — PROPOFOL 150 MG: 10 INJECTION, EMULSION INTRAVENOUS at 10:03

## 2022-07-14 RX ADMIN — KETOROLAC TROMETHAMINE 30 MG: 30 INJECTION, SOLUTION INTRAMUSCULAR; INTRAVENOUS at 10:45

## 2022-07-14 RX ADMIN — HYDROXYZINE HYDROCHLORIDE 25 MG: 25 TABLET, FILM COATED ORAL at 11:38

## 2022-07-14 RX ADMIN — PROPOFOL 100 MCG/KG/MIN: 10 INJECTION, EMULSION INTRAVENOUS at 10:07

## 2022-07-14 RX ADMIN — SODIUM CHLORIDE, SODIUM LACTATE, POTASSIUM CHLORIDE, CALCIUM CHLORIDE: 600; 310; 30; 20 INJECTION, SOLUTION INTRAVENOUS at 09:38

## 2022-07-14 RX ADMIN — CEFAZOLIN SODIUM 2 G: 2 INJECTION, SOLUTION INTRAVENOUS at 09:57

## 2022-07-14 NOTE — ANESTHESIA POSTPROCEDURE EVALUATION
Patient: Merlyn Ravi    Procedure: Procedure(s):  ARTHROSCOPY, KNEE with meniscal and chondral debridement       Anesthesia Type:  General    Note:  Disposition: Outpatient   Postop Pain Control: Uneventful            Sign Out: Well controlled pain   PONV: No   Neuro/Psych: Uneventful            Sign Out: Acceptable/Baseline neuro status   Airway/Respiratory: Uneventful            Sign Out: Acceptable/Baseline resp. status   CV/Hemodynamics: Uneventful            Sign Out: Acceptable CV status; No obvious hypovolemia; No obvious fluid overload   Other NRE: NONE   DID A NON-ROUTINE EVENT OCCUR? No           Last vitals:  Vitals Value Taken Time   /82 07/14/22 1115   Temp 97.5  F (36.4  C) 07/14/22 1052   Pulse 65 07/14/22 1122   Resp 11 07/14/22 1122   SpO2 99 % 07/14/22 1122   Vitals shown include unvalidated device data.    Electronically Signed By: Randall Ball MD  July 14, 2022  2:41 PM

## 2022-07-14 NOTE — ANESTHESIA CARE TRANSFER NOTE
Patient: Merlyn Ravi    Procedure: Procedure(s):  ARTHROSCOPY, KNEE with meniscal and chondral debridement       Diagnosis: Effusion of left knee [M25.462]  Bucket handle tear of lateral meniscus of left knee, unspecified whether old or current tear, subsequent encounter [K45.257D]  Diagnosis Additional Information: No value filed.    Anesthesia Type:   General     Note:    Oropharynx: oropharynx clear of all foreign objects  Level of Consciousness: drowsy  Oxygen Supplementation: nasal cannula    Independent Airway: airway patency satisfactory and stable  Dentition: dentition unchanged  Vital Signs Stable: post-procedure vital signs reviewed and stable  Report to RN Given: handoff report given  Patient transferred to: PACU    Handoff Report: Identifed the Patient, Identified the Reponsible Provider, Reviewed the pertinent medical history, Discussed the surgical course, Reviewed Intra-OP anesthesia mangement and issues during anesthesia, Set expectations for post-procedure period and Allowed opportunity for questions and acknowledgement of understanding      Vitals:  Vitals Value Taken Time   /77 07/14/22 1054   Temp     Pulse 70 07/14/22 1057   Resp 14 07/14/22 1057   SpO2 96 % 07/14/22 1057   Vitals shown include unvalidated device data.    Electronically Signed By: MICHELE Manley CRNA  July 14, 2022  10:57 AM

## 2022-07-14 NOTE — DISCHARGE INSTRUCTIONS
Name: Merlyn Ravi MRN #: [id number]  Date: 7/14/2022  Procedure: left knee arthroscopy, meniscal and chondral debridfement  Discharge to home when stable, tolerating clear liquids, and patient has urinated  Call for follow-up appointment, [phone], with Dr. Berman in:  2 weeks.   WOUND CARE  The bandage may be slightly bloody. This is normal.  Ice:  Keep an ice bag on your knee for 20 minutes at a time.  Keep incisions clean and dry following surgery for:  72 hours   Change all bandages in:  72 hours       If bandages are changed before follow-up, cover all incisions with fresh bandages or bandaids.  O.K. to shower (may get incision wet) in:  72 hours  No tub baths, swimming pools, hot tubs, etc. for a minimum of 2 weeks following surgery  ACTIVITY  Keep leg elevated on a pillow placed under ankle. Do not keep pillow under your knee.  Weight-bearing ([place]):  Weight-bear as tolerated     May discontinue crutches in 2-3 days if able to walk without a limp.  Bracing: no brace needed.  Range of motion limits: no limit. Work to regain full knee range of motion.  Exercises:  Perform exercises 3 times a day for a minimum of 25 reps each time (start today or tomorrow):             Quadriceps sets  Calf Pumps Straight leg raises  Heels Slides   Start Physical Therapy: will discuss upon return to clinic.  OK to drive:  Not for 24 hours  When going back to driving, be sure to test braking/acceleration maneuvers in an empty parking lot before entry into any traffic areas.    ABSOLUTELY NO DRIVING WHILE TAKING NARCOTICS!    DISCHARGE MEDICATIONS:   Norco (5/325), 1 to 2 tablets, take every 6 hours as needed for pain  Other: stool softeners while on pain medications  Ok to take over the counter pain medications as needed.  Xarelto (as previously prescribed by your primary care provider) for blood thinning, DVT prevention.  Strong pain medication has been prescribed. Use as directed. Do not combine with alcohol. Be  careful as you walk or climb stairs.   DIET:  If no nausea, clear liquids should be taken initially.  Then progress to solid foods when clear liquids are tolerated.   RESPONSE TO SURGERY: It is normal to have pain and swelling in your knee after surgery. It may take 4 weeks or longer for the swelling to go away. It is also common to notice some bruising around the knee, thigh, and calf as the swelling resolves.  EMERGENCY: Call or return for any fevers (temperature greater than 101.5   or sustained fevers greater than 100.5   that haven t resolved within 3 to 4 days following surgery) or chills, increasing pain, swelling, redness, calf pain, drainage (especially if yellow, green, or foul smelling), excessive bleeding), chest pain, shortness of breath:  Phone #: [phone]; If emergency, go to local ER or dial 911.    Timo Berman M.D., M.S.  Dept. of Orthopaedic Surgery  [facility]    7/14/2022        KNEE SURGERY - HOME EXERCISE PROGRAM    All exercises to be performed at least 3 times per day.     Quad Sets  Sit with leg extended  Tighten quad muscles in front of leg, trying to  push back of knee downward  Hold exercise for 10 seconds  Rest 10 seconds between reps  Perform 1 set of 20 reps, 3 times a day     Heel Slides   Lie on back with legs straight  Slide heel to buttocks   Return to start position  Repeat with other leg  Perform 1 rep every 4 seconds  Perform 3 sets of 20 reps, 3 times a day  Rest 1 minute between sets     Ankle Pumps   Lie on back with foot elevated on pillow  Move foot up and down, pumping ankle  Perform 3 sets of 20 reps, 3 times a day  Perform 1 rep every 4 seconds  Rest 1 minute between sets     Straight Leg Raise  Lie on back with uninvolved knee bent  Raise straight leg to thigh level of bend leg  Return to starting position  Perform 3 sets of 20 reps, 3 times a day  Perform 1 rep every 4 seconds  Rest 1 minute between sets            [facility]    Adult Discharge Orders & Instructions     For 24 hours after surgery    Get plenty of rest.  A responsible adult must stay with you for at least 24 hours after you leave the hospital.   Do not drive or use heavy equipment.  If you have weakness or tingling, don't drive or use heavy equipment until this feeling goes away.  Do not drink alcohol.  Avoid strenuous or risky activities.  Ask for help when climbing stairs.   You may feel lightheaded.  IF so, sit for a few minutes before standing.  Have someone help you get up.   If you have nausea (feel sick to your stomach): Drink only clear liquids such as apple juice, ginger ale, broth or 7-Up.  Rest may also help.  Be sure to drink enough fluids.  Move to a regular diet as you feel able.  You may have a slight fever. Call the doctor if your fever is over 100 F (37.7 C) (taken under the tongue) or lasts longer than 24 hours.  You may have a dry mouth, a sore throat, muscle aches or trouble sleeping.  These should go away after 24 hours.  Do not make important or legal decisions.     Call your doctor for any of the followin.  Signs of infection (fever, growing tenderness at the surgery site, a large amount of drainage or bleeding, severe pain, foul-smelling drainage, redness, swelling).    2. It has been over 8 to 10 hours since surgery and you are still not able to urinate (pass water).    3.  Headache for over 24 hours.    4.  Numbness, tingling or weakness the day after surgery (if you had spinal anesthesia).                  5. Signs of Covid-19 infection (temperature over 100 degrees, shortness of breath, cough, loss of taste/smell, generalized body aches, persistent headache,                  chills, sore throat, nausea/vomiting/diarrhea).    To contact a doctor, call To contact Dr Berman call:  [phone]

## 2022-07-14 NOTE — OP NOTE
Procedure Date: 07/14/2022    PREOPERATIVE DIAGNOSIS:  Chronic left knee pain, complex lateral meniscus tear, possible medial meniscus tear, patellofemoral and lateral compartment osteoarthritis.    POSTOPERATIVE DIAGNOSES:  Chronic left knee pain, complex lateral meniscus tear, possible medial meniscus tear, patellofemoral and lateral compartment osteoarthritis, a prominent medial plica and multiple articular cartilage loose bodies throughout the knee.    PROCEDURES:    1.  Left knee arthroscopic partial lateral meniscectomy.  2.  Left knee arthroscopic medial plica resection.  3.  Left knee arthroscopic shaving chondroplasty of the patella, lateral femoral condyle, lateral tibia.  4.  Left knee arthroscopic loose body removal, multiple.    SURGEON:  Timo Berman MD    ASSISTANT:  Bryn Johnson PA-C.    ANESTHESIA:  General in supine position.    INTRAVENOUS FLUIDS:  500 mL Lactated Ringer's.    URINE OUTPUT:  Zero, no Brown.    ESTIMATED BLOOD LOSS:  100 mL    ANTIBIOTICS:  Cefazolin 2 gram IV prior to incision and tourniquet inflation.    TOURNIQUET TIME:  21 minutes at 300 mmHg, left upper thigh.    DRAINS:  None.    SPECIMENS:  None.    IMPLANTS:  None.    COMPLICATIONS:  None apparent.    FINDINGS:  Small effusion.  Multiple articular loose bodies within the suprapatellar pouch, medial and lateral gutters as well as the articular surfaces.  Diffuse suprapatellar synovitis.  Grade 4 chondrosis of the lateral patellar facet, grade 3 fissure at the apex, grade 2, medial patellar facet.  Grade 2 femoral trochlear chondrosis.  Intact ACL within the notch.  Hypertrophic fat pad.  Diffuse synovitis within the medial and lateral gutters.  Medial compartment with intact medial meniscus.  Grade 2 chondrosis.  Lateral compartment with complex tearing of the anterior horn of the lateral meniscus with a displaced flap anteriorly to the anterior recess and into the notch.  Grade 4 chondrosis of a significant portion of  the lateral femoral condyle and grade 3/4 chondrosis of the lateral tibia.    INDICATIONS FOR PROCEDURE:  Merlyn Ravi is a 57-year-old female with ongoing left knee pain without known injury.  Pain present for over a year, worse since 02/2022.  Pain just came on and getting worse over time.  Pain throughout the knee, mostly along the outer lateral aspect, but does get pain behind the knee as well.  Pain will shoot down the side of the leg down into the foot.  Pain over the top of knee at times as well.  No numbness or tingling.  The pain aggravated with walking exercise, or driving, prolonged sitting.  She had an aspiration and injection 05/24/2022 with sports medicine, which helped for about 4 days or so, but then the pain came back.  She has been wearing a short hinged brace, which does seem to help.  She takes ibuprofen as needed.  She had x-rays showing no significant arthritic changes.  She did have an MRI showing complex degenerative tearing of the lateral meniscus involving the anterior horn and body extending to the posterior horn with a displaced flap into the meniscal tibial recess with extrusion, along with full-thickness cartilage defects.  the articular surface.  The weightbearing femoral condyle and tibial plateau as well as bone marrow reactive changes in the tibial plateau.  She was referred for surgical evaluation.  We discussed exam and imaging findings.  Treatment options discussed, nonsurgical versus surgical with arthroscopy and meniscal and chondral debridement.  Risks and perceived benefits discussed in detail.  In particular, surgical, risks discussed including but not limited to bleeding, infection, pain, scar, damage to adjacent structures including nerves, vessels, cartilage, stiffness, progression of arthritis, failure to relieve symptoms, recurrence of symptoms, worsening of symptoms, blood clots, pulmonary embolism, and the risks of anesthesia and death.  Surgery is not intended nor  expected to alleviate arthritic pain symptoms nor will it treat or correct underlying arthritic changes.  Arthritis and symptoms related to arthritis could worsen with arthroscopy and meniscal and/or chondral debridement.  Despite these risks as quality of life decision, she elected to proceed.    DESCRIPTION OF PROCEDURE:  The patient was identified in preoperative holding area.  After confirming with the patient, correct procedure and procedure site, the left knee was marked with a marking pen by myself.  After again reviewing risks and perceived benefits of surgery, questions were addressed, she elected to proceed.  Written informed consent was obtained and reviewed.    The patient was then taken to the operating room and placed supine on the operating table.  All bony prominences were well padded.  She was adequately secured.  After adequate general anesthesia, the left knee was examined.  A nonsterile tourniquet was placed on the left upper thigh.  Left lower extremity was prepped and draped in usual sterile fashion.    Timeout was then made, confirming correct patient, procedure, procedure site, availability of instruments.  I reviewed the patient's allergies as well as administration of prophylactic antibiotics by operating staff.    At that time, left lower extremity was elevated, exsanguinated with an Esmarch and tourniquet inflated.  Standard anterolateral arthroscopy portal was made.  A small effusion was evacuated. Upon entering suprapatellar pouch mild to moderate synovitis.  Diffuse articular fragments noted throughout the suprapatellar pouch. Patellofemoral chondrosis as noted.  Medial and lateral gutters were inspected, and  multiple articular loose bodies, both medially and laterally.  Upon entering the notch, the ACL was grossly intact.  I could see significant fragments of the anterior horn of the lateral meniscus flipped anteriorly into the anterior recess as well as into the notch.  Medial  portal was made, guided with a spinal needle.  Probe was introduced and the knee was placed in the figure-of-four position.  Probing superior and inferior surface, lateral meniscus revealed no tearing posteriorly, bad small radial tear at the apex, and then complex tearing anteriorly.  Lateral compartment chondrosis noted As above, significant for advanced femoral and tibial degenerative changes.    Alternating between the oscillating debrider and meniscal biter, the lateral meniscus was debrided back until this was stable, confirmed with probing.  Along the lateral gutter, there was what appeared to be a displaced flap, it was unclear if it was a displaced meniscal flap or thickened synovitis, but this was resected as well.    I then proceeded to place the oscillating debrider and the lateral gutter, and multiple articular cartilage fragments were removed.    Then back to the lateral compartment, using the oscillating debrider, shaving chondroplasty of the lateral tibia and the lateral femoral condyle was performed, stabilizing the articular shoulder.    Then to the medial compartment, under gentle valgus stress.  Probing superior and inferior surface of the medial meniscus revealed no obvious tearing.  Mild chondrosis.    Then back up to suprapatellar pouch, the medial plica was resected.  Articular fragments along the medial gutter were also removed.  A shaving chondroplasty of the apex of the patella and lateral femoral condyle was performed using the oscillating debrider. Final diagnostic examination of the knee was performed and no obvious significant articular fragments were remaining.  A final lavage of the knee was performed, remaining fluid was drained from the knee and instruments were removed.  Wounds were closed with 3-0 Prolene, injected with 0.25% Marcaine, Steri-Strips, well-padded soft dressing, Ace wrap and tourniquet inflated.    She was then awakened and taken to recovery in stable condition.   Postoperatively, rest, ice, elevate.  Weightbear as tolerated.  Home exercise program.  Oral pain medications include hydrocodone versus over-the-counter.  Stool softeners.  She will take Xarelto for DVT prophylaxis provided by her primary care physician given elevated risk of blood clots based on her history and medications.  We will see her back in 2 weeks' time for wound check, suture removal, sooner if needed.    No apparent complications.    Timo Berman MD        D: 2022   T: 2022   MT: INDIRA    Name:     JESSICA CHIUServando  MRN:      [id number]        Account:        [id number]   :      1965           Procedure Date: 2022     Document: [id number]

## 2022-07-14 NOTE — INTERVAL H&P NOTE
"I have reviewed the surgical (or preoperative) H&P that is linked to this encounter, and examined the patient. There are no significant changes    Clinical Conditions Present on Arrival:  Clinically Significant Risk Factors Present on Admission               # Coagulation Defect: home medication list includes an anticoagulant medication   # Obesity: Estimated body mass index is 36.48 kg/m  as calculated from the following:    Height as of 7/7/22: 1.676 m (5' 6\").    Weight as of 7/7/22: 102.5 kg (226 lb).     The History and Physical on patient's chart was personally reviewed today with the patient. there have been no interval changes in patient's history since H+P performed.    History:  Merlyn Ravi is a 57 year old female with ongoing left knee pain with no known injury.   Pain has been present for over 1 years, worse since 2/2022. Pain just came on and getting worse with time. Locates pain throughout the knee, mostly along the outer aspect, but does have pain behind the knee as well. Shoots down the side of the leg to the foot. Can feel pain over the top of the foot at times. No numbness and tingling.  Pain aggravated with walking, exercising, driving, prolonged sitting. She had an Aspiration/injection 5/24/2022, helped somewhat  for 4 days or so but the pain came back.      She's been wearing a short hinged knee brace. Takes ibuprofen as needed.     Right knee was getting sore due to compensation but feeling better now.     She likes to dance and golf, but due to the knee pain, is unable.    X-RAY: bilateral barboza and lateral views 5/17/2022, bilateral sunrise views 5/23/2022, sunrise view left knee from 6/23/2022 -- no obvious fractures or dislocations. Fairly preserved joint spaces.     MRI:  MRI left knee from 6/15/2022  1. Complex degenerative tearing of the lateral meniscus involving the  anterior horn and body, extending into the posterior horn this  associated flipped fragment into the meniscal " tibial recess, markedly  extruded body.   2. Associated full-thickness cartilage defects involving the articular  surfaces of the weightbearing femoral condyle in keeping plateau as  measured above.   3. High-grade reactive bone marrow edema in the lateral tibial  plateau.  4. Mucoid degeneration of the ACL likely sequela of prior injury.  5. Horizontal oblique tear of the medial meniscus, nondisplaced and  subtle. Mild cartilage fraying of the medial compartment.  6. Focal full-thickness articular cartilage fissure of the lateral  patella facet, otherwise mild articular cartilage fraying in the  trochlea       ASSESSMENT/PLAN: Merlyn Ravi is a 57 year old female with chronic left knee pain, complex lateral meniscus tear, possible medial meniscus tear, primary osteoarthritis.      * discussed injury with patient, what appears to be a complex lateral meniscal tear with displaced flap on MRI, as well as some underlying arthritic changes, which is consistent with symptoms and physical examination findings.      * Discussed treatment options including nonoperative treatment with continued rest, ice, elevation, activity modification, NSAIDS and Physical Therapy, bracing and potential injections versus surgical treatment with arthroscopy and meniscal repair versus debridement, possible chondral debridement. Risks and benefits of each discussed in detail.  * in the setting of underlying chondrosis, predictability of arthroscopy is uncertain, unless mechanical symptoms present due to the meniscus tear.     * surgical risks discussed: bleeding, infection, pain, scar, damage to adjacent structures (nerve, vessels, cartilage), stiffness, post-traumatic arthritis, failure to relieve symptoms, recurrence of symptoms, blood clots (DVT), pulmonary emolism, risks of anesthesia and death. This surgery is not intended nor expected to alleviate arthritic pain symptoms, nor will it treat or correct underlying arthritic changes.  Arthritis and symptoms related to arthritis could worsen with arthroscopy and meniscal and/or chondral debridement. Patient understands.     * understanding the risks of surgery, patient elected to proceed with arthroscopy  * plan: LEFT knee arthroscopy with partial meniscal debridement, possible chondral debridement, outpatient surgery      Risks and perceived benefits of surgery again discussed with patient. Patient's questions addressed and answered. Written informed consent obtained and reviewed. Surgical site marked with indelible marker with patient's participation after confirming site with patient.      Timo Berman M.D., M.S.  Dept. of Orthopaedic Surgery  [facility]

## 2022-07-14 NOTE — BRIEF OP NOTE
[facility] Brief Operative Note    Pre-operative diagnosis: Effusion of left knee [M25.462]  Bucket handle tear of lateral meniscus of left knee, unspecified whether old or current tear, subsequent encounter [Z13.113Y]   Post-operative diagnosis left knee complex lateral meniscus tear, loose cartilage bodies, osteoarthritis     Procedure: Procedure(s):  ARTHROSCOPY, KNEE with meniscal and chondral debridement   Surgeon(s): Surgeon(s) and Role:     * Timo Berman MD - Primary     * Bryn Johnson PA - Assisting   Estimated blood loss: 1 mL    Specimens: * No specimens in log *   Findings: Grade 4 patella and lateral femoral condyle, grade 3-4 lateral tibia, multiple loose cartilage bodies, complex lateral meniscus tear.

## 2022-07-14 NOTE — ANESTHESIA PROCEDURE NOTES
Airway       Patient location during procedure: OR  Staff -        Performed By: CRNAIndications and Patient Condition       Indications for airway management: marleni-procedural       Induction type:intravenous       Mask difficulty assessment: 1 - vent by mask    Final Airway Details       Final airway type: supraglottic airway    Supraglottic Airway Details        Type: LMA       Brand: LMA Unique       LMA size: 4    Post intubation assessment        Placement verified by: capnometry, equal breath sounds and chest rise        Number of attempts at approach: 1       Number of other approaches attempted: 0       Secured with: cloth tape       Ease of procedure: easy       Dentition: Intact

## 2022-07-25 NOTE — PROGRESS NOTES
Chief Complaint   Patient presents with     Left Knee - Surgical Followup     Arthroscopy - partial LM, loose body removal. DOS 7/14/22, 2 wk s/p. Patient notes her knee gets better each day. She still has pain with certain things. No issues or concerns.        SURGERY:   1.  Left knee arthroscopic partial lateral meniscectomy.  2.  Left knee arthroscopic medial plica resection.  3.  Left knee arthroscopic shaving chondroplasty of the patella, lateral femoral condyle, lateral tibia.  4.  Left knee arthroscopic loose body removal, multiple.  DATE OF SURGERY: 7/14/2022.      HISTORY OF PRESENT ILLNESS:  Merlyn Ravi is a 57 year old female seen for postoperative evaluation of a left knee arthroscopy and lateral meniscus debridement. Surgery occurred 2 weeks ago. Returns today stating doing well. Pain has been improvng, still sore though. No problems with the surgical wounds. Denies fevers chills or night sweats. No associated numbness or tingling. Has been doing home exercise program  since surgery as recommended. Taking aspirin daily. No concerns.    Only took 1 day off from work, the day after surgery, and doing well back at work.    Past Medical History:   Diagnosis Date     Allergies      Breast cancer (H)      Dermatofibroma 05/14/2012     Need for prophylactic hormone replacement therapy (postmenopausal)      PONV (postoperative nausea and vomiting) 7/7/2022       Past Surgical History:   Procedure Laterality Date     ARTHROSCOPY KNEE Left 7/14/2022    Procedure: ARTHROSCOPY, LEFT KNEE with meniscal and chondral debridement;  Surgeon: Timo Berman MD;  Location: [facility]     BIOPSY BREAST       HYSTERECTOMY, PAP NO LONGER INDICATED       HYSTERECTOMY, MAGY       LASIK BILATERAL  01/01/2005    Dr. Solis      LUMPECTOMY BREAST Left 09/30/2019    Procedure: Re-excision of left breast lumpectomy site;  Surgeon: Kj Salas DO;  Location: [facility]     LUMPECTOMY BREAST Left 10/09/2019    Procedure:  Left breast re-excision;  Surgeon: Kj Salas, ;  Location: [facility]     LUMPECTOMY BREAST WITH SENTINEL NODE, COMBINED Left 09/23/2019    Procedure: LUMPECTOMY, BREAST, WITH SENTINEL LYMPH NODE BIOPSY.;  Surgeon: Kj Salas DO;  Location: [facility] OR     [facility] NONSPECIFIC PROCEDURE      bladder surgery       Medications:   Current Outpatient Medications:      atorvastatin (LIPITOR) 10 MG tablet, TAKE 1 TABLET(10 MG) BY MOUTH DAILY, Disp: 90 tablet, Rfl: 1     BIOTIN MAXIMUM PO, Take by mouth daily, Disp: , Rfl:      calcium 500-125 MG-UNIT TABS, Take 2 tablets by mouth 2 times daily, Disp: , Rfl:      doxycycline hyclate (PERIOSTAT) 20 MG tablet, Take 2 tablets (40 mg) by mouth daily, Disp: 180 tablet, Rfl: 3     gabapentin (NEURONTIN) 100 MG capsule, TAKE ONE CAPSULE BY MOUTH EVERY MORNING AND 2 CAPSULES EVERY EVENING, Disp: 90 capsule, Rfl: 3     HYDROcodone-acetaminophen (NORCO) 5-325 MG tablet, Take 1-2 tablets by mouth every 6 hours as needed for moderate to severe pain, Disp: 12 tablet, Rfl: 0     levothyroxine (SYNTHROID/LEVOTHROID) 88 MCG tablet, Take 1 tablet (88 mcg) by mouth daily, Disp: 90 tablet, Rfl: 1     metroNIDAZOLE (METROCREAM) 0.75 % external cream, Apply topically 2 times daily, Disp: 45 g, Rfl: 3     rivaroxaban ANTICOAGULANT (XARELTO) 10 MG TABS tablet, Take 1 tablet (10 mg) by mouth daily (with dinner) for 10 days, Disp: 10 tablet, Rfl: 0     senna-docusate (SENOKOT-S/PERICOLACE) 8.6-50 MG tablet, Take 1-2 tablets by mouth 2 times daily, Disp: 30 tablet, Rfl: 0     tamoxifen (NOLVADEX) 20 MG tablet, Take 1 tablet (20 mg) by mouth daily, Disp: 90 tablet, Rfl: 3     tolterodine ER (DETROL LA) 2 MG 24 hr capsule, Take 1 capsule (2 mg) by mouth daily, Disp: 90 capsule, Rfl: 3     venlafaxine (EFFEXOR-XR) 75 MG 24 hr capsule, Take 1 capsule (75 mg) by mouth daily, Disp: 90 capsule, Rfl: 3    Allergies:   Allergies   Allergen Reactions     Demerol      Sedation and vomiting      "Meperidine      Sulfa Drugs      unknown reaction       REVIEW OF SYSTEMS:   CONSTITUTIONAL:NEGATIVE for fever, chills, night sweats  INTEGUMENTARY/SKIN: NEGATIVE for worrisome wound problems or redness.  MUSCULOSKELETAL:See HPI above  NEURO: NEGATIVE for weakness, dizziness or paresthesias    PHYSICAL EXAM:  /85   Pulse 71   Ht 1.676 m (5' 6\")   Wt 102.3 kg (225 lb 9.6 oz)   BMI 36.41 kg/m     GENERAL APPEARANCE: healthy, alert, no distress  SKIN: no suspicious lesions or rashes  NEURO: Normal strength and tone, mentation intact and speech normal  PSYCH:  mentation appears normal and affect normal, not anxious  RESPIRATORY: No increased work of breathing.    left  LOWER EXTREMITY:  Gait: subtle favors the left.  No Quad atrophy, strength normal.  Intact sensation deep peroneal nerve, superficial peroneal nerve, med/lat tibial nerve, sural nerve, saphenous nerve  Intact EHL, EDL, TA, FHL, GS, quadriceps hamstrings and hip flexors  Toes warm and well perfused, brisk capillary refill. Palpable 2+ dp pulses.  calf soft and nttp or squeeze.  Edema: trace    left  KNEE EXAM:    Skin: intact, no ecchymosis or erythema  Incisions: skin edges well approximated, sutures in place. Dry. No erythema.  ROM: full extension to 120 flexion  Effusion: small  Tender: incisions, medial and lateral knee.          X-RAY: none indicated.      Impression: Merlyn Ravi is a 57 year old female 2 weeks status post left knee arthroscopy and lateral meniscus debridement, doing well.       Plan: routine postoperative knee arthroscopy  * suture removal    Surgical images reviewed with patient today.    * WB status: weight bearing as tolerated . Cautioned not to overdo it too quickly. Increased activities too soon will lead to more swelling of the knee and leg, more pain, slower recovery. Expect 8-12 weeks.    * Rest  * Activity modification - avoid activities that aggravate symptoms.  * NSAIDS - regular use for inflammation, with " food, as long as no contra-indications. Please discuss with pcp if needed.  * Ice twice daily to three times daily as needed.  * Compression wrap as needed.  * Elevation of extremity to reduce swelling as needed.  * PT ordered for strengthening, stretching and range of motion exercises, effusion control.  * Tylenol as needed for pain  * return to clinic as needed.      * We did also discuss that based on the amount of arthritic changes seen at the time of surgery, may have continued knee pain due to the arthritis. Once recovered from the knee arthroscopy, and arthritic symptoms persist, consider full treatment of arthritis starting with Physical Therapy and injections, NSAIDS, activity modification, bracing.      Timo Berman M.D., M.S.  Dept. of Orthopaedic Surgery  [facility]

## 2022-07-28 ENCOUNTER — OFFICE VISIT (OUTPATIENT)
Dept: ORTHOPEDICS | Facility: CLINIC | Age: 57
End: 2022-07-28
Payer: COMMERCIAL

## 2022-07-28 VITALS
BODY MASS INDEX: 36.26 KG/M2 | WEIGHT: 225.6 LBS | HEART RATE: 71 BPM | SYSTOLIC BLOOD PRESSURE: 130 MMHG | HEIGHT: 66 IN | DIASTOLIC BLOOD PRESSURE: 85 MMHG

## 2022-07-28 DIAGNOSIS — Z98.890 S/P ARTHROSCOPY OF LEFT KNEE: Primary | ICD-10-CM

## 2022-07-28 PROCEDURE — 99024 POSTOP FOLLOW-UP VISIT: CPT | Performed by: ORTHOPAEDIC SURGERY

## 2022-07-28 ASSESSMENT — PAIN SCALES - GENERAL: PAINLEVEL: MODERATE PAIN (4)

## 2022-07-28 NOTE — LETTER
7/28/2022         RE: Merlyn Ravi  [address] [address] [facility] [place] [id number]        Dear Colleague,    Thank you for referring your patient, Merlyn Ravi, to the [facility] [place]. Please see a copy of my visit note below.    Chief Complaint   Patient presents with     Left Knee - Surgical Followup     Arthroscopy - partial LM, loose body removal. DOS 7/14/22, 2 wk s/p. Patient notes her knee gets better each day. She still has pain with certain things. No issues or concerns.        SURGERY:   1.  Left knee arthroscopic partial lateral meniscectomy.  2.  Left knee arthroscopic medial plica resection.  3.  Left knee arthroscopic shaving chondroplasty of the patella, lateral femoral condyle, lateral tibia.  4.  Left knee arthroscopic loose body removal, multiple.  DATE OF SURGERY: 7/14/2022.      HISTORY OF PRESENT ILLNESS:  Merlyn Ravi is a 57 year old female seen for postoperative evaluation of a left knee arthroscopy and lateral meniscus debridement. Surgery occurred 2 weeks ago. Returns today stating doing well. Pain has been improvng, still sore though. No problems with the surgical wounds. Denies fevers chills or night sweats. No associated numbness or tingling. Has been doing home exercise program  since surgery as recommended. Taking aspirin daily. No concerns.    Only took 1 day off from work, the day after surgery, and doing well back at work.    Past Medical History:   Diagnosis Date     Allergies      Breast cancer (H)      Dermatofibroma 05/14/2012     Need for prophylactic hormone replacement therapy (postmenopausal)      PONV (postoperative nausea and vomiting) 7/7/2022       Past Surgical History:   Procedure Laterality Date     ARTHROSCOPY KNEE Left 7/14/2022    Procedure: ARTHROSCOPY, LEFT KNEE with meniscal and chondral debridement;  Surgeon: Timo Berman MD;  Location: [facility]     BIOPSY BREAST       HYSTERECTOMY, PAP NO LONGER INDICATED        HYSTERECTOMY, MAGY       LASIK BILATERAL  01/01/2005    Dr. Solis      LUMPECTOMY BREAST Left 09/30/2019    Procedure: Re-excision of left breast lumpectomy site;  Surgeon: Kj Salas DO;  Location: [facility]     LUMPECTOMY BREAST Left 10/09/2019    Procedure: Left breast re-excision;  Surgeon: Kj Salas DO;  Location: [facility]     LUMPECTOMY BREAST WITH SENTINEL NODE, COMBINED Left 09/23/2019    Procedure: LUMPECTOMY, BREAST, WITH SENTINEL LYMPH NODE BIOPSY.;  Surgeon: Kj Salas, ;  Location: [facility]     [facility] NONSPECIFIC PROCEDURE      bladder surgery       Medications:   Current Outpatient Medications:      atorvastatin (LIPITOR) 10 MG tablet, TAKE 1 TABLET(10 MG) BY MOUTH DAILY, Disp: 90 tablet, Rfl: 1     BIOTIN MAXIMUM PO, Take by mouth daily, Disp: , Rfl:      calcium 500-125 MG-UNIT TABS, Take 2 tablets by mouth 2 times daily, Disp: , Rfl:      doxycycline hyclate (PERIOSTAT) 20 MG tablet, Take 2 tablets (40 mg) by mouth daily, Disp: 180 tablet, Rfl: 3     gabapentin (NEURONTIN) 100 MG capsule, TAKE ONE CAPSULE BY MOUTH EVERY MORNING AND 2 CAPSULES EVERY EVENING, Disp: 90 capsule, Rfl: 3     HYDROcodone-acetaminophen (NORCO) 5-325 MG tablet, Take 1-2 tablets by mouth every 6 hours as needed for moderate to severe pain, Disp: 12 tablet, Rfl: 0     levothyroxine (SYNTHROID/LEVOTHROID) 88 MCG tablet, Take 1 tablet (88 mcg) by mouth daily, Disp: 90 tablet, Rfl: 1     metroNIDAZOLE (METROCREAM) 0.75 % external cream, Apply topically 2 times daily, Disp: 45 g, Rfl: 3     rivaroxaban ANTICOAGULANT (XARELTO) 10 MG TABS tablet, Take 1 tablet (10 mg) by mouth daily (with dinner) for 10 days, Disp: 10 tablet, Rfl: 0     senna-docusate (SENOKOT-S/PERICOLACE) 8.6-50 MG tablet, Take 1-2 tablets by mouth 2 times daily, Disp: 30 tablet, Rfl: 0     tamoxifen (NOLVADEX) 20 MG tablet, Take 1 tablet (20 mg) by mouth daily, Disp: 90 tablet, Rfl: 3     tolterodine ER (DETROL LA) 2 MG 24 hr  "capsule, Take 1 capsule (2 mg) by mouth daily, Disp: 90 capsule, Rfl: 3     venlafaxine (EFFEXOR-XR) 75 MG 24 hr capsule, Take 1 capsule (75 mg) by mouth daily, Disp: 90 capsule, Rfl: 3    Allergies:   Allergies   Allergen Reactions     Demerol      Sedation and vomiting     Meperidine      Sulfa Drugs      unknown reaction       REVIEW OF SYSTEMS:   CONSTITUTIONAL:NEGATIVE for fever, chills, night sweats  INTEGUMENTARY/SKIN: NEGATIVE for worrisome wound problems or redness.  MUSCULOSKELETAL:See HPI above  NEURO: NEGATIVE for weakness, dizziness or paresthesias    PHYSICAL EXAM:  /85   Pulse 71   Ht 1.676 m (5' 6\")   Wt 102.3 kg (225 lb 9.6 oz)   BMI 36.41 kg/m     GENERAL APPEARANCE: healthy, alert, no distress  SKIN: no suspicious lesions or rashes  NEURO: Normal strength and tone, mentation intact and speech normal  PSYCH:  mentation appears normal and affect normal, not anxious  RESPIRATORY: No increased work of breathing.    left  LOWER EXTREMITY:  Gait: subtle favors the left.  No Quad atrophy, strength normal.  Intact sensation deep peroneal nerve, superficial peroneal nerve, med/lat tibial nerve, sural nerve, saphenous nerve  Intact EHL, EDL, TA, FHL, GS, quadriceps hamstrings and hip flexors  Toes warm and well perfused, brisk capillary refill. Palpable 2+ dp pulses.  calf soft and nttp or squeeze.  Edema: trace    left  KNEE EXAM:    Skin: intact, no ecchymosis or erythema  Incisions: skin edges well approximated, sutures in place. Dry. No erythema.  ROM: full extension to 120 flexion  Effusion: small  Tender: incisions, medial and lateral knee.          X-RAY: none indicated.      Impression: Merlyn Ravi is a 57 year old female 2 weeks status post left knee arthroscopy and lateral meniscus debridement, doing well.       Plan: routine postoperative knee arthroscopy  * suture removal    Surgical images reviewed with patient today.    * WB status: weight bearing as tolerated . Cautioned not to " overdo it too quickly. Increased activities too soon will lead to more swelling of the knee and leg, more pain, slower recovery. Expect 8-12 weeks.    * Rest  * Activity modification - avoid activities that aggravate symptoms.  * NSAIDS - regular use for inflammation, with food, as long as no contra-indications. Please discuss with pcp if needed.  * Ice twice daily to three times daily as needed.  * Compression wrap as needed.  * Elevation of extremity to reduce swelling as needed.  * PT ordered for strengthening, stretching and range of motion exercises, effusion control.  * Tylenol as needed for pain  * return to clinic as needed.      * We did also discuss that based on the amount of arthritic changes seen at the time of surgery, may have continued knee pain due to the arthritis. Once recovered from the knee arthroscopy, and arthritic symptoms persist, consider full treatment of arthritis starting with Physical Therapy and injections, NSAIDS, activity modification, bracing.      Timo Berman M.D., M.S.  Dept. of Orthopaedic Surgery  [facility]      Again, thank you for allowing me to participate in the care of your patient.        Sincerely,        Timo Berman MD

## 2022-08-17 ENCOUNTER — THERAPY VISIT (OUTPATIENT)
Dept: PHYSICAL THERAPY | Facility: CLINIC | Age: 57
End: 2022-08-17
Attending: ORTHOPAEDIC SURGERY
Payer: COMMERCIAL

## 2022-08-17 DIAGNOSIS — G89.29 CHRONIC PAIN OF LEFT KNEE: ICD-10-CM

## 2022-08-17 DIAGNOSIS — Z98.890 S/P ARTHROSCOPY OF LEFT KNEE: ICD-10-CM

## 2022-08-17 DIAGNOSIS — M25.562 CHRONIC PAIN OF LEFT KNEE: ICD-10-CM

## 2022-08-17 PROCEDURE — 97161 PT EVAL LOW COMPLEX 20 MIN: CPT | Mod: GP | Performed by: PHYSICAL THERAPIST

## 2022-08-17 PROCEDURE — 97110 THERAPEUTIC EXERCISES: CPT | Mod: GP | Performed by: PHYSICAL THERAPIST

## 2022-08-17 ASSESSMENT — ACTIVITIES OF DAILY LIVING (ADL)
GIVING WAY, BUCKLING OR SHIFTING OF KNEE: THE SYMPTOM AFFECTS MY ACTIVITY MODERATELY
RISE FROM A CHAIR: ACTIVITY IS SOMEWHAT DIFFICULT
AS_A_RESULT_OF_YOUR_KNEE_INJURY,_HOW_WOULD_YOU_RATE_YOUR_CURRENT_LEVEL_OF_DAILY_ACTIVITY?: ABNORMAL
HOW_WOULD_YOU_RATE_THE_OVERALL_FUNCTION_OF_YOUR_KNEE_DURING_YOUR_USUAL_DAILY_ACTIVITIES?: ABNORMAL
STAND: ACTIVITY IS SOMEWHAT DIFFICULT
GO DOWN STAIRS: ACTIVITY IS SOMEWHAT DIFFICULT
PAIN: THE SYMPTOM AFFECTS MY ACTIVITY MODERATELY
SQUAT: ACTIVITY IS VERY DIFFICULT
LIMPING: THE SYMPTOM AFFECTS MY ACTIVITY MODERATELY
KNEEL ON THE FRONT OF YOUR KNEE: ACTIVITY IS MINIMALLY DIFFICULT
SIT WITH YOUR KNEE BENT: ACTIVITY IS MINIMALLY DIFFICULT
HOW_WOULD_YOU_RATE_THE_CURRENT_FUNCTION_OF_YOUR_KNEE_DURING_YOUR_USUAL_DAILY_ACTIVITIES_ON_A_SCALE_FROM_0_TO_100_WITH_100_BEING_YOUR_LEVEL_OF_KNEE_FUNCTION_PRIOR_TO_YOUR_INJURY_AND_0_BEING_THE_INABILITY_TO_PERFORM_ANY_OF_YOUR_USUAL_DAILY_ACTIVITIES?: 85
STIFFNESS: THE SYMPTOM AFFECTS MY ACTIVITY SEVERELY
WEAKNESS: THE SYMPTOM AFFECTS MY ACTIVITY MODERATELY
GO UP STAIRS: ACTIVITY IS MINIMALLY DIFFICULT
SWELLING: THE SYMPTOM AFFECTS MY ACTIVITY MODERATELY

## 2022-08-17 NOTE — PROGRESS NOTES
Physical Therapy Initial Evaluation  Subjective:  The history is provided by the patient.   Therapist Generated HPI Evaluation  Problem details: Had surgery on July 14th, 2022. Still has pain in lateral lower leg. Having swelling in knee, lower leg and foot. Has been elevating and icing. Not getting leg up quite high enough however. Stairs are still tough, as is kneeling and walking. After sitting for a long time will hurt to stand up. No injury prior to surgery..         Type of problem:  Left knee.    This is a chronic condition.  Occurance: Surgery.  Where condition occurred: for unknown reasons.    and is constant.  Pain is worse in the P.M..  Since onset symptoms are gradually improving.  Associated symptoms:  Loss of strength, edema and loss of motion/stiffness. Symptoms are exacerbated by descending stairs, ascending stairs, kneeling, standing, walking and sitting  and relieved by ice (Elevating).      Restrictions due to condition include:  Working in normal job without restrictions.  Barriers include:  Stairs.    Patient Health History  Merlyn Ravi being seen for Left knee.     Problem began: 7/14/2022.      Pain is reported as 4/10 on pain scale.  General health as reported by patient is good.  Pertinent medical history includes: cancer, depression, overweight and thyroid problems.   Red flags:  None as reported by patient.     Surgeries include:  Cancer surgery. Other surgery history details: 9/2019.    Current medications:  Anti-depressants, bone density, hormone replacement therapy, pain medication and thyroid medication.    Current occupation is .   Primary job tasks include:  Computer work and prolonged sitting.                                    Objective:    Gait:  Antalgic gait with offloading of involved LE. Reduced stride length, heel strike and toe off on left. No assistive device.  Gait Type:  Antalgic                                                           Knee  Evaluation:  ROM:    AROM      Extension:  Left: 0    Right:  0  Flexion: Left: 100    Right: 116        Strength:     Extension:  Left: 4-/5   Pain:      Right: 5/5   Pain:  Flexion:  Left: 4-/5   Pain:      Right: 5/5   Pain:    Quad Set Left: Good    Pain:         Palpation:  Palpation of knee: Tender to palpation along tibialis anterior.      Edema:  Edema of the knee: Mild swelling of knee, lower leg and ankle-foot.            General     ROS    Assessment/Plan:    Patient is a 57 year old female with left side knee complaints.    Patient has the following significant findings with corresponding treatment plan.                Diagnosis 1:  Left knee pain  Pain -  manual therapy, self management, education and home program  Decreased ROM/flexibility - manual therapy, therapeutic exercise, therapeutic activity and home program  Decreased joint mobility - manual therapy, therapeutic exercise, therapeutic activity and home program  Decreased strength - therapeutic exercise, therapeutic activities and home program  Impaired balance - neuro re-education, gait training, therapeutic activities, adaptive equipment/assistive device and home program  Impaired gait - gait training, assistive devices and home program  Decreased function - therapeutic activities, home program and functional performance testing    Therapy Evaluation Codes:     Cumulative Therapy Evaluation is: Low complexity.    Previous and current functional limitations:  (See Goal Flow Sheet for this information)    Short term and Long term goals: (See Goal Flow Sheet for this information)     Communication ability:  Patient appears to be able to clearly communicate and understand verbal and written communication and follow directions correctly.  Treatment Explanation - The following has been discussed with the patient:   RX ordered/plan of care  Anticipated outcomes  Possible risks and side effects  This patient would benefit from PT intervention to  resume normal activities.   Rehab potential is good.    Frequency:  1 X week, once daily  Duration:  for 8 weeks  Discharge Plan:  Achieve all LTG.  Independent in home treatment program.  Reach maximal therapeutic benefit.    Please refer to the daily flowsheet for treatment today, total treatment time and time spent performing 1:1 timed codes.

## 2022-08-24 ENCOUNTER — THERAPY VISIT (OUTPATIENT)
Dept: PHYSICAL THERAPY | Facility: CLINIC | Age: 57
End: 2022-08-24
Payer: COMMERCIAL

## 2022-08-24 DIAGNOSIS — M25.562 LEFT KNEE PAIN: Primary | ICD-10-CM

## 2022-08-24 PROCEDURE — 97110 THERAPEUTIC EXERCISES: CPT | Mod: GP | Performed by: PHYSICAL THERAPIST

## 2022-08-30 DIAGNOSIS — G62.9 NEUROPATHY: ICD-10-CM

## 2022-08-30 DIAGNOSIS — R52 PAIN: ICD-10-CM

## 2022-08-30 NOTE — TELEPHONE ENCOUNTER
Requested Prescriptions   Pending Prescriptions Disp Refills    gabapentin (NEURONTIN) 100 MG capsule [Pharmacy Med Name: GABAPENTIN 100MG CAPSULES] 90 capsule 3     Sig: TAKE 1 CAPSULE BY MOUTH EVERY MORNING AND 2 CAPSULES EVERY EVENING        There is no refill protocol information for this order

## 2022-08-31 RX ORDER — GABAPENTIN 100 MG/1
CAPSULE ORAL
Qty: 90 CAPSULE | Refills: 3 | Status: SHIPPED | OUTPATIENT
Start: 2022-08-31 | End: 2023-01-17

## 2022-09-07 ENCOUNTER — THERAPY VISIT (OUTPATIENT)
Dept: PHYSICAL THERAPY | Facility: CLINIC | Age: 57
End: 2022-09-07
Payer: COMMERCIAL

## 2022-09-07 DIAGNOSIS — M25.562 LEFT KNEE PAIN: Primary | ICD-10-CM

## 2022-09-07 PROCEDURE — 97110 THERAPEUTIC EXERCISES: CPT | Mod: GP | Performed by: PHYSICAL THERAPIST

## 2022-09-07 PROCEDURE — 97112 NEUROMUSCULAR REEDUCATION: CPT | Mod: GP | Performed by: PHYSICAL THERAPIST

## 2022-10-03 ENCOUNTER — OFFICE VISIT (OUTPATIENT)
Dept: ORTHOPEDICS | Facility: CLINIC | Age: 57
End: 2022-10-03
Payer: COMMERCIAL

## 2022-10-03 VITALS
HEART RATE: 75 BPM | DIASTOLIC BLOOD PRESSURE: 83 MMHG | HEIGHT: 66 IN | WEIGHT: 226.3 LBS | BODY MASS INDEX: 36.37 KG/M2 | SYSTOLIC BLOOD PRESSURE: 131 MMHG

## 2022-10-03 DIAGNOSIS — M17.12 PRIMARY OSTEOARTHRITIS OF LEFT KNEE: Primary | ICD-10-CM

## 2022-10-03 DIAGNOSIS — M25.562 CHRONIC PAIN OF LEFT KNEE: ICD-10-CM

## 2022-10-03 DIAGNOSIS — G89.29 CHRONIC PAIN OF LEFT KNEE: ICD-10-CM

## 2022-10-03 PROCEDURE — 99024 POSTOP FOLLOW-UP VISIT: CPT | Performed by: ORTHOPAEDIC SURGERY

## 2022-10-03 PROCEDURE — 20610 DRAIN/INJ JOINT/BURSA W/O US: CPT | Mod: LT | Performed by: ORTHOPAEDIC SURGERY

## 2022-10-03 RX ORDER — METHYLPREDNISOLONE ACETATE 80 MG/ML
80 INJECTION, SUSPENSION INTRA-ARTICULAR; INTRALESIONAL; INTRAMUSCULAR; SOFT TISSUE
Status: DISCONTINUED | OUTPATIENT
Start: 2022-10-03 | End: 2023-03-06

## 2022-10-03 RX ADMIN — METHYLPREDNISOLONE ACETATE 80 MG: 80 INJECTION, SUSPENSION INTRA-ARTICULAR; INTRALESIONAL; INTRAMUSCULAR; SOFT TISSUE at 13:34

## 2022-10-03 ASSESSMENT — PAIN SCALES - GENERAL: PAINLEVEL: MILD PAIN (3)

## 2022-10-03 NOTE — PROGRESS NOTES
Chief Complaint   Patient presents with     Left Knee - Surgical Followup     S/p arthroscopy - partial LM, loose body removal. DOS 7/14/22, 11.5 wk. Patient notes she is continuing to have pain. She is not able to walk up the steps normally. At night her knee is so tight and swollen. She still uses ice and ibuprofen. Still wakes her at night. She has been going to physical therapy. Pain is anterior and down the lateral lower leg. She has constant pain.        SURGERY:   1.  Left knee arthroscopic partial lateral meniscectomy.  2.  Left knee arthroscopic medial plica resection.  3.  Left knee arthroscopic shaving chondroplasty of the patella, lateral femoral condyle, lateral tibia.  4.  Left knee arthroscopic loose body removal, multiple.  DATE OF SURGERY: 7/14/2022.      HISTORY OF PRESENT ILLNESS:  Merlyn Ravi is a 57 year old female seen for postoperative evaluation of a left knee arthroscopy and lateral meniscus debridement. Surgery occurred 11.5 weeks ago. Continues to have pain in front of the knee and along the outer aspect. The knee swells as the day goes on. Pain wakes her at night. If sits too long the knee gets stiff. Worse with stairs, sit to stand.    She'd been going to Physical Therapy.    Driving to [place] on Thursday for a long weekend.    OR FINDINGS:  Small effusion.  Multiple articular loose bodies within the suprapatellar pouch, medial and lateral gutters as well as the articular surfaces.  Diffuse suprapatellar synovitis.  Grade 4 chondrosis of the lateral patellar facet, grade 3 fissure at the apex, grade 2, medial patellar facet.  Grade 2 femoral trochlear chondrosis.  Intact ACL within the notch.  Hypertrophic fat pad.  Diffuse synovitis within the medial and lateral gutters.  Medial compartment with intact medial meniscus.  Grade 2 chondrosis.  Lateral compartment with complex tearing of the anterior horn of the lateral meniscus with a displaced flap anteriorly to the anterior  recess and into the notch.  Grade 4 chondrosis of a significant portion of the lateral femoral condyle and grade 3/4 chondrosis of the lateral tibia.    Past Medical History:   Diagnosis Date     Allergies      Breast cancer (H)      Dermatofibroma 05/14/2012     Need for prophylactic hormone replacement therapy (postmenopausal)      PONV (postoperative nausea and vomiting) 7/7/2022       Past Surgical History:   Procedure Laterality Date     ARTHROSCOPY KNEE Left 7/14/2022    Procedure: ARTHROSCOPY, LEFT KNEE with meniscal and chondral debridement;  Surgeon: Timo Berman MD;  Location: [facility]     BIOPSY BREAST       HYSTERECTOMY, PAP NO LONGER INDICATED       HYSTERECTOMY, MAGY       LASIK BILATERAL  01/01/2005    Dr. Solis      LUMPECTOMY BREAST Left 09/30/2019    Procedure: Re-excision of left breast lumpectomy site;  Surgeon: Kj Salas DO;  Location: [facility]     LUMPECTOMY BREAST Left 10/09/2019    Procedure: Left breast re-excision;  Surgeon: Kj Salas DO;  Location: [facility]     LUMPECTOMY BREAST WITH SENTINEL NODE, COMBINED Left 09/23/2019    Procedure: LUMPECTOMY, BREAST, WITH SENTINEL LYMPH NODE BIOPSY.;  Surgeon: Kj Salas DO;  Location: [facility] OR     [facility] NONSPECIFIC PROCEDURE      bladder surgery       Medications:   Current Outpatient Medications:      atorvastatin (LIPITOR) 10 MG tablet, TAKE 1 TABLET(10 MG) BY MOUTH DAILY, Disp: 90 tablet, Rfl: 0     BIOTIN MAXIMUM PO, Take by mouth daily, Disp: , Rfl:      calcium 500-125 MG-UNIT TABS, Take 2 tablets by mouth 2 times daily, Disp: , Rfl:      doxycycline hyclate (PERIOSTAT) 20 MG tablet, Take 2 tablets (40 mg) by mouth daily, Disp: 180 tablet, Rfl: 3     gabapentin (NEURONTIN) 100 MG capsule, TAKE 1 CAPSULE BY MOUTH EVERY MORNING AND 2 CAPSULES EVERY EVENING, Disp: 90 capsule, Rfl: 3     levothyroxine (SYNTHROID/LEVOTHROID) 88 MCG tablet, Take 1 tablet (88 mcg) by mouth daily, Disp: 90 tablet, Rfl: 1      "metroNIDAZOLE (METROCREAM) 0.75 % external cream, Apply topically 2 times daily, Disp: 45 g, Rfl: 3     rivaroxaban ANTICOAGULANT (XARELTO) 10 MG TABS tablet, Take 1 tablet (10 mg) by mouth daily (with dinner) for 10 days, Disp: 10 tablet, Rfl: 0     tamoxifen (NOLVADEX) 20 MG tablet, Take 1 tablet (20 mg) by mouth daily, Disp: 90 tablet, Rfl: 3     tolterodine ER (DETROL LA) 2 MG 24 hr capsule, Take 1 capsule (2 mg) by mouth daily, Disp: 90 capsule, Rfl: 3     venlafaxine (EFFEXOR-XR) 75 MG 24 hr capsule, Take 1 capsule (75 mg) by mouth daily, Disp: 90 capsule, Rfl: 3    Allergies:   Allergies   Allergen Reactions     Demerol      Sedation and vomiting     Meperidine      Sulfa Drugs      unknown reaction       REVIEW OF SYSTEMS:   CONSTITUTIONAL:NEGATIVE for fever, chills, night sweats  INTEGUMENTARY/SKIN: NEGATIVE for worrisome wound problems or redness.  MUSCULOSKELETAL:See HPI above  NEURO: NEGATIVE for weakness, dizziness or paresthesias    PHYSICAL EXAM:  /83   Pulse 75   Ht 1.676 m (5' 6\")   Wt 102.6 kg (226 lb 4.8 oz)   BMI 36.53 kg/m     GENERAL APPEARANCE: healthy, alert, no distress  SKIN: no suspicious lesions or rashes  NEURO: Normal strength and tone, mentation intact and speech normal  PSYCH:  mentation appears normal and affect normal, not anxious  RESPIRATORY: No increased work of breathing.    left  LOWER EXTREMITY:  Gait: subtle favors the left.  No Quad atrophy, strength normal.  Intact sensation deep peroneal nerve, superficial peroneal nerve, med/lat tibial nerve, sural nerve, saphenous nerve  Intact EHL, EDL, TA, FHL, GS, quadriceps hamstrings and hip flexors  Toes warm and well perfused, brisk capillary refill. Palpable 2+ dp pulses.  calf soft and nttp or squeeze.  Edema: trace    left  KNEE EXAM:    Skin: intact, no ecchymosis or erythema  Incisions: well healed. Dry. No erythema.  ROM: full extension to 120 flexion  Effusion: trace  Tender: incisions, medial and lateral knee, " "pes, hamstrings.         X-RAY: none indicated.      Impression: Merlyn Ravi is a 57 year old female 11.5 weeks status post left knee arthroscopy and lateral meniscus debridement, continued chronic left knee pain, advanced primary osteoarthritis.      Plan: routine postoperative knee arthroscopy  * reviewed imaging studies and arthroscopy images with patient, showing arthritic changes, or wearing of the cartilage in the knee. This can be caused by normal \"wear and tear\" over the years or following prior injury to the knee.  * we discused pre and postoperative that pain related to underlying osteoarthritis is likely continue, as arthroscopy is not expected or intended to alleviate osteoarthritis symptoms, perhaps worsen them.      Non-surgical treatment for knee arthritis includes:    * rest, sitting  * Activity modification - avoid impact activities or activities that aggravate symptoms.  * NSAIDS (non-steroidal anti-inflammatory medications; e.g. Aleve, advil, motrin, ibuprofen) - regular use for inflammation ( twice daily or three times daily), with food, as long as no contra-indications Please discuss with primary care doctor if needed  * ice, 15-20 minutes at a time several times a day or as needed.  * Strengthening of quadriceps muscles  * Physical Therapy for strengthening, stretching and range of motion exercises of legs  * Tylenol as needed for pain, consider Tylenol arthritis or similar  * Weight loss: Weight loss:  Body mass index is 36.53 kg/m .. weight loss benefits, not only for the current pain symptoms, but also overall health. Recommend a good diet plan that works for the patient, with the assistance of a dietician or primary care doctor as needed. Also, a good, low-impact exercise program for at least 20 minutes per day, 3 times per week, such as exercise bike, elliptical , or pool.  * Exercise: low impact such as stationary bike, elliptical, pool.  * Injections: cortisone versus " "viscosupplementation (hyaluronic acid, \"rooster comb\", \"gel shots\"); risks and perceived benefits discussed today. Patient elects  to proceed.  * Bracing: bracing the knee may offer some relief of symptoms when worn and provide some stability.  * over the counter supplements such as glucosamine and chondroitin sulfate may help with joint pain.  * topical ointments may help as well    * recommend stopping every couple of hours during her road trip to get up and stretch out, as well as daily aspirin for blood clot prevention.    * return to clinic as needed.          Timo Berman M.D., M.S.  Dept. of Orthopaedic Surgery  [facility]    Large Joint Injection/Arthocentesis: L knee joint    Date/Time: 10/3/2022 1:34 PM  Performed by: Timo Berman MD  Authorized by: Timo Berman MD     Indications:  Pain  Needle Size:  22 G  Guidance: landmark guided    Approach:  Anterolateral  Location:  Knee      Medications:  80 mg methylPREDNISolone 80 MG/ML  Outcome:  Tolerated well, no immediate complications  Procedure discussed: discussed risks, benefits, and alternatives    Consent Given by:  Patient  Timeout: timeout called immediately prior to procedure    Prep: patient was prepped and draped in usual sterile fashion     4cc of 0.25% Sensorcaine NDC 21820-800-02, lot [id number],  2025          "

## 2022-10-03 NOTE — LETTER
10/3/2022         RE: Merlyn Ravi  [id number] [facility] [id number]        Dear Colleague,    Thank you for referring your patient, Merlyn Ravi, to the [facility]. Please see a copy of my visit note below.    Chief Complaint   Patient presents with     Left Knee - Surgical Followup     S/p arthroscopy - partial LM, loose body removal. DOS 7/14/22, 11.5 wk. Patient notes she is continuing to have pain. She is not able to walk up the steps normally. At night her knee is so tight and swollen. She still uses ice and ibuprofen. Still wakes her at night. She has been going to physical therapy. Pain is anterior and down the lateral lower leg. She has constant pain.        SURGERY:   1.  Left knee arthroscopic partial lateral meniscectomy.  2.  Left knee arthroscopic medial plica resection.  3.  Left knee arthroscopic shaving chondroplasty of the patella, lateral femoral condyle, lateral tibia.  4.  Left knee arthroscopic loose body removal, multiple.  DATE OF SURGERY: 7/14/2022.      HISTORY OF PRESENT ILLNESS:  Merlyn Ravi is a 57 year old female seen for postoperative evaluation of a left knee arthroscopy and lateral meniscus debridement. Surgery occurred 11.5 weeks ago. Continues to have pain in front of the knee and along the outer aspect. The knee swells as the day goes on. Pain wakes her at night. If sits too long the knee gets stiff. Worse with stairs, sit to stand.    She'd been going to Physical Therapy.    Driving to [place] on Thursday for a long weekend.    OR FINDINGS:  Small effusion.  Multiple articular loose bodies within the suprapatellar pouch, medial and lateral gutters as well as the articular surfaces.  Diffuse suprapatellar synovitis.  Grade 4 chondrosis of the lateral patellar facet, grade 3 fissure at the apex, grade 2, medial patellar facet.  Grade 2 femoral trochlear chondrosis.  Intact ACL within the notch.  Hypertrophic fat  pad.  Diffuse synovitis within the medial and lateral gutters.  Medial compartment with intact medial meniscus.  Grade 2 chondrosis.  Lateral compartment with complex tearing of the anterior horn of the lateral meniscus with a displaced flap anteriorly to the anterior recess and into the notch.  Grade 4 chondrosis of a significant portion of the lateral femoral condyle and grade 3/4 chondrosis of the lateral tibia.    Past Medical History:   Diagnosis Date     Allergies      Breast cancer (H)      Dermatofibroma 05/14/2012     Need for prophylactic hormone replacement therapy (postmenopausal)      PONV (postoperative nausea and vomiting) 7/7/2022       Past Surgical History:   Procedure Laterality Date     ARTHROSCOPY KNEE Left 7/14/2022    Procedure: ARTHROSCOPY, LEFT KNEE with meniscal and chondral debridement;  Surgeon: Timo Berman MD;  Location: [facility]     BIOPSY BREAST       HYSTERECTOMY, PAP NO LONGER INDICATED       HYSTERECTOMY, MAGY       LASIK BILATERAL  01/01/2005    Dr. Solis      LUMPECTOMY BREAST Left 09/30/2019    Procedure: Re-excision of left breast lumpectomy site;  Surgeon: Kj Salas DO;  Location: [facility]     LUMPECTOMY BREAST Left 10/09/2019    Procedure: Left breast re-excision;  Surgeon: Kj Salas DO;  Location: [facility]     LUMPECTOMY BREAST WITH SENTINEL NODE, COMBINED Left 09/23/2019    Procedure: LUMPECTOMY, BREAST, WITH SENTINEL LYMPH NODE BIOPSY.;  Surgeon: Kj Salas DO;  Location: [facility]     [facility] NONSPECIFIC PROCEDURE      bladder surgery       Medications:   Current Outpatient Medications:      atorvastatin (LIPITOR) 10 MG tablet, TAKE 1 TABLET(10 MG) BY MOUTH DAILY, Disp: 90 tablet, Rfl: 0     BIOTIN MAXIMUM PO, Take by mouth daily, Disp: , Rfl:      calcium 500-125 MG-UNIT TABS, Take 2 tablets by mouth 2 times daily, Disp: , Rfl:      doxycycline hyclate (PERIOSTAT) 20 MG tablet, Take 2 tablets (40 mg) by mouth daily, Disp: 180 tablet, Rfl:  "3     gabapentin (NEURONTIN) 100 MG capsule, TAKE 1 CAPSULE BY MOUTH EVERY MORNING AND 2 CAPSULES EVERY EVENING, Disp: 90 capsule, Rfl: 3     levothyroxine (SYNTHROID/LEVOTHROID) 88 MCG tablet, Take 1 tablet (88 mcg) by mouth daily, Disp: 90 tablet, Rfl: 1     metroNIDAZOLE (METROCREAM) 0.75 % external cream, Apply topically 2 times daily, Disp: 45 g, Rfl: 3     rivaroxaban ANTICOAGULANT (XARELTO) 10 MG TABS tablet, Take 1 tablet (10 mg) by mouth daily (with dinner) for 10 days, Disp: 10 tablet, Rfl: 0     tamoxifen (NOLVADEX) 20 MG tablet, Take 1 tablet (20 mg) by mouth daily, Disp: 90 tablet, Rfl: 3     tolterodine ER (DETROL LA) 2 MG 24 hr capsule, Take 1 capsule (2 mg) by mouth daily, Disp: 90 capsule, Rfl: 3     venlafaxine (EFFEXOR-XR) 75 MG 24 hr capsule, Take 1 capsule (75 mg) by mouth daily, Disp: 90 capsule, Rfl: 3    Allergies:   Allergies   Allergen Reactions     Demerol      Sedation and vomiting     Meperidine      Sulfa Drugs      unknown reaction       REVIEW OF SYSTEMS:   CONSTITUTIONAL:NEGATIVE for fever, chills, night sweats  INTEGUMENTARY/SKIN: NEGATIVE for worrisome wound problems or redness.  MUSCULOSKELETAL:See HPI above  NEURO: NEGATIVE for weakness, dizziness or paresthesias    PHYSICAL EXAM:  /83   Pulse 75   Ht 1.676 m (5' 6\")   Wt 102.6 kg (226 lb 4.8 oz)   BMI 36.53 kg/m     GENERAL APPEARANCE: healthy, alert, no distress  SKIN: no suspicious lesions or rashes  NEURO: Normal strength and tone, mentation intact and speech normal  PSYCH:  mentation appears normal and affect normal, not anxious  RESPIRATORY: No increased work of breathing.    left  LOWER EXTREMITY:  Gait: subtle favors the left.  No Quad atrophy, strength normal.  Intact sensation deep peroneal nerve, superficial peroneal nerve, med/lat tibial nerve, sural nerve, saphenous nerve  Intact EHL, EDL, TA, FHL, GS, quadriceps hamstrings and hip flexors  Toes warm and well perfused, brisk capillary refill. Palpable 2+ dp " "pulses.  calf soft and nttp or squeeze.  Edema: trace    left  KNEE EXAM:    Skin: intact, no ecchymosis or erythema  Incisions: well healed. Dry. No erythema.  ROM: full extension to 120 flexion  Effusion: trace  Tender: incisions, medial and lateral knee, pes, hamstrings.         X-RAY: none indicated.      Impression: Merlyn Ravi is a 57 year old female 11.5 weeks status post left knee arthroscopy and lateral meniscus debridement, continued chronic left knee pain, advanced primary osteoarthritis.      Plan: routine postoperative knee arthroscopy  * reviewed imaging studies and arthroscopy images with patient, showing arthritic changes, or wearing of the cartilage in the knee. This can be caused by normal \"wear and tear\" over the years or following prior injury to the knee.  * we discused pre and postoperative that pain related to underlying osteoarthritis is likely continue, as arthroscopy is not expected or intended to alleviate osteoarthritis symptoms, perhaps worsen them.      Non-surgical treatment for knee arthritis includes:    * rest, sitting  * Activity modification - avoid impact activities or activities that aggravate symptoms.  * NSAIDS (non-steroidal anti-inflammatory medications; e.g. Aleve, advil, motrin, ibuprofen) - regular use for inflammation ( twice daily or three times daily), with food, as long as no contra-indications Please discuss with primary care doctor if needed  * ice, 15-20 minutes at a time several times a day or as needed.  * Strengthening of quadriceps muscles  * Physical Therapy for strengthening, stretching and range of motion exercises of legs  * Tylenol as needed for pain, consider Tylenol arthritis or similar  * Weight loss: Weight loss:  Body mass index is 36.53 kg/m .. weight loss benefits, not only for the current pain symptoms, but also overall health. Recommend a good diet plan that works for the patient, with the assistance of a dietician or primary care doctor as " "needed. Also, a good, low-impact exercise program for at least 20 minutes per day, 3 times per week, such as exercise bike, elliptical , or pool.  * Exercise: low impact such as stationary bike, elliptical, pool.  * Injections: cortisone versus viscosupplementation (hyaluronic acid, \"rooster comb\", \"gel shots\"); risks and perceived benefits discussed today. Patient elects  to proceed.  * Bracing: bracing the knee may offer some relief of symptoms when worn and provide some stability.  * over the counter supplements such as glucosamine and chondroitin sulfate may help with joint pain.  * topical ointments may help as well    * recommend stopping every couple of hours during her road trip to get up and stretch out, as well as daily aspirin for blood clot prevention.    * return to clinic as needed.          Timo Berman M.D., M.S.  Dept. of Orthopaedic Surgery  [facility]    Large Joint Injection/Arthocentesis: L knee joint    Date/Time: 10/3/2022 1:34 PM  Performed by: Timo Berman MD  Authorized by: Timo Berman MD     Indications:  Pain  Needle Size:  22 G  Guidance: landmark guided    Approach:  Anterolateral  Location:  Knee      Medications:  80 mg methylPREDNISolone 80 MG/ML  Outcome:  Tolerated well, no immediate complications  Procedure discussed: discussed risks, benefits, and alternatives    Consent Given by:  Patient  Timeout: timeout called immediately prior to procedure    Prep: patient was prepped and draped in usual sterile fashion     4cc of 0.25% Sensorcaine NDC 10883-254-28, lot [id number],  2025              Again, thank you for allowing me to participate in the care of your patient.        Sincerely,        Timo Berman MD    "

## 2022-10-05 ENCOUNTER — THERAPY VISIT (OUTPATIENT)
Dept: PHYSICAL THERAPY | Facility: CLINIC | Age: 57
End: 2022-10-05
Payer: COMMERCIAL

## 2022-10-05 DIAGNOSIS — M25.562 LEFT KNEE PAIN: Primary | ICD-10-CM

## 2022-10-05 PROCEDURE — 97110 THERAPEUTIC EXERCISES: CPT | Mod: GP | Performed by: PHYSICAL THERAPIST

## 2022-10-05 PROCEDURE — 97112 NEUROMUSCULAR REEDUCATION: CPT | Mod: GP | Performed by: PHYSICAL THERAPIST

## 2022-10-05 PROCEDURE — 97140 MANUAL THERAPY 1/> REGIONS: CPT | Mod: GP | Performed by: PHYSICAL THERAPIST

## 2022-10-11 ENCOUNTER — OFFICE VISIT (OUTPATIENT)
Dept: SURGERY | Facility: CLINIC | Age: 57
End: 2022-10-11
Payer: COMMERCIAL

## 2022-10-11 VITALS
WEIGHT: 226.19 LBS | DIASTOLIC BLOOD PRESSURE: 85 MMHG | SYSTOLIC BLOOD PRESSURE: 127 MMHG | BODY MASS INDEX: 36.51 KG/M2 | HEART RATE: 79 BPM | TEMPERATURE: 98.8 F

## 2022-10-11 DIAGNOSIS — C50.919 INVASIVE LOBULAR CARCINOMA OF BREAST IN FEMALE (H): Primary | ICD-10-CM

## 2022-10-11 PROCEDURE — 99213 OFFICE O/P EST LOW 20 MIN: CPT | Performed by: SURGERY

## 2022-10-11 NOTE — LETTER
10/11/2022         RE: Merlyn Ravi  [address] [facility] [id number]        Dear Colleague,    Thank you for referring your patient, Merlyn Ravi, to the [facility]. Please see a copy of my visit note below.    BREAST CANCER FOLLOW UP  CHIEF COMPLAINT  Chief Complaint   Patient presents with     RECHECK     6 month follow up     HPI  Merlyn Ravi is a 57 year old female who presents with   Chief Complaint   Patient presents with     RECHECK     6 month follow up     The patient presents for her breast cancer followup appointment. Overall, she is feeling good. She denies any significant fatigue, fevers, chills, or changes in appetite. She has not suffered any significant weight loss.  She has not noted any areas of skin changes or any masses in the breast or chest wall that she is concerned about.  Her lymphedema is improved  She denies skin dimpling, puckering, erythema, or nipple discharge. She has not noted any palpable axillary lymphadenopathy.    PAST MEDICAL HISTORY  Past Medical History:   Diagnosis Date     Allergies      Breast cancer (H)      Dermatofibroma 05/14/2012     Need for prophylactic hormone replacement therapy (postmenopausal)      PONV (postoperative nausea and vomiting) 7/7/2022     FAMILY HISTORY  Family History   Problem Relation Age of Onset     Thyroid Disease Mother      Heart Disease Mother      Heart Disease Father      Hyperlipidemia Father      Hypertension Maternal Grandmother      Breast Cancer Maternal Grandmother      Hypertension Maternal Grandfather      Alzheimer Disease Paternal Grandmother      Diabetes Brother      Eczema Brother      Thyroid Disease Brother      Melanoma No family hx of      SOCIAL HISTORY  Social History     Socioeconomic History     Marital status:      Spouse name: None     Number of children: None     Years of education: None     Highest education level: None   Occupational History      Employer: [employer]   Tobacco Use     Smoking status: Never     Smokeless tobacco: Never   Vaping Use     Vaping Use: Never used   Substance and Sexual Activity     Alcohol use: Yes     Alcohol/week: 0.0 standard drinks     Comment: 3 drinks per month     Drug use: No     Sexual activity: Yes     Partners: Male     Birth control/protection: Surgical     Comment: defer to md   Other Topics Concern     Parent/sibling w/ CABG, MI or angioplasty before 65F 55M? No     SURGICAL HISTORY  Past Surgical History:   Procedure Laterality Date     ARTHROSCOPY KNEE Left 7/14/2022    Procedure: ARTHROSCOPY, LEFT KNEE with meniscal and chondral debridement;  Surgeon: Timo Berman MD;  Location: [facility] OR     BIOPSY BREAST       HYSTERECTOMY, PAP NO LONGER INDICATED       HYSTERECTOMY, MAGY       LASIK BILATERAL  01/01/2005    Dr. Solis      LUMPECTOMY BREAST Left 09/30/2019    Procedure: Re-excision of left breast lumpectomy site;  Surgeon: Kj Salas DO;  Location: [facility]     LUMPECTOMY BREAST Left 10/09/2019    Procedure: Left breast re-excision;  Surgeon: Kj Salas DO;  Location: [facility]     LUMPECTOMY BREAST WITH SENTINEL NODE, COMBINED Left 09/23/2019    Procedure: LUMPECTOMY, BREAST, WITH SENTINEL LYMPH NODE BIOPSY.;  Surgeon: Kj Salas DO;  Location: [facility]     [facility] NONSPECIFIC PROCEDURE      bladder surgery     CURRENT MEDICATIONS    Current Outpatient Medications:      atorvastatin (LIPITOR) 10 MG tablet, TAKE 1 TABLET(10 MG) BY MOUTH DAILY, Disp: 90 tablet, Rfl: 0     BIOTIN MAXIMUM PO, Take by mouth daily, Disp: , Rfl:      calcium 500-125 MG-UNIT TABS, Take 2 tablets by mouth 2 times daily, Disp: , Rfl:      doxycycline hyclate (PERIOSTAT) 20 MG tablet, Take 2 tablets (40 mg) by mouth daily, Disp: 180 tablet, Rfl: 3     gabapentin (NEURONTIN) 100 MG capsule, TAKE 1 CAPSULE BY MOUTH EVERY MORNING AND 2 CAPSULES EVERY EVENING, Disp: 90 capsule, Rfl: 3     levothyroxine  (SYNTHROID/LEVOTHROID) 88 MCG tablet, Take 1 tablet (88 mcg) by mouth daily, Disp: 90 tablet, Rfl: 1     metroNIDAZOLE (METROCREAM) 0.75 % external cream, Apply topically 2 times daily, Disp: 45 g, Rfl: 3     rivaroxaban ANTICOAGULANT (XARELTO) 10 MG TABS tablet, Take 1 tablet (10 mg) by mouth daily (with dinner) for 10 days (Patient not taking: Reported on 10/3/2022), Disp: 10 tablet, Rfl: 0     tamoxifen (NOLVADEX) 20 MG tablet, Take 1 tablet (20 mg) by mouth daily, Disp: 90 tablet, Rfl: 3     tolterodine ER (DETROL LA) 2 MG 24 hr capsule, Take 1 capsule (2 mg) by mouth daily, Disp: 90 capsule, Rfl: 3     venlafaxine (EFFEXOR-XR) 75 MG 24 hr capsule, Take 1 capsule (75 mg) by mouth daily, Disp: 90 capsule, Rfl: 3    Current Facility-Administered Medications:      methylPREDNISolone (DEPO-MEDROL) injection 80 mg, 80 mg, , , Timo Berman MD, 80 mg at 10/03/22 1334  ALLERGIES  Allergies   Allergen Reactions     Demerol      Sedation and vomiting     Meperidine      Sulfa Drugs      unknown reaction     PHYSICAL EXAM  VITAL SIGNS:  weight is 102.6 kg (226 lb 3.1 oz). Her tympanic temperature is 98.8  F (37.1  C). Her blood pressure is 127/85 and her pulse is 79.   Constitutional: Well developed, Well nourished, No acute distress, Non-toxic appearance.   HENT: Normocephalic, Atraumatic, Bilateral external ears normal, Oropharynx moist, No oral exudates, Nose normal.   Eyes: EOMI, Conjunctiva normal, No discharge.   Neck: Normal range of motion, No tenderness, Supple, No stridor.   Lymphatic: No lymphadenopathy noted.   Breast Exam: Examined with Danni Brock mA  RIGHT: No areas of skin dimpling or puckering. No erythema. No skin scaling or changes. No expressible nipple discharge. No focal areas of significant tenderness.  No palpable axillary lymphadenopathy.  LEFT: Stable lymphedema (Improved from last visit) left medial breast. No erythema. No skin scaling or changes. No expressible nipple discharge. No focal  areas of significant tenderness.  No palpable axillary lymphadenopathy.  Skin: Warm, Dry, No erythema, No rash.   Back: No tenderness, No CVA tenderness.   Extremities: Intact distal pulses, No edema, No tenderness, No cyanosis, No clubbing.   Musculoskeletal: Good range of motion in all major joints. No tenderness to palpation or major deformities noted.   Neurologic: Alert & oriented x 3, Normal motor function, Normal sensory function, No focal deficits noted.   Psychiatric: Affect normal, Judgment normal, Mood normal.     FINAL IMPRESSION AND PLAN  1. Invasive lobular carcinoma of breast in female (H)        The patient is 3 years out from her breast cancer surgery. There is no evidence of recurrent disease on my exam today. She is continuing to recover well from her breast cancer treatment. Her most recent mammogram (5/2022) was negative.    She will continue periodic self breast or chest wall exam. She is encouraged to followup with me for any changes on self-exam, or for any concerns or questions. She will followup with her primary care provider for routine care, and continue followup with her oncologist as scheduled.    My recommendations were discussed with the patient. She will see me in 1 year for another exam. Patient will be scheduled for any indicated mammography or additional imaging.    Kj Salas DO on 10/11/2022 at 7:13 AM          Again, thank you for allowing me to participate in the care of your patient.        Sincerely,        Kj Salas DO

## 2022-10-11 NOTE — NURSING NOTE
"Initial /85 (BP Location: Right arm, Patient Position: Sitting, Cuff Size: Adult Large)   Pulse 79   Temp 98.8  F (37.1  C) (Tympanic)   Wt 102.6 kg (226 lb 3.1 oz)   BMI 36.51 kg/m   Estimated body mass index is 36.51 kg/m  as calculated from the following:    Height as of 10/3/22: 1.676 m (5' 6\").    Weight as of this encounter: 102.6 kg (226 lb 3.1 oz). .    Danni Brock MA    "

## 2022-10-11 NOTE — PROGRESS NOTES
BREAST CANCER FOLLOW UP  CHIEF COMPLAINT  Chief Complaint   Patient presents with     RECHECK     6 month follow up     JOHN  Merlyn Ravi is a 57 year old female who presents with   Chief Complaint   Patient presents with     RECHECK     6 month follow up     The patient presents for her breast cancer followup appointment. Overall, she is feeling good. She denies any significant fatigue, fevers, chills, or changes in appetite. She has not suffered any significant weight loss.  She has not noted any areas of skin changes or any masses in the breast or chest wall that she is concerned about.  Her lymphedema is improved  She denies skin dimpling, puckering, erythema, or nipple discharge. She has not noted any palpable axillary lymphadenopathy.    PAST MEDICAL HISTORY  Past Medical History:   Diagnosis Date     Allergies      Breast cancer (H)      Dermatofibroma 05/14/2012     Need for prophylactic hormone replacement therapy (postmenopausal)      PONV (postoperative nausea and vomiting) 7/7/2022     FAMILY HISTORY  Family History   Problem Relation Age of Onset     Thyroid Disease Mother      Heart Disease Mother      Heart Disease Father      Hyperlipidemia Father      Hypertension Maternal Grandmother      Breast Cancer Maternal Grandmother      Hypertension Maternal Grandfather      Alzheimer Disease Paternal Grandmother      Diabetes Brother      Eczema Brother      Thyroid Disease Brother      Melanoma No family hx of      SOCIAL HISTORY  Social History     Socioeconomic History     Marital status:      Spouse name: None     Number of children: None     Years of education: None     Highest education level: None   Occupational History     Employer: [employer]   Tobacco Use     Smoking status: Never     Smokeless tobacco: Never   Vaping Use     Vaping Use: Never used   Substance and Sexual Activity     Alcohol use: Yes     Alcohol/week: 0.0 standard drinks     Comment: 3 drinks per month     Drug use: No      Sexual activity: Yes     Partners: Male     Birth control/protection: Surgical     Comment: defer to md   Other Topics Concern     Parent/sibling w/ CABG, MI or angioplasty before 65F 55M? No     SURGICAL HISTORY  Past Surgical History:   Procedure Laterality Date     ARTHROSCOPY KNEE Left 7/14/2022    Procedure: ARTHROSCOPY, LEFT KNEE with meniscal and chondral debridement;  Surgeon: Timo Berman MD;  Location: [facility] OR     BIOPSY BREAST       HYSTERECTOMY, PAP NO LONGER INDICATED       HYSTERECTOMY, MAGY       LASIK BILATERAL  01/01/2005    Dr. Solis      LUMPECTOMY BREAST Left 09/30/2019    Procedure: Re-excision of left breast lumpectomy site;  Surgeon: Kj Salas DO;  Location: [facility]     LUMPECTOMY BREAST Left 10/09/2019    Procedure: Left breast re-excision;  Surgeon: Kj Salas DO;  Location: [facility]     LUMPECTOMY BREAST WITH SENTINEL NODE, COMBINED Left 09/23/2019    Procedure: LUMPECTOMY, BREAST, WITH SENTINEL LYMPH NODE BIOPSY.;  Surgeon: Kj Salas DO;  Location: [facility]     [facility] NONSPECIFIC PROCEDURE      bladder surgery     CURRENT MEDICATIONS    Current Outpatient Medications:      atorvastatin (LIPITOR) 10 MG tablet, TAKE 1 TABLET(10 MG) BY MOUTH DAILY, Disp: 90 tablet, Rfl: 0     BIOTIN MAXIMUM PO, Take by mouth daily, Disp: , Rfl:      calcium 500-125 MG-UNIT TABS, Take 2 tablets by mouth 2 times daily, Disp: , Rfl:      doxycycline hyclate (PERIOSTAT) 20 MG tablet, Take 2 tablets (40 mg) by mouth daily, Disp: 180 tablet, Rfl: 3     gabapentin (NEURONTIN) 100 MG capsule, TAKE 1 CAPSULE BY MOUTH EVERY MORNING AND 2 CAPSULES EVERY EVENING, Disp: 90 capsule, Rfl: 3     levothyroxine (SYNTHROID/LEVOTHROID) 88 MCG tablet, Take 1 tablet (88 mcg) by mouth daily, Disp: 90 tablet, Rfl: 1     metroNIDAZOLE (METROCREAM) 0.75 % external cream, Apply topically 2 times daily, Disp: 45 g, Rfl: 3     rivaroxaban ANTICOAGULANT (XARELTO) 10 MG TABS tablet, Take 1 tablet  (10 mg) by mouth daily (with dinner) for 10 days (Patient not taking: Reported on 10/3/2022), Disp: 10 tablet, Rfl: 0     tamoxifen (NOLVADEX) 20 MG tablet, Take 1 tablet (20 mg) by mouth daily, Disp: 90 tablet, Rfl: 3     tolterodine ER (DETROL LA) 2 MG 24 hr capsule, Take 1 capsule (2 mg) by mouth daily, Disp: 90 capsule, Rfl: 3     venlafaxine (EFFEXOR-XR) 75 MG 24 hr capsule, Take 1 capsule (75 mg) by mouth daily, Disp: 90 capsule, Rfl: 3    Current Facility-Administered Medications:      methylPREDNISolone (DEPO-MEDROL) injection 80 mg, 80 mg, , , Timo Berman MD, 80 mg at 10/03/22 1334  ALLERGIES  Allergies   Allergen Reactions     Demerol      Sedation and vomiting     Meperidine      Sulfa Drugs      unknown reaction     PHYSICAL EXAM  VITAL SIGNS:  weight is 102.6 kg (226 lb 3.1 oz). Her tympanic temperature is 98.8  F (37.1  C). Her blood pressure is 127/85 and her pulse is 79.   Constitutional: Well developed, Well nourished, No acute distress, Non-toxic appearance.   HENT: Normocephalic, Atraumatic, Bilateral external ears normal, Oropharynx moist, No oral exudates, Nose normal.   Eyes: EOMI, Conjunctiva normal, No discharge.   Neck: Normal range of motion, No tenderness, Supple, No stridor.   Lymphatic: No lymphadenopathy noted.   Breast Exam: Examined with Danni Brock mA  RIGHT: No areas of skin dimpling or puckering. No erythema. No skin scaling or changes. No expressible nipple discharge. No focal areas of significant tenderness.  No palpable axillary lymphadenopathy.  LEFT: Stable lymphedema (Improved from last visit) left medial breast. No erythema. No skin scaling or changes. No expressible nipple discharge. No focal areas of significant tenderness.  No palpable axillary lymphadenopathy.  Skin: Warm, Dry, No erythema, No rash.   Back: No tenderness, No CVA tenderness.   Extremities: Intact distal pulses, No edema, No tenderness, No cyanosis, No clubbing.   Musculoskeletal: Good range of  motion in all major joints. No tenderness to palpation or major deformities noted.   Neurologic: Alert & oriented x 3, Normal motor function, Normal sensory function, No focal deficits noted.   Psychiatric: Affect normal, Judgment normal, Mood normal.     FINAL IMPRESSION AND PLAN  1. Invasive lobular carcinoma of breast in female (H)        The patient is 3 years out from her breast cancer surgery. There is no evidence of recurrent disease on my exam today. She is continuing to recover well from her breast cancer treatment. Her most recent mammogram (5/2022) was negative.    She will continue periodic self breast or chest wall exam. She is encouraged to followup with me for any changes on self-exam, or for any concerns or questions. She will followup with her primary care provider for routine care, and continue followup with her oncologist as scheduled.    My recommendations were discussed with the patient. She will see me in 1 year for another exam. Patient will be scheduled for any indicated mammography or additional imaging.     Kj Salas, DO on 10/11/2022 at 7:13 AM

## 2022-10-21 DIAGNOSIS — F43.23 ADJUSTMENT REACTION WITH ANXIETY AND DEPRESSION: ICD-10-CM

## 2022-10-24 ENCOUNTER — MYC MEDICAL ADVICE (OUTPATIENT)
Dept: FAMILY MEDICINE | Facility: CLINIC | Age: 57
End: 2022-10-24

## 2022-10-25 RX ORDER — VENLAFAXINE HYDROCHLORIDE 75 MG/1
CAPSULE, EXTENDED RELEASE ORAL
Qty: 90 CAPSULE | Refills: 3 | Status: SHIPPED | OUTPATIENT
Start: 2022-10-25 | End: 2024-01-22

## 2022-11-07 NOTE — TELEPHONE ENCOUNTER
Routing refill request to provider for review/approval because:  Drug not on the [facility] refill protocol       Maame Soliz RN     [facility] Pregnancy And Lactation Text: It is unknown if this medication will adversely affect pregnancy or breast feeding.

## 2022-11-19 ENCOUNTER — HEALTH MAINTENANCE LETTER (OUTPATIENT)
Age: 57
End: 2022-11-19

## 2022-12-07 ENCOUNTER — OFFICE VISIT (OUTPATIENT)
Dept: FAMILY MEDICINE | Facility: CLINIC | Age: 57
End: 2022-12-07
Payer: COMMERCIAL

## 2022-12-07 VITALS
TEMPERATURE: 98.6 F | HEIGHT: 66 IN | BODY MASS INDEX: 36 KG/M2 | DIASTOLIC BLOOD PRESSURE: 90 MMHG | WEIGHT: 224 LBS | OXYGEN SATURATION: 97 % | RESPIRATION RATE: 16 BRPM | HEART RATE: 93 BPM | SYSTOLIC BLOOD PRESSURE: 116 MMHG

## 2022-12-07 DIAGNOSIS — Z23 ENCOUNTER FOR IMMUNIZATION: ICD-10-CM

## 2022-12-07 DIAGNOSIS — R32 URINARY INCONTINENCE, UNSPECIFIED TYPE: Primary | ICD-10-CM

## 2022-12-07 DIAGNOSIS — L72.9 CYST OF SKIN: ICD-10-CM

## 2022-12-07 PROCEDURE — 90472 IMMUNIZATION ADMIN EACH ADD: CPT | Performed by: FAMILY MEDICINE

## 2022-12-07 PROCEDURE — 90682 RIV4 VACC RECOMBINANT DNA IM: CPT | Performed by: FAMILY MEDICINE

## 2022-12-07 PROCEDURE — 99213 OFFICE O/P EST LOW 20 MIN: CPT | Mod: 25 | Performed by: FAMILY MEDICINE

## 2022-12-07 PROCEDURE — 90471 IMMUNIZATION ADMIN: CPT | Performed by: FAMILY MEDICINE

## 2022-12-07 PROCEDURE — 90750 HZV VACC RECOMBINANT IM: CPT | Performed by: FAMILY MEDICINE

## 2022-12-07 RX ORDER — TOLTERODINE 4 MG/1
4 CAPSULE, EXTENDED RELEASE ORAL DAILY
Qty: 90 CAPSULE | Refills: 1 | Status: SHIPPED | OUTPATIENT
Start: 2022-12-07 | End: 2023-06-14

## 2022-12-07 ASSESSMENT — ENCOUNTER SYMPTOMS
ALLERGIC/IMMUNOLOGIC NEGATIVE: 1
PSYCHIATRIC NEGATIVE: 1
EYES NEGATIVE: 1
CARDIOVASCULAR NEGATIVE: 1
GASTROINTESTINAL NEGATIVE: 1
RESPIRATORY NEGATIVE: 1
HEMATOLOGIC/LYMPHATIC NEGATIVE: 1
MUSCULOSKELETAL NEGATIVE: 1
CONSTITUTIONAL NEGATIVE: 1
ENDOCRINE NEGATIVE: 1
NEUROLOGICAL NEGATIVE: 1

## 2022-12-07 ASSESSMENT — PAIN SCALES - GENERAL: PAINLEVEL: MILD PAIN (2)

## 2022-12-07 NOTE — PROGRESS NOTES
"  Assessment & Plan     Urinary incontinence, unspecified type  Increase Detrol LA to 4mg she want to see about getting a bladder sling. Referral placed.   - tolterodine ER (DETROL LA) 4 MG 24 hr capsule; Take 1 capsule (4 mg) by mouth daily  - Adult Uro/Gyn  Referral; Future    Encounter for immunization  - ZOSTER VACCINE RECOMBINANT ADJUVANTED IM NJX      Cyst of skin  Had several cysts all over body, localized in the low back, arms and elbow. Patient reassured cysts are soft movable , does not appear to be anything to worry about. Patient was reassured.     20 minutes spent on the date of the encounter doing patient visit and documentation        BMI:   Estimated body mass index is 36.15 kg/m  as calculated from the following:    Height as of this encounter: 1.676 m (5' 6\").    Weight as of this encounter: 101.6 kg (224 lb).       FUTURE APPOINTMENTS:       - Follow-up visit in one month     Return in about 4 weeks (around 1/4/2023) for Follow up.    Memo Garcia MD  [facility]    Norman Acevedo is a 57 year old accompanied by her self, presenting for the following health issues:      Mass (Two lumps on the mid to lower back area for over one year.), Med Change Request (She would like to discuss about the Detrol LA.  The medication is helping some but would like to discuss about a dose increase. //), and Imm/Inj (She will do the Flu and Shingles vaccine today.)      HPI     Concern - Two lumps on her back.  Onset: Over one year.  Description: Two lumps on the mid to lower back area.  Intensity: 2/10 today.  It can range at a 10/10 if touching the area too hard.  Progression of Symptoms:  Both have decreased in size but still have been there for years.  Accompanying Signs & Symptoms: No redness.  Just painful when touched with getting a massage.  History of breast cancer which made her come in to have this checked.  Previous history of similar problem: Lump on the " "left arm area that has gotten larger.  Precipitating factors:        Worsened by: Just when touched.  Alleviating factors:        Improved by: None  Therapies tried and outcome: None      MEDICATION QUESTION:  She would like to discuss about the Detrol LA.  The medication is helping some but would like to discuss about a dose increase.         Review of Systems   Constitutional: Negative.    HENT: Negative.    Eyes: Negative.    Respiratory: Negative.    Cardiovascular: Negative.    Gastrointestinal: Negative.    Endocrine: Negative.    Genitourinary: Negative.    Musculoskeletal: Negative.    Skin:        various cysts   Allergic/Immunologic: Negative.    Neurological: Negative.    Hematological: Negative.    Psychiatric/Behavioral: Negative.             Objective    BP (!) 116/90   Pulse 93   Temp 98.6  F (37  C) (Tympanic)   Resp 16   Ht 1.676 m (5' 6\")   Wt 101.6 kg (224 lb)   SpO2 97%   BMI 36.15 kg/m    Body mass index is 36.15 kg/m .  Physical Exam  Constitutional:       Appearance: Normal appearance.   HENT:      Head: Normocephalic and atraumatic.   Cardiovascular:      Rate and Rhythm: Normal rate and regular rhythm.      Pulses: Normal pulses.      Heart sounds: Normal heart sounds.   Pulmonary:      Effort: Pulmonary effort is normal.      Breath sounds: Normal breath sounds.   Skin:     Comments: Cyst on low back, soft movable, cyst on left forearm, lipoma on right elbow.   Neurological:      Mental Status: She is alert and oriented to person, place, and time.   Psychiatric:         Mood and Affect: Mood normal.         Behavior: Behavior normal.                    "

## 2022-12-07 NOTE — NURSING NOTE
Prior to immunization administration, verified patients identity using patient s name and date of birth. Please see Immunization Activity for additional information.     Screening Questionnaire for Adult Immunization    Are you sick today?   No   Do you have allergies to medications, food, a vaccine component or latex?   No   Have you ever had a serious reaction after receiving a vaccination?   No   Do you have a long-term health problem with heart, lung, kidney, or metabolic disease (e.g., diabetes), asthma, a blood disorder, no spleen, complement component deficiency, a cochlear implant, or a spinal fluid leak?  Are you on long-term aspirin therapy?   No   Do you have cancer, leukemia, HIV/AIDS, or any other immune system problem?   Yes, cancer.   Do you have a parent, brother, or sister with an immune system problem?   Yes, brother.   In the past 3 months, have you taken medications that affect  your immune system, such as prednisone, other steroids, or anticancer drugs; drugs for the treatment of rheumatoid arthritis, Crohn s disease, or psoriasis; or have you had radiation treatments?   No   Have you had a seizure, or a brain or other nervous system problem?   No   During the past year, have you received a transfusion of blood or blood    products, or been given immune (gamma) globulin or antiviral drug?   No   For women: Are you pregnant or is there a chance you could become       pregnant during the next month?   No   Have you received any vaccinations in the past 4 weeks?   No     Immunization questionnaire answers were all negative.      Patient states she checked with her Insurance Company and this is covered in the Clinic.    Per orders of Dr. Garcia, injection of Flu and Shingrix given by Angella Alex CMA. Patient instructed to remain in clinic for 15 minutes afterwards, and to report any adverse reaction to me immediately.       Screening performed by Angella Alex CMA on 12/7/2022 at 11:44  AM.

## 2022-12-22 ENCOUNTER — TELEPHONE (OUTPATIENT)
Dept: DERMATOLOGY | Facility: CLINIC | Age: 57
End: 2022-12-22

## 2022-12-22 NOTE — TELEPHONE ENCOUNTER
1st atttempt and lvm for pt to schedule 1yr follow-up telephone or in person with ryley/giovanni due around march 2023

## 2023-01-17 DIAGNOSIS — R52 PAIN: ICD-10-CM

## 2023-01-17 DIAGNOSIS — G62.9 NEUROPATHY: ICD-10-CM

## 2023-01-17 RX ORDER — GABAPENTIN 100 MG/1
CAPSULE ORAL
Qty: 90 CAPSULE | Refills: 3 | Status: SHIPPED | OUTPATIENT
Start: 2023-01-17 | End: 2023-09-29

## 2023-01-23 ENCOUNTER — OFFICE VISIT (OUTPATIENT)
Dept: UROLOGY | Facility: CLINIC | Age: 58
End: 2023-01-23
Attending: FAMILY MEDICINE
Payer: COMMERCIAL

## 2023-01-23 VITALS — OXYGEN SATURATION: 96 % | DIASTOLIC BLOOD PRESSURE: 86 MMHG | SYSTOLIC BLOOD PRESSURE: 130 MMHG | HEART RATE: 85 BPM

## 2023-01-23 DIAGNOSIS — N39.46 MIXED INCONTINENCE: ICD-10-CM

## 2023-01-23 PROBLEM — M25.562 LEFT KNEE PAIN: Status: RESOLVED | Noted: 2022-08-17 | Resolved: 2023-01-23

## 2023-01-23 LAB
ALBUMIN UR-MCNC: NEGATIVE MG/DL
APPEARANCE UR: CLEAR
BACTERIA #/AREA URNS HPF: ABNORMAL /HPF
BILIRUB UR QL STRIP: NEGATIVE
COLOR UR AUTO: YELLOW
GLUCOSE UR STRIP-MCNC: NEGATIVE MG/DL
HGB UR QL STRIP: NEGATIVE
KETONES UR STRIP-MCNC: NEGATIVE MG/DL
LEUKOCYTE ESTERASE UR QL STRIP: NEGATIVE
MUCOUS THREADS #/AREA URNS LPF: PRESENT /LPF
NITRATE UR QL: NEGATIVE
PH UR STRIP: 5.5 [PH] (ref 5–7)
RBC #/AREA URNS AUTO: ABNORMAL /HPF
SP GR UR STRIP: >=1.03 (ref 1–1.03)
SQUAMOUS #/AREA URNS AUTO: ABNORMAL /LPF
UROBILINOGEN UR STRIP-ACNC: 0.2 E.U./DL
WBC #/AREA URNS AUTO: ABNORMAL /HPF

## 2023-01-23 PROCEDURE — 87086 URINE CULTURE/COLONY COUNT: CPT | Performed by: STUDENT IN AN ORGANIZED HEALTH CARE EDUCATION/TRAINING PROGRAM

## 2023-01-23 PROCEDURE — 81001 URINALYSIS AUTO W/SCOPE: CPT | Performed by: STUDENT IN AN ORGANIZED HEALTH CARE EDUCATION/TRAINING PROGRAM

## 2023-01-23 PROCEDURE — 99213 OFFICE O/P EST LOW 20 MIN: CPT | Mod: 25 | Performed by: STUDENT IN AN ORGANIZED HEALTH CARE EDUCATION/TRAINING PROGRAM

## 2023-01-23 PROCEDURE — 51798 US URINE CAPACITY MEASURE: CPT | Performed by: STUDENT IN AN ORGANIZED HEALTH CARE EDUCATION/TRAINING PROGRAM

## 2023-01-23 NOTE — NURSING NOTE
"Initial /86 (BP Location: Right arm, Patient Position: Chair, Cuff Size: Adult Regular)   Pulse 85   SpO2 96%  Estimated body mass index is 36.15 kg/m  as calculated from the following:    Height as of 12/7/22: 1.676 m (5' 6\").    Weight as of 12/7/22: 101.6 kg (224 lb). .    Active order to obtain bladder scan? Yes   Name of ordering provider:  Екатерина Rose  Bladder scan preformed post void Yes.  Bladder scan reveled 24ML  Provider notified?  Yes    Merissa Geller CMA          "

## 2023-01-23 NOTE — PROGRESS NOTES
Chief Complaint:   Mixed urinary incontinence         History of Present Illness:   Merlyn Ravi is a 57 year old female with a history of hypothyroidism, breast cancer, HLD, and monoclonal paraproteinemia who presents for evaluation of mixed urinary incontinence.     She reports a several year history of incontinence with coughing, sneezing, and physical activity. She also reports urge incontinence. She wears one panty liner per day. She reports nocturia x 2-3 and some daytime frequency. She denies dysuria and gross hematuria.     She reports regular bowel movements. She had frequent bladder infections as a teenager and underwent urethral dilatation.     She had a total hysterectomy with BSO in 1998 for endometriosis. She denies other pelvic or abdominal surgeries. She denies a history of vaginal deliveries.     She takes tolterodine XR 4 mg daily. This was recently increased from 2 mg daily. She did notice an improvement in her urinary symptoms, primarily reduced nocturia.     She frequently does Kegel exercises.         Past Medical History:     Past Medical History:   Diagnosis Date     Allergies      Breast cancer (H)      Dermatofibroma 05/14/2012     Need for prophylactic hormone replacement therapy (postmenopausal)      PONV (postoperative nausea and vomiting) 7/7/2022            Past Surgical History:     Past Surgical History:   Procedure Laterality Date     ARTHROSCOPY KNEE Left 7/14/2022    Procedure: ARTHROSCOPY, LEFT KNEE with meniscal and chondral debridement;  Surgeon: Timo Berman MD;  Location: [facility] OR     BIOPSY BREAST       HYSTERECTOMY, PAP NO LONGER INDICATED       HYSTERECTOMY, MAGY       LASIK BILATERAL  01/01/2005    Dr. Solis      LUMPECTOMY BREAST Left 09/30/2019    Procedure: Re-excision of left breast lumpectomy site;  Surgeon: Kj Salas DO;  Location: [facility]     LUMPECTOMY BREAST Left 10/09/2019    Procedure: Left breast re-excision;  Surgeon: Josue  Kj Reagan DO;  Location: [facility] OR     LUMPECTOMY BREAST WITH SENTINEL NODE, COMBINED Left 09/23/2019    Procedure: LUMPECTOMY, BREAST, WITH SENTINEL LYMPH NODE BIOPSY.;  Surgeon: Kj Salas DO;  Location: [facility] OR     [facility] NONSPECIFIC PROCEDURE      bladder surgery            Medications     Current Outpatient Medications   Medication     atorvastatin (LIPITOR) 10 MG tablet     BIOTIN MAXIMUM PO     calcium 500-125 MG-UNIT TABS     doxycycline hyclate (PERIOSTAT) 20 MG tablet     gabapentin (NEURONTIN) 100 MG capsule     levothyroxine (SYNTHROID/LEVOTHROID) 88 MCG tablet     metroNIDAZOLE (METROCREAM) 0.75 % external cream     tamoxifen (NOLVADEX) 20 MG tablet     tolterodine ER (DETROL LA) 4 MG 24 hr capsule     venlafaxine (EFFEXOR XR) 75 MG 24 hr capsule     Current Facility-Administered Medications   Medication     methylPREDNISolone (DEPO-MEDROL) injection 80 mg            Allergies:   Demerol, Meperidine, and Sulfa drugs         Review of Systems:  From intake questionnaire   Negative 14 system review except as noted on HPI, nurse's note.         Physical Exam:   Patient is a 57 year old  female   Vitals: Blood pressure 130/86, pulse 85, SpO2 96 %, not currently breastfeeding.  General Appearance Adult: Alert, no acute distress, oriented.  Lungs: Non-labored breathing.  Heart: No obvious jugular venous distension present.  Neuro: Alert, oriented, speech and mentation normal    PVR: 24 mL      Labs and Pathology:    I personally reviewed all applicable laboratory data and went over findings with patient  Significant for:     BMP RESULTS:  Recent Labs   Lab Test 09/07/21  0811 03/01/21  0828 06/01/20  0828 03/06/20  1536 01/31/20  1715    142 143 139 142   POTASSIUM 3.9 4.2 3.7 4.0 4.1   CHLORIDE 110* 111* 111* 108 109   CO2 29 28 28 31 30   ANIONGAP 3 3 4 <1* 3   GLC 91 92 104* 115* 99   BUN 14 14 12 16 15   CR 0.85 0.83 0.86 0.86 0.90   GFRESTIMATED 77 79 76 76 72   GFRESTBLACK  --  >90 88 88  83   [facility] 8.4* 8.8 8.9 9.0 9.8            Assessment and Plan:     Assessment: 57 year old female seen in evaluation for mixed urinary incontinence. She wears one pad per day. She takes tolterodine XR 4 mg daily, which primarily improved her nocturia. She underwent total hysterectomy with BSO in 1998 for endometriosis.    We discussed the etiologies of stress and urge incontinence and how they differ. We discussed that the options of treating stress incontinence to include observation, weight loss, pelvic floor physical therapy, incontinence pessary, urethral bulking agents, and surgical correction most commonly with midurethral sling or autologous rectus fascial sling. We then discussed that urge incontinence treatments include observation, weight loss, medications most commonly anticholinergics, physical therapy, biofeedback, intravesical botulinum toxin, percutaneous tibial nerve stimulation and sacral neuromodulation.     The patient is most bothered by stress incontinence and interested in the bladder sling surgery. She has friends that had the surgery and are very happy with their results. I will request consult with Dr. Gates or Dr. Wade.    Plan:  Follow up with Dr. Gates or Dr. Wade for consideration of bladder sling for stress incontinence.     ARY DUVAL PA-C  Department of Urology

## 2023-01-23 NOTE — PROGRESS NOTES
DISCHARGE REPORT    Progress reporting period is from 8/17/2022 to 10/5/2022.       SUBJECTIVE  Subjective: Saw Dr. Berman and got a cortisone injection. Was told that there was a lot of arthritis in the knee. In a dance class right now and avoiding jumping.    Current Pain level: 1/10.     Initial Pain level: 4/10.   Changes in function:  Yes (See Goal flowsheet attached for changes in current functional level)  Adverse reaction to treatment or activity: None    OBJECTIVE  Changes noted in objective findings:  The objective findings below are from DOS 10/5/2022.    Objective: Gait - Non antalgic / Discomfort reported with toe tap exercise     ASSESSMENT/PLAN  Updated problem list and treatment plan: Diagnosis 1:  Left knee pain  STG/LTGs have been met or progress has been made towards goals:  Yes (See Goal flow sheet completed today.)  Assessment of Progress: The patient's condition is improving.  Self Management Plans:  Patient has been instructed in a home treatment program.  Patient is independent in a home treatment program.  I have re-evaluated this patient and find that the nature, scope, duration and intensity of the therapy is appropriate for the medical condition of the patient.    Recommendations:  The patient has not returned to physical therapy in over 1 month and will be discharged to [facility] at this time.    Please refer to the daily flowsheet for treatment today, total treatment time and time spent performing 1:1 timed codes.

## 2023-01-24 ENCOUNTER — TELEPHONE (OUTPATIENT)
Dept: UROLOGY | Facility: CLINIC | Age: 58
End: 2023-01-24

## 2023-01-25 LAB — BACTERIA UR CULT: NORMAL

## 2023-01-26 NOTE — TELEPHONE ENCOUNTER
Action 23 MMT   Action Taken  All records available in Epic.        MEDICAL RECORDS REQUEST   [facility]  Urology Clinic  [address] [place] [address]  PHONE: [phone]  Fax: [phone]        FUTURE VISIT INFORMATION                                                   Merlyn Ravi, : 1965 scheduled for future visit at [facility]    APPOINTMENT INFORMATION:    Date: 3/2/23    Provider:  Dr. Trina Gates     Reason for Visit/Diagnosis: Consideration for bladder sling for stress incontinence    REFERRAL INFORMATION:    Referring provider:  Екатерина Rose PA-C    Specialty: Urology    Referring providers clinic:  [facility]    Clinic contact number: [phone]    RECORDS REQUESTED FOR VISIT                                                     NOTES  STATUS/DETAILS   OFFICE NOTE from referring provider  yes 23 - Екатерина Rose - [facility] eval    OFFICE NOTE from other specialist  yes 22 - Dr. Garcia - [facility] - PCP - Urinary Incontinence    MEDICATION LIST  yes   LABS     URINALYSIS (UA)  yes 23   URINE CYTOLOGY  yes 23     PRE-VISIT CHECKLIST      Record collection complete Yes   Appointment appropriately scheduled           (right time/right provider) Yes   Joint diagnostic appointment coordinated correctly          (ensure right order & amount of time) Yes   MyChart activation Yes   Questionnaire complete No, pending

## 2023-02-10 ENCOUNTER — OFFICE VISIT (OUTPATIENT)
Dept: ENDOCRINOLOGY | Facility: CLINIC | Age: 58
End: 2023-02-10
Payer: COMMERCIAL

## 2023-02-10 VITALS
SYSTOLIC BLOOD PRESSURE: 116 MMHG | HEART RATE: 76 BPM | OXYGEN SATURATION: 98 % | WEIGHT: 226.5 LBS | DIASTOLIC BLOOD PRESSURE: 81 MMHG | BODY MASS INDEX: 36.56 KG/M2

## 2023-02-10 DIAGNOSIS — E06.3 HYPOTHYROIDISM DUE TO HASHIMOTO'S THYROIDITIS: ICD-10-CM

## 2023-02-10 LAB
T4 FREE SERPL-MCNC: 0.88 NG/DL (ref 0.76–1.46)
TSH SERPL DL<=0.005 MIU/L-ACNC: 3.25 MU/L (ref 0.4–4)

## 2023-02-10 PROCEDURE — 84439 ASSAY OF FREE THYROXINE: CPT | Performed by: INTERNAL MEDICINE

## 2023-02-10 PROCEDURE — 36415 COLL VENOUS BLD VENIPUNCTURE: CPT | Performed by: INTERNAL MEDICINE

## 2023-02-10 PROCEDURE — 84443 ASSAY THYROID STIM HORMONE: CPT | Performed by: INTERNAL MEDICINE

## 2023-02-10 PROCEDURE — 99214 OFFICE O/P EST MOD 30 MIN: CPT | Performed by: INTERNAL MEDICINE

## 2023-02-10 RX ORDER — LEVOTHYROXINE SODIUM 88 UG/1
88 TABLET ORAL DAILY
Qty: 90 TABLET | Refills: 1 | Status: SHIPPED | OUTPATIENT
Start: 2023-02-10 | End: 2023-06-16

## 2023-02-10 NOTE — PROGRESS NOTES
- Endocrinology Follow up -    Reason for visit/consult:  Hypothyrodism, fatiue    Primary care provider: Memo Garcia    Assessment and Plan  A 57 year old female with hypothyroidism, came with fatigue for a few years and recently getting worse,     # Hashimoto thryoiditis  Increased in 11/2018) levothyroxien from 75 mcg to 88 mcg, since then she feels better.     - TSH, free T4 today    - meanwhile continue current dose of levothryoxine 88 mcg    # Breast CA on the left  Diagnosed 2019, underwent lumpectomy, followed by RT. Currently taking Tamoxifen. Hot flush is getting milder.       # Family history of DM1   Her brother has DM1 since age 14.       # Bone health  Hysterectomy, followed by hormone replacement which was stopped 5 year ago.   She was recently diagnosed braest CA, Currently taking Tamoxifen.     - Calcium citrate plus D (elemental Ca 630 mg, VitD 500 international unit(s)) to make sure to take.       RTC with me in 1 year         30   minutes spent on the date of the encounter doing chart review, history and exam, documentation and further activities as noted above.    Gala Pop MD  Staff Physician  Endocrinology and Metabolism  [facility]  License: [id number]  Pager: [phone]    Interval History as of 2/10/2023 : Patient has been doing well . Medication compliance: excellent   . New event includes: no pertinent medical event noted, but it was hard for her family loss .  Interval History as of 3/9/2020 : She was recently diagnosed braest CA, Currently taking Tamoxifen. Compliant to levothryoxine 88 mcg.   Interval History as of 3/9/2020 : Patient has been doing well. Last seen . Medication compliance   . New event includes  .feelign good.   Interval History: Recently increased (3 month ago 11/2018) levothyroxien from 75 mcg to 88 mcg, patient energy level up and feeling better.   Blood work today euthryoid.      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 She had a history of HRT since 1998, due to total hysterectomy. HRT stopped January 2016.   (upate)  She has been doing well. She lost 20 lb over 1 year, no more fatigue, hair started to grow, no dry skin. Medication compliance is excellent.       1/5/2016 3/10/2016 12:51 10/6/2016 10:10 12/29/2016 12:20 4/27/2017 11:38 12/6/2017 10:11   TSH  0.4-4.0 8.08 4.99 (H) 5.96 (H) 0.01 (L) 1.90 2.99   T4 Free  0.76-1.46 0.85 0.82 0.89 1.58 (H) 1.01 1.13        11/16/2018 15:54 1/21/2019 09:16   TSH 3.38 0.46   T4 Free 1.02 1.11       Past Medical/Surgical History:  Past Medical History:   Diagnosis Date     Allergies      Breast cancer (H)      Dermatofibroma 05/14/2012     Need for prophylactic hormone replacement therapy (postmenopausal)      PONV (postoperative nausea and vomiting) 7/7/2022     Past Surgical History:   Procedure Laterality Date     ARTHROSCOPY KNEE Left 7/14/2022    Procedure: ARTHROSCOPY, LEFT KNEE with meniscal and chondral debridement;  Surgeon: Timo Berman MD;  Location: [facility] OR     BIOPSY BREAST       HYSTERECTOMY, PAP NO LONGER INDICATED       HYSTERECTOMY, MAGY       LASIK BILATERAL  01/01/2005    Dr. Solis      LUMPECTOMY BREAST Left 09/30/2019    Procedure: Re-excision of left breast lumpectomy site;  Surgeon: Kj Salas DO;  Location: [facility]     LUMPECTOMY BREAST Left 10/09/2019    Procedure: Left breast re-excision;  Surgeon: Kj Salas DO;  Location: [facility]     LUMPECTOMY BREAST WITH SENTINEL NODE, COMBINED Left 09/23/2019    Procedure: LUMPECTOMY, BREAST, WITH SENTINEL LYMPH NODE BIOPSY.;  Surgeon: Kj Salas DO;  Location: [facility] OR     [facility] NONSPECIFIC PROCEDURE      bladder surgery       Allergies:  Allergies   Allergen Reactions     Demerol      Sedation and  "vomiting     Meperidine      Sulfa Drugs      unknown reaction       Current Medications   Current Outpatient Medications   Medication     atorvastatin (LIPITOR) 10 MG tablet     BIOTIN MAXIMUM PO     calcium 500-125 MG-UNIT TABS     doxycycline hyclate (PERIOSTAT) 20 MG tablet     gabapentin (NEURONTIN) 100 MG capsule     levothyroxine (SYNTHROID/LEVOTHROID) 88 MCG tablet     metroNIDAZOLE (METROCREAM) 0.75 % external cream     tamoxifen (NOLVADEX) 20 MG tablet     tolterodine ER (DETROL LA) 4 MG 24 hr capsule     venlafaxine (EFFEXOR XR) 75 MG 24 hr capsule     Current Facility-Administered Medications   Medication     methylPREDNISolone (DEPO-MEDROL) injection 80 mg       Family History:  Family History   Problem Relation Age of Onset     Thyroid Disease Mother      Heart Disease Mother      Heart Disease Father      Hyperlipidemia Father      Hypertension Maternal Grandmother      Breast Cancer Maternal Grandmother      Hypertension Maternal Grandfather      Alzheimer Disease Paternal Grandmother      Diabetes Brother      Eczema Brother      Thyroid Disease Brother      Melanoma No family hx of    Mother hyperthyrodism- removed goiter, Borhter hyperthyroidism- with medication, cousin- hyperthyrodism    Social History:  Social History     Tobacco Use     Smoking status: Never     Smokeless tobacco: Never   Substance Use Topics     Alcohol use: Yes     Alcohol/week: 0.0 standard drinks     Comment: 3 drinks per month   Lives  with 2 kids. Work at [employer]).    ROS:  Full review of systems taken with the help of the intake sheet. Pertinent positives include fatigue, loss of energy, weight gain, hair loss. Otherwise a complete 14 point review of systems was taken and is negative unless stated in the history above.      Physical Exam:   Blood pressure 112/84, pulse 83, height 1.672 m (5' 5.83\"), weight 94.1 kg (207 lb 9 oz)  General: well appearing, no acute distress, pleasant and " conversant,   Mental Status/neuro: alert and oriented  Face: symmetrical, normal facial color  Eyes: anicteric, PERRL, no proptosis or lid lag  Neck: suppler, no lymphadenopahty  Thyroid: normal size and texture, no nodule palpable, no bruits  Resp: breathing normally  Legs: no swelling or edema

## 2023-02-10 NOTE — NURSING NOTE
Merlyn Ravi's goals for this visit include:   Chief Complaint   Patient presents with     Thyroid Disease     She requests these members of her care team be copied on today's visit information: No    PCP: Memo Garcia    Referring Provider:  Referred Self, MD  No address on file    /81 (BP Location: Left arm, Patient Position: Sitting, Cuff Size: Adult Large)   Pulse 76   Wt 102.7 kg (226 lb 8 oz)   SpO2 98%   BMI 36.56 kg/m      Do you need any medication refills at today's visit? Yes

## 2023-03-02 ENCOUNTER — OFFICE VISIT (OUTPATIENT)
Dept: UROLOGY | Facility: CLINIC | Age: 58
End: 2023-03-02
Payer: COMMERCIAL

## 2023-03-02 ENCOUNTER — ANCILLARY PROCEDURE (OUTPATIENT)
Dept: RADIOLOGY | Facility: AMBULATORY SURGERY CENTER | Age: 58
End: 2023-03-02
Attending: PHYSICIAN ASSISTANT
Payer: COMMERCIAL

## 2023-03-02 ENCOUNTER — PRE VISIT (OUTPATIENT)
Dept: UROLOGY | Facility: CLINIC | Age: 58
End: 2023-03-02

## 2023-03-02 ENCOUNTER — ALLIED HEALTH/NURSE VISIT (OUTPATIENT)
Dept: UROLOGY | Facility: CLINIC | Age: 58
End: 2023-03-02
Payer: COMMERCIAL

## 2023-03-02 VITALS
DIASTOLIC BLOOD PRESSURE: 83 MMHG | WEIGHT: 222 LBS | HEART RATE: 64 BPM | HEIGHT: 66 IN | SYSTOLIC BLOOD PRESSURE: 123 MMHG | BODY MASS INDEX: 35.68 KG/M2

## 2023-03-02 VITALS
SYSTOLIC BLOOD PRESSURE: 123 MMHG | WEIGHT: 222 LBS | DIASTOLIC BLOOD PRESSURE: 83 MMHG | BODY MASS INDEX: 35.68 KG/M2 | HEART RATE: 64 BPM | HEIGHT: 66 IN

## 2023-03-02 DIAGNOSIS — M79.18 MYALGIA OF PELVIC FLOOR: ICD-10-CM

## 2023-03-02 DIAGNOSIS — N94.10 DYSPAREUNIA IN FEMALE: ICD-10-CM

## 2023-03-02 DIAGNOSIS — R39.89 URINARY PROBLEM: ICD-10-CM

## 2023-03-02 DIAGNOSIS — N39.46 MIXED INCONTINENCE: Primary | ICD-10-CM

## 2023-03-02 DIAGNOSIS — C50.919 INVASIVE LOBULAR CARCINOMA OF BREAST IN FEMALE (H): ICD-10-CM

## 2023-03-02 DIAGNOSIS — N36.9 URETHRAL DISORDER: ICD-10-CM

## 2023-03-02 DIAGNOSIS — N95.2 VAGINAL ATROPHY: ICD-10-CM

## 2023-03-02 DIAGNOSIS — M62.89 HIGH-TONE PELVIC FLOOR DYSFUNCTION: ICD-10-CM

## 2023-03-02 LAB
ALBUMIN UR-MCNC: NEGATIVE MG/DL
APPEARANCE UR: CLEAR
BILIRUB UR QL STRIP: NEGATIVE
COLOR UR AUTO: YELLOW
GLUCOSE UR STRIP-MCNC: NEGATIVE MG/DL
HGB UR QL STRIP: NEGATIVE
KETONES UR STRIP-MCNC: NEGATIVE MG/DL
LEUKOCYTE ESTERASE UR QL STRIP: ABNORMAL
NITRATE UR QL: NEGATIVE
PH UR STRIP: 6 [PH] (ref 5–8)
SP GR UR STRIP: 1.02 (ref 1–1.03)
UROBILINOGEN UR STRIP-ACNC: 0.2 E.U./DL

## 2023-03-02 PROCEDURE — 51728 CYSTOMETROGRAM W/VP: CPT | Performed by: PHYSICIAN ASSISTANT

## 2023-03-02 PROCEDURE — 81003 URINALYSIS AUTO W/O SCOPE: CPT | Performed by: PHYSICIAN ASSISTANT

## 2023-03-02 PROCEDURE — 81003 URINALYSIS AUTO W/O SCOPE: CPT | Performed by: PATHOLOGY

## 2023-03-02 PROCEDURE — 51600 INJECTION FOR BLADDER X-RAY: CPT | Performed by: PHYSICIAN ASSISTANT

## 2023-03-02 PROCEDURE — 74430 CONTRAST X-RAY BLADDER: CPT | Performed by: PHYSICIAN ASSISTANT

## 2023-03-02 PROCEDURE — 51797 INTRAABDOMINAL PRESSURE TEST: CPT | Performed by: PHYSICIAN ASSISTANT

## 2023-03-02 PROCEDURE — 51798 US URINE CAPACITY MEASURE: CPT | Performed by: UROLOGY

## 2023-03-02 PROCEDURE — 51741 ELECTRO-UROFLOWMETRY FIRST: CPT | Performed by: PHYSICIAN ASSISTANT

## 2023-03-02 PROCEDURE — 51784 ANAL/URINARY MUSCLE STUDY: CPT | Performed by: PHYSICIAN ASSISTANT

## 2023-03-02 PROCEDURE — 99214 OFFICE O/P EST MOD 30 MIN: CPT | Mod: 25 | Performed by: UROLOGY

## 2023-03-02 RX ORDER — CEPHALEXIN 500 MG/1
500 CAPSULE ORAL ONCE
Status: COMPLETED | OUTPATIENT
Start: 2023-03-02 | End: 2023-03-02

## 2023-03-02 RX ORDER — IOPAMIDOL 510 MG/ML
100 INJECTION, SOLUTION INTRAVASCULAR ONCE
Status: COMPLETED | OUTPATIENT
Start: 2023-03-02 | End: 2023-03-02

## 2023-03-02 RX ORDER — IOPAMIDOL 510 MG/ML
100 INJECTION, SOLUTION INTRAVASCULAR ONCE
Status: DISCONTINUED | OUTPATIENT
Start: 2023-03-02 | End: 2023-03-02

## 2023-03-02 RX ADMIN — CEPHALEXIN 500 MG: 500 CAPSULE ORAL at 11:30

## 2023-03-02 RX ADMIN — IOPAMIDOL 100 ML: 510 INJECTION, SOLUTION INTRAVASCULAR at 11:30

## 2023-03-02 ASSESSMENT — PAIN SCALES - GENERAL
PAINLEVEL: NO PAIN (0)
PAINLEVEL: NO PAIN (0)

## 2023-03-02 NOTE — NURSING NOTE
"Chief Complaint   Patient presents with     Incontinence       Blood pressure 123/83, pulse 64, height 1.676 m (5' 6\"), weight 100.7 kg (222 lb), not currently breastfeeding. Body mass index is 35.83 kg/m .    Patient Active Problem List   Diagnosis     CARDIOVASCULAR SCREENING; LDL GOAL LESS THAN 160     LASIK OU 5 yrs     Dermatofibroma     Monoclonal paraproteinemia     Hyperlipidemia LDL goal <130     Obesity (BMI 35.0-39.9) with comorbidity (H)     Other specified hypothyroidism     Invasive lobular carcinoma of breast in female (H)     Major depressive disorder, recurrent episode, mild (H)     Effusion of left knee     Bucket handle tear of lateral meniscus of left knee, unspecified whether old or current tear, subsequent encounter     PONV (postoperative nausea and vomiting)       Allergies   Allergen Reactions     Demerol      Sedation and vomiting     Meperidine      Sulfa Drugs      unknown reaction       Current Outpatient Medications   Medication Sig Dispense Refill     atorvastatin (LIPITOR) 10 MG tablet TAKE 1 TABLET(10 MG) BY MOUTH DAILY 90 tablet 0     BIOTIN MAXIMUM PO Take by mouth daily       calcium 500-125 MG-UNIT TABS Take 2 tablets by mouth 2 times daily       doxycycline hyclate (PERIOSTAT) 20 MG tablet Take 2 tablets (40 mg) by mouth daily 180 tablet 3     gabapentin (NEURONTIN) 100 MG capsule TAKE 1 CAPSULE BY MOUTH EVERY MORNING AND TAKE 2 CAPSULES EVERY EVENING. 90 capsule 3     levothyroxine (SYNTHROID/LEVOTHROID) 88 MCG tablet Take 1 tablet (88 mcg) by mouth daily 90 tablet 1     metroNIDAZOLE (METROCREAM) 0.75 % external cream Apply topically 2 times daily 45 g 3     tamoxifen (NOLVADEX) 20 MG tablet Take 1 tablet (20 mg) by mouth daily 90 tablet 3     tolterodine ER (DETROL LA) 4 MG 24 hr capsule Take 1 capsule (4 mg) by mouth daily 90 capsule 1     venlafaxine (EFFEXOR XR) 75 MG 24 hr capsule TAKE 1 CAPSULE(75 MG) BY MOUTH DAILY 90 capsule 3       Social History     Tobacco Use     " Smoking status: Never     Smokeless tobacco: Never   Vaping Use     Vaping Use: Never used   Substance Use Topics     Alcohol use: Yes     Alcohol/week: 0.0 standard drinks     Comment: 3 drinks per month     Drug use: No       Faniessence Lee  3/2/2023  8:13 AM

## 2023-03-02 NOTE — NURSING NOTE
"  Chief Complaint   Patient presents with     Urodynamics Study     Mixed urinary incontinence       Blood pressure 123/83, pulse 64, height 1.676 m (5' 6\"), weight 100.7 kg (222 lb), not currently breastfeeding. Body mass index is 35.83 kg/m .    Patient Active Problem List   Diagnosis     CARDIOVASCULAR SCREENING; LDL GOAL LESS THAN 160     LASIK OU 5 yrs     Dermatofibroma     Monoclonal paraproteinemia     Hyperlipidemia LDL goal <130     Obesity (BMI 35.0-39.9) with comorbidity (H)     Other specified hypothyroidism     Invasive lobular carcinoma of breast in female (H)     Major depressive disorder, recurrent episode, mild (H)     Effusion of left knee     Bucket handle tear of lateral meniscus of left knee, unspecified whether old or current tear, subsequent encounter     PONV (postoperative nausea and vomiting)     Malignant neoplasm of female breast (H)     Symptomatic menopausal or female climacteric states     Hyperlipidemia     Monoclonal gammopathy       Allergies   Allergen Reactions     Demerol      Sedation and vomiting     Meperidine      Sulfa Drugs      unknown reaction       Current Outpatient Medications   Medication Sig Dispense Refill     atorvastatin (LIPITOR) 10 MG tablet TAKE 1 TABLET(10 MG) BY MOUTH DAILY 90 tablet 0     BIOTIN MAXIMUM PO Take by mouth daily       calcium 500-125 MG-UNIT TABS Take 2 tablets by mouth 2 times daily       doxycycline hyclate (PERIOSTAT) 20 MG tablet Take 2 tablets (40 mg) by mouth daily 180 tablet 3     gabapentin (NEURONTIN) 100 MG capsule TAKE 1 CAPSULE BY MOUTH EVERY MORNING AND TAKE 2 CAPSULES EVERY EVENING. 90 capsule 3     levothyroxine (SYNTHROID/LEVOTHROID) 88 MCG tablet Take 1 tablet (88 mcg) by mouth daily 90 tablet 1     metroNIDAZOLE (METROCREAM) 0.75 % external cream Apply topically 2 times daily 45 g 3     tamoxifen (NOLVADEX) 20 MG tablet Take 1 tablet (20 mg) by mouth daily 90 tablet 3     tolterodine ER (DETROL LA) 4 MG 24 hr capsule Take 1 " capsule (4 mg) by mouth daily 90 capsule 1     venlafaxine (EFFEXOR XR) 75 MG 24 hr capsule TAKE 1 CAPSULE(75 MG) BY MOUTH DAILY 90 capsule 3       Social History     Tobacco Use     Smoking status: Never     Smokeless tobacco: Never   Vaping Use     Vaping Use: Never used   Substance Use Topics     Alcohol use: Yes     Alcohol/week: 0.0 standard drinks     Comment: 3 drinks per month     Drug use: No       Invasive Procedure Safety Checklist:    Procedure: Urodynamics    Action: Complete sections and checkboxes as appropriate.    Pre-procedure:    1. Patient ID Verified with 2 identifiers (Yojana and  or MRN) : YES    2. Procedure and site verified with patient/designee (when able) : YES    3. Accurate consent documentation in medical record : YES    4. H&P (or appropriate assessment) documented in medical record : N/A    H&P must be up to 30 days prior to procedure an updated within 24 hours of Procedure as applicable.     5. Relevant diagnostic and radiology test results appropriately labeled and displayed as applicable : YES    6. Blood products, implants, devices, and/or special equipment available for the procedure as applicable : YES    7. Procedure site(s) marked with provider initials [Exclusions: none] : NO    8. Marking not required. Reason : Yes  Procedure does not require site marking    Time Out:     Time-Out performed immediately prior to starting procedure, including verbal and active participation of all team members addressing: YES    1. Correct patient identity.  2. Confirmed that the correct side and site are marked.  3. An accurate procedure to be done.  4. Agreement on the procedure to be done.  5. Correct patient position.  6. Relevant images and results are properly labeled and appropriately displayed.  7. The need to administer antibiotics or fluids for irrigation purposes during the procedure as applicable.  8. Safety precautions based on patient history or medication use.    During  Procedure: Verification of correct person, site, and procedure occurs any time the responsibility for care of the patient is transferred to another member of the care team.          The following medication was given:     MEDICATION:  Keflex (Cefalexin)  ROUTE: PO  SITE: Medication was given orally  DOSE: 500mg  LOT #: [id number]  : [employer]    EXPIRATION DATE: 09/2024  NDC#: [id number]      The following medication was given:     MEDICATION:  Isovue-370  ROUTE:  Provider Administered  SITE: Provider Administered  DOSE: 100 mL  LOT #: [id number]  : [employer]  EXPIRATION DATE: 7/31/2025  NDC#: 5254-8792-14  Was there drug waste? No            Luz Elena Contreras CMA  3/2/2023  10:13 AM

## 2023-03-02 NOTE — LETTER
3/2/2023       RE: Merlyn Ravi  [address] [facility] [address]     Dear Colleague,    Thank you for referring your patient, Merlyn Ravi, to the [facility] [place] at [facility]. Please see a copy of my visit note below.    March 2, 2023    Merlyn was seen today for incontinence.    Diagnoses and all orders for this visit:    Mixed incontinence  -     POST-VOID RESIDUAL BLADDER SCAN  -     MR Pelvis (GYN) wo & w Contrast; Future    Urethral disorder  -     MR Pelvis (GYN) wo & w Contrast; Future    Vaginal atrophy  -     MR Pelvis (GYN) wo & w Contrast; Future    High-tone pelvic floor dysfunction    Myalgia of pelvic floor    Dyspareunia in female    Invasive lobular carcinoma of breast in female (H)    Other orders  -     Clinitek Urine Macroscopic POCT      We discussed the etiologies of stress and urge incontinence and how they differ. We discussed that the options of treating stress incontinence to include observation, weight loss, pelvic floor physical therapy, incontinence pessary, urethral bulking agents, and surgical correction most commonly with midurethral sling or autologous rectus fascial sling. We then discussed that urge incontinence treatments include observation, weight loss, medications most commonly anticholinergics, physical therapy, biofeedback, intravesical botulinum toxin, percutaneous tibial nerve stimulation and sacral neuromodulation.     We discussed how her pelvic floor symptoms are related to the physical exam findings and her pelvic floor myofascial dysfunction.  We discussed how the recommended treatment is dedicated pelvic floor therapy.  We discussed how the pelvic floor physical therapy works and patient is agreeable.  Referral was placed.      Also given the question of urethral fullness and her symptoms I have asked her to get a MRI to assess for diverticulum    As long as MRI  "normal she will plan to return in 6 months, sooner if needed    25 minutes were spent today on the day of the encounter in reviewing the EMR including interpretation of UDS, direct patient care including ordering MRI, coordination of care and documentation    Trina Gates MD MPH  (she/her/hers)   of Urology  [facility]      Subjective    Here today for follow up.  She saw Екатерина Rose for mixed urinary incontinence.  Patient has a history of hysterectomy for endometriosis, breast cancer s/p surgery.  Denies gross hematuria, UTI or vaginal bulge.  Also has a lot of dyspareunia.  She denies any changes in health since last visit    /83   Pulse 64   Ht 1.676 m (5' 6\")   Wt 100.7 kg (222 lb)   BMI 35.83 kg/m    GENERAL: healthy, alert and no distress  EYES: Eyes grossly normal to inspection, conjunctivae and sclerae normal  HENT: normal cephalic/atraumatic.  External ears, nose and mouth without ulcers or lesions.  RESP: no audible wheeze, cough, or visible cyanosis.  No visible retractions or increased work of breathing.  Able to speak fully in complete sentences.  NEURO: Cranial nerves grossly intact, mentation intact and speech normal  PSYCH: mentation appears normal, affect normal/bright, judgement and insight intact, normal speech and appearance well-groomed  ABD: soft, nontender, nondistended, no organomegaly  : Normal external female genitalia with atrophy noted.  Speculum and bimanual exam are remarkable for high tone pelvic floor with diffuse myofascial tenderness.  She has some fullness and tenderness along the urethra    Urodynamics reviewed by me.  On my interpretation she has a some early bladder sensations but normal [facility] of 410 mL with good compliance, no DO/DOI.  USI at [facility].  On voiding she has Qmax 15 ml/s with Pdet max 25 cm H2O with some valsalva, but voids to completion with PVR 0mL    CC  Patient Care Team:  Memo Garcia MD as PCP - General " (Family Practice)  Memo Shaikh MD as Assigned PCP  Tanvi Blakely MD as MD (Hematology & Oncology)  Gala Pop MD as MD (INTERNAL MEDICINE - ENDOCRINOLOGY, DIABETES & METABOLISM)  Donnie Nuno MD as MD (Radiation Oncology)  Bailey Melendez APRN CNP as Nurse Practitioner (Nurse Practitioner)  Sury Ramos RN as Specialty Care Coordinator (Hematology & Oncology)  Kj Salas DO as Assigned Surgical Provider  Tez Bui MD as MD (Dermatology)  Radha Hill PA-C as Physician Assistant (Dermatology)  Wali Castro MD as Assigned Cancer Care Provider  Timo Berman MD as Assigned Musculoskeletal Provider  Gala Pop MD as Assigned Endocrinology Provider  MEMO SHAIKH

## 2023-03-02 NOTE — PROGRESS NOTES
PREPROCEDURE DIAGNOSES:    1. Mixed urinary incontinence    POSTPROCEDURE DIAGNOSES:  -Maximum cystometric capacity 410 mL with heightened filling sensations.  -Good bladder compliance without detrusor overactivity.   -Stress incontinence with ALPP 172 cm H2O at capacity.   -Maximum detrusor contraction during voiding reaches 25 cm H2O which she augments with mild to moderate Valsalva effort. She voids 411 mL with Qmax 15 ml/s, significantly increased EMG activity, complete bladder emptying (PVR 0 mL), and no evidence for bladder outlet obstruction.   -Fluoroscopy reveals a smooth-walled bladder without diverticuli or VUR. The bladder neck is closed during filling; voiding images unavailable as patient transferred to [facility] to void.    PROCEDURE:    1. Uroflowmetry.  2. Sterile urethral catheterization for measurement of postvoid residual urine volume.  3. Complex filling cystometrogram with measurement of bladder and rectal pressures.  4. Complex voiding cystometrogram with measurement of bladder and rectal pressures.  5. Electromyography of the pelvic floor during urodynamics.  6. Fluoroscopic imaging of the bladder during urodynamics, at least 3 views.    7. Interpretation of urodynamics and flouroscopic imaging.      INDICATIONS FOR PROCEDURE:  Ms. Merlyn Ravi is a pleasant 58 year old female with mixed urinary incontinence. Baseline video urodynamic assessment is requested today by Dr. Gates to better characterize Ms. Merlyn Ravi's voiding dysfunction.      VOIDING DIARY:  Did not complete (patient added on same day).    DESCRIPTION OF PROCEDURE:  Risks, benefits, and alternatives to urodynamics were discussed with the patient and she wished to proceed.  Urodynamics are planned to better assess the primary etiology for Ms. Ravi's urologic dysfunction.  The patient last took anticholinergic medication within the last 12 hours.  After informed consent was obtained, the patient was taken to the  procedure room where uroflowmetry was performed. Findings below.     PRE-STUDY UROFLOWMETRY:  Voided volume: 400 mL.  Flow machine malfunctioned so flow data inaccurate.  Postvoid residual by catheter: 20 mL.  Pretest urine dipstick was negative for nitrites, positive for trace leukocytes. This was a voided sample obtained at her visit with Dr. Gates prior to the study. She denies any UTI symptoms today.    Next a 7F double-lumen urodynamics catheter was inserted into the bladder under sterile technique via urethra.  A 7F abdominal manometry catheter was placed in the vagina.  EMG pads were placed on both sides of the anal verge.  The bladder was filled with 100 mL of Isovue-370 at 40 mL/minute and serial pressures were recorded.  With coughing there was an appropriate rise in vesical and abdominal pressures with no change in detrusor pressure, confirming good study catheter placement.    DURING THE FILLING PHASE:  First sensation: 26 mL.  First Desire: 41 mL.  Strong Desire: 107 mL.  Maximum Capacity: filled to 376 mL; true prison 411 mL based on final voided volume + PVR..    Uninhibited detrusor contractions: none.  Compliance: good. PDet low throughout filling.  Continence: stress leak at Pabd 172 cm H2O at capacity.  EMG: concordant during filling.    DURING THE VOIDING PHASE:  Maximum detrusor contraction with void: 25 cm of H2O pressure, which she augments with mild to moderate Valsalva.  Voided volume: 411 mL.  Maximum flow rate: 15 mL/sec.  Average flow rate: 7.5 mL/sec.  Postvoid Residual: 0 mL.  EMG activity: increased.  Character of voiding curve: continuous, slightly fluctuating.  BOOI: -13.5 (suggesting no obstruction - see key below)  [obstructed (AUGUSTIN index [BOOI] ? 40); equivocal (no definite   obstruction; BOOI 20-40); and no obstruction (BOOI ? 20)]    FLUOROSCOPIC IMAGING OF THE BLADDER DURING URODYNAMICS:  Please note, image numbers on UDS tracings correlate with iSite series numbers on PACS images.  Fluoroscopy during today's procedure demonstrated a smooth bladder wall without diverticulae or cellules.  No vesicoureteral reflux was observed.  The bladder neck was closed during filling; voiding images unavailable as patient transferred to [facility] to void.  After voiding to completion, all catheters were removed.    ASSESSMENT/PLAN:  Ms. Merlyn Ravi is a pleasant 58 year old female with mixed urinary incontinence who demonstrated the following findings today on urodynamic evaluation:    -Maximum cystometric capacity 410 mL with heightened filling sensations.  -Good bladder compliance without detrusor overactivity.   -Stress incontinence with ALPP 172 cm H2O at capacity.   -Maximum detrusor contraction during voiding reaches 25 cm H2O which she augments with mild to moderate Valsalva effort. She voids 411 mL with Qmax 15 ml/s, significantly increased EMG activity, complete bladder emptying (PVR 0 mL), and no evidence for bladder outlet obstruction.   -Fluoroscopy reveals a smooth-walled bladder without diverticuli or VUR. The bladder neck is closed during filling; voiding images unavailable as patient transferred to [facility] to void.    The patient will follow up with Dr. Gates to further discuss today's study results and make plans for how best to proceed.      - A single cephalexin antibiotic was provided for UTI prophylaxis following completion of today's study per department protocol.  The risk of UTI with VUDS is low at ~2.5-3%.      Thank you for allowing me to participate in the care of Ms. Merlyn Ravi and please don't hesitate to contact me with any questions or concerns.      Gloria Gurrola PA-C  Urology Physician Assistant

## 2023-03-02 NOTE — PATIENT INSTRUCTIONS
Websites with free information:    [employer] patient website: www.voicesforpfd.org    Total Control Program: www.totalcontrolprogram.com    Please see one of the dedicated pelvic floor physical therapist. If you have not heard from the scheduling office within 2 business days, please call [phone] for [employer]    Please return to see me in 6 months, sooner if needed    It was a pleasure meeting with you today.  Thank you for allowing me and my team the privilege of caring for you today.  YOU are the reason we are here, and I truly hope we provided you with the excellent service you deserve.  Please let us know if there is anything else we can do for you so that we can be sure you are leaving completely satisfied with your care experience.

## 2023-03-02 NOTE — PROGRESS NOTES
March 2, 2023    Merlyn was seen today for incontinence.    Diagnoses and all orders for this visit:    Mixed incontinence  -     POST-VOID RESIDUAL BLADDER SCAN  -     MR Pelvis (GYN) wo & w Contrast; Future    Urethral disorder  -     MR Pelvis (GYN) wo & w Contrast; Future    Vaginal atrophy  -     MR Pelvis (GYN) wo & w Contrast; Future    High-tone pelvic floor dysfunction    Myalgia of pelvic floor    Dyspareunia in female    Invasive lobular carcinoma of breast in female (H)    Other orders  -     Clinitek Urine Macroscopic POCT      We discussed the etiologies of stress and urge incontinence and how they differ. We discussed that the options of treating stress incontinence to include observation, weight loss, pelvic floor physical therapy, incontinence pessary, urethral bulking agents, and surgical correction most commonly with midurethral sling or autologous rectus fascial sling. We then discussed that urge incontinence treatments include observation, weight loss, medications most commonly anticholinergics, physical therapy, biofeedback, intravesical botulinum toxin, percutaneous tibial nerve stimulation and sacral neuromodulation.     We discussed how her pelvic floor symptoms are related to the physical exam findings and her pelvic floor myofascial dysfunction.  We discussed how the recommended treatment is dedicated pelvic floor therapy.  We discussed how the pelvic floor physical therapy works and patient is agreeable.  Referral was placed.      Also given the question of urethral fullness and her symptoms I have asked her to get a MRI to assess for diverticulum    As long as MRI normal she will plan to return in 6 months, sooner if needed    25 minutes were spent today on the day of the encounter in reviewing the EMR including interpretation of UDS, direct patient care including ordering MRI, coordination of care and documentation    Trina Gates MD MPH  (she/her/hers)   of  "Urology  [facility]      Subjective    Here today for follow up.  She saw Екатерина Rose for mixed urinary incontinence.  Patient has a history of hysterectomy for endometriosis, breast cancer s/p surgery.  Denies gross hematuria, UTI or vaginal bulge.  Also has a lot of dyspareunia.  She denies any changes in health since last visit    /83   Pulse 64   Ht 1.676 m (5' 6\")   Wt 100.7 kg (222 lb)   BMI 35.83 kg/m    GENERAL: healthy, alert and no distress  EYES: Eyes grossly normal to inspection, conjunctivae and sclerae normal  HENT: normal cephalic/atraumatic.  External ears, nose and mouth without ulcers or lesions.  RESP: no audible wheeze, cough, or visible cyanosis.  No visible retractions or increased work of breathing.  Able to speak fully in complete sentences.  NEURO: Cranial nerves grossly intact, mentation intact and speech normal  PSYCH: mentation appears normal, affect normal/bright, judgement and insight intact, normal speech and appearance well-groomed  ABD: soft, nontender, nondistended, no organomegaly  : Normal external female genitalia with atrophy noted.  Speculum and bimanual exam are remarkable for high tone pelvic floor with diffuse myofascial tenderness.  She has some fullness and tenderness along the urethra    Urodynamics reviewed by me.  On my interpretation she has a some early bladder sensations but normal [facility] of 410 mL with good compliance, no DO/DOI.  [facility] at [facility].  On voiding she has Qmax 15 ml/s with Pdet max 25 cm H2O with some valsalva, but voids to completion with PVR 0mL    CC  Patient Care Team:  Memo Garcia MD as PCP - General (Family Practice)  Memo Garcia MD as Assigned PCP  Tanvi Blakely MD as MD (Hematology & Oncology)  Gala Pop MD as MD (INTERNAL MEDICINE - ENDOCRINOLOGY, DIABETES & METABOLISM)  Donnie Nuno MD as MD (Radiation Oncology)  Bailey Melendez APRN CNP as Nurse Practitioner (Nurse " Practitioner)  Sury Ramos, RN as Specialty Care Coordinator (Hematology & Oncology)  Kj Salas DO as Assigned Surgical Provider  Tez Bui MD as MD (Dermatology)  Radha Hill PA-C as Physician Assistant (Dermatology)  Wali Castro MD as Assigned Cancer Care Provider  Timo Bemran MD as Assigned Musculoskeletal Provider  Gala Pop MD as Assigned Endocrinology Provider  ERIC SHAIKH

## 2023-03-03 NOTE — PROGRESS NOTES
Chief Complaint   Patient presents with     Left Knee - Surgical Followup     DOS 07-14-22 last injection 10-03-22 states it only lasts a couple of months        SURGERY:   1.  Left knee arthroscopic partial lateral meniscectomy.  2.  Left knee arthroscopic medial plica resection.  3.  Left knee arthroscopic shaving chondroplasty of the patella, lateral femoral condyle, lateral tibia.  4.  Left knee arthroscopic loose body removal, multiple.  DATE OF SURGERY: 7/14/2022.      HISTORY OF PRESENT ILLNESS:  Merlyn Ravi is a 58 year old female seen for left knee pain. Last seen 10/3/2022, had a cortisone injection for pain, osteoarthritis. She is status post  left knee arthroscopy 7/14/2022. She states the injection helped only a couple of months. Continues with have pain mostly along the outer (lateral) aspect of the knee and some in front of the knee as the pain wraps around the front of the knee. Will get pain shooting down the lateral leg to the ankle at times as well . The knee swells as the day goes on. Pain wakes her at night. If sits too long the knee gets stiff. Worse with stairs, sit to stand, activities.    She remains active with dance once a week, going on walks, stretching and home exercise program.    She'd sone therapy in the past since surgery.    Takes gabapentin, tylenol, advil/motrin, ice, CBD topicals.    OR FINDINGS:  Small effusion.  Multiple articular loose bodies within the suprapatellar pouch, medial and lateral gutters as well as the articular surfaces.  Diffuse suprapatellar synovitis.  Grade 4 chondrosis of the lateral patellar facet, grade 3 fissure at the apex, grade 2, medial patellar facet.  Grade 2 femoral trochlear chondrosis.  Intact ACL within the notch.  Hypertrophic fat pad.  Diffuse synovitis within the medial and lateral gutters.  Medial compartment with intact medial meniscus.  Grade 2 chondrosis.  Lateral compartment with complex tearing of the anterior horn of the lateral  meniscus with a displaced flap anteriorly to the anterior recess and into the notch.  Grade 4 chondrosis of a significant portion of the lateral femoral condyle and grade 3/4 chondrosis of the lateral tibia.    Past Medical History:   Diagnosis Date     Allergies      Breast cancer (H)      Dermatofibroma 05/14/2012     Need for prophylactic hormone replacement therapy (postmenopausal)      PONV (postoperative nausea and vomiting) 7/7/2022       Past Surgical History:   Procedure Laterality Date     ARTHROSCOPY KNEE Left 7/14/2022    Procedure: ARTHROSCOPY, LEFT KNEE with meniscal and chondral debridement;  Surgeon: Timo Berman MD;  Location: [facility]     BIOPSY BREAST       HYSTERECTOMY, PAP NO LONGER INDICATED       HYSTERECTOMY, MAGY       LASIK BILATERAL  01/01/2005    Dr. Solis      LUMPECTOMY BREAST Left 09/30/2019    Procedure: Re-excision of left breast lumpectomy site;  Surgeon: Kj Salas DO;  Location: [facility]     LUMPECTOMY BREAST Left 10/09/2019    Procedure: Left breast re-excision;  Surgeon: Kj Salas DO;  Location: [facility]     LUMPECTOMY BREAST WITH SENTINEL NODE, COMBINED Left 09/23/2019    Procedure: LUMPECTOMY, BREAST, WITH SENTINEL LYMPH NODE BIOPSY.;  Surgeon: Kj Salas DO;  Location: [facility]     [facility] [[email or web address] NONSPECIFIC PROCEDURE      bladder surgery       Medications:   Current Outpatient Medications:      atorvastatin (LIPITOR) 10 MG tablet, TAKE 1 TABLET(10 MG) BY MOUTH DAILY, Disp: 90 tablet, Rfl: 0     BIOTIN MAXIMUM PO, Take by mouth daily, Disp: , Rfl:      calcium 500-125 MG-UNIT TABS, Take 2 tablets by mouth 2 times daily, Disp: , Rfl:      doxycycline hyclate (PERIOSTAT) 20 MG tablet, Take 2 tablets (40 mg) by mouth daily, Disp: 180 tablet, Rfl: 3     gabapentin (NEURONTIN) 100 MG capsule, TAKE 1 CAPSULE BY MOUTH EVERY MORNING AND TAKE 2 CAPSULES EVERY EVENING., Disp: 90 capsule, Rfl: 3     levothyroxine (SYNTHROID/LEVOTHROID) 88 MCG tablet, Take 1  "tablet (88 mcg) by mouth daily, Disp: 90 tablet, Rfl: 1     metroNIDAZOLE (METROCREAM) 0.75 % external cream, Apply topically 2 times daily, Disp: 45 g, Rfl: 3     tamoxifen (NOLVADEX) 20 MG tablet, Take 1 tablet (20 mg) by mouth daily, Disp: 90 tablet, Rfl: 3     tolterodine ER (DETROL LA) 4 MG 24 hr capsule, Take 1 capsule (4 mg) by mouth daily, Disp: 90 capsule, Rfl: 1     venlafaxine (EFFEXOR XR) 75 MG 24 hr capsule, TAKE 1 CAPSULE(75 MG) BY MOUTH DAILY, Disp: 90 capsule, Rfl: 3    Current Facility-Administered Medications:      methylPREDNISolone (DEPO-MEDROL) injection 80 mg, 80 mg, , , Timo Berman MD, 80 mg at 10/03/22 1334    Allergies:   Allergies   Allergen Reactions     Demerol      Sedation and vomiting     Meperidine      Sulfa Drugs      unknown reaction       REVIEW OF SYSTEMS:   CONSTITUTIONAL:NEGATIVE for fever, chills, night sweats  INTEGUMENTARY/SKIN: NEGATIVE for worrisome wound problems or redness.  MUSCULOSKELETAL:See HPI above  NEURO: NEGATIVE for weakness, dizziness or paresthesias    PHYSICAL EXAM:  /78 (BP Location: Right arm, Patient Position: Sitting, Cuff Size: Adult Regular)   Pulse 81   Ht 1.676 m (5' 6\")   Wt 100.7 kg (222 lb)   SpO2 100%   BMI 35.83 kg/m     GENERAL APPEARANCE: healthy, alert, no distress  SKIN: no suspicious lesions or rashes  NEURO: Normal strength and tone, mentation intact and speech normal  PSYCH:  mentation appears normal and affect normal, not anxious  RESPIRATORY: No increased work of breathing.    left  LOWER EXTREMITY:  Gait: subtle favors the left.  No Quad atrophy, strength normal.  Intact sensation deep peroneal nerve, superficial peroneal nerve, med/lat tibial nerve, sural nerve, saphenous nerve  Intact EHL, EDL, TA, FHL, GS, quadriceps hamstrings and hip flexors  Toes warm and well perfused, brisk capillary refill. Palpable 2+ dp pulses.  calf soft and nttp or squeeze.  Edema: trace    left  KNEE EXAM:    Skin: intact, no ecchymosis " "or erythema  Incisions: well healed. Dry. No erythema.  ROM: full extension to 120 flexion  Effusion: trace to small  Tender: lateral joint line  Positive patello-femoral crepitus and grind.      X-RAY: none indicated.      Impression: Merlyn Ravi is a 58 year old female with chronic left knee pain, advanced primary osteoarthritis.      Plan: routine postoperative knee arthroscopy  * reviewed imaging studies and arthroscopy images with patient, showing arthritic changes, or wearing of the cartilage in the knee. This can be caused by normal \"wear and tear\" over the years or following prior injury to the knee. Most was lateral knee and under the kneecap.  * we discused pre and postoperative that pain related to underlying osteoarthritis is likely continue, as arthroscopy is not expected or intended to alleviate osteoarthritis symptoms, perhaps worsen them. And again at time of arthroscopy most degenerative changes noted lateral where her current pain is.  * unclear of pain shooting down the side of the leg.      Non-surgical treatment for knee arthritis includes:    * rest, sitting  * Activity modification - avoid impact activities or activities that aggravate symptoms.  * NSAIDS (non-steroidal anti-inflammatory medications; e.g. Aleve, advil, motrin, ibuprofen) - regular use for inflammation ( twice daily or three times daily), with food, as long as no contra-indications Please discuss with primary care doctor if needed  * ice, 15-20 minutes at a time several times a day or as needed.  * Strengthening of quadriceps muscles  * Physical Therapy for strengthening, stretching and range of motion exercises of legs  * Tylenol as needed for pain, consider Tylenol arthritis or similar  * Weight loss: Weight loss:  Body mass index is 35.83 kg/m .. weight loss benefits, not only for the current pain symptoms, but also overall health. Recommend a good diet plan that works for the patient, with the assistance of a dietician " "or primary care doctor as needed. Also, a good, low-impact exercise program for at least 20 minutes per day, 3 times per week, such as exercise bike, elliptical , or pool.  * Exercise: low impact such as stationary bike, elliptical, pool.  * Injections: cortisone versus viscosupplementation (hyaluronic acid, \"rooster comb\", \"gel shots\"); risks and perceived benefits discussed today. Patient elects  to proceed. Will try kenalog today instead of depomedrol.  * consider visco in future, will need preauthorization from insurance first.  * Bracing: bracing the knee may offer some relief of symptoms when worn and provide some stability.  * over the counter supplements such as glucosamine and chondroitin sulfate may help with joint pain.  * topical ointments may help as well    * recommend stopping every couple of hours during her road trip to get up and stretch out, as well as daily aspirin for blood clot prevention.    * return to clinic as needed.          Timo Berman M.D., M.S.  Dept. of Orthopaedic Surgery  [facility]    Large Joint Injection/Arthocentesis: L knee joint    Date/Time: 3/6/2023 3:56 PM  Performed by: Bryn Johnson PA  Authorized by: Timo Berman MD     Indications:  Pain and osteoarthritis  Needle Size:  22 G  Guidance: landmark guided    Approach:  Anteromedial  Location:  Knee      Medications:  4 mL bupivacaine 0.25 %; 40 mg triamcinolone 40 MG/ML  Outcome:  Tolerated well, no immediate complications  Procedure discussed: discussed risks, benefits, and alternatives    Consent Given by:  Patient  Prep: patient was prepped and draped in usual sterile fashion          "

## 2023-03-06 ENCOUNTER — OFFICE VISIT (OUTPATIENT)
Dept: ORTHOPEDICS | Facility: CLINIC | Age: 58
End: 2023-03-06
Payer: COMMERCIAL

## 2023-03-06 ENCOUNTER — ANCILLARY PROCEDURE (OUTPATIENT)
Dept: GENERAL RADIOLOGY | Facility: CLINIC | Age: 58
End: 2023-03-06
Attending: ORTHOPAEDIC SURGERY
Payer: COMMERCIAL

## 2023-03-06 ENCOUNTER — TELEPHONE (OUTPATIENT)
Dept: UROLOGY | Facility: CLINIC | Age: 58
End: 2023-03-06

## 2023-03-06 VITALS
SYSTOLIC BLOOD PRESSURE: 122 MMHG | BODY MASS INDEX: 35.68 KG/M2 | DIASTOLIC BLOOD PRESSURE: 78 MMHG | HEIGHT: 66 IN | HEART RATE: 81 BPM | OXYGEN SATURATION: 100 % | WEIGHT: 222 LBS

## 2023-03-06 DIAGNOSIS — G89.29 CHRONIC PAIN OF LEFT KNEE: ICD-10-CM

## 2023-03-06 DIAGNOSIS — M17.12 PRIMARY OSTEOARTHRITIS OF LEFT KNEE: Primary | ICD-10-CM

## 2023-03-06 DIAGNOSIS — M17.12 PRIMARY OSTEOARTHRITIS OF LEFT KNEE: ICD-10-CM

## 2023-03-06 DIAGNOSIS — M25.562 CHRONIC PAIN OF LEFT KNEE: ICD-10-CM

## 2023-03-06 PROCEDURE — 20610 DRAIN/INJ JOINT/BURSA W/O US: CPT | Mod: LT | Performed by: ORTHOPAEDIC SURGERY

## 2023-03-06 PROCEDURE — 73562 X-RAY EXAM OF KNEE 3: CPT | Mod: TC | Performed by: RADIOLOGY

## 2023-03-06 PROCEDURE — 99214 OFFICE O/P EST MOD 30 MIN: CPT | Mod: 25 | Performed by: ORTHOPAEDIC SURGERY

## 2023-03-06 RX ORDER — TRIAMCINOLONE ACETONIDE 40 MG/ML
40 INJECTION, SUSPENSION INTRA-ARTICULAR; INTRAMUSCULAR
Status: DISCONTINUED | OUTPATIENT
Start: 2023-03-06 | End: 2024-03-27

## 2023-03-06 RX ORDER — BUPIVACAINE HYDROCHLORIDE 2.5 MG/ML
4 INJECTION, SOLUTION INFILTRATION; PERINEURAL
Status: DISCONTINUED | OUTPATIENT
Start: 2023-03-06 | End: 2024-03-27

## 2023-03-06 RX ADMIN — TRIAMCINOLONE ACETONIDE 40 MG: 40 INJECTION, SUSPENSION INTRA-ARTICULAR; INTRAMUSCULAR at 15:56

## 2023-03-06 RX ADMIN — BUPIVACAINE HYDROCHLORIDE 4 ML: 2.5 INJECTION, SOLUTION INFILTRATION; PERINEURAL at 15:56

## 2023-03-06 ASSESSMENT — KOOS JR
TWISING OR PIVOTING ON KNEE: SEVERE
RISING FROM SITTING: SEVERE
STANDING UPRIGHT: MILD
HOW SEVERE IS YOUR KNEE STIFFNESS AFTER FIRST WAKING IN MORNING: SEVERE
KOOS JR SCORING: 47.49
GOING UP OR DOWN STAIRS: MODERATE
STRAIGHTENING KNEE FULLY: MILD
BENDING TO THE FLOOR TO PICK UP OBJECT: SEVERE

## 2023-03-06 ASSESSMENT — PAIN SCALES - GENERAL: PAINLEVEL: MILD PAIN (3)

## 2023-03-06 NOTE — LETTER
3/6/2023         RE: Merlyn Ravi  [id number] [facility] [id number]        Dear Colleague,    Thank you for referring your patient, Merlyn Ravi, to the [facility]. Please see a copy of my visit note below.    Chief Complaint   Patient presents with     Left Knee - Surgical Followup     DOS 07-14-22 last injection 10-03-22 states it only lasts a couple of months        SURGERY:   1.  Left knee arthroscopic partial lateral meniscectomy.  2.  Left knee arthroscopic medial plica resection.  3.  Left knee arthroscopic shaving chondroplasty of the patella, lateral femoral condyle, lateral tibia.  4.  Left knee arthroscopic loose body removal, multiple.  DATE OF SURGERY: 7/14/2022.      HISTORY OF PRESENT ILLNESS:  Merlyn Ravi is a 58 year old female seen for left knee pain. Last seen 10/3/2022, had a cortisone injection for pain, osteoarthritis. She is status post  left knee arthroscopy 7/14/2022. She states the injection helped only a couple of months. Continues with have pain mostly along the outer (lateral) aspect of the knee and some in front of the knee as the pain wraps around the front of the knee. Will get pain shooting down the lateral leg to the ankle at times as well . The knee swells as the day goes on. Pain wakes her at night. If sits too long the knee gets stiff. Worse with stairs, sit to stand, activities.    She remains active with dance once a week, going on walks, stretching and home exercise program.    She'd sone therapy in the past since surgery.    Takes gabapentin, tylenol, advil/motrin, ice, CBD topicals.    OR FINDINGS:  Small effusion.  Multiple articular loose bodies within the suprapatellar pouch, medial and lateral gutters as well as the articular surfaces.  Diffuse suprapatellar synovitis.  Grade 4 chondrosis of the lateral patellar facet, grade 3 fissure at the apex, grade 2, medial patellar facet.  Grade 2 femoral trochlear  chondrosis.  Intact ACL within the notch.  Hypertrophic fat pad.  Diffuse synovitis within the medial and lateral gutters.  Medial compartment with intact medial meniscus.  Grade 2 chondrosis.  Lateral compartment with complex tearing of the anterior horn of the lateral meniscus with a displaced flap anteriorly to the anterior recess and into the notch.  Grade 4 chondrosis of a significant portion of the lateral femoral condyle and grade 3/4 chondrosis of the lateral tibia.    Past Medical History:   Diagnosis Date     Allergies      Breast cancer (H)      Dermatofibroma 05/14/2012     Need for prophylactic hormone replacement therapy (postmenopausal)      PONV (postoperative nausea and vomiting) 7/7/2022       Past Surgical History:   Procedure Laterality Date     ARTHROSCOPY KNEE Left 7/14/2022    Procedure: ARTHROSCOPY, LEFT KNEE with meniscal and chondral debridement;  Surgeon: Timo Berman MD;  Location: [facility]     BIOPSY BREAST       HYSTERECTOMY, PAP NO LONGER INDICATED       HYSTERECTOMY, MAGY       LASIK BILATERAL  01/01/2005    Dr. Solis      LUMPECTOMY BREAST Left 09/30/2019    Procedure: Re-excision of left breast lumpectomy site;  Surgeon: Kj Salas DO;  Location: [facility]     LUMPECTOMY BREAST Left 10/09/2019    Procedure: Left breast re-excision;  Surgeon: Kj Salas DO;  Location: [facility]     LUMPECTOMY BREAST WITH SENTINEL NODE, COMBINED Left 09/23/2019    Procedure: LUMPECTOMY, BREAST, WITH SENTINEL LYMPH NODE BIOPSY.;  Surgeon: Kj Salas DO;  Location: [facility]     [facility] NONSPECIFIC PROCEDURE      bladder surgery       Medications:   Current Outpatient Medications:      atorvastatin (LIPITOR) 10 MG tablet, TAKE 1 TABLET(10 MG) BY MOUTH DAILY, Disp: 90 tablet, Rfl: 0     BIOTIN MAXIMUM PO, Take by mouth daily, Disp: , Rfl:      calcium 500-125 MG-UNIT TABS, Take 2 tablets by mouth 2 times daily, Disp: , Rfl:      doxycycline hyclate (PERIOSTAT) 20 MG tablet,  "Take 2 tablets (40 mg) by mouth daily, Disp: 180 tablet, Rfl: 3     gabapentin (NEURONTIN) 100 MG capsule, TAKE 1 CAPSULE BY MOUTH EVERY MORNING AND TAKE 2 CAPSULES EVERY EVENING., Disp: 90 capsule, Rfl: 3     levothyroxine (SYNTHROID/LEVOTHROID) 88 MCG tablet, Take 1 tablet (88 mcg) by mouth daily, Disp: 90 tablet, Rfl: 1     metroNIDAZOLE (METROCREAM) 0.75 % external cream, Apply topically 2 times daily, Disp: 45 g, Rfl: 3     tamoxifen (NOLVADEX) 20 MG tablet, Take 1 tablet (20 mg) by mouth daily, Disp: 90 tablet, Rfl: 3     tolterodine ER (DETROL LA) 4 MG 24 hr capsule, Take 1 capsule (4 mg) by mouth daily, Disp: 90 capsule, Rfl: 1     venlafaxine (EFFEXOR XR) 75 MG 24 hr capsule, TAKE 1 CAPSULE(75 MG) BY MOUTH DAILY, Disp: 90 capsule, Rfl: 3    Current Facility-Administered Medications:      methylPREDNISolone (DEPO-MEDROL) injection 80 mg, 80 mg, , , Timo Berman MD, 80 mg at 10/03/22 1334    Allergies:   Allergies   Allergen Reactions     Demerol      Sedation and vomiting     Meperidine      Sulfa Drugs      unknown reaction       REVIEW OF SYSTEMS:   CONSTITUTIONAL:NEGATIVE for fever, chills, night sweats  INTEGUMENTARY/SKIN: NEGATIVE for worrisome wound problems or redness.  MUSCULOSKELETAL:See HPI above  NEURO: NEGATIVE for weakness, dizziness or paresthesias    PHYSICAL EXAM:  /78 (BP Location: Right arm, Patient Position: Sitting, Cuff Size: Adult Regular)   Pulse 81   Ht 1.676 m (5' 6\")   Wt 100.7 kg (222 lb)   SpO2 100%   BMI 35.83 kg/m     GENERAL APPEARANCE: healthy, alert, no distress  SKIN: no suspicious lesions or rashes  NEURO: Normal strength and tone, mentation intact and speech normal  PSYCH:  mentation appears normal and affect normal, not anxious  RESPIRATORY: No increased work of breathing.    left  LOWER EXTREMITY:  Gait: subtle favors the left.  No Quad atrophy, strength normal.  Intact sensation deep peroneal nerve, superficial peroneal nerve, med/lat tibial nerve, " "sural nerve, saphenous nerve  Intact EHL, EDL, TA, FHL, GS, quadriceps hamstrings and hip flexors  Toes warm and well perfused, brisk capillary refill. Palpable 2+ dp pulses.  calf soft and nttp or squeeze.  Edema: trace    left  KNEE EXAM:    Skin: intact, no ecchymosis or erythema  Incisions: well healed. Dry. No erythema.  ROM: full extension to 120 flexion  Effusion: trace to small  Tender: lateral joint line  Positive patello-femoral crepitus and grind.      X-RAY: none indicated.      Impression: Merlyn Ravi is a 58 year old female with chronic left knee pain, advanced primary osteoarthritis.      Plan: routine postoperative knee arthroscopy  * reviewed imaging studies and arthroscopy images with patient, showing arthritic changes, or wearing of the cartilage in the knee. This can be caused by normal \"wear and tear\" over the years or following prior injury to the knee. Most was lateral knee and under the kneecap.  * we discused pre and postoperative that pain related to underlying osteoarthritis is likely continue, as arthroscopy is not expected or intended to alleviate osteoarthritis symptoms, perhaps worsen them. And again at time of arthroscopy most degenerative changes noted lateral where her current pain is.  * unclear of pain shooting down the side of the leg.      Non-surgical treatment for knee arthritis includes:    * rest, sitting  * Activity modification - avoid impact activities or activities that aggravate symptoms.  * NSAIDS (non-steroidal anti-inflammatory medications; e.g. Aleve, advil, motrin, ibuprofen) - regular use for inflammation ( twice daily or three times daily), with food, as long as no contra-indications Please discuss with primary care doctor if needed  * ice, 15-20 minutes at a time several times a day or as needed.  * Strengthening of quadriceps muscles  * Physical Therapy for strengthening, stretching and range of motion exercises of legs  * Tylenol as needed for pain, " "consider Tylenol arthritis or similar  * Weight loss: Weight loss:  Body mass index is 35.83 kg/m .. weight loss benefits, not only for the current pain symptoms, but also overall health. Recommend a good diet plan that works for the patient, with the assistance of a dietician or primary care doctor as needed. Also, a good, low-impact exercise program for at least 20 minutes per day, 3 times per week, such as exercise bike, elliptical , or pool.  * Exercise: low impact such as stationary bike, elliptical, pool.  * Injections: cortisone versus viscosupplementation (hyaluronic acid, \"rooster comb\", \"gel shots\"); risks and perceived benefits discussed today. Patient elects  to proceed. Will try kenalog today instead of depomedrol.  * consider visco in future, will need preauthorization from insurance first.  * Bracing: bracing the knee may offer some relief of symptoms when worn and provide some stability.  * over the counter supplements such as glucosamine and chondroitin sulfate may help with joint pain.  * topical ointments may help as well    * recommend stopping every couple of hours during her road trip to get up and stretch out, as well as daily aspirin for blood clot prevention.    * return to clinic as needed.          Timo Berman M.D., M.S.  Dept. of Orthopaedic Surgery  [facility]    Large Joint Injection/Arthocentesis: L knee joint    Date/Time: 3/6/2023 3:56 PM  Performed by: Bryn Johnson PA  Authorized by: Timo Berman MD     Indications:  Pain and osteoarthritis  Needle Size:  22 G  Guidance: landmark guided    Approach:  Anteromedial  Location:  Knee      Medications:  4 mL bupivacaine 0.25 %; 40 mg triamcinolone 40 MG/ML  Outcome:  Tolerated well, no immediate complications  Procedure discussed: discussed risks, benefits, and alternatives    Consent Given by:  Patient  Prep: patient was prepped and draped in usual sterile fashion              Again, thank you for " allowing me to participate in the care of your patient.        Sincerely,        Timo Berman MD

## 2023-03-13 ENCOUNTER — HOSPITAL ENCOUNTER (OUTPATIENT)
Dept: MRI IMAGING | Facility: CLINIC | Age: 58
Discharge: HOME | End: 2023-03-13
Attending: UROLOGY | Admitting: UROLOGY
Payer: COMMERCIAL

## 2023-03-13 DIAGNOSIS — N95.2 VAGINAL ATROPHY: ICD-10-CM

## 2023-03-13 DIAGNOSIS — N36.9 URETHRAL DISORDER: ICD-10-CM

## 2023-03-13 DIAGNOSIS — N39.46 MIXED INCONTINENCE: ICD-10-CM

## 2023-03-13 PROCEDURE — A9585 GADOBUTROL INJECTION: HCPCS | Performed by: UROLOGY

## 2023-03-13 PROCEDURE — 255N000002 HC RX 255 OP 636: Performed by: UROLOGY

## 2023-03-13 PROCEDURE — 72197 MRI PELVIS W/O & W/DYE: CPT

## 2023-03-13 PROCEDURE — 258N000003 HC RX IP 258 OP 636: Performed by: UROLOGY

## 2023-03-13 RX ORDER — GADOBUTROL 604.72 MG/ML
10 INJECTION INTRAVENOUS ONCE
Status: COMPLETED | OUTPATIENT
Start: 2023-03-13 | End: 2023-03-13

## 2023-03-13 RX ADMIN — GADOBUTROL 10 ML: 604.72 INJECTION INTRAVENOUS at 16:54

## 2023-03-13 RX ADMIN — SODIUM CHLORIDE 50 ML: 9 INJECTION, SOLUTION INTRAVENOUS at 16:55

## 2023-03-15 ENCOUNTER — LAB (OUTPATIENT)
Dept: LAB | Facility: CLINIC | Age: 58
End: 2023-03-15
Payer: COMMERCIAL

## 2023-03-15 DIAGNOSIS — D47.2 MONOCLONAL PARAPROTEINEMIA: ICD-10-CM

## 2023-03-15 LAB
ALBUMIN SERPL BCG-MCNC: 4.1 G/DL (ref 3.5–5.2)
ALP SERPL-CCNC: 84 U/L (ref 35–104)
ALT SERPL W P-5'-P-CCNC: 14 U/L (ref 10–35)
ANION GAP SERPL CALCULATED.3IONS-SCNC: 11 MMOL/L (ref 7–15)
AST SERPL W P-5'-P-CCNC: 19 U/L (ref 10–35)
BILIRUB SERPL-MCNC: 0.4 MG/DL
BUN SERPL-MCNC: 18.3 MG/DL (ref 6–20)
CALCIUM SERPL-MCNC: 9.5 MG/DL (ref 8.6–10)
CHLORIDE SERPL-SCNC: 105 MMOL/L (ref 98–107)
CREAT SERPL-MCNC: 0.93 MG/DL (ref 0.51–0.95)
DEPRECATED HCO3 PLAS-SCNC: 23 MMOL/L (ref 22–29)
GFR SERPL CREATININE-BSD FRML MDRD: 71 ML/MIN/1.73M2
GLUCOSE SERPL-MCNC: 97 MG/DL (ref 70–99)
POTASSIUM SERPL-SCNC: 4 MMOL/L (ref 3.4–5.3)
PROT SERPL-MCNC: 7.6 G/DL (ref 6.4–8.3)
SODIUM SERPL-SCNC: 139 MMOL/L (ref 136–145)
TOTAL PROTEIN SERUM FOR ELP: 7.2 G/DL (ref 6.4–8.3)

## 2023-03-15 PROCEDURE — 83521 IG LIGHT CHAINS FREE EACH: CPT | Mod: 59

## 2023-03-15 PROCEDURE — 82784 ASSAY IGA/IGD/IGG/IGM EACH: CPT

## 2023-03-15 PROCEDURE — 84165 PROTEIN E-PHORESIS SERUM: CPT | Mod: TC | Performed by: PATHOLOGY

## 2023-03-15 PROCEDURE — 80053 COMPREHEN METABOLIC PANEL: CPT

## 2023-03-15 PROCEDURE — 84165 PROTEIN E-PHORESIS SERUM: CPT | Mod: 26

## 2023-03-15 PROCEDURE — 36415 COLL VENOUS BLD VENIPUNCTURE: CPT

## 2023-03-15 PROCEDURE — 84155 ASSAY OF PROTEIN SERUM: CPT

## 2023-03-16 DIAGNOSIS — E78.5 HYPERLIPIDEMIA LDL GOAL <130: ICD-10-CM

## 2023-03-16 LAB
ALBUMIN SERPL ELPH-MCNC: 4.2 G/DL (ref 3.7–5.1)
ALPHA1 GLOB SERPL ELPH-MCNC: 0.3 G/DL (ref 0.2–0.4)
ALPHA2 GLOB SERPL ELPH-MCNC: 0.6 G/DL (ref 0.5–0.9)
B-GLOBULIN SERPL ELPH-MCNC: 0.7 G/DL (ref 0.6–1)
GAMMA GLOB SERPL ELPH-MCNC: 1.3 G/DL (ref 0.7–1.6)
IGA SERPL-MCNC: 67 MG/DL (ref 84–499)
IGG SERPL-MCNC: 1484 MG/DL (ref 610–1616)
IGM SERPL-MCNC: 113 MG/DL (ref 35–242)
KAPPA LC FREE SER-MCNC: 3.77 MG/DL (ref 0.33–1.94)
KAPPA LC FREE/LAMBDA FREE SER NEPH: 3.49 {RATIO} (ref 0.26–1.65)
LAMBDA LC FREE SERPL-MCNC: 1.08 MG/DL (ref 0.57–2.63)
M PROTEIN SERPL ELPH-MCNC: 0.6 G/DL
PROT PATTERN SERPL ELPH-IMP: ABNORMAL

## 2023-03-17 RX ORDER — ATORVASTATIN CALCIUM 10 MG/1
TABLET, FILM COATED ORAL
Qty: 90 TABLET | Refills: 0 | Status: SHIPPED | OUTPATIENT
Start: 2023-03-17 | End: 2023-06-13

## 2023-03-17 NOTE — TELEPHONE ENCOUNTER
Please notify patient she needs to follow-up in clinic for further refills of her cholesterol medication.  Verona Porter NP on 3/17/2023 at 12:55 PM

## 2023-03-17 NOTE — TELEPHONE ENCOUNTER
Pending Prescriptions:                       Disp   Refills    atorvastatin (LIPITOR) 10 MG tablet [Pharm*90 tab*0        Sig: TAKE 1 TABLET(10 MG) BY MOUTH DAILY    Routing refill request to provider for review/approval because:  Labs not current:  LDL    LDL Cholesterol Calculated   Date Value Ref Range Status   03/15/2021 84 <100 mg/dL Final     Comment:     Desirable:       <100 mg/dl     Saida Mendosa RN  [facility]

## 2023-03-21 ENCOUNTER — VIRTUAL VISIT (OUTPATIENT)
Dept: ONCOLOGY | Facility: HOSPITAL | Age: 58
End: 2023-03-21
Attending: NURSE PRACTITIONER
Payer: COMMERCIAL

## 2023-03-21 DIAGNOSIS — D47.2 MONOCLONAL PARAPROTEINEMIA: Primary | ICD-10-CM

## 2023-03-21 DIAGNOSIS — C50.912 INVASIVE LOBULAR CARCINOMA OF LEFT BREAST IN FEMALE (H): ICD-10-CM

## 2023-03-21 PROCEDURE — 99214 OFFICE O/P EST MOD 30 MIN: CPT | Mod: VID | Performed by: NURSE PRACTITIONER

## 2023-03-21 PROCEDURE — G0463 HOSPITAL OUTPT CLINIC VISIT: HCPCS | Mod: PN,GT | Performed by: NURSE PRACTITIONER

## 2023-03-21 RX ORDER — TAMOXIFEN CITRATE 20 MG/1
20 TABLET ORAL DAILY
Qty: 90 TABLET | Refills: 3 | Status: SHIPPED | OUTPATIENT
Start: 2023-03-21 | End: 2024-03-25

## 2023-03-21 NOTE — PROGRESS NOTES
\Video-Visit Details    Type of service:  Video Visit- Send link to cell [phone]

## 2023-03-21 NOTE — PROGRESS NOTES
[facility]ematology [facility] Oncology Progress Note    Patient: Merlyn Ravi  MRN: [id number]  Date of Service: 03/21/2023        Reason for Visit    Chief Complaint   Patient presents with     Oncology Clinic Visit     1 year return Monoclonal paraproteinemia, review MRI & Labs       Assessment and Plan     Cancer Staging   No matching staging information was found for the patient.    1. Breast cancer, T2N0M0: currently on tamoxifen since 2019. Overall doing well. Due for mammogram in May. Had follow up with breast surgeon in October and exam normal.  Patient will return to the [facility] to see either a new physician or Zena our nurse practitioner there in 1 year.  She will continue to see her breast surgeon every 6 months.    2. MGUS: benign. Overall stable.  The light chains are slightly better.  The monoclonal peak is gone from 0.5-0.6.  Overall no endorgan damage.  We talked again about what the implication is for MGUS.  She will recheck in 1 year.    3. Vaginal dryness/dyspareunia: likely from tamoxifen/previous surgery.  Encourage patient to use over-the-counter lubricants, coconut oil.  She was asking about estrogen cream.  I did tell her that we prefer to not use that in breast cancer patients if we can get by without.  I did tell her that if the other over-the-counter products just are not working and physical therapy does not help, we could try low-dose.  She will call clinic if she wants to pursue that.    ECOG Performance    0 - Independent    Distress Screening (within last 30 days)    1. How concerned are you about your ability to eat? : 0  2. How concerned are you about unintended weight loss or your current weight? : (!) 10 (wants to lose)  3. How concerned are you about feeling depressed or very sad? : 0  4. How concerned are you about feeling anxious or very scared? : 0  5. Do you struggle with the loss of meaning and edouard in your life? : Somewhat (Lost both parents recemtly)  6. How  concerned are you about work and home life issues that may be affected by your cancer? : 0  7. How concerned are you about knowing what resources are available to help you? : 0  8. Do you currently have what you would describe as Jain or spiritual struggles?            : Not at all       Pain       Problem List    Patient Active Problem List   Diagnosis     CARDIOVASCULAR SCREENING; LDL GOAL LESS THAN 160     LASIK OU 5 yrs     Dermatofibroma     Monoclonal paraproteinemia     Hyperlipidemia LDL goal <130     Obesity (BMI 35.0-39.9) with comorbidity (H)     Other specified hypothyroidism     Invasive lobular carcinoma of breast in female (H)     Major depressive disorder, recurrent episode, mild (H)     Effusion of left knee     Bucket handle tear of lateral meniscus of left knee, unspecified whether old or current tear, subsequent encounter     PONV (postoperative nausea and vomiting)     Malignant neoplasm of female breast (H)     Symptomatic menopausal or female climacteric states     Hyperlipidemia     Monoclonal gammopathy     Mixed incontinence     Vaginal atrophy     Urethral disorder     High-tone pelvic floor dysfunction     Myalgia of pelvic floor     Dyspareunia in female        ______________________________________________________________________________    History of Present Illness    Oncology History/Treatment  Diagnosis/Stage:      8/2019: Left breast invasive lobular cancer (T2-N0)  -self-palpated L breast lump  -diagnostic mammo: 1.1 cm asymmetry 9-10:00 position  -US: 1.1 cm lesion. No axillary adenopathy  -stereotactic biopsy: invasive lobular carcinoma, grade II; ALI neg; LCIS cannot be excluded. ER+, WY+, HER2 neg  -lumpectomy path: 4.5 cm invasive lobular ca, grade III; ALI neg. No LCIS. ER/WY+, HER2 neg. Positive deep margin  -re-excision 1: positive margin. Re-excision 2: negative for residual malignancy  -Oncotype DX score: 20     MGUS IgG, kappa (observed)     Treatment:  9/23/2019:  Left lumpectomy  9/30/2019: re-excision for positive margin, persistent positive margin  10/9/2019: re-excision resulted in negative margin     11/2019: adjuvant RT (5005 cGy/20)     11/2019 - present: Tamoxifen 20 mg daily    Interim history: Patient is here today for a 1 year follow-up visit.  She was seen by Dr. Khan at the [facility] a year ago.  Overall she states she is doing okay.  She has been having some issues with vaginal dryness and pain with intercourse.  She says it is very tight and it does hurt.  She actually had an exam recently at the doctor's office and that was quite painful as well.  She is some question about whether she can be on some estrogen cream.  She has not used any other lubricants.  She denies any new bone or back pain.  Denies any weight loss.  States that she is doing well on the tamoxifen and has not noticed really any new side effects.  Denies any infectious complaints.    Review of Systems    Pertinent items are noted in HPI.    Past History    Past Medical History:   Diagnosis Date     Allergies      Breast cancer (H)      Dermatofibroma 05/14/2012     Need for prophylactic hormone replacement therapy (postmenopausal)      PONV (postoperative nausea and vomiting) 7/7/2022       Physical Exam    No flowsheet data found.    PHYSICAL EXAM  There were no vitals taken for this visit.    GENERAL: no acute distress. Cooperative in conversation.  Alone on video. Mask on  RESP: Regular respiratory rate. No expiratory wheezes   NEURO: non focal. Alert and oriented x3.   PSYCH: within normal limits. No depression or anxiety.  SKIN: exposed skin is dry intact.     Lab Results    Recent Results (from the past 168 hour(s))   Comprehensive metabolic panel (BMP + Alb, Alk Phos, ALT, AST, Total. Bili, TP)   Result Value Ref Range    Sodium 139 136 - 145 mmol/L    Potassium 4.0 3.4 - 5.3 mmol/L    Chloride 105 98 - 107 mmol/L    Carbon Dioxide (CO2) 23 22 - 29 mmol/L    Anion Gap 11 7 - 15  mmol/L    Urea Nitrogen 18.3 6.0 - 20.0 mg/dL    Creatinine 0.93 0.51 - 0.95 mg/dL    Calcium 9.5 8.6 - 10.0 mg/dL    Glucose 97 70 - 99 mg/dL    Alkaline Phosphatase 84 35 - 104 U/L    AST 19 10 - 35 U/L    ALT 14 10 - 35 U/L    Protein Total 7.6 6.4 - 8.3 g/dL    Albumin 4.1 3.5 - 5.2 g/dL    Bilirubin Total 0.4 <=1.2 mg/dL    GFR Estimate 71 >60 mL/min/1.73m2   Kappa and lambda light chain   Result Value Ref Range    Kappa Free Light Chains 3.77 (H) 0.33 - 1.94 mg/dL    Lambda Free Light Chains 1.08 0.57 - 2.63 mg/dL    Kappa /Lambda Ratio 3.49 (H) 0.26 - 1.65   Immunoglobulins A G and M   Result Value Ref Range    Immunoglobulin G 1,484 610 - 1,616 mg/dL    Immunoglobulin A 67 (L) 84 - 499 mg/dL    Immunoglobulin M 113 35 - 242 mg/dL   Total Protein, Serum for ELP   Result Value Ref Range    Total Protein Serum for ELP 7.2 6.4 - 8.3 g/dL   Protein Electrophoresis, Serum   Result Value Ref Range    Albumin 4.2 3.7 - 5.1 g/dL    Alpha 1 0.3 0.2 - 0.4 g/dL    Alpha 2 0.6 0.5 - 0.9 g/dL    Beta Globulin 0.7 0.6 - 1.0 g/dL    Gamma Globulin 1.3 0.7 - 1.6 g/dL    Monoclonal Peak 0.6 (H) <=0.0 g/dL    ELP Interpretation       Monoclonal protein (about 0.6 g/dL) seen in the gamma fraction. Previously characterized in our laboratory on 3/1/2021 as a monoclonal IgG immunoglobulin of kappa light chain type. Pathologic significance requires clinical correlation. VANDANA Chang M.D., Ph.D., Pathologist ().       Imaging    XR Knee Left 3 Views    Result Date: 3/6/2023  XR KNEE LEFT 3 VIEWS 3/6/2023 3:37 PM HISTORY: Primary osteoarthritis of left knee COMPARISON: None.     IMPRESSION: Small left knee joint effusion. No evidence for fracture or compartmental narrowing. MANNIE STODDARD MD   SYSTEM ID:  [id number]    XR Surgery RU L/T 5 Min Fluoro w Stills    Result Date: 3/2/2023  This exam was marked as non-reportable because it will not be read by a radiologist or a [place] non-radiologist provider.     MR Pelvis  (GYN) wo & w Contrast    Result Date: 3/14/2023  EXAM: MR PELVIS (GYN) W/O and W CONTRAST LOCATION: [facility] DATE/TIME: 3/13/2023 5:26 PM INDICATION: urethral fullness, history of dilation COMPARISON: None. TECHNIQUE: Routine MRI female pelvis protocol including T1 in/out phase, diffusion, thin section high resolution multiplane T2, and post contrast T1. CONTRAST: gadavist 10 mL FINDINGS: Urinary bladder unremarkable. No visualized urethral diverticulum. Symmetric circumferential thickening of the urethra, measuring about 6 mm single wall thickness. No visualized Baca duct or Timonium's gland cysts. No Bartholin's gland cyst. No visualized urethral bulking agents. No fistula. Hysterectomy. Sigmoid diverticulosis.     IMPRESSION: 1.  No acute findings. Visualized female urethra grossly unremarkable.    Video-Visit Details    Type of service:  Video Visit    Video Start Time (time video started): 2:14    Video End Time (time video stopped): 2:24    Originating Location (pt. Location): Home        Distant Location (provider location):  On-site    Mode of Communication:  Video Conference via [employer]      Total time spent with patient in face to face time, chart review and documentation was 30 minutes.          Signed by: MICHELE Hoover CNP

## 2023-03-22 ENCOUNTER — PATIENT OUTREACH (OUTPATIENT)
Dept: ONCOLOGY | Facility: HOSPITAL | Age: 58
End: 2023-03-22
Payer: COMMERCIAL

## 2023-03-22 NOTE — PROGRESS NOTES
Pt requested a psychologist call per distress screening form. Phoned pt and left message offering a therapy appointment or for the pt to call back to discuss.       Oncology Distress Screening    Row Name 03/21/23 0224       How concerned do you feel regarding the following common issues people with cancer face. Please answer with a number from 0-10 where 0=not concerned and 10=very concerned. PT response of >=8  will result in outreach from Support Team.   1. How concerned are you about your ability to eat?  0       2. How concerned are you about unintended weight loss or your current weight?  10 Abnormal   wants to lose       3. How concerned are you about feeling depressed or very sad?  0       4. How concerned are you about feeling anxious or very scared?  0       5. Do you struggle with the loss of meaning and edouard in your life?  Somewhat  Lost both parents recemtly       6. How concerned are you about work and home life issues that may be affected by your cancer?  0       7. How concerned are you about knowing what resources are available to help you?  0       8. Do you currently have what you would describe as Yazidi or spiritual struggles?             Not at all       You can also ask to be contacted by one of our Oncology Supportive Care professionals.    9. If you want to be contacted by one of our professionals, I can send a message to them right now.  Oncology Psychologist;Oncology Dietitian

## 2023-03-27 ENCOUNTER — THERAPY VISIT (OUTPATIENT)
Dept: PHYSICAL THERAPY | Facility: CLINIC | Age: 58
End: 2023-03-27
Payer: COMMERCIAL

## 2023-03-27 DIAGNOSIS — M62.89 PELVIC FLOOR DYSFUNCTION IN FEMALE: ICD-10-CM

## 2023-03-27 DIAGNOSIS — M79.18 MYALGIA OF PELVIC FLOOR: ICD-10-CM

## 2023-03-27 DIAGNOSIS — N36.9 URETHRAL DISORDER: ICD-10-CM

## 2023-03-27 DIAGNOSIS — N95.2 VAGINAL ATROPHY: ICD-10-CM

## 2023-03-27 DIAGNOSIS — N39.46 MIXED INCONTINENCE: Primary | ICD-10-CM

## 2023-03-27 DIAGNOSIS — C50.919 INVASIVE LOBULAR CARCINOMA OF BREAST IN FEMALE (H): ICD-10-CM

## 2023-03-27 DIAGNOSIS — N94.10 DYSPAREUNIA IN FEMALE: ICD-10-CM

## 2023-03-27 DIAGNOSIS — M62.89 HIGH-TONE PELVIC FLOOR DYSFUNCTION: ICD-10-CM

## 2023-03-27 PROCEDURE — 97161 PT EVAL LOW COMPLEX 20 MIN: CPT | Mod: GP | Performed by: PHYSICAL THERAPIST

## 2023-03-27 PROCEDURE — 97110 THERAPEUTIC EXERCISES: CPT | Mod: GP | Performed by: PHYSICAL THERAPIST

## 2023-03-27 PROCEDURE — 97535 SELF CARE MNGMENT TRAINING: CPT | Mod: GP | Performed by: PHYSICAL THERAPIST

## 2023-03-27 PROCEDURE — 97112 NEUROMUSCULAR REEDUCATION: CPT | Mod: GP | Performed by: PHYSICAL THERAPIST

## 2023-03-27 NOTE — PROGRESS NOTES
"Physical Therapy Initial Evaluation  Subjective:  The history is provided by the patient. No  was used.   Therapist Generated HPI Evaluation  Problem details: Pt reports today for mixed urinary incontinence. PMH includes urethral disorder, vaginal atrophy, high-tone PF dysfxn, myalgia of PF, dyspareunia, invasive lobular carcinoma of breast. Pt reports the incontinence and pain with penetration has been going on for a while and correlates it to around the time she started Tamoxifen 3 years ago. \"Everything has gotten worse\", since then including the pain and incontinence. Pt also uses lymphedema machine right before bed per her schedule, which makes her have to urinate significantly and will get up 2-3x per night to void. 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 She typically uses the restroom before she leaves on her 30-50min commute. She has some medications that make her mouth dry and \"is always drinking water\" even through the night. Pt reports difficulty voiding completely and does sometimes strain toward the end to get the remainder out, or she will realize she has to go again after getting up. Pt does have diverticulitis and passes stool 1-3x/week, sometimes does ignore urge to defecate if it is not a good time to do so. Pt reports that at rest she does not have pelvic pain but sex and PF exams are extremely painful. [place] causes her to have to urinate right away although she empties beforehand. She usually does not stop with intercourse because she knows it will be too painful to start up again. She has been performing kegels to help with the urge and they do not seem to help. She has vaginal dryness but has been advised to not use lubricant with estrogen in it. Pt also has history of LBP (disc L5-S1) and goes to the chiropractor for that, also a herniated disc in her neck. Pt has knee " pain as well and had a cortisone shot recently.     Pt reports continued stress in her life from cancer along with having lost both parents in recent years. .         Type of problem:  Incontinence.    This is a chronic condition.    Where condition occurred: for unknown reasons.  Patient reports pain:  Vaginal.  Pain is described as sharp and is intermittent.  Pain timing: worse with penetration.  Since onset symptoms are gradually worsening (since starting medication s/p 3yrs).  Symptoms are exacerbated by intercourse, coughing, jumping, sneezing and running (pain exacerbated by intercourse, incontinence exacerbated by cough/sneeze/jump and triggers such as arriving home)  and relieved by nothing.  Special tests included:  MRI (MRI & bladder retention test no significant findings).    Restrictions due to condition include:  Working in normal job without restrictions.  Barriers include:  None as reported by patient.    Patient Health History  Merlyn Ravi being seen for pelvic exercise.     Problem began: 3/2/2023 (MD order, pt reports >1 year ago).        General health as reported by patient is good.  Pertinent medical history includes: cancer, depression, thyroid problems and overweight.   Red flags:  None as reported by patient.  Medical allergies: none.   Surgeries include:  Other. Other surgery history details: hysterectomy s/p 20+ years, breast cancer .    Current medications:  Thyroid medication and anti-depressants. Other medications details: Tamoxifin, Lipitor.    Current occupation is  .   Primary job tasks include:  Prolonged sitting and computer work.                  SUBJECTIVE:    Urination:  Do you leak on the way to the bathroom or with a strong urge to void? Yes   Do you leak with cough,sneeze, jumping, running?Yes   Any other activities that cause leaking? No  Do you have triggers that make you feel you can't wait to go to the bathroom? Yes, returning home, passing time at school  "  Type of pad and number used per day? Pose 1-2  When you leak what is the amount? small  How long can you delay the need to urinate? \"depends\" (pt states 5 maybe 10 min).   How many times do you get up to urinate at night? 2+   Can you stop the flow of urine when on the toilet? Yes  Is the volume of urine passed usually: medium. (8sec rule= 250ml with average bladder storing 400-600ml)  Do you feel empty when you are done? No  Do you strain to pass urine? Pt reports sometimes toward the end of voiding to ensure emptied  Do you have a slow or hesitant urinary stream? No  Do you have difficulty initiating the urine stream? No  Is urination painful? Not asked  How many bladder infections have you had in last 12 months? 0  Fluid intake(one glass is 8oz or one cup) 4+ glasses/day, 0 caffinated glasses/day  0 alcohol glasses/day.    Bowel habits:  Frequency of bowel movements? 1-3 times a week  Consistancy of stool? soft formed, hard  Do you ignore the urge to defecate? Yes  Do you strain to pass stool? sometimes      Sensation:   Can you tell if there is solid, liquid, or gas in the rectum?  Yes    Do you have abdominal pain?  No    Do you have rectal pain, pressure, or burning?  Not asked     Pelvic Pain:  Do you have any pelvic pain with intercourse, exams, use of tampons? Yes  Is initial penetration during intercourse painful? Yes  Is deeper penetration painful? Yes  Do you use lubricant? No     **has to abstain or limit frequency of intercourse due to pain  9/10 pain at worst  ?  Given birth? No pt had infertility issues   Are you sexually active?Yes  Have you ever been worried for your physical safety? No  Do you have any depression, anxiety, panic attacks, excessive stress?  Pt dealing with cancer and loss of loved ones in recent years  Any abdominal or pelvic surgeries? Hysterectomy, breast cancer  Are you having any regular exercise? No  Have you practiced the PF(kegel) exercises for 4 or more weeks? " "Yes  Thyroid checked? Does have thyroid issues but not checked recently (related to hair loss, flu-like symptoms, wt gain/loss, fatigue, menopause)  Changed diet lately? No    OBSERVATION  Lumbar Posture: Negative  Pelvic symmetry: Negative  Introitus: tight  Trendelenberg Sign:Negative    ROM  Single leg standing unilateral hip flexion PSIS:Negative  Standing forward flexion PSIS:Positive some discomfort bending forward  Passive Hip ROM:Negative  OLIVER:Negative  OLIVER with OP:Negative    Discomfort in low back bilaterally with side bends    MUSCLE PERFORMANCE  Active SLR:Positive with R SLR  Baseline PF tone:hyper  PF Tone with cough: normal  Valsalva: not tested  PF Response quality: slow return to baseline  PF Power: Center: 1 Stronger on right/left weak bilaterally.  Endurance: Maximum contraction in seconds: 1  # of endurance contractions before fatigue: NT  Quick contraction repetitions prior to fatigue: 4.  Specificity/accessory muscles: WNL/WFL, Synergy with abdominals: Not Tested.  Prolapse: Cyctocele/Rectocele/Uterine grade: NT      PALPATION: Pain: discomfort Low back with mobility testing, pain with palpation to adductors however pelvic clock neg.         Objective:  System                                 Pelvic Dysfunction Evaluation:          Abdominal Wall:      Trigger Points:  Internal obliques    Pelvic Clock Exam:    Ischiocavernosis pain:  -  Bulbocavernosis pain:  -  Transverse Perineal:  -  Levator ANI:  -  Perineal Body:  -      External Assessment:  External assessment pelvic: contraction with attempts to bear down - some improvement w/ cues \"push my finger out\"  Skin Condition:  Atrophic and irritation          Muscle Contraction/Perineal Mobility:  Slight lift, no urogential triangle descent  Internal Assessment:  Internal assessment pelvic: severe tenderness to palpation internally including obturator internus, isch/bulbo and transverse perineal.    Contraction/Grade:  Fllicker muscles " stretched (1)                               General     ROS    Assessment/Plan:    Patient is a 58 year old female with pelvic complaints.    Patient has the following significant findings with corresponding treatment plan.                Diagnosis 1:  Pelvic Floor Dysfunction  Pain -  manual therapy, self management, education and home program  Decreased ROM/flexibility - manual therapy, therapeutic exercise, therapeutic activity and home program  Decreased strength - therapeutic exercise, therapeutic activities and home program  Impaired balance - neuro re-education, therapeutic activities and home program  Impaired muscle performance - biofeedback, neuro re-education and home program  Decreased function - therapeutic activities and home program  Impaired posture - neuro re-education, therapeutic activities and home program  Instability -  Therapeutic Activity  Therapeutic Exercise  Neuromuscular Re-education  home program    Therapy Evaluation Codes:   1) History comprised of:   Personal factors that impact the plan of care:      Past/current experiences.    Comorbidity factors that impact the plan of care are:      Cancer.     Medications impacting care: Anti-depressant and Thyroid, Tamoxifen.  2) Examination of Body Systems comprised of:   Body structures and functions that impact the plan of care:      Lumbar spine and Pelvis.   Activity limitations that impact the plan of care are:      Bending, Working, Sleeping, Pelvic Exam, [place], Stress incontinence, Urge incontinence and Urinary incontinence.  3) Clinical presentation characteristics are:   Stable/Uncomplicated.  4) Decision-Making    Low complexity using standardized patient assessment instrument and/or measureable assessment of functional outcome.  Cumulative Therapy Evaluation is: Low complexity.    Previous and current functional limitations:  (See Goal Flow Sheet for this information)    Short term and Long term goals: (See Goal Flow Sheet for  this information)     Communication ability:  Patient appears to be able to clearly communicate and understand verbal and written communication and follow directions correctly.  Treatment Explanation - The following has been discussed with the patient:   RX ordered/plan of care  Anticipated outcomes  Possible risks and side effects  This patient would benefit from PT intervention to resume normal activities.   Rehab potential is good.    Frequency:  1 X week, once daily  Duration:  for 12 weeks  Discharge Plan:  Achieve all LTG.  Independent in home treatment program.  Reach maximal therapeutic benefit.    Please refer to the daily flowsheet for treatment today, total treatment time and time spent performing 1:1 timed codes.

## 2023-03-30 ENCOUNTER — TELEPHONE (OUTPATIENT)
Dept: ONCOLOGY | Facility: HOSPITAL | Age: 58
End: 2023-03-30
Payer: COMMERCIAL

## 2023-03-30 NOTE — TELEPHONE ENCOUNTER
Oncology Distress Screen    Called Kristina in regards to her positive oncology nutrition distress screen:    2. How concerned are you about unintended weight loss or your current weight? : 10  And  9. If you want to be contacted by one of our professionals, I can send a message to them right now. : ONCOLOGY DIETITIAN    Called and left voicemail explaining role and reason for call. Call back number provided.      Prisca Juan, MS, RD, LD

## 2023-04-09 ENCOUNTER — HEALTH MAINTENANCE LETTER (OUTPATIENT)
Age: 58
End: 2023-04-09

## 2023-05-04 ENCOUNTER — HOSPITAL ENCOUNTER (EMERGENCY)
Facility: CLINIC | Age: 58
Discharge: HOME | End: 2023-05-04
Payer: COMMERCIAL

## 2023-05-04 ENCOUNTER — APPOINTMENT (OUTPATIENT)
Dept: GENERAL RADIOLOGY | Facility: CLINIC | Age: 58
End: 2023-05-04
Payer: COMMERCIAL

## 2023-05-04 VITALS
SYSTOLIC BLOOD PRESSURE: 141 MMHG | HEART RATE: 111 BPM | RESPIRATION RATE: 20 BRPM | DIASTOLIC BLOOD PRESSURE: 93 MMHG | OXYGEN SATURATION: 95 % | TEMPERATURE: 100.9 F

## 2023-05-04 DIAGNOSIS — J06.9 VIRAL UPPER RESPIRATORY INFECTION: ICD-10-CM

## 2023-05-04 LAB — GROUP A STREP BY PCR: NOT DETECTED

## 2023-05-04 PROCEDURE — 87651 STREP A DNA AMP PROBE: CPT

## 2023-05-04 PROCEDURE — 99214 OFFICE O/P EST MOD 30 MIN: CPT

## 2023-05-04 PROCEDURE — G0463 HOSPITAL OUTPT CLINIC VISIT: HCPCS | Mod: 25

## 2023-05-04 PROCEDURE — 71046 X-RAY EXAM CHEST 2 VIEWS: CPT

## 2023-05-04 RX ORDER — BENZONATATE 200 MG/1
200 CAPSULE ORAL 3 TIMES DAILY PRN
Qty: 30 CAPSULE | Refills: 0 | Status: SHIPPED | OUTPATIENT
Start: 2023-05-04 | End: 2023-08-29

## 2023-05-04 ASSESSMENT — ENCOUNTER SYMPTOMS
WHEEZING: 0
SINUS PRESSURE: 1
NAUSEA: 0
COUGH: 1
SORE THROAT: 1
ABDOMINAL DISTENTION: 0
DIARRHEA: 1
VOMITING: 0
SHORTNESS OF BREATH: 1
DIZZINESS: 0
FATIGUE: 1
FEVER: 1
MYALGIAS: 1

## 2023-05-04 ASSESSMENT — ACTIVITIES OF DAILY LIVING (ADL): ADLS_ACUITY_SCORE: 35

## 2023-05-04 NOTE — DISCHARGE INSTRUCTIONS
Use Tylenol or ibuprofen as needed for fevers and discomfort.  Can use Tessalon Perles up to 3 times daily as needed for cough.  Below are some additional recommendations for over-the-counter upper respiratory infection medications.    For cough, dextromethorphan/guaifenesin combinations help loosen secretions and suppress cough safely without significant risk of sedation.      For nasal congestion and sinus pressure, pseudoephedrine (Sudafed) or phenylephrine is often helpful but it can cause elevations in blood pressure and insomnia.  Short courses of a nasal decongestant spray (Afrin or Neosinephrine) can be appropriate but their use should be restricted to 3 days due to the high risk of rebound congestion.  Use Coricidin as a decongestant if you have a history of hypertension.     For pain and fevers, acetaminophen (Tylenol) is most appropriate.  Ibuprofen (Advil) or naproxen (Aleve) are useful too and last longer but they can cause elevation of blood pressure or stomach problems.     Sometimes the cause of your sinusitis is from allergies.  You may want to try taking a daily antihistamines such as Claritin, Zyrtec, or Allegra - all over the counter medications.

## 2023-05-04 NOTE — ED PROVIDER NOTES
History     Chief Complaint   Patient presents with     Cough     Dry X 4 days.     Pharyngitis     Nasal Congestion     Headache     Top of head hurts     Fever     101.7     Fatigue     Laying down increases the coughing     HPI  Merlyn Ravi is a 58 year old female who presents urgent care for concern of cough.  Patient reports she started feeling fatigued and developed a cough 4 days ago. Reports fever developed 2 days ago, highest up to 101.7. Reports cough is much worse at night. Does endorse some shortness of breath intermittently. Sore scratchy throat intermittently as well. Does reports having some chest discomfort last night which has relieved.  Reports patient's daughter was recently sick a couple weeks ago with similar viral illness. Patient has completed COVID-19 testing x2 at home, both of which were negative.  Has tried some over-the-counter cold medications as well as Tylenol for symptomatic relief at home.    Allergies:  Allergies   Allergen Reactions     Demerol      Sedation and vomiting     Meperidine      Sulfa Antibiotics Hives     unknown reaction       Problem List:    Patient Active Problem List    Diagnosis Date Noted     Pelvic floor dysfunction in female 03/27/2023     Priority: Medium     Mixed incontinence 03/02/2023     Priority: Medium     Vaginal atrophy 03/02/2023     Priority: Medium     Urethral disorder 03/02/2023     Priority: Medium     High-tone pelvic floor dysfunction 03/02/2023     Priority: Medium     Myalgia of pelvic floor 03/02/2023     Priority: Medium     Dyspareunia in female 03/02/2023     Priority: Medium     PONV (postoperative nausea and vomiting) 07/07/2022     Priority: Medium     Effusion of left knee 06/24/2022     Priority: Medium     Added automatically from request for surgery [id number]       Bucket handle tear of lateral meniscus of left knee, unspecified whether old or current tear, subsequent encounter 06/24/2022     Priority: Medium     Added  automatically from request for surgery [id number]       Major depressive disorder, recurrent episode, mild (H) 05/17/2022     Priority: Medium     Other specified hypothyroidism 08/29/2019     Priority: Medium     Invasive lobular carcinoma of breast in female (H) 08/15/2019     Priority: Medium     6.5.2020- Review and distribute treatment summary-[place]  Added automatically from request for surgery [id number]         Malignant neoplasm of female breast (H) 08/15/2019     Priority: Medium     Formatting of this note might be different from the original.  8/2019. Left.  Formatting of this note is different from the original.  Formatting of this note might be different from the original.  8/2019. Left.    Formatting of this note might be different from the original.  6.5.2020- Review and distribute treatment summary-[place] [place]  Added automatically from request for surgery [id number]    Last Assessment & Plan:   Formatting of this note is different from the original.  Kristina Ravi felt a lump in the left breast subsequently she went on to have diagnostic mammography which showed 1.1 cm irregular asymmetry about 11.2 cm from the nipple at 9-10 o'clock position.  Left breast ultrasound at that area revealed the lesion that measures 1.1 cm additional sonography to the left axilla shows no abnormal lymphadenopathy.  Subsequently the patient went on to have stereotactic breast biopsy done on August 15.  The pathology revealed invasive lobular carcinoma grade 2 out of 3.  Angiolymphatic invasion was not seen.  Carcinoma in situ cannot be excluded.  The tumor was estrogen receptor positive progesterone receptor positive and HER-2/walter came back negative.  Patient had a left lumpectomy and sentinel lymph node biopsy done on September 23, 2019 showing invasive lobular carcinoma with tumor size about 4.5 cm.  Tumor is grade 2 angiolymphatic invasion was not seen.  In situ malignancy was not seen . [place] lymph node was  negative for malignancy.  The tumor was positive for estrogen receptor and progesterone receptor and negative for HER-2/walter receptors.  Because of positive deep margin, the patient went on to have reexcision on September 30, 2019 which resulted in positive margin as well.  Eventually she had reexcision again on October 9, 2019 which came back negative for residual malignancy.  Oncotype DX came back with recurrence score at 20.  The patient started on tamoxifen 20 mg orally daily.       Obesity (BMI 35.0-39.9) with comorbidity (H) 01/25/2019     Priority: Medium     Hyperlipidemia LDL goal <130 10/14/2014     Priority: Medium     Hyperlipidemia 10/14/2014     Priority: Medium     Symptomatic menopausal or female climacteric states 02/25/2014     Priority: Medium     Monoclonal paraproteinemia 10/08/2013     Priority: Medium     Monoclonal gammopathy 10/08/2013     Priority: Medium     Formatting of this note might be different from the original.  Last Assessment & Plan:   Formatting of this note might be different from the original.  Merlyn Ravi  has a history of IgG kappa MGUS. The MGUS was discovered after she had been found to have an elevated gamma globulin fraction when she underwent blood testing prior to donating blood at [facility] She was found to have a 0.4 gram/dL IgG kappa monoclonal protein upon further evaluation. She was noted to have an elevated gamma globulin on 09/30/2013. She underwent metastatic bone survey which did not reveal any evidence of lytic destruction. She was neither anemic nor hypercalcemic at the time of the discovery of the MGUS. She in addition has a history of a dermatofibroma in the lower extremity.       Dermatofibroma 05/14/2012     Priority: Medium     LASIK OU 5 yrs 11/18/2010     Priority: Medium     CARDIOVASCULAR SCREENING; LDL GOAL LESS THAN 160 10/31/2010     Priority: Medium        Past Medical History:    Past Medical History:   Diagnosis Date     Allergies      Breast  cancer (H)      Dermatofibroma 05/14/2012     Need for prophylactic hormone replacement therapy (postmenopausal)      PONV (postoperative nausea and vomiting) 7/7/2022       Past Surgical History:    Past Surgical History:   Procedure Laterality Date     ARTHROSCOPY KNEE Left 7/14/2022    Procedure: ARTHROSCOPY, LEFT KNEE with meniscal and chondral debridement;  Surgeon: Timo Berman MD;  Location: [facility] OR     BIOPSY BREAST       HYSTERECTOMY, PAP NO LONGER INDICATED       HYSTERECTOMY, MAGY       LASIK BILATERAL  01/01/2005    Dr. Solis      LUMPECTOMY BREAST Left 09/30/2019    Procedure: Re-excision of left breast lumpectomy site;  Surgeon: Kj Salas DO;  Location: [facility]     LUMPECTOMY BREAST Left 10/09/2019    Procedure: Left breast re-excision;  Surgeon: Kj Salas DO;  Location: [facility] OR     LUMPECTOMY BREAST WITH SENTINEL NODE, COMBINED Left 09/23/2019    Procedure: LUMPECTOMY, BREAST, WITH SENTINEL LYMPH NODE BIOPSY.;  Surgeon: Kj Salas DO;  Location: [facility] OR     [facility] NONSPECIFIC PROCEDURE      bladder surgery       Family History:    Family History   Problem Relation Age of Onset     Thyroid Disease Mother      Heart Disease Mother      Heart Disease Father      Hyperlipidemia Father      Hypertension Maternal Grandmother      Breast Cancer Maternal Grandmother      Hypertension Maternal Grandfather      Alzheimer Disease Paternal Grandmother      Diabetes Brother      Eczema Brother      Thyroid Disease Brother      Melanoma No family hx of        Social History:  Marital Status:   [2]  Social History     Tobacco Use     Smoking status: Never     Smokeless tobacco: Never   Vaping Use     Vaping status: Never Used   Substance Use Topics     Alcohol use: Yes     Alcohol/week: 0.0 standard drinks of alcohol     Comment: 3 drinks per month     Drug use: No        Medications:    atorvastatin (LIPITOR) 10 MG tablet  BIOTIN MAXIMUM PO  calcium 500-125 MG-UNIT  TABS  doxycycline hyclate (PERIOSTAT) 20 MG tablet  gabapentin (NEURONTIN) 100 MG capsule  levothyroxine (SYNTHROID/LEVOTHROID) 88 MCG tablet  metroNIDAZOLE (METROCREAM) 0.75 % external cream  tamoxifen (NOLVADEX) 20 MG tablet  tolterodine ER (DETROL LA) 4 MG 24 hr capsule  venlafaxine (EFFEXOR XR) 75 MG 24 hr capsule          Review of Systems   Constitutional: Positive for fatigue and fever.   HENT: Positive for congestion, sinus pressure and sore throat.    Respiratory: Positive for cough and shortness of breath. Negative for wheezing.    Cardiovascular: Negative for chest pain.   Gastrointestinal: Positive for diarrhea. Negative for abdominal distention, nausea and vomiting.   Musculoskeletal: Positive for myalgias.   Skin: Negative for rash.   Neurological: Negative for dizziness.       Physical Exam   BP: (!) 141/93  Pulse: 111  Temp: (!) 100.9  F (38.3  C)  Resp: 20  SpO2: 95 %      Physical Exam  Constitutional:       General: She is not in acute distress.     Appearance: Normal appearance. She is not diaphoretic.   HENT:      Head: Atraumatic.      Right Ear: Tympanic membrane normal.      Left Ear: Tympanic membrane normal.      Mouth/Throat:      Mouth: Mucous membranes are moist.      Pharynx: Posterior oropharyngeal erythema present. No oropharyngeal exudate.   Eyes:      General: No scleral icterus.     Conjunctiva/sclera: Conjunctivae normal.   Cardiovascular:      Rate and Rhythm: Normal rate and regular rhythm.      Heart sounds: Normal heart sounds.   Pulmonary:      Effort: No respiratory distress.      Breath sounds: Normal breath sounds. No wheezing or rhonchi.   Abdominal:      General: Abdomen is flat. Bowel sounds are normal.      Tenderness: There is no abdominal tenderness.   Musculoskeletal:      Cervical back: Neck supple.   Lymphadenopathy:      Cervical: Cervical adenopathy present.   Skin:     General: Skin is warm.      Findings: No rash.   Neurological:      Mental Status: She is  alert.         ED Course                 Procedures           Results for orders placed or performed during the hospital encounter of 05/04/23 (from the past 24 hour(s))   Group A Streptococcus PCR Throat Swab    Specimen: Throat; Swab   Result Value Ref Range    Group A strep by PCR Not Detected Not Detected    Narrative    The Xpert Xpress Strep A test, performed on the [employer], is a rapid, qualitative in vitro diagnostic test for the detection of Streptococcus pyogenes (Group A ß-hemolytic Streptococcus, Strep A) in throat swab specimens from patients with signs and symptoms of pharyngitis. The Xpert Xpress Strep A test can be used as an aid in the diagnosis of Group A Streptococcal pharyngitis. The assay is not intended to monitor treatment for Group A Streptococcus infections. The Xpert Xpress Strep A test utilizes an automated real-time polymerase chain reaction (PCR) to detect Streptococcus pyogenes DNA.   Chest XR,  PA & LAT    Narrative    CHEST TWO VIEWS May 4, 2023 1:44 PM    HISTORY: Shortness of breath, cough.    COMPARISON: 8/2/2010.    FINDINGS/    Impression    IMPRESSION: The lungs are clear. No airspace consolidation, or  pneumothorax. Heart size and pulmonary vasculature are normal  appearing. Mild scoliotic curvature of the spine. No acute osseous  abnormality. Surgical clips project over the left hemithorax,  corresponding to the left breast and left axilla.         Medications - No data to display    Assessments & Plan (with Medical Decision Making)   Patient presents urgent care for con plaints of cough ongoing for 4 days.  Patient is noted to have a fever of 100.9  F on arrival.  Slightly tachycardic which is likely attributed to fever.  Negative COVID-19 testing at home.  Strep testing completed today which is negative by PCR.  Patient voiced concerns for pneumonia and requested a chest x-ray.  This did not demonstrate any acute consolidation, pneumothorax and there is  no evidence of pneumonia at this time.  Patient's symptoms are likely a viral upper respiratory infection.  Given the patient's cough, she was provided with Tessalon Perles for symptomatic cough relief.  Reviewed return precautions including increased shortness of breath, chest pain, or palpitations or any other new concerns.  Low suspicion for any PE or cardiac cause at this time.  Patient is agreeable to the plan was discharged in good condition.    I have reviewed the nursing notes.    I have reviewed the findings, diagnosis, plan and need for follow up with the patient.        Discharge Medication List as of 5/4/2023  2:02 PM      START taking these medications    Details   benzonatate (TESSALON) 200 MG capsule Take 1 capsule (200 mg) by mouth 3 times daily as needed for cough, Disp-30 capsule, R-0, E-Prescribe             Final diagnoses:   Viral upper respiratory infection       5/4/2023   [facility] EMERGENCY DEPT    Disclaimer:  This note consists of symbols derived from keyboarding, dictation and/or voice recognition software.  As a result, there may be errors in the script that have gone undetected.  Please consider this when interpreting information found in this chart.       Dain Rubalcava, MICHELE CNP  05/04/23 [id number]

## 2023-05-25 ENCOUNTER — TRANSFERRED RECORDS (OUTPATIENT)
Dept: HEALTH INFORMATION MANAGEMENT | Facility: CLINIC | Age: 58
End: 2023-05-25
Payer: COMMERCIAL

## 2023-05-30 ENCOUNTER — HOSPITAL ENCOUNTER (OUTPATIENT)
Dept: MAMMOGRAPHY | Facility: CLINIC | Age: 58
Discharge: HOME | End: 2023-05-30
Attending: NURSE PRACTITIONER | Admitting: NURSE PRACTITIONER
Payer: COMMERCIAL

## 2023-05-30 DIAGNOSIS — C50.912 INVASIVE LOBULAR CARCINOMA OF LEFT BREAST IN FEMALE (H): ICD-10-CM

## 2023-05-30 PROCEDURE — 77067 SCR MAMMO BI INCL CAD: CPT

## 2023-06-13 DIAGNOSIS — E78.5 HYPERLIPIDEMIA LDL GOAL <130: ICD-10-CM

## 2023-06-13 DIAGNOSIS — E06.3 HYPOTHYROIDISM DUE TO HASHIMOTO'S THYROIDITIS: ICD-10-CM

## 2023-06-13 NOTE — TELEPHONE ENCOUNTER
Pending Prescriptions:                       Disp   Refills    atorvastatin (LIPITOR) 10 MG tablet       90 tab*0            Sig: Take 1 tablet (10 mg) by mouth daily

## 2023-06-14 DIAGNOSIS — L71.9 ACNE ROSACEA: ICD-10-CM

## 2023-06-14 RX ORDER — ATORVASTATIN CALCIUM 10 MG/1
10 TABLET, FILM COATED ORAL DAILY
Qty: 90 TABLET | Refills: 3 | Status: SHIPPED | OUTPATIENT
Start: 2023-06-14 | End: 2024-08-13

## 2023-06-14 NOTE — TELEPHONE ENCOUNTER
Dr. Garcia,    Routing refill request to provider for review/approval because:  Labs not current:  Last Lipid 3/2021!  Last office visit 12/2022. Mayte BRADY RN

## 2023-06-14 NOTE — TELEPHONE ENCOUNTER
LM on patient VM lab and rx approved, rx at pharmacy and patient is to call to make lab appt. Tina Page on 6/14/2023 at 4:17 PM

## 2023-06-16 RX ORDER — LEVOTHYROXINE SODIUM 88 UG/1
88 TABLET ORAL DAILY
Qty: 90 TABLET | Refills: 3 | Status: SHIPPED | OUTPATIENT
Start: 2023-06-16 | End: 2024-02-23

## 2023-06-16 NOTE — TELEPHONE ENCOUNTER
LEVOTHYROXINE 0.088MG (88MCG) TAB  Last Written Prescription Date:   2/10/2023  Last Fill Quantity: 90,   # refills: 1  Last Office Visit :  2/10/2023  Future Office visit:   2/23/2024  90 Tabs, 3 Refill sent to pharm      Thyroid Protocol Passed 06/13/2023 08:39 AM   Protocol Details  Patient is 12 years or older    Recent (12 mo) or future (30 days) visit within the authorizing provider's specialty    Medication is active on med list    Normal TSH on file in past 12 months    No active pregnancy on record    No positive pregnancy test in past 12 months          Joellen Barrera RN  Central Triage Red Flags/Med Refills

## 2023-06-19 RX ORDER — DOXYCYCLINE HYCLATE 20 MG
40 TABLET ORAL DAILY
Qty: 180 TABLET | Refills: 0 | Status: SHIPPED | OUTPATIENT
Start: 2023-06-19 | End: 2024-01-05

## 2023-06-19 NOTE — TELEPHONE ENCOUNTER
doxycycline hyclate (PERIOSTAT) 20 MG tablet      Last Written Prescription Date:  3/21/2022  Last Fill Quantity: 180,   # refills: 3  Last Office Visit : 3/21/2022  Future Office visit:  none    Routing refill request to provider for review/approval because:  Refill needs review- continue on current dose.  - plan last visit 3/21/2022: - doxycycline 40 mg daily *Will reduce to 40 mg daily dose with flexibility to reduce further to 20 mg daily if tolerating well and no increase in activity and return to clinic in 1 year with Dr Bui  - message also sent to Dermatology scheduling

## 2023-06-20 ENCOUNTER — TELEPHONE (OUTPATIENT)
Dept: DERMATOLOGY | Facility: CLINIC | Age: 58
End: 2023-06-20
Payer: COMMERCIAL

## 2023-06-20 NOTE — TELEPHONE ENCOUNTER
Lvm my chart msg for patient to call to schedule the following:    Appointment type: Return  Provider:   Return date: [facility]  Specialty phone number: [phone]

## 2023-06-21 ENCOUNTER — LAB (OUTPATIENT)
Dept: LAB | Facility: CLINIC | Age: 58
End: 2023-06-21
Attending: FAMILY MEDICINE
Payer: COMMERCIAL

## 2023-06-21 DIAGNOSIS — E78.5 HYPERLIPIDEMIA LDL GOAL <130: ICD-10-CM

## 2023-06-21 LAB
CHOLEST SERPL-MCNC: 184 MG/DL
HDLC SERPL-MCNC: 61 MG/DL
LDLC SERPL CALC-MCNC: 95 MG/DL
NONHDLC SERPL-MCNC: 123 MG/DL
TRIGL SERPL-MCNC: 139 MG/DL

## 2023-06-21 PROCEDURE — 80061 LIPID PANEL: CPT

## 2023-06-21 PROCEDURE — 36415 COLL VENOUS BLD VENIPUNCTURE: CPT

## 2023-06-28 NOTE — RESULT ENCOUNTER NOTE
Please inform patient that test result was within normal parameters. ( Cholesterol labs )  Thank you.     Memo Garcia M.D.

## 2023-08-29 ENCOUNTER — OFFICE VISIT (OUTPATIENT)
Dept: FAMILY MEDICINE | Facility: CLINIC | Age: 58
End: 2023-08-29
Payer: COMMERCIAL

## 2023-08-29 ENCOUNTER — TELEPHONE (OUTPATIENT)
Dept: DERMATOLOGY | Facility: CLINIC | Age: 58
End: 2023-08-29

## 2023-08-29 VITALS
DIASTOLIC BLOOD PRESSURE: 80 MMHG | RESPIRATION RATE: 16 BRPM | OXYGEN SATURATION: 96 % | WEIGHT: 220 LBS | TEMPERATURE: 98.5 F | HEIGHT: 65 IN | SYSTOLIC BLOOD PRESSURE: 120 MMHG | BODY MASS INDEX: 36.65 KG/M2 | HEART RATE: 72 BPM

## 2023-08-29 DIAGNOSIS — E66.01 MORBID OBESITY (H): ICD-10-CM

## 2023-08-29 DIAGNOSIS — R21 RASH AND NONSPECIFIC SKIN ERUPTION: Primary | ICD-10-CM

## 2023-08-29 DIAGNOSIS — E78.5 HYPERLIPIDEMIA, UNSPECIFIED HYPERLIPIDEMIA TYPE: ICD-10-CM

## 2023-08-29 DIAGNOSIS — F33.0 MAJOR DEPRESSIVE DISORDER, RECURRENT EPISODE, MILD (H): ICD-10-CM

## 2023-08-29 PROBLEM — G62.9 NEUROPATHY: Status: ACTIVE | Noted: 2023-08-29

## 2023-08-29 PROBLEM — M79.18 MYALGIA OF PELVIC FLOOR: Status: RESOLVED | Noted: 2023-03-02 | Resolved: 2023-08-29

## 2023-08-29 PROBLEM — R11.2 PONV (POSTOPERATIVE NAUSEA AND VOMITING): Status: RESOLVED | Noted: 2022-07-07 | Resolved: 2023-08-29

## 2023-08-29 PROBLEM — C50.919 INVASIVE LOBULAR CARCINOMA OF BREAST IN FEMALE (H): Status: RESOLVED | Noted: 2019-08-15 | Resolved: 2023-08-29

## 2023-08-29 PROBLEM — L71.9 ROSACEA: Status: ACTIVE | Noted: 2023-08-29

## 2023-08-29 PROBLEM — M25.462 EFFUSION OF LEFT KNEE: Status: RESOLVED | Noted: 2022-06-24 | Resolved: 2023-08-29

## 2023-08-29 PROBLEM — Z98.890 PONV (POSTOPERATIVE NAUSEA AND VOMITING): Status: RESOLVED | Noted: 2022-07-07 | Resolved: 2023-08-29

## 2023-08-29 PROBLEM — M62.89 HIGH-TONE PELVIC FLOOR DYSFUNCTION: Status: RESOLVED | Noted: 2023-03-02 | Resolved: 2023-08-29

## 2023-08-29 PROBLEM — N94.10 DYSPAREUNIA IN FEMALE: Status: RESOLVED | Noted: 2023-03-02 | Resolved: 2023-08-29

## 2023-08-29 PROBLEM — N36.9 URETHRAL DISORDER: Status: RESOLVED | Noted: 2023-03-02 | Resolved: 2023-08-29

## 2023-08-29 PROCEDURE — 99213 OFFICE O/P EST LOW 20 MIN: CPT | Performed by: FAMILY MEDICINE

## 2023-08-29 RX ORDER — FLUOCINONIDE 0.5 MG/G
OINTMENT TOPICAL 2 TIMES DAILY
Qty: 45 G | Refills: 0 | Status: SHIPPED | OUTPATIENT
Start: 2023-08-29 | End: 2024-08-05

## 2023-08-29 RX ORDER — CETIRIZINE HYDROCHLORIDE 10 MG/1
10 TABLET ORAL DAILY PRN
Qty: 30 TABLET | Refills: 1 | Status: SHIPPED | OUTPATIENT
Start: 2023-08-29 | End: 2024-04-03

## 2023-08-29 NOTE — TELEPHONE ENCOUNTER
Called and spoke with pt and scheduled appt.    Thank you,  Mamta LOPEZ RN  Dermatology   [phone]

## 2023-08-29 NOTE — PROGRESS NOTES
"  Assessment & Plan     (R21) Rash and nonspecific skin eruption  (primary encounter diagnosis)  Plan: fluocinonide (LIDEX) 0.05 % external ointment,         cetirizine (ZYRTEC) 10 MG tablet, Adult         Dermatology Referral          (F33.0) Major depressive disorder, recurrent episode, mild (H)  Comment: Well controlled with medications without side effects.     (E78.5) Hyperlipidemia, unspecified hyperlipidemia type  (E66.01) Morbid obesity (H)  Comment: Well controlled with medications without side effects.              BMI:   Estimated body mass index is 36.3 kg/m  as calculated from the following:    Height as of this encounter: 1.658 m (5' 5.28\").    Weight as of this encounter: 99.8 kg (220 lb).   Weight management plan: Patient was referred to their PCP to discuss a diet and exercise plan.    See Patient Instructions    Huyen King MD  [facility] SAVANNA Acevedo is a 58 year old, presenting for the following health issues:  Derm Problem (Red spots on her legs.)        8/29/2023    11:07 AM   Additional Questions   Roomed by Sonya COX CMA   Accompanied by Self       History of Present Illness       Reason for visit:  Red spots on my legs  Symptoms include:  Ichy and swollen and red and sometimes warm  Symptom intensity:  Moderate  Symptom progression:  Improving  Had these symptoms before:  No    She eats 0-1 servings of fruits and vegetables daily.She consumes 1 sweetened beverage(s) daily.She exercises with enough effort to increase her heart rate 9 or less minutes per day.  She exercises with enough effort to increase her heart rate 3 or less days per week.   She is taking medications regularly.               Review of Systems   CONSTITUTIONAL: NEGATIVE for fever, chills, change in weight  INTEGUMENTARY/SKIN: see HPI   ENT/MOUTH: NEGATIVE for ear, mouth and throat problems  RESP: NEGATIVE for significant cough or SOB  MUSCULOSKELETAL: NEGATIVE for significant arthralgias or " "myalgia  PSYCHIATRIC: HX depression      Objective    /80 (BP Location: Right arm, Patient Position: Sitting, Cuff Size: Adult Large)   Pulse 72   Temp 98.5  F (36.9  C) (Oral)   Resp 16   Ht 1.658 m (5' 5.28\")   Wt 99.8 kg (220 lb)   SpO2 96%   BMI 36.30 kg/m    Body mass index is 36.3 kg/m .  Physical Exam   GENERAL: alert, no distress, and obese  MS: extremities normal- no gross deformities noted  SKIN: red papules on anterior lower extremities - vascular appearance, they are not dry or scaly   PSYCH: mentation appears normal, affect normal/bright    Lab on 06/21/2023   Component Date Value Ref Range Status    Cholesterol 06/21/2023 184  <200 mg/dL Final    Triglycerides 06/21/2023 139  <150 mg/dL Final    Direct Measure HDL 06/21/2023 61  >=50 mg/dL Final    LDL Cholesterol Calculated 06/21/2023 95  <=100 mg/dL Final    Non HDL Cholesterol 06/21/2023 123  <130 mg/dL Final     No results found for this or any previous visit (from the past 24 hour(s)).                  "

## 2023-08-29 NOTE — TELEPHONE ENCOUNTER
This encounter is being sent to inform the clinic that this patient has a referral from Huyen King MD in [facility] for the diagnoses of Rash and nonspecific skin eruption [R21],  and has requested that this patient be seen within 3-5 days.  Based on the availability of our provider(s), we are unable to accommodate this request.    Were all sites offered this patient?  Yes    Does scheduling algorithm request to schedule next available?  Patient has been scheduled for the first available opening with Kathleen Mon  on 10/27/23.  We have informed the patient that the clinic will review their referral and reach out if a sooner appointment is medically necessary.

## 2023-09-06 ENCOUNTER — OFFICE VISIT (OUTPATIENT)
Dept: DERMATOLOGY | Facility: CLINIC | Age: 58
End: 2023-09-06
Attending: FAMILY MEDICINE
Payer: COMMERCIAL

## 2023-09-06 DIAGNOSIS — L30.0 NUMMULAR DERMATITIS: Primary | ICD-10-CM

## 2023-09-06 DIAGNOSIS — R21 RASH AND NONSPECIFIC SKIN ERUPTION: ICD-10-CM

## 2023-09-06 PROCEDURE — 99213 OFFICE O/P EST LOW 20 MIN: CPT | Performed by: DERMATOLOGY

## 2023-09-06 NOTE — PROGRESS NOTES
Merlyn Ravi , a 58 year old year old female patient, I was asked to see by Dr. King  for rash.  Patient states this has been present for a while.  Itching on legs, clearing with TAC  Using bath and body products.  Patient has no other skin complaints today.  Remainder of the HPI, Meds, PMH, Allergies, FH, and SH was reviewed in chart.      Past Medical History:   Diagnosis Date    Allergies     Breast cancer (H)     Hyperlipidemia 10/14/2014    Major depressive disorder, recurrent episode, mild (H) 05/17/2022    Malignant neoplasm of female breast (H) 08/15/2019    Formatting of this note might be different from the original.  8/2019. Left.  Formatting of this note is different from the original.  Formatting of this note might be different from the original.  8/2019. Left.     Formatting of this note might be different from the original.  6.5.2020- Review and distribute treatment summary-[facility]  Added automatically from request for surgery [id number]       Mixed incontinence 03/02/2023    Monoclonal gammopathy 10/08/2013    Formatting of this note might be different from the original.  Last Assessment & Plan:   Formatting of this note might be different from the original.  Merlyn Ravi  has a history of IgG kappa MGUS. The MGUS was discovered after she had been found to have an elevated gamma globulin fraction when she underwent blood testing prior to donating blood at [facility]. She was found to have a 0.4 gram/dL I    Neuropathy 08/29/2023    Obesity (BMI 35.0-39.9) with comorbidity (H) 01/25/2019    Other specified hypothyroidism 08/29/2019    PONV (postoperative nausea and vomiting) 07/07/2022    Rosacea 08/29/2023       Past Surgical History:   Procedure Laterality Date    ARTHROSCOPY KNEE Left 7/14/2022    Procedure: ARTHROSCOPY, LEFT KNEE with meniscal and chondral debridement;  Surgeon: Timo Berman MD;  Location: [facility]    BIOPSY BREAST      HYSTERECTOMY, PAP NO LONGER INDICATED       HYSTERECTOMY, MAGY      LASIK BILATERAL  01/01/2005    Dr. Solis     LUMPECTOMY BREAST Left 09/30/2019    Procedure: Re-excision of left breast lumpectomy site;  Surgeon: Kj Salas DO;  Location: [facility]    LUMPECTOMY BREAST Left 10/09/2019    Procedure: Left breast re-excision;  Surgeon: Kj Salas DO;  Location: [facility]    LUMPECTOMY BREAST WITH SENTINEL NODE, COMBINED Left 09/23/2019    Procedure: LUMPECTOMY, BREAST, WITH SENTINEL LYMPH NODE BIOPSY.;  Surgeon: Kj Salas DO;  Location: [facility]    [facility] NONSPECIFIC PROCEDURE      bladder surgery        Family History   Problem Relation Age of Onset    Thyroid Disease Mother     Heart Disease Mother     Heart Disease Father     Hyperlipidemia Father     Hypertension Maternal Grandmother     Breast Cancer Maternal Grandmother     Hypertension Maternal Grandfather     Alzheimer Disease Paternal Grandmother     Diabetes Brother     Eczema Brother     Thyroid Disease Brother     Melanoma No family hx of        Social History     Socioeconomic History    Marital status:      Spouse name: Not on file    Number of children: Not on file    Years of education: Not on file    Highest education level: Not on file   Occupational History    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 No   Social History Narrative    Not on file     Social Determinants of Health     Financial Resource Strain: Not on file   Food Insecurity: Not on file   Transportation Needs: Not on file   Physical  Activity: Not on file   Stress: Not on file   Social Connections: Not on file   Intimate Partner Violence: Not on file   Housing Stability: Not on file       Outpatient Encounter Medications as of 9/6/2023   Medication Sig Dispense Refill    atorvastatin (LIPITOR) 10 MG tablet Take 1 tablet (10 mg) by mouth daily 90 tablet 3    calcium 500-125 MG-UNIT TABS Take 2 tablets by mouth 2 times daily      cetirizine (ZYRTEC) 10 MG tablet Take 1 tablet (10 mg) by mouth daily as needed (itch) 30 tablet 1    doxycycline hyclate (PERIOSTAT) 20 MG tablet Take 2 tablets (40 mg) by mouth daily 180 tablet 0    fluocinonide (LIDEX) 0.05 % external ointment Apply topically 2 times daily 45 g 0    gabapentin (NEURONTIN) 100 MG capsule TAKE 1 CAPSULE BY MOUTH EVERY MORNING AND TAKE 2 CAPSULES EVERY EVENING. 90 capsule 3    levothyroxine (SYNTHROID/LEVOTHROID) 88 MCG tablet Take 1 tablet (88 mcg) by mouth daily 90 tablet 3    metroNIDAZOLE (METROCREAM) 0.75 % external cream Apply topically 2 times daily 45 g 3    tamoxifen (NOLVADEX) 20 MG tablet Take 1 tablet (20 mg) by mouth daily 90 tablet 3    tolterodine ER (DETROL LA) 4 MG 24 hr capsule TAKE 1 CAPSULE(4 MG) BY MOUTH DAILY 90 capsule 1    venlafaxine (EFFEXOR XR) 75 MG 24 hr capsule TAKE 1 CAPSULE(75 MG) BY MOUTH DAILY 90 capsule 3     Facility-Administered Encounter Medications as of 9/6/2023   Medication Dose Route Frequency Provider Last Rate Last Admin    bupivacaine (MARCAINE) 0.25 % injection 4 mL  4 mL   Timo Berman MD   4 mL at 03/06/23 1556    triamcinolone (KENALOG-40) injection 40 mg  40 mg   Timo Berman MD   40 mg at 03/06/23 1556             Review Of Systems  Skin: As above  Eyes: negative  Ears/Nose/Throat: negative  Respiratory: No shortness of breath, dyspnea on exertion, cough, or hemoptysis  Cardiovascular: negative  Gastrointestinal: negative  Genitourinary: negative  Musculoskeletal: negative  Neurologic: negative  Psychiatric:  negative  Hematologic/Lymphatic/Immunologic: negative  Endocrine: negative      O:   NAD, WDWN, Alert & Oriented, Mood & Affect wnl, Vitals stable   Here today alone   General appearance lelo ii   Vitals stable   Alert, oriented and in no acute distress   Post inflammatory change son legs  Photos show nummular eczematous plaques      Eyes: Conjunctivae/lids:Normal     ENT: Lips, buccal mucosa, tongue: normal    MSK:Normal    Cardiovascular: peripheral edema none    Pulm: Breathing Normal    Neuro/Psych: Orientation:Normal; Mood/Affect:Normal      A/P:  Nummular derm   Pathophysiology discussed with pateint   Skin care discussed with patient   Emollient use discussed with patient   TAC prn   It was a pleasure speaking to Merlyn Ravi today.  Previous clinic  notes and pertinent laboratory tests were reviewed prior to Merlyn Ravi's visit.  Skin care regimen reviewed with patient: Eliminate harsh soaps, i.e. Dial, zest, irsih spring; Mild soaps such as Cetaphil or Dove sensitive skin, avoid hot or cold showers, aggressive use of emollients including vanicream, cetaphil or cerave discussed with patient.    Return to clinic as needed

## 2023-09-06 NOTE — PATIENT INSTRUCTIONS
Patient Education       Proper skin care from [facility]:    -Eliminate harsh soaps as they strip the natural oils from the skin, often resulting in dry itchy skin ( i.e. Dial, Zest, Persian Spring)  -Use mild soaps such as Cetaphil or Dove Sensitive Skin in the shower. You do not need to use soap on arms, legs, and trunk every time you shower unless visibly soiled.   -Avoid hot or cold showers.  -After showering, lightly dry off and apply moisturizing within 2-3 minutes. This will help trap moisture in the skin.   -Aggressive use of a moisturizer at least 1-2 times a day to the entire body (including -Vanicream, Cetaphil, Aquaphor or Cerave) and moisturize hands after every washing.  -We recommend using moisturizers that come in a tub that needs to be scooped out, not a pump. This has more of an oil base. It will hold moisture in your skin much better than a water base moisturizer. The above recommended are non-pore clogging.      Wear a sunscreen with at least SPF 30 on your face, ears, neck and V of the chest daily. Wear sunscreen on other areas of the body if those areas are exposed to the sun throughout the day. Sunscreens can contain physical and/or chemical blockers. Physical blockers are less likely to clog pores, these include zinc oxide and titanium dioxide. Reapply every two hour and after swimming.     Sunscreen examples: https://www.ewg.org/sunscreen/    UV radiation  UVA radiation remains constant throughout the day and throughout the year. It is a longer wavelength than UVB and therefore penetrates deeper into the skin leading to immediate and delayed tanning, photoaging, and skin cancer. 70-80% of UVA and UVB radiation occurs between the hours of 10am-2pm.  UVB radiation  UVB radiation causes the most harmful effects and is more significant during the summer months. However, snow and ice can reflect UVB radiation leading to skin damage during the winter months as well. UVB radiation is  responsible for tanning, burning, inflammation, delayed erythema (pinkness), pigmentation (brown spots), and skin cancer.     I recommend self monthly full body exams and yearly full body exams with a dermatology provider. If you develop a new or changing lesion please follow up for examination. Most skin cancers are pink and scaly or pink and pearly. However, we do see blue/brown/black skin cancers.  Consider the ABCDEs of melanoma when giving yourself your monthly full body exam ( don't forget the groin, buttocks, feet, toes, etc). A-asymmetry, B-borders, C-color, D-diameter, E-elevation or evolving. If you see any of these changes please follow up in clinic. If you cannot see your back I recommend purchasing a hand held mirror to use with a larger wall mirror.       Checking for Skin Cancer  You can find cancer early by checking your skin each month. There are 3 kinds of skin cancer. They are melanoma, basal cell carcinoma, and squamous cell carcinoma. Doing monthly skin checks is the best way to find new marks or skin changes. Follow the instructions below for checking your skin.   The ABCDEs of checking moles for melanoma   Check your moles or growths for signs of melanoma using ABCDE:   Asymmetry: the sides of the mole or growth don t match  Border: the edges are ragged, notched, or blurred  Color: the color within the mole or growth varies  Diameter: the mole or growth is larger than 6 mm (size of a pencil eraser)  Evolving: the size, shape, or color of the mole or growth is changing (evolving is not shown in the images below)    Checking for other types of skin cancer  Basal cell carcinoma or squamous cell carcinoma have symptoms such as:     A spot or mole that looks different from all other marks on your skin  Changes in how an area feels, such as itching, tenderness, or pain  Changes in the skin's surface, such as oozing, bleeding, or scaliness  A sore that does not heal  New swelling or redness beyond  the border of a mole    Who s at risk?  Anyone can get skin cancer. But you are at greater risk if you have:   Fair skin, light-colored hair, or light-colored eyes  Many moles or abnormal moles on your skin  A history of sunburns from sunlight or tanning beds  A family history of skin cancer  A history of exposure to radiation or chemicals  A weakened immune system  If you have had skin cancer in the past, you are at risk for recurring skin cancer.   How to check your skin  Do your monthly skin checkups in front of a full-length mirror. Check all parts of your body, including your:   Head (ears, face, neck, and scalp)  Torso (front, back, and sides)  Arms (tops, undersides, upper, and lower armpits)  Hands (palms, backs, and fingers, including under the nails)  Buttocks and genitals  Legs (front, back, and sides)  Feet (tops, soles, toes, including under the nails, and between toes)  If you have a lot of moles, take digital photos of them each month. Make sure to take photos both up close and from a distance. These can help you see if any moles change over time.   Most skin changes are not cancer. But if you see any changes in your skin, call your doctor right away. Only he or she can diagnose a problem. If you have skin cancer, seeing your doctor can be the first step toward getting the treatment that could save your life.   [employer] last reviewed this educational content on 4/1/2019 2000-2020 [employer]. [address] [place] [address], [place] [address]. All rights reserved. This information is not intended as a substitute for professional medical care. Always follow your healthcare professional's instructions.       When should I call my doctor?  If you are worsening or not improving, please, contact us or seek urgent care as noted below.     Who should I call with questions (adults)?  [facility] (adult and pediatric): [phone]  [facility]  [place] (adult): [phone]  [facility] ([place], [place], [place] and [place]) [phone]  For urgent needs outside of business hours call the [facility] at [phone] and ask for the dermatology resident on call to be paged  If this is a medical emergency and you are unable to reach an ER, Call 911      If you need a prescription refill, please contact your pharmacy. Refills are approved or denied by our Physicians during normal business hours, Monday through Fridays  Per office policy, refills will not be granted if you have not been seen within the past year (or sooner depending on your child's condition)

## 2023-09-06 NOTE — LETTER
9/6/2023         RE: Merlyn Ravi  [address] [facility] [id number]        Dear Colleague,    Thank you for referring your patient, Merlyn Ravi, to the [facility]. Please see a copy of my visit note below.    Merlyn Ravi , a 58 year old year old female patient, I was asked to see by Dr. King  for rash.  Patient states this has been present for a while.  Itching on legs, clearing with TAC  Using bath and body products.  Patient has no other skin complaints today.  Remainder of the HPI, Meds, PMH, Allergies, FH, and SH was reviewed in chart.      Past Medical History:   Diagnosis Date     Allergies      Breast cancer (H)      Hyperlipidemia 10/14/2014     Major depressive disorder, recurrent episode, mild (H) 05/17/2022     Malignant neoplasm of female breast (H) 08/15/2019    Formatting of this note might be different from the original.  8/2019. Left.  Formatting of this note is different from the original.  Formatting of this note might be different from the original.  8/2019. Left.     Formatting of this note might be different from the original.  6.5.2020- Review and distribute treatment summary-[facility]  Added automatically from request for surgery [id number]        Mixed incontinence 03/02/2023     Monoclonal gammopathy 10/08/2013    Formatting of this note might be different from the original.  Last Assessment & Plan:   Formatting of this note might be different from the original.  Merlyn Ravi  has a history of IgG kappa MGUS. The MGUS was discovered after she had been found to have an elevated gamma globulin fraction when she underwent blood testing prior to donating blood at [email or web address]. She was found to have a 0.4 gram/dL I     Neuropathy 08/29/2023     Obesity (BMI 35.0-39.9) with comorbidity (H) 01/25/2019     Other specified hypothyroidism 08/29/2019     PONV (postoperative nausea and vomiting) 07/07/2022     Rosacea 08/29/2023       Past  Surgical History:   Procedure Laterality Date     ARTHROSCOPY KNEE Left 7/14/2022    Procedure: ARTHROSCOPY, LEFT KNEE with meniscal and chondral debridement;  Surgeon: Timo Berman MD;  Location: [facility]     BIOPSY BREAST       HYSTERECTOMY, PAP NO LONGER INDICATED       HYSTERECTOMY, MAGY       LASIK BILATERAL  01/01/2005    Dr. Solis      LUMPECTOMY BREAST Left 09/30/2019    Procedure: Re-excision of left breast lumpectomy site;  Surgeon: Kj Salas DO;  Location: [facility]     LUMPECTOMY BREAST Left 10/09/2019    Procedure: Left breast re-excision;  Surgeon: Kj Salas DO;  Location: [facility]     LUMPECTOMY BREAST WITH SENTINEL NODE, COMBINED Left 09/23/2019    Procedure: LUMPECTOMY, BREAST, WITH SENTINEL LYMPH NODE BIOPSY.;  Surgeon: Kj Salas DO;  Location: [facility] OR     [facility] NONSPECIFIC PROCEDURE      bladder surgery        Family History   Problem Relation Age of Onset     Thyroid Disease Mother      Heart Disease Mother      Heart Disease Father      Hyperlipidemia Father      Hypertension Maternal Grandmother      Breast Cancer Maternal Grandmother      Hypertension Maternal Grandfather      Alzheimer Disease Paternal Grandmother      Diabetes Brother      Eczema Brother      Thyroid Disease Brother      Melanoma No family hx of        Social History     Socioeconomic History     Marital status:      Spouse name: Not on file     Number of children: Not on file     Years of education: Not on file     Highest education level: Not on file   Occupational History     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 No   Social History Narrative     Not on file     Social Determinants of Health     Financial Resource Strain: Not on file   Food Insecurity: Not on file   Transportation Needs: Not on file   Physical Activity: Not on file   Stress: Not on file   Social Connections: Not on file   Intimate Partner Violence: Not on file   Housing Stability: Not on file       Outpatient Encounter Medications as of 9/6/2023   Medication Sig Dispense Refill     atorvastatin (LIPITOR) 10 MG tablet Take 1 tablet (10 mg) by mouth daily 90 tablet 3     calcium 500-125 MG-UNIT TABS Take 2 tablets by mouth 2 times daily       cetirizine (ZYRTEC) 10 MG tablet Take 1 tablet (10 mg) by mouth daily as needed (itch) 30 tablet 1     doxycycline hyclate (PERIOSTAT) 20 MG tablet Take 2 tablets (40 mg) by mouth daily 180 tablet 0     fluocinonide (LIDEX) 0.05 % external ointment Apply topically 2 times daily 45 g 0     gabapentin (NEURONTIN) 100 MG capsule TAKE 1 CAPSULE BY MOUTH EVERY MORNING AND TAKE 2 CAPSULES EVERY EVENING. 90 capsule 3     levothyroxine (SYNTHROID/LEVOTHROID) 88 MCG tablet Take 1 tablet (88 mcg) by mouth daily 90 tablet 3     metroNIDAZOLE (METROCREAM) 0.75 % external cream Apply topically 2 times daily 45 g 3     tamoxifen (NOLVADEX) 20 MG tablet Take 1 tablet (20 mg) by mouth daily 90 tablet 3     tolterodine ER (DETROL LA) 4 MG 24 hr capsule TAKE 1 CAPSULE(4 MG) BY MOUTH DAILY 90 capsule 1     venlafaxine (EFFEXOR XR) 75 MG 24 hr capsule TAKE 1 CAPSULE(75 MG) BY MOUTH DAILY 90 capsule 3     Facility-Administered Encounter Medications as of 9/6/2023   Medication Dose Route Frequency Provider Last Rate Last Admin     bupivacaine (MARCAINE) 0.25 % injection 4 mL  4 mL   Timo Berman MD   4 mL at 03/06/23 8040     triamcinolone (KENALOG-40) injection 40 mg  40 mg   Timo Berman MD   40 mg at 03/06/23 6188              Review Of Systems  Skin: As above  Eyes: negative  Ears/Nose/Throat: negative  Respiratory: No shortness of breath, dyspnea on exertion, cough, or hemoptysis  Cardiovascular: negative  Gastrointestinal: negative  Genitourinary: negative  Musculoskeletal: negative  Neurologic: negative  Psychiatric: negative  Hematologic/Lymphatic/Immunologic: negative  Endocrine: negative      O:   NAD, WDWN, Alert & Oriented, Mood & Affect wnl, Vitals stable   Here today alone   General appearance lelo ii   Vitals stable   Alert, oriented and in no acute distress   Post inflammatory change son legs  Photos show nummular eczematous plaques      Eyes: Conjunctivae/lids:Normal     ENT: Lips, buccal mucosa, tongue: normal    MSK:Normal    Cardiovascular: peripheral edema none    Pulm: Breathing Normal    Neuro/Psych: Orientation:Normal; Mood/Affect:Normal      A/P:  Nummular derm   Pathophysiology discussed with pateint   Skin care discussed with patient   Emollient use discussed with patient   TAC prn   It was a pleasure speaking to Merlyn Ravi today.  Previous clinic  notes and pertinent laboratory tests were reviewed prior to Merlyn Ravi's visit.  Skin care regimen reviewed with patient: Eliminate harsh soaps, i.e. Dial, zest, irsih spring; Mild soaps such as Cetaphil or Dove sensitive skin, avoid hot or cold showers, aggressive use of emollients including vanicream, cetaphil or cerave discussed with patient.    Return to clinic as needed      Again, thank you for allowing me to participate in the care of your patient.        Sincerely,        Miguelito Maurer MD

## 2023-09-08 PROBLEM — M62.89 PELVIC FLOOR DYSFUNCTION IN FEMALE: Status: RESOLVED | Noted: 2023-03-27 | Resolved: 2023-09-08

## 2023-09-08 NOTE — PROGRESS NOTES
DISCHARGE  Reason for Discharge: Patient has failed to schedule further appointments.    Equipment Issued:     Discharge Plan: Patient to continue home program.    Referring Provider:  Trina Gates

## 2023-09-28 DIAGNOSIS — G62.9 NEUROPATHY: ICD-10-CM

## 2023-09-28 DIAGNOSIS — R52 PAIN: ICD-10-CM

## 2023-09-29 RX ORDER — GABAPENTIN 100 MG/1
CAPSULE ORAL
Qty: 90 CAPSULE | Refills: 3 | Status: SHIPPED | OUTPATIENT
Start: 2023-09-29 | End: 2024-04-25

## 2023-10-12 ENCOUNTER — OFFICE VISIT (OUTPATIENT)
Dept: SURGERY | Facility: CLINIC | Age: 58
End: 2023-10-12
Payer: COMMERCIAL

## 2023-10-12 VITALS
OXYGEN SATURATION: 98 % | DIASTOLIC BLOOD PRESSURE: 98 MMHG | WEIGHT: 221 LBS | HEIGHT: 65 IN | BODY MASS INDEX: 36.82 KG/M2 | TEMPERATURE: 97.9 F | SYSTOLIC BLOOD PRESSURE: 141 MMHG | HEART RATE: 88 BPM

## 2023-10-12 DIAGNOSIS — C50.919 INVASIVE LOBULAR CARCINOMA OF BREAST IN FEMALE (H): Primary | ICD-10-CM

## 2023-10-12 PROCEDURE — 99213 OFFICE O/P EST LOW 20 MIN: CPT | Performed by: SURGERY

## 2023-10-12 ASSESSMENT — PAIN SCALES - GENERAL: PAINLEVEL: NO PAIN (0)

## 2023-10-12 NOTE — PROGRESS NOTES
BREAST CANCER FOLLOW UP  CHIEF COMPLAINT  Chief Complaint   Patient presents with    RECHECK     HPI  Merlyn Ravi is a 58 year old female who presents with   Chief Complaint   Patient presents with    RECHECK     The patient presents for her breast cancer followup appointment. Overall, she is feeling good.. She has not suffered any significant weight loss.  Her lymphedema has improved, she has to periodically massage the area to allow drainage, but improves.  She has not noted any areas of skin changes or any masses in the breast or chest wall that she is concerned about.  She denies skin dimpling, puckering, erythema, or nipple discharge. She has not noted any palpable axillary lymphadenopathy.    Review of Systems  10 point ROS otherwise negative    PAST MEDICAL HISTORY  Past Medical History:   Diagnosis Date    Allergies     Breast cancer (H)     Hyperlipidemia 10/14/2014    Major depressive disorder, recurrent episode, mild (H24) 05/17/2022    Malignant neoplasm of female breast (H) 08/15/2019    Formatting of this note might be different from the original.  8/2019. Left.  Formatting of this note is different from the original.  Formatting of this note might be different from the original.  8/2019. Left.     Formatting of this note might be different from the original.  6.5.2020- Review and distribute treatment summary-[place]  Added automatically from request for surgery [id number]       Mixed incontinence 03/02/2023    Monoclonal gammopathy 10/08/2013    Formatting of this note might be different from the original.  Last Assessment & Plan:   Formatting of this note might be different from the original.  Merlyn Ravi  has a history of IgG kappa MGUS. The MGUS was discovered after she had been found to have an elevated gamma globulin fraction when she underwent blood testing prior to donating blood at [facility]. She was found to have a 0.4 gram/dL I    Neuropathy 08/29/2023    Obesity (BMI 35.0-39.9)  with comorbidity (H) 01/25/2019    Other specified hypothyroidism 08/29/2019    PONV (postoperative nausea and vomiting) 07/07/2022    Rosacea 08/29/2023     FAMILY HISTORY  Family History   Problem Relation Age of Onset    Thyroid Disease Mother     Heart Disease Mother     Heart Disease Father     Hyperlipidemia Father     Hypertension Maternal Grandmother     Breast Cancer Maternal Grandmother     Hypertension Maternal Grandfather     Alzheimer Disease Paternal Grandmother     Diabetes Brother     Eczema Brother     Thyroid Disease Brother     Melanoma No family hx of      SOCIAL HISTORY  Social History     Socioeconomic History    Marital status:      Spouse name: None    Number of children: None    Years of education: None    Highest education level: None   Occupational History    Occupation: special education at [employer]   Tobacco Use    Smoking status: Never    Smokeless tobacco: Never   Vaping Use    Vaping Use: Never used   Substance and Sexual Activity    Alcohol use: Yes     Alcohol/week: 0.0 standard drinks of alcohol     Comment: 3 drinks per month    Drug use: No    Sexual activity: Yes     Partners: Male     Birth control/protection: Female Surgical, Post-menopausal   Other Topics Concern    Parent/sibling w/ CABG, MI or angioplasty before 65F 55M? No     SURGICAL HISTORY  Past Surgical History:   Procedure Laterality Date    ARTHROSCOPY KNEE Left 7/14/2022    Procedure: ARTHROSCOPY, LEFT KNEE with meniscal and chondral debridement;  Surgeon: Timo Berman MD;  Location: [facility] OR    BIOPSY BREAST      HYSTERECTOMY, PAP NO LONGER INDICATED      HYSTERECTOMY, MAGY      LASIK BILATERAL  01/01/2005    Dr. Solis     LUMPECTOMY BREAST Left 09/30/2019    Procedure: Re-excision of left breast lumpectomy site;  Surgeon: Kj Salas DO;  Location: [facility]    LUMPECTOMY BREAST Left 10/09/2019    Procedure: Left breast re-excision;  Surgeon: Kj Salas DO;   Location: [facility]    LUMPECTOMY BREAST WITH SENTINEL NODE, COMBINED Left 09/23/2019    Procedure: LUMPECTOMY, BREAST, WITH SENTINEL LYMPH NODE BIOPSY.;  Surgeon: Kj Salas DO;  Location: [facility]    [facility] NONSPECIFIC PROCEDURE      bladder surgery     CURRENT MEDICATIONS    Current Outpatient Medications:     atorvastatin (LIPITOR) 10 MG tablet, Take 1 tablet (10 mg) by mouth daily, Disp: 90 tablet, Rfl: 3    calcium 500-125 MG-UNIT TABS, Take 2 tablets by mouth 2 times daily, Disp: , Rfl:     cetirizine (ZYRTEC) 10 MG tablet, Take 1 tablet (10 mg) by mouth daily as needed (itch), Disp: 30 tablet, Rfl: 1    doxycycline hyclate (PERIOSTAT) 20 MG tablet, Take 2 tablets (40 mg) by mouth daily, Disp: 180 tablet, Rfl: 0    fluocinonide (LIDEX) 0.05 % external ointment, Apply topically 2 times daily, Disp: 45 g, Rfl: 0    gabapentin (NEURONTIN) 100 MG capsule, TAKE 1 CAPSULE BY MOUTH EVERY MORNING AND TAKE 2 CAPSULES EVERY EVENING., Disp: 90 capsule, Rfl: 3    levothyroxine (SYNTHROID/LEVOTHROID) 88 MCG tablet, Take 1 tablet (88 mcg) by mouth daily, Disp: 90 tablet, Rfl: 3    metroNIDAZOLE (METROCREAM) 0.75 % external cream, Apply topically 2 times daily, Disp: 45 g, Rfl: 3    tamoxifen (NOLVADEX) 20 MG tablet, Take 1 tablet (20 mg) by mouth daily, Disp: 90 tablet, Rfl: 3    tolterodine ER (DETROL LA) 4 MG 24 hr capsule, TAKE 1 CAPSULE(4 MG) BY MOUTH DAILY, Disp: 90 capsule, Rfl: 1    venlafaxine (EFFEXOR XR) 75 MG 24 hr capsule, TAKE 1 CAPSULE(75 MG) BY MOUTH DAILY, Disp: 90 capsule, Rfl: 3    Current Facility-Administered Medications:     bupivacaine (MARCAINE) 0.25 % injection 4 mL, 4 mL, , , Timo Berman MD, 4 mL at 03/06/23 1556    triamcinolone (KENALOG-40) injection 40 mg, 40 mg, , , Timo Berman MD, 40 mg at 03/06/23 1556  ALLERGIES  Allergies   Allergen Reactions    Demerol      Sedation and vomiting    Meperidine     Sulfa Antibiotics Hives     unknown reaction     PHYSICAL EXAM  VITAL SIGNS:  " height is 1.651 m (5' 5\") and weight is 100.2 kg (221 lb). Her tympanic temperature is 97.9  F (36.6  C). Her blood pressure is 141/98 (abnormal) and her pulse is 88. Her oxygen saturation is 98%.   Constitutional: Well developed, Well nourished, No acute distress, Non-toxic appearance.   HENT: Normocephalic, Atraumatic, Bilateral external ears normal, Oropharynx moist, No oral exudates, Nose normal.   Eyes: EOMI, Conjunctiva normal, No discharge.   Neck: Normal range of motion, No tenderness, Supple, No stridor.   Lymphatic: No lymphadenopathy noted.   Cardiovascular: Normal heart rate, Normal rhythm, No murmurs, No rubs, No gallops.   Breast Exam: Examined with Terri Flnanery  RIGHT: No areas of skin dimpling or puckering. No erythema. No skin scaling or changes. No expressible nipple discharge. No focal areas of significant tenderness. .  No palpable axillary lymphadenopathy.  LEFT: No areas of skin dimpling or puckering.  Left lower inner breast with some volume loss, chronic, no focal mass No erythema. No skin scaling or changes. No expressible nipple discharge. No focal areas of significant tenderness. .  No palpable axillary lymphadenopathy.  Skin: Warm, Dry, No erythema, No rash.   Back: No tenderness, No CVA tenderness.   Extremities: Intact distal pulses, No edema, No tenderness, No cyanosis, No clubbing.   Musculoskeletal: Good range of motion in all major joints. No tenderness to palpation or major deformities noted.   Neurologic: Alert & oriented x 3, Normal motor function, Normal sensory function, No focal deficits noted.   Psychiatric: Affect normal, Judgment normal, Mood normal.     Imaging Studies:   Mammogram reviewed, negative from 5/2023    FINAL IMPRESSION AND PLAN  1. Invasive lobular carcinoma of breast in female (H)      The patient is 4 years out from her breast cancer surgery. There is no evidence of recurrent disease on my exam today. She is continuing to recover well from her breast " cancer treatment.  \  She will continue periodic self breast or chest wall exam. She is encouraged to followup with me for any changes on self-exam, or for any concerns or questions. She will followup with her primary care provider for routine care, and continue followup with her oncologist as scheduled.    My recommendations were discussed with the patient. She will see me in 1 year for another exam. Patient will be scheduled for any indicated mammography or additional imaging.

## 2023-10-12 NOTE — LETTER
10/12/2023         RE: Merlyn Ravi  [address] [facility] [place] [address]        Dear Colleague,    Thank you for referring your patient, Merlyn Ravi, to the [facility]. Please see a copy of my visit note below.    BREAST CANCER FOLLOW UP  CHIEF COMPLAINT  Chief Complaint   Patient presents with     RECHECK     HPI  Merlyn Ravi is a 58 year old female who presents with   Chief Complaint   Patient presents with     RECHECK     The patient presents for her breast cancer followup appointment. Overall, she is feeling good.. She has not suffered any significant weight loss.  Her lymphedema has improved, she has to periodically massage the area to allow drainage, but improves.  She has not noted any areas of skin changes or any masses in the breast or chest wall that she is concerned about.  She denies skin dimpling, puckering, erythema, or nipple discharge. She has not noted any palpable axillary lymphadenopathy.    Review of Systems  10 point ROS otherwise negative    PAST MEDICAL HISTORY  Past Medical History:   Diagnosis Date     Allergies      Breast cancer (H)      Hyperlipidemia 10/14/2014     Major depressive disorder, recurrent episode, mild (H24) 05/17/2022     Malignant neoplasm of female breast (H) 08/15/2019    Formatting of this note might be different from the original.  8/2019. Left.  Formatting of this note is different from the original.  Formatting of this note might be different from the original.  8/2019. Left.     Formatting of this note might be different from the original.  6.5.2020- Review and distribute treatment summary-[place]  Added automatically from request for surgery [id number]        Mixed incontinence 03/02/2023     Monoclonal gammopathy 10/08/2013    Formatting of this note might be different from the original.  Last Assessment & Plan:   Formatting of this note might be different from the original.  Merlyn Ravi  has a history  of IgG kappa MGUS. The MGUS was discovered after she had been found to have an elevated gamma globulin fraction when she underwent blood testing prior to donating blood at [facility]. She was found to have a 0.4 gram/dL I     Neuropathy 08/29/2023     Obesity (BMI 35.0-39.9) with comorbidity (H) 01/25/2019     Other specified hypothyroidism 08/29/2019     PONV (postoperative nausea and vomiting) 07/07/2022     Rosacea 08/29/2023     FAMILY HISTORY  Family History   Problem Relation Age of Onset     Thyroid Disease Mother      Heart Disease Mother      Heart Disease Father      Hyperlipidemia Father      Hypertension Maternal Grandmother      Breast Cancer Maternal Grandmother      Hypertension Maternal Grandfather      Alzheimer Disease Paternal Grandmother      Diabetes Brother      Eczema Brother      Thyroid Disease Brother      Melanoma No family hx of      SOCIAL HISTORY  Social History     Socioeconomic History     Marital status:      Spouse name: None     Number of children: None     Years of education: None     Highest education level: None   Occupational History     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   Tobacco Use     Smoking status: Never     Smokeless tobacco: Never   Vaping Use     Vaping Use: Never used   Substance and Sexual Activity     Alcohol use: Yes     Alcohol/week: 0.0 standard drinks of alcohol     Comment: 3 drinks per month     Drug use: No     Sexual activity: Yes     Partners: Male     Birth control/protection: Female Surgical, Post-menopausal   Other Topics Concern     Parent/sibling w/ CABG, MI or angioplasty before 65F 55M? No     SURGICAL HISTORY  Past Surgical History:   Procedure Laterality Date     ARTHROSCOPY KNEE Left 7/14/2022    Procedure: ARTHROSCOPY, LEFT KNEE with meniscal and chondral debridement;  Surgeon: Timo Berman MD;  Location: [facility] OR     BIOPSY BREAST       HYSTERECTOMY, PAP NO LONGER INDICATED       HYSTERECTOMY, MAGY       LASIK  BILATERAL  01/01/2005    Dr. Solis      LUMPECTOMY BREAST Left 09/30/2019    Procedure: Re-excision of left breast lumpectomy site;  Surgeon: Kj Salas DO;  Location: [facility]     LUMPECTOMY BREAST Left 10/09/2019    Procedure: Left breast re-excision;  Surgeon: Kj Salas DO;  Location: [facility]     LUMPECTOMY BREAST WITH SENTINEL NODE, COMBINED Left 09/23/2019    Procedure: LUMPECTOMY, BREAST, WITH SENTINEL LYMPH NODE BIOPSY.;  Surgeon: Kj Salas DO;  Location: [facility] OR     [facility] NONSPECIFIC PROCEDURE      bladder surgery     CURRENT MEDICATIONS    Current Outpatient Medications:      atorvastatin (LIPITOR) 10 MG tablet, Take 1 tablet (10 mg) by mouth daily, Disp: 90 tablet, Rfl: 3     calcium 500-125 MG-UNIT TABS, Take 2 tablets by mouth 2 times daily, Disp: , Rfl:      cetirizine (ZYRTEC) 10 MG tablet, Take 1 tablet (10 mg) by mouth daily as needed (itch), Disp: 30 tablet, Rfl: 1     doxycycline hyclate (PERIOSTAT) 20 MG tablet, Take 2 tablets (40 mg) by mouth daily, Disp: 180 tablet, Rfl: 0     fluocinonide (LIDEX) 0.05 % external ointment, Apply topically 2 times daily, Disp: 45 g, Rfl: 0     gabapentin (NEURONTIN) 100 MG capsule, TAKE 1 CAPSULE BY MOUTH EVERY MORNING AND TAKE 2 CAPSULES EVERY EVENING., Disp: 90 capsule, Rfl: 3     levothyroxine (SYNTHROID/LEVOTHROID) 88 MCG tablet, Take 1 tablet (88 mcg) by mouth daily, Disp: 90 tablet, Rfl: 3     metroNIDAZOLE (METROCREAM) 0.75 % external cream, Apply topically 2 times daily, Disp: 45 g, Rfl: 3     tamoxifen (NOLVADEX) 20 MG tablet, Take 1 tablet (20 mg) by mouth daily, Disp: 90 tablet, Rfl: 3     tolterodine ER (DETROL LA) 4 MG 24 hr capsule, TAKE 1 CAPSULE(4 MG) BY MOUTH DAILY, Disp: 90 capsule, Rfl: 1     venlafaxine (EFFEXOR XR) 75 MG 24 hr capsule, TAKE 1 CAPSULE(75 MG) BY MOUTH DAILY, Disp: 90 capsule, Rfl: 3    Current Facility-Administered Medications:      bupivacaine (MARCAINE) 0.25 % injection 4 mL, 4 mL, , ,  "Timo Berman MD, 4 mL at 03/06/23 1556     triamcinolone (KENALOG-40) injection 40 mg, 40 mg, , , Timo Berman MD, 40 mg at 03/06/23 1556  ALLERGIES  Allergies   Allergen Reactions     Demerol      Sedation and vomiting     Meperidine      Sulfa Antibiotics Hives     unknown reaction     PHYSICAL EXAM  VITAL SIGNS:  height is 1.651 m (5' 5\") and weight is 100.2 kg (221 lb). Her tympanic temperature is 97.9  F (36.6  C). Her blood pressure is 141/98 (abnormal) and her pulse is 88. Her oxygen saturation is 98%.   Constitutional: Well developed, Well nourished, No acute distress, Non-toxic appearance.   HENT: Normocephalic, Atraumatic, Bilateral external ears normal, Oropharynx moist, No oral exudates, Nose normal.   Eyes: EOMI, Conjunctiva normal, No discharge.   Neck: Normal range of motion, No tenderness, Supple, No stridor.   Lymphatic: No lymphadenopathy noted.   Cardiovascular: Normal heart rate, Normal rhythm, No murmurs, No rubs, No gallops.   Breast Exam: Examined with Terri Flannery  RIGHT: No areas of skin dimpling or puckering. No erythema. No skin scaling or changes. No expressible nipple discharge. No focal areas of significant tenderness. .  No palpable axillary lymphadenopathy.  LEFT: No areas of skin dimpling or puckering.  Left lower inner breast with some volume loss, chronic, no focal mass No erythema. No skin scaling or changes. No expressible nipple discharge. No focal areas of significant tenderness. .  No palpable axillary lymphadenopathy.  Skin: Warm, Dry, No erythema, No rash.   Back: No tenderness, No CVA tenderness.   Extremities: Intact distal pulses, No edema, No tenderness, No cyanosis, No clubbing.   Musculoskeletal: Good range of motion in all major joints. No tenderness to palpation or major deformities noted.   Neurologic: Alert & oriented x 3, Normal motor function, Normal sensory function, No focal deficits noted.   Psychiatric: Affect normal, Judgment normal, Mood " normal.     Imaging Studies:   Mammogram reviewed, negative from 5/2023    FINAL IMPRESSION AND PLAN  1. Invasive lobular carcinoma of breast in female (H)      The patient is 4 years out from her breast cancer surgery. There is no evidence of recurrent disease on my exam today. She is continuing to recover well from her breast cancer treatment.  \  She will continue periodic self breast or chest wall exam. She is encouraged to followup with me for any changes on self-exam, or for any concerns or questions. She will followup with her primary care provider for routine care, and continue followup with her oncologist as scheduled.    My recommendations were discussed with the patient. She will see me in 1 year for another exam. Patient will be scheduled for any indicated mammography or additional imaging.           Again, thank you for allowing me to participate in the care of your patient.        Sincerely,        Kj Salas, DO

## 2023-12-29 DIAGNOSIS — R32 URINARY INCONTINENCE, UNSPECIFIED TYPE: ICD-10-CM

## 2024-01-02 ENCOUNTER — MYC REFILL (OUTPATIENT)
Dept: DERMATOLOGY | Facility: CLINIC | Age: 59
End: 2024-01-02
Payer: COMMERCIAL

## 2024-01-02 DIAGNOSIS — L71.9 ACNE ROSACEA: ICD-10-CM

## 2024-01-02 RX ORDER — TOLTERODINE 4 MG/1
CAPSULE, EXTENDED RELEASE ORAL
Qty: 90 CAPSULE | Refills: 1 | Status: SHIPPED | OUTPATIENT
Start: 2024-01-02 | End: 2024-04-09

## 2024-01-02 RX ORDER — DOXYCYCLINE HYCLATE 20 MG
40 TABLET ORAL DAILY
Qty: 180 TABLET | Refills: 0 | Status: CANCELLED | OUTPATIENT
Start: 2024-01-02

## 2024-01-02 NOTE — TELEPHONE ENCOUNTER
doxycycline hyclate (PERIOSTAT) 20 MG tablet      pt needs appt- refill denied.  LCV:3-21-22 : Dr. Bui  Derm process 4  Dr. Bui last note: RTC 6-12 months

## 2024-01-03 NOTE — TELEPHONE ENCOUNTER
Dr. Bui - Are you okay giving a gavi refill until the patient is seen? You were the last provider to see her for this specific diagnosis    Goldy REINOSO, JORDAN

## 2024-01-05 RX ORDER — DOXYCYCLINE HYCLATE 20 MG
40 TABLET ORAL DAILY
Qty: 180 TABLET | Refills: 3 | Status: SHIPPED | OUTPATIENT
Start: 2024-01-05 | End: 2024-05-30

## 2024-01-20 DIAGNOSIS — F43.23 ADJUSTMENT REACTION WITH ANXIETY AND DEPRESSION: ICD-10-CM

## 2024-01-22 RX ORDER — VENLAFAXINE HYDROCHLORIDE 75 MG/1
CAPSULE, EXTENDED RELEASE ORAL
Qty: 90 CAPSULE | Refills: 3 | Status: SHIPPED | OUTPATIENT
Start: 2024-01-22 | End: 2024-04-25

## 2024-01-22 NOTE — TELEPHONE ENCOUNTER
"Requested Prescriptions   Pending Prescriptions Disp Refills    venlafaxine (EFFEXOR XR) 75 MG 24 hr capsule [Pharmacy Med Name: VENLAFAXINE ER 75MG CAPSULES] 90 capsule 3     Sig: TAKE 1 CAPSULE(75 MG) BY MOUTH DAILY       Serotonin-Norepinephrine Reuptake Inhibitors  Failed - 1/20/2024  3:12 AM        Failed - Blood pressure under 140/90 in past 12 months     BP Readings from Last 3 Encounters:   10/12/23 (!) 141/98   08/29/23 120/80   05/04/23 (!) 141/93                 Failed - Recent (6 mo) or future (30 days) visit within the authorizing provider's specialty     Patient had office visit in the last 6 months or has a visit in the next 30 days with authorizing provider or within the authorizing provider's specialty.  See \"Patient Info\" tab in inbasket, or \"Choose Columns\" in Meds & Orders section of the refill encounter.            Passed - PHQ-9 score of less than 5 in past 6 months     Please review last PHQ-9 score.           Passed - Medication is active on med list        Passed - Patient is age 18 or older        Passed - No active pregnancy on record        Passed - Normal serum creatinine on file in past 12 months     Recent Labs   Lab Test 03/15/23  0808   CR 0.93       Ok to refill medication if creatinine is low          Passed - No positive pregnancy test in past 12 months           "

## 2024-02-23 ENCOUNTER — OFFICE VISIT (OUTPATIENT)
Dept: ENDOCRINOLOGY | Facility: CLINIC | Age: 59
End: 2024-02-23
Payer: COMMERCIAL

## 2024-02-23 VITALS
DIASTOLIC BLOOD PRESSURE: 80 MMHG | SYSTOLIC BLOOD PRESSURE: 127 MMHG | WEIGHT: 228 LBS | BODY MASS INDEX: 37.94 KG/M2 | HEART RATE: 70 BPM | OXYGEN SATURATION: 97 %

## 2024-02-23 DIAGNOSIS — R00.2 PALPITATIONS: ICD-10-CM

## 2024-02-23 DIAGNOSIS — E05.90 HYPERTHYROIDISM: Primary | ICD-10-CM

## 2024-02-23 PROCEDURE — 99214 OFFICE O/P EST MOD 30 MIN: CPT | Performed by: INTERNAL MEDICINE

## 2024-02-23 PROCEDURE — G2211 COMPLEX E/M VISIT ADD ON: HCPCS | Performed by: INTERNAL MEDICINE

## 2024-02-23 NOTE — NURSING NOTE
Merlyn Ravi's goals for this visit include:   Chief Complaint   Patient presents with    Follow Up    Thyroid Problem     She requests these members of her care team be copied on today's visit information: Yes    PCP: Memo Garcia    Referring Provider:  Referred Self, MD  No address on file    /80 (BP Location: Right arm, Patient Position: Sitting, Cuff Size: Adult Large)   Pulse 70   Wt 103.4 kg (228 lb)   SpO2 97%   BMI 37.94 kg/m      Do you need any medication refills at today's visit? Yes, but may need labs      Lela Portillo CMA  Adult Endocrinology   [facility]

## 2024-02-23 NOTE — LETTER
2/23/2024         RE: Merlyn Ravi  [address] [facility] [id number]        Dear Colleague,    Thank you for referring your patient, Merlyn Ravi, to the [facility]. Please see a copy of my visit note below.                                                            - Endocrinology Follow up -    Reason for visit/consult:  Hypothyrodism, fatiue    Primary care provider: Memo Garcia    Assessment and Plan  A 58 year old female with hypothyroidism, came with fatigue for a few years and recently getting worse,     # Hashimoto thyroiditis    # Resting palpitation this year 2024, HR up to 130 resting    - stop LT4 88 mcg completely and schedule for thyroid uptake scan (she may need methimazole)     # family history of hyperthyroidism    # Breast CA on the left  Diagnosed 2019, underwent lumpectomy, followed by RT. Currently taking Tamoxifen. Hot flush is getting milder.       # Family history of DM1   Her brother has DM1 since age 14.       # Bone health  Hysterectomy, followed by hormone replacement which was stopped 5 year ago.   She was recently diagnosed braest CA, Currently taking Tamoxifen.     - Calcium citrate plus D (elemental Ca 630 mg, VitD 500 international unit(s)) to make sure to take.       RTC with me in 6 month         30   minutes spent on the date of the encounter doing chart review, history and exam, documentation and further activities as noted above.    The longitudinal plan of care for the diagnosis(es)/condition(s) as documented were addressed during this visit. Due to the added complexity in care, I will continue to support Kristina in the subsequent management and with ongoing continuity of care.   Gala Pop MD  Staff Physician  Endocrinology and Metabolism  [facility]  License: [id number]  Pager: [phone]    Interval History as of 2/23/2024 : Patient has been experiencing resting palpitation and  tachycardia, 90s constant and up to 130s.   Interval History as of 2/10/2023 : Patient has been doing well . Medication compliance: excellent   . New event includes: no pertinent medical event noted, but it was hard for her family loss .  Interval History as of 3/9/2020 : She was recently diagnosed braest CA, Currently taking Tamoxifen. Compliant to levothryoxine 88 mcg.   Interval History as of 3/9/2020 : Patient has been doing well. Last seen . Medication compliance   . New event includes  .feelign good.   Interval History: Recently increased (3 month ago 11/2018) levothyroxien from 75 mcg to 88 mcg, patient energy level up and feeling better.   Blood work today euthryoid.     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 She had a history of HRT since 1998, due to total hysterectomy. HRT stopped January 2016.   (upate)  She has been doing well. She lost 20 lb over 1 year, no more fatigue, hair started to grow, no dry skin. Medication compliance is excellent.       1/5/2016 3/10/2016 12:51 10/6/2016 10:10 12/29/2016 12:20 4/27/2017 11:38 12/6/2017 10:11   TSH  0.4-4.0 8.08 4.99 (H) 5.96 (H) 0.01 (L) 1.90 2.99   T4 Free  0.76-1.46 0.85 0.82 0.89 1.58 (H) 1.01 1.13        11/16/2018 15:54 1/21/2019 09:16   TSH 3.38 0.46   T4 Free 1.02 1.11       Past Medical/Surgical History:  Past Medical History:   Diagnosis Date     Allergies      Breast cancer (H)      Hyperlipidemia 10/14/2014     Major depressive disorder, recurrent episode, mild (H24) 05/17/2022     Malignant neoplasm of female breast (H) 08/15/2019    Formatting of this note might be different from the original.  8/2019. Left.  Formatting of this note is different from the original.  Formatting of this note might be different from the original.  8/2019. Left.     Formatting of this note might be different from the  original.  6.5.2020- Review and distribute treatment summary-Zac Fitzgerald  Added automatically from request for surgery [id number]        Mixed incontinence 03/02/2023     Monoclonal gammopathy 10/08/2013    Formatting of this note might be different from the original.  Last Assessment & Plan:   Formatting of this note might be different from the original.  Merlyn Ravi  has a history of IgG kappa MGUS. The MGUS was discovered after she had been found to have an elevated gamma globulin fraction when she underwent blood testing prior to donating blood at [facility]. She was found to have a 0.4 gram/dL I     Neuropathy 08/29/2023     Obesity (BMI 35.0-39.9) with comorbidity (H) 01/25/2019     Other specified hypothyroidism 08/29/2019     PONV (postoperative nausea and vomiting) 07/07/2022     Rosacea 08/29/2023     Past Surgical History:   Procedure Laterality Date     ARTHROSCOPY KNEE Left 7/14/2022    Procedure: ARTHROSCOPY, LEFT KNEE with meniscal and chondral debridement;  Surgeon: Timo Berman MD;  Location:  OR     BIOPSY BREAST       HYSTERECTOMY, PAP NO LONGER INDICATED       HYSTERECTOMY, MAGY       LASIK BILATERAL  01/01/2005    Dr. Solis      LUMPECTOMY BREAST Left 09/30/2019    Procedure: Re-excision of left breast lumpectomy site;  Surgeon: Kj Salas DO;  Location: [facility] OR     LUMPECTOMY BREAST Left 10/09/2019    Procedure: Left breast re-excision;  Surgeon: Kj Salas DO;  Location: [facility] OR     LUMPECTOMY BREAST WITH SENTINEL NODE, COMBINED Left 09/23/2019    Procedure: LUMPECTOMY, BREAST, WITH SENTINEL LYMPH NODE BIOPSY.;  Surgeon: Kj Salas DO;  Location: [facility] OR     [facility] NONSPECIFIC PROCEDURE      bladder surgery       Allergies:  Allergies   Allergen Reactions     Demerol      Sedation and vomiting     Meperidine      Sulfa Antibiotics Hives     unknown reaction       Current Medications   Current Outpatient Medications   Medication     atorvastatin  "(LIPITOR) 10 MG tablet     calcium 500-125 MG-UNIT TABS     doxycycline hyclate (PERIOSTAT) 20 MG tablet     fluocinonide (LIDEX) 0.05 % external ointment     gabapentin (NEURONTIN) 100 MG capsule     levothyroxine (SYNTHROID/LEVOTHROID) 88 MCG tablet     metroNIDAZOLE (METROCREAM) 0.75 % external cream     tamoxifen (NOLVADEX) 20 MG tablet     tolterodine ER (DETROL LA) 4 MG 24 hr capsule     venlafaxine (EFFEXOR XR) 75 MG 24 hr capsule     cetirizine (ZYRTEC) 10 MG tablet     Current Facility-Administered Medications   Medication     bupivacaine (MARCAINE) 0.25 % injection 4 mL     triamcinolone (KENALOG-40) injection 40 mg       Family History:  Family History   Problem Relation Age of Onset     Thyroid Disease Mother      Heart Disease Mother      Heart Disease Father      Hyperlipidemia Father      Hypertension Maternal Grandmother      Breast Cancer Maternal Grandmother      Hypertension Maternal Grandfather      Alzheimer Disease Paternal Grandmother      Diabetes Brother      Eczema Brother      Thyroid Disease Brother      Melanoma No family hx of    Mother hyperthyrodism- removed goiter, Borhter hyperthyroidism- with medication, cousin- hyperthyrodism    Social History:  Social History     Tobacco Use     Smoking status: Never     Smokeless tobacco: Never   Substance Use Topics     Alcohol use: Yes     Alcohol/week: 0.0 standard drinks of alcohol     Comment: 3 drinks per month   Lives  with 2 kids. Work at [employer]    ROS:  Full review of systems taken with the help of the intake sheet. Pertinent positives include fatigue, loss of energy, weight gain, hair loss. Otherwise a complete 14 point review of systems was taken and is negative unless stated in the history above.      Physical Exam:   Blood pressure 112/84, pulse 83, height 1.672 m (5' 5.83\"), weight 94.1 kg (207 lb 9 oz)  General: well appearing, no acute distress, pleasant and conversant,   Mental Status/neuro: " alert and oriented  Face: symmetrical, normal facial color  Eyes: anicteric, no proptosis or lid lag  Neck: suppler, no lymphadenopahty  Thyroid: normal size and texture, no nodule palpable, no bruits  Resp: breathing normally  Legs: no swelling or edema      Again, thank you for allowing me to participate in the care of your patient.        Sincerely,        Gala Pop MD

## 2024-02-23 NOTE — PROGRESS NOTES
- Endocrinology Follow up -    Reason for visit/consult:  Hypothyrodism, emmasara    Primary care provider: Memo Garcia    Assessment and Plan  A 58 year old female with hypothyroidism, came with fatigue for a few years and recently getting worse,     # Hashimoto thyroiditis    # Resting palpitation this year 2024, HR up to 130 resting    - stop LT4 88 mcg completely and schedule for thyroid uptake scan (she may need methimazole)     # family history of hyperthyroidism    # Breast CA on the left  Diagnosed 2019, underwent lumpectomy, followed by RT. Currently taking Tamoxifen. Hot flush is getting milder.       # Family history of DM1   Her brother has DM1 since age 14.       # Bone health  Hysterectomy, followed by hormone replacement which was stopped 5 year ago.   She was recently diagnosed braest CA, Currently taking Tamoxifen.     - Calcium citrate plus D (elemental Ca 630 mg, VitD 500 international unit(s)) to make sure to take.       RTC with me in 6 month         30   minutes spent on the date of the encounter doing chart review, history and exam, documentation and further activities as noted above.    The longitudinal plan of care for the diagnosis(es)/condition(s) as documented were addressed during this visit. Due to the added complexity in care, I will continue to support Kristina in the subsequent management and with ongoing continuity of care.   Gala Pop MD  Staff Physician  Endocrinology and Metabolism  [facility]  License: [id number]  Pager: [phone]    Interval History as of 2/23/2024 : Patient has been experiencing resting palpitation and tachycardia, 90s constant and up to 130s.   Interval History as of 2/10/2023 : Patient has been doing well . Medication compliance: excellent   . New event includes: no pertinent medical event noted, but it was hard for her family loss .  Interval History as of 3/9/2020 :  She was recently diagnosed braest CA, Currently taking Tamoxifen. Compliant to levothryoxine 88 mcg.   Interval History as of 3/9/2020 : Patient has been doing well. Last seen . Medication compliance   . New event includes  .feelign good.   Interval History: Recently increased (3 month ago 11/2018) levothyroxien from 75 mcg to 88 mcg, patient energy level up and feeling better.   Blood work today euthryoid.     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 She had a history of HRT since 1998, due to total hysterectomy. HRT stopped January 2016.   (upate)  She has been doing well. She lost 20 lb over 1 year, no more fatigue, hair started to grow, no dry skin. Medication compliance is excellent.       1/5/2016 3/10/2016 12:51 10/6/2016 10:10 12/29/2016 12:20 4/27/2017 11:38 12/6/2017 10:11   TSH  0.4-4.0 8.08 4.99 (H) 5.96 (H) 0.01 (L) 1.90 2.99   T4 Free  0.76-1.46 0.85 0.82 0.89 1.58 (H) 1.01 1.13        11/16/2018 15:54 1/21/2019 09:16   TSH 3.38 0.46   T4 Free 1.02 1.11       Past Medical/Surgical History:  Past Medical History:   Diagnosis Date    Allergies     Breast cancer (H)     Hyperlipidemia 10/14/2014    Major depressive disorder, recurrent episode, mild (H24) 05/17/2022    Malignant neoplasm of female breast (H) 08/15/2019    Formatting of this note might be different from the original.  8/2019. Left.  Formatting of this note is different from the original.  Formatting of this note might be different from the original.  8/2019. Left.     Formatting of this note might be different from the original.  6.5.2020- Review and distribute treatment summary-[place]  Added automatically from request for surgery [id number]       Mixed incontinence 03/02/2023    Monoclonal gammopathy 10/08/2013    Formatting of this note might be different from the original.  Last Assessment  & Plan:   Formatting of this note might be different from the original.  Merlyn Ravi  has a history of IgG kappa MGUS. The MGUS was discovered after she had been found to have an elevated gamma globulin fraction when she underwent blood testing prior to donating blood at [facility]. She was found to have a 0.4 gram/dL I    Neuropathy 08/29/2023    Obesity (BMI 35.0-39.9) with comorbidity (H) 01/25/2019    Other specified hypothyroidism 08/29/2019    PONV (postoperative nausea and vomiting) 07/07/2022    Rosacea 08/29/2023     Past Surgical History:   Procedure Laterality Date    ARTHROSCOPY KNEE Left 7/14/2022    Procedure: ARTHROSCOPY, LEFT KNEE with meniscal and chondral debridement;  Surgeon: Timo Berman MD;  Location: [facility] OR    BIOPSY BREAST      HYSTERECTOMY, PAP NO LONGER INDICATED      HYSTERECTOMY, MAGY      LASIK BILATERAL  01/01/2005    Dr. Solis     LUMPECTOMY BREAST Left 09/30/2019    Procedure: Re-excision of left breast lumpectomy site;  Surgeon: Kj Salas DO;  Location: [facility] OR    LUMPECTOMY BREAST Left 10/09/2019    Procedure: Left breast re-excision;  Surgeon: Kj Salas DO;  Location: [facility] OR    LUMPECTOMY BREAST WITH SENTINEL NODE, COMBINED Left 09/23/2019    Procedure: LUMPECTOMY, BREAST, WITH SENTINEL LYMPH NODE BIOPSY.;  Surgeon: Kj Salas DO;  Location: [facility] OR    [facility] NONSPECIFIC PROCEDURE      bladder surgery       Allergies:  Allergies   Allergen Reactions    Demerol      Sedation and vomiting    Meperidine     Sulfa Antibiotics Hives     unknown reaction       Current Medications   Current Outpatient Medications   Medication    atorvastatin (LIPITOR) 10 MG tablet    calcium 500-125 MG-UNIT TABS    doxycycline hyclate (PERIOSTAT) 20 MG tablet    fluocinonide (LIDEX) 0.05 % external ointment    gabapentin (NEURONTIN) 100 MG capsule    levothyroxine (SYNTHROID/LEVOTHROID) 88 MCG tablet    metroNIDAZOLE (METROCREAM) 0.75 % external cream    tamoxifen  "(NOLVADEX) 20 MG tablet    tolterodine ER (DETROL LA) 4 MG 24 hr capsule    venlafaxine (EFFEXOR XR) 75 MG 24 hr capsule    cetirizine (ZYRTEC) 10 MG tablet     Current Facility-Administered Medications   Medication    bupivacaine (MARCAINE) 0.25 % injection 4 mL    triamcinolone (KENALOG-40) injection 40 mg       Family History:  Family History   Problem Relation Age of Onset    Thyroid Disease Mother     Heart Disease Mother     Heart Disease Father     Hyperlipidemia Father     Hypertension Maternal Grandmother     Breast Cancer Maternal Grandmother     Hypertension Maternal Grandfather     Alzheimer Disease Paternal Grandmother     Diabetes Brother     Eczema Brother     Thyroid Disease Brother     Melanoma No family hx of    Mother hyperthyrodism- removed goiter, Borhter hyperthyroidism- with medication, cousin- hyperthyrodism    Social History:  Social History     Tobacco Use    Smoking status: Never    Smokeless tobacco: Never   Substance Use Topics    Alcohol use: Yes     Alcohol/week: 0.0 standard drinks of alcohol     Comment: 3 drinks per month   Lives  with 2 kids. Work at [employer]    ROS:  Full review of systems taken with the help of the intake sheet. Pertinent positives include fatigue, loss of energy, weight gain, hair loss. Otherwise a complete 14 point review of systems was taken and is negative unless stated in the history above.      Physical Exam:   Blood pressure 112/84, pulse 83, height 1.672 m (5' 5.83\"), weight 94.1 kg (207 lb 9 oz)  General: well appearing, no acute distress, pleasant and conversant,   Mental Status/neuro: alert and oriented  Face: symmetrical, normal facial color  Eyes: anicteric, no proptosis or lid lag  Neck: suppler, no lymphadenopahty  Thyroid: normal size and texture, no nodule palpable, no bruits  Resp: breathing normally  Legs: no swelling or edema    "

## 2024-02-23 NOTE — PATIENT INSTRUCTIONS
Welcome to the [facility]ology [facility] Clinics     Our Endocrinology Clinics are here to provide you with a team-based, collaborative approach in the diagnosis and treatment of patients with diabetes and endocrine disorders. The team is made up of Physicians, Physician Assistants, Certified Diabetes Educators, Registered Nurses, Medical Assistants, Emergency Medical Technicians, and many others, all of whom have the unified goal of providing our patients with high quality care.     Please see below for some helpful tips to best navigate and use the [facility] Endocrinology clinic:     [place] Respect: At [facility], we are committed to a respectful and safe space for all patients, visitors, and staff.  We believe that mutual respect between patients and their care team is the foundation of quality care.  It is our expectation that you will be treated with respect by your care team.  In turn, we ask that all communication with the care team (written and verbal) be respectful and free from profanity, threatening, or abusive language.  Disrespectful communication undermines our therapeutic relationship with you and may result in us being unable to continue to provide your care.    Refills: A provider must see you at least annually to prescribe and refill medications. This is to ensure your safety as well as meet insurance and compliance regulations.    Scheduling: Many of our Providers offer both in-person or video visits. Please call to schedule any needed follow ups as soon as possible because our provider schedules fill up very quickly. Our care team has the right to require an in-person visit when they believe that it is medically necessary. Please remember that for any virtual visits, you must be in the [facility] at the time of the visit, otherwise we are unable to see you and you will need to be rescheduled.    Missed Appointments: If you need to cancel or miss your  scheduled appointment, please call the clinic at [phone] to reschedule.  Please note if you repeatedly miss appointments or repeatedly miss appointments without calling to inform us ahead of time (no-show), the clinic may elect to not allow you to reschedule without speaking to a manager, may require a Partnership In Care Agreement prior to rescheduling, or could result in you no longer being able to receive care from the clinic. Providing the clinic with timely notification if you have to miss an appointment, allows us to better serve the needs of all of our patients.    Primary Care Provider: Our Endocrinologists are Specialists in their field. We expect you to have a Primary Care Provider established to handle any needs outside of your diabetes and endocrine care.  We would be happy to assist you find a Primary Care Provider, if you do not have one.    [employer]: [employer] is a wonderful resource that allows you access to your Care Team via online or the danay. Please ask a member of the team if you would like help creating an account. Please note that it may take up to 2 business days for a response. [employer] messages are not reviewed on weekends or after business hours.  Emergent or urgent care needs should never be communicated via [employer].  If you experience a medical emergency call 911 or go to the nearest emergency room.    Labs: It is recommended that you stay within the [facility] System for labs but you are welcome to obtain ordered labs (with some exceptions) from any location of your choice as long as they are able to complete and process the needed labs. If you need us to fax orders to your preferred lab, please provide us the name and fax number of the lab you would like to go to so we can fax the orders. If your labs are drawn outside of the [facility], please have them fax the results to [phone] ([place]) or [phone] ([place]) or via [facility]. It is your  responsibility to ensure that outside lab results are sent to us.    We look forward to working with you. Please do not hesitate to reach out with any questions.    Thank you,    The Endocrine Team    [facility] Address:   [place] Address:     [address] [place] [address]    Phone: [phone]  Fax: [phone] [address] [place] [address]    Phone: [phone]  Fax: [phone]     [facility] Cost Estimate Phone Number: [phone]    General Lab and Imaging Scheduling Phone Number: [phone]

## 2024-03-12 ENCOUNTER — ANCILLARY PROCEDURE (OUTPATIENT)
Dept: NUCLEAR MEDICINE | Facility: CLINIC | Age: 59
End: 2024-03-12
Attending: INTERNAL MEDICINE
Payer: COMMERCIAL

## 2024-03-12 DIAGNOSIS — E05.90 HYPERTHYROIDISM: ICD-10-CM

## 2024-03-12 PROCEDURE — 78014 THYROID IMAGING W/BLOOD FLOW: CPT | Performed by: RADIOLOGY

## 2024-03-12 PROCEDURE — A9516 IODINE I-123 SOD IODIDE MIC: HCPCS | Performed by: RADIOLOGY

## 2024-03-12 RX ADMIN — Medication 241 MICROCURIE: at 11:12

## 2024-03-13 ENCOUNTER — ANCILLARY PROCEDURE (OUTPATIENT)
Dept: NUCLEAR MEDICINE | Facility: CLINIC | Age: 59
End: 2024-03-13
Attending: INTERNAL MEDICINE
Payer: COMMERCIAL

## 2024-03-15 ENCOUNTER — MYC MEDICAL ADVICE (OUTPATIENT)
Dept: ENDOCRINOLOGY | Facility: CLINIC | Age: 59
End: 2024-03-15
Payer: COMMERCIAL

## 2024-03-15 DIAGNOSIS — E05.00 GRAVES' DISEASE: Primary | ICD-10-CM

## 2024-03-19 ENCOUNTER — LAB (OUTPATIENT)
Dept: LAB | Facility: CLINIC | Age: 59
End: 2024-03-19
Payer: COMMERCIAL

## 2024-03-19 DIAGNOSIS — D47.2 MONOCLONAL PARAPROTEINEMIA: ICD-10-CM

## 2024-03-19 LAB
ALBUMIN SERPL BCG-MCNC: 4.1 G/DL (ref 3.5–5.2)
ALP SERPL-CCNC: 75 U/L (ref 40–150)
ALT SERPL W P-5'-P-CCNC: 17 U/L (ref 0–50)
ANION GAP SERPL CALCULATED.3IONS-SCNC: 8 MMOL/L (ref 7–15)
AST SERPL W P-5'-P-CCNC: 25 U/L (ref 0–45)
BASOPHILS # BLD AUTO: 0 10E3/UL (ref 0–0.2)
BASOPHILS NFR BLD AUTO: 1 %
BILIRUB SERPL-MCNC: <0.2 MG/DL
BUN SERPL-MCNC: 16.8 MG/DL (ref 8–23)
CALCIUM SERPL-MCNC: 9.2 MG/DL (ref 8.6–10)
CHLORIDE SERPL-SCNC: 107 MMOL/L (ref 98–107)
CREAT SERPL-MCNC: 0.89 MG/DL (ref 0.51–0.95)
DEPRECATED HCO3 PLAS-SCNC: 27 MMOL/L (ref 22–29)
EGFRCR SERPLBLD CKD-EPI 2021: 74 ML/MIN/1.73M2
EOSINOPHIL # BLD AUTO: 0.2 10E3/UL (ref 0–0.7)
EOSINOPHIL NFR BLD AUTO: 4 %
ERYTHROCYTE [DISTWIDTH] IN BLOOD BY AUTOMATED COUNT: 12.6 % (ref 10–15)
GLUCOSE SERPL-MCNC: 94 MG/DL (ref 70–99)
HCT VFR BLD AUTO: 38.5 % (ref 35–47)
HGB BLD-MCNC: 12.6 G/DL (ref 11.7–15.7)
IMM GRANULOCYTES # BLD: 0 10E3/UL
IMM GRANULOCYTES NFR BLD: 0 %
LYMPHOCYTES # BLD AUTO: 2.1 10E3/UL (ref 0.8–5.3)
LYMPHOCYTES NFR BLD AUTO: 38 %
MCH RBC QN AUTO: 31.3 PG (ref 26.5–33)
MCHC RBC AUTO-ENTMCNC: 32.7 G/DL (ref 31.5–36.5)
MCV RBC AUTO: 96 FL (ref 78–100)
MONOCYTES # BLD AUTO: 0.4 10E3/UL (ref 0–1.3)
MONOCYTES NFR BLD AUTO: 7 %
NEUTROPHILS # BLD AUTO: 2.8 10E3/UL (ref 1.6–8.3)
NEUTROPHILS NFR BLD AUTO: 50 %
NRBC # BLD AUTO: 0 10E3/UL
NRBC BLD AUTO-RTO: 0 /100
PLATELET # BLD AUTO: 241 10E3/UL (ref 150–450)
POTASSIUM SERPL-SCNC: 3.9 MMOL/L (ref 3.4–5.3)
PROT SERPL-MCNC: 7.3 G/DL (ref 6.4–8.3)
RBC # BLD AUTO: 4.03 10E6/UL (ref 3.8–5.2)
SODIUM SERPL-SCNC: 142 MMOL/L (ref 135–145)
TOTAL PROTEIN SERUM FOR ELP: 6.9 G/DL (ref 6.4–8.3)
WBC # BLD AUTO: 5.6 10E3/UL (ref 4–11)

## 2024-03-19 PROCEDURE — 86334 IMMUNOFIX E-PHORESIS SERUM: CPT | Mod: 26 | Performed by: STUDENT IN AN ORGANIZED HEALTH CARE EDUCATION/TRAINING PROGRAM

## 2024-03-19 PROCEDURE — 83521 IG LIGHT CHAINS FREE EACH: CPT

## 2024-03-19 PROCEDURE — 82784 ASSAY IGA/IGD/IGG/IGM EACH: CPT

## 2024-03-19 PROCEDURE — 84165 PROTEIN E-PHORESIS SERUM: CPT | Mod: 26 | Performed by: STUDENT IN AN ORGANIZED HEALTH CARE EDUCATION/TRAINING PROGRAM

## 2024-03-19 PROCEDURE — 36415 COLL VENOUS BLD VENIPUNCTURE: CPT

## 2024-03-19 PROCEDURE — 85025 COMPLETE CBC W/AUTO DIFF WBC: CPT

## 2024-03-19 PROCEDURE — 86334 IMMUNOFIX E-PHORESIS SERUM: CPT | Performed by: STUDENT IN AN ORGANIZED HEALTH CARE EDUCATION/TRAINING PROGRAM

## 2024-03-19 PROCEDURE — 84155 ASSAY OF PROTEIN SERUM: CPT

## 2024-03-19 PROCEDURE — 84165 PROTEIN E-PHORESIS SERUM: CPT | Mod: TC | Performed by: STUDENT IN AN ORGANIZED HEALTH CARE EDUCATION/TRAINING PROGRAM

## 2024-03-19 PROCEDURE — 82040 ASSAY OF SERUM ALBUMIN: CPT

## 2024-03-19 RX ORDER — METHIMAZOLE 5 MG/1
2.5 TABLET ORAL DAILY
Qty: 45 TABLET | Refills: 3 | Status: SHIPPED | OUTPATIENT
Start: 2024-03-19 | End: 2024-04-06

## 2024-03-19 NOTE — TELEPHONE ENCOUNTER
Update scan look normla range but possibly mild Graves.     We will try methimazole 2.5 mg warren Pop MD

## 2024-03-20 LAB
IGA SERPL-MCNC: 62 MG/DL (ref 84–499)
IGG SERPL-MCNC: 1337 MG/DL (ref 610–1616)
IGM SERPL-MCNC: 81 MG/DL (ref 35–242)
KAPPA LC FREE SER-MCNC: 3.79 MG/DL (ref 0.33–1.94)
KAPPA LC FREE/LAMBDA FREE SER NEPH: 3.06 {RATIO} (ref 0.26–1.65)
LAMBDA LC FREE SERPL-MCNC: 1.24 MG/DL (ref 0.57–2.63)

## 2024-03-21 LAB
ALBUMIN SERPL ELPH-MCNC: 4.1 G/DL (ref 3.7–5.1)
ALPHA1 GLOB SERPL ELPH-MCNC: 0.3 G/DL (ref 0.2–0.4)
ALPHA2 GLOB SERPL ELPH-MCNC: 0.6 G/DL (ref 0.5–0.9)
B-GLOBULIN SERPL ELPH-MCNC: 0.7 G/DL (ref 0.6–1)
GAMMA GLOB SERPL ELPH-MCNC: 1.2 G/DL (ref 0.7–1.6)
M PROTEIN SERPL ELPH-MCNC: 0.7 G/DL
PROT PATTERN SERPL ELPH-IMP: ABNORMAL
PROT PATTERN SERPL IFE-IMP: NORMAL

## 2024-03-22 NOTE — PROGRESS NOTES
Chief Complaint   Patient presents with    Right Ankle - Pain     DOI: 3/26/24. Patient notes she walking on a sidewalk and hit an uneven part and her ankle bent sideways. Immediate pain and swelling. She went home and iced it all night. She still has pain and swelling. Pain is lateral. She is walking in normal tennis shoes.            HISTORY OF PRESENT ILLNESS:  Merlyn Ravi is a 59 year old female seen for  acute right ankle pain, following an injury 3/26/2024. She was walking on a sidewalk at work and hit an uneven part and twisted the ankle with immediate pain, swelling. She was able to walk on it. She went home and started icing it all night. Still has pain and swelling. She locates pain to the outer aspect of the ankle, aggravated with walking, bending, twisting. She's wearing regular tennis shoes.      Pain  5/10  Location: lateral ankle  Treatment: ice.    Past Medical History:   Diagnosis Date    Allergies     Breast cancer (H)     Hyperlipidemia 10/14/2014    Major depressive disorder, recurrent episode, mild (H24) 05/17/2022    Malignant neoplasm of female breast (H) 08/15/2019    Formatting of this note might be different from the original.  8/2019. Left.  Formatting of this note is different from the original.  Formatting of this note might be different from the original.  8/2019. Left.     Formatting of this note might be different from the original.  6.5.2020- Review and distribute treatment summary-[facility]  Added automatically from request for surgery [id number]       Mixed incontinence 03/02/2023    Monoclonal gammopathy 10/08/2013    Formatting of this note might be different from the original.  Last Assessment & Plan:   Formatting of this note might be different from the original.  Melryn Ravi  has a history of IgG kappa MGUS. The MGUS was discovered after she had been found to have an elevated gamma globulin fraction when she underwent blood testing prior to donating blood at [facility]  She was found to have a 0.4 gram/dL I    Neuropathy 08/29/2023    Obesity (BMI 35.0-39.9) with comorbidity (H) 01/25/2019    Other specified hypothyroidism 08/29/2019    PONV (postoperative nausea and vomiting) 07/07/2022    Rosacea 08/29/2023       Past Surgical History:   Procedure Laterality Date    ARTHROSCOPY KNEE Left 7/14/2022    Procedure: ARTHROSCOPY, LEFT KNEE with meniscal and chondral debridement;  Surgeon: Timo Berman MD;  Location: [facility] OR    BIOPSY BREAST      HYSTERECTOMY, PAP NO LONGER INDICATED      HYSTERECTOMY, MAGY      LASIK BILATERAL  01/01/2005    Dr. Solis     LUMPECTOMY BREAST Left 09/30/2019    Procedure: Re-excision of left breast lumpectomy site;  Surgeon: Kj Salas DO;  Location: [facility]    LUMPECTOMY BREAST Left 10/09/2019    Procedure: Left breast re-excision;  Surgeon: Kj Salas DO;  Location: [facility]    LUMPECTOMY BREAST WITH SENTINEL NODE, COMBINED Left 09/23/2019    Procedure: LUMPECTOMY, BREAST, WITH SENTINEL LYMPH NODE BIOPSY.;  Surgeon: Kj Salas DO;  Location: [facility] OR    [facility] NONSPECIFIC PROCEDURE      bladder surgery       Medications:   Current Outpatient Medications:     atorvastatin (LIPITOR) 10 MG tablet, Take 1 tablet (10 mg) by mouth daily, Disp: 90 tablet, Rfl: 3    calcium 500-125 MG-UNIT TABS, Take 2 tablets by mouth 2 times daily, Disp: , Rfl:     doxycycline hyclate (PERIOSTAT) 20 MG tablet, Take 2 tablets (40 mg) by mouth daily, Disp: 180 tablet, Rfl: 3    fluocinonide (LIDEX) 0.05 % external ointment, Apply topically 2 times daily, Disp: 45 g, Rfl: 0    gabapentin (NEURONTIN) 100 MG capsule, TAKE 1 CAPSULE BY MOUTH EVERY MORNING AND TAKE 2 CAPSULES EVERY EVENING., Disp: 90 capsule, Rfl: 3    methimazole (TAPAZOLE) 5 MG tablet, Take 0.5 tablets (2.5 mg) by mouth daily, Disp: 45 tablet, Rfl: 3    metroNIDAZOLE (METROCREAM) 0.75 % external cream, Apply topically 2 times daily, Disp: 45 g, Rfl: 3    METRONIDAZOLE PO, Take 5  "mg by mouth 1/2 tablet daily, Disp: , Rfl:     tamoxifen (NOLVADEX) 20 MG tablet, Take 1 tablet (20 mg) by mouth daily, Disp: 90 tablet, Rfl: 3    tolterodine ER (DETROL LA) 4 MG 24 hr capsule, TAKE 1 CAPSULE(4 MG) BY MOUTH DAILY, Disp: 90 capsule, Rfl: 1    venlafaxine (EFFEXOR XR) 75 MG 24 hr capsule, TAKE 1 CAPSULE(75 MG) BY MOUTH DAILY, Disp: 90 capsule, Rfl: 3    cetirizine (ZYRTEC) 10 MG tablet, Take 1 tablet (10 mg) by mouth daily as needed (itch), Disp: 30 tablet, Rfl: 1    Allergies:   Allergies   Allergen Reactions    Demerol      Sedation and vomiting    Meperidine     Sulfa Antibiotics Hives     unknown reaction       REVIEW OF SYSTEMS:   CONSTITUTIONAL:NEGATIVE for fever, chills, night sweats  INTEGUMENTARY/SKIN: NEGATIVE for worrisome wound problems or redness.  MUSCULOSKELETAL:See HPI above  NEURO: NEGATIVE for weakness, dizziness or paresthesias    PHYSICAL EXAM:  /81   Pulse 80   Ht 1.664 m (5' 5.5\")   Wt 103.1 kg (227 lb 6.4 oz)   BMI 37.27 kg/m     GENERAL APPEARANCE: healthy, alert, no distress  SKIN: no suspicious lesions or rashes  NEURO: Normal strength and tone, mentation intact and speech normal  PSYCH:  mentation appears normal and affect normal, not anxious  RESPIRATORY: No increased work of breathing.    Bilateral lower extremity.  Gait:   favors the right   No Quad atrophy, strength normal.  Intact sensation deep peroneal nerve, superficial peroneal nerve, med/lat tibial nerve, sural nerve, saphenous nerve  Intact EHL, EDL, TA, FHL, GS, quadriceps hamstrings and hip flexors  Toes warm and well perfused, brisk capillary refill. Palpable 2+ dp pulses.  calf soft and nttp or squeeze.  Edema: trace      RIGHT ANKLE  There is mild-moderate lateral swelling  No ecchymosis.  Skin intacdt.  Tender to palpation distal fibula anterior talo-fibular ligament.  Nbttp medial ankle, medial malleolus, deltoid ligament  Nontender to palpation heel, calcaneus, achilles tendon, base of 5th " metatarsal, midfoot, forefoot.      X-RAY: 3 views right ankle 3/27/2024 reviewed. Symmetric ankle mortise. Small avulsion fragments tip of lateral malleolus. Suble medial talar cortical irregularity. Otherwise No obvious fracture or dislocation. No obvious bony abnormality or lesion.. symmetric ankle mortise. No talar dome osteochondral lesion. Small calcaneal enthesophytes.           Impression: Merlyn Ravi is a 59 year old female withacute right ankle pain, ankle sprain.      Plan:   ANKLE  Images reviewed, showing small avulsion bony fragments off the tip of the lateral malleolus, consistent with avulsion ligament sprain.  This will likely take several months to recover.    Treatment with:  * rest  * ice  * elevation  * compression  * activity modification - avoid aggravating activities  * ankle brace/support in lace-up, supportive shoes  * ankle range of motion exercises  * Physical Therapy, strengthening and proprioception training in future as needed.  * NSAIDS  * return to clinic as needed.         Timo Berman M.D., M.S.  Dept. of Orthopaedic Surgery  [facility]

## 2024-03-25 ENCOUNTER — ONCOLOGY VISIT (OUTPATIENT)
Dept: ONCOLOGY | Facility: CLINIC | Age: 59
End: 2024-03-25
Attending: NURSE PRACTITIONER
Payer: COMMERCIAL

## 2024-03-25 VITALS
WEIGHT: 228 LBS | SYSTOLIC BLOOD PRESSURE: 144 MMHG | RESPIRATION RATE: 12 BRPM | TEMPERATURE: 98.1 F | OXYGEN SATURATION: 100 % | HEIGHT: 66 IN | BODY MASS INDEX: 36.64 KG/M2 | HEART RATE: 68 BPM | DIASTOLIC BLOOD PRESSURE: 91 MMHG

## 2024-03-25 DIAGNOSIS — Z12.31 ENCOUNTER FOR SCREENING MAMMOGRAM FOR BREAST CANCER: ICD-10-CM

## 2024-03-25 DIAGNOSIS — C50.912 INVASIVE LOBULAR CARCINOMA OF LEFT BREAST IN FEMALE (H): Primary | ICD-10-CM

## 2024-03-25 DIAGNOSIS — D47.2 MONOCLONAL PARAPROTEINEMIA: ICD-10-CM

## 2024-03-25 PROCEDURE — G2211 COMPLEX E/M VISIT ADD ON: HCPCS | Performed by: NURSE PRACTITIONER

## 2024-03-25 PROCEDURE — 99214 OFFICE O/P EST MOD 30 MIN: CPT | Performed by: NURSE PRACTITIONER

## 2024-03-25 RX ORDER — TAMOXIFEN CITRATE 20 MG/1
20 TABLET ORAL DAILY
Qty: 90 TABLET | Refills: 3 | Status: SHIPPED | OUTPATIENT
Start: 2024-03-25

## 2024-03-25 ASSESSMENT — PAIN SCALES - GENERAL: PAINLEVEL: NO PAIN (0)

## 2024-03-25 NOTE — PROGRESS NOTES
"Oncology Rooming Note    March 25, 2024 2:53 PM   Merlyn Ravi is a 59 year old female who presents for:    Chief Complaint   Patient presents with    Oncology Clinic Visit     Malignant neoplasm of female breast - Provider visit only     Initial Vitals: Ht 1.664 m (5' 5.5\")   Wt 103.4 kg (228 lb)   BMI 37.36 kg/m   Estimated body mass index is 37.36 kg/m  as calculated from the following:    Height as of this encounter: 1.664 m (5' 5.5\").    Weight as of this encounter: 103.4 kg (228 lb). Body surface area is 2.19 meters squared.  Data Unavailable Comment: Data Unavailable   No LMP recorded. Patient has had a hysterectomy.  Allergies reviewed: Yes  Medications reviewed: Yes    Medications: Medication refills not needed today.  Pharmacy name entered into [facility]: [id number] - [place], [place] - [address] AT [facility] [address]    Frailty Screening:   Is the patient here for a new oncology consult visit in cancer care? 2. No      Clinical concerns:  None      Keya Farris CMA              "

## 2024-03-25 NOTE — LETTER
3/25/2024         RE: Merlyn Ravi  [address] [address] [facility] [address]        Dear Colleague,    Thank you for referring your patient, Merlyn Ravi, to the [facility] [place]. Please see a copy of my visit note below.    [facility] Hematology and Oncology Progress Note    Patient: Merlyn Ravi  MRN: [id number]  Date of Service: Mar 25, 2024          Reason for Visit    Hx breast cancer    Assessment and Plan    1. Breast cancer, T2N0M0 (ER/IL+) - 10/2019:   4.5 yrs from her diagnosis/resection.   Currently on tamoxifen since 11/2019.   Tolerating well, although having some vaginal dryness/dyspareunia.     Mammogram 5/2023 negative.   Clinically, no concern for recurrence.     Plan:  -Continue Tamoxifen through 5 yrs (11/2024), then will stop  -Mammogram due 5/2024 (ordered)  -Annual 3D screening mammograms and breast exams with PCP/us since we will continue following her MGUS labs    2. MGUS:   Benign. Overall stable.  Immunofixation shows IgG immunoglobulin kappa light chain type. Kappa LC and K/L ratio minimally elevated and have been stable/minimally elevating x 4 yrs. Immunoglobulins unremarkable. CBC and CMP show no end-organ damage or bone marrow deficiency.     Plan:  -Annual labs: CBC, CMP, light chains, immunoglobulins, SPEP    3.   Right arm nodule, likely lipoma  Feels consistent with benign lipoma.     Plan:  -Monitor and consider US +/- biopsy if changing    4.   Vaginal dryness/dyspareunia:   Tamoxifen may be exacerbating.   She's never trialed OTC vaginal moisturizers and as needed lubricants.  Met with Gyne therapist who gave her some recommendations.    Plan:  -Will complete tamoxifen this Fall, may improve once off it  -Recommend incorporating daily/routine vaginal moisturizers. PRN lubricants with sexual intercourse, pelvic exams, etc  -Gyne Onc/PT   -Regarding estrogen cream, we prefer to not use that in breast cancer patients if we can  get by without it through alternate means; however, if not feasible can use lowest dose as infrequently as possible with breaks.   -Consider meeting with Mckayla Arroyo Survivorship NP if persistent    5.  Graves Disease  Endo recently started methimazole. Appears to be no interactions with Tamoxifen.    ECOG Performance    0 - Independent    ______________________________________________________________________________    History of Present Illness    Oncology History/Treatment  Diagnosis/Stage:      8/2019: Left breast invasive lobular cancer (T2-N0)  -self-palpated L breast lump  -diagnostic mammo: 1.1 cm asymmetry 9-10:00 position  -US: 1.1 cm lesion. No axillary adenopathy  -stereotactic biopsy: invasive lobular carcinoma, grade II; ALI neg; LCIS cannot be excluded. ER+, SC+, HER2 neg  -lumpectomy path: 4.5 cm invasive lobular ca, grade III; ALI neg. No LCIS. ER/SC+, HER2 neg. Positive deep margin  -re-excision 1: positive margin. Re-excision 2: negative for residual malignancy  -Oncotype DX score: 20     MGUS IgG, kappa (observed)     Treatment:  9/23/2019: Left lumpectomy  9/30/2019: re-excision for positive margin, persistent positive margin  10/9/2019: re-excision resulted in negative margin     11/2019: adjuvant RT (5005 cGy/20)     11/2019 - present (will complete 5 yrs 11/2024): Tamoxifen 20 mg daily    Interim history:   Kristina returns for 1 year follow-up visit.  She also sees her breast surgeon (Dr. Salas) yearly every Fall for exam.     Continues on Tamoxifen daily. Tolerating pretty well.    he has been having some issues with vaginal dryness and pain with intercourse and pelvic exams.   She has not used any other lubricants or moisturizers.    Met with a therapist for various exercises.    Left breast lymphedema improved, uses massage machine as needed. Mild chronic left axillary tightness.   No breast changes.   She denies any new bone or back pain.  Denies any weight loss.     Notes fatty lump  "right forearm, slightly tender and a bit larger than prior. Noted 6-8 mths ago.    PHYSICAL EXAM  BP (!) 144/91 (BP Location: Right arm, Patient Position: Sitting, Cuff Size: Adult Regular)   Pulse 68   Temp 98.1  F (36.7  C) (Tympanic)   Resp 12   Ht 1.664 m (5' 5.5\")   Wt 103.4 kg (228 lb)   SpO2 100%   BMI 37.36 kg/m      GENERAL: no acute distress. Cooperative in conversation.  Alone  HEENT: No icterus. Hair thinning.   LYMPH: No palpable cervical nor axillary adenopathy.   BREASTS: Left medial lumpectomy scar. Densities through both breasts, no palpable discrete lumps.   RESP: Regular respiratory rate. No expiratory wheezes   NEURO: non focal. Alert and oriented x3.   EXTREMITIES: Nickel-sized soft, mobile lump right forearm.   PSYCH: within normal limits. No depression or anxiety.  SKIN: exposed skin is dry intact.     Lab Results    Recent Results (from the past 168 hour(s))   Protein Immunofixation Serum   Result Value Ref Range    Immunofixation ELP       Monoclonal IgG immunoglobulin of kappa light chain type. Monoclonal antibody therapeutics (e.g. Daratumumab) may appear as monoclonal proteins on serum electrophoresis and immunofixation. Results should be interpreted with caution. Pathologic significance requires clinical correlation.  Danni Barroso M.D., Ph.D.   [place] and lambda light chain   Result Value Ref Range    Kappa Free Light Chains 3.79 (H) 0.33 - 1.94 mg/dL    Lambda Free Light Chains 1.24 0.57 - 2.63 mg/dL    Kappa /Lambda Ratio 3.06 (H) 0.26 - 1.65   Immunoglobulins A G and M   Result Value Ref Range    Immunoglobulin G 1,337 610 - 1,616 mg/dL    Immunoglobulin A 62 (L) 84 - 499 mg/dL    Immunoglobulin M 81 35 - 242 mg/dL   Comprehensive metabolic panel   Result Value Ref Range    Sodium 142 135 - 145 mmol/L    Potassium 3.9 3.4 - 5.3 mmol/L    Carbon Dioxide (CO2) 27 22 - 29 mmol/L    Anion Gap 8 7 - 15 mmol/L    Urea Nitrogen 16.8 8.0 - 23.0 mg/dL    Creatinine 0.89 0.51 - 0.95 mg/dL "    GFR Estimate 74 >60 mL/min/1.73m2    Calcium 9.2 8.6 - 10.0 mg/dL    Chloride 107 98 - 107 mmol/L    Glucose 94 70 - 99 mg/dL    Alkaline Phosphatase 75 40 - 150 U/L    AST 25 0 - 45 U/L    ALT 17 0 - 50 U/L    Protein Total 7.3 6.4 - 8.3 g/dL    Albumin 4.1 3.5 - 5.2 g/dL    Bilirubin Total <0.2 <=1.2 mg/dL   Total Protein, Serum for ELP   Result Value Ref Range    Total Protein Serum for ELP 6.9 6.4 - 8.3 g/dL   Protein Electrophoresis, Serum   Result Value Ref Range    Albumin 4.1 3.7 - 5.1 g/dL    Alpha 1 0.3 0.2 - 0.4 g/dL    Alpha 2 0.6 0.5 - 0.9 g/dL    Beta Globulin 0.7 0.6 - 1.0 g/dL    Gamma Globulin 1.2 0.7 - 1.6 g/dL    Monoclonal Peak 0.7 (H) <=0.0 g/dL    ELP Interpretation       Monoclonal protein (0.7 g/dL) seen in the gamma fraction. See immunofixation report on same specimen. Pathologic significance requires clinical correlation. Danni Barroso M.D., Ph.D.   CBC with platelets and differential   Result Value Ref Range    WBC Count 5.6 4.0 - 11.0 10e3/uL    RBC Count 4.03 3.80 - 5.20 10e6/uL    Hemoglobin 12.6 11.7 - 15.7 g/dL    Hematocrit 38.5 35.0 - 47.0 %    MCV 96 78 - 100 fL    MCH 31.3 26.5 - 33.0 pg    MCHC 32.7 31.5 - 36.5 g/dL    RDW 12.6 10.0 - 15.0 %    Platelet Count 241 150 - 450 10e3/uL    % Neutrophils 50 %    % Lymphocytes 38 %    % Monocytes 7 %    % Eosinophils 4 %    % Basophils 1 %    % Immature Granulocytes 0 %    NRBCs per 100 WBC 0 <1 /100    Absolute Neutrophils 2.8 1.6 - 8.3 10e3/uL    Absolute Lymphocytes 2.1 0.8 - 5.3 10e3/uL    Absolute Monocytes 0.4 0.0 - 1.3 10e3/uL    Absolute Eosinophils 0.2 0.0 - 0.7 10e3/uL    Absolute Basophils 0.0 0.0 - 0.2 10e3/uL    Absolute Immature Granulocytes 0.0 <=0.4 10e3/uL    Absolute NRBCs 0.0 10e3/uL       Imaging    NM Thyroid uptake and scan    Result Date: 3/13/2024  Examination:  NM THYROID UPTAKE AND SCAN Examination: Nuclear medicine thyroid uptake and scan, on 3/13/2024 11:43 AM. Indication:  Hyperthyroidism Additional  "Information: TSH 3.25 and T4 free 0.88 on 2/10/2023. Technique: The patient received 241 uci of I-123 orally.  Uptake measurements and scan was obtained at 24 hours. Findings: The uptake at 24 hours was measured at 19.8%.  The normal range at 24 hours is from 10 to 30%. Uptake on right: 12.1%,  Uptake on Left: 7.7%. Total computer estimated weight of the gland: 26 gm,  Right gland weight: 13.7 gm,  Left gland weight: 12.2 gm. Images of the gland demonstrates normal appearance of the right and left lobes. No additional findings. No autonomous nodules were identified.     Impression: 1. 19.8% uptake at 24 hours is within normal limits. 2. No autonomous nodule. I have personally reviewed the examination and initial interpretation and I agree with the findings. LEELA SLOAN MD   SYSTEM ID:  [id number]     Billing  Total time 35 minutes, to include face to face visit, review of EMR, ordering, documentation and coordination of care on date of service.    complexity modifier for longitudinal care.     Signed by: Zena Davidson NP    Oncology Rooming Note    March 25, 2024 2:53 PM   Merlyn Ravi is a 59 year old female who presents for:    Chief Complaint   Patient presents with     Oncology Clinic Visit     Malignant neoplasm of female breast - Provider visit only     Initial Vitals: Ht 1.664 m (5' 5.5\")   Wt 103.4 kg (228 lb)   BMI 37.36 kg/m   Estimated body mass index is 37.36 kg/m  as calculated from the following:    Height as of this encounter: 1.664 m (5' 5.5\").    Weight as of this encounter: 103.4 kg (228 lb). Body surface area is 2.19 meters squared.  Data Unavailable Comment: Data Unavailable   No LMP recorded. Patient has had a hysterectomy.  Allergies reviewed: Yes  Medications reviewed: Yes    Medications: Medication refills not needed today.  Pharmacy name entered into [employer]: [id number] - [place], [place] - [address] AT [facility] [address]    Frailty Screening:   Is the " patient here for a new oncology consult visit in cancer care? 2. No      Clinical concerns:  None      Keya Farris, JORDAN                Again, thank you for allowing me to participate in the care of your patient.        Sincerely,        Zena Davidson, NP

## 2024-03-25 NOTE — PROGRESS NOTES
[facility] Hematology and Oncology Progress Note    Patient: Merlyn Ravi  MRN: [id number]  Date of Service: Mar 25, 2024          Reason for Visit    Hx breast cancer    Assessment and Plan    1. Breast cancer, T2N0M0 (ER/AZ+) - 10/2019:   4.5 yrs from her diagnosis/resection.   Currently on tamoxifen since 11/2019.   Tolerating well, although having some vaginal dryness/dyspareunia.     Mammogram 5/2023 negative.   Clinically, no concern for recurrence.     Plan:  -Continue Tamoxifen through 5 yrs (11/2024), then will stop  -Mammogram due 5/2024 (ordered)  -Annual 3D screening mammograms and breast exams with PCP/us since we will continue following her MGUS labs    2. MGUS:   Benign. Overall stable.  Immunofixation shows IgG immunoglobulin kappa light chain type. Kappa LC and K/L ratio minimally elevated and have been stable/minimally elevating x 4 yrs. Immunoglobulins unremarkable. CBC and CMP show no end-organ damage or bone marrow deficiency.     Plan:  -Annual labs: CBC, CMP, light chains, immunoglobulins, SPEP    3.   Right arm nodule, likely lipoma  Feels consistent with benign lipoma.     Plan:  -Monitor and consider US +/- biopsy if changing    4.   Vaginal dryness/dyspareunia:   Tamoxifen may be exacerbating.   She's never trialed OTC vaginal moisturizers and as needed lubricants.  Met with Gyne therapist who gave her some recommendations.    Plan:  -Will complete tamoxifen this Fall, may improve once off it  -Recommend incorporating daily/routine vaginal moisturizers. PRN lubricants with sexual intercourse, pelvic exams, etc  -Gyne Onc/PT   -Regarding estrogen cream, we prefer to not use that in breast cancer patients if we can get by without it through alternate means; however, if not feasible can use lowest dose as infrequently as possible with breaks.   -Consider meeting with Mckayla Arroyo, Survivorship NP if persistent    5.  Graves Disease  Endo recently started methimazole. Appears to be no  "interactions with Tamoxifen.    ECOG Performance    0 - Independent    ______________________________________________________________________________    History of Present Illness    Oncology History/Treatment  Diagnosis/Stage:      8/2019: Left breast invasive lobular cancer (T2-N0)  -self-palpated L breast lump  -diagnostic mammo: 1.1 cm asymmetry 9-10:00 position  -US: 1.1 cm lesion. No axillary adenopathy  -stereotactic biopsy: invasive lobular carcinoma, grade II; ALI neg; LCIS cannot be excluded. ER+, ID+, HER2 neg  -lumpectomy path: 4.5 cm invasive lobular ca, grade III; ALI neg. No LCIS. ER/ID+, HER2 neg. Positive deep margin  -re-excision 1: positive margin. Re-excision 2: negative for residual malignancy  -Oncotype DX score: 20     MGUS IgG, kappa (observed)     Treatment:  9/23/2019: Left lumpectomy  9/30/2019: re-excision for positive margin, persistent positive margin  10/9/2019: re-excision resulted in negative margin     11/2019: adjuvant RT (5005 cGy/20)     11/2019 - present (will complete 5 yrs 11/2024): Tamoxifen 20 mg daily    Interim history:   Kristina returns for 1 year follow-up visit.  She also sees her breast surgeon (Dr. Salas) yearly every Fall for exam.     Continues on Tamoxifen daily. Tolerating pretty well.    he has been having some issues with vaginal dryness and pain with intercourse and pelvic exams.   She has not used any other lubricants or moisturizers.    Met with a therapist for various exercises.    Left breast lymphedema improved, uses massage machine as needed. Mild chronic left axillary tightness.   No breast changes.   She denies any new bone or back pain.  Denies any weight loss.     Notes fatty lump right forearm, slightly tender and a bit larger than prior. Noted 6-8 mths ago.    PHYSICAL EXAM  BP (!) 144/91 (BP Location: Right arm, Patient Position: Sitting, Cuff Size: Adult Regular)   Pulse 68   Temp 98.1  F (36.7  C) (Tympanic)   Resp 12   Ht 1.664 m (5' 5.5\")  "  Wt 103.4 kg (228 lb)   SpO2 100%   BMI 37.36 kg/m      GENERAL: no acute distress. Cooperative in conversation.  Alone  HEENT: No icterus. Hair thinning.   LYMPH: No palpable cervical nor axillary adenopathy.   BREASTS: Left medial lumpectomy scar. Densities through both breasts, no palpable discrete lumps.   RESP: Regular respiratory rate. No expiratory wheezes   NEURO: non focal. Alert and oriented x3.   EXTREMITIES: Nickel-sized soft, mobile lump right forearm.   PSYCH: within normal limits. No depression or anxiety.  SKIN: exposed skin is dry intact.     Lab Results    Recent Results (from the past 168 hour(s))   Protein Immunofixation Serum   Result Value Ref Range    Immunofixation ELP       Monoclonal IgG immunoglobulin of kappa light chain type. Monoclonal antibody therapeutics (e.g. Daratumumab) may appear as monoclonal proteins on serum electrophoresis and immunofixation. Results should be interpreted with caution. Pathologic significance requires clinical correlation.  Danni Barroso M.D., Ph.D.   [place] and lambda light chain   Result Value Ref Range    Kappa Free Light Chains 3.79 (H) 0.33 - 1.94 mg/dL    Lambda Free Light Chains 1.24 0.57 - 2.63 mg/dL    Kappa /Lambda Ratio 3.06 (H) 0.26 - 1.65   Immunoglobulins A G and M   Result Value Ref Range    Immunoglobulin G 1,337 610 - 1,616 mg/dL    Immunoglobulin A 62 (L) 84 - 499 mg/dL    Immunoglobulin M 81 35 - 242 mg/dL   Comprehensive metabolic panel   Result Value Ref Range    Sodium 142 135 - 145 mmol/L    Potassium 3.9 3.4 - 5.3 mmol/L    Carbon Dioxide (CO2) 27 22 - 29 mmol/L    Anion Gap 8 7 - 15 mmol/L    Urea Nitrogen 16.8 8.0 - 23.0 mg/dL    Creatinine 0.89 0.51 - 0.95 mg/dL    GFR Estimate 74 >60 mL/min/1.73m2    Calcium 9.2 8.6 - 10.0 mg/dL    Chloride 107 98 - 107 mmol/L    Glucose 94 70 - 99 mg/dL    Alkaline Phosphatase 75 40 - 150 U/L    AST 25 0 - 45 U/L    ALT 17 0 - 50 U/L    Protein Total 7.3 6.4 - 8.3 g/dL    Albumin 4.1 3.5 - 5.2  g/dL    Bilirubin Total <0.2 <=1.2 mg/dL   Total Protein, Serum for ELP   Result Value Ref Range    Total Protein Serum for ELP 6.9 6.4 - 8.3 g/dL   Protein Electrophoresis, Serum   Result Value Ref Range    Albumin 4.1 3.7 - 5.1 g/dL    Alpha 1 0.3 0.2 - 0.4 g/dL    Alpha 2 0.6 0.5 - 0.9 g/dL    Beta Globulin 0.7 0.6 - 1.0 g/dL    Gamma Globulin 1.2 0.7 - 1.6 g/dL    Monoclonal Peak 0.7 (H) <=0.0 g/dL    ELP Interpretation       Monoclonal protein (0.7 g/dL) seen in the gamma fraction. See immunofixation report on same specimen. Pathologic significance requires clinical correlation. Danni Barroso M.D., Ph.D.   CBC with platelets and differential   Result Value Ref Range    WBC Count 5.6 4.0 - 11.0 10e3/uL    RBC Count 4.03 3.80 - 5.20 10e6/uL    Hemoglobin 12.6 11.7 - 15.7 g/dL    Hematocrit 38.5 35.0 - 47.0 %    MCV 96 78 - 100 fL    MCH 31.3 26.5 - 33.0 pg    MCHC 32.7 31.5 - 36.5 g/dL    RDW 12.6 10.0 - 15.0 %    Platelet Count 241 150 - 450 10e3/uL    % Neutrophils 50 %    % Lymphocytes 38 %    % Monocytes 7 %    % Eosinophils 4 %    % Basophils 1 %    % Immature Granulocytes 0 %    NRBCs per 100 WBC 0 <1 /100    Absolute Neutrophils 2.8 1.6 - 8.3 10e3/uL    Absolute Lymphocytes 2.1 0.8 - 5.3 10e3/uL    Absolute Monocytes 0.4 0.0 - 1.3 10e3/uL    Absolute Eosinophils 0.2 0.0 - 0.7 10e3/uL    Absolute Basophils 0.0 0.0 - 0.2 10e3/uL    Absolute Immature Granulocytes 0.0 <=0.4 10e3/uL    Absolute NRBCs 0.0 10e3/uL       Imaging    NM Thyroid uptake and scan    Result Date: 3/13/2024  Examination:  NM THYROID UPTAKE AND SCAN Examination: Nuclear medicine thyroid uptake and scan, on 3/13/2024 11:43 AM. Indication:  Hyperthyroidism Additional Information: TSH 3.25 and T4 free 0.88 on 2/10/2023. Technique: The patient received 241 uci of I-123 orally.  Uptake measurements and scan was obtained at 24 hours. Findings: The uptake at 24 hours was measured at 19.8%.  The normal range at 24 hours is from 10 to 30%. Uptake on  right: 12.1%,  Uptake on Left: 7.7%. Total computer estimated weight of the gland: 26 gm,  Right gland weight: 13.7 gm,  Left gland weight: 12.2 gm. Images of the gland demonstrates normal appearance of the right and left lobes. No additional findings. No autonomous nodules were identified.     Impression: 1. 19.8% uptake at 24 hours is within normal limits. 2. No autonomous nodule. I have personally reviewed the examination and initial interpretation and I agree with the findings. LEELA SLOAN MD   SYSTEM ID:  [id number]     Billing  Total time 35 minutes, to include face to face visit, review of EMR, ordering, documentation and coordination of care on date of service.    complexity modifier for longitudinal care.     Signed by: Zena Davidson NP

## 2024-03-27 ENCOUNTER — ANCILLARY PROCEDURE (OUTPATIENT)
Dept: GENERAL RADIOLOGY | Facility: CLINIC | Age: 59
End: 2024-03-27
Attending: ORTHOPAEDIC SURGERY
Payer: COMMERCIAL

## 2024-03-27 ENCOUNTER — OFFICE VISIT (OUTPATIENT)
Dept: ORTHOPEDICS | Facility: CLINIC | Age: 59
End: 2024-03-27
Payer: COMMERCIAL

## 2024-03-27 VITALS
BODY MASS INDEX: 36.53 KG/M2 | HEART RATE: 80 BPM | HEIGHT: 66 IN | DIASTOLIC BLOOD PRESSURE: 81 MMHG | WEIGHT: 227.29 LBS | SYSTOLIC BLOOD PRESSURE: 119 MMHG

## 2024-03-27 VITALS
WEIGHT: 227.4 LBS | SYSTOLIC BLOOD PRESSURE: 119 MMHG | BODY MASS INDEX: 36.55 KG/M2 | HEIGHT: 66 IN | HEART RATE: 80 BPM | DIASTOLIC BLOOD PRESSURE: 81 MMHG

## 2024-03-27 DIAGNOSIS — G89.29 CHRONIC PAIN OF LEFT KNEE: ICD-10-CM

## 2024-03-27 DIAGNOSIS — S93.491A SPRAIN OF ANTERIOR TALOFIBULAR LIGAMENT OF RIGHT ANKLE, INITIAL ENCOUNTER: ICD-10-CM

## 2024-03-27 DIAGNOSIS — M25.562 CHRONIC PAIN OF LEFT KNEE: ICD-10-CM

## 2024-03-27 DIAGNOSIS — M17.12 PRIMARY OSTEOARTHRITIS OF LEFT KNEE: Primary | ICD-10-CM

## 2024-03-27 DIAGNOSIS — S99.911A INJURY OF RIGHT ANKLE, INITIAL ENCOUNTER: Primary | ICD-10-CM

## 2024-03-27 DIAGNOSIS — S99.911A INJURY OF RIGHT ANKLE, INITIAL ENCOUNTER: ICD-10-CM

## 2024-03-27 PROCEDURE — 99213 OFFICE O/P EST LOW 20 MIN: CPT | Mod: 25 | Performed by: ORTHOPAEDIC SURGERY

## 2024-03-27 PROCEDURE — 73610 X-RAY EXAM OF ANKLE: CPT | Mod: TC | Performed by: RADIOLOGY

## 2024-03-27 PROCEDURE — 20610 DRAIN/INJ JOINT/BURSA W/O US: CPT | Mod: LT | Performed by: ORTHOPAEDIC SURGERY

## 2024-03-27 RX ORDER — BUPIVACAINE HYDROCHLORIDE 2.5 MG/ML
4 INJECTION, SOLUTION INFILTRATION; PERINEURAL
Status: SHIPPED | OUTPATIENT
Start: 2024-03-27

## 2024-03-27 RX ORDER — TRIAMCINOLONE ACETONIDE 40 MG/ML
80 INJECTION, SUSPENSION INTRA-ARTICULAR; INTRAMUSCULAR
Status: SHIPPED | OUTPATIENT
Start: 2024-03-27

## 2024-03-27 RX ADMIN — BUPIVACAINE HYDROCHLORIDE 4 ML: 2.5 INJECTION, SOLUTION INFILTRATION; PERINEURAL at 11:35

## 2024-03-27 RX ADMIN — TRIAMCINOLONE ACETONIDE 80 MG: 40 INJECTION, SUSPENSION INTRA-ARTICULAR; INTRAMUSCULAR at 11:35

## 2024-03-27 ASSESSMENT — PAIN SCALES - GENERAL: PAINLEVEL: MODERATE PAIN (5)

## 2024-03-27 NOTE — NURSING NOTE
Patient was fitted with an f8 ankle brace by Millie. All questions were answered to patient's satisfaction. DME form explained, signed, and copy given to the patient for their records.   Fior Walters Clinical Medical Assistant

## 2024-03-27 NOTE — LETTER
3/27/2024         RE: Merlyn Ravi  [address] [facility] [id number]        Dear Colleague,    Thank you for referring your patient, Merlyn Ravi, to the [facility] [place]. Please see a copy of my visit note below.    Chief Complaint   Patient presents with     Right Ankle - Pain     DOI: 3/26/24. Patient notes she walking on a sidewalk and hit an uneven part and her ankle bent sideways. Immediate pain and swelling. She went home and iced it all night. She still has pain and swelling. Pain is lateral. She is walking in normal tennis shoes.            HISTORY OF PRESENT ILLNESS:  Merlyn Ravi is a 59 year old female seen for  acute right ankle pain, following an injury 3/26/2024. She was walking on a sidewalk at work and hit an uneven part and twisted the ankle with immediate pain, swelling. She was able to walk on it. She went home and started icing it all night. Still has pain and swelling. She locates pain to the outer aspect of the ankle, aggravated with walking, bending, twisting. She's wearing regular tennis shoes.      Pain  5/10  Location: lateral ankle  Treatment: ice.    Past Medical History:   Diagnosis Date     Allergies      Breast cancer (H)      Hyperlipidemia 10/14/2014     Major depressive disorder, recurrent episode, mild (H24) 05/17/2022     Malignant neoplasm of female breast (H) 08/15/2019    Formatting of this note might be different from the original.  8/2019. Left.  Formatting of this note is different from the original.  Formatting of this note might be different from the original.  8/2019. Left.     Formatting of this note might be different from the original.  6.5.2020- Review and distribute treatment summary-[place]  Added automatically from request for surgery [id number]        Mixed incontinence 03/02/2023     Monoclonal gammopathy 10/08/2013    Formatting of this note might be different from the original.  Last Assessment &  Plan:   Formatting of this note might be different from the original.  Merlyn Ravi  has a history of IgG kappa MGUS. The MGUS was discovered after she had been found to have an elevated gamma globulin fraction when she underwent blood testing prior to donating blood at [facility]. She was found to have a 0.4 gram/dL I     Neuropathy 08/29/2023     Obesity (BMI 35.0-39.9) with comorbidity (H) 01/25/2019     Other specified hypothyroidism 08/29/2019     PONV (postoperative nausea and vomiting) 07/07/2022     Rosacea 08/29/2023       Past Surgical History:   Procedure Laterality Date     ARTHROSCOPY KNEE Left 7/14/2022    Procedure: ARTHROSCOPY, LEFT KNEE with meniscal and chondral debridement;  Surgeon: Timo Berman MD;  Location: [facility] OR     BIOPSY BREAST       HYSTERECTOMY, PAP NO LONGER INDICATED       HYSTERECTOMY, MAGY       LASIK BILATERAL  01/01/2005    Dr. Solis      LUMPECTOMY BREAST Left 09/30/2019    Procedure: Re-excision of left breast lumpectomy site;  Surgeon: Kj Salas DO;  Location: [facility] OR     LUMPECTOMY BREAST Left 10/09/2019    Procedure: Left breast re-excision;  Surgeon: Kj Salas DO;  Location: [facility] OR     LUMPECTOMY BREAST WITH SENTINEL NODE, COMBINED Left 09/23/2019    Procedure: LUMPECTOMY, BREAST, WITH SENTINEL LYMPH NODE BIOPSY.;  Surgeon: Kj Salas DO;  Location: [facility] OR     [facility] NONSPECIFIC PROCEDURE      bladder surgery       Medications:   Current Outpatient Medications:      atorvastatin (LIPITOR) 10 MG tablet, Take 1 tablet (10 mg) by mouth daily, Disp: 90 tablet, Rfl: 3     calcium 500-125 MG-UNIT TABS, Take 2 tablets by mouth 2 times daily, Disp: , Rfl:      doxycycline hyclate (PERIOSTAT) 20 MG tablet, Take 2 tablets (40 mg) by mouth daily, Disp: 180 tablet, Rfl: 3     fluocinonide (LIDEX) 0.05 % external ointment, Apply topically 2 times daily, Disp: 45 g, Rfl: 0     gabapentin (NEURONTIN) 100 MG capsule, TAKE 1 CAPSULE BY MOUTH EVERY  "MORNING AND TAKE 2 CAPSULES EVERY EVENING., Disp: 90 capsule, Rfl: 3     methimazole (TAPAZOLE) 5 MG tablet, Take 0.5 tablets (2.5 mg) by mouth daily, Disp: 45 tablet, Rfl: 3     metroNIDAZOLE (METROCREAM) 0.75 % external cream, Apply topically 2 times daily, Disp: 45 g, Rfl: 3     METRONIDAZOLE PO, Take 5 mg by mouth 1/2 tablet daily, Disp: , Rfl:      tamoxifen (NOLVADEX) 20 MG tablet, Take 1 tablet (20 mg) by mouth daily, Disp: 90 tablet, Rfl: 3     tolterodine ER (DETROL LA) 4 MG 24 hr capsule, TAKE 1 CAPSULE(4 MG) BY MOUTH DAILY, Disp: 90 capsule, Rfl: 1     venlafaxine (EFFEXOR XR) 75 MG 24 hr capsule, TAKE 1 CAPSULE(75 MG) BY MOUTH DAILY, Disp: 90 capsule, Rfl: 3     cetirizine (ZYRTEC) 10 MG tablet, Take 1 tablet (10 mg) by mouth daily as needed (itch), Disp: 30 tablet, Rfl: 1    Allergies:   Allergies   Allergen Reactions     Demerol      Sedation and vomiting     Meperidine      Sulfa Antibiotics Hives     unknown reaction       REVIEW OF SYSTEMS:   CONSTITUTIONAL:NEGATIVE for fever, chills, night sweats  INTEGUMENTARY/SKIN: NEGATIVE for worrisome wound problems or redness.  MUSCULOSKELETAL:See HPI above  NEURO: NEGATIVE for weakness, dizziness or paresthesias    PHYSICAL EXAM:  /81   Pulse 80   Ht 1.664 m (5' 5.5\")   Wt 103.1 kg (227 lb 6.4 oz)   BMI 37.27 kg/m     GENERAL APPEARANCE: healthy, alert, no distress  SKIN: no suspicious lesions or rashes  NEURO: Normal strength and tone, mentation intact and speech normal  PSYCH:  mentation appears normal and affect normal, not anxious  RESPIRATORY: No increased work of breathing.    Bilateral lower extremity.  Gait:   favors the right   No Quad atrophy, strength normal.  Intact sensation deep peroneal nerve, superficial peroneal nerve, med/lat tibial nerve, sural nerve, saphenous nerve  Intact EHL, EDL, TA, FHL, GS, quadriceps hamstrings and hip flexors  Toes warm and well perfused, brisk capillary refill. Palpable 2+ dp pulses.  calf soft and nttp " or squeeze.  Edema: trace      RIGHT ANKLE  There is mild-moderate lateral swelling  No ecchymosis.  Skin intacdt.  Tender to palpation distal fibula anterior talo-fibular ligament.  Nbttp medial ankle, medial malleolus, deltoid ligament  Nontender to palpation heel, calcaneus, achilles tendon, base of 5th metatarsal, midfoot, forefoot.      X-RAY: 3 views right ankle 3/27/2024 reviewed. Symmetric ankle mortise. Small avulsion fragments tip of lateral malleolus. Suble medial talar cortical irregularity. Otherwise No obvious fracture or dislocation. No obvious bony abnormality or lesion.. symmetric ankle mortise. No talar dome osteochondral lesion. Small calcaneal enthesophytes.           Impression: Merlyn Ravi is a 59 year old female withacute right ankle pain, ankle sprain.      Plan:   ANKLE  Images reviewed, showing small avulsion bony fragments off the tip of the lateral malleolus, consistent with avulsion ligament sprain.  This will likely take several months to recover.    Treatment with:  * rest  * ice  * elevation  * compression  * activity modification - avoid aggravating activities  * ankle brace/support in lace-up, supportive shoes  * ankle range of motion exercises  * Physical Therapy, strengthening and proprioception training in future as needed.  * NSAIDS  * return to clinic as needed.         Timo Berman M.D., M.S.  Dept. of Orthopaedic Surgery  [facility]      Again, thank you for allowing me to participate in the care of your patient.        Sincerely,        Timo Berman MD

## 2024-03-27 NOTE — PROGRESS NOTES
Chief Complaint   Patient presents with    Left Knee - Pain     Hx of knee arthroscopy on 7/14/22. Last injection: 3/6/23. Injection worked great up until 1-2 months ago. She is having trouble with stairs now and sometimes putting any weight on it is difficult. She would like another injection today.        SURGERY:   1.  Left knee arthroscopic partial lateral meniscectomy.  2.  Left knee arthroscopic medial plica resection.  3.  Left knee arthroscopic shaving chondroplasty of the patella, lateral femoral condyle, lateral tibia.  4.  Left knee arthroscopic loose body removal, multiple.  DATE OF SURGERY: 7/14/2022.      HISTORY OF PRESENT ILLNESS:  Merlyn Ravi is a 59 year old female seen for left knee pain. Last seen 3/6/2023,  10/3/2022, had a cortisone injection for pain, osteoarthritis. Injection worked well, for almost a year. She'd like a repeat injection today. Continues with have pain mostly along the outer (lateral) aspect of the knee and some in front of the knee as the pain wraps around the front of the knee. Will get pain shooting down the lateral leg to the ankle at times as well . The knee swells as the day goes on. Pain wakes her at night. If sits too long the knee gets stiff. Worse with stairs, sit to stand, activities.    She remains active with dance once a week, going on walks, stretching and home exercise program.      Patient has tried:     NSAIDS: Yes      Acetaminophen: Yes     Opioids: No     Physical Therapy: Yes      Home Exercise Program / Resistance training: Yes      Activity modification: Yes      Bracing: No      Injections: Yes , numerous cortisone, most recently 3/6/2023 (helped almost a year)     Ice: Yes      Assistive device:  No     Topicals: Yes, CBD     Other: gabapentin, knee arthroscopy with findings of underlying osteoarthritis.      OR FINDINGS:  Small effusion.  Multiple articular loose bodies within the suprapatellar pouch, medial and lateral gutters as well as the  articular surfaces.  Diffuse suprapatellar synovitis.  Grade 4 chondrosis of the lateral patellar facet, grade 3 fissure at the apex, grade 2, medial patellar facet.  Grade 2 femoral trochlear chondrosis.  Intact ACL within the notch.  Hypertrophic fat pad.  Diffuse synovitis within the medial and lateral gutters.  Medial compartment with intact medial meniscus.  Grade 2 chondrosis.  Lateral compartment with complex tearing of the anterior horn of the lateral meniscus with a displaced flap anteriorly to the anterior recess and into the notch.  Grade 4 chondrosis of a significant portion of the lateral femoral condyle and grade 3/4 chondrosis of the lateral tibia.    Past Medical History:   Diagnosis Date    Allergies     Breast cancer (H)     Hyperlipidemia 10/14/2014    Major depressive disorder, recurrent episode, mild (H24) 05/17/2022    Malignant neoplasm of female breast (H) 08/15/2019    Formatting of this note might be different from the original.  8/2019. Left.  Formatting of this note is different from the original.  Formatting of this note might be different from the original.  8/2019. Left.     Formatting of this note might be different from the original.  6.5.2020- Review and distribute treatment summary-[facility]  Added automatically from request for surgery [id number]       Mixed incontinence 03/02/2023    Monoclonal gammopathy 10/08/2013    Formatting of this note might be different from the original.  Last Assessment & Plan:   Formatting of this note might be different from the original.  Merlyn Ravi  has a history of IgG kappa MGUS. The MGUS was discovered after she had been found to have an elevated gamma globulin fraction when she underwent blood testing prior to donating blood at [facility]. She was found to have a 0.4 gram/dL I    Neuropathy 08/29/2023    Obesity (BMI 35.0-39.9) with comorbidity (H) 01/25/2019    Other specified hypothyroidism 08/29/2019    PONV (postoperative nausea and  vomiting) 07/07/2022    Rosacea 08/29/2023       Past Surgical History:   Procedure Laterality Date    ARTHROSCOPY KNEE Left 7/14/2022    Procedure: ARTHROSCOPY, LEFT KNEE with meniscal and chondral debridement;  Surgeon: Timo Berman MD;  Location: [facility]    BIOPSY BREAST      HYSTERECTOMY, PAP NO LONGER INDICATED      HYSTERECTOMY, MAGY      LASIK BILATERAL  01/01/2005    Dr. Solis     LUMPECTOMY BREAST Left 09/30/2019    Procedure: Re-excision of left breast lumpectomy site;  Surgeon: Kj Salas DO;  Location: [facility]    LUMPECTOMY BREAST Left 10/09/2019    Procedure: Left breast re-excision;  Surgeon: Kj Salas DO;  Location: [facility]    LUMPECTOMY BREAST WITH SENTINEL NODE, COMBINED Left 09/23/2019    Procedure: LUMPECTOMY, BREAST, WITH SENTINEL LYMPH NODE BIOPSY.;  Surgeon: Kj Salas DO;  Location: [facility] OR    [facility] NONSPECIFIC PROCEDURE      bladder surgery       Medications:   Current Outpatient Medications:     atorvastatin (LIPITOR) 10 MG tablet, Take 1 tablet (10 mg) by mouth daily, Disp: 90 tablet, Rfl: 3    calcium 500-125 MG-UNIT TABS, Take 2 tablets by mouth 2 times daily, Disp: , Rfl:     cetirizine (ZYRTEC) 10 MG tablet, Take 1 tablet (10 mg) by mouth daily as needed (itch), Disp: 30 tablet, Rfl: 1    doxycycline hyclate (PERIOSTAT) 20 MG tablet, Take 2 tablets (40 mg) by mouth daily, Disp: 180 tablet, Rfl: 3    fluocinonide (LIDEX) 0.05 % external ointment, Apply topically 2 times daily, Disp: 45 g, Rfl: 0    gabapentin (NEURONTIN) 100 MG capsule, TAKE 1 CAPSULE BY MOUTH EVERY MORNING AND TAKE 2 CAPSULES EVERY EVENING., Disp: 90 capsule, Rfl: 3    methimazole (TAPAZOLE) 5 MG tablet, Take 0.5 tablets (2.5 mg) by mouth daily, Disp: 45 tablet, Rfl: 3    metroNIDAZOLE (METROCREAM) 0.75 % external cream, Apply topically 2 times daily, Disp: 45 g, Rfl: 3    METRONIDAZOLE PO, Take 5 mg by mouth 1/2 tablet daily, Disp: , Rfl:     tamoxifen (NOLVADEX) 20 MG tablet,  "Take 1 tablet (20 mg) by mouth daily, Disp: 90 tablet, Rfl: 3    tolterodine ER (DETROL LA) 4 MG 24 hr capsule, TAKE 1 CAPSULE(4 MG) BY MOUTH DAILY, Disp: 90 capsule, Rfl: 1    venlafaxine (EFFEXOR XR) 75 MG 24 hr capsule, TAKE 1 CAPSULE(75 MG) BY MOUTH DAILY, Disp: 90 capsule, Rfl: 3    Allergies:   Allergies   Allergen Reactions    Demerol      Sedation and vomiting    Meperidine     Sulfa Antibiotics Hives     unknown reaction       REVIEW OF SYSTEMS:   CONSTITUTIONAL:NEGATIVE for fever, chills, night sweats  INTEGUMENTARY/SKIN: NEGATIVE for worrisome wound problems or redness.  MUSCULOSKELETAL:See HPI above  NEURO: NEGATIVE for weakness, dizziness or paresthesias    PHYSICAL EXAM:  /81   Pulse 80   Ht 1.664 m (5' 5.5\")   Wt 103.1 kg (227 lb 4.7 oz)   BMI 37.25 kg/m     GENERAL APPEARANCE: healthy, alert, no distress  SKIN: no suspicious lesions or rashes  NEURO: Normal strength and tone, mentation intact and speech normal  PSYCH:  mentation appears normal and affect normal, not anxious  RESPIRATORY: No increased work of breathing.    Bilateral lower extremity.  Gait: subtle favors the right     Left lower extremity:  No Quad atrophy, strength normal.  Intact sensation deep peroneal nerve, superficial peroneal nerve, med/lat tibial nerve, sural nerve, saphenous nerve  Intact EHL, EDL, TA, FHL, GS, quadriceps hamstrings and hip flexors  Toes warm and well perfused, brisk capillary refill. Palpable 2+ dp pulses.  calf soft and nttp or squeeze.  Edema: trace    left  KNEE EXAM:    Skin: intact, no ecchymosis or erythema  Incisions: well healed. Dry. No erythema.  ROM: full extension to 120 flexion  Effusion: trace   Tender: lateral joint line, medial joint line.  Positive patello-femoral crepitus and grind.        Impression: Merlyn Ravi is a 59 year old female with  chronic left knee pain, advanced primary osteoarthritis.        Plan:   KNEE  Again, discussed underlying " "osteoarthritis.    Non-surgical treatment for knee arthritis includes:    * rest, sitting  * Activity modification - avoid impact activities or activities that aggravate symptoms.  * NSAIDS (non-steroidal anti-inflammatory medications; e.g. Aleve, advil, motrin, ibuprofen) - regular use for inflammation ( twice daily or three times daily), with food, as long as no contra-indications Please discuss with primary care doctor if needed  * ice, 15-20 minutes at a time several times a day or as needed.  * Strengthening of quadriceps muscles  * Physical Therapy for strengthening, stretching and range of motion exercises of legs  * Tylenol as needed for pain, consider Tylenol arthritis or similar  * Weight loss: Weight loss:  Body mass index is 37.25 kg/m .. weight loss benefits, not only for the current pain symptoms, but also overall health. Recommend a good diet plan that works for the patient, with the assistance of a dietician or primary care doctor as needed. Also, a good, low-impact exercise program for at least 20 minutes per day, 3 times per week, such as exercise bike, elliptical , or pool.  * Exercise: low impact such as stationary bike, elliptical, pool.  * Injections: cortisone versus viscosupplementation (hyaluronic acid, \"rooster comb\", \"gel shots\"); risks and perceived benefits discussed today. Patient elects  to proceed. Will do kenalog again today.  * consider visco in future, will need preauthorization from insurance first.  * Bracing: bracing the knee may offer some relief of symptoms when worn and provide some stability.  * over the counter supplements such as glucosamine and chondroitin sulfate may help with joint pain.  * topical ointments may help as well      * return to clinic as needed.          Timo Berman M.D., M.S.  Dept. of Orthopaedic Surgery  [facility]    Large Joint Injection/Arthocentesis: L knee joint    Date/Time: 3/27/2024 11:35 AM    Performed by: Timo Berman " MD Lse  Authorized by: Timo Berman MD    Indications:  Pain  Needle Size:  22 G  Guidance: landmark guided    Approach:  Anteromedial  Location:  Knee      Medications:  80 mg triamcinolone 40 MG/ML; 4 mL BUPivacaine 0.25 %  Outcome:  Tolerated well, no immediate complications  Procedure discussed: discussed risks, benefits, and alternatives    Consent Given by:  Patient  Timeout: timeout called immediately prior to procedure    Prep: patient was prepped and draped in usual sterile fashion

## 2024-03-27 NOTE — LETTER
3/27/2024         RE: Merlyn Ravi  [id number] [facility] [id number]        Dear Colleague,    Thank you for referring your patient, Merlyn Ravi, to the [facility]. Please see a copy of my visit note below.    Chief Complaint   Patient presents with     Left Knee - Pain     Hx of knee arthroscopy on 7/14/22. Last injection: 3/6/23. Injection worked great up until 1-2 months ago. She is having trouble with stairs now and sometimes putting any weight on it is difficult. She would like another injection today.        SURGERY:   1.  Left knee arthroscopic partial lateral meniscectomy.  2.  Left knee arthroscopic medial plica resection.  3.  Left knee arthroscopic shaving chondroplasty of the patella, lateral femoral condyle, lateral tibia.  4.  Left knee arthroscopic loose body removal, multiple.  DATE OF SURGERY: 7/14/2022.      HISTORY OF PRESENT ILLNESS:  Merlyn Ravi is a 59 year old female seen for left knee pain. Last seen 3/6/2023,  10/3/2022, had a cortisone injection for pain, osteoarthritis. Injection worked well, for almost a year. She'd like a repeat injection today. Continues with have pain mostly along the outer (lateral) aspect of the knee and some in front of the knee as the pain wraps around the front of the knee. Will get pain shooting down the lateral leg to the ankle at times as well . The knee swells as the day goes on. Pain wakes her at night. If sits too long the knee gets stiff. Worse with stairs, sit to stand, activities.    She remains active with dance once a week, going on walks, stretching and home exercise program.      Patient has tried:     NSAIDS: Yes      Acetaminophen: Yes     Opioids: No     Physical Therapy: Yes      Home Exercise Program / Resistance training: Yes      Activity modification: Yes      Bracing: No      Injections: Yes , numerous cortisone, most recently 3/6/2023 (helped almost a year)     Ice: Yes       Assistive device:  No     Topicals: Yes, CBD     Other: gabapentin, knee arthroscopy with findings of underlying osteoarthritis.      OR FINDINGS:  Small effusion.  Multiple articular loose bodies within the suprapatellar pouch, medial and lateral gutters as well as the articular surfaces.  Diffuse suprapatellar synovitis.  Grade 4 chondrosis of the lateral patellar facet, grade 3 fissure at the apex, grade 2, medial patellar facet.  Grade 2 femoral trochlear chondrosis.  Intact ACL within the notch.  Hypertrophic fat pad.  Diffuse synovitis within the medial and lateral gutters.  Medial compartment with intact medial meniscus.  Grade 2 chondrosis.  Lateral compartment with complex tearing of the anterior horn of the lateral meniscus with a displaced flap anteriorly to the anterior recess and into the notch.  Grade 4 chondrosis of a significant portion of the lateral femoral condyle and grade 3/4 chondrosis of the lateral tibia.    Past Medical History:   Diagnosis Date     Allergies      Breast cancer (H)      Hyperlipidemia 10/14/2014     Major depressive disorder, recurrent episode, mild (H24) 05/17/2022     Malignant neoplasm of female breast (H) 08/15/2019    Formatting of this note might be different from the original.  8/2019. Left.  Formatting of this note is different from the original.  Formatting of this note might be different from the original.  8/2019. Left.     Formatting of this note might be different from the original.  6.5.2020- Review and distribute treatment summary-[facility]  Added automatically from request for surgery [id number]        Mixed incontinence 03/02/2023     Monoclonal gammopathy 10/08/2013    Formatting of this note might be different from the original.  Last Assessment & Plan:   Formatting of this note might be different from the original.  Merlyn Ravi  has a history of IgG kappa MGUS. The MGUS was discovered after she had been found to have an elevated gamma globulin  fraction when she underwent blood testing prior to donating blood at [facility]. She was found to have a 0.4 gram/dL I     Neuropathy 08/29/2023     Obesity (BMI 35.0-39.9) with comorbidity (H) 01/25/2019     Other specified hypothyroidism 08/29/2019     PONV (postoperative nausea and vomiting) 07/07/2022     Rosacea 08/29/2023       Past Surgical History:   Procedure Laterality Date     ARTHROSCOPY KNEE Left 7/14/2022    Procedure: ARTHROSCOPY, LEFT KNEE with meniscal and chondral debridement;  Surgeon: Timo Berman MD;  Location: [facility] OR     BIOPSY BREAST       HYSTERECTOMY, PAP NO LONGER INDICATED       HYSTERECTOMY, MAGY       LASIK BILATERAL  01/01/2005    Dr. Solis      LUMPECTOMY BREAST Left 09/30/2019    Procedure: Re-excision of left breast lumpectomy site;  Surgeon: Kj Salas DO;  Location: [facility]     LUMPECTOMY BREAST Left 10/09/2019    Procedure: Left breast re-excision;  Surgeon: Kj Salas DO;  Location: [facility]     LUMPECTOMY BREAST WITH SENTINEL NODE, COMBINED Left 09/23/2019    Procedure: LUMPECTOMY, BREAST, WITH SENTINEL LYMPH NODE BIOPSY.;  Surgeon: Kj Salas DO;  Location: [facility] OR     [facility] NONSPECIFIC PROCEDURE      bladder surgery       Medications:   Current Outpatient Medications:      atorvastatin (LIPITOR) 10 MG tablet, Take 1 tablet (10 mg) by mouth daily, Disp: 90 tablet, Rfl: 3     calcium 500-125 MG-UNIT TABS, Take 2 tablets by mouth 2 times daily, Disp: , Rfl:      cetirizine (ZYRTEC) 10 MG tablet, Take 1 tablet (10 mg) by mouth daily as needed (itch), Disp: 30 tablet, Rfl: 1     doxycycline hyclate (PERIOSTAT) 20 MG tablet, Take 2 tablets (40 mg) by mouth daily, Disp: 180 tablet, Rfl: 3     fluocinonide (LIDEX) 0.05 % external ointment, Apply topically 2 times daily, Disp: 45 g, Rfl: 0     gabapentin (NEURONTIN) 100 MG capsule, TAKE 1 CAPSULE BY MOUTH EVERY MORNING AND TAKE 2 CAPSULES EVERY EVENING., Disp: 90 capsule, Rfl: 3     methimazole  "(TAPAZOLE) 5 MG tablet, Take 0.5 tablets (2.5 mg) by mouth daily, Disp: 45 tablet, Rfl: 3     metroNIDAZOLE (METROCREAM) 0.75 % external cream, Apply topically 2 times daily, Disp: 45 g, Rfl: 3     METRONIDAZOLE PO, Take 5 mg by mouth 1/2 tablet daily, Disp: , Rfl:      tamoxifen (NOLVADEX) 20 MG tablet, Take 1 tablet (20 mg) by mouth daily, Disp: 90 tablet, Rfl: 3     tolterodine ER (DETROL LA) 4 MG 24 hr capsule, TAKE 1 CAPSULE(4 MG) BY MOUTH DAILY, Disp: 90 capsule, Rfl: 1     venlafaxine (EFFEXOR XR) 75 MG 24 hr capsule, TAKE 1 CAPSULE(75 MG) BY MOUTH DAILY, Disp: 90 capsule, Rfl: 3    Allergies:   Allergies   Allergen Reactions     Demerol      Sedation and vomiting     Meperidine      Sulfa Antibiotics Hives     unknown reaction       REVIEW OF SYSTEMS:   CONSTITUTIONAL:NEGATIVE for fever, chills, night sweats  INTEGUMENTARY/SKIN: NEGATIVE for worrisome wound problems or redness.  MUSCULOSKELETAL:See HPI above  NEURO: NEGATIVE for weakness, dizziness or paresthesias    PHYSICAL EXAM:  /81   Pulse 80   Ht 1.664 m (5' 5.5\")   Wt 103.1 kg (227 lb 4.7 oz)   BMI 37.25 kg/m     GENERAL APPEARANCE: healthy, alert, no distress  SKIN: no suspicious lesions or rashes  NEURO: Normal strength and tone, mentation intact and speech normal  PSYCH:  mentation appears normal and affect normal, not anxious  RESPIRATORY: No increased work of breathing.    Bilateral lower extremity.  Gait: subtle favors the right     Left lower extremity:  No Quad atrophy, strength normal.  Intact sensation deep peroneal nerve, superficial peroneal nerve, med/lat tibial nerve, sural nerve, saphenous nerve  Intact EHL, EDL, TA, FHL, GS, quadriceps hamstrings and hip flexors  Toes warm and well perfused, brisk capillary refill. Palpable 2+ dp pulses.  calf soft and nttp or squeeze.  Edema: trace    left  KNEE EXAM:    Skin: intact, no ecchymosis or erythema  Incisions: well healed. Dry. No erythema.  ROM: full extension to 120 " "flexion  Effusion: trace   Tender: lateral joint line, medial joint line.  Positive patello-femoral crepitus and grind.        Impression: Merlyn Ravi is a 59 year old female with  chronic left knee pain, advanced primary osteoarthritis.        Plan:   KNEE  Again, discussed underlying osteoarthritis.    Non-surgical treatment for knee arthritis includes:    * rest, sitting  * Activity modification - avoid impact activities or activities that aggravate symptoms.  * NSAIDS (non-steroidal anti-inflammatory medications; e.g. Aleve, advil, motrin, ibuprofen) - regular use for inflammation ( twice daily or three times daily), with food, as long as no contra-indications Please discuss with primary care doctor if needed  * ice, 15-20 minutes at a time several times a day or as needed.  * Strengthening of quadriceps muscles  * Physical Therapy for strengthening, stretching and range of motion exercises of legs  * Tylenol as needed for pain, consider Tylenol arthritis or similar  * Weight loss: Weight loss:  Body mass index is 37.25 kg/m .. weight loss benefits, not only for the current pain symptoms, but also overall health. Recommend a good diet plan that works for the patient, with the assistance of a dietician or primary care doctor as needed. Also, a good, low-impact exercise program for at least 20 minutes per day, 3 times per week, such as exercise bike, elliptical , or pool.  * Exercise: low impact such as stationary bike, elliptical, pool.  * Injections: cortisone versus viscosupplementation (hyaluronic acid, \"rooster comb\", \"gel shots\"); risks and perceived benefits discussed today. Patient elects  to proceed. Will do kenalog again today.  * consider visco in future, will need preauthorization from insurance first.  * Bracing: bracing the knee may offer some relief of symptoms when worn and provide some stability.  * over the counter supplements such as glucosamine and chondroitin sulfate may help with " joint pain.  * topical ointments may help as well      * return to clinic as needed.          Timo Berman M.D., M.S.  Dept. of Orthopaedic Surgery  [facility]    Large Joint Injection/Arthocentesis: L knee joint    Date/Time: 3/27/2024 11:35 AM    Performed by: Timo Berman MD  Authorized by: Timo Berman MD    Indications:  Pain  Needle Size:  22 G  Guidance: landmark guided    Approach:  Anteromedial  Location:  Knee      Medications:  80 mg triamcinolone 40 MG/ML; 4 mL BUPivacaine 0.25 %  Outcome:  Tolerated well, no immediate complications  Procedure discussed: discussed risks, benefits, and alternatives    Consent Given by:  Patient  Timeout: timeout called immediately prior to procedure    Prep: patient was prepped and draped in usual sterile fashion          Again, thank you for allowing me to participate in the care of your patient.        Sincerely,        Timo Berman MD

## 2024-04-03 ENCOUNTER — OFFICE VISIT (OUTPATIENT)
Dept: FAMILY MEDICINE | Facility: CLINIC | Age: 59
End: 2024-04-03
Payer: COMMERCIAL

## 2024-04-03 VITALS
RESPIRATION RATE: 16 BRPM | HEART RATE: 87 BPM | TEMPERATURE: 98.4 F | SYSTOLIC BLOOD PRESSURE: 124 MMHG | OXYGEN SATURATION: 94 % | BODY MASS INDEX: 35.52 KG/M2 | DIASTOLIC BLOOD PRESSURE: 94 MMHG | HEIGHT: 66 IN | WEIGHT: 221 LBS

## 2024-04-03 DIAGNOSIS — Z00.00 ENCOUNTER FOR ROUTINE ADULT HEALTH EXAMINATION WITHOUT ABNORMAL FINDINGS: Primary | ICD-10-CM

## 2024-04-03 DIAGNOSIS — Z12.11 COLON CANCER SCREENING: ICD-10-CM

## 2024-04-03 DIAGNOSIS — E03.8 OTHER SPECIFIED HYPOTHYROIDISM: ICD-10-CM

## 2024-04-03 DIAGNOSIS — E78.5 HYPERLIPIDEMIA, UNSPECIFIED HYPERLIPIDEMIA TYPE: ICD-10-CM

## 2024-04-03 DIAGNOSIS — F33.0 MAJOR DEPRESSIVE DISORDER, RECURRENT EPISODE, MILD (H): ICD-10-CM

## 2024-04-03 DIAGNOSIS — E66.01 MORBID OBESITY (H): ICD-10-CM

## 2024-04-03 LAB
ANION GAP SERPL CALCULATED.3IONS-SCNC: 9 MMOL/L (ref 7–15)
BUN SERPL-MCNC: 15.9 MG/DL (ref 8–23)
CALCIUM SERPL-MCNC: 10.2 MG/DL (ref 8.6–10)
CHLORIDE SERPL-SCNC: 103 MMOL/L (ref 98–107)
CHOLEST SERPL-MCNC: 221 MG/DL
CREAT SERPL-MCNC: 0.93 MG/DL (ref 0.51–0.95)
DEPRECATED HCO3 PLAS-SCNC: 28 MMOL/L (ref 22–29)
EGFRCR SERPLBLD CKD-EPI 2021: 70 ML/MIN/1.73M2
FASTING STATUS PATIENT QL REPORTED: YES
GLUCOSE SERPL-MCNC: 100 MG/DL (ref 70–99)
HDLC SERPL-MCNC: 61 MG/DL
LDLC SERPL CALC-MCNC: 132 MG/DL
NONHDLC SERPL-MCNC: 160 MG/DL
POTASSIUM SERPL-SCNC: 4.5 MMOL/L (ref 3.4–5.3)
SODIUM SERPL-SCNC: 140 MMOL/L (ref 135–145)
T4 FREE SERPL-MCNC: 0.9 NG/DL (ref 0.9–1.7)
TRIGL SERPL-MCNC: 141 MG/DL
TSH SERPL DL<=0.005 MIU/L-ACNC: 11.55 UIU/ML (ref 0.3–4.2)

## 2024-04-03 PROCEDURE — 96127 BRIEF EMOTIONAL/BEHAV ASSMT: CPT | Performed by: FAMILY MEDICINE

## 2024-04-03 PROCEDURE — 36415 COLL VENOUS BLD VENIPUNCTURE: CPT | Performed by: FAMILY MEDICINE

## 2024-04-03 PROCEDURE — 99396 PREV VISIT EST AGE 40-64: CPT | Mod: 25 | Performed by: FAMILY MEDICINE

## 2024-04-03 PROCEDURE — 99214 OFFICE O/P EST MOD 30 MIN: CPT | Mod: 25 | Performed by: FAMILY MEDICINE

## 2024-04-03 PROCEDURE — 84439 ASSAY OF FREE THYROXINE: CPT | Performed by: FAMILY MEDICINE

## 2024-04-03 PROCEDURE — 80048 BASIC METABOLIC PNL TOTAL CA: CPT | Performed by: FAMILY MEDICINE

## 2024-04-03 PROCEDURE — 84443 ASSAY THYROID STIM HORMONE: CPT | Performed by: FAMILY MEDICINE

## 2024-04-03 PROCEDURE — 80061 LIPID PANEL: CPT | Performed by: FAMILY MEDICINE

## 2024-04-03 PROCEDURE — 90471 IMMUNIZATION ADMIN: CPT | Performed by: FAMILY MEDICINE

## 2024-04-03 PROCEDURE — 90750 HZV VACC RECOMBINANT IM: CPT | Performed by: FAMILY MEDICINE

## 2024-04-03 RX ORDER — VENLAFAXINE HYDROCHLORIDE 37.5 MG/1
37.5 CAPSULE, EXTENDED RELEASE ORAL DAILY
Qty: 90 CAPSULE | Refills: 0 | Status: SHIPPED | OUTPATIENT
Start: 2024-04-03 | End: 2024-08-05

## 2024-04-03 SDOH — HEALTH STABILITY: PHYSICAL HEALTH: ON AVERAGE, HOW MANY DAYS PER WEEK DO YOU ENGAGE IN MODERATE TO STRENUOUS EXERCISE (LIKE A BRISK WALK)?: 0 DAYS

## 2024-04-03 SDOH — HEALTH STABILITY: PHYSICAL HEALTH: ON AVERAGE, HOW MANY MINUTES DO YOU ENGAGE IN EXERCISE AT THIS LEVEL?: 0 MIN

## 2024-04-03 ASSESSMENT — ANXIETY QUESTIONNAIRES
1. FEELING NERVOUS, ANXIOUS, OR ON EDGE: NOT AT ALL
4. TROUBLE RELAXING: NOT AT ALL
7. FEELING AFRAID AS IF SOMETHING AWFUL MIGHT HAPPEN: NOT AT ALL
GAD7 TOTAL SCORE: 0
GAD7 TOTAL SCORE: 0
5. BEING SO RESTLESS THAT IT IS HARD TO SIT STILL: NOT AT ALL
3. WORRYING TOO MUCH ABOUT DIFFERENT THINGS: NOT AT ALL
7. FEELING AFRAID AS IF SOMETHING AWFUL MIGHT HAPPEN: NOT AT ALL
2. NOT BEING ABLE TO STOP OR CONTROL WORRYING: NOT AT ALL
8. IF YOU CHECKED OFF ANY PROBLEMS, HOW DIFFICULT HAVE THESE MADE IT FOR YOU TO DO YOUR WORK, TAKE CARE OF THINGS AT HOME, OR GET ALONG WITH OTHER PEOPLE?: NOT DIFFICULT AT ALL
6. BECOMING EASILY ANNOYED OR IRRITABLE: NOT AT ALL
IF YOU CHECKED OFF ANY PROBLEMS ON THIS QUESTIONNAIRE, HOW DIFFICULT HAVE THESE PROBLEMS MADE IT FOR YOU TO DO YOUR WORK, TAKE CARE OF THINGS AT HOME, OR GET ALONG WITH OTHER PEOPLE: NOT DIFFICULT AT ALL
GAD7 TOTAL SCORE: 0

## 2024-04-03 ASSESSMENT — PATIENT HEALTH QUESTIONNAIRE - PHQ9
SUM OF ALL RESPONSES TO PHQ QUESTIONS 1-9: 0
SUM OF ALL RESPONSES TO PHQ QUESTIONS 1-9: 0
10. IF YOU CHECKED OFF ANY PROBLEMS, HOW DIFFICULT HAVE THESE PROBLEMS MADE IT FOR YOU TO DO YOUR WORK, TAKE CARE OF THINGS AT HOME, OR GET ALONG WITH OTHER PEOPLE: NOT DIFFICULT AT ALL

## 2024-04-03 ASSESSMENT — PAIN SCALES - GENERAL: PAINLEVEL: NO PAIN (0)

## 2024-04-03 ASSESSMENT — SOCIAL DETERMINANTS OF HEALTH (SDOH): HOW OFTEN DO YOU GET TOGETHER WITH FRIENDS OR RELATIVES?: ONCE A WEEK

## 2024-04-03 NOTE — PROGRESS NOTES
"Preventive Care Visit  [facility]  Memo Garcia MD, Family Medicine  Apr 3, 2024      Assessment & Plan     (Z00.00) Encounter for routine adult health examination without abnormal findings  (primary encounter diagnosis)  Comment: 59 yr old female here for her annual physical.   Plan: Reviewed health maintenance with the patient - she is due for colonoscopy. She received her second shingles vaccine.  Mammogram scheduled. No longer needs a pap.     (F33.0) Major depressive disorder, recurrent episode, mild (H24)  Comment: patient will like to wean off her antidepressants.   Plan: venlafaxine (EFFEXOR XR) 37.5 MG 24 hr capsule,        Basic metabolic panel  (Ca, Cl, CO2, Creat,         Gluc, K, Na, BUN)            (E66.01) Morbid obesity (H)  Comment: working on losing weight.  Plan: As above.     (Z12.11) Colon cancer screening  Comment: reminded of colonoscopy.   Plan: Colonoscopy Screening  Referral            (E78.5) Hyperlipidemia, unspecified hyperlipidemia type  Comment: Labs ordered. Patient will be notified of results.   Plan: Lipid panel reflex to direct LDL Fasting            (E03.8) Other specified hypothyroidism  Comment: Labs ordered. Patient will be notified of results.   Plan: TSH with free T4 reflex            Patient has been advised of split billing requirements and indicates understanding: Yes  Review of external notes as documented elsewhere in note        BMI  Estimated body mass index is 36.22 kg/m  as calculated from the following:    Height as of this encounter: 1.664 m (5' 5.5\").    Weight as of this encounter: 100.2 kg (221 lb).   Weight management plan: Discussed healthy diet and exercise guidelines    Counseling  Appropriate preventive services were discussed with this patient, including applicable screening as appropriate for fall prevention, nutrition, physical activity, Tobacco-use cessation, weight loss and cognition.  Checklist " reviewing preventive services available has been given to the patient.  Reviewed patient's diet, addressing concerns and/or questions.   She is at risk for psychosocial distress and has been provided with information to reduce risk.       FUTURE APPOINTMENTS:       - Follow-up visit as needed.    Norman Acevedo is a 59 year old, presenting for the following:  Patient is a 59-year-old female with past medical history significant for depression anxiety, hyperlipidemia, hypothyroidism, history of breast cancer here for a physical.  She has no acute complaints today.  She feels well.     History of breast cancer.  She is on the 5-year markus from her breast cancer and tells me that her oncologist is going to stop her tamoxifen at the end of the year.  She is very excited about this. She also follows with general surgery.      Obesity  She is very concerned about her weight and she wants to know what steps she can take I asked I explained increased activity and eating healthy. She is waiting until she gets of the tamoxifen before making any decision about weight loss pills.        Patient also wants to wean off her gabapentin and venlafaxine.  She feels that she is in a better place in terms of her depression.      Health maintenance.  She is open to getting blood work today.  She is due for second shingles shot.  She is also due for colon cancer screening and a colonoscopy was ordered for her.  Overall she appears to be doing really well and has no other major complaints today.    Physical (General physical exam.), Blood Draw (Patient is fasting today for labs.), Depression (She would like to discuss about weaning off of her antidepressant medication. ), Gabapentin (She would like to discuss the process of weaning off of the Gabapentin.), Imm/Inj (She will update her second Shingles vaccine today.  States this is covered in clinic.  Declined Covid and Flu today.  Discuss if she is due for a Prevnar 20.  ), and BP  recheck (She will make a RN blood pressure visit in [place] after weaning off of some of the medications.  )        4/3/2024     7:32 AM   Additional Questions   Roomed by Angella Alex CMA        Health Care Directive  Patient does not have a Health Care Directive or Living Will: Discussed advance care planning with patient; however, patient declined at this time.    HPI  Chief Complaint   Patient presents with    Physical     General physical exam.    Blood Draw     Patient is fasting today for labs.    Depression     She would like to discuss about weaning off of her antidepressant medication.     Gabapentin     She would like to discuss the process of weaning off of the Gabapentin.    Imm/Inj     She will update her second Shingles vaccine today.  States this is covered in clinic.  Declined Covid and Flu today.  Discuss if she is due for a Prevnar 20.      BP recheck     She will make a RN blood pressure visit in [place] after weaning off of some of the medications.                   4/3/2024   General Health   How would you rate your overall physical health? Good   Feel stress (tense, anxious, or unable to sleep) Only a little   (!) STRESS CONCERN      4/3/2024   Nutrition   Three or more servings of calcium each day? (!) NO   Diet: Regular (no restrictions)   How many servings of fruit and vegetables per day? (!) 0-1   How many sweetened beverages each day? 0-1         4/3/2024   Exercise   Days per week of moderate/strenous exercise 0 days   Average minutes spent exercising at this level 0 min   (!) EXERCISE CONCERN      4/3/2024   Social Factors   Frequency of gathering with friends or relatives Once a week   Worry food won't last until get money to buy more No   Food not last or not have enough money for food? No   Do you have housing?  Yes   Are you worried about losing your housing? No   Lack of transportation? No   Unable to get utilities (heat,electricity)? No          No data to display                    4/3/2024   Dental   Dentist two times every year? Yes         4/3/2024   TB Screening   Were you born outside of the [place]? No       Today's PHQ-9 Score:       4/3/2024     7:25 AM   PHQ-9 SCORE   PHQ-9 Total Score MyChart 0   PHQ-9 Total Score 0         4/3/2024   Substance Use   Alcohol more than 3/day or more than 7/wk No   Do you use any other substances recreationally? No     Social History     Tobacco Use    Smoking status: Never    Smokeless tobacco: Never   Vaping Use    Vaping Use: Never used   Substance Use Topics    Alcohol use: Yes     Alcohol/week: 0.0 standard drinks of alcohol     Comment: 3 drinks per month    Drug use: No           5/26/2022   LAST FHS-7 RESULTS   1st degree relative breast or ovarian cancer No   Any relative bilateral breast cancer No   Any male have breast cancer No   Any ONE woman have BOTH breast AND ovarian cancer No   Any woman with breast cancer before 50yrs No   2 or more relatives with breast AND/OR ovarian cancer No   2 or more relatives with breast AND/OR bowel cancer No       Mammogram Screening - Annual screen due to history of breast cancer, carcinoma in situ, or hyperplasia          4/3/2024   One time HIV Screening   Previous HIV test? No         4/3/2024   STI Screening   New sexual partner(s) since last STI/HIV test? No     History of abnormal Pap smear: Status post benign hysterectomy. Health Maintenance and Surgical History updated.       ASCVD Risk   The 10-year ASCVD risk score (Ajit LOPES, et al., 2019) is: 2.4%    Values used to calculate the score:      Age: 59 years      Sex: Female      Is Non- : No      Diabetic: No      Tobacco smoker: No      Systolic Blood Pressure: 124 mmHg      Is BP treated: No      HDL Cholesterol: 61 mg/dL      Total Cholesterol: 184 mg/dL          Reviewed and updated as needed this visit by Provider       Med Emelyn              Past Medical History:   Diagnosis Date    Allergies     Breast cancer  (H)     Hyperlipidemia 10/14/2014    Major depressive disorder, recurrent episode, mild (H24) 05/17/2022    Malignant neoplasm of female breast (H) 08/15/2019    Formatting of this note might be different from the original.  8/2019. Left.  Formatting of this note is different from the original.  Formatting of this note might be different from the original.  8/2019. Left.     Formatting of this note might be different from the original.  6.5.2020- Review and distribute treatment summary-[facility]  Added automatically from request for surgery [id number]       Mixed incontinence 03/02/2023    Monoclonal gammopathy 10/08/2013    Formatting of this note might be different from the original.  Last Assessment & Plan:   Formatting of this note might be different from the original.  Merlyn Ravi  has a history of IgG kappa MGUS. The MGUS was discovered after she had been found to have an elevated gamma globulin fraction when she underwent blood testing prior to donating blood at [facility]. She was found to have a 0.4 gram/dL I    Neuropathy 08/29/2023    Obesity (BMI 35.0-39.9) with comorbidity (H) 01/25/2019    Other specified hypothyroidism 08/29/2019    PONV (postoperative nausea and vomiting) 07/07/2022    Rosacea 08/29/2023     Past Surgical History:   Procedure Laterality Date    ARTHROSCOPY KNEE Left 7/14/2022    Procedure: ARTHROSCOPY, LEFT KNEE with meniscal and chondral debridement;  Surgeon: Timo Berman MD;  Location: [facility] OR    BIOPSY BREAST      HYSTERECTOMY, PAP NO LONGER INDICATED      HYSTERECTOMY, MAGY      LASIK BILATERAL  01/01/2005    Dr. Solis     LUMPECTOMY BREAST Left 09/30/2019    Procedure: Re-excision of left breast lumpectomy site;  Surgeon: Kj Salas DO;  Location: [facility]    LUMPECTOMY BREAST Left 10/09/2019    Procedure: Left breast re-excision;  Surgeon: Kj Salas DO;  Location: [facility]    LUMPECTOMY BREAST WITH SENTINEL NODE, COMBINED Left 09/23/2019     Procedure: LUMPECTOMY, BREAST, WITH SENTINEL LYMPH NODE BIOPSY.;  Surgeon: Kj Salas DO;  Location: [facility] OR    [facility] NONSPECIFIC PROCEDURE      bladder surgery     OB History   No obstetric history on file.     Lab work is in process  Labs reviewed in EPIC  BP Readings from Last 3 Encounters:   04/03/24 (!) 124/94   03/27/24 119/81   03/27/24 119/81    Wt Readings from Last 3 Encounters:   04/03/24 100.2 kg (221 lb)   03/27/24 103.1 kg (227 lb 4.7 oz)   03/27/24 103.1 kg (227 lb 6.4 oz)                  Patient Active Problem List   Diagnosis    LASIK OU 5 yrs    Obesity (BMI 35.0-39.9) with comorbidity (H)    Other specified hypothyroidism    Major depressive disorder, recurrent episode, mild (H24)    Bucket handle tear of lateral meniscus of left knee, unspecified whether old or current tear, subsequent encounter    Malignant neoplasm of female breast (H)    Hyperlipidemia    Monoclonal gammopathy    Mixed incontinence    Vaginal atrophy    Rosacea    Neuropathy     Past Surgical History:   Procedure Laterality Date    ARTHROSCOPY KNEE Left 7/14/2022    Procedure: ARTHROSCOPY, LEFT KNEE with meniscal and chondral debridement;  Surgeon: Timo Berman MD;  Location: [facility] OR    BIOPSY BREAST      HYSTERECTOMY, PAP NO LONGER INDICATED      HYSTERECTOMY, MAGY      LASIK BILATERAL  01/01/2005    Dr. Solis     LUMPECTOMY BREAST Left 09/30/2019    Procedure: Re-excision of left breast lumpectomy site;  Surgeon: Kj Salas DO;  Location: [facility]    LUMPECTOMY BREAST Left 10/09/2019    Procedure: Left breast re-excision;  Surgeon: Kj Salas DO;  Location: [facility]    LUMPECTOMY BREAST WITH SENTINEL NODE, COMBINED Left 09/23/2019    Procedure: LUMPECTOMY, BREAST, WITH SENTINEL LYMPH NODE BIOPSY.;  Surgeon: Kj Salas DO;  Location: [facility] OR    [facility] NONSPECIFIC PROCEDURE      bladder surgery       Social History     Tobacco Use    Smoking status: Never    Smokeless tobacco:  Never   Substance Use Topics    Alcohol use: Yes     Alcohol/week: 0.0 standard drinks of alcohol     Comment: 3 drinks per month     Family History   Problem Relation Age of Onset    Thyroid Disease Mother     Heart Disease Mother     Heart Disease Father     Hyperlipidemia Father     Hypertension Maternal Grandmother     Breast Cancer Maternal Grandmother     Hypertension Maternal Grandfather     Alzheimer Disease Paternal Grandmother     Diabetes Brother     Eczema Brother     Thyroid Disease Brother     Melanoma No family hx of          Current Outpatient Medications   Medication Sig Dispense Refill    atorvastatin (LIPITOR) 10 MG tablet Take 1 tablet (10 mg) by mouth daily 90 tablet 3    calcium 500-125 MG-UNIT TABS Take 2 tablets by mouth 2 times daily      doxycycline hyclate (PERIOSTAT) 20 MG tablet Take 2 tablets (40 mg) by mouth daily 180 tablet 3    fluocinonide (LIDEX) 0.05 % external ointment Apply topically 2 times daily 45 g 0    gabapentin (NEURONTIN) 100 MG capsule TAKE 1 CAPSULE BY MOUTH EVERY MORNING AND TAKE 2 CAPSULES EVERY EVENING. 90 capsule 3    methimazole (TAPAZOLE) 5 MG tablet Take 0.5 tablets (2.5 mg) by mouth daily 45 tablet 3    metroNIDAZOLE (METROCREAM) 0.75 % external cream Apply topically 2 times daily 45 g 3    METRONIDAZOLE PO Take 5 mg by mouth 1/2 tablet daily      tamoxifen (NOLVADEX) 20 MG tablet Take 1 tablet (20 mg) by mouth daily 90 tablet 3    tolterodine ER (DETROL LA) 4 MG 24 hr capsule TAKE 1 CAPSULE(4 MG) BY MOUTH DAILY 90 capsule 1    venlafaxine (EFFEXOR XR) 37.5 MG 24 hr capsule Take 1 capsule (37.5 mg) by mouth daily 90 capsule 0    venlafaxine (EFFEXOR XR) 75 MG 24 hr capsule TAKE 1 CAPSULE(75 MG) BY MOUTH DAILY 90 capsule 3     Allergies   Allergen Reactions    Demerol      Sedation and vomiting    Meperidine     Sulfa Antibiotics Hives     unknown reaction         Review of Systems  CONSTITUTIONAL: NEGATIVE for fever, chills, change in  "weight  INTEGUMENTARY/SKIN: NEGATIVE for worrisome rashes, moles or lesions  EYES: NEGATIVE for vision changes or irritation  ENT/MOUTH: NEGATIVE for ear, mouth and throat problems  RESP: NEGATIVE for significant cough or SOB  BREAST: POSITIVE history of breast cancer  CV: NEGATIVE for chest pain, palpitations or peripheral edema  GI: NEGATIVE for nausea, abdominal pain, heartburn, or change in bowel habits  : NEGATIVE for frequency, dysuria, or hematuria  MUSCULOSKELETAL: NEGATIVE for significant arthralgias or myalgia  NEURO: NEGATIVE for weakness, dizziness or paresthesias  ENDOCRINE: NEGATIVE for temperature intolerance, skin/hair changes  HEME: NEGATIVE for bleeding problems  PSYCHIATRIC: NEGATIVE for changes in mood or affect     Objective    Exam  BP (!) 124/94 (BP Location: Right arm, Patient Position: Chair, Cuff Size: Adult Large)   Pulse 87   Temp 98.4  F (36.9  C) (Tympanic)   Resp 16   Ht 1.664 m (5' 5.5\")   Wt 100.2 kg (221 lb)   SpO2 94%   BMI 36.22 kg/m     Estimated body mass index is 36.22 kg/m  as calculated from the following:    Height as of this encounter: 1.664 m (5' 5.5\").    Weight as of this encounter: 100.2 kg (221 lb).    Physical Exam  GENERAL: alert and no distress  EYES: Eyes grossly normal to inspection, PERRL and conjunctivae and sclerae normal  HENT: ear canals and TM's normal, nose and mouth without ulcers or lesions  NECK: no adenopathy, no asymmetry, masses, or scars  RESP: lungs clear to auscultation - no rales, rhonchi or wheezes  CV: regular rate and rhythm, normal S1 S2, no S3 or S4, no murmur, click or rub, no peripheral edema  ABDOMEN: soft, nontender, no hepatosplenomegaly, no masses and bowel sounds normal  MS: no gross musculoskeletal defects noted, no edema  SKIN: no suspicious lesions or rashes  PSYCH: mentation appears normal, affect normal/bright        Signed Electronically by: Memo Garcia MD    Answers submitted by the patient for this " visit:  Patient Health Questionnaire (Submitted on 4/3/2024)  If you checked off any problems, how difficult have these problems made it for you to do your work, take care of things at home, or get along with other people?: Not difficult at all  PHQ9 TOTAL SCORE: 0  LAUREL-7 (Submitted on 4/3/2024)  LAUREL 7 TOTAL SCORE: 0

## 2024-04-03 NOTE — NURSING NOTE
Prior to immunization administration, verified patients identity using patient s name and date of birth. Please see Immunization Activity for additional information.     Screening Questionnaire for Adult Immunization    Are you sick today?   No   Do you have allergies to medications, food, a vaccine component or latex?   No   Have you ever had a serious reaction after receiving a vaccination?   No   Do you have a long-term health problem with heart, lung, kidney, or metabolic disease (e.g., diabetes), asthma, a blood disorder, no spleen, complement component deficiency, a cochlear implant, or a spinal fluid leak?  Are you on long-term aspirin therapy?   No   Do you have cancer, leukemia, HIV/AIDS, or any other immune system problem?   Yes   Do you have a parent, brother, or sister with an immune system problem?   Yes   In the past 3 months, have you taken medications that affect  your immune system, such as prednisone, other steroids, or anticancer drugs; drugs for the treatment of rheumatoid arthritis, Crohn s disease, or psoriasis; or have you had radiation treatments?   No   Have you had a seizure, or a brain or other nervous system problem?   No   During the past year, have you received a transfusion of blood or blood    products, or been given immune (gamma) globulin or antiviral drug?   No   For women: Are you pregnant or is there a chance you could become       pregnant during the next month?   No   Have you received any vaccinations in the past 4 weeks?   No     Immunization questionnaire was positive for at least one answer.  Fine to give when reading the back of the sheet as this is not a live virus vaccine.      Patient instructed to remain in clinic for 15 minutes afterwards, and to report any adverse reactions.     Screening performed by Angella Alex CMA on 4/3/2024 at 8:19 AM.

## 2024-04-03 NOTE — COMMUNITY RESOURCES LIST (ENGLISH)
April 3, 2024           YOUR PERSONALIZED LIST OF SERVICES & PROGRAMS           & RECREATION    [employer]      [employer] [place] - Sports clubs and recreational activities - [facility]  [address] [place] [address] (Distance: 6.4 miles)  Language: English  Fee: Self pay, Sliding scale      [facility] - Sports Recreation  [address] [place] [address] (Distance: 6.4 miles)  Phone: ([phone]) [phone]  Website: https://www.[facility] [facility].gov/acc  Language: English  Fee: Free, Self pay      [employer]AGUE BASE[employer]ALL AND SOFTBALL  Website: http://www.ascentify.Novast    Classes/Groups      [facility] fitness classes - [facility]  [address] [place] [address] (Distance: 6.4 miles)  Language: English  Fee: Free, Self pay, Sliding scale      [facility] fitness classes  [address] [place] [address] (Distance: 3.8 miles)  Phone: [phone]) [phone]  Website: http://www.[email or web address]./?q=community/senior/activities  Language: English  Fee: Free      [facility] - Care Coordination (Healthcare only)  Phone: [phone]) [phone]  Website: https://SearsportBoracci  Language: English, Nepali  Hours: Wed 9:00 AM - 11:30 AM Thu 1:00 PM - 4:00 PM, 5:30 PM - 7:00 PM               IMPORTANT NUMBERS & WEBSITES        Emergency Services  911  .   [facility]  211 http://211unitedway.org  .   Poison Control  (599) 820-2947 http://[place]poison.org http://wis[place]poison.org  .     Suicide and Crisis Lifeline  988 http://988lifeline.org  .   Childhelp National Child Abuse Hotline  299.238.8086 http://Childhelphotline.org   .   National Sexual Assault Hotline  (918) 308-7061 (HOPE) http://Rainn.org   .     National Runaway Safeline  (212) 528-2321 (RUNAWAY) http://1800runaManga Corta.org  .   Pregnancy & Postpartum Support  Call/text 215-274-6165  [place]: http://ppsupportmn.org  [place]: http://MyVR/wi  .    Substance Abuse National Helpline ([facility])  800-622-HELP (4090) http://Findtreatment.gov   .                DISCLAIMER: These resources have been generated via the [employer]. [employer] does not endorse any service providers mentioned in this resource list. [employer] does not guarantee that the services mentioned in this resource list will be available to you or will improve your health or wellness.    [facility]

## 2024-04-05 ENCOUNTER — MYC MEDICAL ADVICE (OUTPATIENT)
Dept: ENDOCRINOLOGY | Facility: CLINIC | Age: 59
End: 2024-04-05
Payer: COMMERCIAL

## 2024-04-05 DIAGNOSIS — E05.00 GRAVES' DISEASE: ICD-10-CM

## 2024-04-05 NOTE — RESULT ENCOUNTER NOTE
Please inform patient that test result was within normal parameters except the cholesterol was a bit worse. Thyroid labs were high. Patient should contact her endocrinologist for further instructions.For cholesterol I will recommend increasing her Lipitor( atorvastatin ) to two tablets daily   Thank you.     Memo Garcia M.D.

## 2024-04-06 RX ORDER — METHIMAZOLE 5 MG/1
2.5 TABLET ORAL
Qty: 20 TABLET | Refills: 3 | Status: SHIPPED | OUTPATIENT
Start: 2024-04-08

## 2024-04-09 DIAGNOSIS — R32 URINARY INCONTINENCE, UNSPECIFIED TYPE: ICD-10-CM

## 2024-04-09 RX ORDER — TOLTERODINE 4 MG/1
CAPSULE, EXTENDED RELEASE ORAL
Qty: 90 CAPSULE | Refills: 3 | Status: SHIPPED | OUTPATIENT
Start: 2024-04-09 | End: 2024-08-05

## 2024-04-12 NOTE — TELEPHONE ENCOUNTER
Per Dr. Pop:    2 month lab fine, (Angella- TSH, free T4, total T3)    Thank you    Gala         Orders placed and patient response sent on [employer].         Angella Haynes, RN  Endocrine Care Coordinator  [facility]

## 2024-04-24 ENCOUNTER — MYC MEDICAL ADVICE (OUTPATIENT)
Dept: FAMILY MEDICINE | Facility: CLINIC | Age: 59
End: 2024-04-24
Payer: COMMERCIAL

## 2024-04-24 NOTE — TELEPHONE ENCOUNTER
Called pt regarding effexor titration and cough/fever.    Pt has had 10 day cough. Fever is gone. Family has also had this illness. Did not test for covid. Ears slightly plugged also. Otherwise no other symptoms. Pt would like to be seen for cough.     Pt also having difficult time titrating down on effexor. Lightheaded feeling. Pt will bring her schedule with her to the appt to discuss if there is a better way to do this.       Jose Francisco Alonso RN

## 2024-04-25 ENCOUNTER — OFFICE VISIT (OUTPATIENT)
Dept: FAMILY MEDICINE | Facility: CLINIC | Age: 59
End: 2024-04-25
Payer: COMMERCIAL

## 2024-04-25 VITALS
WEIGHT: 218.8 LBS | TEMPERATURE: 97.1 F | OXYGEN SATURATION: 97 % | SYSTOLIC BLOOD PRESSURE: 112 MMHG | BODY MASS INDEX: 35.17 KG/M2 | RESPIRATION RATE: 12 BRPM | HEIGHT: 66 IN | HEART RATE: 78 BPM | DIASTOLIC BLOOD PRESSURE: 80 MMHG

## 2024-04-25 DIAGNOSIS — Z86.59 HISTORY OF DEPRESSION: ICD-10-CM

## 2024-04-25 DIAGNOSIS — J20.9 ACUTE BRONCHITIS WITH SYMPTOMS > 10 DAYS: Primary | ICD-10-CM

## 2024-04-25 PROCEDURE — 99214 OFFICE O/P EST MOD 30 MIN: CPT | Performed by: NURSE PRACTITIONER

## 2024-04-25 RX ORDER — BENZONATATE 200 MG/1
200 CAPSULE ORAL 3 TIMES DAILY PRN
Qty: 30 CAPSULE | Refills: 0 | Status: SHIPPED | OUTPATIENT
Start: 2024-04-25 | End: 2024-08-05

## 2024-04-25 ASSESSMENT — PAIN SCALES - GENERAL: PAINLEVEL: MILD PAIN (2)

## 2024-04-25 NOTE — PATIENT INSTRUCTIONS
"Take twice  daily with food. Take a probiotic such as Culturelle or Florastor (recommend lactobacillus 50 billion CFU twice daily  by at least one hour from the antibiotic) while on the antibiotic or eat a Greek yogurt containing \"live active cultures\" daily.    Can use benzonatate for cough.    I would stay on your venlafaxine until you are well, then go back to every other day dosing for 10-14 days then stop.    It is fine to use your Crystal light in your water.  "

## 2024-04-25 NOTE — PROGRESS NOTES
Assessment & Plan     Acute bronchitis with symptoms > 10 days  Would suspect she had influenza, now with bilateral lower lobe rhonchi on exam with persistent cough. CXR deferred as would not . Will cover  for bacterial process. Can use cough suppressant as needed. RTC if not improving.  - amoxicillin-clavulanate (AUGMENTIN) 875-125 MG tablet; Take 1 tablet by mouth 2 times daily for 7 days  - benzonatate (TESSALON) 200 MG capsule; Take 1 capsule (200 mg) by mouth 3 times daily as needed    History of depression  Is trying to wean off venlafaxine and does sound as though she is having some withdrawal type symptoms. Recommend patient wait until this acute illness resolves, then take venlafaxine every other day for 10 to 14 days then stop.      See Patient Instructions    Norman Acevedo is a 59 year old, presenting for the following health issues:  Cough        4/25/2024     6:53 AM   Additional Questions   Roomed by pippa   Accompanied by self         4/25/2024     6:53 AM   Patient Reported Additional Medications   Patient reports taking the following new medications none     HPI       Acute Illness  Acute illness concerns: cough  Onset/Duration: 10 days  Symptoms:  Fever: No  Chills/Sweats: YES  Headache (location?): No  Sinus Pressure: No  Conjunctivitis:  No  Ear Pain: YES: bilateral  Rhinorrhea: No  Congestion: No  Sore Throat: No  Cough: YES-non-productive  Wheeze: No  Decreased Appetite: No  Nausea: YES  Vomiting: No  Diarrhea: No  Dysuria/Freq.: No  Dysuria or Hematuria: No  Fatigue/Achiness: YES  Sick/Strep Exposure: YES daughter same symptoms and was told sinus infection  Therapies tried and outcome: tessalon cap, cough syrup this is helping     Above HPI reviewed. Additionally, had fever at onset of illness for 3 days, Tmax 102. Illness came on suddenly, , both daughters had as well. No chest pain, shortness of breath. Now having persistent cough, generally non productive.  "    Is trying to wean off venlafaxine. Is now taking 37.5mg. Has been taking for 2 days, not taking for one day, taking for 2 days. Yesterday, felt lightheaded, foggy. Wondering if its related.         Review of Systems  Constitutional, HEENT, cardiovascular, pulmonary, gi and gu systems are negative, except as otherwise noted.      Objective    /80 (BP Location: Right arm, Patient Position: Sitting, Cuff Size: Adult Regular)   Pulse 78   Temp 97.1  F (36.2  C) (Tympanic)   Resp 12   Ht 1.664 m (5' 5.5\")   Wt 99.2 kg (218 lb 12.8 oz)   SpO2 97%   BMI 35.86 kg/m    Body mass index is 35.86 kg/m .  Physical Exam  Vitals and nursing note reviewed.   Constitutional:       Appearance: Normal appearance.   HENT:      Head: Normocephalic and atraumatic.      Right Ear: Tympanic membrane and ear canal normal.      Left Ear: Tympanic membrane and ear canal normal.      Nose: Nose normal. No rhinorrhea.      Right Sinus: No maxillary sinus tenderness or frontal sinus tenderness.      Left Sinus: No maxillary sinus tenderness or frontal sinus tenderness.      Mouth/Throat:      Lips: Pink.      Mouth: Mucous membranes are moist.      Pharynx: Oropharynx is clear.   Eyes:      General: Lids are normal.      Conjunctiva/sclera: Conjunctivae normal.      Comments: Non icteric   Cardiovascular:      Rate and Rhythm: Normal rate and regular rhythm.      Pulses: Normal pulses.      Heart sounds: Normal heart sounds, S1 normal and S2 normal.   Pulmonary:      Effort: Pulmonary effort is normal.      Breath sounds: Normal air entry. Rhonchi (briseida lower) present.   Musculoskeletal:      Cervical back: Neck supple.   Lymphadenopathy:      Cervical: No cervical adenopathy.   Skin:     General: Skin is warm and dry.   Neurological:      General: No focal deficit present.      Mental Status: She is alert and oriented to person, place, and time.   Psychiatric:         Mood and Affect: Mood normal.         Behavior: Behavior " normal.         Thought Content: Thought content normal.         Judgment: Judgment normal.                    Signed Electronically by: MICHELE Henriquez CNP

## 2024-05-30 ENCOUNTER — OFFICE VISIT (OUTPATIENT)
Dept: DERMATOLOGY | Facility: CLINIC | Age: 59
End: 2024-05-30
Payer: COMMERCIAL

## 2024-05-30 DIAGNOSIS — L71.9 ACNE ROSACEA: ICD-10-CM

## 2024-05-30 DIAGNOSIS — L71.9 ROSACEA: ICD-10-CM

## 2024-05-30 DIAGNOSIS — L82.1 SEBORRHEIC KERATOSIS: Primary | ICD-10-CM

## 2024-05-30 DIAGNOSIS — L65.9 HAIR THINNING: ICD-10-CM

## 2024-05-30 PROCEDURE — 99213 OFFICE O/P EST LOW 20 MIN: CPT | Performed by: PHYSICIAN ASSISTANT

## 2024-05-30 RX ORDER — OXYMETAZOLINE HYDROCHLORIDE 1 G/100G
CREAM TOPICAL
Qty: 30 G | Refills: 4 | Status: SHIPPED | OUTPATIENT
Start: 2024-05-30

## 2024-05-30 RX ORDER — DOXYCYCLINE HYCLATE 20 MG
40 TABLET ORAL DAILY
Qty: 180 TABLET | Refills: 3 | Status: SHIPPED | OUTPATIENT
Start: 2024-05-30

## 2024-05-30 NOTE — LETTER
5/30/2024         RE: Merlyn Ravi  [id number] [facility] [id number]        Dear Colleague,    Thank you for referring your patient, Merlyn Ravi, to the [facility]. Please see a copy of my visit note below.    Merlyn Ravi is an extremely pleasant 59 year old year old female patient here today for recheck rosacea. She notes no acne with being on doxycyline. She notes she will get flushing still which metrocream doesn't help. She also notes new brown spot on left hand, no healing. She also has had hair loss for years, worse these past 6 months. She notes that her thyroid has been up and done. She is getting it corrected with medication. She does have family history of hair thinning with her mother. Patient has no other skin complaints today.  Remainder of the HPI, Meds, PMH, Allergies, FH, and SH was reviewed in chart.   Past Medical History:   Diagnosis Date     Allergies      Breast cancer (H)      Hyperlipidemia 10/14/2014     Major depressive disorder, recurrent episode, mild (H24) 05/17/2022     Malignant neoplasm of female breast (H) 08/15/2019    Formatting of this note might be different from the original.  8/2019. Left.  Formatting of this note is different from the original.  Formatting of this note might be different from the original.  8/2019. Left.     Formatting of this note might be different from the original.  6.5.2020- Review and distribute treatment summary-[facility]  Added automatically from request for surgery [id number]        Mixed incontinence 03/02/2023     Monoclonal gammopathy 10/08/2013    Formatting of this note might be different from the original.  Last Assessment & Plan:   Formatting of this note might be different from the original.  Merlyn Ravi  has a history of IgG kappa MGUS. The MGUS was discovered after she had been found to have an elevated gamma globulin fraction when she underwent blood testing prior to donating  blood at [facility]. She was found to have a 0.4 gram/dL I     Neuropathy 08/29/2023     Obesity (BMI 35.0-39.9) with comorbidity (H) 01/25/2019     Other specified hypothyroidism 08/29/2019     PONV (postoperative nausea and vomiting) 07/07/2022     Rosacea 08/29/2023       Past Surgical History:   Procedure Laterality Date     ARTHROSCOPY KNEE Left 7/14/2022    Procedure: ARTHROSCOPY, LEFT KNEE with meniscal and chondral debridement;  Surgeon: Timo Berman MD;  Location: [facility] OR     BIOPSY BREAST       HYSTERECTOMY, PAP NO LONGER INDICATED       HYSTERECTOMY, MAGY       LASIK BILATERAL  01/01/2005    Dr. Solis      LUMPECTOMY BREAST Left 09/30/2019    Procedure: Re-excision of left breast lumpectomy site;  Surgeon: Kj Salas DO;  Location: [facility]     LUMPECTOMY BREAST Left 10/09/2019    Procedure: Left breast re-excision;  Surgeon: Kj Salas DO;  Location: [facility]     LUMPECTOMY BREAST WITH SENTINEL NODE, COMBINED Left 09/23/2019    Procedure: LUMPECTOMY, BREAST, WITH SENTINEL LYMPH NODE BIOPSY.;  Surgeon: Kj Salas DO;  Location: [facility] OR     [facility] NONSPECIFIC PROCEDURE      bladder surgery        Family History   Problem Relation Age of Onset     Thyroid Disease Mother      Heart Disease Mother      Heart Disease Father      Hyperlipidemia Father      Hypertension Maternal Grandmother      Breast Cancer Maternal Grandmother      Hypertension Maternal Grandfather      Alzheimer Disease Paternal Grandmother      Diabetes Brother      Eczema Brother      Thyroid Disease Brother      Melanoma No family hx of        Social History     Socioeconomic History     Marital status:      Spouse name: Not on file     Number of children: Not on file     Years of education: Not on file     Highest education level: Not on file   Occupational History     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 No   Social History Narrative     Not on file     Social Determinants of Health     Financial Resource Strain: Low Risk  (4/3/2024)    Financial Resource Strain      Within the past 12 months, have you or your family members you live with been unable to get utilities (heat, electricity) when it was really needed?: No   Food Insecurity: Low Risk  (4/3/2024)    Food Insecurity      Within the past 12 months, did you worry that your food would run out before you got money to buy more?: No      Within the past 12 months, did the food you bought just not last and you didn t have money to get more?: No   Transportation Needs: Low Risk  (4/3/2024)    Transportation Needs      Within the past 12 months, has lack of transportation kept you from medical appointments, getting your medicines, non-medical meetings or appointments, work, or from getting things that you need?: No   Physical Activity: Inactive (4/3/2024)    Exercise Vital Sign      Days of Exercise per Week: 0 days      Minutes of Exercise per Session: 0 min   Stress: No Stress Concern Present (4/3/2024)    [place] [employer] - Occupational Stress Questionnaire      Feeling of Stress : Only a little   Social Connections: Unknown (4/3/2024)    Social Connection and Isolation Panel [NHANES]      Frequency of Communication with Friends and Family: Not on file      Frequency of Social Gatherings with Friends and Family: Once a week      Attends Jew Services: Not on file      Active Member of Clubs or Organizations: Not on file      Attends Club or Organization  Meetings: Not on file      Marital Status: Not on file   Interpersonal Safety: Low Risk  (4/3/2024)    Interpersonal Safety      Do you feel physically and emotionally safe where you currently live?: Yes      Within the past 12 months, have you been hit, slapped, kicked or otherwise physically hurt by someone?: No      Within the past 12 months, have you been humiliated or emotionally abused in other ways by your partner or ex-partner?: No   Housing Stability: Low Risk  (4/3/2024)    Housing Stability      Do you have housing? : Yes      Are you worried about losing your housing?: No       Outpatient Encounter Medications as of 5/30/2024   Medication Sig Dispense Refill     doxycycline hyclate (PERIOSTAT) 20 MG tablet Take 2 tablets (40 mg) by mouth daily 180 tablet 3     oxymetazoline HCl (RHOFADE) 1 % cream Apply a pea-sized amount once daily to the entire face or affected area avoiding the eyes and lips. Wash hands immediately after applying. 30 g 4     atorvastatin (LIPITOR) 10 MG tablet Take 1 tablet (10 mg) by mouth daily 90 tablet 3     benzonatate (TESSALON) 200 MG capsule Take 1 capsule (200 mg) by mouth 3 times daily as needed 30 capsule 0     calcium 500-125 MG-UNIT TABS Take 2 tablets by mouth 2 times daily       fluocinonide (LIDEX) 0.05 % external ointment Apply topically 2 times daily 45 g 0     methimazole (TAPAZOLE) 5 MG tablet Take 0.5 tablets (2.5 mg) by mouth Every Mon, Wed, Fri Morning 20 tablet 3     metroNIDAZOLE (METROCREAM) 0.75 % external cream Apply topically 2 times daily 45 g 3     METRONIDAZOLE PO Take 5 mg by mouth 1/2 tablet daily       tamoxifen (NOLVADEX) 20 MG tablet Take 1 tablet (20 mg) by mouth daily 90 tablet 3     tolterodine ER (DETROL LA) 4 MG 24 hr capsule TAKE 1 CAPSULE(4 MG) BY MOUTH DAILY 90 capsule 3     venlafaxine (EFFEXOR XR) 37.5 MG 24 hr capsule Take 1 capsule (37.5 mg) by mouth daily 90 capsule 0     [DISCONTINUED] doxycycline hyclate (PERIOSTAT) 20 MG tablet Take  2 tablets (40 mg) by mouth daily 180 tablet 3     Facility-Administered Encounter Medications as of 5/30/2024   Medication Dose Route Frequency Provider Last Rate Last Admin     BUPivacaine (MARCAINE) 0.25 % injection 4 mL  4 mL   Timo Berman MD   4 mL at 03/27/24 1135     triamcinolone (KENALOG-40) injection 80 mg  80 mg   Timo Berman MD   80 mg at 03/27/24 1135             O:   NAD, WDWN, Alert & Oriented, Mood & Affect wnl, Vitals stable   Here today alone   There were no vitals taken for this visit.   General appearance normal   Vitals stable   Alert, oriented and in no acute distress      Non scarring hair loss on crown of scalp   Brown stuck on papule on left dorsal hand   No inflammatory papules, redness to cheek,nose, forehead and chin       Eyes: Conjunctivae/lids:Normal     ENT: Lips: normal    MSK:Normal    Pulm: Breathing Normal    Neuro/Psych: Orientation:Alert and Orientedx3 ; Mood/Affect:normal   A/P:  Acne rosacea   Acne is controlled with doxycycline, refilled.   Discussed redness can be considered cosmetic, can send in for rhofade.   Discussed IPL as option.    2. 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 Seborrheic keratosis  It was a pleasure speaking to Merlyn Ravi today.  BENIGN LESIONS DISCUSSED WITH PATIENT:  I discussed the specifics of tumor, prognosis, and genetics of benign lesions.  I explained that treatment of these lesions would be purely cosmetic and not medically neccessary.  I discussed with patient different removal options including excision, cautery and /or laser.      Nature and genetics of benign skin lesions dicussed with patient.      Again, thank you for allowing me to participate in the care of your patient.        Sincerely,        Lolis Henry PA-C

## 2024-05-31 NOTE — PROGRESS NOTES
Merlyn Ravi is an extremely pleasant 59 year old year old female patient here today for recheck rosacea. She notes no acne with being on doxycyline. She notes she will get flushing still which metrocream doesn't help. She also notes new brown spot on left hand, no healing. She also has had hair loss for years, worse these past 6 months. She notes that her thyroid has been up and done. She is getting it corrected with medication. She does have family history of hair thinning with her mother. Patient has no other skin complaints today.  Remainder of the HPI, Meds, PMH, Allergies, FH, and SH was reviewed in chart.   Past Medical History:   Diagnosis Date    Allergies     Breast cancer (H)     Hyperlipidemia 10/14/2014    Major depressive disorder, recurrent episode, mild (H24) 05/17/2022    Malignant neoplasm of female breast (H) 08/15/2019    Formatting of this note might be different from the original.  8/2019. Left.  Formatting of this note is different from the original.  Formatting of this note might be different from the original.  8/2019. Left.     Formatting of this note might be different from the original.  6.5.2020- Review and distribute treatment summary-[facility]  Added automatically from request for surgery [id number]       Mixed incontinence 03/02/2023    Monoclonal gammopathy 10/08/2013    Formatting of this note might be different from the original.  Last Assessment & Plan:   Formatting of this note might be different from the original.  Merlyn Ravi  has a history of IgG kappa MGUS. The MGUS was discovered after she had been found to have an elevated gamma globulin fraction when she underwent blood testing prior to donating blood at [facility]. She was found to have a 0.4 gram/dL I    Neuropathy 08/29/2023    Obesity (BMI 35.0-39.9) with comorbidity (H) 01/25/2019    Other specified hypothyroidism 08/29/2019    PONV (postoperative nausea and vomiting) 07/07/2022    Rosacea 08/29/2023       Past  Surgical History:   Procedure Laterality Date    ARTHROSCOPY KNEE Left 7/14/2022    Procedure: ARTHROSCOPY, LEFT KNEE with meniscal and chondral debridement;  Surgeon: Timo Berman MD;  Location: [facility] OR    BIOPSY BREAST      HYSTERECTOMY, PAP NO LONGER INDICATED      HYSTERECTOMY, MAGY      LASIK BILATERAL  01/01/2005    Dr. Solis     LUMPECTOMY BREAST Left 09/30/2019    Procedure: Re-excision of left breast lumpectomy site;  Surgeon: Kj Salas DO;  Location: [facility] OR    LUMPECTOMY BREAST Left 10/09/2019    Procedure: Left breast re-excision;  Surgeon: Kj Salas DO;  Location: [facility] OR    LUMPECTOMY BREAST WITH SENTINEL NODE, COMBINED Left 09/23/2019    Procedure: LUMPECTOMY, BREAST, WITH SENTINEL LYMPH NODE BIOPSY.;  Surgeon: Kj Salas DO;  Location: [facility] OR    [facility] NONSPECIFIC PROCEDURE      bladder surgery        Family History   Problem Relation Age of Onset    Thyroid Disease Mother     Heart Disease Mother     Heart Disease Father     Hyperlipidemia Father     Hypertension Maternal Grandmother     Breast Cancer Maternal Grandmother     Hypertension Maternal Grandfather     Alzheimer Disease Paternal Grandmother     Diabetes Brother     Eczema Brother     Thyroid Disease Brother     Melanoma No family hx of        Social History     Socioeconomic History    Marital status:      Spouse name: Not on file    Number of children: Not on file    Years of education: Not on file    Highest education level: Not on file   Occupational History    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 No   Social History Narrative    Not on file     Social Determinants of Health     Financial Resource Strain: Low Risk  (4/3/2024)    Financial Resource Strain     Within the past 12 months, have you or your family members you live with been unable to get utilities (heat, electricity) when it was really needed?: No   Food Insecurity: Low Risk  (4/3/2024)    Food Insecurity     Within the past 12 months, did you worry that your food would run out before you got money to buy more?: No     Within the past 12 months, did the food you bought just not last and you didn t have money to get more?: No   Transportation Needs: Low Risk  (4/3/2024)    Transportation Needs     Within the past 12 months, has lack of transportation kept you from medical appointments, getting your medicines, non-medical meetings or appointments, work, or from getting things that you need?: No   Physical Activity: Inactive (4/3/2024)    Exercise Vital Sign     Days of Exercise per Week: 0 days     Minutes of Exercise per Session: 0 min   Stress: No Stress Concern Present (4/3/2024)    [employer] of [employer]ational Health - Occupational Stress Questionnaire     Feeling of Stress : Only a little   Social Connections: Unknown (4/3/2024)    Social Connection and Isolation Panel [NHANES]     Frequency of Communication with Friends and Family: Not on file     Frequency of Social Gatherings with Friends and Family: Once a week     Attends Druze Services: Not on file     Active Member of Clubs or Organizations: Not on file     Attends Club or Organization Meetings: Not on file     Marital Status: Not on file   Interpersonal Safety: Low Risk  (4/3/2024)    Interpersonal Safety     Do you feel physically and emotionally safe where you currently live?: Yes     Within the past 12 months, have you been hit, slapped, kicked or otherwise physically hurt by someone?: No     Within the past  12 months, have you been humiliated or emotionally abused in other ways by your partner or ex-partner?: No   Housing Stability: Low Risk  (4/3/2024)    Housing Stability     Do you have housing? : Yes     Are you worried about losing your housing?: No       Outpatient Encounter Medications as of 5/30/2024   Medication Sig Dispense Refill    doxycycline hyclate (PERIOSTAT) 20 MG tablet Take 2 tablets (40 mg) by mouth daily 180 tablet 3    oxymetazoline HCl (RHOFADE) 1 % cream Apply a pea-sized amount once daily to the entire face or affected area avoiding the eyes and lips. Wash hands immediately after applying. 30 g 4    atorvastatin (LIPITOR) 10 MG tablet Take 1 tablet (10 mg) by mouth daily 90 tablet 3    benzonatate (TESSALON) 200 MG capsule Take 1 capsule (200 mg) by mouth 3 times daily as needed 30 capsule 0    calcium 500-125 MG-UNIT TABS Take 2 tablets by mouth 2 times daily      fluocinonide (LIDEX) 0.05 % external ointment Apply topically 2 times daily 45 g 0    methimazole (TAPAZOLE) 5 MG tablet Take 0.5 tablets (2.5 mg) by mouth Every Mon, Wed, Fri Morning 20 tablet 3    metroNIDAZOLE (METROCREAM) 0.75 % external cream Apply topically 2 times daily 45 g 3    METRONIDAZOLE PO Take 5 mg by mouth 1/2 tablet daily      tamoxifen (NOLVADEX) 20 MG tablet Take 1 tablet (20 mg) by mouth daily 90 tablet 3    tolterodine ER (DETROL LA) 4 MG 24 hr capsule TAKE 1 CAPSULE(4 MG) BY MOUTH DAILY 90 capsule 3    venlafaxine (EFFEXOR XR) 37.5 MG 24 hr capsule Take 1 capsule (37.5 mg) by mouth daily 90 capsule 0    [DISCONTINUED] doxycycline hyclate (PERIOSTAT) 20 MG tablet Take 2 tablets (40 mg) by mouth daily 180 tablet 3     Facility-Administered Encounter Medications as of 5/30/2024   Medication Dose Route Frequency Provider Last Rate Last Admin    BUPivacaine (MARCAINE) 0.25 % injection 4 mL  4 mL   Timo Berman MD   4 mL at 03/27/24 1135    triamcinolone (KENALOG-40) injection 80 mg  80 mg   Timo Berman  MD   80 mg at 03/27/24 1135             O:   NAD, WDWN, Alert & Oriented, Mood & Affect wnl, Vitals stable   Here today alone   There were no vitals taken for this visit.   General appearance normal   Vitals stable   Alert, oriented and in no acute distress      Non scarring hair loss on crown of scalp   Brown stuck on papule on left dorsal hand   No inflammatory papules, redness to cheek,nose, forehead and chin       Eyes: Conjunctivae/lids:Normal     ENT: Lips: normal    MSK:Normal    Pulm: Breathing Normal    Neuro/Psych: Orientation:Alert and Orientedx3 ; Mood/Affect:normal   A/P:  Acne rosacea   Acne is controlled with doxycycline, refilled.   Discussed redness can be considered cosmetic, can send in for rhofade.   Discussed IPL as option.    2. 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 Seborrheic keratosis  It was a pleasure speaking to Merlyn Ravi today.  BENIGN LESIONS DISCUSSED WITH PATIENT:  I discussed the specifics of tumor, prognosis, and genetics of benign lesions.  I explained that treatment of these lesions would be purely cosmetic and not medically neccessary.  I discussed with patient different removal options including excision, cautery and /or laser.      Nature and genetics of benign skin lesions dicussed with patient.

## 2024-06-03 ENCOUNTER — HOSPITAL ENCOUNTER (OUTPATIENT)
Dept: MAMMOGRAPHY | Facility: CLINIC | Age: 59
Discharge: HOME | End: 2024-06-03
Attending: NURSE PRACTITIONER | Admitting: NURSE PRACTITIONER
Payer: COMMERCIAL

## 2024-06-03 DIAGNOSIS — Z12.31 ENCOUNTER FOR SCREENING MAMMOGRAM FOR BREAST CANCER: ICD-10-CM

## 2024-06-03 PROCEDURE — 77063 BREAST TOMOSYNTHESIS BI: CPT

## 2024-06-26 ENCOUNTER — OFFICE VISIT (OUTPATIENT)
Dept: OPTOMETRY | Facility: CLINIC | Age: 59
End: 2024-06-26
Payer: COMMERCIAL

## 2024-06-26 DIAGNOSIS — H52.13 MYOPIA OF BOTH EYES: ICD-10-CM

## 2024-06-26 DIAGNOSIS — H52.223 REGULAR ASTIGMATISM OF BOTH EYES: ICD-10-CM

## 2024-06-26 DIAGNOSIS — Z98.890 HISTORY OF REPAIR OF RETINAL TEAR BY LASER PHOTOCOAGULATION: ICD-10-CM

## 2024-06-26 DIAGNOSIS — H43.813 PVD (POSTERIOR VITREOUS DETACHMENT), BILATERAL: ICD-10-CM

## 2024-06-26 DIAGNOSIS — H25.13 NUCLEAR AGE-RELATED CATARACT, BOTH EYES: ICD-10-CM

## 2024-06-26 DIAGNOSIS — Z01.01 ENCOUNTER FOR EXAMINATION OF EYES AND VISION WITH ABNORMAL FINDINGS: Primary | ICD-10-CM

## 2024-06-26 DIAGNOSIS — H52.4 PRESBYOPIA: ICD-10-CM

## 2024-06-26 PROCEDURE — 92015 DETERMINE REFRACTIVE STATE: CPT | Performed by: OPTOMETRIST

## 2024-06-26 PROCEDURE — 92004 COMPRE OPH EXAM NEW PT 1/>: CPT | Performed by: OPTOMETRIST

## 2024-06-26 ASSESSMENT — REFRACTION_WEARINGRX
SPECS_TYPE: SVL COMPUTER
OS_CYLINDER: +0.50
OD_SPHERE: +0.50
OD_CYLINDER: +0.50
OS_AXIS: 158
OD_SPHERE: -1.00
OD_CYLINDER: +0.50
OS_SPHERE: +0.50
OS_CYLINDER: +0.75
OD_AXIS: 162
OS_SPHERE: -1.00
SPECS_TYPE: SVL DISTANCE
OD_AXIS: 167
OS_AXIS: 001

## 2024-06-26 ASSESSMENT — REFRACTION_MANIFEST
OD_SPHERE: -0.75
OD_ADD: +2.50
OD_AXIS: 152
OD_SPHERE: -1.00
OD_CYLINDER: +0.50
OD_CYLINDER: +0.25
OS_CYLINDER: +0.75
OS_AXIS: 180
OD_AXIS: 157
OS_ADD: +2.50
OS_SPHERE: -1.25
OS_CYLINDER: +0.75
OS_AXIS: 157
METHOD_AUTOREFRACTION: 1
OS_SPHERE: -1.25

## 2024-06-26 ASSESSMENT — CONF VISUAL FIELD
OD_SUPERIOR_TEMPORAL_RESTRICTION: 0
OS_INFERIOR_NASAL_RESTRICTION: 0
OS_SUPERIOR_TEMPORAL_RESTRICTION: 0
OD_SUPERIOR_NASAL_RESTRICTION: 0
OD_INFERIOR_TEMPORAL_RESTRICTION: 0
OD_NORMAL: 1
OS_SUPERIOR_NASAL_RESTRICTION: 0
OS_INFERIOR_TEMPORAL_RESTRICTION: 0
METHOD: COUNTING FINGERS
OD_INFERIOR_NASAL_RESTRICTION: 0
OS_NORMAL: 1

## 2024-06-26 ASSESSMENT — KERATOMETRY
OS_AXISANGLE_DEGREES: 160
OS_AXISANGLE2_DEGREES: 070
OS_K2POWER_DIOPTERS: 40.00
OD_AXISANGLE2_DEGREES: 036
OD_K2POWER_DIOPTERS: 40.50
OS_K1POWER_DIOPTERS: 39.75
OD_AXISANGLE_DEGREES: 126
OD_K1POWER_DIOPTERS: 40.00

## 2024-06-26 ASSESSMENT — VISUAL ACUITY
OS_CC: 20/25
OS_SC: 20/200
METHOD: SNELLEN - LINEAR
OS_CC+: -1
CORRECTION_TYPE: GLASSES
OS_SC+: -1
OS_CC: 20/80
OD_SC: 20/80
OS_SC: 20/50
OD_CC+: -1
OD_CC: 20/60
OD_CC: 20/20
OD_SC: 20/50

## 2024-06-26 ASSESSMENT — EXTERNAL EXAM - LEFT EYE: OS_EXAM: NORMAL

## 2024-06-26 ASSESSMENT — SLIT LAMP EXAM - LIDS
COMMENTS: 1+ DERMATOCHALASIS
COMMENTS: 1+ DERMATOCHALASIS

## 2024-06-26 ASSESSMENT — EXTERNAL EXAM - RIGHT EYE: OD_EXAM: NORMAL

## 2024-06-26 ASSESSMENT — CUP TO DISC RATIO
OD_RATIO: 0.1
OS_RATIO: 0.1

## 2024-06-26 ASSESSMENT — TONOMETRY
IOP_METHOD: APPLANATION
OS_IOP_MMHG: 12
OD_IOP_MMHG: 13

## 2024-06-26 NOTE — PATIENT INSTRUCTIONS
"Updated glasses prescriptions provided today.   Allow 2 weeks to adapt to the new glasses.    You have a PVD- posterior vitreous detachment which is due to the gel of the eye shrinking and clumping together.  This can sometimes cause holes or tears in the retina.  The signs of a retinal detachment are flashes of light or a \"curtain veil\" coming over your vision. If you notice any of these changes return to clinic for re-evaluation.     You have the start of mild cataracts.  You may notice some blurred vision or glare with night driving.  It is important that you wear good sunglasses to protect your eyes from the ultraviolet light from the sun.     Return in 1 year for a comprehensive eye exam, or sooner if needed.      The effects of the dilating drops last for 4- 6 hours.  You will be more sensitive to light and vision will be blurry up close.  Mydriatic sunglasses were given if needed.     Jered Sylvester, OD  [facility]  [address] [place] [address]. [place]  [id number]    [phone]      "

## 2024-06-26 NOTE — LETTER
6/26/2024      Merlyn Ravi  [address] [facility] [place] [address]      Dear Colleague,    Thank you for referring your patient, Merlyn Ravi, to the [facility]. Please see a copy of my visit note below.    Chief Complaint   Patient presents with     Annual Eye Exam     -S/P Lasik each eye  ~2005   -HX of retinal tear with laser repair - unsure which eye ~ 2014     Last Eye Exam: 2+ yrs - [place]   Dilated Previously: Yes, side effects of dilation explained today    What are you currently using to see?  Glasses - has 4 pair    1) SVL distance - 2+ yrs old   2) SVL Computer - 2+ yrs old  3) PAL's - 5+ yrs old - did not bring with today - wears infrequently   4) SVL reading - 2+ yrs old - did not bring with today     -Wants separate Rx for all distances      Distance Vision Acuity: Noticed gradual change in both eyes - harder time refocusing from DV to NV     Near Vision Acuity: Not satisfied     Eye Comfort: good  Do you use eye drops? 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       OBJECTIVE: See Ophthalmology exam    ASSESSMENT:    ICD-10-CM    1. Encounter for examination of eyes and vision with abnormal findings  Z01.01 EYE EXAM (SIMPLE-NONBILLABLE)      2. History of repair of retinal tear by laser photocoagulation - Right Eye  Z98.890 EYE EXAM (SIMPLE-NONBILLABLE)      3. Nuclear age-related cataract, both eyes  H25.13 EYE EXAM (SIMPLE-NONBILLABLE)      4. PVD (posterior vitreous detachment), bilateral  H43.813 EYE EXAM (SIMPLE-NONBILLABLE)      5. Myopia of both eyes  H52.13 EYE EXAM (SIMPLE-NONBILLABLE)     REFRACTION      6. Regular astigmatism of both eyes  H52.223 EYE EXAM (SIMPLE-NONBILLABLE)     REFRACTION      7. Presbyopia  H52.4 EYE EXAM (SIMPLE-NONBILLABLE)     REFRACTION          PLAN:     Patient Instructions   Updated glasses  "prescriptions provided today.   Allow 2 weeks to adapt to the new glasses.    You have a PVD- posterior vitreous detachment which is due to the gel of the eye shrinking and clumping together.  This can sometimes cause holes or tears in the retina.  The signs of a retinal detachment are flashes of light or a \"curtain veil\" coming over your vision. If you notice any of these changes return to clinic for re-evaluation.     You have the start of mild cataracts.  You may notice some blurred vision or glare with night driving.  It is important that you wear good sunglasses to protect your eyes from the ultraviolet light from the sun.     Return in 1 year for a comprehensive eye exam, or sooner if needed.      The effects of the dilating drops last for 4- 6 hours.  You will be more sensitive to light and vision will be blurry up close.  Mydriatic sunglasses were given if needed.     Jered Sylvester, OD  [address] [place]  [address]    [phone]          Again, thank you for allowing me to participate in the care of your patient.        Sincerely,        Jered Sylvester, OD  "

## 2024-06-26 NOTE — PROGRESS NOTES
Chief Complaint   Patient presents with    Annual Eye Exam     -S/P Lasik each eye  ~2005   -HX of retinal tear with laser repair - unsure which eye ~ 2014     Last Eye Exam: 2+ yrs - [place]   Dilated Previously: Yes, side effects of dilation explained today    What are you currently using to see?  Glasses - has 4 pair    1) SVL distance - 2+ yrs old   2) SVL Computer - 2+ yrs old  3) PAL's - 5+ yrs old - did not bring with today - wears infrequently   4) SVL reading - 2+ yrs old - did not bring with today     -Wants separate Rx for all distances      Distance Vision Acuity: Noticed gradual change in both eyes - harder time refocusing from DV to NV     Near Vision Acuity: Not satisfied     Eye Comfort: good  Do you use eye drops? : No  Occupation or Hobbies: [employer]     Brittaneyash Fofana  Optometry Assistant          Medical, surgical and family histories reviewed and updated 6/26/2024.       OBJECTIVE: See Ophthalmology exam    ASSESSMENT:    ICD-10-CM    1. Encounter for examination of eyes and vision with abnormal findings  Z01.01 EYE EXAM (SIMPLE-NONBILLABLE)      2. History of repair of retinal tear by laser photocoagulation - Right Eye  Z98.890 EYE EXAM (SIMPLE-NONBILLABLE)      3. Nuclear age-related cataract, both eyes  H25.13 EYE EXAM (SIMPLE-NONBILLABLE)      4. PVD (posterior vitreous detachment), bilateral  H43.813 EYE EXAM (SIMPLE-NONBILLABLE)      5. Myopia of both eyes  H52.13 EYE EXAM (SIMPLE-NONBILLABLE)     REFRACTION      6. Regular astigmatism of both eyes  H52.223 EYE EXAM (SIMPLE-NONBILLABLE)     REFRACTION      7. Presbyopia  H52.4 EYE EXAM (SIMPLE-NONBILLABLE)     REFRACTION          PLAN:     Patient Instructions   Updated glasses prescriptions provided today.   Allow 2 weeks to adapt to the new glasses.    You have a PVD- posterior vitreous detachment which is due to the gel of the eye shrinking and clumping together.  This can sometimes cause holes or tears in the retina.   "The signs of a retinal detachment are flashes of light or a \"curtain veil\" coming over your vision. If you notice any of these changes return to clinic for re-evaluation.     You have the start of mild cataracts.  You may notice some blurred vision or glare with night driving.  It is important that you wear good sunglasses to protect your eyes from the ultraviolet light from the sun.     Return in 1 year for a comprehensive eye exam, or sooner if needed.      The effects of the dilating drops last for 4- 6 hours.  You will be more sensitive to light and vision will be blurry up close.  Mydriatic sunglasses were given if needed.     Jered Sylvester, OD  [address] [place]  [address] [phone]        "

## 2024-07-15 ENCOUNTER — PATIENT OUTREACH (OUTPATIENT)
Dept: ONCOLOGY | Facility: CLINIC | Age: 59
End: 2024-07-15
Payer: COMMERCIAL

## 2024-07-15 NOTE — PROGRESS NOTES
[facility]: Cancer Care                                                                                          Enrollment status: maintenance  Follow up scheduled: 3/24/25    Signature:  BILL CLEANING RN

## 2024-07-22 ENCOUNTER — MYC MEDICAL ADVICE (OUTPATIENT)
Dept: FAMILY MEDICINE | Facility: CLINIC | Age: 59
End: 2024-07-22
Payer: COMMERCIAL

## 2024-07-31 NOTE — TELEPHONE ENCOUNTER
Patient can schedule a visit to discuss this more. There is a lot of questions here that will be best addressed in a visit. We can use a same day appointment.

## 2024-08-05 ENCOUNTER — OFFICE VISIT (OUTPATIENT)
Dept: FAMILY MEDICINE | Facility: CLINIC | Age: 59
End: 2024-08-05
Payer: COMMERCIAL

## 2024-08-05 VITALS
RESPIRATION RATE: 16 BRPM | HEIGHT: 65 IN | TEMPERATURE: 98.7 F | OXYGEN SATURATION: 97 % | WEIGHT: 225.9 LBS | HEART RATE: 76 BPM | DIASTOLIC BLOOD PRESSURE: 86 MMHG | BODY MASS INDEX: 37.64 KG/M2 | SYSTOLIC BLOOD PRESSURE: 112 MMHG

## 2024-08-05 DIAGNOSIS — M67.431 GANGLION CYST OF WRIST, RIGHT: Primary | ICD-10-CM

## 2024-08-05 DIAGNOSIS — E78.5 HYPERLIPIDEMIA, UNSPECIFIED HYPERLIPIDEMIA TYPE: ICD-10-CM

## 2024-08-05 PROCEDURE — 99213 OFFICE O/P EST LOW 20 MIN: CPT | Performed by: FAMILY MEDICINE

## 2024-08-05 ASSESSMENT — PAIN SCALES - GENERAL: PAINLEVEL: MILD PAIN (2)

## 2024-08-05 NOTE — PROGRESS NOTES
Kristina was seen today for muscle pain and recheck medication.    Diagnoses and all orders for this visit:    Ganglion cyst of wrist, right  -     Orthopedic  Referral; Future        -     recommend seeing a hand specialist    Hyperlipidemia, unspecified hyperlipidemia type  -     Lipid panel reflex to direct LDL Fasting; Future  -     I recommend that she continue with 10 mg of the atorvastatin.          Subjective   Kristina is a 59 year old, presenting for the following health issues:    Patient is a 59-year-old presenting to the clinic for concerns of medication side effect.  She had been on atorvastatin for cholesterol control for a few years but the medication dose was increased at her last visit because her cholesterol levels went up.  Since the increase in the dose she has been experiencing severe muscle cramps especially in the calf area.    Patient also has a cyst on her right wrist - she says it is quite painful . She first noticed this a few days. It is the same size .  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  She exercises with enough effort to increase her heart rate 3 or less days per week. She is missing 1 dose(s) of medications per week.                 Review of Systems  Constitutional, HEENT, cardiovascular, pulmonary, gi and gu systems are negative, except as otherwise noted.      Objective    /86 (BP  "Location: Right arm, Patient Position: Sitting, Cuff Size: Adult Regular)   Pulse 76   Temp 98.7  F (37.1  C) (Tympanic)   Resp 16   Ht 1.657 m (5' 5.25\")   Wt 102.5 kg (225 lb 14.4 oz)   SpO2 97%   BMI 37.30 kg/m    Body mass index is 37.3 kg/m .  Physical Exam   GENERAL: alert and no distress  EYES: Eyes grossly normal to inspection, PERRL and conjunctivae and sclerae normal  HENT: ear canals and TM's normal, nose and mouth without ulcers or lesions  NECK: no adenopathy, no asymmetry, masses, or scars  RESP: lungs clear to auscultation - no rales, rhonchi or wheezes  CV: regular rate and rhythm, normal S1 S2, no S3 or S4, no murmur, click or rub, no peripheral edema  ABDOMEN: soft, nontender, no hepatosplenomegaly, no masses and bowel sounds normal  MS: no gross musculoskeletal defects noted, no edema            Signed Electronically by: Memo Garcia MD    "

## 2024-08-06 ENCOUNTER — MYC MEDICAL ADVICE (OUTPATIENT)
Dept: FAMILY MEDICINE | Facility: CLINIC | Age: 59
End: 2024-08-06
Payer: COMMERCIAL

## 2024-08-06 DIAGNOSIS — R32 URINARY INCONTINENCE, UNSPECIFIED TYPE: ICD-10-CM

## 2024-08-07 RX ORDER — TOLTERODINE 4 MG/1
4 CAPSULE, EXTENDED RELEASE ORAL DAILY
Qty: 90 CAPSULE | Refills: 3 | Status: SHIPPED | OUTPATIENT
Start: 2024-08-07

## 2024-08-13 DIAGNOSIS — E78.5 HYPERLIPIDEMIA LDL GOAL <130: ICD-10-CM

## 2024-08-13 RX ORDER — ATORVASTATIN CALCIUM 10 MG/1
10 TABLET, FILM COATED ORAL DAILY
Qty: 90 TABLET | Refills: 2 | Status: SHIPPED | OUTPATIENT
Start: 2024-08-13

## 2024-09-11 ENCOUNTER — LAB (OUTPATIENT)
Dept: LAB | Facility: CLINIC | Age: 59
End: 2024-09-11
Payer: COMMERCIAL

## 2024-09-11 DIAGNOSIS — Z11.4 SCREENING FOR HIV (HUMAN IMMUNODEFICIENCY VIRUS): Primary | ICD-10-CM

## 2024-09-11 DIAGNOSIS — E78.5 HYPERLIPIDEMIA, UNSPECIFIED HYPERLIPIDEMIA TYPE: ICD-10-CM

## 2024-09-11 DIAGNOSIS — Z11.59 NEED FOR HEPATITIS C SCREENING TEST: ICD-10-CM

## 2024-09-11 DIAGNOSIS — E05.00 GRAVES' DISEASE: ICD-10-CM

## 2024-09-11 LAB
CHOLEST SERPL-MCNC: 164 MG/DL
FASTING STATUS PATIENT QL REPORTED: NO
HDLC SERPL-MCNC: 50 MG/DL
HIV 1+2 AB+HIV1 P24 AG SERPL QL IA: NONREACTIVE
LDLC SERPL CALC-MCNC: 84 MG/DL
NONHDLC SERPL-MCNC: 114 MG/DL
T3 SERPL-MCNC: 127 NG/DL (ref 85–202)
T4 FREE SERPL-MCNC: 0.92 NG/DL (ref 0.9–1.7)
TRIGL SERPL-MCNC: 150 MG/DL
TSH SERPL DL<=0.005 MIU/L-ACNC: 7.09 UIU/ML (ref 0.3–4.2)

## 2024-09-11 PROCEDURE — 86803 HEPATITIS C AB TEST: CPT

## 2024-09-11 PROCEDURE — 84439 ASSAY OF FREE THYROXINE: CPT

## 2024-09-11 PROCEDURE — 87389 HIV-1 AG W/HIV-1&-2 AB AG IA: CPT

## 2024-09-11 PROCEDURE — 80061 LIPID PANEL: CPT

## 2024-09-11 PROCEDURE — 36415 COLL VENOUS BLD VENIPUNCTURE: CPT

## 2024-09-11 PROCEDURE — 84480 ASSAY TRIIODOTHYRONINE (T3): CPT

## 2024-09-11 PROCEDURE — 84443 ASSAY THYROID STIM HORMONE: CPT

## 2024-09-12 LAB — HCV AB SERPL QL IA: NONREACTIVE

## 2024-09-13 ENCOUNTER — OFFICE VISIT (OUTPATIENT)
Dept: ENDOCRINOLOGY | Facility: CLINIC | Age: 59
End: 2024-09-13
Payer: COMMERCIAL

## 2024-09-13 VITALS
BODY MASS INDEX: 36.33 KG/M2 | DIASTOLIC BLOOD PRESSURE: 84 MMHG | HEART RATE: 59 BPM | SYSTOLIC BLOOD PRESSURE: 126 MMHG | OXYGEN SATURATION: 100 % | WEIGHT: 220 LBS

## 2024-09-13 DIAGNOSIS — E05.00 GRAVES' DISEASE: Primary | ICD-10-CM

## 2024-09-13 PROCEDURE — 99214 OFFICE O/P EST MOD 30 MIN: CPT | Performed by: INTERNAL MEDICINE

## 2024-09-13 NOTE — PATIENT INSTRUCTIONS
Welcome to the [facility]     Our Endocrinology Clinics are here to provide you with a team-based, collaborative approach in the diagnosis and treatment of patients with diabetes and endocrine disorders. The team is made up of Physicians, Physician Assistants, Certified Diabetes Educators, Registered Nurses, Medical Assistants, Emergency Medical Technicians, and many others, all of whom have the unified goal of providing our patients with high quality care.     Please see below for some helpful tips to best navigate and use the [facility] Endocrinology clinic:     [place] Respect: At [facility], we are committed to a respectful and safe space for all patients, visitors, and staff.  We believe that mutual respect between patients and their care team is the foundation of quality care.  It is our expectation that you will be treated with respect by your care team.  In turn, we ask that all communication with the care team (written and verbal) be respectful and free from profanity, threatening, or abusive language.  Disrespectful communication undermines our therapeutic relationship with you and may result in us being unable to continue to provide your care.    Refills: A provider must see you at least annually to prescribe and refill medications. This is to ensure your safety as well as meet insurance and compliance regulations.    Scheduling: Many of our Providers offer both in-person or video visits. Please call to schedule any needed follow ups as soon as possible because our provider schedules fill up very quickly. Our care team has the right to require an in-person visit when they believe that it is medically necessary. Please remember that for any virtual visits, you must be in the [facility] at the time of the visit, otherwise we are unable to see you and you will need to be rescheduled.    Missed Appointments: If you need to cancel or miss your  scheduled appointment, please call the clinic at [phone] to reschedule.  Please note if you repeatedly miss appointments or repeatedly miss appointments without calling to inform us ahead of time (no-show), the clinic may elect to not allow you to reschedule without speaking to a manager, may require a Partnership In Care Agreement prior to rescheduling, or could result in you no longer being able to receive care from the clinic. Providing the clinic with timely notification if you have to miss an appointment, allows us to better serve the needs of all of our patients.    Primary Care Provider: Our Endocrinologists are Specialists in their field. We expect you to have a Primary Care Provider established to handle any needs outside of your diabetes and endocrine care.  We would be happy to assist you find a Primary Care Provider, if you do not have one.    [employer]: [employer] is a wonderful resource that allows you access to your Care Team via online or the danay. Please ask a member of the team if you would like help creating an account. Please note that it may take up to 2 business days for a response. [employer] messages are not reviewed on weekends or after business hours.  Emergent or urgent care needs should never be communicated via [employer].  If you experience a medical emergency call 911 or go to the nearest emergency room.    Labs: It is recommended that you stay within the [facility] for labs but you are welcome to obtain ordered labs (with some exceptions) from any location of your choice as long as they are able to complete and process the needed labs. If you need us to fax orders to your preferred lab, please provide us the name and fax number of the lab you would like to go to so we can fax the orders. If your labs are drawn outside of the [facility], please have them fax the results to [phone] ([place]) or [phone] ([place]) or via [facility]. It is your  responsibility to ensure that outside lab results are sent to us.    We look forward to working with you. Please do not hesitate to reach out with any questions.    Thank you,    The Endocrine Team    [facility] Address:   [place] Address:     [address] [place] [address]    Phone: [phone]  Fax: [phone] [address] [place] [address]    Phone: [phone]  Fax: [phone]     [facility] Cost Estimate Phone Number: [phone]    General Lab and Imaging Scheduling Phone Number: [phone]

## 2024-09-13 NOTE — PROGRESS NOTES
- Endocrinology Follow up -    Reason for visit/consult:  Hypothyrodism, emmaue    Primary care provider: Memo Garcia    Assessment and Plan  A 59 year old female with hypothyroidism, came with fatigue for a few years and recently getting worse,     # Hashimoto thyroiditis    # Resting palpitation this year 2024, HR up to 130, now resolved after small dose of methimazole  , current HR 70s. TSH 15-7, she mentioned some fatigue. We have been adjusting from 2.5 mg daily to 2.5 mg every other day but will further reduce    - methimazole 2.5 mg twice a week only for the next 1 year     - repeat lab in 3-4 month     # family history of hyperthyroidism    # Breast CA on the left  Diagnosed 2019, underwent lumpectomy, followed by RT. Currently taking Tamoxifen.       # Family history of DM1   Her brother has DM1 since age 14.       # Bone health  Hysterectomy, followed by hormone replacement which was stopped 5 year ago.   She was recently diagnosed braest CA, Currently taking Tamoxifen.     - Calcium citrate plus D (elemental Ca 630 mg, VitD 500 international unit(s)) to make sure to take.       RTC with me in 1 year        30   minutes spent on the date of the encounter doing chart review, history and exam, documentation and further activities as noted above.    The longitudinal plan of care for the diagnosis(es)/condition(s) as documented were addressed during this visit. Due to the added complexity in care, I will continue to support Kristina in the subsequent management and with ongoing continuity of care.     Gala Pop MD  Staff Physician  Endocrinology and Metabolism  [facility]  License: [id number]  Pager: [phone]    Interval History as of 9/13/2024 : Patient has been feeling better, no tachycardia anymore . Medication compliance excellent  . New event includes : some fatigue symptoms but not much intense .   Interval  History as of 2/23/2024 : Patient has been experiencing resting palpitation and tachycardia, 90s constant and up to 130s.   Interval History as of 2/10/2023 : Patient has been doing well . Medication compliance: excellent   . New event includes: no pertinent medical event noted, but it was hard for her family loss .  Interval History as of 3/9/2020 : She was recently diagnosed braest CA, Currently taking Tamoxifen. Compliant to levothryoxine 88 mcg.   Interval History as of 3/9/2020 : Patient has been doing well. Last seen . Medication compliance   . New event includes  .feelign good.   Interval History: Recently increased (3 month ago 11/2018) levothyroxien from 75 mcg to 88 mcg, patient energy level up and feeling better.   Blood work today euthryoid.     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 She had a history of HRT since 1998, due to total hysterectomy. HRT stopped January 2016.   (upate)  She has been doing well. She lost 20 lb over 1 year, no more fatigue, hair started to grow, no dry skin. Medication compliance is excellent.       1/5/2016 3/10/2016 12:51 10/6/2016 10:10 12/29/2016 12:20 4/27/2017 11:38 12/6/2017 10:11   TSH  0.4-4.0 8.08 4.99 (H) 5.96 (H) 0.01 (L) 1.90 2.99   T4 Free  0.76-1.46 0.85 0.82 0.89 1.58 (H) 1.01 1.13        11/16/2018 15:54 1/21/2019 09:16   TSH 3.38 0.46   T4 Free 1.02 1.11       Past Medical/Surgical History:  Past Medical History:   Diagnosis Date    Allergies     Breast cancer (H)     Hyperlipidemia 10/14/2014    Major depressive disorder, recurrent episode, mild (H24) 05/17/2022    Malignant neoplasm of female breast (H) 08/15/2019    Formatting of this note might be different from the original.  8/2019. Left.  Formatting of this note is different from the original.  Formatting of this note might be different from the original.   8/2019. Left.     Formatting of this note might be different from the original.  6.5.2020- Review and distribute treatment summary-Zac Fitzgerald  Added automatically from request for surgery [id number]       Mixed incontinence 03/02/2023    Monoclonal gammopathy 10/08/2013    Formatting of this note might be different from the original.  Last Assessment & Plan:   Formatting of this note might be different from the original.  Merlyn Ravi  has a history of IgG kappa MGUS. The MGUS was discovered after she had been found to have an elevated gamma globulin fraction when she underwent blood testing prior to donating blood at [facility] She was found to have a 0.4 gram/dL I    Neuropathy 08/29/2023    Obesity (BMI 35.0-39.9) with comorbidity (H) 01/25/2019    Other specified hypothyroidism 08/29/2019    PONV (postoperative nausea and vomiting) 07/07/2022    Retinal tear 2014    Rosacea 08/29/2023     Past Surgical History:   Procedure Laterality Date    ARTHROSCOPY KNEE Left 7/14/2022    Procedure: ARTHROSCOPY, LEFT KNEE with meniscal and chondral debridement;  Surgeon: Timo Berman MD;  Location: [facility] OR    BIOPSY BREAST      HYSTERECTOMY, PAP NO LONGER INDICATED      HYSTERECTOMY, MAGY      LASIK BILATERAL  01/01/2005    Dr. Solis     LUMPECTOMY BREAST Left 09/30/2019    Procedure: Re-excision of left breast lumpectomy site;  Surgeon: Kj Salas DO;  Location: [facility]    LUMPECTOMY BREAST Left 10/09/2019    Procedure: Left breast re-excision;  Surgeon: Kj Salas DO;  Location: [facility] OR    LUMPECTOMY BREAST WITH SENTINEL NODE, COMBINED Left 09/23/2019    Procedure: LUMPECTOMY, BREAST, WITH SENTINEL LYMPH NODE BIOPSY.;  Surgeon: Kj Salas DO;  Location: [facility] OR    [facility] NONSPECIFIC PROCEDURE      bladder surgery       Allergies:  Allergies   Allergen Reactions    Demerol      Sedation and vomiting    Meperidine     Seasonal Allergies     Sulfa Antibiotics Hives     unknown reaction        Current Medications   Current Outpatient Medications   Medication Sig Dispense Refill    atorvastatin (LIPITOR) 10 MG tablet TAKE 1 TABLET(10 MG) BY MOUTH DAILY 90 tablet 2    calcium 500-125 MG-UNIT TABS Take 2 tablets by mouth 2 times daily      doxycycline hyclate (PERIOSTAT) 20 MG tablet Take 2 tablets (40 mg) by mouth daily 180 tablet 3    methimazole (TAPAZOLE) 5 MG tablet Take 0.5 tablets (2.5 mg) by mouth Every Mon, Wed, Fri Morning 20 tablet 3    oxymetazoline HCl (RHOFADE) 1 % cream Apply a pea-sized amount once daily to the entire face or affected area avoiding the eyes and lips. Wash hands immediately after applying. 30 g 4    tamoxifen (NOLVADEX) 20 MG tablet Take 1 tablet (20 mg) by mouth daily 90 tablet 3    tolterodine ER (DETROL LA) 4 MG 24 hr capsule Take 1 capsule (4 mg) by mouth daily 90 capsule 3     Current Facility-Administered Medications   Medication Dose Route Frequency Provider Last Rate Last Admin    BUPivacaine (MARCAINE) 0.25 % injection 4 mL  4 mL   Timo Berman MD   4 mL at 03/27/24 1135    triamcinolone (KENALOG-40) injection 80 mg  80 mg   Timo Berman MD   80 mg at 03/27/24 1135       Family History:  Family History   Problem Relation Age of Onset    Thyroid Disease Mother     Heart Disease Mother     Heart Disease Father     Hyperlipidemia Father     Hypertension Maternal Grandmother     Breast Cancer Maternal Grandmother     Hypertension Maternal Grandfather     Alzheimer Disease Paternal Grandmother     Diabetes Brother         Type 1    Eczema Brother     Thyroid Disease Brother     Melanoma No family hx of     Glaucoma No family hx of     Macular Degeneration No family hx of    Mother hyperthyrodism- removed goiter, Borhter hyperthyroidism- with medication, cousin- hyperthyrodism    Social History:  Social History     Tobacco Use    Smoking status: Never    Smokeless tobacco: Never   Substance Use Topics    Alcohol use: Yes     Comment: very moderate   Lives  " with 2 kids. 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    ROS:  Full review of systems taken with the help of the intake sheet. Pertinent positives include fatigue, loss of energy, weight gain, hair loss. Otherwise a complete 14 point review of systems was taken and is negative unless stated in the history above.      Physical Exam:   Blood pressure 112/84, pulse 83, height 1.672 m (5' 5.83\"), weight 94.1 kg (207 lb 9 oz)  General: well appearing, no acute distress, pleasant and conversant,   Mental Status/neuro: alert and oriented  Face: symmetrical, normal facial color  Eyes: anicteric, no proptosis or lid lag  Neck: suppler, no lymphadenopahty  Thyroid: normal size and texture, no nodule palpable, no bruits      "

## 2024-09-13 NOTE — LETTER
9/13/2024      Merlyn Ravi  [id number] [facility] [id number]      Dear Colleague,    Thank you for referring your patient, Merlyn Ravi, to the [facility]. Please see a copy of my visit note below.                                                            - Endocrinology Follow up -    Reason for visit/consult:  Hypothyrodism, fatiue    Primary care provider: Memo Garcia    Assessment and Plan  A 59 year old female with hypothyroidism, came with fatigue for a few years and recently getting worse,     # Hashimoto thyroiditis    # Resting palpitation this year 2024, HR up to 130, now resolved after small dose of methimazole  , current HR 70s. TSH 15-7, she mentioned some fatigue. We have been adjusting from 2.5 mg daily to 2.5 mg every other day but will further reduce    - methimazole 2.5 mg twice a week only for the next 1 year     - repeat lab in 3-4 month     # family history of hyperthyroidism    # Breast CA on the left  Diagnosed 2019, underwent lumpectomy, followed by RT. Currently taking Tamoxifen.       # Family history of DM1   Her brother has DM1 since age 14.       # Bone health  Hysterectomy, followed by hormone replacement which was stopped 5 year ago.   She was recently diagnosed braest CA, Currently taking Tamoxifen.     - Calcium citrate plus D (elemental Ca 630 mg, VitD 500 international unit(s)) to make sure to take.       RTC with me in 1 year        30   minutes spent on the date of the encounter doing chart review, history and exam, documentation and further activities as noted above.    The longitudinal plan of care for the diagnosis(es)/condition(s) as documented were addressed during this visit. Due to the added complexity in care, I will continue to support Kristina in the subsequent management and with ongoing continuity of care.     Gala Pop MD  Staff Physician  Endocrinology and Metabolism  [facility]  [facility]  License: [id number]  Pager: [phone]    Interval History as of 9/13/2024 : Patient has been feeling better, no tachycardia anymore . Medication compliance excellent  . New event includes : some fatigue symptoms but not much intense .   Interval History as of 2/23/2024 : Patient has been experiencing resting palpitation and tachycardia, 90s constant and up to 130s.   Interval History as of 2/10/2023 : Patient has been doing well . Medication compliance: excellent   . New event includes: no pertinent medical event noted, but it was hard for her family loss .  Interval History as of 3/9/2020 : She was recently diagnosed braest CA, Currently taking Tamoxifen. Compliant to levothryoxine 88 mcg.   Interval History as of 3/9/2020 : Patient has been doing well. Last seen . Medication compliance   . New event includes  .feelign good.   Interval History: Recently increased (3 month ago 11/2018) levothyroxien from 75 mcg to 88 mcg, patient energy level up and feeling better.   Blood work today euthryoid.     HPI: A 53 yo female with monoclonal gammopathy of unknown significance, stable, incidentally found upon plasma transfusion donation, here for the second follow up of her hypothryoidism. She had a history of HRT since 1998, due to total hysterectomy. HRT stopped January 2016.   (upate)  She has been doing well. She lost 20 lb over 1 year, no more fatigue, hair started to grow, no dry skin. Medication compliance is excellent.       1/5/2016 3/10/2016 12:51 10/6/2016 10:10 12/29/2016 12:20 4/27/2017 11:38 12/6/2017 10:11   TSH  0.4-4.0 8.08 4.99 (H) 5.96 (H) 0.01 (L) 1.90 2.99   T4 Free  0.76-1.46 0.85 0.82 0.89 1.58 (H) 1.01 1.13        11/16/2018 15:54 1/21/2019 09:16   TSH 3.38 0.46   T4 Free 1.02 1.11       Past Medical/Surgical History:  Past Medical History:   Diagnosis Date     Allergies      Breast cancer (H)      Hyperlipidemia 10/14/2014     Major depressive disorder, recurrent episode, mild (H24)  05/17/2022     Malignant neoplasm of female breast (H) 08/15/2019    Formatting of this note might be different from the original.  8/2019. Left.  Formatting of this note is different from the original.  Formatting of this note might be different from the original.  8/2019. Left.     Formatting of this note might be different from the original.  6.5.2020- Review and distribute treatment summary-[place]  Added automatically from request for surgery [id number]        Mixed incontinence 03/02/2023     Monoclonal gammopathy 10/08/2013    Formatting of this note might be different from the original.  Last Assessment & Plan:   Formatting of this note might be different from the original.  Merlyn Ravi  has a history of IgG kappa MGUS. The MGUS was discovered after she had been found to have an elevated gamma globulin fraction when she underwent blood testing prior to donating blood at [facility]. She was found to have a 0.4 gram/dL I     Neuropathy 08/29/2023     Obesity (BMI 35.0-39.9) with comorbidity (H) 01/25/2019     Other specified hypothyroidism 08/29/2019     PONV (postoperative nausea and vomiting) 07/07/2022     Retinal tear 2014     Rosacea 08/29/2023     Past Surgical History:   Procedure Laterality Date     ARTHROSCOPY KNEE Left 7/14/2022    Procedure: ARTHROSCOPY, LEFT KNEE with meniscal and chondral debridement;  Surgeon: Timo Berman MD;  Location: [facility] OR     BIOPSY BREAST       HYSTERECTOMY, PAP NO LONGER INDICATED       HYSTERECTOMY, MAGY       LASIK BILATERAL  01/01/2005    Dr. Solis      LUMPECTOMY BREAST Left 09/30/2019    Procedure: Re-excision of left breast lumpectomy site;  Surgeon: Kj Salas DO;  Location: [facility]     LUMPECTOMY BREAST Left 10/09/2019    Procedure: Left breast re-excision;  Surgeon: Kj Salas DO;  Location: [facility]     LUMPECTOMY BREAST WITH SENTINEL NODE, COMBINED Left 09/23/2019    Procedure: LUMPECTOMY, BREAST, WITH SENTINEL LYMPH NODE  BIOPSY.;  Surgeon: Kj Salas DO;  Location: [facility] OR     [facility] NONSPECIFIC PROCEDURE      bladder surgery       Allergies:  Allergies   Allergen Reactions     Demerol      Sedation and vomiting     Meperidine      Seasonal Allergies      Sulfa Antibiotics Hives     unknown reaction       Current Medications   Current Outpatient Medications   Medication Sig Dispense Refill     atorvastatin (LIPITOR) 10 MG tablet TAKE 1 TABLET(10 MG) BY MOUTH DAILY 90 tablet 2     calcium 500-125 MG-UNIT TABS Take 2 tablets by mouth 2 times daily       doxycycline hyclate (PERIOSTAT) 20 MG tablet Take 2 tablets (40 mg) by mouth daily 180 tablet 3     methimazole (TAPAZOLE) 5 MG tablet Take 0.5 tablets (2.5 mg) by mouth Every Mon, Wed, Fri Morning 20 tablet 3     oxymetazoline HCl (RHOFADE) 1 % cream Apply a pea-sized amount once daily to the entire face or affected area avoiding the eyes and lips. Wash hands immediately after applying. 30 g 4     tamoxifen (NOLVADEX) 20 MG tablet Take 1 tablet (20 mg) by mouth daily 90 tablet 3     tolterodine ER (DETROL LA) 4 MG 24 hr capsule Take 1 capsule (4 mg) by mouth daily 90 capsule 3     Current Facility-Administered Medications   Medication Dose Route Frequency Provider Last Rate Last Admin     BUPivacaine (MARCAINE) 0.25 % injection 4 mL  4 mL   Timo Berman MD   4 mL at 03/27/24 1135     triamcinolone (KENALOG-40) injection 80 mg  80 mg   Timo Berman MD   80 mg at 03/27/24 1135       Family History:  Family History   Problem Relation Age of Onset     Thyroid Disease Mother      Heart Disease Mother      Heart Disease Father      Hyperlipidemia Father      Hypertension Maternal Grandmother      Breast Cancer Maternal Grandmother      Hypertension Maternal Grandfather      Alzheimer Disease Paternal Grandmother      Diabetes Brother         Type 1     Eczema Brother      Thyroid Disease Brother      Melanoma No family hx of      Glaucoma No family hx of      Macular  "Degeneration No family hx of    Mother hyperthyrodism- removed goiter, Borhter hyperthyroidism- with medication, cousin- hyperthyrodism    Social History:  Social History     Tobacco Use     Smoking status: Never     Smokeless tobacco: Never   Substance Use Topics     Alcohol use: Yes     Comment: very moderate   Lives  with 2 kids. Work at [employer]    ROS:  Full review of systems taken with the help of the intake sheet. Pertinent positives include fatigue, loss of energy, weight gain, hair loss. Otherwise a complete 14 point review of systems was taken and is negative unless stated in the history above.      Physical Exam:   Blood pressure 112/84, pulse 83, height 1.672 m (5' 5.83\"), weight 94.1 kg (207 lb 9 oz)  General: well appearing, no acute distress, pleasant and conversant,   Mental Status/neuro: alert and oriented  Face: symmetrical, normal facial color  Eyes: anicteric, no proptosis or lid lag  Neck: suppler, no lymphadenopahty  Thyroid: normal size and texture, no nodule palpable, no bruits        Again, thank you for allowing me to participate in the care of your patient.        Sincerely,        Gala Pop MD  "

## 2024-09-13 NOTE — NURSING NOTE
Merlyn Ravi's goals for this visit include:   Chief Complaint   Patient presents with    Follow Up    Thyroid Disease     She requests these members of her care team be copied on today's visit information: Yes     PCP: Memo Garcia    Referring Provider:  Referred Self, MD  No address on file    /84 (BP Location: Right arm, Patient Position: Sitting, Cuff Size: Adult Large)   Pulse 59   Wt 99.8 kg (220 lb)   SpO2 100%   BMI 36.33 kg/m      Do you need any medication refills at today's visit? Unsure      Lela Portillo, JORDAN  Adult Endocrinology   [facility]

## 2024-10-11 ENCOUNTER — OFFICE VISIT (OUTPATIENT)
Dept: FAMILY MEDICINE | Facility: CLINIC | Age: 59
End: 2024-10-11
Payer: COMMERCIAL

## 2024-10-11 VITALS
SYSTOLIC BLOOD PRESSURE: 108 MMHG | RESPIRATION RATE: 16 BRPM | HEIGHT: 65 IN | DIASTOLIC BLOOD PRESSURE: 80 MMHG | OXYGEN SATURATION: 97 % | HEART RATE: 76 BPM | TEMPERATURE: 98.2 F | BODY MASS INDEX: 36.99 KG/M2 | WEIGHT: 222 LBS

## 2024-10-11 DIAGNOSIS — M48.061 FORAMINAL STENOSIS OF LUMBAR REGION: Primary | ICD-10-CM

## 2024-10-11 PROCEDURE — 90673 RIV3 VACCINE NO PRESERV IM: CPT | Performed by: FAMILY MEDICINE

## 2024-10-11 PROCEDURE — 99213 OFFICE O/P EST LOW 20 MIN: CPT | Mod: 25 | Performed by: FAMILY MEDICINE

## 2024-10-11 PROCEDURE — 90471 IMMUNIZATION ADMIN: CPT | Performed by: FAMILY MEDICINE

## 2024-10-11 ASSESSMENT — ENCOUNTER SYMPTOMS: BACK PAIN: 1

## 2024-10-11 ASSESSMENT — ANXIETY QUESTIONNAIRES
GAD7 TOTAL SCORE: 5
GAD7 TOTAL SCORE: 5
2. NOT BEING ABLE TO STOP OR CONTROL WORRYING: SEVERAL DAYS
6. BECOMING EASILY ANNOYED OR IRRITABLE: SEVERAL DAYS
7. FEELING AFRAID AS IF SOMETHING AWFUL MIGHT HAPPEN: NOT AT ALL
3. WORRYING TOO MUCH ABOUT DIFFERENT THINGS: SEVERAL DAYS
IF YOU CHECKED OFF ANY PROBLEMS ON THIS QUESTIONNAIRE, HOW DIFFICULT HAVE THESE PROBLEMS MADE IT FOR YOU TO DO YOUR WORK, TAKE CARE OF THINGS AT HOME, OR GET ALONG WITH OTHER PEOPLE: SOMEWHAT DIFFICULT
1. FEELING NERVOUS, ANXIOUS, OR ON EDGE: SEVERAL DAYS
5. BEING SO RESTLESS THAT IT IS HARD TO SIT STILL: NOT AT ALL

## 2024-10-11 ASSESSMENT — PATIENT HEALTH QUESTIONNAIRE - PHQ9
5. POOR APPETITE OR OVEREATING: SEVERAL DAYS
SUM OF ALL RESPONSES TO PHQ QUESTIONS 1-9: 6

## 2024-10-11 ASSESSMENT — PAIN SCALES - GENERAL: PAINLEVEL: MODERATE PAIN (5)

## 2024-10-11 NOTE — PROGRESS NOTES
"  Assessment & Plan   Problem List Items Addressed This Visit    None  Visit Diagnoses       Foraminal stenosis of lumbar region    -  Primary    Relevant Orders    MR Lumbar Spine w/o Contrast           59 yr old female here for low back pain greater than ten years.     MRI ordered. Patient may benefit from a cortisone injection.        BMI  Estimated body mass index is 36.66 kg/m  as calculated from the following:    Height as of this encounter: 1.657 m (5' 5.25\").    Weight as of this encounter: 100.7 kg (222 lb).       FUTURE APPOINTMENTS:       - Follow-up visit as needed      Subjective   Kristina is a 59 year old, presenting for the following health issues:    Patient is a 59 yr old female here for back pain. She has had back issues for over twenty years, she reports the back pain is worsening and she reports that she has been experiencing some numbness and tingling. It is affecting her every day life as she is in pain all the time; she had an MRI done about ten years ago and then it was reported that she had severe foraminal stenosis in L4-L5.      Back Pain (Continuing back pain.  States last MRI was in 2014.  Pain is starting to radiated down her legs.  Right hip hurts worse than the left.  Has tried changing her bed, will elevate her feet at night, has tried to sit on a wobble chair.  Aleve is not helping any longer.  Back pain pills are not helping.  Heat will help it to feel better.) and Imm/Inj (She will update her flu shot today.  Declined the Covid injection.  Discuss if she is due for a Prevnar 20.)        10/11/2024     7:13 AM   Additional Questions   Roomed by Angella Alex CMA     Chief Complaint   Patient presents with    Back Pain     Continuing back pain.  States last MRI was in 2014.  Pain is starting to radiated down her legs.  Right hip hurts worse than the left.  Has tried changing her bed, will elevate her feet at night, has tried to sit on a wobble chair.  Aleve is not helping any " "longer.  Back pain pills are not helping.  Heat will help it to feel better.    Imm/Inj     She will update her flu shot today.  Declined the Covid injection.  Discuss if she is due for a Prevnar 20.       History of Present Illness       Back Pain:  She presents for follow up of back pain. Patient's back pain is a recurring problem.  Location of back pain:  Right lower back, left lower back, right middle of back, left middle of back, right hip and left hip  Description of back pain: gnawing, sharp, shooting and stabbing  Back pain spreads: right thigh, left thigh, right knee, left knee, right foot and left foot    Since patient first noticed back pain, pain is: always present, but gets better and worse  Does back pain interfere with her job:  Yes       She eats 0-1 servings of fruits and vegetables daily.She consumes 0 sweetened beverage(s) daily.She exercises with enough effort to increase her heart rate 9 or less minutes per day.  She exercises with enough effort to increase her heart rate 3 or less days per week. She is missing 1 dose(s) of medications per week.  She is not taking prescribed medications regularly due to remembering to take.             Review of Systems  CONSTITUTIONAL: NEGATIVE for fever, chills, change in weight  ENT/MOUTH: NEGATIVE for ear, mouth and throat problems  RESP: NEGATIVE for significant cough or SOB  CV: NEGATIVE for chest pain, palpitations or peripheral edema  MUSCULOSKELETAL: POSITIVE  for  back pain  NEURO: NEGATIVE for weakness, dizziness or paresthesias  ENDOCRINE: NEGATIVE for temperature intolerance, skin/hair changes  HEME/ALLERGY/IMMUNE: NEGATIVE for bleeding problems  PSYCHIATRIC: NEGATIVE for changes in mood or affect      Objective    /80 (BP Location: Right arm, Patient Position: Chair, Cuff Size: Adult Large)   Pulse 76   Temp 98.2  F (36.8  C) (Tympanic)   Resp 16   Ht 1.657 m (5' 5.25\")   Wt 100.7 kg (222 lb)   SpO2 97%   BMI 36.66 kg/m    Body mass " index is 36.66 kg/m .  Physical Exam   GENERAL: alert and no distress  EYES: Eyes grossly normal to inspection, PERRL and conjunctivae and sclerae normal  HENT: ear canals and TM's normal, nose and mouth without ulcers or lesions  NECK: no adenopathy, no asymmetry, masses, or scars  RESP: lungs clear to auscultation - no rales, rhonchi or wheezes  CV: regular rate and rhythm, normal S1 S2, no S3 or S4, no murmur, click or rub, no peripheral edema  MS: decreased range of motion low back            Signed Electronically by: Memo Garcia MD

## 2024-10-15 ENCOUNTER — OFFICE VISIT (OUTPATIENT)
Dept: SURGERY | Facility: CLINIC | Age: 59
End: 2024-10-15
Payer: COMMERCIAL

## 2024-10-15 VITALS
HEART RATE: 78 BPM | TEMPERATURE: 98 F | DIASTOLIC BLOOD PRESSURE: 88 MMHG | SYSTOLIC BLOOD PRESSURE: 131 MMHG | OXYGEN SATURATION: 96 %

## 2024-10-15 DIAGNOSIS — C50.919 INVASIVE LOBULAR CARCINOMA OF BREAST IN FEMALE (H): Primary | ICD-10-CM

## 2024-10-15 PROCEDURE — 99212 OFFICE O/P EST SF 10 MIN: CPT | Performed by: SURGERY

## 2024-10-15 NOTE — PROGRESS NOTES
BREAST CANCER FOLLOW UP  CHIEF COMPLAINT  Chief Complaint   Patient presents with    RECHECK     5 year breast cancer survivor      HPI  Merlyn Ravi is a 59 year old female who presents with   Chief Complaint   Patient presents with    RECHECK     5 year breast cancer survivor      The patient presents for her breast cancer followup appointment. Overall, she is feeling good. She denies any significant fatigue, fevers, chills, or changes in appetite. She has not suffered any significant weight loss.  Her lymphedema in left breast has nearly resolved.  She has not noted any areas of skin changes or any masses in the breast or chest wall that she is concerned about.  She denies skin dimpling, puckering, erythema, or nipple discharge. She has not noted any palpable axillary lymphadenopathy.  She admits chronic low back pain with radicular symptoms.  She has an MRI scheduled next week    Review of Systems  5 point ROS otherwise negative    PAST MEDICAL HISTORY  Past Medical History:   Diagnosis Date    Allergies     Breast cancer (H)     Hyperlipidemia 10/14/2014    Major depressive disorder, recurrent episode, mild (H) 05/17/2022    Malignant neoplasm of female breast (H) 08/15/2019    Formatting of this note might be different from the original.  8/2019. Left.  Formatting of this note is different from the original.  Formatting of this note might be different from the original.  8/2019. Left.     Formatting of this note might be different from the original.  6.5.2020- Review and distribute treatment summary-[facility]  Added automatically from request for surgery [id number]       Mixed incontinence 03/02/2023    Monoclonal gammopathy 10/08/2013    Formatting of this note might be different from the original.  Last Assessment & Plan:   Formatting of this note might be different from the original.  Merlyn Ravi  has a history of IgG kappa MGUS. The MGUS was discovered after she had been found to have an elevated  gamma globulin fraction when she underwent blood testing prior to donating blood at [facility]. She was found to have a 0.4 gram/dL I    Neuropathy 08/29/2023    Obesity (BMI 35.0-39.9) with comorbidity (H) 01/25/2019    Other specified hypothyroidism 08/29/2019    PONV (postoperative nausea and vomiting) 07/07/2022    Retinal tear 2014    Rosacea 08/29/2023     FAMILY HISTORY  Family History   Problem Relation Age of Onset    Thyroid Disease Mother     Heart Disease Mother     Heart Disease Father     Hyperlipidemia Father     Hypertension Maternal Grandmother     Breast Cancer Maternal Grandmother     Hypertension Maternal Grandfather     Alzheimer Disease Paternal Grandmother     Diabetes Brother         Type 1    Eczema Brother     Thyroid Disease Brother     Melanoma No family hx of     Glaucoma No family hx of     Macular Degeneration No family hx of      SOCIAL HISTORY  Social History     Socioeconomic History    Marital status:      Spouse name: None    Number of children: None    Years of education: None    Highest education level: None   Occupational History    Occupation: special education at [employer]   Tobacco Use    Smoking status: Never    Smokeless tobacco: Never   Vaping Use    Vaping status: Never Used   Substance and Sexual Activity    Alcohol use: Yes     Comment: very moderate    Drug use: No    Sexual activity: Yes     Partners: Male     Birth control/protection: Other     Comment: Hysterectomy   Other Topics Concern    Parent/sibling w/ CABG, MI or angioplasty before 65F 55M? No     Social Determinants of Health     Financial Resource Strain: Low Risk  (4/3/2024)    Financial Resource Strain     Within the past 12 months, have you or your family members you live with been unable to get utilities (heat, electricity) when it was really needed?: No   Food Insecurity: Low Risk  (4/3/2024)    Food Insecurity     Within the past 12 months, did you worry that your food would run out  before you got money to buy more?: No     Within the past 12 months, did the food you bought just not last and you didn t have money to get more?: No   Transportation Needs: Low Risk  (4/3/2024)    Transportation Needs     Within the past 12 months, has lack of transportation kept you from medical appointments, getting your medicines, non-medical meetings or appointments, work, or from getting things that you need?: No   Physical Activity: Inactive (4/3/2024)    Exercise Vital Sign     Days of Exercise per Week: 0 days     Minutes of Exercise per Session: 0 min   Stress: No Stress Concern Present (4/3/2024)    [place] [employer] - Occupational Stress Questionnaire     Feeling of Stress : Only a little   Social Connections: Unknown (4/3/2024)    Social Connection and Isolation Panel [NHANES]     Frequency of Social Gatherings with Friends and Family: Once a week   Interpersonal Safety: Low Risk  (10/11/2024)    Interpersonal Safety     Do you feel physically and emotionally safe where you currently live?: Yes     Within the past 12 months, have you been hit, slapped, kicked or otherwise physically hurt by someone?: No     Within the past 12 months, have you been humiliated or emotionally abused in other ways by your partner or ex-partner?: No   Housing Stability: Low Risk  (4/3/2024)    Housing Stability     Do you have housing? : Yes     Are you worried about losing your housing?: No     SURGICAL HISTORY  Past Surgical History:   Procedure Laterality Date    ARTHROSCOPY KNEE Left 7/14/2022    Procedure: ARTHROSCOPY, LEFT KNEE with meniscal and chondral debridement;  Surgeon: Timo Berman MD;  Location: [facility] OR    BIOPSY BREAST      HYSTERECTOMY, PAP NO LONGER INDICATED      HYSTERECTOMY, MAGY      LASIK BILATERAL  01/01/2005    Dr. Solis     LUMPECTOMY BREAST Left 09/30/2019    Procedure: Re-excision of left breast lumpectomy site;  Surgeon: Kj Salas DO;  Location: [facility]     LUMPECTOMY BREAST Left 10/09/2019    Procedure: Left breast re-excision;  Surgeon: Kj Salas DO;  Location: [facility]    LUMPECTOMY BREAST WITH SENTINEL NODE, COMBINED Left 09/23/2019    Procedure: LUMPECTOMY, BREAST, WITH SENTINEL LYMPH NODE BIOPSY.;  Surgeon: Kj Salas DO;  Location: [facility] OR    [facility] NONSPECIFIC PROCEDURE      bladder surgery     CURRENT MEDICATIONS    Current Outpatient Medications:     atorvastatin (LIPITOR) 10 MG tablet, TAKE 1 TABLET(10 MG) BY MOUTH DAILY, Disp: 90 tablet, Rfl: 2    calcium 500-125 MG-UNIT TABS, Take 2 tablets by mouth 2 times daily, Disp: , Rfl:     doxycycline hyclate (PERIOSTAT) 20 MG tablet, Take 2 tablets (40 mg) by mouth daily, Disp: 180 tablet, Rfl: 3    methimazole (TAPAZOLE) 5 MG tablet, Take 0.5 tablets (2.5 mg) by mouth Every Mon, Wed, Fri Morning, Disp: 20 tablet, Rfl: 3    oxymetazoline HCl (RHOFADE) 1 % cream, Apply a pea-sized amount once daily to the entire face or affected area avoiding the eyes and lips. Wash hands immediately after applying., Disp: 30 g, Rfl: 4    tamoxifen (NOLVADEX) 20 MG tablet, Take 1 tablet (20 mg) by mouth daily, Disp: 90 tablet, Rfl: 3    tolterodine ER (DETROL LA) 4 MG 24 hr capsule, Take 1 capsule (4 mg) by mouth daily, Disp: 90 capsule, Rfl: 3    Current Facility-Administered Medications:     BUPivacaine (MARCAINE) 0.25 % injection 4 mL, 4 mL, , , Timo Berman MD, 4 mL at 03/27/24 1135    triamcinolone (KENALOG-40) injection 80 mg, 80 mg, , , Timo Berman MD, 80 mg at 03/27/24 1135  ALLERGIES  Allergies   Allergen Reactions    Demerol      Sedation and vomiting    Meperidine     Seasonal Allergies     Sulfa Antibiotics Hives     unknown reaction     PHYSICAL EXAM  VITAL SIGNS:  tympanic temperature is 98  F (36.7  C). Her blood pressure is 131/88 and her pulse is 78. Her oxygen saturation is 96%.   Constitutional: Well developed, Well nourished, No acute distress, Non-toxic appearance.   HENT:  Normocephalic, Atraumatic, Bilateral external ears normal, Oropharynx moist, No oral exudates, Nose normal.   Neck: Normal range of motion, No tenderness, Supple, No stridor.   Lymphatic: No lymphadenopathy noted.   Cardiovascular: No peripheral edema  Breast Exam: Examined with Terri Flannery MA  RIGHT: No areas of skin dimpling or puckering. No erythema. Heterogeneously dense No skin scaling or changes. No expressible nipple discharge. No focal areas of significant tenderness. .  No palpable axillary lymphadenopathy.  LEFT: No areas of skin dimpling or puckering. Heterogeneously denseNo erythema. No skin scaling or changes. No expressible nipple discharge. No focal areas of significant tenderness. Breast conservation changes left medial outer quadrant.  No palpable axillary lymphadenopathy.  Thorax & Lungs: Normal breath sounds, No respiratory distress, No wheezing, No chest tenderness.   Abdomen: Bowel sounds normal, Soft, No masses, No pulsatile masses.   Skin: Warm, Dry, No erythema, No rash.   Back: Low lumbar spinal tenderness without stepoff.  No CVA tenderness.   Extremities: Intact distal pulses, No edema, No tenderness, No cyanosis, No clubbing.   Musculoskeletal: Good range of motion in all major joints. No tenderness to palpation or major deformities noted.   Neurologic: Alert & oriented x 3, Normal motor function, Normal sensory function, No focal deficits noted.   Psychiatric: Affect normal, Judgment normal, Mood normal.     FINAL IMPRESSION AND PLAN  1. Invasive lobular carcinoma of breast in female (H)      The patient is 5 years out from her breast cancer surgery. There is no evidence of recurrent disease on my exam today. She has nearly completed 5 years of adjuvant endocrine therapy.  She will continue periodic self breast or chest wall exam. She is encouraged to followup with me for any changes on self-exam, or for any concerns or questions. She will followup with her primary care provider for  routine care, and continue followup with her oncologist as scheduled.  She has graduated, 5 years out from her breast cancer surgery.      Kj Salas,  on 10/15/2024 at 12:43 PM

## 2024-10-15 NOTE — NURSING NOTE
"Initial /88 (BP Location: Right arm, Patient Position: Sitting, Cuff Size: Adult Regular)   Pulse 78   Temp 98  F (36.7  C) (Tympanic)   SpO2 96%  Estimated body mass index is 36.66 kg/m  as calculated from the following:    Height as of 10/11/24: 1.657 m (5' 5.25\").    Weight as of 10/11/24: 100.7 kg (222 lb). .  Missy Coto MA on 10/15/2024 at 8:00 AM    "

## 2024-10-15 NOTE — LETTER
10/15/2024      Merlyn Ravi  [address] [address] [facility] [id number]      Dear Colleague,    Thank you for referring your patient, Merlyn Ravi, to the [facility]. Please see a copy of my visit note below.    BREAST CANCER FOLLOW UP  CHIEF COMPLAINT  Chief Complaint   Patient presents with     RECHECK     5 year breast cancer survivor      HPI  Merlyn Ravi is a 59 year old female who presents with   Chief Complaint   Patient presents with     RECHECK     5 year breast cancer survivor      The patient presents for her breast cancer followup appointment. Overall, she is feeling good. She denies any significant fatigue, fevers, chills, or changes in appetite. She has not suffered any significant weight loss.  Her lymphedema in left breast has nearly resolved.  She has not noted any areas of skin changes or any masses in the breast or chest wall that she is concerned about.  She denies skin dimpling, puckering, erythema, or nipple discharge. She has not noted any palpable axillary lymphadenopathy.  She admits chronic low back pain with radicular symptoms.  She has an MRI scheduled next week    Review of Systems  5 point ROS otherwise negative    PAST MEDICAL HISTORY  Past Medical History:   Diagnosis Date     Allergies      Breast cancer (H)      Hyperlipidemia 10/14/2014     Major depressive disorder, recurrent episode, mild (H) 05/17/2022     Malignant neoplasm of female breast (H) 08/15/2019    Formatting of this note might be different from the original.  8/2019. Left.  Formatting of this note is different from the original.  Formatting of this note might be different from the original.  8/2019. Left.     Formatting of this note might be different from the original.  6.5.2020- Review and distribute treatment summary-Carlos Amilcar  Added automatically from request for surgery [id number]        Mixed incontinence 03/02/2023     Monoclonal gammopathy 10/08/2013    Formatting  of this note might be different from the original.  Last Assessment & Plan:   Formatting of this note might be different from the original.  Merlyn Ravi  has a history of IgG kappa MGUS. The MGUS was discovered after she had been found to have an elevated gamma globulin fraction when she underwent blood testing prior to donating blood at [facility]. She was found to have a 0.4 gram/dL I     Neuropathy 08/29/2023     Obesity (BMI 35.0-39.9) with comorbidity (H) 01/25/2019     Other specified hypothyroidism 08/29/2019     PONV (postoperative nausea and vomiting) 07/07/2022     Retinal tear 2014     Rosacea 08/29/2023     FAMILY HISTORY  Family History   Problem Relation Age of Onset     Thyroid Disease Mother      Heart Disease Mother      Heart Disease Father      Hyperlipidemia Father      Hypertension Maternal Grandmother      Breast Cancer Maternal Grandmother      Hypertension Maternal Grandfather      Alzheimer Disease Paternal Grandmother      Diabetes Brother         Type 1     Eczema Brother      Thyroid Disease Brother      Melanoma No family hx of      Glaucoma No family hx of      Macular Degeneration No family hx of      SOCIAL HISTORY  Social History     Socioeconomic History     Marital status:      Spouse name: None     Number of children: None     Years of education: None     Highest education level: None   Occupational History     Occupation: special education at [employer]   Tobacco Use     Smoking status: Never     Smokeless tobacco: Never   Vaping Use     Vaping status: Never Used   Substance and Sexual Activity     Alcohol use: Yes     Comment: very moderate     Drug use: No     Sexual activity: Yes     Partners: Male     Birth control/protection: Other     Comment: Hysterectomy   Other Topics Concern     Parent/sibling w/ CABG, MI or angioplasty before 65F 55M? No     Social Determinants of Health     Financial Resource Strain: Low Risk  (4/3/2024)    Financial Resource  Strain      Within the past 12 months, have you or your family members you live with been unable to get utilities (heat, electricity) when it was really needed?: No   Food Insecurity: Low Risk  (4/3/2024)    Food Insecurity      Within the past 12 months, did you worry that your food would run out before you got money to buy more?: No      Within the past 12 months, did the food you bought just not last and you didn t have money to get more?: No   Transportation Needs: Low Risk  (4/3/2024)    Transportation Needs      Within the past 12 months, has lack of transportation kept you from medical appointments, getting your medicines, non-medical meetings or appointments, work, or from getting things that you need?: No   Physical Activity: Inactive (4/3/2024)    Exercise Vital Sign      Days of Exercise per Week: 0 days      Minutes of Exercise per Session: 0 min   Stress: No Stress Concern Present (4/3/2024)    [employer] [place] [employer] - Occupational Stress Questionnaire      Feeling of Stress : Only a little   Social Connections: Unknown (4/3/2024)    Social Connection and Isolation Panel [NHANES]      Frequency of Social Gatherings with Friends and Family: Once a week   Interpersonal Safety: Low Risk  (10/11/2024)    Interpersonal Safety      Do you feel physically and emotionally safe where you currently live?: Yes      Within the past 12 months, have you been hit, slapped, kicked or otherwise physically hurt by someone?: No      Within the past 12 months, have you been humiliated or emotionally abused in other ways by your partner or ex-partner?: No   Housing Stability: Low Risk  (4/3/2024)    Housing Stability      Do you have housing? : Yes      Are you worried about losing your housing?: No     SURGICAL HISTORY  Past Surgical History:   Procedure Laterality Date     ARTHROSCOPY KNEE Left 7/14/2022    Procedure: ARTHROSCOPY, LEFT KNEE with meniscal and chondral debridement;  Surgeon: Timo Berman  MD Les;  Location: [facility] OR     BIOPSY BREAST       HYSTERECTOMY, PAP NO LONGER INDICATED       HYSTERECTOMY, MAGY       LASIK BILATERAL  01/01/2005    Dr. Solis      LUMPECTOMY BREAST Left 09/30/2019    Procedure: Re-excision of left breast lumpectomy site;  Surgeon: Kj Salas DO;  Location: [facility]     LUMPECTOMY BREAST Left 10/09/2019    Procedure: Left breast re-excision;  Surgeon: Kj Salas DO;  Location: [facility] OR     LUMPECTOMY BREAST WITH SENTINEL NODE, COMBINED Left 09/23/2019    Procedure: LUMPECTOMY, BREAST, WITH SENTINEL LYMPH NODE BIOPSY.;  Surgeon: Kj Salas DO;  Location: [facility] OR     [facility] NONSPECIFIC PROCEDURE      bladder surgery     CURRENT MEDICATIONS    Current Outpatient Medications:      atorvastatin (LIPITOR) 10 MG tablet, TAKE 1 TABLET(10 MG) BY MOUTH DAILY, Disp: 90 tablet, Rfl: 2     calcium 500-125 MG-UNIT TABS, Take 2 tablets by mouth 2 times daily, Disp: , Rfl:      doxycycline hyclate (PERIOSTAT) 20 MG tablet, Take 2 tablets (40 mg) by mouth daily, Disp: 180 tablet, Rfl: 3     methimazole (TAPAZOLE) 5 MG tablet, Take 0.5 tablets (2.5 mg) by mouth Every Mon, Wed, Fri Morning, Disp: 20 tablet, Rfl: 3     oxymetazoline HCl (RHOFADE) 1 % cream, Apply a pea-sized amount once daily to the entire face or affected area avoiding the eyes and lips. Wash hands immediately after applying., Disp: 30 g, Rfl: 4     tamoxifen (NOLVADEX) 20 MG tablet, Take 1 tablet (20 mg) by mouth daily, Disp: 90 tablet, Rfl: 3     tolterodine ER (DETROL LA) 4 MG 24 hr capsule, Take 1 capsule (4 mg) by mouth daily, Disp: 90 capsule, Rfl: 3    Current Facility-Administered Medications:      BUPivacaine (MARCAINE) 0.25 % injection 4 mL, 4 mL, , , Timo Berman MD, 4 mL at 03/27/24 1135     triamcinolone (KENALOG-40) injection 80 mg, 80 mg, , , Timo Berman MD, 80 mg at 03/27/24 1135  ALLERGIES  Allergies   Allergen Reactions     Demerol      Sedation and vomiting      Meperidine      Seasonal Allergies      Sulfa Antibiotics Hives     unknown reaction     PHYSICAL EXAM  VITAL SIGNS:  tympanic temperature is 98  F (36.7  C). Her blood pressure is 131/88 and her pulse is 78. Her oxygen saturation is 96%.   Constitutional: Well developed, Well nourished, No acute distress, Non-toxic appearance.   HENT: Normocephalic, Atraumatic, Bilateral external ears normal, Oropharynx moist, No oral exudates, Nose normal.   Neck: Normal range of motion, No tenderness, Supple, No stridor.   Lymphatic: No lymphadenopathy noted.   Cardiovascular: No peripheral edema  Breast Exam: Examined with Terri Flannery MA  RIGHT: No areas of skin dimpling or puckering. No erythema. Heterogeneously dense No skin scaling or changes. No expressible nipple discharge. No focal areas of significant tenderness. .  No palpable axillary lymphadenopathy.  LEFT: No areas of skin dimpling or puckering. Heterogeneously denseNo erythema. No skin scaling or changes. No expressible nipple discharge. No focal areas of significant tenderness. Breast conservation changes left medial outer quadrant.  No palpable axillary lymphadenopathy.  Thorax & Lungs: Normal breath sounds, No respiratory distress, No wheezing, No chest tenderness.   Abdomen: Bowel sounds normal, Soft, No masses, No pulsatile masses.   Skin: Warm, Dry, No erythema, No rash.   Back: Low lumbar spinal tenderness without stepoff.  No CVA tenderness.   Extremities: Intact distal pulses, No edema, No tenderness, No cyanosis, No clubbing.   Musculoskeletal: Good range of motion in all major joints. No tenderness to palpation or major deformities noted.   Neurologic: Alert & oriented x 3, Normal motor function, Normal sensory function, No focal deficits noted.   Psychiatric: Affect normal, Judgment normal, Mood normal.     FINAL IMPRESSION AND PLAN  1. Invasive lobular carcinoma of breast in female (H)      The patient is 5 years out from her breast cancer surgery.  There is no evidence of recurrent disease on my exam today. She has nearly completed 5 years of adjuvant endocrine therapy.  She will continue periodic self breast or chest wall exam. She is encouraged to followup with me for any changes on self-exam, or for any concerns or questions. She will followup with her primary care provider for routine care, and continue followup with her oncologist as scheduled.  She has graduated, 5 years out from her breast cancer surgery.      Kj Salas,  on 10/15/2024 at 12:43 PM        Again, thank you for allowing me to participate in the care of your patient.        Sincerely,        Kj Salas, DO

## 2024-11-12 ENCOUNTER — HOSPITAL ENCOUNTER (OUTPATIENT)
Dept: MRI IMAGING | Facility: CLINIC | Age: 59
Discharge: HOME | End: 2024-11-12
Attending: FAMILY MEDICINE | Admitting: FAMILY MEDICINE
Payer: COMMERCIAL

## 2024-11-12 DIAGNOSIS — M48.061 FORAMINAL STENOSIS OF LUMBAR REGION: ICD-10-CM

## 2024-11-12 PROCEDURE — 72148 MRI LUMBAR SPINE W/O DYE: CPT

## 2024-11-15 DIAGNOSIS — M51.369 BULGING LUMBAR DISC: Primary | ICD-10-CM

## 2024-11-18 ENCOUNTER — PATIENT OUTREACH (OUTPATIENT)
Dept: CARE COORDINATION | Facility: CLINIC | Age: 59
End: 2024-11-18
Payer: COMMERCIAL

## 2024-11-20 ENCOUNTER — PATIENT OUTREACH (OUTPATIENT)
Dept: CARE COORDINATION | Facility: CLINIC | Age: 59
End: 2024-11-20
Payer: COMMERCIAL

## 2024-11-25 NOTE — TELEPHONE ENCOUNTER
REASON FOR VISIT: severe low back pain    DATE OF APPT: 12/19/2024   NOTES (FOR ALL VISITS) STATUS DETAILS   OFFICE NOTE from referring provider Internal [facility] Memo Tinoco MD 11/15/2024   MEDICATION LIST N/A    IMAGING  (FOR ALL VISITS)     XR N/A    MRI (HEAD, NECK, SPINE) Internal [facility]  MR Lumbar spine 11/12/2024   CT (HEAD, NECK, SPINE) N/A

## 2024-12-10 NOTE — PROGRESS NOTES
"    SUBJECTIVE:  HPI:  Merlyn Ravi  Is a 59 year old female who presents for new patient evaluation of chronic low back pain upon referral from Dr. Memo Garcia, whose 10/11/2024 office note records:  \"Patient is a 59 yr old female here for back pain. She has had back issues for over twenty years, she reports the back pain is worsening and she reports that she has been experiencing some numbness and tingling. It is affecting her every day life as she is in pain all the time; she had an MRI done about ten years ago and then it was reported that she had severe foraminal stenosis in L4-L5.   Back Pain (Continuing back pain.  States last MRI was in 2014.  Pain is starting to radiated down her legs.  Right hip hurts worse than the left.  Has tried changing her bed, will elevate her feet at night, has tried to sit on a wobble chair.  Aleve is not helping any longer.  Back pain pills are not helping.  Heat will help it to feel better.)\"  Pain is in bilateral low back radiating to bilateral hips thighs knees and foot.    History 12/19/2024:    Kristina has had 20 years of back pain.  She has had some chiropractic care recently which helped \"a little bit.\"  Has never had PT.  He does not HEP from her chiropractor.  She has seen her chiropractor a couple weeks.  She describes what sounds like leg pulling as well as SI joint adjustments when she sees him and these are HVLA.  In the past couple of months she has had a flareup of pain and she points to bilateral SI areas radiating sometimes down the lateral thighs about penitentiary down but never passed that point but there is no specific numbness, tingling, weakness, change in bowel or bladder habits, or saddle anesthesia.  She has a sore left knee having had recent surgery for \"double\" left meniscus tear and sometimes that feels a bit weak.  No specific myotomal weakness is described.  Symptoms recently flared up when she did a lot of bending stooping and twisting helping " "her daughter move into her kitchen.    SYMPTOMS WORSENED WITH \"everything\", prolonged standing    SYMPTOMS IMPROVED WITH heat and pain medication (Tylenol, some other over-the-counter back and spine med)    Pain score and diagram reviewed.  See questionnaire.      ROS: .  Otherwise negative for bowel/bladder dysfunction, balance changes, headache, leg pain/numbness/weakness, fevers, chills, night sweats, unexplained weight loss;  otherwise unremarkable.   See the patient's intake questionnaire from today for details.    Treatment to Date: No injections and no surgery for the spine    MEDICATIONS:  Reviewed.    ALLERGIES:  Reviewed.     PAST MEDICAL/SURGICAL HISTORY:   Pertinent for peripheral neuropathy, depression, left breast cancer--lumpectomy and axillary node dissection September 2019-tamoxifen for 5 years and no recurrence, hysterectomy, MGUS, obesity, mixed incontinence    SOCIAL HX: She is a  for school district doing sedentary work.  She and her spouse have 2 adopted children.  Sports hobbies and activities: Walking in the summer otherwise sedentary.      OBJECTIVE:    IMAGING: Images and report reviewed    MR LUMBAR SPINE W/O CONTRAST: 11/12/2024    COMPARISON: 11/19/2014     L3-L4: Asymmetric right disc bulge with osteophytic ridging. Moderate disc height loss with signal change. Moderate facet arthropathy. Mild spinal canal stenosis with lateral recess narrowing. Mild right foraminal stenosis. No left foraminal stenosis.     L4-L5: Disc bulge. Severe disc height loss with signal change. Mild facet arthropathy. Right greater than left lateral recess narrowing. No central spinal canal stenosis. No foraminal stenosis.     L5-S1: Left subarticular protrusion. Mild disc height loss with signal change. Moderate left and mild right facet arthropathy. No spinal canal stenosis. Mild left foraminal stenosis. No right foraminal stenosis.                                                        "               IMPRESSION:  1.  Degenerative changes throughout the lumbar spine with Modic endplate changes from L3-S1.  2.  Mild spinal canal stenosis at L3-L4. Multilevel lateral recess narrowing, most pronounced at L4-L5 where there is compression of the descending right L5 nerve root.  3.  Mild multilevel foraminal stenosis.    EXAMINATION:    --CONSTITUTIONAL:   No acute distress.  The patient is well nourished and well groomed.  Positive right greater than left Nikolas finger sign.  Transitions and moves fluidly.  Elevated BMI.  --SKIN:  Skin over the face, bilateral lower extremities, and posterior torso is clean, dry, intact without rashes.    --GAIT:  is non-antalgic. Flat foot, heel and toe walking:  normal   .  Squat and rise   normal    limited by left knee pain.  --STANDING EXAMINATION:    Symmetry of spine/pelvis   unremarkable   .      Range of motion limited in flexion and extension.  Pain noted in the right SI area with extension and right sidebending and a little bit with flexion, but not with rotation.   Standing flexion   is at the right.    Nikolas's sign     is at the right.     Stork test     positive right.   --NEUROLOGICAL:    SENSATION to light touch is intact in bilateral thighs, lower legs and feet.   REFLEXES:  patellar 2+, and achilles 2.  Babinski is negative. No clonus.  MANUAL MOTOR TESTING:  Hip flexion 5/5   Hip abduction 5/5   Hip adduction 5/5   Knee extension 5/5   Knee flexion 5/5   Ankle dorsiflexion 5/5   EHL 5/5   Ankle inversion 5/5   Ankle eversion 5/5  DURAL STRETCH TESTS:  SLR negative.    --PELVIC/HIP JOINTS:                Long Sitting    right short toe walk.    Hip scour   negative   .    Hip Impingement   negative   .  Piriformis   negative   .   Spring testing bilateral SI and lower 3 lumbar segments all comparably tender.  SIJ provocation: Negative pelvic distraction, Joseph, thigh thrust    PELVIC ALIGNMENT right posterior innominate rotation.   --LUMBAR/GLUTEAL  "MUSCLES: Negative.    Procedure note- OMT:  Manual medicine restored normal leg lengths, and pelvic alignment.  Negative long sitting test.  She said that it feels \"way better than it was\".  She has never had this type of treatment before.  Standing flexion and stork test became negative.  Nikolas sign was negative.  Facet loading however brought on pretty significant pain in the midline lumbar back especially with right rotation    ASSESSMENT: Merlyn Ravi is a 59 year old female who presents  today for new patient evaluation of:    Chronic low back pain  Multilevel degenerative disc disease  Multilevel degenerative joint disease most significant at L3-4 and at L5-S1 especially and probably symptomatic at those levels  Pelvic Joint Dysfunction manifesting as a right posterior innominate rotation-encouraging response to OMT.  Neurologically intact      PLAN:  I am recommending that we put a pause to chiropractic and switch her to a pelvic stabilization program in [place].  Self-correction each morning and follow-up with me in 3 months.  Consider RFA workup for L3-4 and L5-S1 depending on how she is feeling.    Advised patient to call or return early if symptoms worsen, or having problems controlling bladder and bowel function or worsening leg weakness.     Please note: Voice recognition software was used in this dictation.  It may therefore contain typographical errors.    Nabil Hawkins MD         "

## 2024-12-12 ENCOUNTER — OFFICE VISIT (OUTPATIENT)
Dept: DERMATOLOGY | Facility: CLINIC | Age: 59
End: 2024-12-12
Payer: COMMERCIAL

## 2024-12-12 DIAGNOSIS — L57.0 ACTINIC KERATOSIS: ICD-10-CM

## 2024-12-12 DIAGNOSIS — L71.9 ACNE ROSACEA: ICD-10-CM

## 2024-12-12 DIAGNOSIS — L65.9 HAIR THINNING: Primary | ICD-10-CM

## 2024-12-12 RX ORDER — OXYMETAZOLINE HYDROCHLORIDE 30 G/1
CREAM TOPICAL
Qty: 30 G | Refills: 4 | Status: SHIPPED | OUTPATIENT
Start: 2024-12-12

## 2024-12-12 RX ORDER — DOXYCYCLINE HYCLATE 20 MG
40 TABLET ORAL DAILY
Qty: 180 TABLET | Refills: 3 | Status: SHIPPED | OUTPATIENT
Start: 2024-12-12

## 2024-12-12 NOTE — LETTER
12/12/2024      Merlyn Ravi  [id number] [facility] [id number]      Dear Colleague,    Thank you for referring your patient, Merlyn Ravi, to the [facility]. Please see a copy of my visit note below.    Merlyn Ravi is a pleasant 59 year old year old female patient here today for recheck rosacea. Doing better with rhofade and doxycycline. She does note some burning to cheeks recently. She does have family history of hair thinning with her mother. She was tried minoxidil for two weeks but then stopped. She notes rough area on forehead, no pain or bleeding.  Patient has no other skin complaints today.  Remainder of the HPI, Meds, PMH, Allergies, FH, and SH was reviewed in chart.   Past Medical History:   Diagnosis Date     Allergies      Breast cancer (H)      Hyperlipidemia 10/14/2014     Major depressive disorder, recurrent episode, mild (H) 05/17/2022     Malignant neoplasm of female breast (H) 08/15/2019    Formatting of this note might be different from the original.  8/2019. Left.  Formatting of this note is different from the original.  Formatting of this note might be different from the original.  8/2019. Left.     Formatting of this note might be different from the original.  6.5.2020- Review and distribute treatment summary-[facility]  Added automatically from request for surgery [id number]        Mixed incontinence 03/02/2023     Monoclonal gammopathy 10/08/2013    Formatting of this note might be different from the original.  Last Assessment & Plan:   Formatting of this note might be different from the original.  Merlyn Ravi  has a history of IgG kappa MGUS. The MGUS was discovered after she had been found to have an elevated gamma globulin fraction when she underwent blood testing prior to donating blood at [facility]. She was found to have a 0.4 gram/dL I     Neuropathy 08/29/2023     Obesity (BMI 35.0-39.9) with comorbidity (H) 01/25/2019     Other  specified hypothyroidism 08/29/2019     PONV (postoperative nausea and vomiting) 07/07/2022     Retinal tear 2014     Rosacea 08/29/2023       Past Surgical History:   Procedure Laterality Date     ARTHROSCOPY KNEE Left 7/14/2022    Procedure: ARTHROSCOPY, LEFT KNEE with meniscal and chondral debridement;  Surgeon: Timo Berman MD;  Location: [facility] OR     BIOPSY BREAST       HYSTERECTOMY, PAP NO LONGER INDICATED       HYSTERECTOMY, MAGY       LASIK BILATERAL  01/01/2005    Dr. Solis      LUMPECTOMY BREAST Left 09/30/2019    Procedure: Re-excision of left breast lumpectomy site;  Surgeon: Kj Salas DO;  Location: [facility] OR     LUMPECTOMY BREAST Left 10/09/2019    Procedure: Left breast re-excision;  Surgeon: Kj Salas DO;  Location: [facility] OR     LUMPECTOMY BREAST WITH SENTINEL NODE, COMBINED Left 09/23/2019    Procedure: LUMPECTOMY, BREAST, WITH SENTINEL LYMPH NODE BIOPSY.;  Surgeon: Kj Salas DO;  Location: [facility] OR     [facility] NONSPECIFIC PROCEDURE      bladder surgery        Family History   Problem Relation Age of Onset     Thyroid Disease Mother      Heart Disease Mother      Heart Disease Father      Hyperlipidemia Father      Hypertension Maternal Grandmother      Breast Cancer Maternal Grandmother      Hypertension Maternal Grandfather      Alzheimer Disease Paternal Grandmother      Diabetes Brother         Type 1     Eczema Brother      Thyroid Disease Brother      Melanoma No family hx of      Glaucoma No family hx of      Macular Degeneration No family hx of        Social History     Socioeconomic History     Marital status:      Spouse name: Not on file     Number of children: Not on file     Years of education: Not on file     Highest education level: Not on file   Occupational History     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 No   Social History Narrative     Not on file     Social Drivers of Health     Financial Resource Strain: Low Risk  (4/3/2024)    Financial Resource Strain      Within the past 12 months, have you or your family members you live with been unable to get utilities (heat, electricity) when it was really needed?: No   Food Insecurity: Low Risk  (4/3/2024)    Food Insecurity      Within the past 12 months, did you worry that your food would run out before you got money to buy more?: No      Within the past 12 months, did the food you bought just not last and you didn t have money to get more?: No   Transportation Needs: Low Risk  (4/3/2024)    Transportation Needs      Within the past 12 months, has lack of transportation kept you from medical appointments, getting your medicines, non-medical meetings or appointments, work, or from getting things that you need?: No   Physical Activity: Inactive (4/3/2024)    Exercise Vital Sign      Days of Exercise per Week: 0 days      Minutes of Exercise per Session: 0 min   Stress: No Stress Concern Present (4/3/2024)    [employer] [place] [employer] - Occupational Stress Questionnaire      Feeling of Stress : Only a little   Social Connections: Unknown (4/3/2024)    Social Connection and Isolation Panel [NHANES]      Frequency of Communication with Friends and Family: Not on file      Frequency of Social Gatherings with Friends and Family: Once a week      Attends Denominational Services: Not on file      Active Member of Clubs or Organizations: Not on file      Attends Club or Organization Meetings: Not on file      Marital Status: Not on file   Interpersonal Safety: Low Risk  (10/11/2024)     Interpersonal Safety      Do you feel physically and emotionally safe where you currently live?: Yes      Within the past 12 months, have you been hit, slapped, kicked or otherwise physically hurt by someone?: No      Within the past 12 months, have you been humiliated or emotionally abused in other ways by your partner or ex-partner?: No   Housing Stability: Low Risk  (4/3/2024)    Housing Stability      Do you have housing? : Yes      Are you worried about losing your housing?: No       Outpatient Encounter Medications as of 12/12/2024   Medication Sig Dispense Refill     doxycycline hyclate (PERIOSTAT) 20 MG tablet Take 2 tablets (40 mg) by mouth daily. 180 tablet 3     oxymetazoline HCl (RHOFADE) 1 % cream Apply a pea-sized amount once daily to the entire face or affected area avoiding the eyes and lips. Wash hands immediately after applying. 30 g 4     atorvastatin (LIPITOR) 10 MG tablet TAKE 1 TABLET(10 MG) BY MOUTH DAILY 90 tablet 2     calcium 500-125 MG-UNIT TABS Take 2 tablets by mouth 2 times daily       methimazole (TAPAZOLE) 5 MG tablet Take 0.5 tablets (2.5 mg) by mouth Every Mon, Wed, Fri Morning 20 tablet 3     tamoxifen (NOLVADEX) 20 MG tablet Take 1 tablet (20 mg) by mouth daily 90 tablet 3     tolterodine ER (DETROL LA) 4 MG 24 hr capsule Take 1 capsule (4 mg) by mouth daily 90 capsule 3     [DISCONTINUED] doxycycline hyclate (PERIOSTAT) 20 MG tablet Take 2 tablets (40 mg) by mouth daily 180 tablet 3     [DISCONTINUED] oxymetazoline HCl (RHOFADE) 1 % cream Apply a pea-sized amount once daily to the entire face or affected area avoiding the eyes and lips. Wash hands immediately after applying. 30 g 4     Facility-Administered Encounter Medications as of 12/12/2024   Medication Dose Route Frequency Provider Last Rate Last Admin     BUPivacaine (MARCAINE) 0.25 % injection 4 mL  4 mL   Timo Berman MD   4 mL at 03/27/24 1135     triamcinolone (KENALOG-40) injection 80 mg  80 mg   Timo Berman  MD Les   80 mg at 03/27/24 1135             O:   NAD, WDWN, Alert & Oriented, Mood & Affect wnl, Vitals stable   Here today alone   There were no vitals taken for this visit.   General appearance normal   Vitals stable   Alert, oriented and in no acute distress      Non scarring hair loss on crown of scalp   Gritty papule on left forehead    No inflammatory papules, redness to cheek,nose, forehead and chin       Eyes: Conjunctivae/lids:Normal     ENT: Lips: normal    MSK:Normal    Pulm: Breathing Normal    Neuro/Psych: Orientation:Alert and Orientedx3 ; Mood/Affect:normal   A/P:  Acne rosacea   Acne is controlled with doxycycline, refilled.   Doing better with rhofade.   Recommend cerave renewing moisturizer.   2. 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 Can try oral medications.   3. Actinic keratosis on left forehead x 1  LN2:  Treated with LN2 for 5s for 1-2 cycles. Warned risks of blistering, pain, pigment change, scarring, and incomplete resolution.  Advised patient to return if lesions do not completely resolve.  Wound care sheet given.      Again, thank you for allowing me to participate in the care of your patient.        Sincerely,        Lolis Henry PA-C

## 2024-12-13 NOTE — PROGRESS NOTES
Merlyn Ravi is a pleasant 59 year old year old female patient here today for recheck rosacea. Doing better with rhofade and doxycycline. She does note some burning to cheeks recently. She does have family history of hair thinning with her mother. She was tried minoxidil for two weeks but then stopped. She notes rough area on forehead, no pain or bleeding.  Patient has no other skin complaints today.  Remainder of the HPI, Meds, PMH, Allergies, FH, and SH was reviewed in chart.   Past Medical History:   Diagnosis Date    Allergies     Breast cancer (H)     Hyperlipidemia 10/14/2014    Major depressive disorder, recurrent episode, mild (H) 05/17/2022    Malignant neoplasm of female breast (H) 08/15/2019    Formatting of this note might be different from the original.  8/2019. Left.  Formatting of this note is different from the original.  Formatting of this note might be different from the original.  8/2019. Left.     Formatting of this note might be different from the original.  6.5.2020- Review and distribute treatment summary-[facility]  Added automatically from request for surgery [id number]       Mixed incontinence 03/02/2023    Monoclonal gammopathy 10/08/2013    Formatting of this note might be different from the original.  Last Assessment & Plan:   Formatting of this note might be different from the original.  Merlyn Ravi  has a history of IgG kappa MGUS. The MGUS was discovered after she had been found to have an elevated gamma globulin fraction when she underwent blood testing prior to donating blood at [facility]. She was found to have a 0.4 gram/dL I    Neuropathy 08/29/2023    Obesity (BMI 35.0-39.9) with comorbidity (H) 01/25/2019    Other specified hypothyroidism 08/29/2019    PONV (postoperative nausea and vomiting) 07/07/2022    Retinal tear 2014    Rosacea 08/29/2023       Past Surgical History:   Procedure Laterality Date    ARTHROSCOPY KNEE Left 7/14/2022    Procedure: ARTHROSCOPY, LEFT KNEE  with meniscal and chondral debridement;  Surgeon: Timo Berman MD;  Location: [facility] OR    BIOPSY BREAST      HYSTERECTOMY, PAP NO LONGER INDICATED      HYSTERECTOMY, MAGY      LASIK BILATERAL  01/01/2005    Dr. Solis     LUMPECTOMY BREAST Left 09/30/2019    Procedure: Re-excision of left breast lumpectomy site;  Surgeon: Kj Salas DO;  Location: [facility] OR    LUMPECTOMY BREAST Left 10/09/2019    Procedure: Left breast re-excision;  Surgeon: Kj Salas DO;  Location: [facility] OR    LUMPECTOMY BREAST WITH SENTINEL NODE, COMBINED Left 09/23/2019    Procedure: LUMPECTOMY, BREAST, WITH SENTINEL LYMPH NODE BIOPSY.;  Surgeon: Kj Salas DO;  Location: [facility] OR    [facility] NONSPECIFIC PROCEDURE      bladder surgery        Family History   Problem Relation Age of Onset    Thyroid Disease Mother     Heart Disease Mother     Heart Disease Father     Hyperlipidemia Father     Hypertension Maternal Grandmother     Breast Cancer Maternal Grandmother     Hypertension Maternal Grandfather     Alzheimer Disease Paternal Grandmother     Diabetes Brother         Type 1    Eczema Brother     Thyroid Disease Brother     Melanoma No family hx of     Glaucoma No family hx of     Macular Degeneration No family hx of        Social History     Socioeconomic History    Marital status:      Spouse name: Not on file    Number of children: Not on file    Years of education: Not on file    Highest education level: Not on file   Occupational History    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 No   Social History  Narrative    Not on file     Social Drivers of Health     Financial Resource Strain: Low Risk  (4/3/2024)    Financial Resource Strain     Within the past 12 months, have you or your family members you live with been unable to get utilities (heat, electricity) when it was really needed?: No   Food Insecurity: Low Risk  (4/3/2024)    Food Insecurity     Within the past 12 months, did you worry that your food would run out before you got money to buy more?: No     Within the past 12 months, did the food you bought just not last and you didn t have money to get more?: No   Transportation Needs: Low Risk  (4/3/2024)    Transportation Needs     Within the past 12 months, has lack of transportation kept you from medical appointments, getting your medicines, non-medical meetings or appointments, work, or from getting things that you need?: No   Physical Activity: Inactive (4/3/2024)    Exercise Vital Sign     Days of Exercise per Week: 0 days     Minutes of Exercise per Session: 0 min   Stress: No Stress Concern Present (4/3/2024)    [employer] [place] [employer]ational [employer] - Occupational Stress Questionnaire     Feeling of Stress : Only a little   Social Connections: Unknown (4/3/2024)    Social Connection and Isolation Panel [NHANES]     Frequency of Communication with Friends and Family: Not on file     Frequency of Social Gatherings with Friends and Family: Once a week     Attends Hinduism Services: Not on file     Active Member of Clubs or Organizations: Not on file     Attends Club or Organization Meetings: Not on file     Marital Status: Not on file   Interpersonal Safety: Low Risk  (10/11/2024)    Interpersonal Safety     Do you feel physically and emotionally safe where you currently live?: Yes     Within the past 12 months, have you been hit, slapped, kicked or otherwise physically hurt by someone?: No     Within the past 12 months, have you been humiliated or emotionally abused in other ways by your partner or  ex-partner?: No   Housing Stability: Low Risk  (4/3/2024)    Housing Stability     Do you have housing? : Yes     Are you worried about losing your housing?: No       Outpatient Encounter Medications as of 12/12/2024   Medication Sig Dispense Refill    doxycycline hyclate (PERIOSTAT) 20 MG tablet Take 2 tablets (40 mg) by mouth daily. 180 tablet 3    oxymetazoline HCl (RHOFADE) 1 % cream Apply a pea-sized amount once daily to the entire face or affected area avoiding the eyes and lips. Wash hands immediately after applying. 30 g 4    atorvastatin (LIPITOR) 10 MG tablet TAKE 1 TABLET(10 MG) BY MOUTH DAILY 90 tablet 2    calcium 500-125 MG-UNIT TABS Take 2 tablets by mouth 2 times daily      methimazole (TAPAZOLE) 5 MG tablet Take 0.5 tablets (2.5 mg) by mouth Every Mon, Wed, Fri Morning 20 tablet 3    tamoxifen (NOLVADEX) 20 MG tablet Take 1 tablet (20 mg) by mouth daily 90 tablet 3    tolterodine ER (DETROL LA) 4 MG 24 hr capsule Take 1 capsule (4 mg) by mouth daily 90 capsule 3    [DISCONTINUED] doxycycline hyclate (PERIOSTAT) 20 MG tablet Take 2 tablets (40 mg) by mouth daily 180 tablet 3    [DISCONTINUED] oxymetazoline HCl (RHOFADE) 1 % cream Apply a pea-sized amount once daily to the entire face or affected area avoiding the eyes and lips. Wash hands immediately after applying. 30 g 4     Facility-Administered Encounter Medications as of 12/12/2024   Medication Dose Route Frequency Provider Last Rate Last Admin    BUPivacaine (MARCAINE) 0.25 % injection 4 mL  4 mL   Timo Berman MD   4 mL at 03/27/24 1135    triamcinolone (KENALOG-40) injection 80 mg  80 mg   Timo Berman MD   80 mg at 03/27/24 1135             O:   NAD, WDWN, Alert & Oriented, Mood & Affect wnl, Vitals stable   Here today alone   There were no vitals taken for this visit.   General appearance normal   Vitals stable   Alert, oriented and in no acute distress      Non scarring hair loss on crown of scalp   Gritty papule on left  forehead    No inflammatory papules, redness to cheek,nose, forehead and chin       Eyes: Conjunctivae/lids:Normal     ENT: Lips: normal    MSK:Normal    Pulm: Breathing Normal    Neuro/Psych: Orientation:Alert and Orientedx3 ; Mood/Affect:normal   A/P:  Acne rosacea   Acne is controlled with doxycycline, refilled.   Doing better with rhofade.   Recommend cerave renewing moisturizer.   2. 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 Can try oral medications.   3. Actinic keratosis on left forehead x 1  LN2:  Treated with LN2 for 5s for 1-2 cycles. Warned risks of blistering, pain, pigment change, scarring, and incomplete resolution.  Advised patient to return if lesions do not completely resolve.  Wound care sheet given.

## 2024-12-19 ENCOUNTER — OFFICE VISIT (OUTPATIENT)
Dept: NEUROSURGERY | Facility: CLINIC | Age: 59
End: 2024-12-19
Attending: FAMILY MEDICINE
Payer: COMMERCIAL

## 2024-12-19 ENCOUNTER — PRE VISIT (OUTPATIENT)
Dept: NEUROSURGERY | Facility: CLINIC | Age: 59
End: 2024-12-19

## 2024-12-19 VITALS
DIASTOLIC BLOOD PRESSURE: 88 MMHG | WEIGHT: 223 LBS | HEART RATE: 71 BPM | BODY MASS INDEX: 35.84 KG/M2 | SYSTOLIC BLOOD PRESSURE: 135 MMHG | HEIGHT: 66 IN

## 2024-12-19 DIAGNOSIS — M51.360 DEGENERATION OF INTERVERTEBRAL DISC OF LUMBAR REGION WITH DISCOGENIC BACK PAIN: ICD-10-CM

## 2024-12-19 DIAGNOSIS — M51.369 BULGING LUMBAR DISC: ICD-10-CM

## 2024-12-19 DIAGNOSIS — M47.896 OTHER SPONDYLOSIS, LUMBAR REGION: ICD-10-CM

## 2024-12-19 DIAGNOSIS — M79.18 MYOFASCIAL PAIN: ICD-10-CM

## 2024-12-19 DIAGNOSIS — M99.05 SOMATIC DYSFUNCTION OF PELVIS REGION: Primary | ICD-10-CM

## 2024-12-19 ASSESSMENT — PAIN SCALES - GENERAL: PAINLEVEL_OUTOF10: MILD PAIN (3)

## 2024-12-19 NOTE — PATIENT INSTRUCTIONS
"Kristina, I am glad you came in so we could identify and begin treatment for your Pelvic Joint Dysfunction which I am quite certain is the source of your recent flare of pain.  You may have a response to today's adjustment with some changes in the upper back and a little more pain there for a while but I agree with your goal to get off pain medication for this..  See the assessment and plan below for further details of our discussion today and see the instructions for the self-correction I want you to do each morning for the rest of your life.  I am looking forward to seeing you back in 3 months and we can talk about further treatment depending on how you are feeling.  I hope you have a happy holiday season    ASSESSMENT: Merlyn Ravi is a 59 year old female who presents  today for new patient evaluation of:    Chronic low back pain  Multilevel degenerative disc disease  Multilevel degenerative joint disease most significant at L3-4 and at L5-S1 especially and probably symptomatic at those levels  Pelvic Joint Dysfunction manifesting as a right posterior innominate rotation-encouraging response to OMT.  Neurologically intact      PLAN:  I am recommending that we put a pause to chiropractic and switch her to a pelvic stabilization program in [place].  Self-correction each morning and follow-up with me in 3 months.  Consider RFA workup for L3-4 and L5-S1 depending on how she is feeling.        PELVIC JOINT SELF CORRECTION EXERCISES      It is best to do this first thing in the morning, and can be repeated once or twice during the day.    \"SHOTGUN\" TECHNIQUE:  This loosens up the front and back of the pelvis.  Do this before the Broomstick exercise.  Use on object such as a rectangular laundry basket.  Lie on your back with your knees bent, feet together and flat on the floor.    Spread your knees approximately 12-24 inches around the outside of an upright laundry basket.  Squeeze your knees together, breathing as you " do this.    Concentrate on keeping your buttocks relaxed and on the ground.    A brief discomfort in the front of the pelvis and even a popping sound is normal.  Hold the squeeze for 3-5 seconds.    Relax for 3-5 seconds.    Repeat 2 more times.    Now reverse your knee position to the inside of the upside down laundry basket while still lying with your knees bent up, feet on the ground.  Pull your knees apart, breathing easy as you do this.  Hold the squeeze for 3-5 seconds.    Relax for 3-5 seconds.    Repeat 2 more times.    BROOMSTICK EXERCISE:  Lie on your back with your knees bent.  Slide a broomstick or similar object above one knee, and below the opposite knee.  Firmly hold the stick with your hands will bringing your knees closed to your belly, preferably high enough that your back can rest flat on the ground.  Still holding the stick, scissors your legs against the stick, breathing as you do this.    (Push down to your foot with leg on top of the broomstick, and lift up to your chin with the leg below the broomstick).  Hold the squeeze for 3-5 seconds.    Relax for 3-5 seconds.    Repeat 2 more times.  Switch the position of the broomstick above the other knee, and below the first knee.  Repeat the exercise as above in this new position.

## 2024-12-19 NOTE — LETTER
"12/19/2024      Merlyn Ravi  [address] [facility] [address]      Dear Colleague,    Thank you for referring your patient, Merlyn Ravi, to the [facility]UROS[facility]ERY [facility] [place]. Please see a copy of my visit note below.        SUBJECTIVE:  HPI:  Merlyn Ravi  Is a 59 year old female who presents for new patient evaluation of chronic low back pain upon referral from Dr. Memo Garcia, whose 10/11/2024 office note records:  \"Patient is a 59 yr old female here for back pain. She has had back issues for over twenty years, she reports the back pain is worsening and she reports that she has been experiencing some numbness and tingling. It is affecting her every day life as she is in pain all the time; she had an MRI done about ten years ago and then it was reported that she had severe foraminal stenosis in L4-L5.   Back Pain (Continuing back pain.  States last MRI was in 2014.  Pain is starting to radiated down her legs.  Right hip hurts worse than the left.  Has tried changing her bed, will elevate her feet at night, has tried to sit on a wobble chair.  Aleve is not helping any longer.  Back pain pills are not helping.  Heat will help it to feel better.)\"  Pain is in bilateral low back radiating to bilateral hips thighs knees and foot.    History 12/19/2024:    Kristina has had 20 years of back pain.  She has had some chiropractic care recently which helped \"a little bit.\"  Has never had PT.  He does not HEP from her chiropractor.  She has seen her chiropractor a couple weeks.  She describes what sounds like leg pulling as well as SI joint adjustments when she sees him and these are HVLA.  In the past couple of months she has had a flareup of pain and she points to bilateral SI areas radiating sometimes down the lateral thighs about shelter down but never passed that point but there is no specific numbness, tingling, weakness, change in bowel or bladder habits, or saddle " "anesthesia.  She has a sore left knee having had recent surgery for \"double\" left meniscus tear and sometimes that feels a bit weak.  No specific myotomal weakness is described.  Symptoms recently flared up when she did a lot of bending stooping and twisting helping her daughter move into her kitchen.    SYMPTOMS WORSENED WITH \"everything\", prolonged standing    SYMPTOMS IMPROVED WITH heat and pain medication (Tylenol, some other over-the-counter back and spine med)    Pain score and diagram reviewed.  See questionnaire.      ROS: .  Otherwise negative for bowel/bladder dysfunction, balance changes, headache, leg pain/numbness/weakness, fevers, chills, night sweats, unexplained weight loss;  otherwise unremarkable.   See the patient's intake questionnaire from today for details.    Treatment to Date: No injections and no surgery for the spine    MEDICATIONS:  Reviewed.    ALLERGIES:  Reviewed.     PAST MEDICAL/SURGICAL HISTORY:   Pertinent for peripheral neuropathy, depression, left breast cancer--lumpectomy and axillary node dissection September 2019-tamoxifen for 5 years and no recurrence, hysterectomy, MGUS, obesity, mixed incontinence    SOCIAL HX: She is a  for school district doing sedentary work.  She and her spouse have 2 adopted children.  Sports hobbies and activities: Walking in the summer otherwise sedentary.      OBJECTIVE:    IMAGING: Images and report reviewed    MR LUMBAR SPINE W/O CONTRAST: 11/12/2024    COMPARISON: 11/19/2014     L3-L4: Asymmetric right disc bulge with osteophytic ridging. Moderate disc height loss with signal change. Moderate facet arthropathy. Mild spinal canal stenosis with lateral recess narrowing. Mild right foraminal stenosis. No left foraminal stenosis.     L4-L5: Disc bulge. Severe disc height loss with signal change. Mild facet arthropathy. Right greater than left lateral recess narrowing. No central spinal canal stenosis. No foraminal stenosis.   "   L5-S1: Left subarticular protrusion. Mild disc height loss with signal change. Moderate left and mild right facet arthropathy. No spinal canal stenosis. Mild left foraminal stenosis. No right foraminal stenosis.                                                                      IMPRESSION:  1.  Degenerative changes throughout the lumbar spine with Modic endplate changes from L3-S1.  2.  Mild spinal canal stenosis at L3-L4. Multilevel lateral recess narrowing, most pronounced at L4-L5 where there is compression of the descending right L5 nerve root.  3.  Mild multilevel foraminal stenosis.    EXAMINATION:    --CONSTITUTIONAL:   No acute distress.  The patient is well nourished and well groomed.  Positive right greater than left Nikolas finger sign.  Transitions and moves fluidly.  Elevated BMI.  --SKIN:  Skin over the face, bilateral lower extremities, and posterior torso is clean, dry, intact without rashes.    --GAIT:  is non-antalgic. Flat foot, heel and toe walking:  normal   .  Squat and rise   normal    limited by left knee pain.  --STANDING EXAMINATION:    Symmetry of spine/pelvis   unremarkable   .      Range of motion limited in flexion and extension.  Pain noted in the right SI area with extension and right sidebending and a little bit with flexion, but not with rotation.   Standing flexion   is at the right.    Nikolas's sign     is at the right.     Stork test     positive right.   --NEUROLOGICAL:    SENSATION to light touch is intact in bilateral thighs, lower legs and feet.   REFLEXES:  patellar 2+, and achilles 2.  Babinski is negative. No clonus.  MANUAL MOTOR TESTING:  Hip flexion 5/5   Hip abduction 5/5   Hip adduction 5/5   Knee extension 5/5   Knee flexion 5/5   Ankle dorsiflexion 5/5   EHL 5/5   Ankle inversion 5/5   Ankle eversion 5/5  DURAL STRETCH TESTS:  SLR negative.    --PELVIC/HIP JOINTS:                Long Sitting    right short toe walk.    Hip scour   negative   .    Hip Impingement   " negative   .  Piriformis   negative   .   Spring testing bilateral SI and lower 3 lumbar segments all comparably tender.  SIJ provocation: Negative pelvic distraction, Joseph, thigh thrust    PELVIC ALIGNMENT right posterior innominate rotation.   --LUMBAR/GLUTEAL MUSCLES: Negative.    Procedure note- OMT:  Manual medicine restored normal leg lengths, and pelvic alignment.  Negative long sitting test.  She said that it feels \"way better than it was\".  She has never had this type of treatment before.  Standing flexion and stork test became negative.  Nikolas sign was negative.  Facet loading however brought on pretty significant pain in the midline lumbar back especially with right rotation    ASSESSMENT: Merlyn Ravi is a 59 year old female who presents  today for new patient evaluation of:    Chronic low back pain  Multilevel degenerative disc disease  Multilevel degenerative joint disease most significant at L3-4 and at L5-S1 especially and probably symptomatic at those levels  Pelvic Joint Dysfunction manifesting as a right posterior innominate rotation-encouraging response to OMT.  Neurologically intact      PLAN:  I am recommending that we put a pause to chiropractic and switch her to a pelvic stabilization program in [place].  Self-correction each morning and follow-up with me in 3 months.  Consider RFA workup for L3-4 and L5-S1 depending on how she is feeling.    Advised patient to call or return early if symptoms worsen, or having problems controlling bladder and bowel function or worsening leg weakness.     Please note: Voice recognition software was used in this dictation.  It may therefore contain typographical errors.    Nabil Hawkins MD             Again, thank you for allowing me to participate in the care of your patient.        Sincerely,        Nabil Hawkins MD  "

## 2024-12-19 NOTE — NURSING NOTE
"Reason For Visit:   Chief Complaint   Patient presents with    Consult         Occupation: admin assist  Currently working? Yes.  Work status?  Full time.    Sports: n  Activities: n             /88   Pulse 71   Ht 1.676 m (5' 6\")   Wt 101.2 kg (223 lb)   BMI 35.99 kg/m        Allergies   Allergen Reactions    Demerol      Sedation and vomiting    Meperidine     Seasonal Allergies     Sulfa Antibiotics Hives     unknown reaction       Current Outpatient Medications   Medication Sig Dispense Refill    atorvastatin (LIPITOR) 10 MG tablet TAKE 1 TABLET(10 MG) BY MOUTH DAILY 90 tablet 2    calcium 500-125 MG-UNIT TABS Take 2 tablets by mouth 2 times daily      doxycycline hyclate (PERIOSTAT) 20 MG tablet Take 2 tablets (40 mg) by mouth daily. 180 tablet 3    methimazole (TAPAZOLE) 5 MG tablet Take 0.5 tablets (2.5 mg) by mouth Every Mon, Wed, Fri Morning 20 tablet 3    oxymetazoline HCl (RHOFADE) 1 % cream Apply a pea-sized amount once daily to the entire face or affected area avoiding the eyes and lips. Wash hands immediately after applying. 30 g 4    tolterodine ER (DETROL LA) 4 MG 24 hr capsule Take 1 capsule (4 mg) by mouth daily 90 capsule 3     Current Facility-Administered Medications   Medication Dose Route Frequency Provider Last Rate Last Admin    BUPivacaine (MARCAINE) 0.25 % injection 4 mL  4 mL   Timo Berman MD   4 mL at 03/27/24 1135    triamcinolone (KENALOG-40) injection 80 mg  80 mg   Timo Berman MD   80 mg at 03/27/24 1135         Darla Severin-Brown, LPN   "

## 2024-12-23 ENCOUNTER — TELEPHONE (OUTPATIENT)
Dept: SURGERY | Facility: CLINIC | Age: 59
End: 2024-12-23
Payer: COMMERCIAL

## 2024-12-23 ENCOUNTER — ANCILLARY PROCEDURE (OUTPATIENT)
Dept: GENERAL RADIOLOGY | Facility: CLINIC | Age: 59
End: 2024-12-23
Attending: STUDENT IN AN ORGANIZED HEALTH CARE EDUCATION/TRAINING PROGRAM
Payer: COMMERCIAL

## 2024-12-23 ENCOUNTER — OFFICE VISIT (OUTPATIENT)
Dept: ORTHOPEDICS | Facility: CLINIC | Age: 59
End: 2024-12-23
Payer: COMMERCIAL

## 2024-12-23 VITALS — HEIGHT: 66 IN | BODY MASS INDEX: 35.84 KG/M2 | WEIGHT: 223 LBS

## 2024-12-23 DIAGNOSIS — S89.92XA LEFT KNEE INJURY, INITIAL ENCOUNTER: ICD-10-CM

## 2024-12-23 DIAGNOSIS — S89.92XA LEFT KNEE INJURY, INITIAL ENCOUNTER: Primary | ICD-10-CM

## 2024-12-23 PROCEDURE — 99214 OFFICE O/P EST MOD 30 MIN: CPT | Performed by: STUDENT IN AN ORGANIZED HEALTH CARE EDUCATION/TRAINING PROGRAM

## 2024-12-23 RX ORDER — DICLOFENAC SODIUM 75 MG/1
75 TABLET, DELAYED RELEASE ORAL 2 TIMES DAILY WITH MEALS
Qty: 60 TABLET | Refills: 1 | Status: SHIPPED | OUTPATIENT
Start: 2024-12-23

## 2024-12-23 NOTE — PROGRESS NOTES
ASSESSMENT & PLAN    Kristina was seen today for pain.    Diagnoses and all orders for this visit:    Left knee injury, initial encounter  -     XR Knee Standing AP Bilat [place] Bilat Lat Left; Future  -     diclofenac (VOLTAREN) 75 MG EC tablet; Take 1 tablet (75 mg) by mouth 2 times daily (with meals).      This issue is acute on chronic and NA.    # Left knee pain: Merlyn Ravi was seen today for left knee pain. She has a history of L lateral meniscectomy on 7/14/22. Symptoms this time began yesterday where patient had a pop sensation and immediate pain/ swelling while walking. On examination there are positive findings of joint line tenderness (lateral>medial) and surrounding tenderness of the hamstring tendons and IT band. Imaging findings showed moderate-severe osteoarthritis (worst in the lateral compartment) no obvious acute insuffiencey fracture on xray.     Likely cause of patient's condition due to osteoarthritis flare with possible degenerative tear of what is left of meniscus. Other ligamentous testing today is unremarkable, though patient is somewhat guarded on exam.  Counseled patient on nature of condition and treatment options.    - Activity: weight bearing as tolerated. Compression sleeve given. Discussed PT referral for trial of  brace, but patient would like to wait on this to see how things go. Offered crutches, but patient prefers cane  - Ice, heat, massage as needed  - Medications:      - diclofenac 75mg twice daily for 2-4 weeks.      - Okay to take tylenol 1000mg three times daily as needed in addition to these.    Follow-up: Has appointment scheduled with Dr. Berman on Friday (4 days), sooner if worsening      Felix Merino MD  [facility] [place]    -----  Chief Complaint   Patient presents with    Left Knee - Pain       SUBJECTIVE  Merlyn Ravi is a/an 59 year old female who is seen as a WALK IN patient for evaluation of left knee.     The  "patient is seen by themselves.    Onset: 1 day(s) ago. Patient describes injury as walking around her house, her left knee gave out and she heard a pop. She reports that she had to catch herself from falling. She reports difficulty weightbearing afterwards. She is ambulating with a cane.  Location of Pain: left anterior knee, diffuse; posterior knee  Worsened by: knee flexion, walking, stairs, laying in bed/rolling over  Better with: unsure  Treatments tried: corticosteroid injection (most recent date: 3/27/24) that provided  9 month(s) of relief; use of cane  Associated symptoms: swelling, instability    Orthopedic/Surgical history: YES - s/p left knee lateral meniscectomy 7/14/22 with Dr. Berman  Social History/Occupation:  - working from home until 1/2/25      REVIEW OF SYSTEMS:  Review of Systems    OBJECTIVE:  Ht 1.676 m (5' 6\")   Wt 101.2 kg (223 lb)   BMI 35.99 kg/m     General: healthy, alert and in no distress  Skin: no suspicious lesions or rash.  CV: distal perfusion intact   Resp: normal respiratory effort without conversational dyspnea   Psych: normal mood and affect  Gait: antalgic, using cane  Neuro: Normal light sensory exam of left lower extremity, though has some numb/tingling in her toes    LEFT KNEE  Inspection:    Mild-moderate soft tissue swelling, no bruising  Palpation:    Tender about the lateral joint line, medial joint line, and distal IT band. Remainder of bony and ligamentous landmarks are nontender.    Mild effusion is present    Patellofemoral crepitus is Absent  Range of Motion:     00 extension to 900 flexion, limited due to pain  Strength:    5-/5 flexion/ extension due to pain    Extensor mechanism intact  Special Tests:    Positive: Michele's    Negative: MCL/valgus stress (0 & 30 deg), LCL/varus stress (0 & 30 deg), Lachman's       RADIOLOGY:  Final results and radiologist's interpretation, available in the [facility] health record.  Images were reviewed with " the patient in the office today.  My personal interpretation of the performed imaging:   - moderate-severe osteoarthritis (worst in the lateral compartment) no obvious acute insuffiencey fracture on xray.

## 2024-12-23 NOTE — TELEPHONE ENCOUNTER
RN called and spoke to patient. On Saturday patient intermittently felt like it was giving way then it was fine. Sunday evening she heard a pop and would have fallen to the floor if she didn't have something to hold herself up. Painful with bending, straightening, putting weight on it. Writer advised that as this is a new injury it will need to be assessed to determine what is going on and scheduled patient with Dr. Berman next available which is Friday. Patient wanting to be seen same day. Writer advised provider's schedule is full today and provided information for sports med walk in at the [facility].    ERIC Santiago, RN, PHN 12/23/2024 8:33 AM

## 2024-12-23 NOTE — PATIENT INSTRUCTIONS
# Left knee pain: Merlyn Ravi was seen today for left knee pain. She has a history of L lateral meniscectomy on 7/14/22. Symptoms this time began yesterday where patient had a pop sensation and immediate pain/ swelling while walking. On examination there are positive findings of joint line tenderness (lateral>medial) and surrounding tenderness of the hamstring tendons and IT band. Imaging findings showed moderate-severe osteoarthritis (worst in the lateral compartment) no obvious acute insuffiencey fracture on xray.     Likely cause of patient's condition due to osteoarthritis flare with possible degenerative tear of what is left of meniscus. Other ligamentous testing today is unremarkable, though patient is somewhat guarded on exam.  Counseled patient on nature of condition and treatment options.    - Activity: weight bearing as tolerated. Compression sleeve given. Discussed PT referral for trial of  brace, but patient would like to wait on this to see how things go  - Ice, heat, massage as needed  - Medications:      - diclofenac 75mg twice daily for 2-4 weeks.      - Okay to take tylenol 1000mg three times daily as needed in addition to these.    Follow-up: Has appointment scheduled with Dr. Berman on Friday (4 days), sooner if worsening    Please call [phone] and ask for my team if you have any questions or concerns.

## 2024-12-23 NOTE — TELEPHONE ENCOUNTER
Patient Returning Call    Reason for call:  patient was walking last night and all of a sudden her knee popped and her knee gave out and red, patient has her knee surgery with provider in 2022. No none injuries at this time, requesting callback     Information relayed to patient:  te sent to clinic     Patient has additional questions:  No      [facility] or would you prefer to receive a phone call?:   Patient would prefer a phone call   Okay to leave a detailed message?: Yes at Cell number on file:    Telephone Information:   Mobile [phone]

## 2024-12-23 NOTE — LETTER
12/23/2024      Merlyn Ravi  [address] [facility] [address]      Dear Colleague,    Thank you for referring your patient, Merlyn Ravi, to the [facility] [place]. Please see a copy of my visit note below.    ASSESSMENT & PLAN    Kristina was seen today for pain.    Diagnoses and all orders for this visit:    Left knee injury, initial encounter  -     XR Knee Standing AP Bilat [place] Bilat Lat Left; Future  -     diclofenac (VOLTAREN) 75 MG EC tablet; Take 1 tablet (75 mg) by mouth 2 times daily (with meals).      This issue is acute on chronic and NA.    # Left knee pain: Merlyn Ravi was seen today for left knee pain. She has a history of L lateral meniscectomy on 7/14/22. Symptoms this time began yesterday where patient had a pop sensation and immediate pain/ swelling while walking. On examination there are positive findings of joint line tenderness (lateral>medial) and surrounding tenderness of the hamstring tendons and IT band. Imaging findings showed moderate-severe osteoarthritis (worst in the lateral compartment) no obvious acute insuffiencey fracture on xray.     Likely cause of patient's condition due to osteoarthritis flare with possible degenerative tear of what is left of meniscus. Other ligamentous testing today is unremarkable, though patient is somewhat guarded on exam.  Counseled patient on nature of condition and treatment options.    - Activity: weight bearing as tolerated. Compression sleeve given. Discussed PT referral for trial of  brace, but patient would like to wait on this to see how things go. Offered crutches, but patient prefers cane  - Ice, heat, massage as needed  - Medications:      - diclofenac 75mg twice daily for 2-4 weeks.      - Okay to take tylenol 1000mg three times daily as needed in addition to these.    Follow-up: Has appointment scheduled with Dr. Berman on Friday (4 days), sooner if worsening      Felix  "MD Wilber  [facility] [place]    -----  Chief Complaint   Patient presents with     Left Knee - Pain       SUBJECTIVE  Merlyn Ravi is a/an 59 year old female who is seen as a WALK IN patient for evaluation of left knee.     The patient is seen by themselves.    Onset: 1 day(s) ago. Patient describes injury as walking around her house, her left knee gave out and she heard a pop. She reports that she had to catch herself from falling. She reports difficulty weightbearing afterwards. She is ambulating with a cane.  Location of Pain: left anterior knee, diffuse; posterior knee  Worsened by: knee flexion, walking, stairs, laying in bed/rolling over  Better with: unsure  Treatments tried: corticosteroid injection (most recent date: 3/27/24) that provided  9 month(s) of relief; use of cane  Associated symptoms: swelling, instability    Orthopedic/Surgical history: YES - s/p left knee lateral meniscectomy 7/14/22 with Dr. Berman  Social History/Occupation:  - working from home until 1/2/25      REVIEW OF SYSTEMS:  Review of Systems    OBJECTIVE:  Ht 1.676 m (5' 6\")   Wt 101.2 kg (223 lb)   BMI 35.99 kg/m     General: healthy, alert and in no distress  Skin: no suspicious lesions or rash.  CV: distal perfusion intact   Resp: normal respiratory effort without conversational dyspnea   Psych: normal mood and affect  Gait: antalgic, using cane  Neuro: Normal light sensory exam of left lower extremity, though has some numb/tingling in her toes    LEFT KNEE  Inspection:    Mild-moderate soft tissue swelling, no bruising  Palpation:    Tender about the lateral joint line, medial joint line, and distal IT band. Remainder of bony and ligamentous landmarks are nontender.    Mild effusion is present    Patellofemoral crepitus is Absent  Range of Motion:     00 extension to 900 flexion, limited due to pain  Strength:    5-/5 flexion/ extension due to pain    Extensor " mechanism intact  Special Tests:    Positive: Michele's    Negative: MCL/valgus stress (0 & 30 deg), LCL/varus stress (0 & 30 deg), Lachman's       RADIOLOGY:  Final results and radiologist's interpretation, available in the [facility] health record.  Images were reviewed with the patient in the office today.  My personal interpretation of the performed imaging:   - moderate-severe osteoarthritis (worst in the lateral compartment) no obvious acute insuffiencey fracture on xray.       Again, thank you for allowing me to participate in the care of your patient.        Sincerely,        Felix Merino MD    Electronically signed

## 2025-01-05 NOTE — TELEPHONE ENCOUNTER
Requested Prescriptions   Pending Prescriptions Disp Refills    tolterodine ER (DETROL LA) 4 MG 24 hr capsule [Pharmacy Med Name: TOLTERODINE TART ER 4MG CAPSULES] 90 capsule 1     Sig: TAKE 1 CAPSULE(4 MG) BY MOUTH DAILY       Muscarinic Antagonists (Urinary Incontinence Agents) Failed - 12/29/2023  1:49 AM        Failed - Recent (12 mo) or future (30 days) visit within the authorizing provider's specialty     The patient must have completed an in-person or virtual visit within the past 12 months or has a future visit scheduled within the next 90 days with the authorizing provider s specialty.  Urgent care and e-visits do not quality as an office visit for this protocol.          Passed - Patient does not have a diagnosis of glaucoma on the problem list     If glaucoma diagnosis is new, refer refill to physician.          Passed - Medication is active on med list        Passed - Patient is 18 years of age or older             
A+OX4/intact

## 2025-01-06 ENCOUNTER — LAB (OUTPATIENT)
Dept: LAB | Facility: CLINIC | Age: 60
End: 2025-01-06
Payer: COMMERCIAL

## 2025-01-06 DIAGNOSIS — E05.00 GRAVES' DISEASE: ICD-10-CM

## 2025-01-06 LAB
T3 SERPL-MCNC: 102 NG/DL (ref 85–202)
T4 FREE SERPL-MCNC: 0.97 NG/DL (ref 0.9–1.7)
TSH SERPL DL<=0.005 MIU/L-ACNC: 8.18 UIU/ML (ref 0.3–4.2)

## 2025-01-06 PROCEDURE — 84480 ASSAY TRIIODOTHYRONINE (T3): CPT

## 2025-01-06 PROCEDURE — 84439 ASSAY OF FREE THYROXINE: CPT

## 2025-01-06 PROCEDURE — 84443 ASSAY THYROID STIM HORMONE: CPT

## 2025-01-06 PROCEDURE — 36415 COLL VENOUS BLD VENIPUNCTURE: CPT

## 2025-01-13 ENCOUNTER — TRANSFERRED RECORDS (OUTPATIENT)
Dept: HEALTH INFORMATION MANAGEMENT | Facility: CLINIC | Age: 60
End: 2025-01-13
Payer: COMMERCIAL

## 2025-02-06 ENCOUNTER — TELEPHONE (OUTPATIENT)
Dept: FAMILY MEDICINE | Facility: CLINIC | Age: 60
End: 2025-02-06
Payer: COMMERCIAL

## 2025-02-06 ENCOUNTER — MYC MEDICAL ADVICE (OUTPATIENT)
Dept: ENDOCRINOLOGY | Facility: CLINIC | Age: 60
End: 2025-02-06
Payer: COMMERCIAL

## 2025-02-06 DIAGNOSIS — E05.00 GRAVES' DISEASE: ICD-10-CM

## 2025-02-06 NOTE — TELEPHONE ENCOUNTER
Patient Quality Outreach    Patient is due for the following:   Colon Cancer Screening    Action(s) Taken:   No follow up needed at this time.  Procedure was done and results were scanned into her chart from 1-13-25.    Type of outreach:    Chart review performed, no outreach needed.    Questions for provider review:    None           Angella Alex CMA  Chart routed to none, encounter closed.

## 2025-02-18 RX ORDER — METHIMAZOLE 5 MG/1
2.5 TABLET ORAL
Qty: 20 TABLET | Refills: 3 | Status: SHIPPED | OUTPATIENT
Start: 2025-02-20 | End: 2025-02-20

## 2025-02-20 RX ORDER — METHIMAZOLE 5 MG/1
2.5 TABLET ORAL WEEKLY
Qty: 6 TABLET | Refills: 3 | Status: SHIPPED | OUTPATIENT
Start: 2025-02-20

## 2025-03-04 ENCOUNTER — PATIENT OUTREACH (OUTPATIENT)
Dept: CARE COORDINATION | Facility: CLINIC | Age: 60
End: 2025-03-04
Payer: COMMERCIAL

## 2025-03-17 ENCOUNTER — LAB (OUTPATIENT)
Dept: LAB | Facility: CLINIC | Age: 60
End: 2025-03-17
Payer: COMMERCIAL

## 2025-03-17 DIAGNOSIS — D47.2 MONOCLONAL PARAPROTEINEMIA: ICD-10-CM

## 2025-03-17 LAB
ALBUMIN SERPL BCG-MCNC: 4.3 G/DL (ref 3.5–5.2)
ALP SERPL-CCNC: 109 U/L (ref 40–150)
ALT SERPL W P-5'-P-CCNC: 22 U/L (ref 0–50)
ANION GAP SERPL CALCULATED.3IONS-SCNC: 9 MMOL/L (ref 7–15)
AST SERPL W P-5'-P-CCNC: 25 U/L (ref 0–45)
BASOPHILS # BLD AUTO: 0.1 10E3/UL (ref 0–0.2)
BASOPHILS NFR BLD AUTO: 1 %
BILIRUB SERPL-MCNC: 0.2 MG/DL
BUN SERPL-MCNC: 18.3 MG/DL (ref 8–23)
CALCIUM SERPL-MCNC: 9.7 MG/DL (ref 8.8–10.4)
CHLORIDE SERPL-SCNC: 104 MMOL/L (ref 98–107)
CREAT SERPL-MCNC: 0.86 MG/DL (ref 0.51–0.95)
EGFRCR SERPLBLD CKD-EPI 2021: 77 ML/MIN/1.73M2
EOSINOPHIL # BLD AUTO: 0.2 10E3/UL (ref 0–0.7)
EOSINOPHIL NFR BLD AUTO: 4 %
ERYTHROCYTE [DISTWIDTH] IN BLOOD BY AUTOMATED COUNT: 12.9 % (ref 10–15)
GLUCOSE SERPL-MCNC: 141 MG/DL (ref 70–99)
HCO3 SERPL-SCNC: 27 MMOL/L (ref 22–29)
HCT VFR BLD AUTO: 39.9 % (ref 35–47)
HGB BLD-MCNC: 13.3 G/DL (ref 11.7–15.7)
IMM GRANULOCYTES # BLD: 0 10E3/UL
IMM GRANULOCYTES NFR BLD: 0 %
LYMPHOCYTES # BLD AUTO: 2 10E3/UL (ref 0.8–5.3)
LYMPHOCYTES NFR BLD AUTO: 33 %
MCH RBC QN AUTO: 31.8 PG (ref 26.5–33)
MCHC RBC AUTO-ENTMCNC: 33.3 G/DL (ref 31.5–36.5)
MCV RBC AUTO: 96 FL (ref 78–100)
MONOCYTES # BLD AUTO: 0.5 10E3/UL (ref 0–1.3)
MONOCYTES NFR BLD AUTO: 8 %
NEUTROPHILS # BLD AUTO: 3.1 10E3/UL (ref 1.6–8.3)
NEUTROPHILS NFR BLD AUTO: 54 %
NRBC # BLD AUTO: 0 10E3/UL
NRBC BLD AUTO-RTO: 0 /100
PLATELET # BLD AUTO: 287 10E3/UL (ref 150–450)
POTASSIUM SERPL-SCNC: 3.7 MMOL/L (ref 3.4–5.3)
PROT SERPL-MCNC: 7.5 G/DL (ref 6.4–8.3)
RBC # BLD AUTO: 4.18 10E6/UL (ref 3.8–5.2)
SODIUM SERPL-SCNC: 140 MMOL/L (ref 135–145)
TOTAL PROTEIN SERUM FOR ELP: 7 G/DL (ref 6.4–8.3)
WBC # BLD AUTO: 5.9 10E3/UL (ref 4–11)

## 2025-03-17 PROCEDURE — 36415 COLL VENOUS BLD VENIPUNCTURE: CPT

## 2025-03-17 PROCEDURE — 85004 AUTOMATED DIFF WBC COUNT: CPT

## 2025-03-17 PROCEDURE — 82784 ASSAY IGA/IGD/IGG/IGM EACH: CPT | Performed by: PATHOLOGY

## 2025-03-17 PROCEDURE — 84165 PROTEIN E-PHORESIS SERUM: CPT | Mod: TC | Performed by: PATHOLOGY

## 2025-03-17 PROCEDURE — 80053 COMPREHEN METABOLIC PANEL: CPT

## 2025-03-17 PROCEDURE — 85048 AUTOMATED LEUKOCYTE COUNT: CPT

## 2025-03-17 PROCEDURE — 84155 ASSAY OF PROTEIN SERUM: CPT

## 2025-03-17 PROCEDURE — 83521 IG LIGHT CHAINS FREE EACH: CPT | Performed by: PATHOLOGY

## 2025-03-18 ENCOUNTER — PATIENT OUTREACH (OUTPATIENT)
Dept: CARE COORDINATION | Facility: CLINIC | Age: 60
End: 2025-03-18
Payer: COMMERCIAL

## 2025-03-18 LAB
ALBUMIN SERPL ELPH-MCNC: 4 G/DL (ref 3.7–5.1)
ALPHA1 GLOB SERPL ELPH-MCNC: 0.3 G/DL (ref 0.2–0.4)
ALPHA2 GLOB SERPL ELPH-MCNC: 0.7 G/DL (ref 0.5–0.9)
B-GLOBULIN SERPL ELPH-MCNC: 0.7 G/DL (ref 0.6–1)
GAMMA GLOB SERPL ELPH-MCNC: 1.3 G/DL (ref 0.7–1.6)
IGA SERPL-MCNC: 66 MG/DL (ref 84–499)
IGG SERPL-MCNC: 1340 MG/DL (ref 610–1616)
IGM SERPL-MCNC: 105 MG/DL (ref 35–242)
KAPPA LC FREE SER-MCNC: 3.12 MG/DL (ref 0.33–1.94)
KAPPA LC FREE/LAMBDA FREE SER NEPH: 2.71 {RATIO} (ref 0.26–1.65)
LAMBDA LC FREE SERPL-MCNC: 1.15 MG/DL (ref 0.57–2.63)
M PROTEIN SERPL ELPH-MCNC: 0.7 G/DL
PROT PATTERN SERPL ELPH-IMP: ABNORMAL

## 2025-03-24 ENCOUNTER — VIRTUAL VISIT (OUTPATIENT)
Dept: ONCOLOGY | Facility: CLINIC | Age: 60
End: 2025-03-24
Attending: NURSE PRACTITIONER
Payer: COMMERCIAL

## 2025-03-24 VITALS — HEIGHT: 66 IN | WEIGHT: 222 LBS | BODY MASS INDEX: 35.68 KG/M2

## 2025-03-24 DIAGNOSIS — D47.2 MONOCLONAL PARAPROTEINEMIA: Primary | ICD-10-CM

## 2025-03-24 PROCEDURE — 1126F AMNT PAIN NOTED NONE PRSNT: CPT | Performed by: NURSE PRACTITIONER

## 2025-03-24 PROCEDURE — 98006 SYNCH AUDIO-VIDEO EST MOD 30: CPT | Performed by: NURSE PRACTITIONER

## 2025-03-24 ASSESSMENT — PAIN SCALES - GENERAL: PAINLEVEL_OUTOF10: NO PAIN (0)

## 2025-03-24 NOTE — NURSING NOTE
Current patient location: [address] [place] [id number]    Is the patient currently in the state of [place]? YES    Visit mode: VIDEO    If the visit is dropped, the patient can be reconnected by:VIDEO VISIT: Text to cell phone:   Telephone Information:   Mobile [phone]       Will anyone else be joining the visit? NO  (If patient encounters technical issues they should call [phone])    Are changes needed to the allergy or medication list? No    Are refills needed on medications prescribed by this physician? NO    Rooming Documentation:  Not applicable    Reason for visit: RECHECK    Antonietta CHIANG

## 2025-03-24 NOTE — LETTER
3/24/2025      Merlyn Ravi  [address] [facility] [id number]      Dear Colleague,    Thank you for referring your patient, Merlyn Ravi, to the [facility] [place]. Please see a copy of my visit note below.    [facility] Hematology and Oncology Progress Note    Patient: Merlyn Ravi  MRN: [id number]  Date of Service: Mar 24, 2025          Reason for Visit    Hx breast cancer    Virtual visit    Assessment and Plan    1. Breast cancer, T2N0M0 (ER/[place]+) - 10/2019:   5.5 yrs from her diagnosis/resection.   Completed 5 yrs of adjuvant tamoxifen 11/2024.  Her vaginal dryness/dyspareunia is stable.     Mammogram 6/2024 negative.   Clinically, no concern for recurrence.     Plan:  -No longer on active surveillance. Will carry chronic low risk for late recurrence since hormone positive.   -Annual 3D screening mammograms and breast exams with PCP/us since we will continue following her MGUS labs    2. MGUS:   Benign. Overall stable.  Immunofixation shows IgG immunoglobulin kappa light chain type. Kappa LC and K/L ratio minimally elevated and have been stable/minimally elevating x 5 yrs. M peak 0.7, stable over last 2 yrs, up slightly from 0.5 over 4 yrs. Immunoglobulins unremarkable. CBC and CMP show no end-organ damage or bone marrow deficiency.     No concern for transition to malignancy.  Chronic low back pain unrelated; MRI showed no concerning lytic lesions.     Plan:  -Annual labs: CBC, CMP, light chains, immunoglobulins, SPEP 1 week prior to visit     3.   Right arm and back nodules, likely lipomas  Prior exam of right arm forearm likely consistent with benign lipoma. This site is stable.  She notes similar-appearing nodules on back x several months that can be tender at times. She has follow-up with PCP next week for in-clinic exam.     Plan:  -Assess with PCP. Consider US +/- biopsy if any concerning exam findings.    4.   Vaginal dryness/dyspareunia:    Tamoxifen may have exacerbated and symptoms are stable since stopping tamoxifen.  She's never trialed OTC vaginal moisturizers and as needed lubricants.  Met with Gyne therapist who gave her some recommendations.    Plan:  -Recommend incorporating daily/routine vaginal moisturizers. PRN lubricants with sexual intercourse, pelvic exams, etc  -Gyne Onc/PT   -Regarding estrogen cream, we prefer to not use that in breast cancer patients if we can get by without it through alternate means; however, if not feasible can use lowest dose as infrequently as possible with breaks.     5.   Chronic low back pain/lumbar stenosis  20+ yrs. MRI L spine last Fall showed benign/degenerative changes; no cancer detected.   Getting benefit with PT.    Plan:  -Continue mgmt with PCP/Ortho    6.   Graves Disease  Endo manages. On methimazole.   She has a few questions on her dosing and follow-up plan    Plan:  -Encouraged she follow-up with Endo directly with any questions    ECOG Performance    0 - Independent    ______________________________________________________________________________    History of Present Illness    Oncology History/Treatment  Diagnosis/Stage:      8/2019: Left breast invasive lobular cancer (T2-N0), ER/DE+  -self-palpated L breast lump  -diagnostic mammo: 1.1 cm asymmetry 9-10:00 position  -US: 1.1 cm lesion. No axillary adenopathy  -stereotactic biopsy: invasive lobular carcinoma, grade II; ALI neg; LCIS cannot be excluded. ER+, DE+, HER2 neg  -lumpectomy path: 4.5 cm invasive lobular ca, grade III; ALI neg. No LCIS. ER/DE+, HER2 neg. Positive deep margin  -re-excision 1: positive margin. Re-excision 2: negative for residual malignancy  -Oncotype DX score: 20     MGUS IgG, kappa (observed)     Treatment:  9/23/2019: Left lumpectomy  9/30/2019: re-excision for positive margin, persistent positive margin  10/9/2019: re-excision resulted in negative margin     11/2019: adjuvant RT (5005 cGy/20)     11/2019 -  "11/2024: Tamoxifen 20 mg daily x 5 yrs    Interim history:   Kristina returns for 1 year follow-up visit.  She also sees her breast surgeon (Dr. Salas) yearly every Fall for exam.     Completed 5 yrs of tamoxifen last November and now off endocrine therapy. Notes her fatigue, brain fog is better since coming off it. Her vaginal dryness and pain with intercourse + pelvic exams has been stable.    She has not used any other lubricants or moisturizers.    Has also met with a therapist for various exercises.    Left breast lymphedema notably improved, uses massage machine as needed. Mild chronic left axillary tightness.   No breast changes.   Denies any weight loss.   No new sites of pain.   Chronic low back pain x 20 yrs due to lumbar stenosis, worse with LE tingling more recently. MRI L spine 11/2024 showed degenerative changes with mild spinal stenosis with compression of descending R L5 nerve root. [facility], has seen good improvement.     Notes fatty lump right forearm x 1 yr, slightly tender and stable.   She has other similar lumps on back that can be tender at times. She has appt with PCP for assessment of his next week.     She's starting an antibiotic for sinusitis today.     PHYSICAL EXAM  Ht 1.676 m (5' 6\")   Wt 100.7 kg (222 lb)   BMI 35.83 kg/m      GENERAL: no acute distress. Cooperative in conversation.  Alone  HEENT: No icterus.   RESP: Regular respiratory rate.   NEURO:Alert and oriented x3.   PSYCH: within normal limits. No depression or anxiety.      Lab Results    Recent Results (from the past week)   Immunoglobulins A G and M   Result Value Ref Range    Immunoglobulin G 1,340 610 - 1,616 mg/dL    Immunoglobulin A 66 (L) 84 - 499 mg/dL    Immunoglobulin M 105 35 - 242 mg/dL   Kappa and lambda light chain   Result Value Ref Range    Kappa Free Light Chains 3.12 (H) 0.33 - 1.94 mg/dL    Lambda Free Light Chains 1.15 0.57 - 2.63 mg/dL    Kappa /Lambda Ratio 2.71 (H) 0.26 - 1.65 "   Comprehensive metabolic panel (BMP + Alb, Alk Phos, ALT, AST, Total. Bili, TP)   Result Value Ref Range    Sodium 140 135 - 145 mmol/L    Potassium 3.7 3.4 - 5.3 mmol/L    Carbon Dioxide (CO2) 27 22 - 29 mmol/L    Anion Gap 9 7 - 15 mmol/L    Urea Nitrogen 18.3 8.0 - 23.0 mg/dL    Creatinine 0.86 0.51 - 0.95 mg/dL    GFR Estimate 77 >60 mL/min/1.73m2    Calcium 9.7 8.8 - 10.4 mg/dL    Chloride 104 98 - 107 mmol/L    Glucose 141 (H) 70 - 99 mg/dL    Alkaline Phosphatase 109 40 - 150 U/L    AST 25 0 - 45 U/L    ALT 22 0 - 50 U/L    Protein Total 7.5 6.4 - 8.3 g/dL    Albumin 4.3 3.5 - 5.2 g/dL    Bilirubin Total 0.2 <=1.2 mg/dL   Total Protein, Serum for ELP   Result Value Ref Range    Total Protein Serum for ELP 7.0 6.4 - 8.3 g/dL   Protein Electrophoresis, Serum   Result Value Ref Range    Albumin 4.0 3.7 - 5.1 g/dL    Alpha 1 0.3 0.2 - 0.4 g/dL    Alpha 2 0.7 0.5 - 0.9 g/dL    Beta Globulin 0.7 0.6 - 1.0 g/dL    Gamma Globulin 1.3 0.7 - 1.6 g/dL    Monoclonal Peak 0.7 (H) <=0.0 g/dL    ELP Interpretation       Monoclonal protein (0.7 g/dL) seen in the gamma fraction. Previously characterized in our laboratory on 03/19/2024 as a monoclonal IgG immunoglobulin of kappa light chain type. Pathologic significance requires clinical correlation. CAROLA Byrnes M.D., Ph.D., Pathologist.   CBC with platelets and differential   Result Value Ref Range    WBC Count 5.9 4.0 - 11.0 10e3/uL    RBC Count 4.18 3.80 - 5.20 10e6/uL    Hemoglobin 13.3 11.7 - 15.7 g/dL    Hematocrit 39.9 35.0 - 47.0 %    MCV 96 78 - 100 fL    MCH 31.8 26.5 - 33.0 pg    MCHC 33.3 31.5 - 36.5 g/dL    RDW 12.9 10.0 - 15.0 %    Platelet Count 287 150 - 450 10e3/uL    % Neutrophils 54 %    % Lymphocytes 33 %    % Monocytes 8 %    % Eosinophils 4 %    % Basophils 1 %    % Immature Granulocytes 0 %    NRBCs per 100 WBC 0 <1 /100    Absolute Neutrophils 3.1 1.6 - 8.3 10e3/uL    Absolute Lymphocytes 2.0 0.8 - 5.3 10e3/uL    Absolute Monocytes 0.5 0.0 - 1.3  10e3/uL    Absolute Eosinophils 0.2 0.0 - 0.7 10e3/uL    Absolute Basophils 0.1 0.0 - 0.2 10e3/uL    Absolute Immature Granulocytes 0.0 <=0.4 10e3/uL    Absolute NRBCs 0.0 10e3/uL       Imaging    No results found.    Billing  Total time 30 minutes, to include face to face visit, review of EMR, ordering, documentation and coordination of care on date of service.    complexity modifier for longitudinal care.     Start time: 1451 / End time: 1504  Platform: Sarbari  Patient location: Home  Provider location: [facility]      Signed by: Zena Davidson NP      Again, thank you for allowing me to participate in the care of your patient.        Sincerely,        Zena Davidson NP    Electronically signed

## 2025-03-24 NOTE — LETTER
3/24/2025      Merlyn Ravi  [address] [facility] [id number]      Dear Colleague,    Thank you for referring your patient, Merlyn Ravi, to the [facility] [place]. Please see a copy of my visit note below.    [facility] Hematology and Oncology Progress Note    Patient: Merlyn Ravi  MRN: [id number]  Date of Service: Mar 24, 2025          Reason for Visit    Hx breast cancer    Virtual visit    Assessment and Plan    1. Breast cancer, T2N0M0 (ER/[place]+) - 10/2019:   5.5 yrs from her diagnosis/resection.   Completed 5 yrs of adjuvant tamoxifen 11/2024.  Her vaginal dryness/dyspareunia is stable.     Mammogram 6/2024 negative.   Clinically, no concern for recurrence.     Plan:  -No longer on active surveillance. Will carry chronic low risk for late recurrence since hormone positive.   -Annual 3D screening mammograms and breast exams with PCP/us since we will continue following her MGUS labs    2. MGUS:   Benign. Overall stable.  Immunofixation shows IgG immunoglobulin kappa light chain type. Kappa LC and K/L ratio minimally elevated and have been stable/minimally elevating x 5 yrs. M peak 0.7, stable over last 2 yrs, up slightly from 0.5 over 4 yrs. Immunoglobulins unremarkable. CBC and CMP show no end-organ damage or bone marrow deficiency.     No concern for transition to malignancy.  Chronic low back pain unrelated; MRI showed no concerning lytic lesions.     Plan:  -Annual labs: CBC, CMP, light chains, immunoglobulins, SPEP 1 week prior to visit     3.   Right arm and back nodules, likely lipomas  Prior exam of right arm forearm likely consistent with benign lipoma. This site is stable.  She notes similar-appearing nodules on back x several months that can be tender at times. She has follow-up with PCP next week for in-clinic exam.     Plan:  -Assess with PCP. Consider US +/- biopsy if any concerning exam findings.    4.   Vaginal dryness/dyspareunia:    Tamoxifen may have exacerbated and symptoms are stable since stopping tamoxifen.  She's never trialed OTC vaginal moisturizers and as needed lubricants.  Met with Gyne therapist who gave her some recommendations.    Plan:  -Recommend incorporating daily/routine vaginal moisturizers. PRN lubricants with sexual intercourse, pelvic exams, etc  -Gyne Onc/PT   -Regarding estrogen cream, we prefer to not use that in breast cancer patients if we can get by without it through alternate means; however, if not feasible can use lowest dose as infrequently as possible with breaks.     5.   Chronic low back pain/lumbar stenosis  20+ yrs. MRI L spine last Fall showed benign/degenerative changes; no cancer detected.   Getting benefit with PT.    Plan:  -Continue mgmt with PCP/Ortho    6.   Graves Disease  Endo manages. On methimazole.   She has a few questions on her dosing and follow-up plan    Plan:  -Encouraged she follow-up with Endo directly with any questions    ECOG Performance    0 - Independent    ______________________________________________________________________________    History of Present Illness    Oncology History/Treatment  Diagnosis/Stage:      8/2019: Left breast invasive lobular cancer (T2-N0), ER/[place]+  -self-palpated L breast lump  -diagnostic mammo: 1.1 cm asymmetry 9-10:00 position  -US: 1.1 cm lesion. No axillary adenopathy  -stereotactic biopsy: invasive lobular carcinoma, grade II; ALI neg; LCIS cannot be excluded. ER+, VT+, HER2 neg  -lumpectomy path: 4.5 cm invasive lobular ca, grade III; ALI neg. No LCIS. ER/VT+, HER2 neg. Positive deep margin  -re-excision 1: positive margin. Re-excision 2: negative for residual malignancy  -Oncotype DX score: 20     MGUS IgG, kappa (observed)     Treatment:  9/23/2019: Left lumpectomy  9/30/2019: re-excision for positive margin, persistent positive margin  10/9/2019: re-excision resulted in negative margin     11/2019: adjuvant RT (5005 cGy/20)     11/2019 -  "11/2024: Tamoxifen 20 mg daily x 5 yrs    Interim history:   Kristina returns for 1 year follow-up visit.  She also sees her breast surgeon (Dr. Salas) yearly every Fall for exam.     Completed 5 yrs of tamoxifen last November and now off endocrine therapy. Notes her fatigue, brain fog is better since coming off it. Her vaginal dryness and pain with intercourse + pelvic exams has been stable.    She has not used any other lubricants or moisturizers.    Has also met with a therapist for various exercises.    Left breast lymphedema notably improved, uses massage machine as needed. Mild chronic left axillary tightness.   No breast changes.   Denies any weight loss.   No new sites of pain.   Chronic low back pain x 20 yrs due to lumbar stenosis, worse with LE tingling more recently. MRI L spine 11/2024 showed degenerative changes with mild spinal stenosis with compression of descending R L5 nerve root. Working with [facility], has seen good improvement.     Notes fatty lump right forearm x 1 yr, slightly tender and stable.   She has other similar lumps on back that can be tender at times. She has appt with PCP for assessment of his next week.     She's starting an antibiotic for sinusitis today.     PHYSICAL EXAM  Ht 1.676 m (5' 6\")   Wt 100.7 kg (222 lb)   BMI 35.83 kg/m      GENERAL: no acute distress. Cooperative in conversation.  Alone  HEENT: No icterus.   RESP: Regular respiratory rate.   NEURO:Alert and oriented x3.   PSYCH: within normal limits. No depression or anxiety.      Lab Results    Recent Results (from the past week)   Immunoglobulins A G and M   Result Value Ref Range    Immunoglobulin G 1,340 610 - 1,616 mg/dL    Immunoglobulin A 66 (L) 84 - 499 mg/dL    Immunoglobulin M 105 35 - 242 mg/dL   Kappa and lambda light chain   Result Value Ref Range    Kappa Free Light Chains 3.12 (H) 0.33 - 1.94 mg/dL    Lambda Free Light Chains 1.15 0.57 - 2.63 mg/dL    Kappa /Lambda Ratio 2.71 (H) 0.26 - 1.65 "   Comprehensive metabolic panel (BMP + Alb, Alk Phos, ALT, AST, Total. Bili, TP)   Result Value Ref Range    Sodium 140 135 - 145 mmol/L    Potassium 3.7 3.4 - 5.3 mmol/L    Carbon Dioxide (CO2) 27 22 - 29 mmol/L    Anion Gap 9 7 - 15 mmol/L    Urea Nitrogen 18.3 8.0 - 23.0 mg/dL    Creatinine 0.86 0.51 - 0.95 mg/dL    GFR Estimate 77 >60 mL/min/1.73m2    Calcium 9.7 8.8 - 10.4 mg/dL    Chloride 104 98 - 107 mmol/L    Glucose 141 (H) 70 - 99 mg/dL    Alkaline Phosphatase 109 40 - 150 U/L    AST 25 0 - 45 U/L    ALT 22 0 - 50 U/L    Protein Total 7.5 6.4 - 8.3 g/dL    Albumin 4.3 3.5 - 5.2 g/dL    Bilirubin Total 0.2 <=1.2 mg/dL   Total Protein, Serum for ELP   Result Value Ref Range    Total Protein Serum for ELP 7.0 6.4 - 8.3 g/dL   Protein Electrophoresis, Serum   Result Value Ref Range    Albumin 4.0 3.7 - 5.1 g/dL    Alpha 1 0.3 0.2 - 0.4 g/dL    Alpha 2 0.7 0.5 - 0.9 g/dL    Beta Globulin 0.7 0.6 - 1.0 g/dL    Gamma Globulin 1.3 0.7 - 1.6 g/dL    Monoclonal Peak 0.7 (H) <=0.0 g/dL    ELP Interpretation       Monoclonal protein (0.7 g/dL) seen in the gamma fraction. Previously characterized in our laboratory on 03/19/2024 as a monoclonal IgG immunoglobulin of kappa light chain type. Pathologic significance requires clinical correlation. CAROLA Byrnes M.D., Ph.D., Pathologist.   CBC with platelets and differential   Result Value Ref Range    WBC Count 5.9 4.0 - 11.0 10e3/uL    RBC Count 4.18 3.80 - 5.20 10e6/uL    Hemoglobin 13.3 11.7 - 15.7 g/dL    Hematocrit 39.9 35.0 - 47.0 %    MCV 96 78 - 100 fL    MCH 31.8 26.5 - 33.0 pg    MCHC 33.3 31.5 - 36.5 g/dL    RDW 12.9 10.0 - 15.0 %    Platelet Count 287 150 - 450 10e3/uL    % Neutrophils 54 %    % Lymphocytes 33 %    % Monocytes 8 %    % Eosinophils 4 %    % Basophils 1 %    % Immature Granulocytes 0 %    NRBCs per 100 WBC 0 <1 /100    Absolute Neutrophils 3.1 1.6 - 8.3 10e3/uL    Absolute Lymphocytes 2.0 0.8 - 5.3 10e3/uL    Absolute Monocytes 0.5 0.0 - 1.3  10e3/uL    Absolute Eosinophils 0.2 0.0 - 0.7 10e3/uL    Absolute Basophils 0.1 0.0 - 0.2 10e3/uL    Absolute Immature Granulocytes 0.0 <=0.4 10e3/uL    Absolute NRBCs 0.0 10e3/uL       Imaging    No results found.    Billing  Total time 30 minutes, to include face to face visit, review of EMR, ordering, documentation and coordination of care on date of service.    complexity modifier for longitudinal care.     Start time: 1451 / End time: 1504  Platform: InOpen  Patient location: Home  Provider location: [facility]      Signed by: Zena Davidson NP      Again, thank you for allowing me to participate in the care of your patient.        Sincerely,        Zena Davidson NP    Electronically signed

## 2025-03-24 NOTE — PROGRESS NOTES
[facility] Hematology and Oncology Progress Note    Patient: Merlyn Ravi  MRN: [id number]  Date of Service: Mar 24, 2025          Reason for Visit    Hx breast cancer    Virtual visit    Assessment and Plan    1. Breast cancer, T2N0M0 (ER/CT+) - 10/2019:   5.5 yrs from her diagnosis/resection.   Completed 5 yrs of adjuvant tamoxifen 11/2024.  Her vaginal dryness/dyspareunia is stable.     Mammogram 6/2024 negative.   Clinically, no concern for recurrence.     Plan:  -No longer on active surveillance. Will carry chronic low risk for late recurrence since hormone positive.   -Annual 3D screening mammograms and breast exams with PCP/us since we will continue following her MGUS labs    2. MGUS:   Benign. Overall stable.  Immunofixation shows IgG immunoglobulin kappa light chain type. Kappa LC and K/L ratio minimally elevated and have been stable/minimally elevating x 5 yrs. M peak 0.7, stable over last 2 yrs, up slightly from 0.5 over 4 yrs. Immunoglobulins unremarkable. CBC and CMP show no end-organ damage or bone marrow deficiency.     No concern for transition to malignancy.  Chronic low back pain unrelated; MRI showed no concerning lytic lesions.     Plan:  -Annual labs: CBC, CMP, light chains, immunoglobulins, SPEP 1 week prior to visit     3.   Right arm and back nodules, likely lipomas  Prior exam of right arm forearm likely consistent with benign lipoma. This site is stable.  She notes similar-appearing nodules on back x several months that can be tender at times. She has follow-up with PCP next week for in-clinic exam.     Plan:  -Assess with PCP. Consider US +/- biopsy if any concerning exam findings.    4.   Vaginal dryness/dyspareunia:   Tamoxifen may have exacerbated and symptoms are stable since stopping tamoxifen.  She's never trialed OTC vaginal moisturizers and as needed lubricants.  Met with Gyne therapist who gave her some recommendations.    Plan:  -Recommend incorporating daily/routine  vaginal moisturizers. PRN lubricants with sexual intercourse, pelvic exams, etc  -Gyne Onc/PT   -Regarding estrogen cream, we prefer to not use that in breast cancer patients if we can get by without it through alternate means; however, if not feasible can use lowest dose as infrequently as possible with breaks.     5.   Chronic low back pain/lumbar stenosis  20+ yrs. MRI L spine last Fall showed benign/degenerative changes; no cancer detected.   Getting benefit with PT.    Plan:  -Continue mgmt with PCP/Ortho    6.   Graves Disease  Endo manages. On methimazole.   She has a few questions on her dosing and follow-up plan    Plan:  -Encouraged she follow-up with Endo directly with any questions    ECOG Performance    0 - Independent    ______________________________________________________________________________    History of Present Illness    Oncology History/Treatment  Diagnosis/Stage:      8/2019: Left breast invasive lobular cancer (T2-N0), ER/IA+  -self-palpated L breast lump  -diagnostic mammo: 1.1 cm asymmetry 9-10:00 position  -US: 1.1 cm lesion. No axillary adenopathy  -stereotactic biopsy: invasive lobular carcinoma, grade II; ALI neg; LCIS cannot be excluded. ER+, IA+, HER2 neg  -lumpectomy path: 4.5 cm invasive lobular ca, grade III; ALI neg. No LCIS. ER/IA+, HER2 neg. Positive deep margin  -re-excision 1: positive margin. Re-excision 2: negative for residual malignancy  -Oncotype DX score: 20     MGUS IgG, kappa (observed)     Treatment:  9/23/2019: Left lumpectomy  9/30/2019: re-excision for positive margin, persistent positive margin  10/9/2019: re-excision resulted in negative margin     11/2019: adjuvant RT (5005 cGy/20)     11/2019 - 11/2024: Tamoxifen 20 mg daily x 5 yrs    Interim history:   Kristina returns for 1 year follow-up visit.  She also sees her breast surgeon (Dr. Salas) yearly every Fall for exam.     Completed 5 yrs of tamoxifen last November and now off endocrine therapy. Notes her  "fatigue, brain fog is better since coming off it. Her vaginal dryness and pain with intercourse + pelvic exams has been stable.    She has not used any other lubricants or moisturizers.    Has also met with a therapist for various exercises.    Left breast lymphedema notably improved, uses massage machine as needed. Mild chronic left axillary tightness.   No breast changes.   Denies any weight loss.   No new sites of pain.   Chronic low back pain x 20 yrs due to lumbar stenosis, worse with LE tingling more recently. MRI L spine 11/2024 showed degenerative changes with mild spinal stenosis with compression of descending R L5 nerve root. Working with [facility], has seen good improvement.     Notes fatty lump right forearm x 1 yr, slightly tender and stable.   She has other similar lumps on back that can be tender at times. She has appt with PCP for assessment of his next week.     She's starting an antibiotic for sinusitis today.     PHYSICAL EXAM  Ht 1.676 m (5' 6\")   Wt 100.7 kg (222 lb)   BMI 35.83 kg/m      GENERAL: no acute distress. Cooperative in conversation.  Alone  HEENT: No icterus.   RESP: Regular respiratory rate.   NEURO:Alert and oriented x3.   PSYCH: within normal limits. No depression or anxiety.      Lab Results    Recent Results (from the past week)   Immunoglobulins A G and M   Result Value Ref Range    Immunoglobulin G 1,340 610 - 1,616 mg/dL    Immunoglobulin A 66 (L) 84 - 499 mg/dL    Immunoglobulin M 105 35 - 242 mg/dL   Kappa and lambda light chain   Result Value Ref Range    Kappa Free Light Chains 3.12 (H) 0.33 - 1.94 mg/dL    Lambda Free Light Chains 1.15 0.57 - 2.63 mg/dL    Kappa /Lambda Ratio 2.71 (H) 0.26 - 1.65   Comprehensive metabolic panel (BMP + Alb, Alk Phos, ALT, AST, Total. Bili, TP)   Result Value Ref Range    Sodium 140 135 - 145 mmol/L    Potassium 3.7 3.4 - 5.3 mmol/L    Carbon Dioxide (CO2) 27 22 - 29 mmol/L    Anion Gap 9 7 - 15 mmol/L    Urea Nitrogen 18.3 8.0 " - 23.0 mg/dL    Creatinine 0.86 0.51 - 0.95 mg/dL    GFR Estimate 77 >60 mL/min/1.73m2    Calcium 9.7 8.8 - 10.4 mg/dL    Chloride 104 98 - 107 mmol/L    Glucose 141 (H) 70 - 99 mg/dL    Alkaline Phosphatase 109 40 - 150 U/L    AST 25 0 - 45 U/L    ALT 22 0 - 50 U/L    Protein Total 7.5 6.4 - 8.3 g/dL    Albumin 4.3 3.5 - 5.2 g/dL    Bilirubin Total 0.2 <=1.2 mg/dL   Total Protein, Serum for ELP   Result Value Ref Range    Total Protein Serum for ELP 7.0 6.4 - 8.3 g/dL   Protein Electrophoresis, Serum   Result Value Ref Range    Albumin 4.0 3.7 - 5.1 g/dL    Alpha 1 0.3 0.2 - 0.4 g/dL    Alpha 2 0.7 0.5 - 0.9 g/dL    Beta Globulin 0.7 0.6 - 1.0 g/dL    Gamma Globulin 1.3 0.7 - 1.6 g/dL    Monoclonal Peak 0.7 (H) <=0.0 g/dL    ELP Interpretation       Monoclonal protein (0.7 g/dL) seen in the gamma fraction. Previously characterized in our laboratory on 03/19/2024 as a monoclonal IgG immunoglobulin of kappa light chain type. Pathologic significance requires clinical correlation. CAROLA Byrnes M.D., Ph.D., Pathologist.   CBC with platelets and differential   Result Value Ref Range    WBC Count 5.9 4.0 - 11.0 10e3/uL    RBC Count 4.18 3.80 - 5.20 10e6/uL    Hemoglobin 13.3 11.7 - 15.7 g/dL    Hematocrit 39.9 35.0 - 47.0 %    MCV 96 78 - 100 fL    MCH 31.8 26.5 - 33.0 pg    MCHC 33.3 31.5 - 36.5 g/dL    RDW 12.9 10.0 - 15.0 %    Platelet Count 287 150 - 450 10e3/uL    % Neutrophils 54 %    % Lymphocytes 33 %    % Monocytes 8 %    % Eosinophils 4 %    % Basophils 1 %    % Immature Granulocytes 0 %    NRBCs per 100 WBC 0 <1 /100    Absolute Neutrophils 3.1 1.6 - 8.3 10e3/uL    Absolute Lymphocytes 2.0 0.8 - 5.3 10e3/uL    Absolute Monocytes 0.5 0.0 - 1.3 10e3/uL    Absolute Eosinophils 0.2 0.0 - 0.7 10e3/uL    Absolute Basophils 0.1 0.0 - 0.2 10e3/uL    Absolute Immature Granulocytes 0.0 <=0.4 10e3/uL    Absolute NRBCs 0.0 10e3/uL       Imaging    No results found.    Billing  Total time 30 minutes, to include face to  face visit, review of EMR, ordering, documentation and coordination of care on date of service.    complexity modifier for longitudinal care.     Start time: 1451 / End time: 1504  Platform: [employer]  Patient location: Home  Provider location: [facility]      Signed by: Zena Davidson, NP

## 2025-04-02 ENCOUNTER — OFFICE VISIT (OUTPATIENT)
Dept: FAMILY MEDICINE | Facility: CLINIC | Age: 60
End: 2025-04-02
Payer: COMMERCIAL

## 2025-04-02 VITALS
HEIGHT: 65 IN | SYSTOLIC BLOOD PRESSURE: 119 MMHG | RESPIRATION RATE: 20 BRPM | WEIGHT: 222 LBS | TEMPERATURE: 97.9 F | OXYGEN SATURATION: 95 % | HEART RATE: 77 BPM | BODY MASS INDEX: 36.99 KG/M2 | DIASTOLIC BLOOD PRESSURE: 85 MMHG

## 2025-04-02 DIAGNOSIS — Z00.00 ENCOUNTER FOR ROUTINE ADULT HEALTH EXAMINATION WITHOUT ABNORMAL FINDINGS: Primary | ICD-10-CM

## 2025-04-02 DIAGNOSIS — E66.01 MORBID OBESITY (H): ICD-10-CM

## 2025-04-02 DIAGNOSIS — R22.9 SKIN LUMPS: ICD-10-CM

## 2025-04-02 PROBLEM — C50.919 MALIGNANT NEOPLASM OF FEMALE BREAST (H): Status: RESOLVED | Noted: 2019-08-15 | Resolved: 2025-04-02

## 2025-04-02 PROCEDURE — G2211 COMPLEX E/M VISIT ADD ON: HCPCS | Performed by: FAMILY MEDICINE

## 2025-04-02 PROCEDURE — 99396 PREV VISIT EST AGE 40-64: CPT | Performed by: FAMILY MEDICINE

## 2025-04-02 PROCEDURE — 3079F DIAST BP 80-89 MM HG: CPT | Performed by: FAMILY MEDICINE

## 2025-04-02 PROCEDURE — 3074F SYST BP LT 130 MM HG: CPT | Performed by: FAMILY MEDICINE

## 2025-04-02 PROCEDURE — 99213 OFFICE O/P EST LOW 20 MIN: CPT | Mod: 25 | Performed by: FAMILY MEDICINE

## 2025-04-02 PROCEDURE — 1126F AMNT PAIN NOTED NONE PRSNT: CPT | Performed by: FAMILY MEDICINE

## 2025-04-02 SDOH — HEALTH STABILITY: PHYSICAL HEALTH: ON AVERAGE, HOW MANY DAYS PER WEEK DO YOU ENGAGE IN MODERATE TO STRENUOUS EXERCISE (LIKE A BRISK WALK)?: 0 DAYS

## 2025-04-02 SDOH — HEALTH STABILITY: PHYSICAL HEALTH: ON AVERAGE, HOW MANY MINUTES DO YOU ENGAGE IN EXERCISE AT THIS LEVEL?: 0 MIN

## 2025-04-02 ASSESSMENT — ENCOUNTER SYMPTOMS
ALLERGIC/IMMUNOLOGIC NEGATIVE: 1
NEUROLOGICAL NEGATIVE: 1
PSYCHIATRIC NEGATIVE: 1
CARDIOVASCULAR NEGATIVE: 1
HEMATOLOGIC/LYMPHATIC NEGATIVE: 1
RESPIRATORY NEGATIVE: 1
CONSTITUTIONAL NEGATIVE: 1
GASTROINTESTINAL NEGATIVE: 1
EYES NEGATIVE: 1
ENDOCRINE NEGATIVE: 1
MUSCULOSKELETAL NEGATIVE: 1

## 2025-04-02 ASSESSMENT — PAIN SCALES - GENERAL: PAINLEVEL_OUTOF10: NO PAIN (0)

## 2025-04-02 ASSESSMENT — SOCIAL DETERMINANTS OF HEALTH (SDOH): HOW OFTEN DO YOU GET TOGETHER WITH FRIENDS OR RELATIVES?: MORE THAN THREE TIMES A WEEK

## 2025-04-02 NOTE — PROGRESS NOTES
"Preventive Care Visit  [facility]  Memo Garcia MD, Family Medicine  Apr 2, 2025      Assessment & Plan   Problem List Items Addressed This Visit          Digestive    Obesity (BMI 35.0-39.9) with comorbidity (H)     Other Visit Diagnoses       Encounter for routine adult health examination without abnormal findings    -  Primary    Skin lumps        Relevant Orders    US Soft Tissue Abdominal Wall or Lower Back             Patient is a 60 yr old female here for her annual physical.    Patient has a history of breast cancer in remission presently. She is still following with oncology.     Patient does not need refills presently.   Patient has some concerning lumps on her lower back. US ordered. Patient will be notified of results.    Patient has been advised of split billing requirements and indicates understanding: Yes       BMI  Estimated body mass index is 36.99 kg/m  as calculated from the following:    Height as of this encounter: 1.65 m (5' 4.96\").    Weight as of this encounter: 100.7 kg (222 lb).   Weight management plan: Discussed healthy diet and exercise guidelines    Counseling  Appropriate preventive services were addressed with this patient via screening, questionnaire, or discussion as appropriate for fall prevention, nutrition, physical activity, Tobacco-use cessation, social engagement, weight loss and cognition.  Checklist reviewing preventive services available has been given to the patient.  Reviewed patient's diet, addressing concerns and/or questions.     FUTURE APPOINTMENTS:       - Follow-up visit in one year for annual physical.      Norman Acevedo is a 60 year old, presenting for the following:    Patient is a 60-year-old here for her annual physical.  Her past medical history significant for hypothyroidism, history of breast cancer, obesity, hyperlipidemia, overactive bladder.  She reports no acute symptoms today.  She reports that she has some " lumps in the low back that have been there for many years.  The lumps have become more painful lately.  No drainage.  No warmth or redness on the lumps.  She had blood work done through her oncologist recently.  No need for refills today on her medications.  Reviewed health maintenance with her.  She is due for COVID booster and RSV and pneumococcal vaccines.  Colon cancer screening is up-to-date.  Mammogram not due till June.       Physical and Lesion        4/2/2025     8:10 AM   Additional Questions   Roomed by Hina Pisano   Accompanied by self         4/2/2025     8:10 AM   Patient Reported Additional Medications   Patient reports taking the following new medications none          HPI       Skin Lesion  Onset/Duration: these have been around for years  Description  Location: lower back  Color: below the surface  Border description: sharp border  Character: painful  Itching: no  Bleeding:  No  Intensity:  mild  Progression of Symptoms:  same  Accompanying signs and symptoms:   Bleeding: No  Scaling: No  Excessive sun exposure/tanning: No  Sunscreen used: No  History:           Any previous history of skin cancer: No  Any family history of melanoma: No  Previous episodes of similar lesion: have had this for years  Precipitating or alleviating factors: massaging the area is painful  Therapies tried and outcome: none    Advance Care Planning  Patient does not have a Health Care Directive: Discussed advance care planning with patient; information given to patient to review.      4/3/2024   General Health   How would you rate your overall physical health? Good   Feel stress (tense, anxious, or unable to sleep) Only a little         4/3/2024   Nutrition   Three or more servings of calcium each day? (!) NO   Diet: Regular (no restrictions)   How many servings of fruit and vegetables per day? (!) 0-1   How many sweetened beverages each day? 0-1         4/3/2024   Exercise   Days per week of moderate/strenous exercise 0  days   Average minutes spent exercising at this level 0 min         4/3/2024   Social Factors   Frequency of gathering with friends or relatives Once a week   Worry food won't last until get money to buy more No   Food not last or not have enough money for food? No   Do you have housing? (Housing is defined as stable permanent housing and does not include staying ouside in a car, in a tent, in an abandoned building, in an overnight shelter, or couch-surfing.) Yes   Are you worried about losing your housing? No   Lack of transportation? No   Unable to get utilities (heat,electricity)? No         4/3/2024   Dental   Dentist two times every year? Yes           4/3/2024   TB Screening   Were you born outside of the [place]? No           Today's PHQ-9 Score:       4/2/2025     8:08 AM   PHQ-9 SCORE   PHQ-9 Total Score MyChart 0   PHQ-9 Total Score 0        Proxy-reported           4/3/2024   Substance Use   Alcohol more than 3/day or more than 7/wk No   Do you use any other substances recreationally? No     Social History     Tobacco Use    Smoking status: Never     Passive exposure: Never    Smokeless tobacco: Never   Vaping Use    Vaping status: Never Used   Substance Use Topics    Alcohol use: Yes     Comment: very moderate    Drug use: No           6/3/2024   LAST FHS-7 RESULTS   1st degree relative breast or ovarian cancer No   Any relative bilateral breast cancer No   Any male have breast cancer No   Any ONE woman have BOTH breast AND ovarian cancer No   Any woman with breast cancer before 50yrs No   2 or more relatives with breast AND/OR ovarian cancer No   2 or more relatives with breast AND/OR bowel cancer No        Mammogram Screening - Mammogram every 1-2 years updated in Health Maintenance based on mutual decision making      History of abnormal Pap smear: Status post hysterectomy with removal of cervix and no history of CIN2 or greater or cervical cancer. Health Maintenance and Surgical History updated.        ASCVD Risk   The 10-year ASCVD risk score (Ajit LOPES, et al., 2019) is: 2.6%    Values used to calculate the score:      Age: 60 years      Sex: Female      Is Non- : No      Diabetic: No      Tobacco smoker: No      Systolic Blood Pressure: 119 mmHg      Is BP treated: No      HDL Cholesterol: 50 mg/dL      Total Cholesterol: 164 mg/dL          Reviewed and updated as needed this visit by Provider                    Past Medical History:   Diagnosis Date    Allergies     Breast cancer (H)     Depressive disorder 2020    Hyperlipidemia 10/14/2014    Major depressive disorder, recurrent episode, mild 05/17/2022    Malignant neoplasm of female breast (H) 08/15/2019    Formatting of this note might be different from the original.  8/2019. Left.  Formatting of this note is different from the original.  Formatting of this note might be different from the original.  8/2019. Left.     Formatting of this note might be different from the original.  6.5.2020- Review and distribute treatment summary-[place]  Added automatically from request for surgery [id number]       Mixed incontinence 03/02/2023    Monoclonal gammopathy 10/08/2013    Formatting of this note might be different from the original.  Last Assessment & Plan:   Formatting of this note might be different from the original.  Merlyn Ravi  has a history of IgG kappa MGUS. The MGUS was discovered after she had been found to have an elevated gamma globulin fraction when she underwent blood testing prior to donating blood at [facility]. She was found to have a 0.4 gram/dL I    Neuropathy 08/29/2023    Obesity (BMI 35.0-39.9) with comorbidity (H) 01/25/2019    Other specified hypothyroidism 08/29/2019    PONV (postoperative nausea and vomiting) 07/07/2022    Retinal tear 2014    Rosacea 08/29/2023     Past Surgical History:   Procedure Laterality Date    APPENDECTOMY  08/1998    ARTHROSCOPY KNEE Left 07/14/2022    Procedure:  ARTHROSCOPY, LEFT KNEE with meniscal and chondral debridement;  Surgeon: Timo Berman MD;  Location:  OR    BACK SURGERY  2014    Severe Scoliosis    BIOPSY BREAST      COLONOSCOPY  09/23/2014    Severe Diverticulosis of lower intestines    HEAD & NECK SURGERY  1998    Herniated disk - C6&C7    HYSTERECTOMY, PAP NO LONGER INDICATED      HYSTERECTOMY, MAGY      LASIK BILATERAL  01/01/2005    Dr. Solis     LUMPECTOMY BREAST Left 09/30/2019    Procedure: Re-excision of left breast lumpectomy site;  Surgeon: Kj Salas DO;  Location: [facility] OR    LUMPECTOMY BREAST Left 10/09/2019    Procedure: Left breast re-excision;  Surgeon: Kj Salas DO;  Location: [facility] OR    LUMPECTOMY BREAST WITH SENTINEL NODE, COMBINED Left 09/23/2019    Procedure: LUMPECTOMY, BREAST, WITH SENTINEL LYMPH NODE BIOPSY.;  Surgeon: Kj Salas DO;  Location: [facility] OR    [facility] NONSPECIFIC PROCEDURE      bladder surgery     OB History   No obstetric history on file.     BP Readings from Last 3 Encounters:   04/02/25 119/85   12/27/24 (!) 150/80   12/19/24 135/88    Wt Readings from Last 3 Encounters:   04/02/25 100.7 kg (222 lb)   03/24/25 100.7 kg (222 lb)   12/27/24 99.3 kg (219 lb)                  Patient Active Problem List   Diagnosis    LASIK OU 5 yrs    Obesity (BMI 35.0-39.9) with comorbidity (H)    Other specified hypothyroidism    Major depressive disorder, recurrent episode, mild    Bucket handle tear of lateral meniscus of left knee, unspecified whether old or current tear, subsequent encounter    Hyperlipidemia    Monoclonal gammopathy    Mixed incontinence    Vaginal atrophy    Rosacea    Neuropathy     Past Surgical History:   Procedure Laterality Date    APPENDECTOMY  08/1998    ARTHROSCOPY KNEE Left 07/14/2022    Procedure: ARTHROSCOPY, LEFT KNEE with meniscal and chondral debridement;  Surgeon: Timo Berman MD;  Location:  OR    BACK SURGERY  2014    Severe Scoliosis    BIOPSY BREAST       COLONOSCOPY  09/23/2014    Severe Diverticulosis of lower intestines    HEAD & NECK SURGERY  1998    Herniated disk - C6&C7    HYSTERECTOMY, PAP NO LONGER INDICATED      HYSTERECTOMY, MAGY      LASIK BILATERAL  01/01/2005    Dr. Solis     LUMPECTOMY BREAST Left 09/30/2019    Procedure: Re-excision of left breast lumpectomy site;  Surgeon: Kj Salas DO;  Location: [facility] OR    LUMPECTOMY BREAST Left 10/09/2019    Procedure: Left breast re-excision;  Surgeon: Kj Salas DO;  Location: [facility] OR    LUMPECTOMY BREAST WITH SENTINEL NODE, COMBINED Left 09/23/2019    Procedure: LUMPECTOMY, BREAST, WITH SENTINEL LYMPH NODE BIOPSY.;  Surgeon: Kj Salas DO;  Location: [facility] OR    [facility] NONSPECIFIC PROCEDURE      bladder surgery       Social History     Tobacco Use    Smoking status: Never     Passive exposure: Never    Smokeless tobacco: Never   Substance Use Topics    Alcohol use: Yes     Comment: very moderate     Family History   Problem Relation Age of Onset    Thyroid Disease Mother     Heart Disease Mother     Heart Disease Father     Hyperlipidemia Father     Hypertension Maternal Grandmother     Breast Cancer Maternal Grandmother         1978    Hypertension Maternal Grandfather     Alzheimer Disease Paternal Grandmother     Prostate Cancer Paternal Grandfather         1962    Diabetes Brother         Type 1    Eczema Brother     Thyroid Disease Brother     Melanoma No family hx of     Glaucoma No family hx of     Macular Degeneration No family hx of          Current Outpatient Medications   Medication Sig Dispense Refill    atorvastatin (LIPITOR) 10 MG tablet TAKE 1 TABLET(10 MG) BY MOUTH DAILY 90 tablet 2    doxycycline hyclate (PERIOSTAT) 20 MG tablet Take 2 tablets (40 mg) by mouth daily. 180 tablet 3    methimazole (TAPAZOLE) 5 MG tablet Take 0.5 tablets (2.5 mg) by mouth once a week. 6 tablet 3    oxymetazoline HCl (RHOFADE) 1 % cream Apply a pea-sized amount once daily to the  "entire face or affected area avoiding the eyes and lips. Wash hands immediately after applying. 30 g 4    tolterodine ER (DETROL LA) 4 MG 24 hr capsule Take 1 capsule (4 mg) by mouth daily 90 capsule 3     Allergies   Allergen Reactions    Demerol      Sedation and vomiting    Meperidine     Seasonal Allergies     Sulfa Antibiotics Hives     unknown reaction         Review of Systems   Constitutional: Negative.    HENT: Negative.     Eyes: Negative.    Respiratory: Negative.     Cardiovascular: Negative.    Gastrointestinal: Negative.    Endocrine: Negative.    Genitourinary: Negative.    Musculoskeletal: Negative.    Skin:         Lumps on her low back   Allergic/Immunologic: Negative.    Neurological: Negative.    Hematological: Negative.    Psychiatric/Behavioral: Negative.           Objective    Exam  /85 (BP Location: Right arm, Patient Position: Sitting, Cuff Size: Adult Large)   Pulse 77   Temp 97.9  F (36.6  C) (Tympanic)   Resp 20   Ht 1.65 m (5' 4.96\")   Wt 100.7 kg (222 lb)   SpO2 95%   BMI 36.99 kg/m     Estimated body mass index is 36.99 kg/m  as calculated from the following:    Height as of this encounter: 1.65 m (5' 4.96\").    Weight as of this encounter: 100.7 kg (222 lb).    Physical Exam  Constitutional:       Appearance: Normal appearance.   HENT:      Head: Normocephalic and atraumatic.      Right Ear: Tympanic membrane normal.      Left Ear: Tympanic membrane normal.      Mouth/Throat:      Mouth: Mucous membranes are moist.   Eyes:      Extraocular Movements: Extraocular movements intact.      Pupils: Pupils are equal, round, and reactive to light.   Cardiovascular:      Rate and Rhythm: Normal rate and regular rhythm.      Pulses: Normal pulses.      Heart sounds: Normal heart sounds. No murmur heard.     No friction rub. No gallop.   Pulmonary:      Effort: Pulmonary effort is normal. No respiratory distress.      Breath sounds: Normal breath sounds. No stridor. No wheezing, " rhonchi or rales.   Chest:      Chest wall: No tenderness.   Abdominal:      General: Abdomen is flat. Bowel sounds are normal.      Palpations: Abdomen is soft.   Musculoskeletal:         General: Tenderness present. Normal range of motion.   Skin:     Findings: Lesion present.   Neurological:      Mental Status: She is alert and oriented to person, place, and time.   Psychiatric:         Mood and Affect: Mood normal.         Behavior: Behavior normal.               Signed Electronically by: Memo Garcia MD    Answers submitted by the patient for this visit:  Patient Health Questionnaire (Submitted on 4/2/2025)  If you checked off any problems, how difficult have these problems made it for you to do your work, take care of things at home, or get along with other people?: Not difficult at all  PHQ9 TOTAL SCORE: 0

## 2025-04-04 ENCOUNTER — HOSPITAL ENCOUNTER (OUTPATIENT)
Dept: ULTRASOUND IMAGING | Facility: CLINIC | Age: 60
Discharge: HOME | End: 2025-04-04
Attending: FAMILY MEDICINE | Admitting: FAMILY MEDICINE
Payer: COMMERCIAL

## 2025-04-04 DIAGNOSIS — R22.9 SKIN LUMPS: ICD-10-CM

## 2025-04-04 PROCEDURE — 76882 US LMTD JT/FCL EVL NVASC XTR: CPT | Mod: RT

## 2025-04-04 PROCEDURE — 76705 ECHO EXAM OF ABDOMEN: CPT

## 2025-04-15 ENCOUNTER — OFFICE VISIT (OUTPATIENT)
Dept: SURGERY | Facility: CLINIC | Age: 60
End: 2025-04-15
Attending: FAMILY MEDICINE
Payer: COMMERCIAL

## 2025-04-15 VITALS
TEMPERATURE: 98 F | DIASTOLIC BLOOD PRESSURE: 79 MMHG | SYSTOLIC BLOOD PRESSURE: 118 MMHG | HEIGHT: 65 IN | WEIGHT: 222 LBS | BODY MASS INDEX: 36.99 KG/M2 | HEART RATE: 72 BPM

## 2025-04-15 DIAGNOSIS — D17.30 LIPOMA OF SKIN AND SUBCUTANEOUS TISSUE: ICD-10-CM

## 2025-04-15 PROCEDURE — 3078F DIAST BP <80 MM HG: CPT | Performed by: SURGERY

## 2025-04-15 PROCEDURE — 99213 OFFICE O/P EST LOW 20 MIN: CPT | Performed by: SURGERY

## 2025-04-15 PROCEDURE — 3074F SYST BP LT 130 MM HG: CPT | Performed by: SURGERY

## 2025-04-15 NOTE — LETTER
4/15/2025      Merlyn Ravi  [address] [facility] [id number]      Dear Colleague,    Thank you for referring your patient, Merlyn Ravi, to the [facility]. Please see a copy of my visit note below.    Surgical Consultation/History and Physical  [facility]    Merlyn is seen in consultation for Masses on back and arms, at the request of Memo Garcia MD.    Chief Complaint:  Masses on back and arms    History of Present Illness: Merlyn Ravi is a 60 year old female presents with masses on back and arms.  Known about for several years.  They have increased in size and causing discomfort.  Denies skin changes over the area.  No similar lesions in past.    Patient Active Problem List   Diagnosis     LASIK OU 5 yrs     Obesity (BMI 35.0-39.9) with comorbidity (H)     Other specified hypothyroidism     Major depressive disorder, recurrent episode, mild     Bucket handle tear of lateral meniscus of left knee, unspecified whether old or current tear, subsequent encounter     Hyperlipidemia     Monoclonal gammopathy     Mixed incontinence     Vaginal atrophy     Rosacea     Neuropathy       Past Medical History:   Diagnosis Date     Allergies      Breast cancer (H)      Depressive disorder 2020     Hyperlipidemia 10/14/2014     Major depressive disorder, recurrent episode, mild 05/17/2022     Malignant neoplasm of female breast (H) 08/15/2019    Formatting of this note might be different from the original.  8/2019. Left.  Formatting of this note is different from the original.  Formatting of this note might be different from the original.  8/2019. Left.     Formatting of this note might be different from the original.  6.5.2020- Review and distribute treatment summary-[facility]  Added automatically from request for surgery [id number]        Mixed incontinence 03/02/2023     Monoclonal gammopathy 10/08/2013    Formatting of this note might  be different from the original.  Last Assessment & Plan:   Formatting of this note might be different from the original.  Merlyn Ravi  has a history of IgG kappa MGUS. The MGUS was discovered after she had been found to have an elevated gamma globulin fraction when she underwent blood testing [facility]. She was found to have a 0.4 gram/dL I     Neuropathy 08/29/2023     Obesity (BMI 35.0-39.9) with comorbidity (H) 01/25/2019     Other specified hypothyroidism 08/29/2019     PONV (postoperative nausea and vomiting) 07/07/2022     Retinal tear 2014     Rosacea 08/29/2023       Past Surgical History:   Procedure Laterality Date     APPENDECTOMY  08/1998     ARTHROSCOPY KNEE Left 07/14/2022    Procedure: ARTHROSCOPY, LEFT KNEE with meniscal and chondral debridement;  Surgeon: Timo Berman MD;  Location:  OR     BACK SURGERY  2014    Severe Scoliosis     BIOPSY BREAST       COLONOSCOPY  09/23/2014    Severe Diverticulosis of lower intestines     HEAD & NECK SURGERY  1998    Herniated disk - C6&C7     HYSTERECTOMY, PAP NO LONGER INDICATED       HYSTERECTOMY, MAGY       LASIK BILATERAL  01/01/2005    Dr. Solis      LUMPECTOMY BREAST Left 09/30/2019    Procedure: Re-excision of left breast lumpectomy site;  Surgeon: Kj Salas DO;  Location: [facility] OR     LUMPECTOMY BREAST Left 10/09/2019    Procedure: Left breast re-excision;  Surgeon: Kj Salas DO;  Location: [facility] OR     LUMPECTOMY BREAST WITH SENTINEL NODE, COMBINED Left 09/23/2019    Procedure: LUMPECTOMY, BREAST, WITH SENTINEL LYMPH NODE BIOPSY.;  Surgeon: Kj Salas DO;  Location: [facility] OR     [facility] NONSPECIFIC PROCEDURE      bladder surgery       Family History   Problem Relation Age of Onset     Thyroid Disease Mother      Heart Disease Mother      Heart Disease Father      Hyperlipidemia Father      Hypertension Maternal Grandmother      Breast Cancer Maternal Grandmother         1978      "Hypertension Maternal Grandfather      Alzheimer Disease Paternal Grandmother      Prostate Cancer Paternal Grandfather         1962     Diabetes Brother         Type 1     Eczema Brother      Thyroid Disease Brother      Melanoma No family hx of      Glaucoma No family hx of      Macular Degeneration No family hx of        Social History     Tobacco Use     Smoking status: Never     Passive exposure: Never     Smokeless tobacco: Never   Substance Use Topics     Alcohol use: Yes     Comment: very moderate        History   Drug Use No       Current Outpatient Medications   Medication Sig Dispense Refill     atorvastatin (LIPITOR) 10 MG tablet TAKE 1 TABLET(10 MG) BY MOUTH DAILY 90 tablet 2     doxycycline hyclate (PERIOSTAT) 20 MG tablet Take 2 tablets (40 mg) by mouth daily. 180 tablet 3     methimazole (TAPAZOLE) 5 MG tablet Take 0.5 tablets (2.5 mg) by mouth once a week. 6 tablet 3     oxymetazoline HCl (RHOFADE) 1 % cream Apply a pea-sized amount once daily to the entire face or affected area avoiding the eyes and lips. Wash hands immediately after applying. 30 g 4     tolterodine ER (DETROL LA) 4 MG 24 hr capsule Take 1 capsule (4 mg) by mouth daily 90 capsule 3       Allergies   Allergen Reactions     Demerol      Sedation and vomiting     Meperidine      Seasonal Allergies      Sulfa Antibiotics Hives     unknown reaction       Review of Systems:   5 point ROS otherwise negative    Physical Exam:  /79 (BP Location: Right arm, Patient Position: Sitting, Cuff Size: Adult Regular)   Pulse 72   Temp 98  F (36.7  C) (Tympanic)   Ht 1.65 m (5' 4.96\")   Wt 100.7 kg (222 lb)   BMI 36.99 kg/m      Constitutional- No acute distress, well nourished, non-toxic  Eyes: Anicteric, no injection.  PERRL  ENT:  Normocephalic, atraumatic, Nose midline, moist mucus membranes  Neck - supple, trachea midline  Respiratory- Good inspiratory effort  Cardiovascular - No peripheral edema.  No clubbing.  Neuro - No focal neuro " deficits, Alert and oriented x 3  Psych: Appropriate mood and affect  Musculoskeletal: Normal gait, symmetric strength.  FROM upper and lower extremities.  Skin: Warm, Dry; Superficial masses consistent with lipomas bilateral low back, 2 right upper volar forearm, 1 left elbow consistent with lipomas    EXAM: US SOFT TISSUE ABDOMINAL WALL OR LOWER BACK  LOCATION: [facility]  DATE: 4/4/2025     INDICATION: Skin lumps  COMPARISON: None.     TECHNIQUE: Routine.                                                                      IMPRESSION: Ultrasound images of the area of concern within the left back demonstrates a 1.3 x 1.4 x 0.8 cm echogenic area. 2 echogenic areas within the right back measuring 0.5 x 0.4 x 0.6 cm and 0.4 x 0.4 x 0.5 cm.  Findings suggest small lipoma.    Assessment:  Merlyn Ravi has  multiple painful masses low back and bilateral upper extremities, all under 2 cm in size . She may benefit from excision of this mass, which is being scheduled as an in-office procedure.  The indications, risks, benefits and alternatives of excision of this mass were discussed with Merlyn Ravi. The risks discussed included but were not limited to bleeding, infection, seroma, need for additional treatment, nontherapeutic intervention, wound complication (such as dehiscence), and rare complications related to surgery and/or anesthesia such as venous thromboembolism and cardiorespiratory complications.  All of her questions were answered thoroughly and to her satisfaction, and informed consent was obtained. Consent was verified via teach back methodology.      Kj Salas DO on 4/15/2025 at 7:05 AM      Again, thank you for allowing me to participate in the care of your patient.        Sincerely,        Kj Salas DO    Electronically signed

## 2025-04-15 NOTE — PROGRESS NOTES
Surgical Consultation/History and Physical  [facility]    Merlyn is seen in consultation for Masses on back and arms, at the request of Memo Garcia MD.    Chief Complaint:  Masses on back and arms    History of Present Illness: Merlyn Ravi is a 60 year old female presents with masses on back and arms.  Known about for several years.  They have increased in size and causing discomfort.  Denies skin changes over the area.  No similar lesions in past.    Patient Active Problem List   Diagnosis    LASIK OU 5 yrs    Obesity (BMI 35.0-39.9) with comorbidity (H)    Other specified hypothyroidism    Major depressive disorder, recurrent episode, mild    Bucket handle tear of lateral meniscus of left knee, unspecified whether old or current tear, subsequent encounter    Hyperlipidemia    Monoclonal gammopathy    Mixed incontinence    Vaginal atrophy    Rosacea    Neuropathy       Past Medical History:   Diagnosis Date    Allergies     Breast cancer (H)     Depressive disorder 2020    Hyperlipidemia 10/14/2014    Major depressive disorder, recurrent episode, mild 05/17/2022    Malignant neoplasm of female breast (H) 08/15/2019    Formatting of this note might be different from the original.  8/2019. Left.  Formatting of this note is different from the original.  Formatting of this note might be different from the original.  8/2019. Left.     Formatting of this note might be different from the original.  6.5.2020- Review and distribute treatment summary-[facility]  Added automatically from request for surgery [id number]       Mixed incontinence 03/02/2023    Monoclonal gammopathy 10/08/2013    Formatting of this note might be different from the original.  Last Assessment & Plan:   Formatting of this note might be different from the original.  Merlyn Ravi  has a history of IgG kappa MGUS. The MGUS was discovered after she had been found to have an elevated gamma globulin fraction when  she underwent blood testing prior to donating blood at [facility]. She was found to have a 0.4 gram/dL I    Neuropathy 08/29/2023    Obesity (BMI 35.0-39.9) with comorbidity (H) 01/25/2019    Other specified hypothyroidism 08/29/2019    PONV (postoperative nausea and vomiting) 07/07/2022    Retinal tear 2014    Rosacea 08/29/2023       Past Surgical History:   Procedure Laterality Date    APPENDECTOMY  08/1998    ARTHROSCOPY KNEE Left 07/14/2022    Procedure: ARTHROSCOPY, LEFT KNEE with meniscal and chondral debridement;  Surgeon: Timo Berman MD;  Location:  OR    BACK SURGERY  2014    Severe Scoliosis    BIOPSY BREAST      COLONOSCOPY  09/23/2014    Severe Diverticulosis of lower intestines    HEAD & NECK SURGERY  1998    Herniated disk - C6&C7    HYSTERECTOMY, PAP NO LONGER INDICATED      HYSTERECTOMY, MAGY      LASIK BILATERAL  01/01/2005    Dr. Solis     LUMPECTOMY BREAST Left 09/30/2019    Procedure: Re-excision of left breast lumpectomy site;  Surgeon: Kj Salas DO;  Location: [place] OR    LUMPECTOMY BREAST Left 10/09/2019    Procedure: Left breast re-excision;  Surgeon: Kj Salas DO;  Location: [facility] OR    LUMPECTOMY BREAST WITH SENTINEL NODE, COMBINED Left 09/23/2019    Procedure: LUMPECTOMY, BREAST, WITH SENTINEL LYMPH NODE BIOPSY.;  Surgeon: Kj Salas DO;  Location: [place] OR    [facility] NONSPECIFIC PROCEDURE      bladder surgery       Family History   Problem Relation Age of Onset    Thyroid Disease Mother     Heart Disease Mother     Heart Disease Father     Hyperlipidemia Father     Hypertension Maternal Grandmother     Breast Cancer Maternal Grandmother         1978    Hypertension Maternal Grandfather     Alzheimer Disease Paternal Grandmother     Prostate Cancer Paternal Grandfather         1962    Diabetes Brother         Type 1    Eczema Brother     Thyroid Disease Brother     Melanoma No family hx of     Glaucoma No family hx of     Macular Degeneration No family  "hx of        Social History     Tobacco Use    Smoking status: Never     Passive exposure: Never    Smokeless tobacco: Never   Substance Use Topics    Alcohol use: Yes     Comment: very moderate        History   Drug Use No       Current Outpatient Medications   Medication Sig Dispense Refill    atorvastatin (LIPITOR) 10 MG tablet TAKE 1 TABLET(10 MG) BY MOUTH DAILY 90 tablet 2    doxycycline hyclate (PERIOSTAT) 20 MG tablet Take 2 tablets (40 mg) by mouth daily. 180 tablet 3    methimazole (TAPAZOLE) 5 MG tablet Take 0.5 tablets (2.5 mg) by mouth once a week. 6 tablet 3    oxymetazoline HCl (RHOFADE) 1 % cream Apply a pea-sized amount once daily to the entire face or affected area avoiding the eyes and lips. Wash hands immediately after applying. 30 g 4    tolterodine ER (DETROL LA) 4 MG 24 hr capsule Take 1 capsule (4 mg) by mouth daily 90 capsule 3       Allergies   Allergen Reactions    Demerol      Sedation and vomiting    Meperidine     Seasonal Allergies     Sulfa Antibiotics Hives     unknown reaction       Review of Systems:   5 point ROS otherwise negative    Physical Exam:  /79 (BP Location: Right arm, Patient Position: Sitting, Cuff Size: Adult Regular)   Pulse 72   Temp 98  F (36.7  C) (Tympanic)   Ht 1.65 m (5' 4.96\")   Wt 100.7 kg (222 lb)   BMI 36.99 kg/m      Constitutional- No acute distress, well nourished, non-toxic  Eyes: Anicteric, no injection.  PERRL  ENT:  Normocephalic, atraumatic, Nose midline, moist mucus membranes  Neck - supple, trachea midline  Respiratory- Good inspiratory effort  Cardiovascular - No peripheral edema.  No clubbing.  Neuro - No focal neuro deficits, Alert and oriented x 3  Psych: Appropriate mood and affect  Musculoskeletal: Normal gait, symmetric strength.  FROM upper and lower extremities.  Skin: Warm, Dry; Superficial masses consistent with lipomas bilateral low back, 2 right upper volar forearm, 1 left elbow consistent with lipomas    EXAM: US SOFT " TISSUE ABDOMINAL WALL OR LOWER BACK  LOCATION: [facility]  DATE: 4/4/2025     INDICATION: Skin lumps  COMPARISON: None.     TECHNIQUE: Routine.                                                                      IMPRESSION: Ultrasound images of the area of concern within the left back demonstrates a 1.3 x 1.4 x 0.8 cm echogenic area. 2 echogenic areas within the right back measuring 0.5 x 0.4 x 0.6 cm and 0.4 x 0.4 x 0.5 cm.  Findings suggest small lipoma.    Assessment:  Merlyn Ravi has  multiple painful masses low back and bilateral upper extremities, all under 2 cm in size . She may benefit from excision of this mass, which is being scheduled as an in-office procedure.  The indications, risks, benefits and alternatives of excision of this mass were discussed with Merlyn Ravi. The risks discussed included but were not limited to bleeding, infection, seroma, need for additional treatment, nontherapeutic intervention, wound complication (such as dehiscence), and rare complications related to surgery and/or anesthesia such as venous thromboembolism and cardiorespiratory complications.  All of her questions were answered thoroughly and to her satisfaction, and informed consent was obtained. Consent was verified via teach back methodology.      Kj Salas DO on 4/15/2025 at 7:05 AM

## 2025-05-05 ENCOUNTER — PATIENT OUTREACH (OUTPATIENT)
Dept: CARE COORDINATION | Facility: CLINIC | Age: 60
End: 2025-05-05
Payer: COMMERCIAL

## 2025-05-27 ENCOUNTER — OFFICE VISIT (OUTPATIENT)
Dept: SURGERY | Facility: CLINIC | Age: 60
End: 2025-05-27
Payer: COMMERCIAL

## 2025-05-27 VITALS
SYSTOLIC BLOOD PRESSURE: 128 MMHG | HEIGHT: 65 IN | DIASTOLIC BLOOD PRESSURE: 85 MMHG | TEMPERATURE: 97.7 F | WEIGHT: 222 LBS | BODY MASS INDEX: 36.99 KG/M2 | HEART RATE: 65 BPM

## 2025-05-27 DIAGNOSIS — D17.30 LIPOMA OF SKIN AND SUBCUTANEOUS TISSUE: Primary | ICD-10-CM

## 2025-05-27 PROCEDURE — 3079F DIAST BP 80-89 MM HG: CPT | Performed by: SURGERY

## 2025-05-27 PROCEDURE — 24075 EXC ARM/ELBOW LES SC < 3 CM: CPT | Mod: RT | Performed by: SURGERY

## 2025-05-27 PROCEDURE — 3074F SYST BP LT 130 MM HG: CPT | Performed by: SURGERY

## 2025-05-27 PROCEDURE — 21930 EXC BACK LES SC < 3 CM: CPT | Performed by: SURGERY

## 2025-05-27 NOTE — LETTER
5/27/2025      Merlyn Ravi  [id number] [facility] [id number]      Dear Colleague,    Thank you for referring your patient, Merlyn Ravi, to the [facility]. Please see a copy of my visit note below.    Procedure Date: 05/27/25     PREOPERATIVE DIAGNOSIS:   1. Excision bilateral upper extremities  2. Excision back lipomas    POSTOPERATIVE DIAGNOSIS: Same     PROCEDURE:   1. Excision bilateral upper extremities  2. Excision of back lipomas     ATTENDING SURGEON: Kj Salas DO    ESTIMATED BLOOD LOSS: Minimal    ANESTHESIA: Local Anesthesia     INDICATIONS FOR PROCEDURE: : Merlyn Ravi presents with a history of an enlarging, tender bilateral upper extremity and low back masses. After discussion was held with the patient regarding indications, risks, benefits and alternatives, benefits, and risks, her questions were addressed and she understood and wished to proceed. Specific risks discussed included bleeding, infection, seroma, need for additional treatment, nontherapeutic intervention, wound complication (such as dehiscence), recurrence, and rare complications related to surgery and/or anesthesia such as venous thromboembolism and cardiorespiratory complications.     PROCEDURE: After informed consent was obtained, the patient was brought to the procedure room.     Local anesthetic was delivered, and an incision was carried out over both right upper extremity the masses. The surrounding tissue was carefully dissected to free the mass, to a width of 1.5 cm, length of 1.0 cm, and subcutaneous depth of 1.0cm. The surrounding tissue was carefully dissected to free the distal mass, to a width of 1.0 cm, length of 1.0 cm, and subcutaneous depth of 1.0 cm.  Once excised this was passed off the field and sent routine to pathology. Hemostasis was assured.     The subcutaneous tissue was closed in layers using interrupted 3-0 Vicryl suture, and skin was closed using a  subcuticular suture of 4-0 Monocryl. The skin was cleansed and dried, before administration of Dermabond glue, set to function as a sterile bandage.     The patient was repositioned in prone position.  Local anesthetic was delivered, and an incision was carried out over a solitary right back mass. The surrounding tissue was carefully dissected to free the mass, to a width of 1.5 cm, length of 1.0 cm, and subcutaneous depth of 1.5 cm. Additional local was placed over the left masses (2) The surrounding tissue was carefully dissected to free which appeared to be a single lesion which was removed piecemeal, to a width of 2.0  cm, length of 1.5 cm, and subcutaneous depth of 1.5 cm.  Once excised this was passed off the field and sent routine to pathology. Hemostasis was assured.     In the prone position the left upper extremity mass overlying the elbow was able to be prepped and draped in sterile fashion.  Local anesthetic was delivered, and an incision was carried out. The surrounding tissue was carefully dissected to free the mass, to a width of 1.5 cm, length of 1.0 cm, and subcutaneous depth of 1.5 cm.  This appeared to be a lobulated lipoma      The subcutaneous tissue was closed in layers using interrupted 3-0 Vicryl suture, and skin was closed using a subcuticular suture of 4-0 Monocryl. The skin was cleansed and dried, before administration of Dermabond glue, set to function as a sterile bandage.      The patient tolerated the procedure well.    Kj Salas DO on 5/27/2025 at 12:06 PM      Again, thank you for allowing me to participate in the care of your patient.        Sincerely,        Kj Salas DO    Electronically signed

## 2025-05-27 NOTE — PROGRESS NOTES
Med rec complete per pt. Allergies reviewed with pt. No ABX taken in the last 2 weeks.   Procedure Date: 05/27/25     PREOPERATIVE DIAGNOSIS:   1. Excision bilateral upper extremities  2. Excision back lipomas    POSTOPERATIVE DIAGNOSIS: Same     PROCEDURE:   1. Excision bilateral upper extremities  2. Excision of back lipomas     ATTENDING SURGEON: Kj Salas DO    ESTIMATED BLOOD LOSS: Minimal    ANESTHESIA: Local Anesthesia     INDICATIONS FOR PROCEDURE: : Merlyn Ravi presents with a history of an enlarging, tender bilateral upper extremity and low back masses. After discussion was held with the patient regarding indications, risks, benefits and alternatives, benefits, and risks, her questions were addressed and she understood and wished to proceed. Specific risks discussed included bleeding, infection, seroma, need for additional treatment, nontherapeutic intervention, wound complication (such as dehiscence), recurrence, and rare complications related to surgery and/or anesthesia such as venous thromboembolism and cardiorespiratory complications.     PROCEDURE: After informed consent was obtained, the patient was brought to the procedure room.     Local anesthetic was delivered, and an incision was carried out over both right upper extremity the masses. The surrounding tissue was carefully dissected to free the mass, to a width of 1.5 cm, length of 1.0 cm, and subcutaneous depth of 1.0cm. The surrounding tissue was carefully dissected to free the distal mass, to a width of 1.0 cm, length of 1.0 cm, and subcutaneous depth of 1.0 cm.  Once excised this was passed off the field and sent routine to pathology. Hemostasis was assured.     The subcutaneous tissue was closed in layers using interrupted 3-0 Vicryl suture, and skin was closed using a subcuticular suture of 4-0 Monocryl. The skin was cleansed and dried, before administration of Dermabond glue, set to function as a sterile bandage.     The patient was repositioned in prone position.  Local anesthetic was delivered, and an incision  was carried out over a solitary right back mass. The surrounding tissue was carefully dissected to free the mass, to a width of 1.5 cm, length of 1.0 cm, and subcutaneous depth of 1.5 cm. Additional local was placed over the left masses (2) The surrounding tissue was carefully dissected to free which appeared to be a single lesion which was removed piecemeal, to a width of 2.0  cm, length of 1.5 cm, and subcutaneous depth of 1.5 cm.  Once excised this was passed off the field and sent routine to pathology. Hemostasis was assured.     In the prone position the left upper extremity mass overlying the elbow was able to be prepped and draped in sterile fashion.  Local anesthetic was delivered, and an incision was carried out. The surrounding tissue was carefully dissected to free the mass, to a width of 1.5 cm, length of 1.0 cm, and subcutaneous depth of 1.5 cm.  This appeared to be a lobulated lipoma      The subcutaneous tissue was closed in layers using interrupted 3-0 Vicryl suture, and skin was closed using a subcuticular suture of 4-0 Monocryl. The skin was cleansed and dried, before administration of Dermabond glue, set to function as a sterile bandage.      The patient tolerated the procedure well.    Kj Salas, DO on 5/27/2025 at 12:06 PM

## 2025-05-27 NOTE — NURSING NOTE
"Initial /85 (BP Location: Right arm, Patient Position: Sitting, Cuff Size: Adult Large)   Pulse 65   Temp 97.7  F (36.5  C) (Tympanic)   Ht 1.65 m (5' 4.96\")   Wt 100.7 kg (222 lb)   BMI 36.99 kg/m   Estimated body mass index is 36.99 kg/m  as calculated from the following:    Height as of this encounter: 1.65 m (5' 4.96\").    Weight as of this encounter: 100.7 kg (222 lb). .  Danni Brock MA    "

## 2025-05-29 LAB
PATH REPORT.COMMENTS IMP SPEC: NORMAL
PATH REPORT.COMMENTS IMP SPEC: NORMAL
PATH REPORT.FINAL DX SPEC: NORMAL
PATH REPORT.GROSS SPEC: NORMAL
PATH REPORT.MICROSCOPIC SPEC OTHER STN: NORMAL
PATH REPORT.RELEVANT HX SPEC: NORMAL
PHOTO IMAGE: NORMAL

## 2025-06-16 ENCOUNTER — HOSPITAL ENCOUNTER (OUTPATIENT)
Dept: MAMMOGRAPHY | Facility: CLINIC | Age: 60
Discharge: HOME | End: 2025-06-16
Attending: FAMILY MEDICINE | Admitting: FAMILY MEDICINE
Payer: COMMERCIAL

## 2025-06-16 DIAGNOSIS — Z12.31 VISIT FOR SCREENING MAMMOGRAM: ICD-10-CM

## 2025-06-16 PROCEDURE — 77063 BREAST TOMOSYNTHESIS BI: CPT

## 2025-08-05 ENCOUNTER — MYC MEDICAL ADVICE (OUTPATIENT)
Dept: ENDOCRINOLOGY | Facility: CLINIC | Age: 60
End: 2025-08-05
Payer: COMMERCIAL

## 2025-08-05 DIAGNOSIS — E06.3 HASHIMOTO'S THYROIDITIS: Primary | ICD-10-CM

## 2025-08-14 DIAGNOSIS — R32 URINARY INCONTINENCE, UNSPECIFIED TYPE: ICD-10-CM

## 2025-08-14 RX ORDER — TOLTERODINE 4 MG/1
4 CAPSULE, EXTENDED RELEASE ORAL DAILY
Qty: 90 CAPSULE | Refills: 1 | Status: SHIPPED | OUTPATIENT
Start: 2025-08-14

## 2025-08-18 ENCOUNTER — LAB (OUTPATIENT)
Dept: LAB | Facility: CLINIC | Age: 60
End: 2025-08-18
Payer: COMMERCIAL

## 2025-08-18 DIAGNOSIS — E03.9 HYPOTHYROIDISM, UNSPECIFIED TYPE: ICD-10-CM

## 2025-08-18 DIAGNOSIS — E06.3 HASHIMOTO'S THYROIDITIS: ICD-10-CM

## 2025-08-18 LAB
T4 FREE SERPL-MCNC: 0.89 NG/DL (ref 0.9–1.7)
TSH SERPL DL<=0.005 MIU/L-ACNC: 13.6 UIU/ML (ref 0.3–4.2)

## 2025-08-18 PROCEDURE — 86376 MICROSOMAL ANTIBODY EACH: CPT

## 2025-08-18 PROCEDURE — 84443 ASSAY THYROID STIM HORMONE: CPT

## 2025-08-18 PROCEDURE — 36415 COLL VENOUS BLD VENIPUNCTURE: CPT

## 2025-08-18 PROCEDURE — 84439 ASSAY OF FREE THYROXINE: CPT

## 2025-08-19 ENCOUNTER — MYC MEDICAL ADVICE (OUTPATIENT)
Dept: ENDOCRINOLOGY | Facility: CLINIC | Age: 60
End: 2025-08-19
Payer: COMMERCIAL

## 2025-08-19 LAB — THYROPEROXIDASE AB SERPL-ACNC: 1108 IU/ML

## 2025-08-20 ENCOUNTER — TELEPHONE (OUTPATIENT)
Dept: ENDOCRINOLOGY | Facility: CLINIC | Age: 60
End: 2025-08-20
Payer: COMMERCIAL

## 2025-08-20 ENCOUNTER — VIRTUAL VISIT (OUTPATIENT)
Dept: ENDOCRINOLOGY | Facility: CLINIC | Age: 60
End: 2025-08-20
Payer: COMMERCIAL

## 2025-08-20 DIAGNOSIS — E06.3 HASHIMOTO'S THYROIDITIS: Primary | ICD-10-CM

## 2025-08-20 RX ORDER — LEVOTHYROXINE SODIUM 88 UG/1
88 TABLET ORAL DAILY
Qty: 90 TABLET | Refills: 3 | Status: SHIPPED | OUTPATIENT
Start: 2025-08-20

## (undated) DEVICE — SYR BULB IRRIG DOVER 60 ML LATEX FREE 67000

## (undated) DEVICE — DRAPE SHEET 3/4 78X60"

## (undated) DEVICE — GLOVE PROTEXIS BLUE W/NEU-THERA 8.0  2D73EB80

## (undated) DEVICE — BNDG ABDOMINAL BINDER 10X26-50" 08140145

## (undated) DEVICE — MARKER MARGIN MARKER STD 6 COLOR SGL USE MMS6

## (undated) DEVICE — LIGHT HANDLE X2

## (undated) DEVICE — PACK LAPAROTOMY CUSTOM LAKES

## (undated) DEVICE — SYR BULB IRRIG 50ML LATEX FREE 0035280

## (undated) DEVICE — DRSG GAUZE 4X4" TRAY

## (undated) DEVICE — SUCTION TIP YANKAUER STR K87

## (undated) DEVICE — DRAPE SHEET REV FOLD 3/4 9349

## (undated) DEVICE — DRAPE STOCKINETTE IMPERVIOUS 08" LATEX 1586

## (undated) DEVICE — NDL 22GA 1.5"

## (undated) DEVICE — SU VICRYL 2-0 SH 27" UND J417H

## (undated) DEVICE — NDL COUNTER 20CT 31142493

## (undated) DEVICE — GOWN LG DISP 9515

## (undated) DEVICE — ESU PENCIL SMOKE EVAC W/ROCKER SWITCH 0703-047-000

## (undated) DEVICE — GLOVE PROTEXIS BLUE W/NEU-THERA 7.5  2D73EB75

## (undated) DEVICE — PACK LAP TRANSVERSE STD

## (undated) DEVICE — LABEL MEDICATION SYSTEM  3304

## (undated) DEVICE — SU MONOCRYL 4-0 PS-2 18" UND Y496G

## (undated) DEVICE — DRSG ADAPTIC 3X8" 6113

## (undated) DEVICE — TUBING SUCTION 12"X1/4" N612

## (undated) DEVICE — COVER NEOPROBE SOFTFLEX 5X96" W/BANDS 20-PC596

## (undated) DEVICE — SPONGE KITTNER 31001010

## (undated) DEVICE — SU VICRYL 2-0 CT-2 27" UND J269H

## (undated) DEVICE — DRSG ABDOMINAL 07 1/2X8" 7197D

## (undated) DEVICE — DECANTER VIAL 2006S

## (undated) DEVICE — GLOVE PROTEXIS W/NEU-THERA 7.5  2D73TE75

## (undated) DEVICE — SU PLAIN FAST ABSORB 5-0 PC-1 18" 1915G

## (undated) DEVICE — SU VICRYL 3-0 SH 27" UND J416H

## (undated) DEVICE — CLIP APPLIER 11" MED LIGACLIP MCM20

## (undated) DEVICE — BASIN SET MINOR DISP

## (undated) DEVICE — BNDG ESMARK 6" STERILE

## (undated) DEVICE — SOL WATER IRRIG 1000ML BOTTLE 2F7114

## (undated) DEVICE — SU SECURESEAL SKIN ADHESIVE 0.5ML CHSS-05

## (undated) DEVICE — BNDG COBAN 4"X5YDS STERILE

## (undated) DEVICE — GLOVE PROTEXIS POWDER FREE 8.0 ORTHOPEDIC 2D73ET80

## (undated) DEVICE — BLADE KNIFE SURG 15 371115

## (undated) DEVICE — PREP CHLORAPREP 26ML TINTED ORANGE  260815

## (undated) DEVICE — SOL WATER IRRIG 1000ML BOTTLE 07139-09

## (undated) DEVICE — SOL NACL 0.9% IRRIG 1000ML BOTTLE 07138-09

## (undated) DEVICE — ADH SKIN CLOSURE PREMIERPRO EXOFIN 1.0ML 3470

## (undated) DEVICE — DRAPE STERI U 1015

## (undated) DEVICE — SPONGE RAY-TEC 4X8" 7318

## (undated) DEVICE — DRSG STERI STRIP 1/2X4" R1547

## (undated) DEVICE — STOCKING SLEEVE COMPRESSION CALF MED

## (undated) DEVICE — SYR 10ML FINGER CONTROL W/O NDL 309695

## (undated) DEVICE — Device

## (undated) DEVICE — DRSG KERLIX FLUFFS X5

## (undated) DEVICE — DRAPE BREAST/CHEST 29420

## (undated) DEVICE — PACK ARTHROSCOPY KNEE SOP15AKFSM

## (undated) DEVICE — GOWN XLG DISP 9545

## (undated) DEVICE — GLOVE PROTEXIS W/NEU-THERA 8.0  2D73TE80

## (undated) DEVICE — BINDER MAMMARY LACE 9X36-45"

## (undated) DEVICE — BNDG ELASTIC 6"X5YDS UNSTERILE 6611-60

## (undated) DEVICE — SOL NACL 0.9% IRRIG 3000ML BAG 07972-08

## (undated) DEVICE — SU PROLENE 3-0 PS-2 18" 8687H

## (undated) DEVICE — SU SILK 2-0 SH 30" K833H

## (undated) DEVICE — DRAPE MAYO STAND 23X54 8337

## (undated) RX ORDER — LIDOCAINE HYDROCHLORIDE AND EPINEPHRINE 10; 10 MG/ML; UG/ML
INJECTION, SOLUTION INFILTRATION; PERINEURAL
Status: DISPENSED
Start: 2019-09-23

## (undated) RX ORDER — LIDOCAINE HYDROCHLORIDE 10 MG/ML
INJECTION, SOLUTION EPIDURAL; INFILTRATION; INTRACAUDAL; PERINEURAL
Status: DISPENSED
Start: 2019-08-15

## (undated) RX ORDER — FENTANYL CITRATE 50 UG/ML
INJECTION, SOLUTION INTRAMUSCULAR; INTRAVENOUS
Status: DISPENSED
Start: 2019-10-09

## (undated) RX ORDER — PROPOFOL 10 MG/ML
INJECTION, EMULSION INTRAVENOUS
Status: DISPENSED
Start: 2019-09-23

## (undated) RX ORDER — PROPOFOL 10 MG/ML
INJECTION, EMULSION INTRAVENOUS
Status: DISPENSED
Start: 2019-09-30

## (undated) RX ORDER — CEFAZOLIN SODIUM 1 G/3ML
INJECTION, POWDER, FOR SOLUTION INTRAMUSCULAR; INTRAVENOUS
Status: DISPENSED
Start: 2022-07-14

## (undated) RX ORDER — ONDANSETRON 2 MG/ML
INJECTION INTRAMUSCULAR; INTRAVENOUS
Status: DISPENSED
Start: 2022-07-14

## (undated) RX ORDER — HYDROMORPHONE HYDROCHLORIDE 1 MG/ML
INJECTION, SOLUTION INTRAMUSCULAR; INTRAVENOUS; SUBCUTANEOUS
Status: DISPENSED
Start: 2019-09-23

## (undated) RX ORDER — DEXAMETHASONE SODIUM PHOSPHATE 10 MG/ML
INJECTION, SOLUTION INTRAMUSCULAR; INTRAVENOUS
Status: DISPENSED
Start: 2019-09-23

## (undated) RX ORDER — GABAPENTIN 300 MG/1
CAPSULE ORAL
Status: DISPENSED
Start: 2019-09-23

## (undated) RX ORDER — FENTANYL CITRATE 50 UG/ML
INJECTION, SOLUTION INTRAMUSCULAR; INTRAVENOUS
Status: DISPENSED
Start: 2019-09-23

## (undated) RX ORDER — LIDOCAINE HYDROCHLORIDE AND EPINEPHRINE 10; 10 MG/ML; UG/ML
INJECTION, SOLUTION INFILTRATION; PERINEURAL
Status: DISPENSED
Start: 2019-09-30

## (undated) RX ORDER — BUPIVACAINE HYDROCHLORIDE 5 MG/ML
INJECTION, SOLUTION PERINEURAL
Status: DISPENSED
Start: 2019-10-09

## (undated) RX ORDER — LIDOCAINE HYDROCHLORIDE AND EPINEPHRINE 10; 10 MG/ML; UG/ML
INJECTION, SOLUTION INFILTRATION; PERINEURAL
Status: DISPENSED
Start: 2019-10-09

## (undated) RX ORDER — ONDANSETRON 2 MG/ML
INJECTION INTRAMUSCULAR; INTRAVENOUS
Status: DISPENSED
Start: 2019-09-30

## (undated) RX ORDER — FENTANYL CITRATE 50 UG/ML
INJECTION, SOLUTION INTRAMUSCULAR; INTRAVENOUS
Status: DISPENSED
Start: 2019-09-30

## (undated) RX ORDER — CEFAZOLIN SODIUM 1 G/3ML
INJECTION, POWDER, FOR SOLUTION INTRAMUSCULAR; INTRAVENOUS
Status: DISPENSED
Start: 2019-09-30

## (undated) RX ORDER — OXYCODONE HYDROCHLORIDE 5 MG/1
TABLET ORAL
Status: DISPENSED
Start: 2022-07-14

## (undated) RX ORDER — PROPOFOL 10 MG/ML
INJECTION, EMULSION INTRAVENOUS
Status: DISPENSED
Start: 2022-07-14

## (undated) RX ORDER — LIDOCAINE HYDROCHLORIDE 10 MG/ML
INJECTION, SOLUTION EPIDURAL; INFILTRATION; INTRACAUDAL; PERINEURAL
Status: DISPENSED
Start: 2019-09-30

## (undated) RX ORDER — ONDANSETRON 2 MG/ML
INJECTION INTRAMUSCULAR; INTRAVENOUS
Status: DISPENSED
Start: 2019-10-09

## (undated) RX ORDER — KETOROLAC TROMETHAMINE 30 MG/ML
INJECTION, SOLUTION INTRAMUSCULAR; INTRAVENOUS
Status: DISPENSED
Start: 2022-07-14

## (undated) RX ORDER — KETAMINE HCL IN 0.9 % NACL 50 MG/5 ML
SYRINGE (ML) INTRAVENOUS
Status: DISPENSED
Start: 2019-10-09

## (undated) RX ORDER — DEXAMETHASONE SODIUM PHOSPHATE 4 MG/ML
INJECTION, SOLUTION INTRA-ARTICULAR; INTRALESIONAL; INTRAMUSCULAR; INTRAVENOUS; SOFT TISSUE
Status: DISPENSED
Start: 2019-10-09

## (undated) RX ORDER — ACETAMINOPHEN 325 MG/1
TABLET ORAL
Status: DISPENSED
Start: 2022-07-14

## (undated) RX ORDER — BUPIVACAINE HYDROCHLORIDE 5 MG/ML
INJECTION, SOLUTION PERINEURAL
Status: DISPENSED
Start: 2019-09-23

## (undated) RX ORDER — KETOROLAC TROMETHAMINE 30 MG/ML
INJECTION, SOLUTION INTRAMUSCULAR; INTRAVENOUS
Status: DISPENSED
Start: 2019-09-30

## (undated) RX ORDER — LIDOCAINE HYDROCHLORIDE 20 MG/ML
JELLY TOPICAL
Status: DISPENSED
Start: 2019-09-23

## (undated) RX ORDER — HYDROXYZINE HYDROCHLORIDE 25 MG/1
TABLET, FILM COATED ORAL
Status: DISPENSED
Start: 2022-07-14

## (undated) RX ORDER — BUPIVACAINE HYDROCHLORIDE 5 MG/ML
INJECTION, SOLUTION PERINEURAL
Status: DISPENSED
Start: 2019-09-30

## (undated) RX ORDER — CEFAZOLIN SODIUM 2 G/100ML
INJECTION, SOLUTION INTRAVENOUS
Status: DISPENSED
Start: 2019-09-23

## (undated) RX ORDER — FENTANYL CITRATE 50 UG/ML
INJECTION, SOLUTION INTRAMUSCULAR; INTRAVENOUS
Status: DISPENSED
Start: 2022-07-14

## (undated) RX ORDER — KETAMINE HCL IN 0.9 % NACL 50 MG/5 ML
SYRINGE (ML) INTRAVENOUS
Status: DISPENSED
Start: 2019-09-30

## (undated) RX ORDER — LIDOCAINE HYDROCHLORIDE 10 MG/ML
INJECTION, SOLUTION EPIDURAL; INFILTRATION; INTRACAUDAL; PERINEURAL
Status: DISPENSED
Start: 2019-10-09

## (undated) RX ORDER — ACETAMINOPHEN 325 MG/1
TABLET ORAL
Status: DISPENSED
Start: 2019-09-30

## (undated) RX ORDER — ACETAMINOPHEN 325 MG/1
TABLET ORAL
Status: DISPENSED
Start: 2019-10-09

## (undated) RX ORDER — DIPHENHYDRAMINE HYDROCHLORIDE 50 MG/ML
INJECTION INTRAMUSCULAR; INTRAVENOUS
Status: DISPENSED
Start: 2019-09-30

## (undated) RX ORDER — ACETAMINOPHEN 325 MG/1
TABLET ORAL
Status: DISPENSED
Start: 2019-09-23

## (undated) RX ORDER — DEXAMETHASONE SODIUM PHOSPHATE 10 MG/ML
INJECTION, SOLUTION INTRAMUSCULAR; INTRAVENOUS
Status: DISPENSED
Start: 2019-09-30

## (undated) RX ORDER — PROPOFOL 10 MG/ML
INJECTION, EMULSION INTRAVENOUS
Status: DISPENSED
Start: 2019-10-09

## (undated) RX ORDER — GABAPENTIN 300 MG/1
CAPSULE ORAL
Status: DISPENSED
Start: 2019-10-09

## (undated) RX ORDER — HYDROCODONE BITARTRATE AND ACETAMINOPHEN 5; 325 MG/1; MG/1
TABLET ORAL
Status: DISPENSED
Start: 2019-09-23

## (undated) RX ORDER — DEXAMETHASONE SODIUM PHOSPHATE 4 MG/ML
INJECTION, SOLUTION INTRA-ARTICULAR; INTRALESIONAL; INTRAMUSCULAR; INTRAVENOUS; SOFT TISSUE
Status: DISPENSED
Start: 2019-09-23

## (undated) RX ORDER — CEPHALEXIN 500 MG/1
CAPSULE ORAL
Status: DISPENSED
Start: 2023-03-02